# Patient Record
Sex: MALE | Race: WHITE | NOT HISPANIC OR LATINO | Employment: OTHER | URBAN - NONMETROPOLITAN AREA
[De-identification: names, ages, dates, MRNs, and addresses within clinical notes are randomized per-mention and may not be internally consistent; named-entity substitution may affect disease eponyms.]

---

## 2017-01-27 ENCOUNTER — HISTORY (OUTPATIENT)
Dept: FAMILY MEDICINE | Facility: OTHER | Age: 77
End: 2017-01-27

## 2017-01-27 ENCOUNTER — OFFICE VISIT - GICH (OUTPATIENT)
Dept: FAMILY MEDICINE | Facility: OTHER | Age: 77
End: 2017-01-27

## 2017-01-27 DIAGNOSIS — R20.2 PARESTHESIA OF SKIN: ICD-10-CM

## 2017-01-27 DIAGNOSIS — I10 ESSENTIAL (PRIMARY) HYPERTENSION: ICD-10-CM

## 2017-01-27 DIAGNOSIS — E55.9 VITAMIN D DEFICIENCY: ICD-10-CM

## 2017-01-27 DIAGNOSIS — G25.0 ESSENTIAL TREMOR: ICD-10-CM

## 2017-01-27 LAB
VIT B12 SERPL-MCNC: 647 PG/ML (ref 180–914)
VITAMIN D TOTAL - HISTORICAL: 36.6 NG/ML

## 2017-01-27 ASSESSMENT — PATIENT HEALTH QUESTIONNAIRE - PHQ9: SUM OF ALL RESPONSES TO PHQ QUESTIONS 1-9: 0

## 2017-02-07 ENCOUNTER — COMMUNICATION - GICH (OUTPATIENT)
Dept: FAMILY MEDICINE | Facility: OTHER | Age: 77
End: 2017-02-07

## 2017-02-07 ENCOUNTER — HOSPITAL ENCOUNTER (OUTPATIENT)
Dept: RADIOLOGY | Facility: OTHER | Age: 77
End: 2017-02-07
Attending: FAMILY MEDICINE

## 2017-02-07 DIAGNOSIS — R20.2 PARESTHESIA OF SKIN: ICD-10-CM

## 2017-02-07 DIAGNOSIS — G25.0 ESSENTIAL TREMOR: ICD-10-CM

## 2017-02-07 DIAGNOSIS — I10 ESSENTIAL (PRIMARY) HYPERTENSION: ICD-10-CM

## 2017-02-27 ENCOUNTER — COMMUNICATION - GICH (OUTPATIENT)
Dept: FAMILY MEDICINE | Facility: OTHER | Age: 77
End: 2017-02-27

## 2017-04-18 ENCOUNTER — HISTORY (OUTPATIENT)
Dept: EMERGENCY MEDICINE | Facility: OTHER | Age: 77
End: 2017-04-18

## 2017-06-19 ENCOUNTER — COMMUNICATION - GICH (OUTPATIENT)
Dept: FAMILY MEDICINE | Facility: OTHER | Age: 77
End: 2017-06-19

## 2017-06-19 DIAGNOSIS — G25.0 ESSENTIAL TREMOR: ICD-10-CM

## 2017-08-08 ENCOUNTER — OFFICE VISIT - GICH (OUTPATIENT)
Dept: FAMILY MEDICINE | Facility: OTHER | Age: 77
End: 2017-08-08

## 2017-08-08 ENCOUNTER — HISTORY (OUTPATIENT)
Dept: FAMILY MEDICINE | Facility: OTHER | Age: 77
End: 2017-08-08

## 2017-08-08 DIAGNOSIS — T16.2XXA FOREIGN BODY OF LEFT EAR: ICD-10-CM

## 2017-10-04 ENCOUNTER — HISTORY (OUTPATIENT)
Dept: INTERNAL MEDICINE | Facility: OTHER | Age: 77
End: 2017-10-04

## 2017-10-04 ENCOUNTER — OFFICE VISIT - GICH (OUTPATIENT)
Dept: INTERNAL MEDICINE | Facility: OTHER | Age: 77
End: 2017-10-04

## 2017-10-04 DIAGNOSIS — L57.0 ACTINIC KERATOSIS: ICD-10-CM

## 2017-10-04 ASSESSMENT — PATIENT HEALTH QUESTIONNAIRE - PHQ9: SUM OF ALL RESPONSES TO PHQ QUESTIONS 1-9: 0

## 2017-10-09 ENCOUNTER — HISTORY (OUTPATIENT)
Dept: UROLOGY | Facility: OTHER | Age: 77
End: 2017-10-09

## 2017-10-09 ENCOUNTER — AMBULATORY - GICH (OUTPATIENT)
Dept: UROLOGY | Facility: OTHER | Age: 77
End: 2017-10-09

## 2017-10-09 DIAGNOSIS — Z85.51 PERSONAL HISTORY OF MALIGNANT NEOPLASM OF BLADDER: ICD-10-CM

## 2017-10-13 ENCOUNTER — AMBULATORY - GICH (OUTPATIENT)
Dept: SCHEDULING | Facility: OTHER | Age: 77
End: 2017-10-13

## 2017-12-07 ENCOUNTER — COMMUNICATION - GICH (OUTPATIENT)
Dept: INTERNAL MEDICINE | Facility: OTHER | Age: 77
End: 2017-12-07

## 2017-12-07 DIAGNOSIS — I25.10 ATHEROSCLEROTIC HEART DISEASE OF NATIVE CORONARY ARTERY WITHOUT ANGINA PECTORIS: ICD-10-CM

## 2017-12-13 ENCOUNTER — HISTORY (OUTPATIENT)
Dept: INTERNAL MEDICINE | Facility: OTHER | Age: 77
End: 2017-12-13

## 2017-12-13 ENCOUNTER — OFFICE VISIT - GICH (OUTPATIENT)
Dept: INTERNAL MEDICINE | Facility: OTHER | Age: 77
End: 2017-12-13

## 2017-12-13 DIAGNOSIS — I10 ESSENTIAL (PRIMARY) HYPERTENSION: ICD-10-CM

## 2017-12-13 DIAGNOSIS — R53.83 OTHER FATIGUE: ICD-10-CM

## 2017-12-13 DIAGNOSIS — R73.03 PREDIABETES: ICD-10-CM

## 2017-12-13 DIAGNOSIS — R53.81 OTHER MALAISE: ICD-10-CM

## 2017-12-13 DIAGNOSIS — I25.10 ATHEROSCLEROTIC HEART DISEASE OF NATIVE CORONARY ARTERY WITHOUT ANGINA PECTORIS: ICD-10-CM

## 2017-12-13 DIAGNOSIS — E55.9 VITAMIN D DEFICIENCY: ICD-10-CM

## 2017-12-13 DIAGNOSIS — Z86.39 PERSONAL HISTORY OF OTHER ENDOCRINE, NUTRITIONAL AND METABOLIC DISEASE: ICD-10-CM

## 2017-12-13 DIAGNOSIS — G25.0 ESSENTIAL TREMOR: ICD-10-CM

## 2017-12-13 DIAGNOSIS — Z85.819 PERSONAL HISTORY OF MALIGNANT NEOPLASM OF UNSPECIFIED SITE OF LIP, ORAL CAVITY, AND PHARYNX: ICD-10-CM

## 2017-12-13 DIAGNOSIS — L57.0 ACTINIC KERATOSIS: ICD-10-CM

## 2017-12-13 DIAGNOSIS — E78.2 MIXED HYPERLIPIDEMIA: ICD-10-CM

## 2017-12-13 DIAGNOSIS — R73.09 OTHER ABNORMAL GLUCOSE: ICD-10-CM

## 2017-12-13 LAB
A/G RATIO - HISTORICAL: 1.7 (ref 1–2)
ALBUMIN SERPL-MCNC: 4.1 G/DL (ref 3.5–5.7)
ALP SERPL-CCNC: 50 IU/L (ref 34–104)
ALT (SGPT) - HISTORICAL: 20 IU/L (ref 7–52)
ANION GAP - HISTORICAL: 7 (ref 5–18)
AST SERPL-CCNC: 23 IU/L (ref 13–39)
BACTERIA URINE: NORMAL BACTERIA/HPF
BILIRUB SERPL-MCNC: 0.5 MG/DL (ref 0.3–1)
BILIRUB UR QL: NEGATIVE
BUN SERPL-MCNC: 8 MG/DL (ref 7–25)
BUN/CREAT RATIO - HISTORICAL: 10
CALCIUM SERPL-MCNC: 8.7 MG/DL (ref 8.6–10.3)
CHLORIDE SERPLBLD-SCNC: 102 MMOL/L (ref 98–107)
CHOL/HDL RATIO - HISTORICAL: 2.8
CHOLESTEROL TOTAL: 112 MG/DL
CLARITY, URINE: CLEAR CLARITY
CO2 SERPL-SCNC: 26 MMOL/L (ref 21–31)
COLOR UR: YELLOW COLOR
CREAT SERPL-MCNC: 0.83 MG/DL (ref 0.7–1.3)
EPITHELIAL CELLS: NORMAL EPI/HPF
ERYTHROCYTE [DISTWIDTH] IN BLOOD BY AUTOMATED COUNT: 12.2 % (ref 11.5–15.5)
ESTIMATED AVERAGE GLUCOSE: 126 MG/DL
GFR IF NOT AFRICAN AMERICAN - HISTORICAL: >60 ML/MIN/1.73M2
GLOBULIN - HISTORICAL: 2.4 G/DL (ref 2–3.7)
GLUCOSE SERPL-MCNC: 109 MG/DL (ref 70–105)
GLUCOSE URINE: NEGATIVE MG/DL
HCT VFR BLD AUTO: 41.5 % (ref 37–53)
HDLC SERPL-MCNC: 40 MG/DL (ref 23–92)
HEMOGLOBIN A1C MONITORING (POCT) - HISTORICAL: 6 % (ref 4–6.2)
HEMOGLOBIN: 14.5 G/DL (ref 13.5–17.5)
KETONES UR QL: NEGATIVE MG/DL
LDLC SERPL CALC-MCNC: 57 MG/DL
LEUKOCYTE ESTERASE URINE: NEGATIVE
MCH RBC QN AUTO: 33.8 PG (ref 26–34)
MCHC RBC AUTO-ENTMCNC: 34.9 G/DL (ref 32–36)
MCV RBC AUTO: 97 FL (ref 80–100)
NITRITE UR QL STRIP: NEGATIVE
NON-HDL CHOLESTEROL - HISTORICAL: 72 MG/DL
OCCULT BLOOD,URINE - HISTORICAL: NEGATIVE
OTHER: NORMAL
PH UR: 7.5 [PH]
PLATELET # BLD AUTO: 322 THOU/CU MM (ref 140–440)
PMV BLD: 9.6 FL (ref 6.5–11)
POTASSIUM SERPL-SCNC: 4.6 MMOL/L (ref 3.5–5.1)
PROT SERPL-MCNC: 6.5 G/DL (ref 6.4–8.9)
PROTEIN QUALITATIVE,URINE - HISTORICAL: 30 MG/DL
PROVIDER ORDERDED STATUS - HISTORICAL: NORMAL
RBC - HISTORICAL: NORMAL /HPF
RED BLOOD COUNT - HISTORICAL: 4.29 MIL/CU MM (ref 4.3–5.9)
SODIUM SERPL-SCNC: 135 MMOL/L (ref 133–143)
SP GR UR STRIP: 1.01
TRIGL SERPL-MCNC: 75 MG/DL
TSH - HISTORICAL: 3.34 UIU/ML (ref 0.34–5.6)
UROBILINOGEN,QUALITATIVE - HISTORICAL: NORMAL EU/DL
VIT B12 SERPL-MCNC: 678 PG/ML (ref 180–914)
VITAMIN D TOTAL - HISTORICAL: 34.3 NG/ML
WBC - HISTORICAL: NORMAL /HPF
WHITE BLOOD COUNT - HISTORICAL: 7.5 THOU/CU MM (ref 4.5–11)

## 2017-12-13 ASSESSMENT — PATIENT HEALTH QUESTIONNAIRE - PHQ9: SUM OF ALL RESPONSES TO PHQ QUESTIONS 1-9: 0

## 2017-12-18 ENCOUNTER — COMMUNICATION - GICH (OUTPATIENT)
Dept: INTERNAL MEDICINE | Facility: OTHER | Age: 77
End: 2017-12-18

## 2017-12-27 NOTE — PROGRESS NOTES
"Patient Information     Patient Name MRN Sex Rajesh Boothe [id number] Male 1940      Progress Notes by Rhina Omer NP at 2017  9:00 AM     Author:  Rhina Omer NP Service:  (none) Author Type:  PHYS- Nurse Practitioner     Filed:  2017  9:51 AM Encounter Date:  2017 Status:  Signed     :  Rhina Omer NP (PHYS- Nurse Practitioner)            HPI:  Nursing Notes:   Rin Perera  2017  9:13 AM  Signed  Patient presents to clinic with part of his hearing aid stuck in his left ear.  Rin PereraLPN ....................  2017   9:02 AM      Rajesh Huynh is a 77 y.o. male who presents to clinic today for hearing aid \"dome\"  stuck in left ear canal since last night. Has happened in the past and has been removed by medical staff without difficulty. The ear feels mildly painful, like the dome is pressing against the ear. He has tried to remove the dome without success.    Past Medical History:     Diagnosis  Date     Chronic pancreatitis (HC)      DM2 (diabetes mellitus, type 2) (HC)      ED (erectile dysfunction)      Former smoker     2ppd - quit       HTN (hypertension)      Hx of radiation therapy 2012-3/2/2012     Hyperlipidemia      Myeloproliferative disorder (HC)     questionable      Personal history of malignant neoplasm of bladder 2015     Pneumonia      Squamous cell carcinoma of epiglottis (HC)      Radiation      STEMI (ST elevation myocardial infarction) () 2013    Integrity BMS to RCA done at [facility]      Past Surgical History:      Procedure  Laterality Date     APPENDECTOMY  1970     CHOLECYSTECTOMY       COLONOSCOPY SCREENING  2005     COLONOSCOPY SCREENING  2015    no repeat due at this time       CORONARY STENT PLACEMENT  2013    Bare-metal stent to dominant RCA       CYSTOSCOPY  Multiple times     Dr. Lyndon FRANK       ERCP      xseveral, " one with papillotomy       Hx Whipple procedure  2004    for chronic pancreatitis       LEFT TOTAL SHOULDER REPLACEMENT  10/22/15     NECK/THORAX PROCEDURE  12/7/2011    L modified radical neck surgery       RADIATION TREATMENT  1/30/2012-3/2/2012     Resection Transurethral Bladder Tumor  11/10/14    Dr. Lyndon FRANK       REVISION EYELID  2012    Dr. Farias       SHOULDER ARTHROSCOPY  11/2005    right shoulder       SHOULDER ARTHROSCOPY  1/2006    left shoulder       SHOULDER ARTHROSCOPY  2011    Left shoulder scope SAD, ac.  Open subscap, biceps       SHOULDER REPLACEMENT Right 6/22/2007    with biceps tenodesis by Dr. Mukesh Ayoub       TONSIL AND ADENOIDECTOMY  1947     VASECTOMY  1980     Social History      Substance Use Topics        Smoking status:  Former Smoker     Packs/day: 3.00     Years: 43.00     Types: Cigarettes     Quit date: 1/1/2000     Smokeless tobacco:  Never Used     Alcohol use  No     Current Outpatient Prescriptions       Medication  Sig Dispense Refill     ACCU-CHEK RD PLUS TEST STRP strip USE ONE STRIP TO CHECK GLUCOSE THREE TIMES DAILY 300 Strip 2     aspirin enteric coated 81 mg tablet Take 1 tablet by mouth once daily with a meal.  0     atorvastatin (LIPITOR) 40 mg tablet Take 1 tablet by mouth once daily with evening meal. 90 tablet 2     betamethasone,augmented dipropionate 0.05% (DIPROLENE LOTION 0.05%) lotion        blood-glucose meter Dispense Accu-Chek Rd  Meter. E11.9 NIDDM type II - Test 2 times/day. Reason: New medication 1 Device 1     calcium carbonate-cholecalciferol, 600mg-200 units, (CALCIUM 600 + D,3,) 600 mg(1,500mg) -200 unit tablet Take 1 tablet by mouth 2 times daily with meals.  0     Fish Oil-DHA-EPA 1,200-144-216 mg cap Take 1 capsule by mouth 2 times daily.  0     lancets (ACCU-CHEK SOFTCLIX LANCETS) Dispense item covered by pt ins. E11.9 NIDDM type II - Test 2 times/day. Reason: High A1C 100 Each 3     metFORMIN (GLUCOPHAGE) 1,000 mg tablet Take 1 tablet  "by mouth 2 times daily with meals. 180 tablet 3     multivitamin (MVI) tablet Take 1 tablet by mouth once daily.  0     nitroglycerin (NITROSTAT) 0.4 mg sublingual tablet Place 1 tablet under the tongue every 5 minutes if needed for Chest Pain. 25 tablet 0     primidone (MYSOLINE) 50 mg tablet TAKE TWO TABLETS BY MOUTH AT BEDTIME 60 tablet 5     propranolol (INDERAL) 20 mg tablet Take 1 tablet by mouth 2 times daily. 180 tablet 3     Vacuum Erection Device System kit As directed. Use as instructed for ED 1 Kit 0     No current facility-administered medications for this visit.      Medications have been reviewed by me and are current to the best of my knowledge and ability.    No Known Allergies    ROS:  Refer to HPI    /74  Pulse 68  Temp 98.2  F (36.8  C) (Tympanic)   Ht 1.715 m (5' 7.5\")  Wt 79.5 kg (175 lb 3.2 oz)  BMI 27.04 kg/m2    EXAM:  General Appearance: Well appearing male,  appropriate appearance for age. No acute distress  Head: normocephalic, atraumatic  Ears: Large dome obscuring left ear canal prior to removing it  Psychological: normal affect, alert and pleasant    ASSESSMENT/PLAN:    ICD-10-CM    1. Ear foreign body, left, initial encounter T16.2XXA    Hearing aid dome stuck in ear canal  On exam: Black hearing aid dome stuck in left ear canal  Easily removed with alligator clamp forceps  Follow up as needed    There are no Patient Instructions on file for this visit.          "

## 2017-12-28 ENCOUNTER — COMMUNICATION - GICH (OUTPATIENT)
Dept: INTERNAL MEDICINE | Facility: OTHER | Age: 77
End: 2017-12-28

## 2017-12-28 NOTE — PATIENT INSTRUCTIONS
Patient Information     Patient Name MRN Rajesh Ball [id number] Male 1940      Patient Instructions by Jovanni Sen MD at 10/4/2017 10:40 AM     Author:  Jovanni Sen MD Service:  (none) Author Type:  Physician     Filed:  10/4/2017 11:05 AM Encounter Date:  10/4/2017 Status:  Signed     :  Jovanni Sen MD (Physician)            What to Expect Following Cryosurgery (Liquid Nitrogen)   What is Cryosurgery?   Cryosurgery is a technique for removing skin lesions that primarily involve the surface of the skin, such as warts, seborrheic keratosis, or actinic keratosis. It is a quick method of removing the lesion with minimal scarring.   The liquid nitrogen needs to be applied long enough to freeze the affected skin. By freezing the skin, a blister is created underneath the lesion. Ideally, as the new skin forms underneath the blister, the abnormal skin on the roof of the blister peels off. Occasionally, if the lesion is very thick (such as a large wart), only the surface is blistered off. The base or residual lesion may need to be frozen at another visit.   It takes about one to two weeks for the scab to fall off, which is when the new layer of skin has formed under the blister. Areas of thinner skin, such as the face, may heal a little faster.      What to Expect Over the Next Few Weeks     During Treatment - Area being treated will sting, burn, and then possibly itch.     Immediately After Treatment - Area will be red, sore, and swollen.     Next Day - Blister or blood blister has formed, tenderness starts to subside. Apply a Band-Aid if   necessary.     7 Days - Surface is dark red/brown and scab-like. Apply Vaseline  or an antibacterial ointment, such as Polysporin , if necessary.     2 to 4 Weeks - The surface starts to peel off. This may be encouraged gently during bathing, when the scab is softened.     No makeup should be applied until area is fully healed.      How to  Take Care of the Skin after Cryosurgery     A Band-Aid can be used for larger blisters or blisters in areas that are more likely to be traumatized -such as fingers and toes. If the area becomes dry or crusted, an ointment (Vaseline , Bacitracin , or Polysporin ) can also be applied.     Cleanse area with a mild cleanser and cool water.     Pat the area dry with a lint-free cloth and apply an ointment (Vaseline , Bacitracin , or Polysporin ).     Avoid glycolic acids, Vitamin C, scrubs, Tretinoin (Retin-A), and Retinol creams for 7 to 10 days.     If approved by your Provider, you may bathe, swim, exercise, and otherwise follow all of your normal activities.     The area may get wet while bathing, but swimming or hot tub use should be avoided for one week following a treatment or while the skin is open.     Within 24 hours, you can expect the area to be swollen and/or blistered. The blister may not be visible to the naked eye.     Within one week, the swelling goes down. The top becomes dark red and scab-like. The scab will loosen over the next weeks, and should fall off within one month.      Adverse Effects     The most common adverse effects are pain, swelling/blistering, potential for infection, and pale discoloration of the skin after it heals.      Blisters        Anytime a blister surfaces, whether from ill-fitting shoes, an oven burn, or liquid nitrogen cryosurgery, it will be a bit painful. For most patients, the pain is a temporary sting with some discomfort periodically over the next day as the blister forms.     The goal is to achieve a blister. This means, most commonly, patients will have a blister form following treatment. Sometimes, the blister is so thin that it can't be seen and may have minimal swelling. Occasionally, a blood blister forms that can be quite dramatic but is harmless.     Rarely, the blister may become infected. When this happens, the blister becomes unusually tender, the fluid  becomes cloudy, and the redness around it becomes more extensive (and may even form streaks). If this happens, contact our office.     Some lesions, especially those on the face, may leave a slight pale discoloration.     True scarring, involving deeper layers of the skin is unlikely.       Establish Care visit in Middle of December sometime.

## 2017-12-28 NOTE — TELEPHONE ENCOUNTER
Patient Information     Patient Name MRN Sex Rajesh Boothe [id number] Male 1940      Telephone Encounter by Jeanne Handy RN at 2017  3:26 PM     Author:  Jeanne Handy RN Service:  (none) Author Type:  NURS- Registered Nurse     Filed:  2017  3:32 PM Encounter Date:  2017 Status:  Signed     :  Jeanne Handy RN (NURS- Registered Nurse)            In clinical absence of patient's primary, Hosea Lu MD, patient is requesting that this message be sent to the primary provider's Teamlet for consideration please.        This is a Refill request from: [facility]   Name of Medication: Primidone  Quantity requested: 60 with 5 refills  Last fill date: 16  Due for refill: yes  Last visit with HOSEA LU was on: 2017 in [facility]  PCP:  Hosea Lu MD  Controlled Substance Agreement:  n/a   Diagnosis r/t this medication request: Tremor     Unable to complete prescription refill per RN Medication Refill Policy.................... Jeanne Handy RN ....................  2017   3:28 PM

## 2017-12-28 NOTE — PROGRESS NOTES
"Patient Information     Patient Name MRN Rajesh Ball [id number] Male 1940      Progress Notes by Jovanni Sen MD at 10/4/2017 10:40 AM     Author:  Jovanni Sen MD Service:  (none) Author Type:  Physician     Filed:  10/4/2017 11:16 AM Encounter Date:  10/4/2017 Status:  Signed     :  Jovanni Sen MD (Physician)            Nursing Notes:   Kate Gar  10/4/2017 10:53 AM  Signed  Previous A1C is at goal of <8  HEMOGLOBIN A1C MONITORING (POCT)    Date Value   2016 5.6 %   2011 6.0 % Total Hgb     HEMOGLOBIN A1C GIH (%)    Date Value   2012 5.7     Urine microalbumin:creatine: n/a  Foot exam unknown  Eye exam this past year    Patient is not a current smoker  Patient is on a daily aspirin  Patient is on a Statin.  Blood pressure today of   is at the goal of <139/89 for diabetics.    Kate Gar LPN..............10/4/2017 10:37 AM      Kate Gar  10/4/2017 10:53 AM  Signed  Patient presents to the clinic for multiple \"dry\" spots on the left side of the face and in the left ear noted over the past year.    Kate Gar LPN        10/4/2017 10:39 AM    Chief Complaint    Patient presents with      Derm Problem     S: Patient presented his wife due to multiple little red scaly spots on his head neck and face.  He's had some of them for over a year.  He used to spend many hours at side and was sunburned many times.  He does not have history of skin cancer.  He does have multiple other cancers in the past including bladder cancer, myeloproliferative disorders, epiglottis squamous cell carcinoma.    States that the lesions do seem to get itch and traumatized at times.  None of them have really cause any bleeding.  He has had a few new ones.    ROS: No shortness breath.  No chest pain or heaviness.  No bruising or bleeding issues.  No recent fevers or chills.    O:   Vitals:     10/04/17 1039   BP: 136/82   Pulse: 64   Weight: 80.3 kg (177 lb 2 oz)     BP " Readings from Last 3 Encounters:    10/04/17 136/82   08/08/17 128/74   04/18/17 103/82     EXAM:  Pleasant 77-year-old male in no apparent distress.  Wears glasses.  Breathing is nonlabored, no wheezing.  Pulses regular.  Got up onto the examination table without difficulty.    Multiple actinic keratoses noted on head/neck - 12 noted - one on right ear, right sideburn, right temple, 2 on the right upper hairline.  3 along left upper hairline, 3 along left sideburn, 1 on left neck just behind the left ear.    PROCEDURE:   Reviewed risks and benefits of cryotherapy. After informed consent was obtained, patient elected to proceed. These 12 lesions were treated today.   Performed cryotherapy to #12 lesions x 2 rounds of 30 second freeze/thaw cycles.   Tolerated well. No obvious complications.   Return for signs of infection.    The patient and I reviewed safe sun practices today: the importance of staying out of the sun; especially between 10AM-2PM, wearing sun screen SPF > 30, and protective clothing.   We also discussed the ABC's of skin cancers including: changes in size, uniformity, borders, coloration, bleeding, or any irregularity or changes. If these occur, seek medical attention.   The patient should have a complete skin exam by a medical professional every 6-12 months, and any questionable/suspicious, or changing lesions should be removed.     A:   Rajesh was seen today for derm problem.    Diagnoses and all orders for this visit:    Actinic keratosis of multiple sites of head and neck  -     WA DESTROY PREMALIGNANT 1ST LESION  -     WA DESTROY PREMALIGNANT LESION EA 2-14      P:   Return for -- Establish Care in Mid-December.    Patient Instructions   What to Expect Following Cryosurgery (Liquid Nitrogen)   What is Cryosurgery?   Cryosurgery is a technique for removing skin lesions that primarily involve the surface of the skin, such as warts, seborrheic keratosis, or actinic keratosis. It is a quick method  of removing the lesion with minimal scarring.   The liquid nitrogen needs to be applied long enough to freeze the affected skin. By freezing the skin, a blister is created underneath the lesion. Ideally, as the new skin forms underneath the blister, the abnormal skin on the roof of the blister peels off. Occasionally, if the lesion is very thick (such as a large wart), only the surface is blistered off. The base or residual lesion may need to be frozen at another visit.   It takes about one to two weeks for the scab to fall off, which is when the new layer of skin has formed under the blister. Areas of thinner skin, such as the face, may heal a little faster.      What to Expect Over the Next Few Weeks     During Treatment - Area being treated will sting, burn, and then possibly itch.     Immediately After Treatment - Area will be red, sore, and swollen.     Next Day - Blister or blood blister has formed, tenderness starts to subside. Apply a Band-Aid if   necessary.     7 Days - Surface is dark red/brown and scab-like. Apply Vaseline  or an antibacterial ointment, such as Polysporin , if necessary.     2 to 4 Weeks - The surface starts to peel off. This may be encouraged gently during bathing, when the scab is softened.     No makeup should be applied until area is fully healed.      How to Take Care of the Skin after Cryosurgery     A Band-Aid can be used for larger blisters or blisters in areas that are more likely to be traumatized -such as fingers and toes. If the area becomes dry or crusted, an ointment (Vaseline , Bacitracin , or Polysporin ) can also be applied.     Cleanse area with a mild cleanser and cool water.     Pat the area dry with a lint-free cloth and apply an ointment (Vaseline , Bacitracin , or Polysporin ).     Avoid glycolic acids, Vitamin C, scrubs, Tretinoin (Retin-A), and Retinol creams for 7 to 10 days.     If approved by your Provider, you may bathe, swim, exercise, and otherwise follow  all of your normal activities.     The area may get wet while bathing, but swimming or hot tub use should be avoided for one week following a treatment or while the skin is open.     Within 24 hours, you can expect the area to be swollen and/or blistered. The blister may not be visible to the naked eye.     Within one week, the swelling goes down. The top becomes dark red and scab-like. The scab will loosen over the next weeks, and should fall off within one month.      Adverse Effects     The most common adverse effects are pain, swelling/blistering, potential for infection, and pale discoloration of the skin after it heals.      Blisters        Anytime a blister surfaces, whether from ill-fitting shoes, an oven burn, or liquid nitrogen cryosurgery, it will be a bit painful. For most patients, the pain is a temporary sting with some discomfort periodically over the next day as the blister forms.     The goal is to achieve a blister. This means, most commonly, patients will have a blister form following treatment. Sometimes, the blister is so thin that it can't be seen and may have minimal swelling. Occasionally, a blood blister forms that can be quite dramatic but is harmless.     Rarely, the blister may become infected. When this happens, the blister becomes unusually tender, the fluid becomes cloudy, and the redness around it becomes more extensive (and may even form streaks). If this happens, contact our office.     Some lesions, especially those on the face, may leave a slight pale discoloration.     True scarring, involving deeper layers of the skin is unlikely.       Establish Care visit in Middle of December sometime.     Jovanni Sen MD

## 2017-12-28 NOTE — PROGRESS NOTES
Patient Information     Patient Name MRN Sex Rajesh Boothe [id number] Male 1940      Progress Notes by Anthony Haney MD at 10/9/2017  8:00 AM     Author:  Anthony Haney MD Service:  (none) Author Type:  Physician     Filed:  10/9/2017  8:38 AM Encounter Date:  10/9/2017 Status:  Signed     :  Anthony Haney MD (Physician)            Preprocedure diagnosis  History of bladder cancer    Postprocedure diagnosis  History of bladder cancer    Procedure  Flexible Cystourethroscopy    Surgeon  Anthony Haney MD    Anesthesia  2% lidocaine jelly intraurethrally    Complications  None    Indications  77 y.o. male undergoing a flexible cystoscopy for the above mentioned indications.    Pathology   I personally reviewed the pathology report   11/10/2014   Low grade Ta (muscularis propria was present in specimen)    Findings  Cystoscopic findings included a normal anterior urethra.    There was a prominent median lobe.    The lateral lobes were obstructive in appearance.  The bladder appeared to be normal capacity.    There were no tumors, stones or foreign bodies.    The orifices were slit-shaped and in their normal location.    Procedure  The patient was placed in supine position and prepped and draped in sterile fashion with lidocaine jelly per urethra for anesthesia.    I passed a lubricated 14F flexible cystoscope through the penile urethra and into the bladder and the bladder was completely visualized.  The cystoscope was retroflexed and the bladder neck and prostate visualized.    The cystoscope was slowly withdrawn while visualizing the urethra and the procedure terminated.    The patient tolerated the procedure well.      Plan  Follow up surveillance cystoscopy annually

## 2017-12-28 NOTE — PATIENT INSTRUCTIONS
Patient Information     Patient Name MRRajesh Maxwell [id number] Male 1940      Patient Instructions by Rosio Rivera RN at 10/9/2017  8:00 AM     Author:  Rosio Rivera RN Service:  (none) Author Type:  NURS- Registered Nurse     Filed:  10/9/2017  8:17 AM Encounter Date:  10/9/2017 Status:  Signed     :  Rosio Rivera RN (NURS- Registered Nurse)            Home Care after Cystoscopy  Follow these guidelines for your care after your procedure.    Activity  No limitations    Bathing or showering  No limitations    Symptoms  You may notice some burning with urination but this usually resolves after 1-2 days.  You may also notice small amounts of blood in your urine.  Please increase water intake for the next few days to help with these symptoms.    Contacts  General Questions: [phone]  Appointments:  [phone]  Emergencies:  [phone]    When to call the clinic  If you develop any of the following symptoms please call the clinic immediately.  If the clinic is closed please be seen at an urgent care clinic or the Emergency Department.  - Burning with urination that worsens after 2 days  - Unable to urinate causing severe pelvic pain  - Fevers of greater than 101 degrees F  - Flank pain that is not responding to pain medication    Follow up  Please follow up as discussed at the appointment.

## 2017-12-30 NOTE — NURSING NOTE
"Patient Information     Patient Name MRN Rajesh Ball [id number] Male 1940      Nursing Note by Kate Gar at 10/4/2017 10:40 AM     Author:  Kate Gar Service:  (none) Author Type:  (none)     Filed:  10/4/2017 10:53 AM Encounter Date:  10/4/2017 Status:  Signed     :  Kate Gar            Patient presents to the clinic for multiple \"dry\" spots on the left side of the face and in the left ear noted over the past year.    Kate Gar LPN        10/4/2017 10:39 AM          "

## 2017-12-30 NOTE — NURSING NOTE
Patient Information     Patient Name MRN Sex Rajesh Boothe [id number] Male 1940      Nursing Note by Rosio Rivera RN at 10/9/2017  8:00 AM     Author:  Rosio Rivera RN Service:  (none) Author Type:  NURS- Registered Nurse     Filed:  10/9/2017  8:33 AM Encounter Date:  10/9/2017 Status:  Signed     :  Rosio Rivera RN (NURS- Registered Nurse)            Patient positioned in supine position, perineum area prepped with chlorhexidene Gluconate and patient draped per sterile technique. Per verbal order read back by Anthony Hnaey MD, Urojet 10mL 2% lidocaine jelly to be instilled into urethra.  Urojet- 10ml 2% Lidocaine jelly instilled into the urethra.    Urojet 2%  Lot#: [id number]  Expiration date:   : [employer]  NDC: [address]-3    Bath Protocol    A. Pre-procedure verification complete yes  1-relevant information / documentation available, reviewed and properly matched to the patient; 2-consent accurate and complete, 3-equipment and supplies available    B. Site marking complete N/A  Site marked if not in continuous attendance with patient    C. TIME OUT completed yes  Time Out was conducted just prior to starting procedure to verify the eight required elements: 1-patient identity, 2-consent accurate and complete, 3-position, 4-correct side/site marked (if applicable), 5-procedure, 6-relevant images / results properly labeled and displayed (if applicable), 7-antibiotics / irrigation fluids (if applicable), 8-safety precautions.    After procedure perineum area rinsed. Discharge instructions reviewed with patient. Patient verbalized understanding of discharge instructions and discharged ambulatory.

## 2017-12-30 NOTE — NURSING NOTE
Patient Information     Patient Name MRN Rajesh Ball [id number] Male 1940      Nursing Note by Rin Perera at 2017  9:00 AM     Author:  Rin Perera Service:  (none) Author Type:  (none)     Filed:  2017  9:13 AM Encounter Date:  2017 Status:  Signed     :  Rin Perera            Patient presents to clinic with part of his hearing aid stuck in his left ear.  Rin Weiner ....................  2017   9:02 AM

## 2017-12-30 NOTE — NURSING NOTE
Patient Information     Patient Name MRN Sex Rajesh Boothe [id number] Male 1940      Nursing Note by Kate Gar at 10/4/2017 10:40 AM     Author:  Kate Gar Service:  (none) Author Type:  (none)     Filed:  10/4/2017 10:53 AM Encounter Date:  10/4/2017 Status:  Signed     :  Kate Gar            Previous A1C is at goal of <8  HEMOGLOBIN A1C MONITORING (POCT)    Date Value   2016 5.6 %   2011 6.0 % Total Hgb     HEMOGLOBIN A1C GIH (%)    Date Value   2012 5.7     Urine microalbumin:creatine: n/a  Foot exam unknown  Eye exam this past year    Patient is not a current smoker  Patient is on a daily aspirin  Patient is on a Statin.  Blood pressure today of   is at the goal of <139/89 for diabetics.    Kate Gar LPN..............10/4/2017 10:37 AM

## 2018-01-02 ENCOUNTER — COMMUNICATION - GICH (OUTPATIENT)
Dept: FAMILY MEDICINE | Facility: OTHER | Age: 78
End: 2018-01-02

## 2018-01-02 DIAGNOSIS — E11.9 TYPE 2 DIABETES MELLITUS WITHOUT COMPLICATIONS (H): ICD-10-CM

## 2018-01-03 ENCOUNTER — COMMUNICATION - GICH (OUTPATIENT)
Dept: INTERNAL MEDICINE | Facility: OTHER | Age: 78
End: 2018-01-03

## 2018-01-03 DIAGNOSIS — E11.9 TYPE 2 DIABETES MELLITUS WITHOUT COMPLICATIONS (H): ICD-10-CM

## 2018-01-03 NOTE — TELEPHONE ENCOUNTER
Patient Information     Patient Name MRN Sex Rajesh Boothe [id number] Male 1940      Telephone Encounter by Flaquita Aguilar at 2017 11:06 AM     Author:  Flaquita Aguilar Service:  (none) Author Type:  (none)     Filed:  2017 11:08 AM Encounter Date:  2017 Status:  Signed     :  Flaquita Aguilar            Spouse has questions regarding medication the [facility] was to call in for patient.  Please call.

## 2018-01-03 NOTE — TELEPHONE ENCOUNTER
Patient Information     Patient Name MRRajesh Maxwell [id number] Male 1940      Telephone Encounter by Tanya Herrera at 2017 11:20 AM     Author:  Tanya Herrera Service:  (none) Author Type:  (none)     Filed:  2017 11:28 AM Encounter Date:  2017 Status:  Signed     :  Tanya Herrera            Talked with wife and she states that she contacted the pharmacy for refills and  they will be faxing over requests as Marleni Sutton MD was to refill all of Kiesha medications for a years worth when he was in on 16 and not all were refilled. This is just an FYI.  Tanya Herrera LPN  2017  11:28 AM

## 2018-01-03 NOTE — PROGRESS NOTES
"Patient Information     Patient Name MRRajesh Maxwell [id number] Male 1940      Progress Notes by Marleni Sutton MD at 2017  9:15 AM     Author:  Marleni Sutton MD Service:  (none) Author Type:  Physician     Filed:  2017 12:26 PM Encounter Date:  2017 Status:  Signed     :  Marleni Sutton MD (Physician)            SUBJECTIVE:  76 y.o. male presents for ER followup. He was seen 17 for a diffuse sensation of tingling. He is quite unable to localize this; describes it as \"everywhere\". Not worse or better in any specific body part; says skin felt tingly as if a body part had \"gone to sleep\" and then started moving again. Has had similar but milder symptoms since then, once daily or less. 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   Blood pressure was elevated in ED, has been on low side of goal range since then.  Has had some low blood sugars in the 60s. These typically occur late morning or early afternoon especially when working outdoors. Sometimes has skipped breakfast or been late for lunch. Doesn't want to change metformin dose.    REVIEW OF SYSTEMS:    Gastrointestinal: has been taking omeprazole OTC for heartburn; Sx now well controlled.     Neuro: had some leg cramps and was taking quinine; none now for several days with no difference in \"tingling\" sensations.      Current Outpatient Prescriptions       Medication  Sig Dispense Refill     ACCU-CHEK AGNES PLUS TEST STRP strip USE ONE STRIP TO CHECK GLUCOSE THREE TIMES DAILY 300 Strip 2     aspirin enteric coated 81 mg tablet Take 1 tablet by mouth once daily with a meal.  0     atorvastatin (LIPITOR) 40 mg tablet Take 1 tablet by mouth once daily with evening meal. 90 tablet 2     betamethasone,augmented dipropionate 0.05% (DIPROLENE LOTION 0.05%) lotion        blood-glucose meter Dispense Accu-Chek Agnes  Meter. E11.9 NIDDM type II - " Test 2 times/day. Reason: New medication 1 Device 1     calcium carbonate-cholecalciferol, 600mg-200 units, (CALCIUM 600 + D,3,) 600 mg(1,500mg) -200 unit tablet Take 1 tablet by mouth 2 times daily with meals.  0     Fish Oil-DHA-EPA 1,200-144-216 mg cap Take 1 capsule by mouth 2 times daily.  0     lancets (ACCU-CHEK SOFTCLIX LANCETS) Dispense item covered by pt ins. E11.9 NIDDM type II - Test 2 times/day. Reason: High A1C 100 Each 3     metFORMIN (GLUCOPHAGE) 1,000 mg tablet Take 1 tablet by mouth 2 times daily with meals. 180 tablet 3     multivitamin (MVI) tablet Take 1 tablet by mouth once daily.  0     nitroglycerin (NITROSTAT) 0.4 mg sublingual tablet Place 1 tablet under the tongue every 5 minutes if needed for Chest Pain. 25 tablet 0     primidone (MYSOLINE) 50 mg tablet Take 2 tablets by mouth at bedtime. 60 tablet 2     propranolol (INDERAL) 20 mg tablet Take 1 tablet by mouth 2 times daily. 180 tablet 3     Vacuum Erection Device System kit As directed. Use as instructed for ED 1 Kit 0     No current facility-administered medications for this visit.      Medications have been reviewed by me and are current to the best of my knowledge and ability.      Allergies as of 01/27/2017      (No Known Allergies)        OBJECTIVE:  Visit Vitals       /74     Resp 14     Wt 78.7 kg (173 lb 9.6 oz)     BMI 27.19 kg/m2       General Appearance: Pleasant, alert, appropriate appearance for age. No acute distress  Head Exam: Normal. Normocephalic, atraumatic.  Eye Exam: Normal external eye, conjunctiva, lids, cornea. AMBER.  OroPharynx Exam: no mucosal lesions. Palate elevates midline.  Neck Exam: stable post-radiation scarring.  Thyroid Exam: No nodules or enlargement.  Chest/Respiratory Exam: Normal chest wall and respirations. Clear to auscultation.  Cardiovascular Exam: Regular rate and rhythm. S1, S2, no murmur, click, gallop, or rubs.  Gastrointestinal Exam: Soft, nontender, no abnormal masses or  organomegaly.  Neurologic Exam: Nonfocal; symmetric DTRs, normal gross motor movement, tone, and coordination. Intention tremor present on F-N-F testing; improved since starting primidone.    ASSESSMENT/PLAN:    ICD-10-CM   1. Paresthesias R20.2   2. Benign essential tremor G25.0   3. Essential hypertension I10   4. Vitamin D deficiency  E55.9       His symptom of paresthesias is extremely diffuse and not localized to any particular neurologic region. Suspect that it is therefore more of a metabolic process rather than related to neural pathology. However, given his history of vascular disease and history of cancer, will obtain a head MRI.  Medications reviewed and interactions checked. He had been taking quinine, which can potentiate the effects of metformin and may have resulted in some low blood sugars. He has stopped this and will stay off of it. Also discussed need for regular food intake especially when working outdoors.  Question if primidone may be causing his symptoms. Paresthesia is listed as an uncommon (less than 10%) side effect. He is having improvement in his tremor and would like to continue it.  Will also check labs today including vitamin D and B12. Other appropriate labs were done in the ER. Further management based on results of these labs and his MRI.

## 2018-01-03 NOTE — TELEPHONE ENCOUNTER
Patient Information     Patient Name MRN Rajesh Ball [id number] Male 1940      Telephone Encounter by Reyna Sinclair at 2017  4:23 PM     Author:  Reyna Sinclair Service:  (none) Author Type:  (none)     Filed:  2017  4:23 PM Encounter Date:  2017 Status:  Signed     :  Reyna Sinclair            Please see result note.   Reyna Sinclair LPN...................2017   4:23 PM

## 2018-01-24 ENCOUNTER — DOCUMENTATION ONLY (OUTPATIENT)
Dept: FAMILY MEDICINE | Facility: OTHER | Age: 78
End: 2018-01-24

## 2018-01-24 PROBLEM — Z86.39 HISTORY OF TYPE 2 DIABETES MELLITUS: Status: ACTIVE | Noted: 2018-01-24

## 2018-01-24 PROBLEM — Z47.89 AFTERCARE FOLLOWING SURGERY OF THE MUSCULOSKELETAL SYSTEM: Status: ACTIVE | Noted: 2017-06-21

## 2018-01-24 PROBLEM — E78.2 MIXED HYPERLIPIDEMIA: Status: ACTIVE | Noted: 2018-01-24

## 2018-01-24 PROBLEM — K86.1 PANCREATITIS, CHRONIC (H): Status: ACTIVE | Noted: 2018-01-24

## 2018-01-24 PROBLEM — F52.8 PSYCHOSEXUAL DYSFUNCTION WITH INHIBITED SEXUAL EXCITEMENT: Status: ACTIVE | Noted: 2018-01-24

## 2018-01-24 PROBLEM — R73.03 PREDIABETES: Status: ACTIVE | Noted: 2017-12-13

## 2018-01-24 PROBLEM — M75.111 PARTIAL TEAR OF RIGHT ROTATOR CUFF: Status: ACTIVE | Noted: 2017-06-21

## 2018-01-24 PROBLEM — G25.0 BENIGN ESSENTIAL TREMOR: Status: ACTIVE | Noted: 2018-01-24

## 2018-01-24 PROBLEM — I10 ESSENTIAL HYPERTENSION: Status: ACTIVE | Noted: 2018-01-24

## 2018-01-24 PROBLEM — Z85.819 HISTORY OF THROAT CANCER: Status: ACTIVE | Noted: 2017-12-13

## 2018-01-24 PROBLEM — D47.Z9 NEOPLASM OF UNCERTAIN BEHAVIOR OF OTHER LYMPHATIC AND HEMATOPOIETIC TISSUES(238.79): Status: ACTIVE | Noted: 2018-01-24

## 2018-01-24 RX ORDER — ASPIRIN 81 MG/1
81 TABLET ORAL
COMMUNITY
Start: 2013-03-04 | End: 2022-01-01

## 2018-01-24 RX ORDER — PROPRANOLOL HYDROCHLORIDE 20 MG/1
20 TABLET ORAL 2 TIMES DAILY
COMMUNITY
Start: 2017-12-13 | End: 2018-11-27

## 2018-01-24 RX ORDER — NITROGLYCERIN 0.4 MG/1
0.4 TABLET SUBLINGUAL EVERY 5 MIN PRN
COMMUNITY
Start: 2017-12-07 | End: 2018-11-27

## 2018-01-24 RX ORDER — ATORVASTATIN CALCIUM 40 MG/1
40 TABLET, FILM COATED ORAL
COMMUNITY
Start: 2017-12-13 | End: 2018-11-27

## 2018-01-24 RX ORDER — INSULIN PUMP SYRINGE, 3 ML
EACH MISCELLANEOUS
COMMUNITY
Start: 2018-01-04

## 2018-01-24 RX ORDER — DIPHENOXYLATE HYDROCHLORIDE AND ATROPINE SULFATE 2.5; .025 MG/1; MG/1
1 TABLET ORAL DAILY
COMMUNITY
Start: 2011-02-04 | End: 2022-01-01

## 2018-01-24 RX ORDER — PRIMIDONE 50 MG/1
100 TABLET ORAL AT BEDTIME
COMMUNITY
Start: 2017-12-13 | End: 2018-11-27

## 2018-01-25 VITALS
SYSTOLIC BLOOD PRESSURE: 128 MMHG | BODY MASS INDEX: 26.55 KG/M2 | TEMPERATURE: 98.2 F | DIASTOLIC BLOOD PRESSURE: 74 MMHG | HEART RATE: 68 BPM | WEIGHT: 175.2 LBS | HEIGHT: 68 IN

## 2018-01-25 VITALS
HEART RATE: 64 BPM | RESPIRATION RATE: 14 BRPM | DIASTOLIC BLOOD PRESSURE: 74 MMHG | BODY MASS INDEX: 27.18 KG/M2 | WEIGHT: 177.13 LBS | BODY MASS INDEX: 27.33 KG/M2 | SYSTOLIC BLOOD PRESSURE: 136 MMHG | DIASTOLIC BLOOD PRESSURE: 82 MMHG | SYSTOLIC BLOOD PRESSURE: 136 MMHG | WEIGHT: 173.6 LBS

## 2018-01-25 VITALS — BODY MASS INDEX: 26.46 KG/M2 | HEIGHT: 68 IN | HEART RATE: 68 BPM | WEIGHT: 174.6 LBS | RESPIRATION RATE: 20 BRPM

## 2018-01-27 ASSESSMENT — PATIENT HEALTH QUESTIONNAIRE - PHQ9
SUM OF ALL RESPONSES TO PHQ QUESTIONS 1-9: 0
SUM OF ALL RESPONSES TO PHQ QUESTIONS 1-9: 0

## 2018-02-09 VITALS
DIASTOLIC BLOOD PRESSURE: 84 MMHG | SYSTOLIC BLOOD PRESSURE: 120 MMHG | BODY MASS INDEX: 26.54 KG/M2 | HEART RATE: 64 BPM | WEIGHT: 172 LBS

## 2018-02-11 ASSESSMENT — PATIENT HEALTH QUESTIONNAIRE - PHQ9: SUM OF ALL RESPONSES TO PHQ QUESTIONS 1-9: 0

## 2018-02-12 NOTE — TELEPHONE ENCOUNTER
Patient Information     Patient Name MRN Rajesh Ball [id number] Male 1940      Telephone Encounter by Rosio Little RN at 2017  8:52 AM     Author:  Rosio Little RN Service:  (none) Author Type:  NURS- Registered Nurse     Filed:  2017  8:56 AM Encounter Date:  2017 Status:  Signed     :  Rosio Little RN (NURS- Registered Nurse)            Nitrates/Vasodilators    Office visit in the past 12 months.    Last visit with MONAE GUZMAN was on: 10/04/2017 in [facility]  Next visit with MONAE GUZMAN is on: 2017 in [facility]  Next visit with Internal Medicine is on: 2017 in [facility]    Max refills 12 months from last office visit.  If any concerns call patient and notify provider.  Prescription refilled per RN Medication Refill Policy.................... Rosio Little RN ....................  2017   8:53 AM

## 2018-02-12 NOTE — PATIENT INSTRUCTIONS
Patient Information     Patient Name MRN Rajesh Ball [id number] Male 1940      Patient Instructions by Jovanni Sen MD at 2017  9:40 AM     Author:  Jovanni Sen MD  Service:  (none) Author Type:  Physician     Filed:  2017 10:23 AM  Encounter Date:  2017 Status:  Addendum     :  Jovanni Sen MD (Physician)        Related Notes: Original Note by Jovanni Sen MD (Physician) filed at 2017 10:22 AM            Labs today.   Medications refilled.     Due to fatigue -- Trial of B12 shot today. Check thyroid labs.     Continue metformin for Diabetes prevention -- consider reducing dose, if A1c is again in normal range.     Hemoglobin A1c is a test for prediabetes and diabetes.   The normal range is up to 5.6%.    Between 5.7 and 6.4% indicates prediabetes.   6.5% and higher indicates Diabetes.     HEMOGLOBIN A1C MONITORING (POCT)    Date Value   2016 5.6 %   2011 6.0 % Total Hgb     HEMOGLOBIN A1C GIH (%)    Date Value   2012 5.7        Vitamin B12 deficiency:    Vitamin B12 deficiency can cause numerous symptoms.  Fatigue and malaise, low energy levels, low blood counts or anemia, poor mood or depression, neuropathy or pins and needles/burning sensation of the hands and feet.    -- trial of B12 shot today.    -- If B12 shot improves your energy and your blood counts, we can do B12 shots every 3-4 weeks up to every 2 or 3 months if needed.    -- Omeprazole (Proton Pump Inhibitors in general) and metformin can lower B12 levels (inhibits absorption).    -- Consider B12 tablet or B-complex tablet -- once daily.     -- Some people are able to take and absorb oral B12 or B complex tablets.   -- Some people are NOT able to absorb oral B12 very well.    If you decide to proceed with oral B12 replacement, but your B12 levels do not improve, you likely will need to use B12 shots instead.      -- Call with update on this B12 shot in 2 to 4  weeks.     -- If it didn't help, we won't do anymore.    -- If it was helpful, we can order more.       What to Expect Following Cryosurgery (Liquid Nitrogen)   What is Cryosurgery?   Cryosurgery is a technique for removing skin lesions that primarily involve the surface of the skin, such as warts, seborrheic keratosis, or actinic keratosis. It is a quick method of removing the lesion with minimal scarring.   The liquid nitrogen needs to be applied long enough to freeze the affected skin. By freezing the skin, a blister is created underneath the lesion. Ideally, as the new skin forms underneath the blister, the abnormal skin on the roof of the blister peels off. Occasionally, if the lesion is very thick (such as a large wart), only the surface is blistered off. The base or residual lesion may need to be frozen at another visit.   It takes about one to two weeks for the scab to fall off, which is when the new layer of skin has formed under the blister. Areas of thinner skin, such as the face, may heal a little faster.      What to Expect Over the Next Few Weeks     During Treatment - Area being treated will sting, burn, and then possibly itch.     Immediately After Treatment - Area will be red, sore, and swollen.     Next Day - Blister or blood blister has formed, tenderness starts to subside. Apply a Band-Aid if   necessary.     7 Days - Surface is dark red/brown and scab-like. Apply Vaseline  or an antibacterial ointment, such as Polysporin , if necessary.     2 to 4 Weeks - The surface starts to peel off. This may be encouraged gently during bathing, when the scab is softened.     No makeup should be applied until area is fully healed.      How to Take Care of the Skin after Cryosurgery     A Band-Aid can be used for larger blisters or blisters in areas that are more likely to be traumatized -such as fingers and toes. If the area becomes dry or crusted, an ointment (Vaseline , Bacitracin , or Polysporin ) can also  be applied.     Cleanse area with a mild cleanser and cool water.     Pat the area dry with a lint-free cloth and apply an ointment (Vaseline , Bacitracin , or Polysporin ).     Avoid glycolic acids, Vitamin C, scrubs, Tretinoin (Retin-A), and Retinol creams for 7 to 10 days.     If approved by your Provider, you may bathe, swim, exercise, and otherwise follow all of your normal activities.     The area may get wet while bathing, but swimming or hot tub use should be avoided for one week following a treatment or while the skin is open.     Within 24 hours, you can expect the area to be swollen and/or blistered. The blister may not be visible to the naked eye.     Within one week, the swelling goes down. The top becomes dark red and scab-like. The scab will loosen over the next weeks, and should fall off within one month.      Adverse Effects     The most common adverse effects are pain, swelling/blistering, potential for infection, and pale discoloration of the skin after it heals.      Blisters        Anytime a blister surfaces, whether from ill-fitting shoes, an oven burn, or liquid nitrogen cryosurgery, it will be a bit painful. For most patients, the pain is a temporary sting with some discomfort periodically over the next day as the blister forms.     The goal is to achieve a blister. This means, most commonly, patients will have a blister form following treatment. Sometimes, the blister is so thin that it can't be seen and may have minimal swelling. Occasionally, a blood blister forms that can be quite dramatic but is harmless.     Rarely, the blister may become infected. When this happens, the blister becomes unusually tender, the fluid becomes cloudy, and the redness around it becomes more extensive (and may even form streaks). If this happens, contact our office.     Some lesions, especially those on the face, may leave a slight pale discoloration.     True scarring, involving deeper layers of the skin is  unlikely.         Return in approximately 1 year, or sooner as needed for follow-up with Dr. Sen.  -- annual follow-up    Clinic : [phone]  Appointment line: [phone]

## 2018-02-12 NOTE — PROGRESS NOTES
Patient Information     Patient Name MRN Sex Rajesh Boothe [id number] Male 1940      Progress Notes by Jovanni Sen MD at 2017  9:40 AM     Author:  Jovanni Sen MD Service:  (none) Author Type:  Physician     Filed:  2017  7:45 PM Encounter Date:  2017 Status:  Signed     :  Jovanni Sen MD (Physician)            Nursing Notes:   Kate Gar  2017 10:12 AM  Signed  Patient presents to the clinic for establish care.    Previous A1C is at goal of <8  HEMOGLOBIN A1C MONITORING (POCT)    Date Value   2016 5.6 %   2011 6.0 % Total Hgb     HEMOGLOBIN A1C GIH (%)    Date Value   2012 5.7     Urine microalbumin:creatine: n/a  Foot exam unknown  Eye exam at least a year ago    Patient is not a current smoker  Patient is on a daily aspirin  Patient is on a Statin.  Blood pressure today of   is at the goal of <139/89 for diabetics.    Kate Gar LPN..............2017 9:58 AM    Rajesh Huynh presents to clinic today for:   Chief Complaint    Patient presents with      Establish Care     HPI: Mr. Huynh is a 77 y.o. male who presents today for evaluation of above.     (I10) Essential hypertension  (primary encounter diagnosis)  (I25.10) CAD in native artery  (G25.0) Benign essential tremor  (Z86.39) History of type 2 diabetes mellitus  (E78.2) Mixed hyperlipidemia  (Z85.819) History of throat cancer  (L57.0) Actinic keratoses x3 on head / neck  (R73.09) Elevated random blood glucose level  (E55.9) Vitamin D deficiency  (R53.81,  R53.83) Malaise and fatigue  (R73.03) Prediabetes     Patient presents for evaluation to establish care.    Hypertension, currently well controlled.  Doing well with current medication regimen.  Check labs today.  Needs refills.    Coronary artery disease, no exertional chest pain or heaviness.  Taking Lipitor, aspirin.  Propranolol.  Doing well.  Check labs.    Benign tremor, ESSential tremor, chronic.  Permit  own at bedtime and propranolol twice daily has been helpful.  He would like to continue.    Prediabetes, history of type 2 diabetes, currently on metformin 1000 mg twice daily.  Doing well with that.   HEMOGLOBIN A1C MONITORING (POCT)    Date Value   2017 6.0 %   2011 6.0 % Total Hgb     HEMOGLOBIN A1C GIH (%)    Date Value   2012 5.7     patient has 3 new skin lesions he is worried about.  He had a number of precancerous lesions treated earlier this year.  Evaluation today shows that he has a few more actinic keratoses.  Treatment options reviewed.  He would like to proceed with cryotherapy today.  See below.    Hyperlipidemia - currently on Lipitor 40 mg daily.  Seems to be tolerating well.  Check labs.  Last Lipids:  Chol: 2017 112   75  HDL: 2017 40   LDL: 2017 57    Fatigue and malaise - patient has been on metformin for quite some time.  We discussed the possibility of B12 deficiency.  He would like to proceed with lab, trial of B12 shot today.  Check thyroid level.    Vitamin D deficiency - taking calcium plus vitamin D.  Check labs today.    Mr. Flaherty Body mass index is 26.54 kg/(m^2). This is out of the normal range for a 77 y.o. Normal range for ages 18+ is between 18.5 and 24.9. To lose weight we reviewed risks and benefits of appropriate options such as diet, exercise, and medications. Patient's strategy will be  self-directed nutrition plan and self-directed exercise program   BP Readings from Last 1 Encounters:17 : 120/84  Mr. Flaherty blood pressure is out of the normal range for adults. Per JNC-8 guidelines normal adult blood pressure is < 120/80, pre-hypertensive is between 120/80 and 139/89, and hypertension is 140/90 or greater. Risks of hypertension were discussed. Patient's strategy will be weight loss, increased activity and reduced salt intake    Functional Capacity: > 4 METS.   Reports that he can climb a flight of stairs without any  chest pain/heaviness or shortness of breath.   Patient reports no current symptoms of fevers, chills, nausea/vomiting.   No cough. No shortness of breath.   No other change in bowel/bladder habits. No melena, hematochezia. No Hematuria.   No palpitations.  No orthopnea/paroxysmal nocturnal dyspnea   No vision or hearing issues.   No significant mood issues     + diarrhea after eating pizza.  Reports that it does not happen immediately, typically the next day. Stools get pretty loose.  He has not noticed it with other foods unless they are spicy -- thinks there might be something with the tomato sauce or pizza sauce that gives him problems.    No bruising.     LAVINIA:  No flowsheet data found.    PHQ9:  PHQ Depression Screening 10/4/2017 12/13/2017   Date of PHQ exam (doc flow) 10/4/2017 12/13/2017   1. Lack of interest/pleasure 0 - Not at all 0 - Not at all   2. Feeling down/depressed 0 - Not at all 0 - Not at all   PHQ-2 TOTAL SCORE 0 0   3. Trouble sleeping 0 - Not at all 0 - Not at all   4. Decreased energy 0 - Not at all 0 - Not at all   5. Appetite change 0 - Not at all 0 - Not at all   6. Feelings of failure 0 - Not at all 0 - Not at all   7. Trouble concentrating 0 - Not at all 0 - Not at all   8. Activity level 0 - Not at all 0 - Not at all   9. Hurting yourself 0 - Not at all 0 - Not at all   PHQ-9 TOTAL SCORE 0 0   PHQ-9 Severity Level none none   Functional Impairment not difficult at all not difficult at all   Some recent data might be hidden          I have personally reviewed the past medical history, past surgical history, medications, allergies, family and social history as listed below, on 12/13/2017.    Patient Active Problem List       Diagnosis  Date Noted     History of throat cancer  12/13/2017     History of throat cancer  12/13/2017     Prediabetes  12/13/2017     s/p Left total shoulder arthroplasty DOS 10/22/15 by Mukesh Ayoub MD  06/21/2017     s/p [place] Right total shoulder arthroplasty  2007 by Mukesh Ayoub MD  06/21/2017     Partial tear of right rotator cuff  06/21/2017     History of urinary retention  12/23/2016     Postoperative        History of bladder cancer, 11/2014, LGTa  11/19/2014     History of ST elevation myocardial infarction (STEMI) - 7/11/2013 07/11/2013     ACP (advance care planning)  12/08/2011     Patient has identified Health Care Agent(s):   wife Ruth [phone] is the primary medical decision maker.    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 essential thrombocytosis          Benign essential tremor                 Essential hypertension       ERECTILE DYSFUNCTION       PANCREATITIS, CHRONIC       Currently asymptomatic and on no medication as of 2008.          History of type 2 diabetes mellitus       Past Medical History:     Diagnosis  Date     Chronic pancreatitis (HC)      ED (erectile dysfunction)      Former smoker     2ppd - quit 2000      HTN (hypertension)      Hx of radiation therapy 1/30/2012-3/2/2012     Hyperlipidemia      Myeloproliferative disorder (HC)     questionable      Personal history of malignant neoplasm of bladder 4/22/2015     Pneumonia 2001     Squamous cell carcinoma of epiglottis (HC) 2011     Radiation      STEMI (ST elevation myocardial infarction) (HC) 7/5/2013    Integrity BMS to RCA done at [facility]      Past Surgical History:      Procedure  Laterality Date     APPENDECTOMY  1970     CHOLECYSTECTOMY  1994     COLONOSCOPY SCREENING  02/2005     COLONOSCOPY SCREENING  2/19/2015    no repeat due at this time       CORONARY STENT PLACEMENT  07/05/2013    Bare-metal stent to dominant RCA        CYSTOSCOPY  Multiple times     Dr. Lyndon FRANK       ERCP  1995/2002    xseveral, one with papillotomy       Hx Whipple procedure  2004    for chronic pancreatitis       LEFT TOTAL SHOULDER REPLACEMENT  10/22/15     NECK/THORAX PROCEDURE  12/7/2011    L modified radical neck surgery       RADIATION TREATMENT  1/30/2012-3/2/2012     Resection Transurethral Bladder Tumor  11/10/14    Dr. Lyndon FRANK       REVISION EYELID  2012    Dr. Farias       SHOULDER ARTHROSCOPY  11/2005    right shoulder       SHOULDER ARTHROSCOPY  1/2006    left shoulder       SHOULDER ARTHROSCOPY  2011    Left shoulder scope SAD, ac.  Open subscap, biceps       SHOULDER REPLACEMENT Right 6/22/2007    with biceps tenodesis by Dr. Mukesh Ayoub       TONSIL AND ADENOIDECTOMY  1947     VASECTOMY  1980     Current Outpatient Prescriptions       Medication  Sig Dispense Refill     aspirin enteric coated 81 mg tablet Take 1 tablet by mouth once daily with a meal.  0     atorvastatin (LIPITOR) 40 mg tablet Take 1 tablet by mouth once daily with evening meal. 90 tablet 3     blood-glucose meter Dispense Accu-Chek Agnes  Meter. E11.9 NIDDM type II - Test 2 times/day. Reason: New medication 1 Device 1     calcium carbonate-cholecalciferol, 600mg-200 units, (CALCIUM 600 + D,3,) 600 mg(1,500mg) -200 unit tablet Take 1 tablet by mouth 2 times daily with meals.  0     Fish Oil-DHA-EPA 1,200-144-216 mg cap Take 1 capsule by mouth 2 times daily.  0     metFORMIN (GLUCOPHAGE) 1,000 mg tablet Take 1 tablet by mouth 2 times daily with meals. -- for diabetes prevention 180 tablet 3     multivitamin (MVI) tablet Take 1 tablet by mouth once daily.  0     nitroglycerin (NITROSTAT) 0.4 mg sublingual tablet Place 1 tablet under the tongue every 5 minutes if needed for Chest Pain. 25 tablet 0     primidone (MYSOLINE) 50 mg tablet Take 2 tablets by mouth at bedtime. 180 tablet 3     propranolol (INDERAL) 20 mg tablet Take 1 tablet by mouth 2 times daily. 180 tablet 3  "    Vacuum Erection Device System kit As directed. Use as instructed for ED 1 Kit 0     No Known Allergies  Family History       Problem   Relation Age of Onset     Heart Disease  Father 83     CHF       Heart Disease  Mother 80     MI       Good Health  Sister      Heart Disease  Sister      pacemaker       Diabetes  Son      Type 1       Cancer-breast  Daughter      Cancer-prostate  No Family History      Family Status     Relation  Status     Father  at age 80's     of CHF       Mother  at age 83    myocardial infarction       Sister Alive     Sister Alive     Son Alive     Daughter Alive     No Family History      Social History     Social History        Marital status:       Spouse name: N/A     Number of children:  N/A     Years of education:  N/A     Occupational History       retired      Social History Main Topics        Smoking status:  Former Smoker     Packs/day: 3.00     Years: 43.00     Types: Cigarettes     Quit date: 2000     Smokeless tobacco:  Never Used     Alcohol use  No     Drug use:  No     Sexual activity:  Yes     Partners: Female     Other Topics  Concern     Not on file      Social History Narrative     Ruth Spouse     Patient is  with grown children.    [employer] at [employer] for 17 years     Complete ROS was performed and was negative unless otherwise noted in HPI above.     EXAM:   Vitals:     17 0959   BP: 120/84   Cuff Site: Right Arm   Position: Sitting   Cuff Size: Adult Regular   Pulse: 64   Weight: 78 kg (172 lb)     BP Readings from Last 3 Encounters:    17 120/84   10/04/17 136/82   17 128/74     Wt Readings from Last 3 Encounters:    17 78 kg (172 lb)   10/09/17 79.2 kg (174 lb 9.6 oz)   10/04/17 80.3 kg (177 lb 2 oz)     Estimated body mass index is 26.54 kg/(m^2) as calculated from the following:    Height as of 10/9/17: 1.715 m (5' 7.5\").    Weight as of this encounter: 78 kg (172 lb). "     EXAM:  Constitutional: Pleasant, alert, appropriate appearance for age. No acute distress  ENT: Normocephalic, Atraumatic, Thyroid without nodules or tenderness   Nose/Mouth: Oral pharynx without erythema or exudates, Nose is patent bilaterally, no rhinorrhea and Dental hygeine adequate   Eyes:  Extraocular muscles intact, Sclera non-icteric, Conjunctiva without erythema  Lymphatic Exam: Non-palpable nodes in neck, clavicular regions  Pulmonary: Lungs are clear to auscultation bilaterally, without wheezes or crackles  Cardiovascular Exam: regular rate and rhythm, brisk carotid upstroke without bruits, peripheral pulses very brisk, no pedal edema, no murmur, click, rub or gallop appreciated  Gastrointestinal Exam: Soft, non-tender, non-distended, positive bowel sounds  Genitourinary Exam:  Rectal: Deferred to Urology   Integument: actinic keratoses x3 noted -- x2 on right sideburn, x1 on left ear.     PROCEDURE:   Reviewed risks and benefits of cryotherapy. After informed consent was obtained, patient elected to proceed. These 3 lesions were treated today.   Performed cryotherapy to #3 lesions x 2 rounds of 30 second freeze/thaw cycles.   Tolerated well. No obvious complications.   Return for signs of infection.    The patient and I reviewed safe sun practices today: the importance of staying out of the sun; especially between 10AM-2PM, wearing sun screen SPF > 30, and protective clothing.   We also discussed the ABC's of skin cancers including: changes in size, uniformity, borders, coloration, bleeding, or any irregularity or changes. If these occur, seek medical attention.   The patient should have a complete skin exam by a medical professional every 6-12 months, and any questionable/suspicious, or changing lesions should be removed.       Neurologic Exam: CN 3-12 grossly intact Nonfocal, Normal gross motor movement, coordination, and tone  Sensation intact to light touch in upper and lower  extremities  Musculoskeletal Exam: Moves upper and lower extremities symmetrically, No focal weakness  Gait and station appear grossly normal  Psychiatric Exam: Awake and Alert, Affect and mood appropriate  Speech is fluent, Thought process is normal    INVESTIGATIONS:   Results for orders placed or performed in visit on 12/13/17      CBC W PLT NO DIFF      Result  Value Ref Range    WHITE BLOOD COUNT         7.5 4.5 - 11.0 thou/cu mm    RED BLOOD COUNT           4.29 (L) 4.30 - 5.90 mil/cu mm    HEMOGLOBIN                14.5 13.5 - 17.5 g/dL    HEMATOCRIT                41.5 37.0 - 53.0 %    MCV                       97 80 - 100 fL    MCH                       33.8 26.0 - 34.0 pg    MCHC                      34.9 32.0 - 36.0 g/dL    RDW                       12.2 11.5 - 15.5 %    PLATELET COUNT            322 140 - 440 thou/cu mm    MPV                       9.6 6.5 - 11.0 fL   COMP METABOLIC PANEL      Result  Value Ref Range    SODIUM 135 133 - 143 mmol/L    POTASSIUM 4.6 3.5 - 5.1 mmol/L    CHLORIDE 102 98 - 107 mmol/L    CO2,TOTAL 26 21 - 31 mmol/L    ANION GAP 7 5 - 18                    GLUCOSE 109 (H) 70 - 105 mg/dL    CALCIUM 8.7 8.6 - 10.3 mg/dL    BUN 8 7 - 25 mg/dL    CREATININE 0.83 0.70 - 1.30 mg/dL    BUN/CREAT RATIO           10                    GFR if African American >60 >60 ml/min/1.73m2    GFR if not African American >60 >60 ml/min/1.73m2    ALBUMIN 4.1 3.5 - 5.7 g/dL    PROTEIN,TOTAL 6.5 6.4 - 8.9 g/dL    GLOBULIN                  2.4 2.0 - 3.7 g/dL    A/G RATIO 1.7 1.0 - 2.0                    BILIRUBIN,TOTAL 0.5 0.3 - 1.0 mg/dL    ALK PHOSPHATASE 50 34 - 104 IU/L    ALT (SGPT) 20 7 - 52 IU/L    AST (SGOT) 23 13 - 39 IU/L   HEMOGLOBIN A1C MONITORING (POCT)      Result  Value Ref Range    HEMOGLOBIN A1C MONITORING (POCT) 6.0 4.0 - 6.2 %    ESTIMATED AVERAGE GLUCOSE  126 mg/dL   LIPID PANEL      Result  Value Ref Range    CHOLESTEROL,TOTAL 112 <200 mg/dL    TRIGLYCERIDES 75 <150 mg/dL    HDL  CHOLESTEROL 40 23 - 92 mg/dL    NON-HDL CHOLESTEROL 72 <145 mg/dl    CHOL/HDL RATIO            2.80 <4.50                    LDL CHOLESTEROL 57 <100 mg/dL    PROVIDER ORDERED STATUS RANDOM    VITAMIN B12      Result  Value Ref Range    VITAMIN B12 678 180 - 914 pg/mL   TSH      Result  Value Ref Range    TSH 3.34 0.34 - 5.60 uIU/mL   VITAMIN D 25 (DEFICIENCY)      Result  Value Ref Range    VITAMIN D TOTAL AFL 34.3   ng/mL   URINALYSIS W REFLEX MICROSCOPIC IF POSITIVE      Result  Value Ref Range    COLOR                     Yellow Yellow Color    CLARITY                   Clear Clear Clarity    SPECIFIC GRAVITY,URINE    1.010 1.010, 1.015, 1.020, 1.025                    PH,URINE                  7.5 6.0, 7.0, 8.0, 5.5, 6.5, 7.5, 8.5                    UROBILINOGEN,QUALITATIVE  Normal Normal EU/dl    PROTEIN, URINE 30 (A) Negative mg/dL    GLUCOSE, URINE Negative Negative mg/dL    KETONES,URINE             Negative Negative mg/dL    BILIRUBIN,URINE           Negative Negative                    OCCULT BLOOD,URINE        Negative Negative                    NITRITE                   Negative Negative                    LEUKOCYTE ESTERASE        Negative Negative                   URINALYSIS MICROSCOPIC      Result  Value Ref Range    RBC None Seen 0-2, None Seen /HPF    WBC None Seen 0-2, 3-5, None Seen /HPF    BACTERIA                  None Seen None Seen, Rare, Occasional, Few Bacteria/HPF    EPITHELIAL CELLS          None Seen None Seen, Few Epi/HPF    OTHER Mucus Present        ASSESSMENT AND PLAN:  Rajesh was seen today for establish care.    Diagnoses and all orders for this visit:    Essential hypertension  -     propranolol (INDERAL) 20 mg tablet; Take 1 tablet by mouth 2 times daily.  -     CBC W PLT NO DIFF; Standing  -     COMP METABOLIC PANEL; Standing  -     URINALYSIS W REFLEX MICROSCOPIC IF POSITIVE; Future  -     CBC W PLT NO DIFF  -     COMP METABOLIC PANEL  -     URINALYSIS W REFLEX MICROSCOPIC IF  POSITIVE  -     URINALYSIS MICROSCOPIC; Future  -     URINALYSIS MICROSCOPIC    CAD in native artery  -     atorvastatin (LIPITOR) 40 mg tablet; Take 1 tablet by mouth once daily with evening meal.    Benign essential tremor  -     primidone (MYSOLINE) 50 mg tablet; Take 2 tablets by mouth at bedtime.    History of type 2 diabetes mellitus  -     metFORMIN (GLUCOPHAGE) 1,000 mg tablet; Take 1 tablet by mouth 2 times daily with meals. -- for diabetes prevention    Mixed hyperlipidemia  -     atorvastatin (LIPITOR) 40 mg tablet; Take 1 tablet by mouth once daily with evening meal.  -     LIPID PANEL; Standing  -     LIPID PANEL    History of throat cancer    Actinic keratoses x3 on head / neck  -     DC DESTROY PREMALIGNANT 1ST LESION  -     DC DESTROY PREMALIGNANT LESION EA 2-14    Elevated random blood glucose level  -     HEMOGLOBIN A1C MONITORING (POCT); Standing  -     HEMOGLOBIN A1C MONITORING (POCT)    Vitamin D deficiency  -     VITAMIN D 25 (DEFICIENCY); Future  -     VITAMIN D 25 (DEFICIENCY)    Malaise and fatigue  -     cyanocobalamin 1,000 mcg injection (VITAMIN B12); Inject 1 mL intramuscular one time.  -     VITAMIN B12; Future  -     TSH; Future  -     VITAMIN B12  -     TSH    Prediabetes    lab results and schedule of future lab studies reviewed with patient, reviewed diet, exercise and weight control, recommended sodium restriction, cardiovascular risk and specific lipid/LDL goals reviewed, use of aspirin to prevent MI and TIA's discussed    -- Expected clinical course discussed   -- Medications and their side effects discussed    Rajesh is also recommended to eat a heart-healthy diet, do regular aerobic exercises, maintain a desirable body weight, and avoid tobacco products. These recommendations are from the American Heart Association (AHA) which stresses the importance of lifestyle changes to lower cardiovascular disease risk.    40 minutes spent in face-to-face interaction with patient (separate  from separately billed procedures) with greater than 50% spent in counseling and care coordination of listed medical problems.       Return in about 1 year (around 12/13/2018) for -- annual follow-up.    Patient Instructions     Labs today.   Medications refilled.     Due to fatigue -- Trial of B12 shot today. Check thyroid labs.     Continue metformin for Diabetes prevention -- consider reducing dose, if A1c is again in normal range.     Hemoglobin A1c is a test for prediabetes and diabetes.   The normal range is up to 5.6%.    Between 5.7 and 6.4% indicates prediabetes.   6.5% and higher indicates Diabetes.     HEMOGLOBIN A1C MONITORING (POCT)    Date Value   12/23/2016 5.6 %   12/08/2011 6.0 % Total Hgb     HEMOGLOBIN A1C GIH (%)    Date Value   02/21/2012 5.7        Vitamin B12 deficiency:    Vitamin B12 deficiency can cause numerous symptoms.  Fatigue and malaise, low energy levels, low blood counts or anemia, poor mood or depression, neuropathy or pins and needles/burning sensation of the hands and feet.    -- trial of B12 shot today.    -- If B12 shot improves your energy and your blood counts, we can do B12 shots every 3-4 weeks up to every 2 or 3 months if needed.    -- Omeprazole (Proton Pump Inhibitors in general) and metformin can lower B12 levels (inhibits absorption).    -- Consider B12 tablet or B-complex tablet -- once daily.     -- Some people are able to take and absorb oral B12 or B complex tablets.   -- Some people are NOT able to absorb oral B12 very well.    If you decide to proceed with oral B12 replacement, but your B12 levels do not improve, you likely will need to use B12 shots instead.      -- Call with update on this B12 shot in 2 to 4 weeks.     -- If it didn't help, we won't do anymore.    -- If it was helpful, we can order more.       What to Expect Following Cryosurgery (Liquid Nitrogen)   What is Cryosurgery?   Cryosurgery is a technique for removing skin lesions that primarily  involve the surface of the skin, such as warts, seborrheic keratosis, or actinic keratosis. It is a quick method of removing the lesion with minimal scarring.   The liquid nitrogen needs to be applied long enough to freeze the affected skin. By freezing the skin, a blister is created underneath the lesion. Ideally, as the new skin forms underneath the blister, the abnormal skin on the roof of the blister peels off. Occasionally, if the lesion is very thick (such as a large wart), only the surface is blistered off. The base or residual lesion may need to be frozen at another visit.   It takes about one to two weeks for the scab to fall off, which is when the new layer of skin has formed under the blister. Areas of thinner skin, such as the face, may heal a little faster.      What to Expect Over the Next Few Weeks     During Treatment - Area being treated will sting, burn, and then possibly itch.     Immediately After Treatment - Area will be red, sore, and swollen.     Next Day - Blister or blood blister has formed, tenderness starts to subside. Apply a Band-Aid if   necessary.     7 Days - Surface is dark red/brown and scab-like. Apply Vaseline  or an antibacterial ointment, such as Polysporin , if necessary.     2 to 4 Weeks - The surface starts to peel off. This may be encouraged gently during bathing, when the scab is softened.     No makeup should be applied until area is fully healed.      How to Take Care of the Skin after Cryosurgery     A Band-Aid can be used for larger blisters or blisters in areas that are more likely to be traumatized -such as fingers and toes. If the area becomes dry or crusted, an ointment (Vaseline , Bacitracin , or Polysporin ) can also be applied.     Cleanse area with a mild cleanser and cool water.     Pat the area dry with a lint-free cloth and apply an ointment (Vaseline , Bacitracin , or Polysporin ).     Avoid glycolic acids, Vitamin C, scrubs, Tretinoin (Retin-A), and Retinol  creams for 7 to 10 days.     If approved by your Provider, you may bathe, swim, exercise, and otherwise follow all of your normal activities.     The area may get wet while bathing, but swimming or hot tub use should be avoided for one week following a treatment or while the skin is open.     Within 24 hours, you can expect the area to be swollen and/or blistered. The blister may not be visible to the naked eye.     Within one week, the swelling goes down. The top becomes dark red and scab-like. The scab will loosen over the next weeks, and should fall off within one month.      Adverse Effects     The most common adverse effects are pain, swelling/blistering, potential for infection, and pale discoloration of the skin after it heals.      Blisters        Anytime a blister surfaces, whether from ill-fitting shoes, an oven burn, or liquid nitrogen cryosurgery, it will be a bit painful. For most patients, the pain is a temporary sting with some discomfort periodically over the next day as the blister forms.     The goal is to achieve a blister. This means, most commonly, patients will have a blister form following treatment. Sometimes, the blister is so thin that it can't be seen and may have minimal swelling. Occasionally, a blood blister forms that can be quite dramatic but is harmless.     Rarely, the blister may become infected. When this happens, the blister becomes unusually tender, the fluid becomes cloudy, and the redness around it becomes more extensive (and may even form streaks). If this happens, contact our office.     Some lesions, especially those on the face, may leave a slight pale discoloration.     True scarring, involving deeper layers of the skin is unlikely.         Return in approximately 1 year, or sooner as needed for follow-up with Dr. Sen.  -- annual follow-up    Clinic : [phone]  Appointment line: [phone]      Jovanni Sen MD

## 2018-02-12 NOTE — TELEPHONE ENCOUNTER
Patient Information     Patient Name MRN Rajesh Ball [id number] Male 1940      Telephone Encounter by Kate Gar at 2017 10:31 AM     Author:  Kate Gar Service:  (none) Author Type:  (none)     Filed:  2017 10:32 AM Encounter Date:  2017 Status:  Signed     :  Kate Gar            See other phone note.      Kate Gar LPN        2017 10:32 AM

## 2018-02-12 NOTE — NURSING NOTE
Patient Information     Patient Name MRN Sex Rajesh Boothe [id number] Male 1940      Nursing Note by Kate Gar at 2017  9:40 AM     Author:  Kate Gar Service:  (none) Author Type:  (none)     Filed:  2017 10:12 AM Encounter Date:  2017 Status:  Signed     :  Kate Gar            Patient presents to the clinic for establish care.    Previous A1C is at goal of <8  HEMOGLOBIN A1C MONITORING (POCT)    Date Value   2016 5.6 %   2011 6.0 % Total Hgb     HEMOGLOBIN A1C GIH (%)    Date Value   2012 5.7     Urine microalbumin:creatine: n/a  Foot exam unknown  Eye exam at least a year ago    Patient is not a current smoker  Patient is on a daily aspirin  Patient is on a Statin.  Blood pressure today of   is at the goal of <139/89 for diabetics.    Kate Gar LPN..............2017 9:58 AM

## 2018-02-12 NOTE — TELEPHONE ENCOUNTER
Patient Information     Patient Name MRN Sex Rajesh Boothe [id number] Male 1940      Telephone Encounter by Lisa Lovelace at 2017  9:08 AM     Author:  Lisa Lovelace Service:  (none) Author Type:  (none)     Filed:  2017  9:08 AM Encounter Date:  2017 Status:  Signed     :  Lisa Lovelace            Left message to call back    Lisa Lovelace LPN..........2017  9:08 AM

## 2018-02-12 NOTE — TELEPHONE ENCOUNTER
Patient Information     Patient Name MRN Rajesh Ball [id number] Male 1940      Telephone Encounter by Juliet Aceves at 2017 10:27 AM     Author:  Juliet Aceves Service:  (none) Author Type:  (none)     Filed:  2017 10:27 AM Encounter Date:  2017 Status:  Signed     :  Juliet Aceves            DJS-Juan Ramon says is returning phone call to [facility]? Thank You.  Juliet Aceves ....................  2017   10:27 AM

## 2018-02-12 NOTE — TELEPHONE ENCOUNTER
Patient Information     Patient Name MRN Rajesh Ball [id number] Male 1940      Telephone Encounter by Silvina Funez at 2017 11:52 AM     Author:  Silvina Funez Service:  (none) Author Type:  (none)     Filed:  2017  2:26 PM Encounter Date:  2017 Status:  Signed     :  Silvina Funez            Spoke with patient's wife, she thought Jovanni Sen MD  Had refilled patient's meds for 90 days, pharmacy only filled 30 days. Notified patient that Jovanni Sen MD  Did send 90 days on all patient's RX and that it must be something with [facility] pharmacy. Silvina Funez LPKECIA......................2017 2:25 PM

## 2018-02-12 NOTE — TELEPHONE ENCOUNTER
Patient Information     Patient Name MRN Rajesh Ball [id number] Male 1940      Telephone Encounter by Kate Gar at 2017  1:42 PM     Author:  Kate Gar Service:  (none) Author Type:  (none)     Filed:  2017  1:43 PM Encounter Date:  2017 Status:  Signed     :  Kate Gar            [facility] pharmacist indicates that Primidone was refilled on an old prescription, pharmacist confirms the new prescription and patient will receive the 90 day supply at next refill.      Kate Gar LPN        2017 1:43 PM

## 2018-02-13 NOTE — TELEPHONE ENCOUNTER
Patient Information     Patient Name MRRajesh Maxwell [id number] Male 1940      Telephone Encounter by Tiarra Wagner at 1/3/2018  4:24 PM     Author:  Tiarra Wagner Service:  (none) Author Type:  (none)     Filed:  1/3/2018  4:25 PM Encounter Date:  1/3/2018 Status:  Signed     :  Tiarra Wagner            DJS- Patient is confused about his prescriptions from his last visit and has some questions on what he should, or should not, be taking.  Thank you.

## 2018-02-13 NOTE — TELEPHONE ENCOUNTER
Patient Information     Patient Name MRN Sex Rajesh Boothe [id number] Male 1940      Telephone Encounter by Kate Gar at 2018  9:09 AM     Author:  Kate Gar Service:  (none) Author Type:  (none)     Filed:  2018  9:10 AM Encounter Date:  1/3/2018 Status:  Signed     :  Kate Gar            Patient notified of providers note and would still like a refill for the diabetic testing supplies.      Kate Gar LPN        2018 9:09 AM

## 2018-02-13 NOTE — TELEPHONE ENCOUNTER
Patient Information     Patient Name MRN Rajesh Ball [id number] Male 1940      Telephone Encounter by Kate Gar at 1/3/2018  4:41 PM     Author:  Kate Gar Service:  (none) Author Type:  (none)     Filed:  1/3/2018  4:43 PM Encounter Date:  1/3/2018 Status:  Signed     :  Kate Gar            Patient thought that Jovanni Sne MD informed him that he didn't need to test blood sugar anymore. Office note doesn't specify whether he should or shouldn't continue with that.      Please advise.    Kate Gar LPN        1/3/2018 4:43 PM

## 2018-02-13 NOTE — TELEPHONE ENCOUNTER
Patient Information     Patient Name MRN Rajesh Ball [id number] Male 1940      Telephone Encounter by Jovanni Sen MD at 1/3/2018  5:01 PM     Author:  Jovanni Sen MD Service:  (none) Author Type:  Physician     Filed:  1/3/2018  5:02 PM Encounter Date:  1/3/2018 Status:  Signed     :  Jovanni Sen MD (Physician)            Only on Metformin -- NO more regular finger poke-glucose testing needed, unless he gets low blood sugar symptoms.    Jovanni Sen MD

## 2018-02-13 NOTE — TELEPHONE ENCOUNTER
Patient Information     Patient Name MRN Rajesh Ball [id number] Male 1940      Telephone Encounter by Jeanne Handy RN at 2018  9:15 AM     Author:  Jeanne Handy RN Service:  (none) Author Type:  NURS- Registered Nurse     Filed:  2018  9:27 AM Encounter Date:  2018 Status:  Signed     :  Jeanne Handy RN (NURS- Registered Nurse)            This is being addressed by patient's PCP.  Jeanne Handy RN ....................  2018   9:16 AM

## 2018-02-13 NOTE — TELEPHONE ENCOUNTER
Patient Information     Patient Name MRN Rajesh Ball [id number] Male 1940      Telephone Encounter by Kate Gar at 2017 10:34 AM     Author:  Kate Gar Service:  (none) Author Type:  (none)     Filed:  2017 10:34 AM Encounter Date:  2017 Status:  Signed     :  Kate Gar            Patient notified of pharmacy response.      Kate Gar LPN        2017 10:34 AM

## 2018-03-07 ENCOUNTER — OFFICE VISIT (OUTPATIENT)
Dept: INTERNAL MEDICINE | Facility: OTHER | Age: 78
End: 2018-03-07
Attending: INTERNAL MEDICINE
Payer: MEDICARE

## 2018-03-07 VITALS
BODY MASS INDEX: 28.08 KG/M2 | SYSTOLIC BLOOD PRESSURE: 140 MMHG | HEART RATE: 68 BPM | WEIGHT: 182 LBS | DIASTOLIC BLOOD PRESSURE: 82 MMHG

## 2018-03-07 DIAGNOSIS — L57.0 ACTINIC KERATOSES: Primary | ICD-10-CM

## 2018-03-07 PROCEDURE — 17003 DESTRUCT PREMALG LES 2-14: CPT | Performed by: INTERNAL MEDICINE

## 2018-03-07 PROCEDURE — G0463 HOSPITAL OUTPT CLINIC VISIT: HCPCS

## 2018-03-07 PROCEDURE — 17000 DESTRUCT PREMALG LESION: CPT | Performed by: INTERNAL MEDICINE

## 2018-03-07 ASSESSMENT — ANXIETY QUESTIONNAIRES
7. FEELING AFRAID AS IF SOMETHING AWFUL MIGHT HAPPEN: NOT AT ALL
2. NOT BEING ABLE TO STOP OR CONTROL WORRYING: NOT AT ALL
GAD7 TOTAL SCORE: 0
1. FEELING NERVOUS, ANXIOUS, OR ON EDGE: NOT AT ALL
3. WORRYING TOO MUCH ABOUT DIFFERENT THINGS: NOT AT ALL
5. BEING SO RESTLESS THAT IT IS HARD TO SIT STILL: NOT AT ALL
6. BECOMING EASILY ANNOYED OR IRRITABLE: NOT AT ALL
IF YOU CHECKED OFF ANY PROBLEMS ON THIS QUESTIONNAIRE, HOW DIFFICULT HAVE THESE PROBLEMS MADE IT FOR YOU TO DO YOUR WORK, TAKE CARE OF THINGS AT HOME, OR GET ALONG WITH OTHER PEOPLE: NOT DIFFICULT AT ALL

## 2018-03-07 ASSESSMENT — PAIN SCALES - GENERAL: PAINLEVEL: NO PAIN (0)

## 2018-03-07 ASSESSMENT — PATIENT HEALTH QUESTIONNAIRE - PHQ9: 5. POOR APPETITE OR OVEREATING: NOT AT ALL

## 2018-03-07 NOTE — PROGRESS NOTES
Nursing Notes:   Kate Gar LPN  3/7/2018  3:52 PM  Signed  Patient presents to the clinic for multiple areas on the head to be treated with liquid nitrogen.      Previous A1C is at goal of <8  No results found for: A1C  Urine microalbumin:creatine: n/a  Foot exam over year-decline  Eye exam last year   Tobacco User no  Patient is on a daily aspirin  Patient is on a Statin.  Blood pressure today of  at the goal of <139/89 for diabetics.    Kate Gar LPN 3/7/2018 3:33 PM            Nursing note reviewed with patient.  Accurracy and completeness verified.   Mr. Huynh is a 77 year old male who:  No chief complaint on file.    HPI     ICD-10-CM    1. Actinic keratoses - x4 on head, x1 on left upper arm L57.0 DESTRUCT PREMALIGNANT LESION, FIRST     DESTRUCT PREMALIGNANT LESION, 2-14     Patient comes in with his wife concerned about some new skin lesions.  He has had a number of scaly spots on his head as well as one on his left upper arm.  States that they tend to get scaly he will pick at them they will sort of go away but never fully resolved and then come back.  He has had them for a number of months.    Evaluation shows actinic keratoses.  Treatment options discussed.  He would like to proceed with cryotherapy.  See below.    Functional Capacity: > 4 METS.   Review of Systems     LAVINIA:   LAVINIA-7 SCORE 3/7/2018   Total Score 0     PHQ9:  PHQ-9 SCORE 10/4/2017 12/13/2017 3/7/2018   Total Score 0 0 0       I have personally reviewed the past medical history, past surgical history, medications, allergies, family and social history as listed below, on 3/7/2018.    No Known Allergies    Current Outpatient Prescriptions   Medication Sig Dispense Refill     aspirin EC 81 MG EC tablet Take 81 mg by mouth daily with food       atorvastatin (LIPITOR) 40 MG tablet Take 40 mg by mouth daily (with dinner)       blood glucose monitoring (NO BRAND SPECIFIED) test strip Dispense item covered by pt ins. E11.9 NIDDM type II -  Test 1 time/day       Blood Glucose Monitoring Suppl (FIFTY50 GLUCOSE METER 2.0) W/DEVICE KIT Dispense Accu-Chek Agnes  Meter. Dispense item covered by pt ins. E11.9 NIDDM type II - Test 1 time/day       calcium carbonate-vitamin D (CALCIUM 600+D) 600-200 MG-UNIT TABS Take 1 tablet by mouth 2 times daily (with meals)       DOCOSAHEXAENOIC ACID PO Take 1 capsule by mouth 2 times daily       metFORMIN (GLUCOPHAGE) 1000 MG tablet Take 1,000 mg by mouth 2 times daily (with meals) For diabetes prevention       Multiple Vitamin (MULTI-VITAMINS) TABS Take 1 tablet by mouth daily       nitroGLYcerin (NITROSTAT) 0.4 MG sublingual tablet Place 0.4 mg under the tongue every 5 minutes as needed for chest pain       primidone (MYSOLINE) 50 MG tablet Take 100 mg by mouth At Bedtime       propranolol (INDERAL) 20 MG tablet Take 20 mg by mouth 2 times daily          Patient Active Problem List    Diagnosis Date Noted     Actinic keratoses - x4 on head, x1 on left upper arm 03/08/2018     Priority: Medium     Benign essential tremor 01/24/2018     Priority: Medium     Overview:        Psychosexual dysfunction with inhibited sexual excitement 01/24/2018     Priority: Medium     Essential hypertension 01/24/2018     Priority: Medium     History of type 2 diabetes mellitus 01/24/2018     Priority: Medium     Mixed hyperlipidemia 01/24/2018     Priority: Medium     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       History of throat cancer 12/13/2017     Priority: Medium     Prediabetes 12/13/2017     Priority: Medium     Partial tear of right rotator cuff 06/21/2017     Priority: Medium     Aftercare following surgery of the musculoskeletal system 06/21/2017     Priority: Medium      History of urinary retention 12/23/2016     Priority: Medium     Overview:   Postoperative       Personal history of malignant neoplasm of bladder 11/19/2014     Priority: Medium     History of ST elevation myocardial infarction (STEMI) 07/11/2013     Priority: Medium     ACP (advance care planning) 12/08/2011     Priority: Medium     Overview:   Patient has identified Health Care Agent(s):   wife Ruth [phone] is the primary medical decision maker.    Patient has Advance Care Plan Documents (Health Care Directive, POLST): Yes, has a living will    Patient has identified Specific Treatment Preferences: Yes   Specific Treatment Preferences: a.) Code Status:  CPR/Attempt Resuscitation (per chart)       Other malignant neoplasm without specification of site 11/21/2011     Priority: Medium     Past Medical History:   Diagnosis Date     Chronic myeloproliferative disease (H)     questionable     Essential (primary) hypertension     No Comments Provided     Hyperlipidemia     No Comments Provided     Male erectile dysfunction     No Comments Provided     Malignant neoplasm of supraglottis (H)     2011,Radiation     Other chronic pancreatitis (H)     No Comments Provided     Personal history of irradiation     1/30/2012-3/2/2012     Personal history of malignant neoplasm of bladder     4/22/2015     Personal history of nicotine dependence     2ppd - quit 2000     Pneumonia     2001     ST elevation (STEMI) myocardial infarction (H)     7/5/2013,Integrity BMS to RCA done at [facility]     No Comments Provided     Past Surgical History:   Procedure Laterality Date     APPENDECTOMY OPEN      1970     ARTHROSCOPY SHOULDER      11/2005,right shoulder     ARTHROSCOPY SHOULDER      1/2006,left shoulder     ARTHROSCOPY SHOULDER      2011,Left shoulder scope SAD, ac.  Open subscap, biceps     CHOLECYSTECTOMY      1994     COLONOSCOPY      02/2005     COLONOSCOPY      2/19/2015,no repeat due at this time     CYSTOSCOPY       Multiple times,Dr. Lyndon FRANK     HEART CATH, ANGIOPLASTY      07/05/2013,Bare-metal stent to dominant RCA     OTHER SURGICAL HISTORY      2004,[id number],OTHER,for chronic pancreatitis     OTHER SURGICAL HISTORY      6/22/2007,[id number],SHOULDER REPLACEMENT,Right,with biceps tenodesis by Dr. Mukesh Ayoub     OTHER SURGICAL HISTORY      1995/2002,,ERCP,xseveral, one with papillotomy     OTHER SURGICAL HISTORY      1/30/2012-3/2/2012,[id number],RADIATION TREATMENT     OTHER SURGICAL HISTORY      12/7/2011,[id number],NECK/THORAX PROCEDURE,L modified radical neck surgery     OTHER SURGICAL HISTORY      11/10/14,[id number],OTHER,Dr. Lyndon FRANK     OTHER SURGICAL HISTORY      2012,[id number],REVISION EYELID,Dr. Farias     OTHER SURGICAL HISTORY      10/22/15,[id number],OTHER     TONSILLECTOMY, ADENOIDECTOMY, COMBINED      1947     VASECTOMY      1980     Social History     Social History     Marital status:      Spouse name: N/A     Number of children: N/A     Years of education: N/A     Social History Main Topics     Smoking status: Former Smoker     Packs/day: 3.00     Years: 43.00     Types: Cigarettes     Quit date: 1/1/2000     Smokeless tobacco: Never Used     Alcohol use No     Drug use: No     Sexual activity: Yes     Partners: Female     Other Topics Concern     None     Social History Narrative    Ruth Spouse   Patient is  with grown children.  [employer] at [employer] for 17 years     Family History   Problem Relation Age of Onset     HEART DISEASE Father 83     Heart Disease,CHF     HEART DISEASE Mother 80     Heart Disease,MI     Family History Negative Sister      Good Health     HEART DISEASE Sister      Heart Disease,pacemaker     DIABETES Son      Diabetes,Type 1     Breast Cancer Daughter      Cancer-breast     Prostate Cancer No family hx of      Cancer-prostate       EXAM:   Vitals:    03/07/18 1541   BP: 140/82   BP Location: Right arm   Patient Position: Sitting   Cuff Size: Adult Regular   Pulse:  "68   Weight: 182 lb (82.6 kg)       Current Pain Score: No Pain (0)     BP Readings from Last 3 Encounters:   03/07/18 140/82   12/13/17 120/84   10/04/17 136/82    Wt Readings from Last 3 Encounters:   03/07/18 182 lb (82.6 kg)   12/13/17 172 lb (78 kg)   10/09/17 174 lb 9.6 oz (79.2 kg)      Estimated body mass index is 28.08 kg/(m^2) as calculated from the following:    Height as of 10/9/17: 5' 7.5\" (1.715 m).    Weight as of this encounter: 182 lb (82.6 kg).     Physical Exam   Skin:   actinic keratoses --- Scalp x3, left pre-auricular area x1 and left upper lateral arm x1.     PROCEDURE:  Reviewed risks and benefits of cryotherapy. After informed consent was obtained, patient elected to proceed. These 5 lesions were treated today.  Performed cryotherapy to #5 lesions x 2 rounds of 30 second freeze/thaw cycles.  Tolerated well. No obvious complications.  Return for signs of infection.    The patient and I reviewed safe sun practices today: the importance of staying out of the sun; especially between 10AM-2PM, wearing sun screen SPF > 30, and protective clothing.  We also discussed the ABC's of skin cancers including: changes in size, uniformity, borders, coloration, bleeding, or any irregularity or changes. If these occur, seek medical attention.  The patient should have a complete skin exam by a medical professional every 6-12 months, and any questionable/suspicious, or changing lesions should be removed.         INVESTIGATIONS:  Results for orders placed or performed in visit on 12/13/17   CBC with platelets   Result Value Ref Range    MPV 9.6 6.5 - 11.0 fL    Platelet Count 322 140 - 440 thou/cu mm    MCHC 34.9 32.0 - 36.0 g/dL    MCV 97 80 - 100 fL    Hemoglobin 14.5 13.5 - 17.5 g/dL    White Blood Count - Historical 7.5 4.5 - 11.0 thou/cu mm    Red Blood Count - Historical 4.29 (L) 4.30 - 5.90 mil/cu mm    RDW 12.2 11.5 - 15.5 %    Hematocrit 41.5 37.0 - 53.0 %    MCH 33.8 26.0 - 34.0 pg   Comprehensive " metabolic panel   Result Value Ref Range    A/G Ratio - Historical 1.7 1.0 - 2.0    BUN/Creatinine Ratio - Historical 10     Urea Nitrogen 8 7 - 25 mg/dL    GFR Estimate If Black >60 >60 ml/min/1.73m2    AST (SGOT) - Historical 23 13 - 39 IU/L    Protein Total 6.5 6.4 - 8.9 g/dL    Albumin 4.1 3.5 - 5.7 g/dL    GFR If Not  - Historical >60 >60 ml/min/1.73m2    Alkaline Phosphatase 50 34 - 104 IU/L    Bilirubin Total 0.5 0.3 - 1.0 mg/dL    Anion Gap - Historical 7 5 - 18    Sodium 135 133 - 143 mmol/L    Glucose 109 (H) 70 - 105 mg/dL    Potassium 4.6 3.5 - 5.1 mmol/L    Carbon Dioxide 26 21 - 31 mmol/L    Chloride 102 98 - 107 mmol/L    ALT (SGPT) - Historical 20 7 - 52 IU/L    Globulin - Historical 2.4 2.0 - 3.7 g/dL    Creatinine 0.83 0.70 - 1.30 mg/dL    Calcium 8.7 8.6 - 10.3 mg/dL   Lipid Profile   Result Value Ref Range    LDL Cholesterol Calculated 57 <100 mg/dL    Cholesterol/HDL Ratio - Historical 2.80 <4.50    Provider Ordered Status - Historical RANDOM     HDL Cholesterol 40 23 - 92 mg/dL    Cholesterol Total 112 <200 mg/dL    Non-HDL Cholesterol - Historical 72 <145 mg/dL    Triglycerides 75 <150 mg/dL   Vitamin B12   Result Value Ref Range    Vitamin B12 678 180 - 914 pg/mL   TSH   Result Value Ref Range    TSH - Historical 3.34 0.34 - 5.60 uIU/mL   Vitamin D 25 (Deficiency) - Historical Result   Result Value Ref Range    Vitamin D Total - Historical 34.3 ng/mL    Narrative    Deficiency:           <10 ng/mL  Insufficiency:      10-29 ng/mL    Sufficiency:        ng/mL    Possible Toxicity:   >100 ng/mL   UA reflex to Microscopic   Result Value Ref Range    Ketones Urine Negative Negative mg/dL    Clarity, urine Clear Clear Clarity    Color Urine Yellow Yellow Color    Occult Blood, Urine - Historical Negative Negative    Bilirubin Urine - Historical Negative Negative    Glucose Urine Negative Negative mg/dL    Urine pH - Historical 7.5 6.0, 7.0, 8.0, 5.5, 6.5, 7.5, 8.5    Protein  Qualitative, Urine - Historical 30 (A) Negative mg/dL    Leukocyte Esterase Urine Negative Negative    Nitrite Urine Negative Negative    Urobilinogen, Qualitative - Historical Normal Normal EU/dl    Specific Gravity Urine 1.010 1.010, 1.015, 1.020, 1.025   Microscopic exam urine   Result Value Ref Range    Other Mucus Present     Epithelial Cells None Seen None Seen, Few Epi/HPF    RBC - Historical None Seen 0-2, None Seen /HPF    WBC - Historical None Seen 0-2, 3-5, None Seen /HPF    Bacteria Urine None Seen None Seen, Rare, Occasional, Few Bacteria/HPF   Hemoglobin A1c POCT   Result Value Ref Range    Estimated Average Glucose 126 mg/dL    Hemoglobin A1C Monitoring - Historical 6.0 4.0 - 6.2 %    Narrative         (<=6.9%)           Indicates good control       (7.0% to 7.9%)     Indicates fair control       (>=8.0%)           Indicates poor control     NOTE:  These thresholds are guidelines and            individual targets may vary.       ASSESSMENT AND PLAN:  Problem List Items Addressed This Visit        Musculoskeletal and Integumentary    Actinic keratoses - x4 on head, x1 on left upper arm - Primary    Relevant Orders    DESTRUCT PREMALIGNANT LESION, FIRST (Completed)    DESTRUCT PREMALIGNANT LESION, 2-14 (Completed)        -- Expected clinical course discussed      Patient Instructions   What to Expect Following Cryosurgery (Liquid Nitrogen)   What is Cryosurgery?   Cryosurgery is a technique for removing skin lesions that primarily involve the surface of the skin, such as warts, seborrheic keratosis, or actinic keratosis. It is a quick method of removing the lesion with minimal scarring.   The liquid nitrogen needs to be applied long enough tofreeze the affected skin. By freezing the skin, a blister is created underneath the lesion. Ideally, as the new skin forms underneath the blister, the abnormal skin on the roof of the blister peels off. Occasionally, if thelesion is very thick (such as a large  wart), only the surface is blistered off. The base or residual lesion may need to be frozen at another visit.   It takes about one to two weeks for the scab to fall off, which is whenthe new layer of skin has formed under the blister. Areas of thinner skin, such as the face, may heal a little faster.      What to Expect Over the Next Few Weeks     During Treatment - Area beingtreated will sting, burn, and then possibly itch.     Immediately After Treatment - Area will be red, sore, and swollen.     Next Day - Blister or blood blister has formed, tenderness starts to subside. Apply aBand-Aid if   necessary.     7 Days - Surface is dark red/brown and scab-like. Apply Vaseline  or an antibacterial ointment, such as Polysporin , if necessary.     2 to 4 Weeks - The surface startsto peel off. This may be encouraged gently during bathing, when the scab is softened.     No makeup shouldbe applied until area is fully healed.      Howto Take Care of the Skin after Cryosurgery     ABand-Aid can be used for larger blisters or blisters in areas that are more likely to be traumatized -such as fingers and toes. If the area becomes dry or crusted, an ointment (Vaseline , Bacitracin , or Polysporin ) can also be applied.     Cleanse area with a mild cleanser and cool water.     Pat the area dry with a lint-free cloth and apply an ointment (Vaseline , Bacitracin , or Polysporin ).     Avoid glycolic acids, Vitamin C, scrubs, Tretinoin (Retin-A), and Retinol creams for 7 to 10 days.     If approved by your Provider, you may bathe, swim, exercise, and otherwise follow all of your normalactivities.     The area may get wet while bathing, but swimming or hot tub use should be avoided for oneweek following a treatment or while the skin is open.     Within 24 hours, you can expect the area to beswollen and/or blistered. The blister may not be visible to the naked eye.     Within one week, theswelling goes down. The top becomes dark red  and scab-like. The scab will loosen over the next weeks, and should fall off within one month.      Adverse Effects     The most common adverse effects are pain, swelling/blistering, potential for infection, and pale discoloration of the skin after it heals.      Blisters        Anytime a blister surfaces, whether from ill-fitting shoes, an oven burn, or liquid nitrogen cryosurgery, it will be a bit painful. For most patients, the pain is a temporary stingwith some discomfort periodically over the next day as the blister forms.     The goal is to achieve ablister. This means, most commonly, patients will have a blister form following treatment. Sometimes, the blister is so thin that it can't be seen and may have minimal swelling. Occasionally, a blood blister forms that canbe quite dramatic but is harmless.     Rarely, the blister may become infected. When this happens, theblister becomes unusually tender, the fluid becomes cloudy, and the redness around it becomes more extensive (and may even form streaks). If this happens, contact our office.     Some lesions, especially those on theface, may leave a slight pale discoloration.     True scarring,involving deeper layers of the skin is unlikely.       Jovanni Sen MD  Internal Medicine  [facility]

## 2018-03-07 NOTE — NURSING NOTE
Patient presents to the clinic for multiple areas on the head to be treated with liquid nitrogen.      Previous A1C is at goal of <8  No results found for: A1C  Urine microalbumin:creatine: n/a  Foot exam over year-decline  Eye exam last year   Tobacco User no  Patient is on a daily aspirin  Patient is on a Statin.  Blood pressure today of  at the goal of <139/89 for diabetics.    Kate Gar LPN 3/7/2018 3:33 PM

## 2018-03-07 NOTE — PATIENT INSTRUCTIONS
What to Expect Following Cryosurgery (Liquid Nitrogen)   What is Cryosurgery?   Cryosurgery is a technique for removing skin lesions that primarily involve the surface of the skin, such as warts, seborrheic keratosis, or actinic keratosis. It is a quick method of removing the lesion with minimal scarring.   The liquid nitrogen needs to be applied long enough tofreeze the affected skin. By freezing the skin, a blister is created underneath the lesion. Ideally, as the new skin forms underneath the blister, the abnormal skin on the roof of the blister peels off. Occasionally, if thelesion is very thick (such as a large wart), only the surface is blistered off. The base or residual lesion may need to be frozen at another visit.   It takes about one to two weeks for the scab to fall off, which is whenthe new layer of skin has formed under the blister. Areas of thinner skin, such as the face, may heal a little faster.      What to Expect Over the Next Few Weeks     During Treatment - Area beingtreated will sting, burn, and then possibly itch.     Immediately After Treatment - Area will be red, sore, and swollen.     Next Day - Blister or blood blister has formed, tenderness starts to subside. Apply aBand-Aid if   necessary.     7 Days - Surface is dark red/brown and scab-like. Apply Vaseline  or an antibacterial ointment, such as Polysporin , if necessary.     2 to 4 Weeks - The surface startsto peel off. This may be encouraged gently during bathing, when the scab is softened.     No makeup shouldbe applied until area is fully healed.      Howto Take Care of the Skin after Cryosurgery     ABand-Aid can be used for larger blisters or blisters in areas that are more likely to be traumatized -such as fingers and toes. If the area becomes dry or crusted, an ointment (Vaseline , Bacitracin , or Polysporin ) can also be applied.     Cleanse area with a mild cleanser and cool water.     Pat the area dry with a lint-free cloth  and apply an ointment (Vaseline , Bacitracin , or Polysporin ).     Avoid glycolic acids, Vitamin C, scrubs, Tretinoin (Retin-A), and Retinol creams for 7 to 10 days.     If approved by your Provider, you may bathe, swim, exercise, and otherwise follow all of your normalactivities.     The area may get wet while bathing, but swimming or hot tub use should be avoided for oneweek following a treatment or while the skin is open.     Within 24 hours, you can expect the area to beswollen and/or blistered. The blister may not be visible to the naked eye.     Within one week, theswelling goes down. The top becomes dark red and scab-like. The scab will loosen over the next weeks, and should fall off within one month.      Adverse Effects     The most common adverse effects are pain, swelling/blistering, potential for infection, and pale discoloration of the skin after it heals.      Blisters        Anytime a blister surfaces, whether from ill-fitting shoes, an oven burn, or liquid nitrogen cryosurgery, it will be a bit painful. For most patients, the pain is a temporary stingwith some discomfort periodically over the next day as the blister forms.     The goal is to achieve ablister. This means, most commonly, patients will have a blister form following treatment. Sometimes, the blister is so thin that it can't be seen and may have minimal swelling. Occasionally, a blood blister forms that canbe quite dramatic but is harmless.     Rarely, the blister may become infected. When this happens, theblister becomes unusually tender, the fluid becomes cloudy, and the redness around it becomes more extensive (and may even form streaks). If this happens, contact our office.     Some lesions, especially those on theface, may leave a slight pale discoloration.     True scarring,involving deeper layers of the skin is unlikely.

## 2018-03-07 NOTE — MR AVS SNAPSHOT
After Visit Summary   3/7/2018    Rajesh Huynh    MRN: [id number]           Patient Information     Date Of Birth          1940        Visit Information        Provider Department      3/7/2018 3:20 PM Jovanni Sen MD [facility]        Today's Diagnoses     Actinic keratoses - x4 on head, x1 on left upper arm    -  1      Care Instructions    What to Expect Following Cryosurgery (Liquid Nitrogen)   What is Cryosurgery?   Cryosurgery is a technique for removing skin lesions that primarily involve the surface of the skin, such as warts, seborrheic keratosis, or actinic keratosis. It is a quick method of removing the lesion with minimal scarring.   The liquid nitrogen needs to be applied long enough tofreeze the affected skin. By freezing the skin, a blister is created underneath the lesion. Ideally, as the new skin forms underneath the blister, the abnormal skin on the roof of the blister peels off. Occasionally, if thelesion is very thick (such as a large wart), only the surface is blistered off. The base or residual lesion may need to be frozen at another visit.   It takes about one to two weeks for the scab to fall off, which is whenthe new layer of skin has formed under the blister. Areas of thinner skin, such as the face, may heal a little faster.      What to Expect Over the Next Few Weeks     During Treatment - Area beingtreated will sting, burn, and then possibly itch.     Immediately After Treatment - Area will be red, sore, and swollen.     Next Day - Blister or blood blister has formed, tenderness starts to subside. Apply aBand-Aid if   necessary.     7 Days - Surface is dark red/brown and scab-like. Apply Vaseline  or an antibacterial ointment, such as Polysporin , if necessary.     2 to 4 Weeks - The surface startsto peel off. This may be encouraged gently during bathing, when the scab is softened.     No makeup shouldbe applied until area is fully healed.       Howto Take Care of the Skin after Cryosurgery     ABand-Aid can be used for larger blisters or blisters in areas that are more likely to be traumatized -such as fingers and toes. If the area becomes dry or crusted, an ointment (Vaseline , Bacitracin , or Polysporin ) can also be applied.     Cleanse area with a mild cleanser and cool water.     Pat the area dry with a lint-free cloth and apply an ointment (Vaseline , Bacitracin , or Polysporin ).     Avoid glycolic acids, Vitamin C, scrubs, Tretinoin (Retin-A), and Retinol creams for 7 to 10 days.     If approved by your Provider, you may bathe, swim, exercise, and otherwise follow all of your normalactivities.     The area may get wet while bathing, but swimming or hot tub use should be avoided for oneweek following a treatment or while the skin is open.     Within 24 hours, you can expect the area to beswollen and/or blistered. The blister may not be visible to the naked eye.     Within one week, theswelling goes down. The top becomes dark red and scab-like. The scab will loosen over the next weeks, and should fall off within one month.      Adverse Effects     The most common adverse effects are pain, swelling/blistering, potential for infection, and pale discoloration of the skin after it heals.      Blisters        Anytime a blister surfaces, whether from ill-fitting shoes, an oven burn, or liquid nitrogen cryosurgery, it will be a bit painful. For most patients, the pain is a temporary stingwith some discomfort periodically over the next day as the blister forms.     The goal is to achieve ablister. This means, most commonly, patients will have a blister form following treatment. Sometimes, the blister is so thin that it can't be seen and may have minimal swelling. Occasionally, a blood blister forms that canbe quite dramatic but is harmless.     Rarely, the blister may become infected. When this happens, theblister becomes unusually tender, the fluid  "becomes cloudy, and the redness around it becomes more extensive (and may even form streaks). If this happens, contact our office.     Some lesions, especially those on theface, may leave a slight pale discoloration.     True scarring,involving deeper layers of the skin is unlikely.             Follow-ups after your visit        Follow-up notes from your care team     Return if symptoms worsen or fail to improve.      Who to contact     If you have questions or need follow up information about today's clinic visit or your schedule please contact [facility] directly at [phone].  Normal or non-critical lab and imaging results will be communicated to you by Splicehart, letter or phone within 4 business days after the clinic has received the results. If you do not hear from us within 7 days, please contact the clinic through Splicehart or phone. If you have a critical or abnormal lab result, we will notify you by phone as soon as possible.  Submit refill requests through [employer] [facility] or call your pharmacy and they will forward the refill request to us. Please allow 3 business days for your refill to be completed.          Additional Information About Your Visit        MyChart Information     [employer] [facility] lets you send messages to your doctor, view your test results, renew your prescriptions, schedule appointments and more. To sign up, go to [email or web address] . Click on \"Log in\" on the left side of the screen, which will take you to the Welcome page. Then click on \"Sign up Now\" on the right side of the page.     You will be asked to enter the access code listed below, as well as some personal information. Please follow the directions to create your username and password.     Your access code is: [email or web address]  Expires: 2018  3:56 PM     Your access code will  in 90 days. If you need help or a new code, please call your [place] clinic or [phone].        Care EveryWhere ID     This is your " Care EveryWhere ID. This could be used by other organizations to access your [place] medical records  [id number]        Your Vitals Were     Pulse BMI (Body Mass Index)                68 28.08 kg/m2           Blood Pressure from Last 3 Encounters:   03/07/18 140/82   12/13/17 120/84   10/04/17 136/82    Weight from Last 3 Encounters:   03/07/18 182 lb (82.6 kg)   12/13/17 172 lb (78 kg)   10/09/17 174 lb 9.6 oz (79.2 kg)              We Performed the Following     DESTRUCT PREMALIGNANT LESION, 2-14     DESTRUCT PREMALIGNANT LESION, FIRST        Primary Care Provider Office Phone # Fax #    Jovanni Sen [phone]       [address] [facility] [address]        Equal Access to Services     [facility] RAVINDER : Hadii nay george Sosully, waaxda luqadaha, qaybta kaalmada loyd, wei jarrett . So [facility] [phone].    ATENCIÓN: Si habla español, tiene a matthews disposición servicios gratuitos de asistencia lingüística. Cornelius al [phone].    We comply with applicable federal civil rights laws and [place] laws. We do not discriminate on the basis of race, color, national origin, age, disability, sex, sexual orientation, or gender identity.            Thank you!     Thank you for choosing [facility]  for your care. Our goal is always to provide you with excellent care. Hearing back from our patients is one way we can continue to improve our services. Please take a few minutes to complete the written survey that you may receive in the mail after your visit with us. Thank you!             Your Updated Medication List - Protect others around you: Learn how to safely use, store and throw away your medicines at www.disposemymeds.org.          This list is accurate as of 3/7/18  3:56 PM.  Always use your most recent med list.                   Brand Name Dispense Instructions for use Diagnosis    aspirin EC 81 MG EC tablet      Take 81 mg by  mouth daily with food        atorvastatin 40 MG tablet    LIPITOR     Take 40 mg by mouth daily (with dinner)        blood glucose monitoring test strip    no brand specified     Dispense item covered by pt ins. E11.9 NIDDM type II - Test 1 time/day        CALCIUM 600+D 600-200 MG-UNIT Tabs   Generic drug:  calcium carbonate-vitamin D      Take 1 tablet by mouth 2 times daily (with meals)        DOCOSAHEXAENOIC ACID PO      Take 1 capsule by mouth 2 times daily        FIFTY50 GLUCOSE METER 2.0 W/DEVICE Kit      Dispense Accu-Chek Agnes  Meter. Dispense item covered by pt ins. E11.9 NIDDM type II - Test 1 time/day        metFORMIN 1000 MG tablet    GLUCOPHAGE     Take 1,000 mg by mouth 2 times daily (with meals) For diabetes prevention        MULTI-VITAMINS Tabs      Take 1 tablet by mouth daily        nitroGLYcerin 0.4 MG sublingual tablet    NITROSTAT     Place 0.4 mg under the tongue every 5 minutes as needed for chest pain        primidone 50 MG tablet    MYSOLINE     Take 100 mg by mouth At Bedtime        propranolol 20 MG tablet    INDERAL     Take 20 mg by mouth 2 times daily

## 2018-03-08 ENCOUNTER — TRANSFERRED RECORDS (OUTPATIENT)
Dept: HEALTH INFORMATION MANAGEMENT | Facility: OTHER | Age: 78
End: 2018-03-08

## 2018-03-08 PROBLEM — L57.0 ACTINIC KERATOSES: Status: ACTIVE | Noted: 2018-03-08

## 2018-03-08 ASSESSMENT — ANXIETY QUESTIONNAIRES: GAD7 TOTAL SCORE: 0

## 2018-03-08 ASSESSMENT — PATIENT HEALTH QUESTIONNAIRE - PHQ9: SUM OF ALL RESPONSES TO PHQ QUESTIONS 1-9: 0

## 2018-04-04 ENCOUNTER — OFFICE VISIT (OUTPATIENT)
Dept: INTERNAL MEDICINE | Facility: OTHER | Age: 78
End: 2018-04-04
Attending: INTERNAL MEDICINE
Payer: MEDICARE

## 2018-04-04 VITALS
BODY MASS INDEX: 27.51 KG/M2 | WEIGHT: 178.25 LBS | HEART RATE: 80 BPM | SYSTOLIC BLOOD PRESSURE: 138 MMHG | OXYGEN SATURATION: 95 % | TEMPERATURE: 97.6 F | DIASTOLIC BLOOD PRESSURE: 80 MMHG

## 2018-04-04 DIAGNOSIS — H25.13 AGE-RELATED NUCLEAR CATARACT OF BOTH EYES: ICD-10-CM

## 2018-04-04 DIAGNOSIS — Z86.39 HISTORY OF TYPE 2 DIABETES MELLITUS: ICD-10-CM

## 2018-04-04 DIAGNOSIS — I25.2 HISTORY OF ST ELEVATION MYOCARDIAL INFARCTION (STEMI): ICD-10-CM

## 2018-04-04 DIAGNOSIS — I10 ESSENTIAL HYPERTENSION: ICD-10-CM

## 2018-04-04 DIAGNOSIS — E78.2 MIXED HYPERLIPIDEMIA: ICD-10-CM

## 2018-04-04 DIAGNOSIS — G25.0 BENIGN ESSENTIAL TREMOR: ICD-10-CM

## 2018-04-04 DIAGNOSIS — R73.03 PREDIABETES: ICD-10-CM

## 2018-04-04 DIAGNOSIS — Z01.818 PREOP GENERAL PHYSICAL EXAM: Primary | ICD-10-CM

## 2018-04-04 PROCEDURE — 99214 OFFICE O/P EST MOD 30 MIN: CPT | Performed by: INTERNAL MEDICINE

## 2018-04-04 PROCEDURE — G0463 HOSPITAL OUTPT CLINIC VISIT: HCPCS

## 2018-04-04 ASSESSMENT — PATIENT HEALTH QUESTIONNAIRE - PHQ9: 5. POOR APPETITE OR OVEREATING: NOT AT ALL

## 2018-04-04 ASSESSMENT — ANXIETY QUESTIONNAIRES
5. BEING SO RESTLESS THAT IT IS HARD TO SIT STILL: NOT AT ALL
6. BECOMING EASILY ANNOYED OR IRRITABLE: NOT AT ALL
3. WORRYING TOO MUCH ABOUT DIFFERENT THINGS: NOT AT ALL
7. FEELING AFRAID AS IF SOMETHING AWFUL MIGHT HAPPEN: NOT AT ALL
2. NOT BEING ABLE TO STOP OR CONTROL WORRYING: NOT AT ALL
1. FEELING NERVOUS, ANXIOUS, OR ON EDGE: NOT AT ALL
GAD7 TOTAL SCORE: 0
IF YOU CHECKED OFF ANY PROBLEMS ON THIS QUESTIONNAIRE, HOW DIFFICULT HAVE THESE PROBLEMS MADE IT FOR YOU TO DO YOUR WORK, TAKE CARE OF THINGS AT HOME, OR GET ALONG WITH OTHER PEOPLE: NOT DIFFICULT AT ALL

## 2018-04-04 ASSESSMENT — PAIN SCALES - GENERAL: PAINLEVEL: NO PAIN (0)

## 2018-04-04 NOTE — PATIENT INSTRUCTIONS
Before Your Surgery      Call your surgeon if there is any change in your health. This includes signs of a cold or flu (such as a sore throat, runny nose, cough, rash or fever).    Do not smoke, drink alcohol or take over the counter medicine (unless your surgeon or primary care doctor tells you to) for the 24 hours before and after surgery.    If you take prescribed drugs: Follow your doctor s orders about which medicines to take and which to stop until after surgery.    Eating and drinking prior to surgery: follow the instructions from your surgeon    Take a shower or bath the night before surgery. Use the soap your surgeon gave you to gently clean your skin. If you do not have soap from your surgeon, use your regular soap. Do not shave or scrub the surgery site.  Wear clean pajamas and have clean sheets on your bed.       ASPIRIN: Bleeding risk is low for this procedure and aspirin 81 mg daily should be continued in the perioperative period

## 2018-04-04 NOTE — NURSING NOTE
Patient presents to the clinic for pre-op exam.    Previous A1C is at goal of <8  No results found for: A1C  Urine microalbumin:creatine: n/a  Foot exam over a year ago-declines today  Eye exam 3-7-18    Tobacco User no  Patient is on a daily aspirin  Patient is on a Statin.  Blood pressure today of:     BP Readings from Last 1 Encounters:   03/07/18 140/82      is at the goal of <139/89 for diabetics.    Kate Gar LPN on 4/4/2018 at 9:42 AM

## 2018-04-04 NOTE — PROGRESS NOTES
[facility]  [address]  [place] [address]  [phone]    PRE-OP EVALUATION:  Today's date: 2018    Rajesh Huynh (: 1940) presents for pre-operative evaluation assessment as requested by Dr. Yates.  He requires evaluation and anesthesia risk assessment prior to undergoing surgery/procedure for treatment of Cataracts .    Proposed Surgery/ Procedure: Right and Left Eye Cataract   Date of Surgery/ Procedure: Right Eye 18, Left Eye 18  Time of Surgery/ Procedure: unknown  Hospital/Surgical Facility: [facility]  Fax number for surgical facility: n/a  Primary Physician: Jovanni Sen  Type of Anesthesia Anticipated: Local    Patient has a Health Care Directive or Living Will:  NO    1. YES - Do you have a history of heart attack, stroke, stent, bypass or surgery on an artery in the head, neck, heart or legs?  2. NO - Do you ever have any pain or discomfort in your chest?  3. NO - Do you have a history of  Heart Failure?  4. NO - Are you troubled by shortness of breath when: walking on the level, up a slight hill or at night?  5. NO - Do you currently have a cold, bronchitis or other respiratory infection?  6. NO - Do you have a cough, shortness of breath or wheezing?  7. NO - Do you sometimes get pains in the calves of your legs when you walk?  8. NO - Do you or anyone in your family have previous history of blood clots?  9. NO - Do you or does anyone in your family have a serious bleeding problem such as prolonged bleeding following surgeries or cuts?  10. NO - Have you ever had problems with anemia or been told to take iron pills?  11. NO - Have you had any abnormal blood loss such as black, tarry or bloody stools, or abnormal vaginal bleeding?  12. NO - Have you ever had a blood transfusion?  13. NO - Have you or any of your relatives ever had problems with anesthesia?  14. NO - Do you have sleep apnea, excessive snoring or daytime drowsiness?  15.  NO - Do you have any prosthetic heart valves?  16. YES - Do you have prosthetic joints?  17. NO - Is there any chance that you may be pregnant?      HPI:       ICD-10-CM    1. Preop general physical exam Z01.818    2. Age-related nuclear cataract of both eyes H25.13    3. Essential hypertension I10    4. Benign essential tremor G25.0    5. History of ST elevation myocardial infarction (STEMI) I25.2    6. Mixed hyperlipidemia E78.2    7. History of type 2 diabetes mellitus Z86.39    8. Prediabetes R73.03      HPI related to upcoming procedure: Patient has been having progression of his eye cataracts and is now scheduled for surgery.    Essential tremor, much improved with current medication regimen.  No changes recommended.    History of ST elevation MI, treated with stent.  Doing well.  Okay to hold Plavix.  This was back in 2012.    History of type 2 diabetes, now with prediabetes.  Taking metformin.    - Advised to hold on morning of surgery while n.p.o.    HYPERTENSION - Patient has longstanding history of HTN , currently denies any symptoms referable to elevated blood pressure. Specifically denies chest pain, palpitations, dyspnea, orthopnea, PND or peripheral edema. Blood pressure readings have been in normal range. Current medication regimen is as listed below. Patient denies any side effects of medication.                                                                                                                                                                                          .  HYPERLIPIDEMIA - Patient has a long history of significant Hyperlipidemia requiring medication for treatment with recent good control. Patient reports no problems or side effects with the medication.                                                                                                                                                       .    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       History of throat cancer 12/13/2017     Priority: Medium     Prediabetes 12/13/2017     Priority: Medium     Partial tear of right rotator cuff 06/21/2017     Priority: Medium     Aftercare following surgery of the musculoskeletal system 06/21/2017     Priority: Medium     History of urinary retention 12/23/2016     Priority: Medium     Overview:   Postoperative       Personal history of malignant neoplasm of bladder 11/19/2014     Priority: Medium     History of ST elevation myocardial infarction (STEMI) 07/11/2013     Priority: Medium     ACP (advance care planning) 12/08/2011     Priority: Medium     Overview:   Patient has identified Health Care Agent(s):   wife Ruth [phone] is the primary medical decision maker.    Patient has Advance Care Plan Documents (Health Care Directive, POLST): Yes, has a living will    Patient has identified Specific Treatment Preferences: Yes   Specific Treatment Preferences: a.) Code Status:  CPR/Attempt Resuscitation (per chart)       Other malignant neoplasm without specification of site 11/21/2011     Priority: Medium      Past Medical History:   Diagnosis Date     Chronic myeloproliferative disease (H)     questionable      Essential (primary) hypertension     No Comments Provided     Hyperlipidemia     No Comments Provided     Male erectile dysfunction     No Comments Provided     Malignant neoplasm of supraglottis (H)     2011,Radiation     Other chronic pancreatitis (H)     No Comments Provided     Personal history of irradiation     1/30/2012-3/2/2012     Personal history of malignant neoplasm of bladder     4/22/2015     Personal history of nicotine dependence     2ppd - quit 2000     Pneumonia     2001     ST elevation (STEMI) myocardial infarction (H)     7/5/2013,Integrity BMS to RCA done at [facility]     No Comments Provided     Past Surgical History:   Procedure Laterality Date     APPENDECTOMY OPEN      1970     ARTHROSCOPY SHOULDER      11/2005,right shoulder     ARTHROSCOPY SHOULDER      1/2006,left shoulder     ARTHROSCOPY SHOULDER      2011,Left shoulder scope SAD, ac.  Open subscap, biceps     CHOLECYSTECTOMY      1994     COLONOSCOPY      02/2005     COLONOSCOPY      2/19/2015,no repeat due at this time     CYSTOSCOPY      Multiple times,Dr. Lyndon FRANK     HEART CATH, ANGIOPLASTY      07/05/2013,Bare-metal stent to dominant RCA     OTHER SURGICAL HISTORY      2004,427488,OTHER,for chronic pancreatitis     OTHER SURGICAL HISTORY      6/22/2007,[id number],SHOULDER REPLACEMENT,Right,with biceps tenodesis by Dr. Mukesh Ayoub     OTHER SURGICAL HISTORY      1995/2002,,ERCP,xseveral, one with papillotomy     OTHER SURGICAL HISTORY      1/30/2012-3/2/2012,[id number],RADIATION TREATMENT     OTHER SURGICAL HISTORY      12/7/2011,46272,NECK/THORAX PROCEDURE,L modified radical neck surgery     OTHER SURGICAL HISTORY      11/10/14,504294,OTHER,Dr. Lyndon FRANK     OTHER SURGICAL HISTORY      2012,87109,REVISION EYELID,Dr. Farias     OTHER SURGICAL HISTORY      10/22/15,[id number],OTHER     TONSILLECTOMY, ADENOIDECTOMY, COMBINED      1947     VASECTOMY      1980     Current Outpatient Prescriptions   Medication Sig Dispense  Refill     aspirin EC 81 MG EC tablet Take 81 mg by mouth daily with food       atorvastatin (LIPITOR) 40 MG tablet Take 40 mg by mouth daily (with dinner)       blood glucose monitoring (NO BRAND SPECIFIED) test strip Dispense item covered by pt ins. E11.9 NIDDM type II - Test 1 time/day       Blood Glucose Monitoring Suppl (FIFTY50 GLUCOSE METER 2.0) W/DEVICE KIT Dispense Accu-Chek Agnes  Meter. Dispense item covered by pt ins. E11.9 NIDDM type II - Test 1 time/day       calcium carbonate-vitamin D (CALCIUM 600+D) 600-200 MG-UNIT TABS Take 1 tablet by mouth 2 times daily (with meals)       DOCOSAHEXAENOIC ACID PO Take 1 capsule by mouth 2 times daily       metFORMIN (GLUCOPHAGE) 1000 MG tablet Take 1,000 mg by mouth 2 times daily (with meals) For diabetes prevention       Multiple Vitamin (MULTI-VITAMINS) TABS Take 1 tablet by mouth daily       nitroGLYcerin (NITROSTAT) 0.4 MG sublingual tablet Place 0.4 mg under the tongue every 5 minutes as needed for chest pain       primidone (MYSOLINE) 50 MG tablet Take 100 mg by mouth At Bedtime       propranolol (INDERAL) 20 MG tablet Take 20 mg by mouth 2 times daily       OTC products: no recent use of OTC ASA, NSAIDS or Steroids    No Known Allergies   Latex Allergy: NO    Social History   Substance Use Topics     Smoking status: Former Smoker     Packs/day: 3.00     Years: 43.00     Types: Cigarettes     Quit date: 1/1/2000     Smokeless tobacco: Never Used     Alcohol use No     History   Drug Use No       REVIEW OF SYSTEMS:   Constitutional, neuro, ENT, endocrine, pulmonary, cardiac, gastrointestinal, genitourinary, musculoskeletal, integument and psychiatric systems are negative, except as otherwise noted.    EXAM:   /80 (BP Location: Right arm, Patient Position: Sitting, Cuff Size: Adult Regular)  Pulse 80  Temp 97.6  F (36.4  C) (Temporal)  Wt 178 lb 4 oz (80.9 kg)  SpO2 95%  BMI 27.51 kg/m2    GENERAL APPEARANCE: healthy, alert and no distress     EYES:  EOMI,  PERRL     HENT: ear canals and TM's normal and nose and mouth without ulcers or lesions     NECK: no adenopathy, no asymmetry, masses, or scars and thyroid normal to palpation     RESP: lungs clear to auscultation - no rales, rhonchi or wheezes     CV: regular rates and rhythm, normal S1 S2, no S3 or S4 and no murmur, click or rub     ABDOMEN:  soft, nontender, no HSM or masses and bowel sounds normal     MS: extremities normal- no gross deformities noted, no evidence of inflammation in joints, FROM in all extremities.     SKIN: no suspicious lesions or rashes     NEURO: Normal strength and tone, sensory exam grossly normal, mentation intact and speech normal     PSYCH: mentation appears normal. and affect normal/bright     LYMPHATICS: No cervical adenopathy    DIAGNOSTICS:   EKG 4/18/2017- normal EKG with a rate of 71.  Unchanged from previous.    Recent Labs   Lab Test  12/13/17   1126  12/13/17   1058  04/18/17   1533  04/18/17   1514   05/29/15   1437   07/05/13   1146   HGB   --   14.5   --   13.8   < >   --    < >   --    PLT   --   322   --   308   < >   --    < >   --    INR   --    --    --    --    --   1.1   --   1.0   NA  135   --   132*   --    < >   --    < >   --    POTASSIUM  4.6   --   4.3   --    < >   --    < >   --    CR  0.83   --   0.84   --    < >   --    < >   --     < > = values in this interval not displayed.        IMPRESSION:   Reason for surgery/procedure: Cataract surgery.  Diagnosis/reason for consult: Cardiopulmonary preoperative risk stratification    The proposed surgical procedure is considered LOW risk.    REVISED CARDIAC RISK INDEX  The patient has the following serious cardiovascular risks for perioperative complications such as (MI, PE, VFib and 3  AV Block):  Coronary Artery Disease (MI, positive stress test, angina, Qs on EKG)  INTERPRETATION: 1 risks: Class II (low risk - 0.9% complication rate)    The patient has the following additional risks for perioperative  complications:  No identified additional risks      ICD-10-CM    1. Preop general physical exam Z01.818    2. Age-related nuclear cataract of both eyes H25.13    3. Essential hypertension I10    4. Benign essential tremor G25.0    5. History of ST elevation myocardial infarction (STEMI) I25.2    6. Mixed hyperlipidemia E78.2    7. History of type 2 diabetes mellitus Z86.39    8. Prediabetes R73.03        RECOMMENDATIONS:     --Patient is to take all scheduled medications on the day of surgery EXCEPT for modifications listed below.    Anticoagulant or Antiplatelet Medication Use  ASPIRIN: Bleeding risk is low for this procedure and aspirin 81 mg daily should be continued in the perioperative period        APPROVAL GIVEN to proceed with proposed procedure, without further diagnostic evaluation       Signed Electronically by: Jovanni Sen MD    Copy of this evaluation report is provided to requesting physician.    [facility] Preop Guidelines

## 2018-04-04 NOTE — MR AVS SNAPSHOT
After Visit Summary   4/4/2018    aRjesh Huynh    MRN: [id number]           Patient Information     Date Of Birth          1940        Visit Information        Provider Department      4/4/2018 10:00 AM Jovanni Sen MD         Today's Diagnoses     Preop general physical exam    -  1    Age-related nuclear cataract of both eyes        Essential hypertension        Benign essential tremor        History of ST elevation myocardial infarction (STEMI)        Mixed hyperlipidemia        History of type 2 diabetes mellitus        Prediabetes          Care Instructions      Before Your Surgery      Call your surgeon if there is any change in your health. This includes signs of a cold or flu (such as a sore throat, runny nose, cough, rash or fever).    Do not smoke, drink alcohol or take over the counter medicine (unless your surgeon or primary care doctor tells you to) for the 24 hours before and after surgery.    If you take prescribed drugs: Follow your doctor s orders about which medicines to take and which to stop until after surgery.    Eating and drinking prior to surgery: follow the instructions from your surgeon    Take a shower or bath the night before surgery. Use the soap your surgeon gave you to gently clean your skin. If you do not have soap from your surgeon, use your regular soap. Do not shave or scrub the surgery site.  Wear clean pajamas and have clean sheets on your bed.       ASPIRIN: Bleeding risk is low for this procedure and aspirin 81 mg daily should be continued in the perioperative period          Follow-ups after your visit        Follow-up notes from your care team     Return if symptoms worsen or fail to improve.      Who to contact     If you have questions or need follow up information about today's clinic visit or your schedule please contact [facility] directly at [phone].  Normal or non-critical lab and  "imaging results will be communicated to you by MyChart, letter or phone within 4 business days after the clinic has received the results. If you do not hear from us within 7 days, please contact the clinic through Together[employer] or phone. If you have a critical or abnormal lab result, we will notify you by phone as soon as possible.  Submit refill requests through [employer] or call your pharmacy and they will forward the refill request to us. Please allow 3 business days for your refill to be completed.          Additional Information About Your Visit        [employer] Information     [employer] lets you send messages to your doctor, view your test results, renew your prescriptions, schedule appointments and more. To sign up, go to [email or web address] . Click on \"Log in\" on the left side of the screen, which will take you to the Welcome page. Then click on \"Sign up Now\" on the right side of the page.     You will be asked to enter the access code listed below, as well as some personal information. Please follow the directions to create your username and password.     Your access code is: [email or web address]  Expires: 2018  4:56 PM     Your access code will  in 90 days. If you need help or a new code, please call your [place] clinic or [phone].        Care EveryWhere ID     This is your Care EveryWhere ID. This could be used by other organizations to access your [place] medical records  [id number]        Your Vitals Were     Pulse Temperature BMI (Body Mass Index)             80 97.6  F (36.4  C) (Temporal) 27.51 kg/m2          Blood Pressure from Last 3 Encounters:   18 138/80   18 140/82   17 120/84    Weight from Last 3 Encounters:   18 178 lb 4 oz (80.9 kg)   18 182 lb (82.6 kg)   17 172 lb (78 kg)              Today, you had the following     No orders found for display       Primary Care Provider Office Phone # Fax #    Jovanni Sen [phone]       " [address]    [facility] [address]        Equal Access to Services     MINAKHADRA ANDREASUN : Hadii nay mcmillan ariel Somarioali, wagermanda luqslavaha, qaybta katomijuan david varelaroycejuan david, wei merinoakashlata magana. So [facility] [phone].    ATENCIÓN: Si habla español, tiene a matthews disposición servicios gratuitos de asistencia lingüística. Llame al [phone].    We comply with applicable federal civil rights laws and [place] laws. We do not discriminate on the basis of race, color, national origin, age, disability, sex, sexual orientation, or gender identity.            Thank you!     Thank you for choosing [facility] AND [facility]  for your care. Our goal is always to provide you with excellent care. Hearing back from our patients is one way we can continue to improve our services. Please take a few minutes to complete the written survey that you may receive in the mail after your visit with us. Thank you!             Your Updated Medication List - Protect others around you: Learn how to safely use, store and throw away your medicines at www.disposemymeds.org.          This list is accurate as of 4/4/18 10:13 AM.  Always use your most recent med list.                   Brand Name Dispense Instructions for use Diagnosis    aspirin EC 81 MG EC tablet      Take 81 mg by mouth daily with food        atorvastatin 40 MG tablet    LIPITOR     Take 40 mg by mouth daily (with dinner)        blood glucose monitoring test strip    no brand specified     Dispense item covered by pt ins. E11.9 NIDDM type II - Test 1 time/day        CALCIUM 600+D 600-200 MG-UNIT Tabs   Generic drug:  calcium carbonate-vitamin D      Take 1 tablet by mouth 2 times daily (with meals)        DOCOSAHEXAENOIC ACID PO      Take 1 capsule by mouth 2 times daily        FIFTY50 GLUCOSE METER 2.0 W/DEVICE Kit      Dispense Accu-Chek Agnes  Meter. Dispense item covered by pt ins. E11.9 NIDDM type II - Test 1 time/day        metFORMIN 1000 MG tablet     GLUCOPHAGE     Take 1,000 mg by mouth 2 times daily (with meals) For diabetes prevention        MULTI-VITAMINS Tabs      Take 1 tablet by mouth daily        nitroGLYcerin 0.4 MG sublingual tablet    NITROSTAT     Place 0.4 mg under the tongue every 5 minutes as needed for chest pain        primidone 50 MG tablet    MYSOLINE     Take 100 mg by mouth At Bedtime        propranolol 20 MG tablet    INDERAL     Take 20 mg by mouth 2 times daily

## 2018-04-05 ASSESSMENT — PATIENT HEALTH QUESTIONNAIRE - PHQ9: SUM OF ALL RESPONSES TO PHQ QUESTIONS 1-9: 0

## 2018-04-05 ASSESSMENT — ANXIETY QUESTIONNAIRES: GAD7 TOTAL SCORE: 0

## 2018-04-19 ENCOUNTER — TRANSFERRED RECORDS (OUTPATIENT)
Dept: HEALTH INFORMATION MANAGEMENT | Facility: OTHER | Age: 78
End: 2018-04-19

## 2018-05-08 ENCOUNTER — NURSE TRIAGE (OUTPATIENT)
Dept: INTERNAL MEDICINE | Facility: OTHER | Age: 78
End: 2018-05-08

## 2018-05-08 ENCOUNTER — TELEPHONE (OUTPATIENT)
Dept: INTERNAL MEDICINE | Facility: OTHER | Age: 78
End: 2018-05-08

## 2018-05-08 ENCOUNTER — OFFICE VISIT (OUTPATIENT)
Dept: PEDIATRICS | Facility: OTHER | Age: 78
End: 2018-05-08
Attending: INTERNAL MEDICINE
Payer: MEDICARE

## 2018-05-08 VITALS
DIASTOLIC BLOOD PRESSURE: 80 MMHG | SYSTOLIC BLOOD PRESSURE: 132 MMHG | OXYGEN SATURATION: 97 % | WEIGHT: 175 LBS | BODY MASS INDEX: 27 KG/M2 | HEART RATE: 70 BPM | TEMPERATURE: 97.3 F

## 2018-05-08 DIAGNOSIS — J44.1 COPD EXACERBATION (H): Primary | ICD-10-CM

## 2018-05-08 DIAGNOSIS — F17.200 TOBACCO USE DISORDER: ICD-10-CM

## 2018-05-08 PROCEDURE — 99213 OFFICE O/P EST LOW 20 MIN: CPT | Performed by: INTERNAL MEDICINE

## 2018-05-08 PROCEDURE — G0463 HOSPITAL OUTPT CLINIC VISIT: HCPCS

## 2018-05-08 RX ORDER — PREDNISONE 20 MG/1
40 TABLET ORAL DAILY
Qty: 10 TABLET | Refills: 0 | Status: SHIPPED | OUTPATIENT
Start: 2018-05-08 | End: 2018-05-13

## 2018-05-08 RX ORDER — AZITHROMYCIN 250 MG/1
TABLET, FILM COATED ORAL
Qty: 6 TABLET | Refills: 0 | Status: SHIPPED | OUTPATIENT
Start: 2018-05-08 | End: 2018-05-14

## 2018-05-08 RX ORDER — ALBUTEROL SULFATE 90 UG/1
2 AEROSOL, METERED RESPIRATORY (INHALATION) EVERY 6 HOURS PRN
Qty: 1 INHALER | Refills: 0 | Status: SHIPPED | OUTPATIENT
Start: 2018-05-08 | End: 2018-05-14

## 2018-05-08 RX ORDER — INHALER, ASSIST DEVICES
SPACER (EA) MISCELLANEOUS
Qty: 1 EACH | Refills: 1 | Status: SHIPPED | OUTPATIENT
Start: 2018-05-08 | End: 2021-04-22

## 2018-05-08 ASSESSMENT — PAIN SCALES - GENERAL: PAINLEVEL: NO PAIN (0)

## 2018-05-08 NOTE — PROGRESS NOTES
Subjective  Rajesh Huynh is a 77 year old male who presents for evaluation of cough and wheezing.  He has been sick a week.  Started with cough and runny nose but it is all getting worse.  He is bringing up phlegm.  He feels short of breath.  He has had a generalized headache.  He has yellow-green phlegm.  His wife was recently diagnosed with pneumonia.  No fever.  No muscle aches.  No sinus pressure or tenderness.    Problem List/PMH: reviewed in EMR, and made relevant updates today.  Medications: reviewed in EMR, and made relevant updates today.  Allergies: reviewed in EMR, and made relevant updates today.    Social Hx:  Social History   Substance Use Topics     Smoking status: Former Smoker     Packs/day: 3.00     Years: 43.00     Types: Cigarettes     Quit date: 1/1/2000     Smokeless tobacco: Never Used     Alcohol use No     Social History     Social History Narrative    Ruth Spouse   Patient is  with grown children.  [employer] at [employer] for 17 years     I reviewed social history and made relevant updates today.    Family Hx:   Family History   Problem Relation Age of Onset     HEART DISEASE Father 83     Heart Disease,CHF     HEART DISEASE Mother 80     Heart Disease,MI     Family History Negative Sister      Good Health     HEART DISEASE Sister      Heart Disease,pacemaker     DIABETES Son      Diabetes,Type 1     Breast Cancer Daughter      Cancer-breast     Prostate Cancer No family hx of      Cancer-prostate       Objective  Vitals: reviewed in EMR.  /80 (BP Location: Right arm, Patient Position: Sitting, Cuff Size: Adult Large)  Pulse 70  Temp 97.3  F (36.3  C) (Tympanic)  Wt 175 lb (79.4 kg)  SpO2 97%  BMI 27 kg/m2    Gen: Pleasant male, NAD.  HEENT: MMM, no OP erythema.   Neck: Supple, no JVD, no bruits.  CV: RRR no m/r/g.   Pulm: Rhonchi at the bases, prolonged expiratory phase.  Neuro: Grossly intact  Msk: No lower extremity edema.  Skin: No concerning  lesions.  Psychiatric: Normal affect and insight. Does not appear anxious or depressed.    Assessment    ICD-10-CM    1. COPD exacerbation (H) J44.1 azithromycin (ZITHROMAX) 250 MG tablet     predniSONE (DELTASONE) 20 MG tablet     albuterol (PROAIR HFA/PROVENTIL HFA/VENTOLIN HFA) 108 (90 Base) MCG/ACT Inhaler     Respiratory Therapy Supplies (VORTEX HOLDING CHAMBER/MASK) JACOBY     General PFT Lab (Please always keep checked)     Pulmonary Function Test   2. Tobacco use disorder F17.200 Respiratory Therapy Supplies (VORTEX HOLDING CHAMBER/MASK) JACOBY     Pulmonary Function Test     Orders Placed This Encounter   Procedures     General PFT Lab (Please always keep checked)     Pulmonary Function Test       I think the most likely etiology is COPD with acute exacerbation however differential diagnosis also includes viral bronchitis, early pneumonia, atypical pneumonia, CHF with exacerbation, others.    Plan   -- Expected clinical course discussed   -- Medications and their side effects discussed  Patient Instructions    -- Azithromycin  -- Eat yogurt 1-2 times per day while on antibiotics (and for a few weeks after) to reduce the chances of diarrhea   -- Prednisone x 5 days   -- Albuterol with holding chamber for cough   -- Schedule breathing test for a few weeks out when you're feeling better   -- Schedule follow-up with Francy for 5-7 days after breathing test    Signed, Jarad Lawrence MD  Internal Medicine & Pediatrics

## 2018-05-08 NOTE — TELEPHONE ENCOUNTER
"Patient calling and states below.  Patient transferred to scheduling.  Lisa Priest RN on 5/8/2018 at 11:09 AM    Reason for Disposition    Wheezing is present    Additional Information    Negative: Severe difficulty breathing (e.g., struggling for each breath, speaks in single words)    Negative: Bluish lips, tongue, or face now    Negative: [1] Difficulty breathing AND [2] exposure to flames, smoke, or fumes    Negative: [1] Stridor AND [2] difficulty breathing    Negative: Sounds like a life-threatening emergency to the triager    Negative: [1] Previous asthma attacks AND [2] this feels like asthma attack    Negative: Dry (non-productive) cough (i.e., no sputum or minimal clear sputum)    Negative: Chest pain  (Exception: MILD central chest pain, present only when coughing)    Negative: Difficulty breathing    Negative: Patient sounds very sick or weak to the triager    Negative: [1] Coughed up blood AND [2] > 1 tablespoon (15 ml) (Exception: blood-tinged sputum)    Negative: Fever > 103 F (39.4 C)    Negative: [1] Fever > 101 F (38.3 C) AND [2] age > 60    Negative: [1] Fever > 101 F (38.3 C) AND [2] bedridden (e.g., nursing home patient, CVA, chronic illness, recovering from surgery)    Negative: [1] Fever > 100.5 F (38.1 C) AND [2] diabetes mellitus or weak immune system (e.g., HIV positive, cancer chemo, splenectomy, organ transplant, chronic steroids)    Answer Assessment - Initial Assessment Questions  1. ONSET: \"When did the cough begin?\"       One week ago  2. SEVERITY: \"How bad is the cough today?\"       Coughing alot  3. RESPIRATORY DISTRESS: \"Describe your breathing.\"       rattled  4. FEVER: \"Do you have a fever?\" If so, ask: \"What is your temperature, how was it measured, and when did it start?\"      unsure  5. SPUTUM: \"Describe the color of your sputum\" (clear, white, yellow, green)      yellow  6. HEMOPTYSIS: \"Are you coughing up any blood?\" If so ask: \"How much?\" (flecks, streaks, " "tablespoons, etc.)      denies  7. CARDIAC HISTORY: \"Do you have any history of heart disease?\" (e.g., heart attack, congestive heart failure)       Heart attack, stent  8. LUNG HISTORY: \"Do you have any history of lung disease?\"  (e.g., pulmonary embolus, asthma, emphysema)      denies  9. PE RISK FACTORS: \"Do you have a history of blood clots?\" (or: recent major surgery, recent prolonged travel, bedridden )      denies  10. OTHER SYMPTOMS: \"Do you have any other symptoms?\" (e.g., runny nose, wheezing, chest pain)        Wheezing, runny nose  11. PREGNANCY: \"Is there any chance you are pregnant?\" \"When was your last menstrual period?\"        n/a  12. TRAVEL: \"Have you traveled out of the country in the last month?\" (e.g., travel history, exposures)        denies    Protocols used: COUGH - ACUTE PRODUCTIVE-ADULT-AH    "

## 2018-05-08 NOTE — PATIENT INSTRUCTIONS
-- Azithromycin  -- Eat yogurt 1-2 times per day while on antibiotics (and for a few weeks after) to reduce the chances of diarrhea   -- Prednisone x 5 days   -- Albuterol with holding chamber for cough   -- Schedule breathing test for a few weeks out when you're feeling better   -- Schedule follow-up with Skaudis for 5-7 days after breathing test

## 2018-05-08 NOTE — NURSING NOTE
Patient presents to clinic for cough and shortness of breath that started a week ago.  Mckenna Lewis LPN ....................  5/8/2018   11:29 AM

## 2018-05-08 NOTE — MR AVS SNAPSHOT
After Visit Summary   5/8/2018    Rajesh Huynh    MRN: [id number]           Patient Information     Date Of Birth          1940        Visit Information        Provider Department      5/8/2018 11:30 AM Jarad Lawrence MD [facility]        Today's Diagnoses     COPD exacerbation (H)    -  1    Tobacco use disorder          Care Instructions     -- Azithromycin  -- Eat yogurt 1-2 times per day while on antibiotics (and for a few weeks after) to reduce the chances of diarrhea   -- Prednisone x 5 days   -- Albuterol with holding chamber for cough   -- Schedule breathing test for a few weeks out when you're feeling better   -- Schedule follow-up with Skaudis for 5-7 days after breathing test          Follow-ups after your visit        Future tests that were ordered for you today     Open Future Orders        Priority Expected Expires Ordered    General PFT Lab (Please always keep checked) Routine  5/8/2019 5/8/2018    Pulmonary Function Test Routine  5/8/2019 5/8/2018            Who to contact     If you have questions or need follow up information about today's clinic visit or your schedule please contact [facility] directly at [phone].  Normal or non-critical lab and imaging results will be communicated to you by [employer], letter or phone within 4 business days after the clinic has received the results. If you do not hear from us within 7 days, please contact the clinic through [employer] or phone. If you have a critical or abnormal lab result, we will notify you by phone as soon as possible.  Submit refill requests through [employer] or call your pharmacy and they will forward the refill request to us. Please allow 3 business days for your refill to be completed.          Additional Information About Your Visit        MyChart Information     [employer] lets you send messages to your doctor, view your test results, renew your prescriptions, schedule  "appointments and more. To sign up, go to [email or web address] . Click on \"Log in\" on the left side of the screen, which will take you to the Welcome page. Then click on \"Sign up Now\" on the right side of the page.     You will be asked to enter the access code listed below, as well as some personal information. Please follow the directions to create your username and password.     Your access code is: [email or web address]  Expires: 2018  4:56 PM     Your access code will  in 90 days. If you need help or a new code, please call your [place] clinic or [phone].        Care EveryWhere ID     This is your Care EveryWhere ID. This could be used by other organizations to access your [place] medical records  [id number]        Your Vitals Were     Pulse Temperature Pulse Oximetry BMI (Body Mass Index)          70 97.3  F (36.3  C) (Tympanic) 97% 27 kg/m2         Blood Pressure from Last 3 Encounters:   18 132/80   18 138/80   18 140/82    Weight from Last 3 Encounters:   18 175 lb (79.4 kg)   18 178 lb 4 oz (80.9 kg)   18 182 lb (82.6 kg)                 Today's Medication Changes          These changes are accurate as of 18 12:19 PM.  If you have any questions, ask your nurse or doctor.               Start taking these medicines.        Dose/Directions    albuterol 108 (90 Base) MCG/ACT Inhaler   Commonly known as:  PROAIR HFA/PROVENTIL HFA/VENTOLIN HFA   Used for:  COPD exacerbation (H)   Started by:  Jarad Lawrence MD        Dose:  2 puff   Inhale 2 puffs into the lungs every 6 hours as needed for shortness of breath / dyspnea or wheezing   Quantity:  1 Inhaler   Refills:  0       azithromycin 250 MG tablet   Commonly known as:  ZITHROMAX   Used for:  COPD exacerbation (H)   Started by:  Jarad Lawrence MD        Two tablets first day, then one tablet daily for four days.   Quantity:  6 tablet   Refills:  0       predniSONE 20 MG tablet   Commonly known " as:  DELTASONE   Used for:  COPD exacerbation (H)   Started by:  Jarad Lawrence MD        Dose:  40 mg   Take 2 tablets (40 mg) by mouth daily for 5 days   Quantity:  10 tablet   Refills:  0       VORTEX HOLDING CHAMBER/MASK Smiley   Used for:  COPD exacerbation (H), Tobacco use disorder   Started by:  Jarad Lawrence MD        Use with albuterol HFA   Quantity:  1 each   Refills:  1            Where to get your medicines      These medications were sent to [facility] [address] [address]     Phone:  [phone]     albuterol 108 (90 Base) MCG/ACT Inhaler    azithromycin 250 MG tablet    predniSONE 20 MG tablet    VORTEX HOLDING CHAMBER/MASK Smiley                Primary Care Provider Office Phone # Fax #    Jovanni Sen [phone]       [address] [facility] [address]        Equal Access to Services     [facility] HospitalSUN AH: Hadii nay george Sosully, waaxda luqadaha, qaybta kaalmada adecarlosyajuan david, wei jarrett . So [facility] [phone].    ATENCIÓN: Si habla español, tiene a matthews disposición servicios gratuitos de asistencia lingüística. Llame al [phone].    We comply with applicable federal civil rights laws and [place] laws. We do not discriminate on the basis of race, color, national origin, age, disability, sex, sexual orientation, or gender identity.            Thank you!     Thank you for choosing [facility] AND [facility]  for your care. Our goal is always to provide you with excellent care. Hearing back from our patients is one way we can continue to improve our services. Please take a few minutes to complete the written survey that you may receive in the mail after your visit with us. Thank you!             Your Updated Medication List - Protect others around you: Learn how to safely use, store and throw away your medicines at www.disposemymeds.org.           This list is accurate as of 5/8/18 12:19 PM.  Always use your most recent med list.                   Brand Name Dispense Instructions for use Diagnosis    albuterol 108 (90 Base) MCG/ACT Inhaler    PROAIR HFA/PROVENTIL HFA/VENTOLIN HFA    1 Inhaler    Inhale 2 puffs into the lungs every 6 hours as needed for shortness of breath / dyspnea or wheezing    COPD exacerbation (H)       aspirin EC 81 MG EC tablet      Take 81 mg by mouth daily with food        atorvastatin 40 MG tablet    LIPITOR     Take 40 mg by mouth daily (with dinner)        azithromycin 250 MG tablet    ZITHROMAX    6 tablet    Two tablets first day, then one tablet daily for four days.    COPD exacerbation (H)       blood glucose monitoring test strip    no brand specified     Dispense item covered by pt ins. E11.9 NIDDM type II - Test 1 time/day        CALCIUM 600+D 600-200 MG-UNIT Tabs   Generic drug:  calcium carbonate-vitamin D      Take 1 tablet by mouth 2 times daily (with meals)        DOCOSAHEXAENOIC ACID PO      Take 1 capsule by mouth 2 times daily        FIFTY50 GLUCOSE METER 2.0 w/Device Kit      Dispense Accu-Chek Agnes  Meter. Dispense item covered by pt ins. E11.9 NIDDM type II - Test 1 time/day        metFORMIN 1000 MG tablet    GLUCOPHAGE     Take 1,000 mg by mouth 2 times daily (with meals) For diabetes prevention        MULTI-VITAMINS Tabs      Take 1 tablet by mouth daily        nitroGLYcerin 0.4 MG sublingual tablet    NITROSTAT     Place 0.4 mg under the tongue every 5 minutes as needed for chest pain        predniSONE 20 MG tablet    DELTASONE    10 tablet    Take 2 tablets (40 mg) by mouth daily for 5 days    COPD exacerbation (H)       primidone 50 MG tablet    MYSOLINE     Take 100 mg by mouth At Bedtime        propranolol 20 MG tablet    INDERAL     Take 20 mg by mouth 2 times daily        VORTEX HOLDING CHAMBER/MASK Smiley     1 each    Use with albuterol HFA    COPD exacerbation (H), Tobacco use disorder

## 2018-05-14 ENCOUNTER — OFFICE VISIT (OUTPATIENT)
Dept: FAMILY MEDICINE | Facility: OTHER | Age: 78
End: 2018-05-14
Attending: PHYSICIAN ASSISTANT
Payer: MEDICARE

## 2018-05-14 ENCOUNTER — HOSPITAL ENCOUNTER (OUTPATIENT)
Dept: GENERAL RADIOLOGY | Facility: OTHER | Age: 78
Discharge: HOME | End: 2018-05-14
Attending: PHYSICIAN ASSISTANT | Admitting: PHYSICIAN ASSISTANT
Payer: MEDICARE

## 2018-05-14 VITALS
OXYGEN SATURATION: 96 % | TEMPERATURE: 97.4 F | HEART RATE: 70 BPM | DIASTOLIC BLOOD PRESSURE: 72 MMHG | SYSTOLIC BLOOD PRESSURE: 112 MMHG | WEIGHT: 170.8 LBS | BODY MASS INDEX: 26.36 KG/M2

## 2018-05-14 DIAGNOSIS — R05.9 COUGH: ICD-10-CM

## 2018-05-14 DIAGNOSIS — J44.1 COPD EXACERBATION (H): ICD-10-CM

## 2018-05-14 DIAGNOSIS — J22 LOWER RESP. TRACT INFECTION: Primary | ICD-10-CM

## 2018-05-14 PROCEDURE — 99213 OFFICE O/P EST LOW 20 MIN: CPT | Performed by: PHYSICIAN ASSISTANT

## 2018-05-14 PROCEDURE — G0463 HOSPITAL OUTPT CLINIC VISIT: HCPCS

## 2018-05-14 PROCEDURE — G0463 HOSPITAL OUTPT CLINIC VISIT: HCPCS | Mod: 25

## 2018-05-14 PROCEDURE — 71046 X-RAY EXAM CHEST 2 VIEWS: CPT

## 2018-05-14 RX ORDER — ALBUTEROL SULFATE 90 UG/1
2 AEROSOL, METERED RESPIRATORY (INHALATION) EVERY 6 HOURS PRN
Qty: 1 INHALER | Refills: 1 | Status: SHIPPED | OUTPATIENT
Start: 2018-05-14 | End: 2018-11-27

## 2018-05-14 RX ORDER — LEVOFLOXACIN 500 MG/1
500 TABLET, FILM COATED ORAL DAILY
Qty: 10 TABLET | Refills: 0 | Status: SHIPPED | OUTPATIENT
Start: 2018-05-14 | End: 2018-05-25

## 2018-05-14 RX ORDER — PREDNISONE 20 MG/1
TABLET ORAL
Qty: 15 TABLET | Refills: 0 | Status: SHIPPED | OUTPATIENT
Start: 2018-05-14 | End: 2018-05-25

## 2018-05-14 NOTE — MR AVS SNAPSHOT
After Visit Summary   5/14/2018    Rajesh Huynh    MRN: [id number]           Patient Information     Date Of Birth          1940        Visit Information        Provider Department      5/14/2018 10:00 AM Lety Yuen PA-C [facility]        Today's Diagnoses     Lower resp. tract infection    -  1    Cough        COPD exacerbation (H)          Care Instructions    Antibiotic has been sent to pharmacy. Please take full course of antibiotic even if symptoms have completely resolved. This helps prevent against antibiotic resistance.     Patient prescribed antibiotics. Monitor for any fevers or chills. Return in 7-10 days if not feeling better. Please call clinic with any questions or concerns. Please take in a lot of fluids and get rest. Return with any change orworsening of symptoms.    May use symptomatic care with tylenol or ibuprofen. Sudafed or mucinex work well for congestion. May use cough syrup or cough drops.  Using a humidifier works well to break up the congestion. You can also sleep propped up on a couple pillows to decrease symptoms at night. A nettipot works well to decrease nasal congestion.    Use a Neti Pot/sinus flush (Chase Med Sinus Rinse) 3 times daily to irrigate sinuses/mucosal tissue.     Sudafed or mucinex work well for congestion.   If you choose pseudoephedrine, usefor only 5-7 days AS DIRECTED. Speak to your pharmacist if you have any concerns about your medications. May also use decongestant nasal spray, but only for 3 days MAXIMUM.    You will need to be evaluated if you start to experience:  Fever higher than 102.5 F (39.2 C)   Sudden and severe pain in the face and head   Trouble seeing or seeing double   Trouble thinking clearly   Swelling or redness around 1 or both eyes   Trouble breathing or a stiff neck    * If you are a smoker, try to quit *    Call 9-1-1 or go to the emergency room if you:  Have trouble breathing   Are drooling  "because you cannot swallow your saliva   Have swelling of the neck or tongue   Cannot move your neck or have trouble opening your mouth            Follow-ups after your visit        Follow-up notes from your care team     Return if symptoms worsen or fail to improve.      Your next 10 appointments already scheduled     Jun 07, 2018 10:45 AM CDT   Pulmonary Function with GH RESPIRATORY THERAPY TECH   [facility] ([facility])    [address]  [place] [address] [phone]           No Inhalers for 6 hours prior to test No Smoking 2 hours prior to test              Future tests that were ordered for you today     Open Future Orders        Priority Expected Expires Ordered    XR Chest 2 Views Routine 5/14/2018 5/14/2019 5/14/2018            Who to contact     If you have questions or need follow up information about today's clinic visit or your schedule please contact [facility] directly at [phone].  Normal or non-critical lab and imaging results will be communicated to you by Leiyoohart, letter or phone within 4 business days after the clinic has received the results. If you do not hear from us within 7 days, please contact the clinic through Leiyoohart or phone. If you have a critical or abnormal lab result, we will notify you by phone as soon as possible.  Submit refill requests through Flexcom or call your pharmacy and they will forward the refill request to us. Please allow 3 business days for your refill to be completed.          Additional Information About Your Visit        Flexcom Information     Flexcom lets you send messages to your doctor, view your test results, renew your prescriptions, schedule appointments and more. To sign up, go to [email or web address] . Click on \"Log in\" on the left side of the screen, which will take you to the Welcome page. Then click on \"Sign up Now\" on the right side of the page.     You will be " asked to enter the access code listed below, as well as some personal information. Please follow the directions to create your username and password.     Your access code is: [email or web address]  Expires: 2018  4:56 PM     Your access code will  in 90 days. If you need help or a new code, please call your [place] clinic or [phone].        Care EveryWhere ID     This is your Care EveryWhere ID. This could be used by other organizations to access your [place] medical records  [id number]        Your Vitals Were     Pulse Temperature Pulse Oximetry BMI (Body Mass Index)          70 97.4  F (36.3  C) 96% 26.36 kg/m2         Blood Pressure from Last 3 Encounters:   18 112/72   18 132/80   18 138/80    Weight from Last 3 Encounters:   18 170 lb 12.8 oz (77.5 kg)   18 175 lb (79.4 kg)   18 178 lb 4 oz (80.9 kg)                 Today's Medication Changes          These changes are accurate as of 18 10:45 AM.  If you have any questions, ask your nurse or doctor.               Start taking these medicines.        Dose/Directions    levofloxacin 500 MG tablet   Commonly known as:  LEVAQUIN   Used for:  Lower resp. tract infection, Cough, COPD exacerbation (H)   Started by:  Lety Yuen PA-C        Dose:  500 mg   Take 1 tablet (500 mg) by mouth daily for 10 days   Quantity:  10 tablet   Refills:  0       predniSONE 20 MG tablet   Commonly known as:  DELTASONE   Used for:  Lower resp. tract infection, Cough, COPD exacerbation (H)   Started by:  Lety Yuen PA-C        Take 40 mg PO Qam x 5 days, then 20 mg x 5 days   Quantity:  15 tablet   Refills:  0         Stop taking these medicines if you haven't already. Please contact your care team if you have questions.     azithromycin 250 MG tablet   Commonly known as:  ZITHROMAX   Stopped by:  Lety Yuen PA-C                Where to get your medicines      These medications were sent to [id number]  - [place], [place] - [address] .  [address] [facility] [address]     Phone:  [phone]     albuterol 108 (90 Base) MCG/ACT Inhaler    levofloxacin 500 MG tablet    predniSONE 20 MG tablet                Primary Care Provider Office Phone # Fax #    Jovanni Sen [phone]       [address]  [facility] [address]        Equal Access to Services     KHADRA CASTELLON : Hadii aad ku hadasho Soomaali, waaxda luqadaha, qaybta kaalmada adeegyada, waxay idiin haysandien adeeg fox jarrett . So [facility] [phone].    ATENCIÓN: Si bernadette yu, tiene a matthews disposición servicios gratuitos de asistencia lingüística. [facility] [phone].    We comply with applicable federal civil rights laws and [place] laws. We do not discriminate on the basis of race, color, national origin, age, disability, sex, sexual orientation, or gender identity.            Thank you!     Thank you for choosing [facility]  for your care. Our goal is always to provide you with excellent care. Hearing back from our patients is one way we can continue to improve our services. Please take a few minutes to complete the written survey that you may receive in the mail after your visit with us. Thank you!             Your Updated Medication List - Protect others around you: Learn how to safely use, store and throw away your medicines at www.disposemymeds.org.          This list is accurate as of 5/14/18 10:45 AM.  Always use your most recent med list.                   Brand Name Dispense Instructions for use Diagnosis    albuterol 108 (90 Base) MCG/ACT Inhaler    PROAIR HFA/PROVENTIL HFA/VENTOLIN HFA    1 Inhaler    Inhale 2 puffs into the lungs every 6 hours as needed for shortness of breath / dyspnea or wheezing    COPD exacerbation (H)       aspirin 81 MG EC tablet      Take 81 mg by mouth daily with food        atorvastatin 40 MG tablet    LIPITOR     Take 40 mg by mouth daily  (with dinner)        blood glucose monitoring test strip    no brand specified     Dispense item covered by pt ins. E11.9 NIDDM type II - Test 1 time/day        CALCIUM 600+D 600-200 MG-UNIT Tabs   Generic drug:  calcium carbonate-vitamin D      Take 1 tablet by mouth 2 times daily (with meals)        DOCOSAHEXAENOIC ACID PO      Take 1 capsule by mouth 2 times daily        FIFTY50 GLUCOSE METER 2.0 w/Device Kit      Dispense Accu-Chek Agnes  Meter. Dispense item covered by pt ins. E11.9 NIDDM type II - Test 1 time/day        levofloxacin 500 MG tablet    LEVAQUIN    10 tablet    Take 1 tablet (500 mg) by mouth daily for 10 days    Lower resp. tract infection, Cough, COPD exacerbation (H)       metFORMIN 1000 MG tablet    GLUCOPHAGE     Take 1,000 mg by mouth 2 times daily (with meals) For diabetes prevention        MULTI-VITAMINS Tabs      Take 1 tablet by mouth daily        nitroGLYcerin 0.4 MG sublingual tablet    NITROSTAT     Place 0.4 mg under the tongue every 5 minutes as needed for chest pain        predniSONE 20 MG tablet    DELTASONE    15 tablet    Take 40 mg PO Qam x 5 days, then 20 mg x 5 days    Lower resp. tract infection, Cough, COPD exacerbation (H)       primidone 50 MG tablet    MYSOLINE     Take 100 mg by mouth At Bedtime        propranolol 20 MG tablet    INDERAL     Take 20 mg by mouth 2 times daily        VORTEX HOLDING CHAMBER/MASK Smiley     1 each    Use with albuterol HFA    COPD exacerbation (H), Tobacco use disorder

## 2018-05-14 NOTE — PATIENT INSTRUCTIONS
Antibiotic has been sent to pharmacy. Please take full course of antibiotic even if symptoms have completely resolved. This helps prevent against antibiotic resistance.     Patient prescribed antibiotics. Monitor for any fevers or chills. Return in 7-10 days if not feeling better. Please call clinic with any questions or concerns. Please take in a lot of fluids and get rest. Return with any change orworsening of symptoms.    May use symptomatic care with tylenol or ibuprofen. Sudafed or mucinex work well for congestion. May use cough syrup or cough drops.  Using a humidifier works well to break up the congestion. You can also sleep propped up on a couple pillows to decrease symptoms at night. A nettipot works well to decrease nasal congestion.    Use a Neti Pot/sinus flush (Chase Med Sinus Rinse) 3 times daily to irrigate sinuses/mucosal tissue.     Sudafed or mucinex work well for congestion.   If you choose pseudoephedrine, usefor only 5-7 days AS DIRECTED. Speak to your pharmacist if you have any concerns about your medications. May also use decongestant nasal spray, but only for 3 days MAXIMUM.    You will need to be evaluated if you start to experience:  Fever higher than 102.5 F (39.2 C)   Sudden and severe pain in the face and head   Trouble seeing or seeing double   Trouble thinking clearly   Swelling or redness around 1 or both eyes   Trouble breathing or a stiff neck    * If you are a smoker, try to quit *    Call 9-1-1 or go to the emergency room if you:  Have trouble breathing   Are drooling because you cannot swallow your saliva   Have swelling of the neck or tongue   Cannot move your neck or have trouble opening your mouth

## 2018-05-14 NOTE — NURSING NOTE
"Patient presents to clinic with c/o cough, SOB, states \"rattling in chest.\"  Was seen last week and put on antibiotics, feels worse.  Blessing Clarke LPN ...... 5/14/2018 10:03 AM      "

## 2018-05-14 NOTE — PROGRESS NOTES
"Nursing Notes:   Nedra Clarke LPN  5/14/2018 10:11 AM  Signed  Patient presents to clinic with c/o cough, SOB, states \"rattling in chest.\"  Was seen last week and put on antibiotics, feels worse.  Blessing Vince MILLERN ...... 5/14/2018 10:03 AM        HPI:    Rajesh Huynh is a 77 year old male who presents for cough, SOB, states \"rattling in chest.\"  Was seen last week and put on antibiotics, feels worse. Seen on 5/8. He was put on azithromycin and prednisone. Cold 2.5 weeks. Wheezing, rattling.  No diarrhea.  Coughing up yellow phlegm.      Past Medical History:   Diagnosis Date     Chronic myeloproliferative disease (H)     questionable     Essential (primary) hypertension     No Comments Provided     Hyperlipidemia     No Comments Provided     Male erectile dysfunction     No Comments Provided     Malignant neoplasm of supraglottis (H)     2011,Radiation     Other chronic pancreatitis (H)     No Comments Provided     Personal history of irradiation     1/30/2012-3/2/2012     Personal history of malignant neoplasm of bladder     4/22/2015     Personal history of nicotine dependence     2ppd - quit 2000     Pneumonia     2001     ST elevation (STEMI) myocardial infarction (H)     7/5/2013,Integrity BMS to RCA done at [facility]     No Comments Provided       Past Surgical History:   Procedure Laterality Date     APPENDECTOMY OPEN      1970     ARTHROSCOPY SHOULDER      11/2005,right shoulder     ARTHROSCOPY SHOULDER      1/2006,left shoulder     ARTHROSCOPY SHOULDER      2011,Left shoulder scope SAD, ac.  Open subscap, biceps     CHOLECYSTECTOMY      1994     COLONOSCOPY      02/2005     COLONOSCOPY      2/19/2015,no repeat due at this time     CYSTOSCOPY      Multiple times,Dr. Lyndon FRANK     HEART CATH, ANGIOPLASTY      07/05/2013,Bare-metal stent to dominant RCA     OTHER SURGICAL HISTORY      2004,281216,OTHER,for chronic pancreatitis     OTHER SURGICAL HISTORY      " 6/22/2007,[id number],SHOULDER REPLACEMENT,Right,with biceps tenodesis by Dr. Mukesh Ayoub     OTHER SURGICAL HISTORY      1995/2002,,ERCP,xseveral, one with papillotomy     OTHER SURGICAL HISTORY      1/30/2012-3/2/2012,255241,RADIATION TREATMENT     OTHER SURGICAL HISTORY      12/7/2011,07762,NECK/THORAX PROCEDURE,L modified radical neck surgery     OTHER SURGICAL HISTORY      11/10/14,[id number],OTHER,Dr. Lyndon FRANK     OTHER SURGICAL HISTORY      2012,31343,REVISION EYELID,Dr. Farias     OTHER SURGICAL HISTORY      10/22/15,[id number],OTHER     TONSILLECTOMY, ADENOIDECTOMY, COMBINED      1947     VASECTOMY      1980       Family History   Problem Relation Age of Onset     HEART DISEASE Father 83     Heart Disease,CHF     HEART DISEASE Mother 80     Heart Disease,MI     Family History Negative Sister      Good Health     HEART DISEASE Sister      Heart Disease,pacemaker     DIABETES Son      Diabetes,Type 1     Breast Cancer Daughter      Cancer-breast     Prostate Cancer No family hx of      Cancer-prostate       Social History     Social History     Marital status:      Spouse name: N/A     Number of children: N/A     Years of education: N/A     Occupational History     Not on file.     Social History Main Topics     Smoking status: Former Smoker     Packs/day: 3.00     Years: 43.00     Types: Cigarettes     Quit date: 1/1/2000     Smokeless tobacco: Never Used     Alcohol use No     Drug use: No     Sexual activity: Yes     Partners: Female     Other Topics Concern     Not on file     Social History Narrative    Ruth Spouse   Patient is  with grown children.  [employer] at [employer] for 17 years       Current Outpatient Prescriptions   Medication Sig Dispense Refill     albuterol (PROAIR HFA/PROVENTIL HFA/VENTOLIN HFA) 108 (90 Base) MCG/ACT Inhaler Inhale 2 puffs into the lungs every 6 hours as needed for shortness of breath / dyspnea or wheezing 1 Inhaler 1     aspirin EC 81 MG EC tablet Take 81  mg by mouth daily with food       atorvastatin (LIPITOR) 40 MG tablet Take 40 mg by mouth daily (with dinner)       blood glucose monitoring (NO BRAND SPECIFIED) test strip Dispense item covered by pt ins. E11.9 NIDDM type II - Test 1 time/day       Blood Glucose Monitoring Suppl (FIFTY50 GLUCOSE METER 2.0) W/DEVICE KIT Dispense Accu-Chek Agnes  Meter. Dispense item covered by pt ins. E11.9 NIDDM type II - Test 1 time/day       calcium carbonate-vitamin D (CALCIUM 600+D) 600-200 MG-UNIT TABS Take 1 tablet by mouth 2 times daily (with meals)       DOCOSAHEXAENOIC ACID PO Take 1 capsule by mouth 2 times daily       levofloxacin (LEVAQUIN) 500 MG tablet Take 1 tablet (500 mg) by mouth daily for 10 days 10 tablet 0     metFORMIN (GLUCOPHAGE) 1000 MG tablet Take 1,000 mg by mouth 2 times daily (with meals) For diabetes prevention       Multiple Vitamin (MULTI-VITAMINS) TABS Take 1 tablet by mouth daily       nitroGLYcerin (NITROSTAT) 0.4 MG sublingual tablet Place 0.4 mg under the tongue every 5 minutes as needed for chest pain       predniSONE (DELTASONE) 20 MG tablet Take 40 mg PO Qam x 5 days, then 20 mg x 5 days 15 tablet 0     primidone (MYSOLINE) 50 MG tablet Take 100 mg by mouth At Bedtime       propranolol (INDERAL) 20 MG tablet Take 20 mg by mouth 2 times daily       Respiratory Therapy Supplies (VORTEX HOLDING CHAMBER/MASK) JACOBY Use with albuterol HFA 1 each 1     [DISCONTINUED] albuterol (PROAIR HFA/PROVENTIL HFA/VENTOLIN HFA) 108 (90 Base) MCG/ACT Inhaler Inhale 2 puffs into the lungs every 6 hours as needed for shortness of breath / dyspnea or wheezing 1 Inhaler 0       No Known Allergies    REVIEW OF SYSTEMS:  Refer to HPI.    EXAM:   Vitals:    /72 (BP Location: Right arm, Patient Position: Sitting, Cuff Size: Adult Regular)  Pulse 70  Temp 97.4  F (36.3  C)  Wt 170 lb 12.8 oz (77.5 kg)  SpO2 96%  BMI 26.36 kg/m2    General Appearance: Pleasant, alert, appropriate appearance for age. No acute  distress  Ear Exam: Normal TM's bilaterally, grey, translucent, bony landmarks appreciated.   Left/Right TM: Effusion is not present. TM is not bulging. There is no pus appreciated.    Normal auditory canals and external ears. Non-tender.  OroPharynx Exam:  Non-erythematous posterior pharynx with no exudates. No sinus pain upon palpation of frontal, ethmoid, and maxillary sinuses.  Chest/Respiratory Exam: Normal chest wall and respirations.  Wheezing appreciated throughout the anterior and posterior lung lobes bilaterally.  No crackling appreciated.. No retractions appreciated.  Cardiovascular Exam: Regular rate and rhythm. S1, S2, no murmur, click, gallop, or rubs.  Lymphatic Exam: ACLN.  Skin: no rash or abnormalities  Psychiatric Exam: Alert and oriented - appropriate affect.    PHQ Depression Screen  PHQ-9 SCORE 12/13/2017 3/7/2018 4/4/2018   Total Score 0 0 0           ASSESSMENT AND PLAN:    1. Lower resp. tract infection    2. Cough    3. COPD exacerbation (H)          Completed chest xray.  I personally reviewed the xray. I found no concerns appreciated upon initial read of xray.  Final read pending by radiology.    Started on a course of levofloxacin and prednisone taper.  Refilled albuterol inhaler.    Antibiotic has been sent to pharmacy. Please take full course of antibiotic even if symptoms have completely resolved. This helps prevent against antibiotic resistance.     Patient prescribed antibiotics. Monitor for any fevers or chills. Return in 7-10 days if not feeling better. Please call clinic with any questions or concerns. Please take in a lot of fluids and get rest. Return with any change orworsening of symptoms.    May use symptomatic care with tylenol or ibuprofen. Sudafed or mucinex work well for congestion. May use cough syrup or cough drops.  Using a humidifier works well to break up the congestion. You can also sleep propped up on a couple pillows to decrease symptoms at night. A nettipot  works well to decrease nasal congestion.    Use a Neti Pot/sinus flush (Chase Med Sinus Rinse) 3 times daily to irrigate sinuses/mucosal tissue.     Sudafed or mucinex work well for congestion.   If you choose pseudoephedrine, usefor only 5-7 days AS DIRECTED. Speak to your pharmacist if you have any concerns about your medications. May also use decongestant nasal spray, but only for 3 days MAXIMUM.    You will need to be evaluated if you start to experience:  Fever higher than 102.5 F (39.2 C)   Sudden and severe pain in the face and head   Trouble seeing or seeing double   Trouble thinking clearly   Swelling or redness around 1 or both eyes   Trouble breathing or a stiff neck    * If you are a smoker, try to quit *    Call 9-1-1 or go to the emergency room if you:  Have trouble breathing   Are drooling because you cannot swallow your saliva   Have swelling of the neck or tongue   Cannot move your neck or have trouble opening your mouth         Lety Yuen..................5/14/2018 10:09 AM

## 2018-05-17 ENCOUNTER — OFFICE VISIT (OUTPATIENT)
Dept: UROLOGY | Facility: OTHER | Age: 78
End: 2018-05-17
Attending: UROLOGY
Payer: MEDICARE

## 2018-05-17 VITALS
HEART RATE: 68 BPM | DIASTOLIC BLOOD PRESSURE: 82 MMHG | SYSTOLIC BLOOD PRESSURE: 132 MMHG | RESPIRATION RATE: 16 BRPM | BODY MASS INDEX: 25.79 KG/M2 | WEIGHT: 170.2 LBS | HEIGHT: 68 IN

## 2018-05-17 DIAGNOSIS — Z85.51 PERSONAL HISTORY OF MALIGNANT NEOPLASM OF BLADDER: Primary | ICD-10-CM

## 2018-05-17 DIAGNOSIS — R30.0 DYSURIA: ICD-10-CM

## 2018-05-17 LAB
ALBUMIN UR-MCNC: ABNORMAL MG/DL
APPEARANCE UR: CLEAR
BACTERIA #/AREA URNS HPF: ABNORMAL /HPF
BILIRUB UR QL STRIP: NEGATIVE
CAOX CRY #/AREA URNS HPF: ABNORMAL /HPF
COLOR UR AUTO: YELLOW
GLUCOSE UR STRIP-MCNC: NEGATIVE MG/DL
HGB UR QL STRIP: NEGATIVE
KETONES UR STRIP-MCNC: ABNORMAL MG/DL
LEUKOCYTE ESTERASE UR QL STRIP: NEGATIVE
MUCOUS THREADS #/AREA URNS LPF: PRESENT /LPF
NITRATE UR QL: NEGATIVE
PH UR STRIP: 5.5 PH (ref 5–7)
RBC #/AREA URNS AUTO: ABNORMAL /HPF
SOURCE: ABNORMAL
SP GR UR STRIP: >1.03 (ref 1–1.03)
UROBILINOGEN UR STRIP-ACNC: 0.2 EU/DL (ref 0.2–1)
WBC #/AREA URNS AUTO: ABNORMAL /HPF

## 2018-05-17 PROCEDURE — 81001 URINALYSIS AUTO W/SCOPE: CPT | Performed by: UROLOGY

## 2018-05-17 PROCEDURE — G0463 HOSPITAL OUTPT CLINIC VISIT: HCPCS

## 2018-05-17 PROCEDURE — G0463 HOSPITAL OUTPT CLINIC VISIT: HCPCS | Mod: 25

## 2018-05-17 PROCEDURE — 99213 OFFICE O/P EST LOW 20 MIN: CPT | Performed by: UROLOGY

## 2018-05-17 ASSESSMENT — PAIN SCALES - GENERAL: PAINLEVEL: NO PAIN (0)

## 2018-05-17 NOTE — PATIENT INSTRUCTIONS
Results for MARLINE CHOA (MRN [id number]) as of 5/17/2018 09:06   Ref. Range 5/17/2018 08:09   Color Urine Unknown Yellow   Appearance Urine Unknown Clear   Glucose Urine Latest Ref Range: NEG^Negative mg/dL Negative   Bilirubin Urine Latest Ref Range: NEG^Negative  Negative   Ketones Urine Latest Ref Range: NEG^Negative mg/dL Trace (A)   Specific Gravity Urine Latest Ref Range: 1.003 - 1.035  >1.030   pH Urine Latest Ref Range: 5.0 - 7.0 pH 5.5   Protein Albumin Urine Latest Ref Range: NEG^Negative mg/dL Trace (A)   Urobilinogen Urine Latest Ref Range: 0.2 - 1.0 EU/dL 0.2   Nitrite Urine Latest Ref Range: NEG^Negative  Negative   Blood Urine Latest Ref Range: NEG^Negative  Negative   Leukocyte Esterase Urine Latest Ref Range: NEG^Negative  Negative   Source Unknown Midstream Urine   WBC Urine Latest Ref Range: OTO5^0 - 5 /HPF 0 - 5   RBC Urine Latest Ref Range: OTO2^O - 2 /HPF O - 2   Bacteria Urine Latest Ref Range: NEG^Negative /HPF Few (A)   Mucous Urine Latest Ref Range: NEG^Negative /LPF Present (A)   Calcium Oxalate Latest Ref Range: NEG^Negative /HPF Many (A)

## 2018-05-17 NOTE — PROGRESS NOTES
"Type of Visit  Established    Chief Complaint  Dysuria    HPI  Mr. Huynh is a 77 year old male w/h/o bladder cancer follows up with dysuria.  Dysuria x 3 weeks.  Daily but intermittent.  No gross hematuria.  Currently on Levaquin for bronchitis.  No fevers or chills.      Review of Systems  I reviewed the ROS with the patient.    Nursing Notes:   Elinor Martinez RN  5/17/2018  8:11 AM  Signed  Review of Systems:    Weight loss:    No     Recent fever/chills:  No   Night sweats:   No  Current skin rash:  No   Recent hair loss:  No  Heat intolerance:  No   Cold intolerance:  No  Chest pain:   No   Palpitations:   No  Shortness of breath:  No   Wheezing:   Yes  Constipation:    No   Diarrhea:   No   Nausea:   No   Vomiting:   No   Kidney/side pain:  No   Back pain:   No  Frequent headaches:  No   Dizziness:     No  Leg swelling:   No   Calf pain:    No    Family History  Family History   Problem Relation Age of Onset     HEART DISEASE Father 83     Heart Disease,CHF     HEART DISEASE Mother 80     Heart Disease,MI     Family History Negative Sister      Good Health     HEART DISEASE Sister      Heart Disease,pacemaker     DIABETES Son      Diabetes,Type 1     Breast Cancer Daughter      Cancer-breast     Prostate Cancer No family hx of      Cancer-prostate       Physical Exam  Vitals:    05/17/18 0808   BP: 132/82   BP Location: Left arm   Patient Position: Sitting   Cuff Size: Adult Regular   Pulse: 68   Resp: 16   Weight: 77.2 kg (170 lb 3.2 oz)   Height: 1.715 m (5' 7.5\")     Constitutional: NAD, WDWN.  Cardiovascular: Regular rate.  Pulmonary/Chest: Respirations are even and non-labored bilaterally.  Abdominal: Soft. No distension, tenderness, masses or guarding. No CVA tenderness.  Extremities: LOU x 4, Warm. No clubbing.  No cyanosis.    Skin: Pink, warm and dry.  No rashes noted.  Genitourinary: nonpalpable bladder    Labs  Results for MARLINE HUYNH (MRN [id number]) as of 5/17/2018 09:02   5/17/2018 08:09 "   Color Urine Yellow   Appearance Urine Clear   Glucose Urine Negative   Bilirubin Urine Negative   Ketones Urine Trace (A)   Specific Gravity Urine >1.030   pH Urine 5.5   Protein Albumin Urine Trace (A)   Urobilinogen Urine 0.2   Nitrite Urine Negative   Blood Urine Negative   Leukocyte Esterase Urine Negative   Source Midstream Urine   WBC Urine 0 - 5   RBC Urine O - 2   Bacteria Urine Few (A)   Mucous Urine Present (A)   Calcium Oxalate Many (A)     Assessment  Mr. Huynh is a 77 year old male follows up with dysuria.  His urinalysis does not support a UTI in addition he is currently on Levaquin for bronchitis.  His specific gravity is greater than 1.030.  I explained the dysuria is likely related to concentrated urine.    Plan  Increase fluids   follow-up as planned in October with cystoscopy

## 2018-05-17 NOTE — MR AVS SNAPSHOT
After Visit Summary   5/17/2018    Marline Huynh    MRN: [id number]           Patient Information     Date Of Birth          1940        Visit Information        Provider Department      5/17/2018 8:00 AM Anthony Haney MD [facility]        Today's Diagnoses     Personal history of malignant neoplasm of bladder    -  1      Care Instructions    Results for MARLINE HUYNH (MRN [id number]) as of 5/17/2018 09:06   Ref. Range 5/17/2018 08:09   Color Urine Unknown Yellow   Appearance Urine Unknown Clear   Glucose Urine Latest Ref Range: NEG^Negative mg/dL Negative   Bilirubin Urine Latest Ref Range: NEG^Negative  Negative   Ketones Urine Latest Ref Range: NEG^Negative mg/dL Trace (A)   Specific Gravity Urine Latest Ref Range: 1.003 - 1.035  >1.030   pH Urine Latest Ref Range: 5.0 - 7.0 pH 5.5   Protein Albumin Urine Latest Ref Range: NEG^Negative mg/dL Trace (A)   Urobilinogen Urine Latest Ref Range: 0.2 - 1.0 EU/dL 0.2   Nitrite Urine Latest Ref Range: NEG^Negative  Negative   Blood Urine Latest Ref Range: NEG^Negative  Negative   Leukocyte Esterase Urine Latest Ref Range: NEG^Negative  Negative   Source Unknown Midstream Urine   WBC Urine Latest Ref Range: OTO5^0 - 5 /HPF 0 - 5   RBC Urine Latest Ref Range: OTO2^O - 2 /HPF O - 2   Bacteria Urine Latest Ref Range: NEG^Negative /HPF Few (A)   Mucous Urine Latest Ref Range: NEG^Negative /LPF Present (A)   Calcium Oxalate Latest Ref Range: NEG^Negative /HPF Many (A)             Follow-ups after your visit        Your next 10 appointments already scheduled     Jun 07, 2018 10:45 AM CDT   Pulmonary Function with GH RESPIRATORY THERAPY TECH   [facility]    [address]  [facility] [id number] [phone]           No Inhalers for 6 hours prior to test No Smoking 2 hours prior to test              Who to contact     If you have questions or need follow up  "information about today's clinic visit or your schedule please contact [facility] directly at [phone].  Normal or non-critical lab and imaging results will be communicated to you by CTMGhart, letter or phone within 4 business days after the clinic has received the results. If you do not hear from us within 7 days, please contact the clinic through CTMGhart or phone. If you have a critical or abnormal lab result, we will notify you by phone as soon as possible.  Submit refill requests through Inbox or call your pharmacy and they will forward the refill request to us. Please allow 3 business days for your refill to be completed.          Additional Information About Your Visit        CTMGharMobPanel Information     Inbox lets you send messages to your doctor, view your test results, renew your prescriptions, schedule appointments and more. To sign up, go to [email or web address] . Click on \"Log in\" on the left side of the screen, which will take you to the Welcome page. Then click on \"Sign up Now\" on the right side of the page.     You will be asked to enter the access code listed below, as well as some personal information. Please follow the directions to create your username and password.     Your access code is: [email or web address]  Expires: 2018  4:56 PM     Your access code will  in 90 days. If you need help or a new code, please call your [facility] clinic or [phone].        Care EveryWhere ID     This is your Care EveryWhere ID. This could be used by other organizations to access your [facility] medical records  [id number]        Your Vitals Were     Pulse Respirations Height BMI (Body Mass Index)          68 16 1.715 m (5' 7.5\") 26.26 kg/m2         Blood Pressure from Last 3 Encounters:   18 132/82   18 112/72   18 132/80    Weight from Last 3 Encounters:   18 77.2 kg (170 lb 3.2 oz)   18 77.5 kg (170 lb 12.8 oz)   18 79.4 kg (175 lb)            "   We Performed the Following     *UA reflex to Microscopic     Urine Microscopic        Primary Care Provider Office Phone # Fax #    Jovanni Sen [phone] [address]   [facility] [address]        Equal Access to Services     PILAR CASTELLON : Fabricio mcmillan maxtomasa Sosully, wagermanda luqadaha, qaybta kaalmada adeseven, waxnguyen ced papotoshia merinoakashlata magana. So [facility] [phone].    ATENCIÓN: Si habla español, tiene a matthews disposición servicios gratuitos de asistencia lingüística. Llame al [phone].    We comply with applicable federal civil rights laws and [place] laws. We do not discriminate on the basis of race, color, national origin, age, disability, sex, sexual orientation, or gender identity.            Thank you!     Thank you for choosing [facility]\""  for your care. Our goal is always to provide you with excellent care. Hearing back from our patients is one way we can continue to improve our services. Please take a few minutes to complete the written survey that you may receive in the mail after your visit with us. Thank you!             Your Updated Medication List - Protect others around you: Learn how to safely use, store and throw away your medicines at www.disposemymeds.org.          This list is accurate as of 5/17/18  9:08 AM.  Always use your most recent med list.                   Brand Name Dispense Instructions for use Diagnosis    albuterol 108 (90 Base) MCG/ACT Inhaler    PROAIR HFA/PROVENTIL HFA/VENTOLIN HFA    1 Inhaler    Inhale 2 puffs into the lungs every 6 hours as needed for shortness of breath / dyspnea or wheezing    COPD exacerbation (H)       aspirin 81 MG EC tablet      Take 81 mg by mouth daily with food        atorvastatin 40 MG tablet    LIPITOR     Take 40 mg by mouth daily (with dinner)        blood glucose monitoring test strip    no brand specified     Dispense item covered by pt ins. E11.9 NIDDM type II - Test 1  time/day        CALCIUM 600+D 600-200 MG-UNIT Tabs   Generic drug:  calcium carbonate-vitamin D      Take 1 tablet by mouth 2 times daily (with meals)        DOCOSAHEXAENOIC ACID PO      Take 1 capsule by mouth 2 times daily        FIFTY50 GLUCOSE METER 2.0 w/Device Kit      Dispense Accu-Chek Agnes  Meter. Dispense item covered by pt ins. E11.9 NIDDM type II - Test 1 time/day        levofloxacin 500 MG tablet    LEVAQUIN    10 tablet    Take 1 tablet (500 mg) by mouth daily for 10 days    Lower resp. tract infection, Cough, COPD exacerbation (H)       metFORMIN 1000 MG tablet    GLUCOPHAGE     Take 1,000 mg by mouth 2 times daily (with meals) For diabetes prevention        MULTI-VITAMINS Tabs      Take 1 tablet by mouth daily        nitroGLYcerin 0.4 MG sublingual tablet    NITROSTAT     Place 0.4 mg under the tongue every 5 minutes as needed for chest pain        predniSONE 20 MG tablet    DELTASONE    15 tablet    Take 40 mg PO Qam x 5 days, then 20 mg x 5 days    Lower resp. tract infection, Cough, COPD exacerbation (H)       primidone 50 MG tablet    MYSOLINE     Take 100 mg by mouth At Bedtime        propranolol 20 MG tablet    INDERAL     Take 20 mg by mouth 2 times daily        VORTEX HOLDING CHAMBER/MASK Smiley     1 each    Use with albuterol HFA    COPD exacerbation (H), Tobacco use disorder

## 2018-05-17 NOTE — NURSING NOTE
Review of Systems:    Weight loss:    No     Recent fever/chills:  No   Night sweats:   No  Current skin rash:  No   Recent hair loss:  No  Heat intolerance:  No   Cold intolerance:  No  Chest pain:   No   Palpitations:   No  Shortness of breath:  No   Wheezing:   Yes  Constipation:    No   Diarrhea:   No   Nausea:   No   Vomiting:   No   Kidney/side pain:  No   Back pain:   No  Frequent headaches:  No   Dizziness:     No  Leg swelling:   No   Calf pain:    No

## 2018-05-25 ENCOUNTER — HOSPITAL ENCOUNTER (EMERGENCY)
Facility: OTHER | Age: 78
Discharge: HOME | End: 2018-05-25
Attending: EMERGENCY MEDICINE | Admitting: EMERGENCY MEDICINE
Payer: MEDICARE

## 2018-05-25 VITALS
TEMPERATURE: 97.3 F | OXYGEN SATURATION: 98 % | SYSTOLIC BLOOD PRESSURE: 125 MMHG | HEIGHT: 67 IN | DIASTOLIC BLOOD PRESSURE: 76 MMHG

## 2018-05-25 DIAGNOSIS — T16.1XXA FOREIGN BODY OF RIGHT EAR, INITIAL ENCOUNTER: ICD-10-CM

## 2018-05-25 PROCEDURE — 69200 CLEAR OUTER EAR CANAL: CPT | Performed by: EMERGENCY MEDICINE

## 2018-05-25 PROCEDURE — 99282 EMERGENCY DEPT VISIT SF MDM: CPT | Mod: Z6 | Performed by: EMERGENCY MEDICINE

## 2018-05-25 PROCEDURE — 99282 EMERGENCY DEPT VISIT SF MDM: CPT | Mod: 25 | Performed by: EMERGENCY MEDICINE

## 2018-05-25 ASSESSMENT — ENCOUNTER SYMPTOMS
SHORTNESS OF BREATH: 0
FEVER: 0
NAUSEA: 0
CHILLS: 0

## 2018-05-25 NOTE — ED AVS SNAPSHOT
[facility]    [address]    [place] [address]    Phone:  [phone]    Fax:  [phone]                                       Rajesh Huynh   MRN: [id number]    Department:  [facility]   Date of Visit:  5/25/2018           After Visit Summary Signature Page     I have received my discharge instructions, and my questions have been answered. I have discussed any challenges I see with this plan with the nurse or doctor.    ..........................................................................................................................................  Patient/Patient Representative Signature      ..........................................................................................................................................  Patient Representative Print Name and Relationship to Patient    ..................................................               ................................................  Date                                            Time    ..........................................................................................................................................  Reviewed by Signature/Title    ...................................................              ..............................................  Date                                                            Time

## 2018-05-25 NOTE — ED AVS SNAPSHOT
[facility]    [address]    [place] [address]    Phone:  [phone]    Fax:  [phone]                                       Rajesh Huynh   MRN: [id number]    Department:  [facility]   Date of Visit:  5/25/2018           Patient Information     Date Of Birth          1940        Your diagnoses for this visit were:     Foreign body of right ear, initial encounter        You were seen by Jordon Estes MD.        Discharge Instructions         Foreign Body: Ear Canal (Removed)    An object has been removed from the ear canal. A foreign body in the ear can lead to irritation. Sometimes this can cause infection in the outer ear canal.  Home care    If prescription eardrops have been given, use these as directed. Do not get water in your ear for the next five days. (Do not go swimming for 5 days.)    You may use acetaminophen or ibuprofen to control pain, unless another pain medicine was prescribed. Note: If you have chronic liver or kidney disease, or if you have ever had a stomach ulcer or gastrointestinal bleeding, talk with your healthcare provider before using these medicines.  Follow-up care  Follow up with your healthcare provider, or as advised.  When to seek medical advice  Call your healthcare provider right away if any of these occur    Ear pain, itching, or discharge    Redness or swelling of the outer ear    Blood or fluid draining from the ear    Persistent hearing loss    Fever of 100.4 F (38 C) or higher, or as directed by your healthcare provider  Date Last Reviewed: 5/1/2017 2000-2017 [employer]. [address] [place] [address]. All rights reserved. This information is not intended as a substitute for professional medical care. Always follow your healthcare professional's instructions.          Your next 10 appointments already scheduled     Jun 07, 2018 10:45 AM CDT   Pulmonary Function with GH  RESPIRATORY THERAPY [facility] ([facility])    [address]  [place] [address] [phone]           No Inhalers for 6 hours prior to test No Smoking 2 hours prior to test              24 Hour Appointment Hotline       To make an appointment at any [facility], call [phone] ([phone]). If you don't have a family doctor or clinic, we will help you find one. [facility] are conveniently located to serve the needs of you and your family.             Review of your medicines      Our records show that you are taking the medicines listed below. If these are incorrect, please call your family doctor or clinic.        Dose / Directions Last dose taken    albuterol 108 (90 Base) MCG/ACT Inhaler   Commonly known as:  PROAIR HFA/PROVENTIL HFA/VENTOLIN HFA   Dose:  2 puff   Quantity:  1 Inhaler        Inhale 2 puffs into the lungs every 6 hours as needed for shortness of breath / dyspnea or wheezing   Refills:  1        aspirin 81 MG EC tablet   Dose:  81 mg        Take 81 mg by mouth daily with food   Refills:  0        atorvastatin 40 MG tablet   Commonly known as:  LIPITOR   Dose:  40 mg        Take 40 mg by mouth daily (with dinner)   Refills:  0        blood glucose monitoring test strip   Commonly known as:  no brand specified        Dispense item covered by pt ins. E11.9 NIDDM type II - Test 1 time/day   Refills:  0        CALCIUM 600+D 600-200 MG-UNIT Tabs   Dose:  1 tablet   Generic drug:  calcium carbonate-vitamin D        Take 1 tablet by mouth 2 times daily (with meals)   Refills:  0        DOCOSAHEXAENOIC ACID PO   Dose:  1 capsule        Take 1 capsule by mouth 2 times daily   Refills:  0        FIFTY50 GLUCOSE METER 2.0 w/Device Kit        Dispense Accu-Chek Agnes  Meter. Dispense item covered by pt ins. E11.9 NIDDM type II - Test 1 time/day   Refills:  0        metFORMIN 1000 MG tablet   Commonly known as:  GLUCOPHAGE  "  Dose:  1000 mg        Take 1,000 mg by mouth 2 times daily (with meals) For diabetes prevention   Refills:  0        MULTI-VITAMINS Tabs   Dose:  1 tablet        Take 1 tablet by mouth daily   Refills:  0        nitroGLYcerin 0.4 MG sublingual tablet   Commonly known as:  NITROSTAT   Dose:  0.4 mg        Place 0.4 mg under the tongue every 5 minutes as needed for chest pain   Refills:  0        primidone 50 MG tablet   Commonly known as:  MYSOLINE   Dose:  100 mg        Take 100 mg by mouth At Bedtime   Refills:  0        propranolol 20 MG tablet   Commonly known as:  INDERAL   Dose:  20 mg        Take 20 mg by mouth 2 times daily   Refills:  0        VORTEX HOLDING CHAMBER/MASK Smiley   Quantity:  1 each        Use with albuterol HFA   Refills:  1                Orders Needing Specimen Collection     None      Pending Results     No orders found from 5/23/2018 to 5/26/2018.            Pending Culture Results     No orders found from 5/23/2018 to 5/26/2018.            Pending Results Instructions     If you had any lab results that were not finalized at the time of your Discharge, you can call the ED Lab Result RN at [phone]. You will be contacted by this team for any positive Lab results or changes in treatment. The nurses are available 7 days a week from 10A to 6:30P.  You can leave a message 24 hours per day and they will return your call.        Thank you for choosing [facility]       Thank you for choosing [facility] for your care. Our goal is always to provide you with excellent care. Hearing back from our patients is one way we can continue to improve our services. Please take a few minutes to complete the written survey that you may receive in the mail after you visit with us. Thank you!        [employer] Information     [employer] lets you send messages to your doctor, view your test results, renew your prescriptions, schedule appointments and more. To sign up, go to [email or web address] . Click on \"Log in\" " "on the left side of the screen, which will take you to the Welcome page. Then click on \"Sign up Now\" on the right side of the page.     You will be asked to enter the access code listed below, as well as some personal information. Please follow the directions to create your username and password.     Your access code is: [email or web address]  Expires: 2018  4:56 PM     Your access code will  in 90 days. If you need help or a new code, please call your [place] clinic or [phone].        Care EveryWhere ID     This is your Care EveryWhere ID. This could be used by other organizations to access your [place] medical records  [id number]        Equal Access to Services     PILAR CASTELLON : Fabricio Singh, manan jonas, martha harp, wei magana. So [facility] [phone].    ATENCIÓN: Si habla español, tiene a matthews disposición servicios gratuitos de asistencia lingüística. Llame al [phone].    We comply with applicable federal civil rights laws and [place] laws. We do not discriminate on the basis of race, color, national origin, age, disability, sex, sexual orientation, or gender identity.            After Visit Summary       This is your record. Keep this with you and show to your community pharmacist(s) and doctor(s) at your next visit.                  "

## 2018-05-26 NOTE — ED NOTES
COLUMBIA-SUICIDE SEVERITY RATING SCALE   Screen with Triage Points for Emergency Department      Ask questions that are bolded and underlined.   Past  month   Ask Questions 1 and 2 YES NO   1)  Have you wished you were dead or wished you could go to sleep and not wake up?   n   2)  Have you actually had any thoughts of killing yourself?   n   If YES to 2, ask questions 3, 4, 5, and 6.  If NO to 2, go directly to question 6.   3)  Have you been thinking about how you might do this?   E.g.  I thought about taking an overdose but I never made a specific plan as to when where or how I would actually do it .and I would never go through with it.       4)  Have you had these thoughts and had some intention of acting on them?   As opposed to  I have the thoughts but I definitely will not do anything about them.       5)  Have you started to work out or worked out the details of how to kill yourself? Do you intend to carry out this plan?      6)  Have you ever done anything, started to do anything, or prepared to do anything to end your life?  Examples: Collected pills, obtained a gun, gave away valuables, wrote a will or suicide note, took out pills but didn t swallow any, held a gun but changed your mind or it was grabbed from your hand, went to the roof but didn t jump; or actually took pills, tried to shoot yourself, cut yourself, tried to hang yourself, etc.    If YES, ask: Was this within the past three months?  Lifetime     n    Past 3 Months     n   Item 1:  Behavioral Health Referral at Discharge  Item 2:  Behavioral Health Referral at Discharge   Item 3:  Behavioral Health Consult (Psychiatric Nurse/) and consider Patient Safety Precautions  Item 4:  Immediate Notification of Physician and/or Behavioral Health and Patient Safety Precautions   Item 5:  Immediate Notification of Physician and/or Behavioral Health and Patient Safety Precautions  Item 6:  Over 3 months ago: Behavioral Health Consult  (Psychiatric Nurse/) and consider Patient Safety Precautions  OR  Item 6:  3 months ago or less: Immediate Notification of Physician and/or Behavioral Health and Patient Safety Precautions

## 2018-05-26 NOTE — ED PROVIDER NOTES
History     Chief Complaint   Patient presents with     Foreign Body in Ear     Patient is a 78 year old male presenting with foreign body in ear. The history is provided by the patient.   Foreign Body in Ear   Pertinent negatives include no chest pain and no shortness of breath.     Rajesh Huynh is a 78 year old male who has part of his hearing aid stuck in his ear. It is the plastic earbud piece. It is far enough in that neither he nor his wife can get it out. No pain.    Problem List:    Patient Active Problem List    Diagnosis Date Noted     COPD exacerbation (H) 05/08/2018     Priority: Medium     Tobacco use disorder 05/08/2018     Priority: Medium     Actinic keratoses - x4 on head, x1 on left upper arm 03/08/2018     Priority: Medium     Benign essential tremor 01/24/2018     Priority: Medium     Overview:        Psychosexual dysfunction with inhibited sexual excitement 01/24/2018     Priority: Medium     Essential hypertension, benign 01/24/2018     Priority: Medium     History of type 2 diabetes mellitus 01/24/2018     Priority: Medium     Mixed hyperlipidemia 01/24/2018     Priority: Medium     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       History of throat cancer 12/13/2017     Priority: Medium     Prediabetes 12/13/2017     Priority: Medium     Partial tear of right rotator cuff 06/21/2017     Priority: Medium     Aftercare following surgery of the musculoskeletal system 06/21/2017     Priority: Medium     History of urinary retention 12/23/2016     Priority: Medium     Overview:   Postoperative       Atherosclerosis of coronary artery of native heart without angina pectoris, unspecified vessel or lesion type 10/03/2016     Priority: Medium      Dyslipidemia 06/02/2015     Priority: Medium     Personal history of malignant neoplasm of bladder 11/19/2014     Priority: Medium     History of ST elevation myocardial infarction (STEMI) 07/11/2013     Priority: Medium     ST elevation myocardial infarction (STEMI) of inferior wall (H) 07/05/2013     Priority: Medium     HCD (health care directive) 12/08/2011     Priority: Medium     Overview:   Patient has identified Health Care Agent(s):   wife Ruth [phone] is the primary medical decision maker.    Patient has Advance Care Plan Documents (Health Care Directive, POLST): Yes, has a living will    Patient has identified Specific Treatment Preferences: Yes   Specific Treatment Preferences: a.) Code Status:  CPR/Attempt Resuscitation (per chart)       Other malignant neoplasm without specification of site 11/21/2011     Priority: Medium        Past Medical History:    Past Medical History:   Diagnosis Date     Chronic myeloproliferative disease (H)      Essential (primary) hypertension      Hyperlipidemia      Male erectile dysfunction      Malignant neoplasm of supraglottis (H)      Other chronic pancreatitis (H)      Personal history of irradiation      Personal history of malignant neoplasm of bladder      Personal history of nicotine dependence      Pneumonia      ST elevation (STEMI) myocardial infarction (H)      Tremor        Past Surgical History:    Past Surgical History:   Procedure Laterality Date     APPENDECTOMY OPEN      1970     ARTHROSCOPY SHOULDER      11/2005,right shoulder     ARTHROSCOPY SHOULDER      1/2006,left shoulder     ARTHROSCOPY SHOULDER      2011,Left shoulder scope SAD, ac.  Open subscap, biceps     CHOLECYSTECTOMY      1994     COLONOSCOPY      02/2005     COLONOSCOPY      2/19/2015,no repeat due at this time     CYSTOSCOPY      Multiple times,Dr. Lyndon FRANK     HEART CATH, ANGIOPLASTY      07/05/2013,Bare-metal stent to dominant RCA     OTHER SURGICAL HISTORY       2004,268305,OTHER,for chronic pancreatitis     OTHER SURGICAL HISTORY      6/22/2007,[id number],SHOULDER REPLACEMENT,Right,with biceps tenodesis by Dr. Mukesh Ayoub     OTHER SURGICAL HISTORY      1995/2002,,ERCP,xseveral, one with papillotomy     OTHER SURGICAL HISTORY      1/30/2012-3/2/2012,540573,RADIATION TREATMENT     OTHER SURGICAL HISTORY      12/7/2011,36592,NECK/THORAX PROCEDURE,L modified radical neck surgery     OTHER SURGICAL HISTORY      11/10/14,062180,OTHER,Dr. Lyndon FRANK     OTHER SURGICAL HISTORY      2012,78457,REVISION EYELID,Dr. Farias     OTHER SURGICAL HISTORY      10/22/15,412204,OTHER     TONSILLECTOMY, ADENOIDECTOMY, COMBINED      1947     VASECTOMY      1980       Family History:    Family History   Problem Relation Age of Onset     HEART DISEASE Father 83     Heart Disease,CHF     HEART DISEASE Mother 80     Heart Disease,MI     Family History Negative Sister      Good Health     HEART DISEASE Sister      Heart Disease,pacemaker     DIABETES Son      Diabetes,Type 1     Breast Cancer Daughter      Cancer-breast     Prostate Cancer No family hx of      Cancer-prostate       Social History:  Marital Status:   [2]  Social History   Substance Use Topics     Smoking status: Former Smoker     Packs/day: 3.00     Years: 43.00     Types: Cigarettes     Quit date: 1/1/2000     Smokeless tobacco: Never Used     Alcohol use No        Medications:      albuterol (PROAIR HFA/PROVENTIL HFA/VENTOLIN HFA) 108 (90 Base) MCG/ACT Inhaler   aspirin EC 81 MG EC tablet   atorvastatin (LIPITOR) 40 MG tablet   calcium carbonate-vitamin D (CALCIUM 600+D) 600-200 MG-UNIT TABS   DOCOSAHEXAENOIC ACID PO   metFORMIN (GLUCOPHAGE) 1000 MG tablet   Multiple Vitamin (MULTI-VITAMINS) TABS   primidone (MYSOLINE) 50 MG tablet   propranolol (INDERAL) 20 MG tablet   blood glucose monitoring (NO BRAND SPECIFIED) test strip   Blood Glucose Monitoring Suppl (FIFTY50 GLUCOSE METER 2.0) W/DEVICE KIT   nitroGLYcerin  "(NITROSTAT) 0.4 MG sublingual tablet   Respiratory Therapy Supplies (VORTEX HOLDING CHAMBER/MASK) JACOBY         Review of Systems   Constitutional: Negative for chills and fever.   HENT: Negative for congestion, ear discharge and ear pain.    Eyes: Negative for visual disturbance.   Respiratory: Negative for shortness of breath.    Cardiovascular: Negative for chest pain.   Gastrointestinal: Negative for nausea.   Skin: Negative for rash.       Physical Exam   BP: 125/76  Heart Rate: 66  Temp: 97.3  F (36.3  C)  Height: 170.2 cm (5' 7\")  SpO2: 98 %      Physical Exam   Constitutional: He is oriented to person, place, and time. He appears well-developed and well-nourished. No distress.   HENT:   Head: Normocephalic and atraumatic.   Patient has an earbud stuck deep in the external canal on the right side. No erythema bleeding or discharge.   Neck: Neck supple.   Cardiovascular: Normal rate.    Pulmonary/Chest: Effort normal.   Neurological: He is alert and oriented to person, place, and time.   Skin: Skin is warm and dry. He is not diaphoretic.   Psychiatric: He has a normal mood and affect. His behavior is normal.   Nursing note and vitals reviewed.      ED Course     ED Course     Procedures    I was able to visualize and grasp the earbud with a bayonet forceps and remove it easily. Tolerated this well.       No results found for this or any previous visit (from the past 24 hour(s)).    Medications - No data to display    Assessments & Plan (with Medical Decision Making)     I have reviewed the nursing notes.    I have reviewed the findings, diagnosis, plan and need for follow up with the patient.  Will discharge him to home. If he develops any signs of infection in his ear should be rechecked in clinic.    New Prescriptions    No medications on file       Final diagnoses:   Foreign body of right ear, initial encounter       5/25/2018   [facility]     Jordon Estes MD  05/25/18 2227    "

## 2018-05-26 NOTE — ED TRIAGE NOTES
Pt comes in to the ER c/o of having the dome of his hearing aid stuck in the right ear. Piece is not obviously seen.

## 2018-05-26 NOTE — DISCHARGE INSTRUCTIONS
Foreign Body: Ear Canal (Removed)    An object has been removed from the ear canal. A foreign body in the ear can lead to irritation. Sometimes this can cause infection in the outer ear canal.  Home care    If prescription eardrops have been given, use these as directed. Do not get water in your ear for the next five days. (Do not go swimming for 5 days.)    You may use acetaminophen or ibuprofen to control pain, unless another pain medicine was prescribed. Note: If you have chronic liver or kidney disease, or if you have ever had a stomach ulcer or gastrointestinal bleeding, talk with your healthcare provider before using these medicines.  Follow-up care  Follow up with your healthcare provider, or as advised.  When to seek medical advice  Call your healthcare provider right away if any of these occur    Ear pain, itching, or discharge    Redness or swelling of the outer ear    Blood or fluid draining from the ear    Persistent hearing loss    Fever of 100.4 F (38 C) or higher, or as directed by your healthcare provider  Date Last Reviewed: 5/1/2017 2000-2017 [employer]. [address] [place] [address]. All rights reserved. This information is not intended as a substitute for professional medical care. Always follow your healthcare professional's instructions.

## 2018-05-30 ENCOUNTER — TELEPHONE (OUTPATIENT)
Dept: INTERNAL MEDICINE | Facility: OTHER | Age: 78
End: 2018-05-30

## 2018-05-30 ENCOUNTER — OFFICE VISIT (OUTPATIENT)
Dept: FAMILY MEDICINE | Facility: OTHER | Age: 78
End: 2018-05-30
Attending: NURSE PRACTITIONER
Payer: MEDICARE

## 2018-05-30 VITALS
HEART RATE: 64 BPM | TEMPERATURE: 97.8 F | OXYGEN SATURATION: 96 % | SYSTOLIC BLOOD PRESSURE: 130 MMHG | WEIGHT: 173 LBS | BODY MASS INDEX: 27.1 KG/M2 | DIASTOLIC BLOOD PRESSURE: 70 MMHG

## 2018-05-30 DIAGNOSIS — J44.1 COPD EXACERBATION (H): ICD-10-CM

## 2018-05-30 DIAGNOSIS — R53.83 OTHER FATIGUE: Primary | ICD-10-CM

## 2018-05-30 LAB
ALBUMIN SERPL-MCNC: 4.1 G/DL (ref 3.5–5.7)
ALP SERPL-CCNC: 51 U/L (ref 34–104)
ALT SERPL W P-5'-P-CCNC: 22 U/L (ref 7–52)
ANION GAP SERPL CALCULATED.3IONS-SCNC: 6 MMOL/L (ref 3–14)
AST SERPL W P-5'-P-CCNC: 18 U/L (ref 13–39)
BASOPHILS # BLD AUTO: 0 10E9/L (ref 0–0.2)
BASOPHILS NFR BLD AUTO: 0.2 %
BILIRUB SERPL-MCNC: 0.4 MG/DL (ref 0.3–1)
BUN SERPL-MCNC: 9 MG/DL (ref 7–25)
CALCIUM SERPL-MCNC: 9.6 MG/DL (ref 8.6–10.3)
CHLORIDE SERPL-SCNC: 98 MMOL/L (ref 98–107)
CO2 SERPL-SCNC: 29 MMOL/L (ref 21–31)
CREAT SERPL-MCNC: 0.73 MG/DL (ref 0.7–1.3)
DIFFERENTIAL METHOD BLD: ABNORMAL
EOSINOPHIL # BLD AUTO: 0.2 10E9/L (ref 0–0.7)
EOSINOPHIL NFR BLD AUTO: 2.1 %
ERYTHROCYTE [DISTWIDTH] IN BLOOD BY AUTOMATED COUNT: 12.4 % (ref 10–15)
ERYTHROCYTE [SEDIMENTATION RATE] IN BLOOD BY WESTERGREN METHOD: 14 MM/H (ref 1–10)
GFR SERPL CREATININE-BSD FRML MDRD: >90 ML/MIN/1.7M2
GLUCOSE SERPL-MCNC: 157 MG/DL (ref 70–105)
HCT VFR BLD AUTO: 41.4 % (ref 40–53)
HGB BLD-MCNC: 14.1 G/DL (ref 13.3–17.7)
IMM GRANULOCYTES # BLD: 0 10E9/L (ref 0–0.4)
IMM GRANULOCYTES NFR BLD: 0.3 %
LYMPHOCYTES # BLD AUTO: 0.8 10E9/L (ref 0.8–5.3)
LYMPHOCYTES NFR BLD AUTO: 9.3 %
MCH RBC QN AUTO: 33.4 PG (ref 26.5–33)
MCHC RBC AUTO-ENTMCNC: 34.1 G/DL (ref 31.5–36.5)
MCV RBC AUTO: 98 FL (ref 78–100)
MONOCYTES # BLD AUTO: 0.9 10E9/L (ref 0–1.3)
MONOCYTES NFR BLD AUTO: 9.9 %
NEUTROPHILS # BLD AUTO: 6.9 10E9/L (ref 1.6–8.3)
NEUTROPHILS NFR BLD AUTO: 78.2 %
PLATELET # BLD AUTO: 353 10E9/L (ref 150–450)
POTASSIUM SERPL-SCNC: 5 MMOL/L (ref 3.5–5.1)
PROT SERPL-MCNC: 6.4 G/DL (ref 6.4–8.9)
RBC # BLD AUTO: 4.22 10E12/L (ref 4.4–5.9)
SODIUM SERPL-SCNC: 133 MMOL/L (ref 134–144)
TSH SERPL DL<=0.05 MIU/L-ACNC: 3.46 IU/ML (ref 0.34–5.6)
WBC # BLD AUTO: 8.8 10E9/L (ref 4–11)

## 2018-05-30 PROCEDURE — 25000125 ZZHC RX 250: Performed by: NURSE PRACTITIONER

## 2018-05-30 PROCEDURE — 36415 COLL VENOUS BLD VENIPUNCTURE: CPT | Performed by: NURSE PRACTITIONER

## 2018-05-30 PROCEDURE — 80053 COMPREHEN METABOLIC PANEL: CPT | Performed by: NURSE PRACTITIONER

## 2018-05-30 PROCEDURE — G0463 HOSPITAL OUTPT CLINIC VISIT: HCPCS

## 2018-05-30 PROCEDURE — G0463 HOSPITAL OUTPT CLINIC VISIT: HCPCS | Mod: 25

## 2018-05-30 PROCEDURE — 99214 OFFICE O/P EST MOD 30 MIN: CPT | Performed by: NURSE PRACTITIONER

## 2018-05-30 PROCEDURE — 84443 ASSAY THYROID STIM HORMONE: CPT | Performed by: NURSE PRACTITIONER

## 2018-05-30 PROCEDURE — 94640 AIRWAY INHALATION TREATMENT: CPT | Performed by: NURSE PRACTITIONER

## 2018-05-30 PROCEDURE — 85025 COMPLETE CBC W/AUTO DIFF WBC: CPT | Performed by: NURSE PRACTITIONER

## 2018-05-30 PROCEDURE — 85652 RBC SED RATE AUTOMATED: CPT | Performed by: NURSE PRACTITIONER

## 2018-05-30 RX ORDER — IPRATROPIUM BROMIDE AND ALBUTEROL SULFATE 2.5; .5 MG/3ML; MG/3ML
3 SOLUTION RESPIRATORY (INHALATION) ONCE
Status: COMPLETED | OUTPATIENT
Start: 2018-05-30 | End: 2018-05-30

## 2018-05-30 RX ADMIN — IPRATROPIUM BROMIDE AND ALBUTEROL SULFATE 3 ML: .5; 3 SOLUTION RESPIRATORY (INHALATION) at 14:29

## 2018-05-30 ASSESSMENT — ENCOUNTER SYMPTOMS
FATIGUE: 1
COUGH: 1

## 2018-05-30 ASSESSMENT — PAIN SCALES - GENERAL: PAINLEVEL: NO PAIN (0)

## 2018-05-30 NOTE — TELEPHONE ENCOUNTER
Wife called, he has a test next week but thinks she should get him in he can't stay awake and is sleeping all the time-

## 2018-05-30 NOTE — NURSING NOTE
Patient presents for Cough along with fatigue. He has been better with the cough but is still coughing and increased fatigue.   Unique Alonso LPN........................5/30/2018  1:28 PM

## 2018-05-30 NOTE — TELEPHONE ENCOUNTER
Patient wife states coughing has improved slightly. But is very tired and not getting back to him self, sitting on couch, no ambition and feeling tired.  Radha Alvarado LPN .............5/30/2018     8:12 Am       appt scheduled with Tanya LARSON.   Radha Alvarado LPN .............5/30/2018     8:21 AM

## 2018-05-30 NOTE — MR AVS SNAPSHOT
After Visit Summary   5/30/2018    Rajesh Huynh    MRN: [id number]           Patient Information     Date Of Birth          1940        Visit Information        Provider Department      5/30/2018 1:30 PM Tanya Sanchez APRN CNP [facility]        Today's Diagnoses     Other fatigue    -  1    COPD exacerbation (H)          Care Instructions    Try an over the counter generic steroid nasal spray daily to try    Continue to use albuterol as needed every 4-6 hours for cough spasms    Push fluids    Give it some time to recover    You have lung function test next week          Follow-ups after your visit        Follow-up notes from your care team     Return if symptoms worsen or fail to improve.      Your next 10 appointments already scheduled     Jun 07, 2018 10:45 AM CDT   Pulmonary Function with  RESPIRATORY THERAPY TECH   [facility] ([facility])    [address]  [place] [id number] [phone]           No Inhalers for 6 hours prior to test No Smoking 2 hours prior to test              Who to contact     If you have questions or need follow up information about today's clinic visit or your schedule please contact [facility] directly at [phone].  Normal or non-critical lab and imaging results will be communicated to you by [employer], letter or phone within 4 business days after the clinic has received the results. If you do not hear from us within 7 days, please contact the clinic through [employer] or phone. If you have a critical or abnormal lab result, we will notify you by phone as soon as possible.  Submit refill requests through [employer] or call your pharmacy and they will forward the refill request to us. Please allow 3 business days for your refill to be completed.          Additional Information About Your Visit        [employer] Information     [employer] lets you send messages to your  "doctor, view your test results, renew your prescriptions, schedule appointments and more. To sign up, go to [email or web address] . Click on \"Log in\" on the left side of the screen, which will take you to the Welcome page. Then click on \"Sign up Now\" on the right side of the page.     You will be asked to enter the access code listed below, as well as some personal information. Please follow the directions to create your username and password.     Your access code is: [email or web address]  Expires: 2018  4:56 PM     Your access code will  in 90 days. If you need help or a new code, please call your [place] clinic or [phone].        Care EveryWhere ID     This is your Care EveryWhere ID. This could be used by other organizations to access your [place] medical records  [id number]        Your Vitals Were     Pulse Temperature Pulse Oximetry BMI (Body Mass Index)          64 97.8  F (36.6  C) (Tympanic) 96% 27.1 kg/m2         Blood Pressure from Last 3 Encounters:   18 130/70   18 125/76   18 132/82    Weight from Last 3 Encounters:   18 173 lb (78.5 kg)   18 170 lb 3.2 oz (77.2 kg)   18 170 lb 12.8 oz (77.5 kg)              We Performed the Following     CBC and Differential     Comprehensive metabolic panel     Sedimentation Rate (ESR)     TSH        Primary Care Provider Office Phone # Fax #    Jovanni Sen [phone] [address] [facility] [address]        Equal Access to Services     [facility] : Hadthania Singh, manan jonas, wei lerner. So [facility] [phone].    ATENCIÓN: Si habla español, tiene a matthews disposición servicios gratuitos de asistencia lingüística. Cornelius al [phone].    We comply with applicable federal civil rights laws and [place] laws. We do not discriminate on the basis of race, color, national origin, age, disability, sex, sexual " orientation, or gender identity.            Thank you!     Thank you for choosing [facility] AND [facility]  for your care. Our goal is always to provide you with excellent care. Hearing back from our patients is one way we can continue to improve our services. Please take a few minutes to complete the written survey that you may receive in the mail after your visit with us. Thank you!             Your Updated Medication List - Protect others around you: Learn how to safely use, store and throw away your medicines at www.disposemymeds.org.          This list is accurate as of 5/30/18  3:14 PM.  Always use your most recent med list.                   Brand Name Dispense Instructions for use Diagnosis    albuterol 108 (90 Base) MCG/ACT Inhaler    PROAIR HFA/PROVENTIL HFA/VENTOLIN HFA    1 Inhaler    Inhale 2 puffs into the lungs every 6 hours as needed for shortness of breath / dyspnea or wheezing    COPD exacerbation (H)       aspirin 81 MG EC tablet      Take 81 mg by mouth daily with food        atorvastatin 40 MG tablet    LIPITOR     Take 40 mg by mouth daily (with dinner)        blood glucose monitoring test strip    no brand specified     Dispense item covered by pt ins. E11.9 NIDDM type II - Test 1 time/day        CALCIUM 600+D 600-200 MG-UNIT Tabs   Generic drug:  calcium carbonate-vitamin D      Take 1 tablet by mouth 2 times daily (with meals)        DOCOSAHEXAENOIC ACID PO      Take 1 capsule by mouth 2 times daily        FIFTY50 GLUCOSE METER 2.0 w/Device Kit      Dispense Accu-Chek Agnes  Meter. Dispense item covered by pt ins. E11.9 NIDDM type II - Test 1 time/day        metFORMIN 1000 MG tablet    GLUCOPHAGE     Take 1,000 mg by mouth 2 times daily (with meals) For diabetes prevention        MULTI-VITAMINS Tabs      Take 1 tablet by mouth daily        nitroGLYcerin 0.4 MG sublingual tablet    NITROSTAT     Place 0.4 mg under the tongue every 5 minutes as needed for chest pain        primidone 50  MG tablet    MYSOLINE     Take 100 mg by mouth At Bedtime        propranolol 20 MG tablet    INDERAL     Take 20 mg by mouth 2 times daily        VORTEX HOLDING CHAMBER/MASK Smiley     1 each    Use with albuterol HFA    COPD exacerbation (H), Tobacco use disorder

## 2018-05-30 NOTE — PATIENT INSTRUCTIONS
Try an over the counter generic steroid nasal spray daily to try relieving congestion and nasal drip    Continue to use albuterol as needed every 4-6 hours for cough spasms    Push fluids    Give it some time to recover    You have lung function test next week--

## 2018-05-30 NOTE — PROGRESS NOTES
SUBJECTIVE:   Rajesh Huynh is a 78 year old male who presents to clinic today for the following health issues:    Since for follow-up on recent COPD exacerbation, was seen early May, treated with azithromycin and prednisone burst after first visit, return to clinic 10 days later retreated with Levaquin and prednisone  Reports rarely uses albuterol at home is not on a controller inhaler  Reports feels generally fatigued, denies any fever, chills, nausea, vomiting or changes in bowel or bladder  Taking oral foods and fluids well without difficulty, feels generally tired  Cough has lessened each week since onset    HPI    Patient Active Problem List   Diagnosis     HCD (health care directive)     Benign essential tremor     Other malignant neoplasm without specification of site     Psychosexual dysfunction with inhibited sexual excitement     Essential hypertension, benign     Personal history of malignant neoplasm of bladder     History of ST elevation myocardial infarction (STEMI)     History of throat cancer     History of type 2 diabetes mellitus     History of urinary retention     Mixed hyperlipidemia     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)     Pancreatitis, chronic (H)     Partial tear of right rotator cuff     Prediabetes     Aftercare following surgery of the musculoskeletal system     Actinic keratoses - x4 on head, x1 on left upper arm     COPD exacerbation (H)     Tobacco use disorder     Atherosclerosis of coronary artery of native heart without angina pectoris, unspecified vessel or lesion type     Dyslipidemia     ST elevation myocardial infarction (STEMI) of inferior wall (H)     Past Surgical History:   Procedure Laterality Date     APPENDECTOMY OPEN      1970     ARTHROSCOPY SHOULDER      11/2005,right shoulder     ARTHROSCOPY SHOULDER      1/2006,left shoulder     ARTHROSCOPY SHOULDER      2011,Left shoulder scope SAD, ac.  Open subscap, biceps      CHOLECYSTECTOMY      1994     COLONOSCOPY      02/2005     COLONOSCOPY      2/19/2015,no repeat due at this time     CYSTOSCOPY      Multiple times,Dr. Lyndon FRANK     HEART CATH, ANGIOPLASTY      07/05/2013,Bare-metal stent to dominant RCA     OTHER SURGICAL HISTORY      2004,586391,OTHER,for chronic pancreatitis     OTHER SURGICAL HISTORY      6/22/2007,[id number],SHOULDER REPLACEMENT,Right,with biceps tenodesis by Dr. Mukesh Ayoub     OTHER SURGICAL HISTORY      1995/2002,,ERCP,xseveral, one with papillotomy     OTHER SURGICAL HISTORY      1/30/2012-3/2/2012,[id number],RADIATION TREATMENT     OTHER SURGICAL HISTORY      12/7/2011,33483,NECK/THORAX PROCEDURE,L modified radical neck surgery     OTHER SURGICAL HISTORY      11/10/14,[id number],OTHER,Dr. Lyndon FRANK     OTHER SURGICAL HISTORY      2012,69678,REVISION EYELID,Dr. Farias     OTHER SURGICAL HISTORY      10/22/15,[id number],OTHER     TONSILLECTOMY, ADENOIDECTOMY, COMBINED      1947     VASECTOMY      1980       Social History   Substance Use Topics     Smoking status: Former Smoker     Packs/day: 3.00     Years: 43.00     Types: Cigarettes     Quit date: 1/1/2000     Smokeless tobacco: Never Used     Alcohol use No     Family History   Problem Relation Age of Onset     HEART DISEASE Father 83     Heart Disease,CHF     HEART DISEASE Mother 80     Heart Disease,MI     Family History Negative Sister      Good Health     HEART DISEASE Sister      Heart Disease,pacemaker     DIABETES Son      Diabetes,Type 1     Breast Cancer Daughter      Cancer-breast     Prostate Cancer No family hx of      Cancer-prostate         Current Outpatient Prescriptions   Medication Sig Dispense Refill     albuterol (PROAIR HFA/PROVENTIL HFA/VENTOLIN HFA) 108 (90 Base) MCG/ACT Inhaler Inhale 2 puffs into the lungs every 6 hours as needed for shortness of breath / dyspnea or wheezing 1 Inhaler 1     aspirin EC 81 MG EC tablet Take 81 mg by mouth daily with food       atorvastatin (LIPITOR)  40 MG tablet Take 40 mg by mouth daily (with dinner)       blood glucose monitoring (NO BRAND SPECIFIED) test strip Dispense item covered by pt ins. E11.9 NIDDM type II - Test 1 time/day       Blood Glucose Monitoring Suppl (FIFTY50 GLUCOSE METER 2.0) W/DEVICE KIT Dispense Accu-Chek Agnes  Meter. Dispense item covered by pt ins. E11.9 NIDDM type II - Test 1 time/day       calcium carbonate-vitamin D (CALCIUM 600+D) 600-200 MG-UNIT TABS Take 1 tablet by mouth 2 times daily (with meals)       DOCOSAHEXAENOIC ACID PO Take 1 capsule by mouth 2 times daily       metFORMIN (GLUCOPHAGE) 1000 MG tablet Take 1,000 mg by mouth 2 times daily (with meals) For diabetes prevention       Multiple Vitamin (MULTI-VITAMINS) TABS Take 1 tablet by mouth daily       nitroGLYcerin (NITROSTAT) 0.4 MG sublingual tablet Place 0.4 mg under the tongue every 5 minutes as needed for chest pain       primidone (MYSOLINE) 50 MG tablet Take 100 mg by mouth At Bedtime       propranolol (INDERAL) 20 MG tablet Take 20 mg by mouth 2 times daily       Respiratory Therapy Supplies (VORTEX HOLDING CHAMBER/MASK) JACOBY Use with albuterol HFA 1 each 1     No Known Allergies  BP Readings from Last 3 Encounters:   05/30/18 130/70   05/25/18 125/76   05/17/18 132/82    Wt Readings from Last 3 Encounters:   05/30/18 173 lb (78.5 kg)   05/17/18 170 lb 3.2 oz (77.2 kg)   05/14/18 170 lb 12.8 oz (77.5 kg)                  Labs reviewed in EPIC    Review of Systems   Constitutional: Positive for fatigue.   HENT: Positive for congestion.    Respiratory: Positive for cough.    All other systems reviewed and are negative.       OBJECTIVE:     /70 (BP Location: Right arm, Patient Position: Sitting, Cuff Size: Adult Large)  Pulse 64  Temp 97.8  F (36.6  C) (Tympanic)  Wt 173 lb (78.5 kg)  SpO2 96%  BMI 27.1 kg/m2  Body mass index is 27.1 kg/(m^2).  Physical Exam   Constitutional: He is oriented to person, place, and time. He appears well-developed and  well-nourished.   Cardiovascular: Normal rate, regular rhythm and normal heart sounds.    Pulmonary/Chest: Effort normal and breath sounds normal.   Musculoskeletal: Normal range of motion.   Neurological: He is alert and oriented to person, place, and time.   Skin: Skin is warm and dry.   Psychiatric: He has a normal mood and affect. His behavior is normal. Judgment and thought content normal.   Laughing, highly animated conversation   Nursing note and vitals reviewed.      Results for orders placed or performed in visit on 05/30/18   Sedimentation Rate (ESR)   Result Value Ref Range    Sed Rate 14 (H) 1 - 10 mm/h   CBC and Differential   Result Value Ref Range    WBC 8.8 4.0 - 11.0 10e9/L    RBC Count 4.22 (L) 4.4 - 5.9 10e12/L    Hemoglobin 14.1 13.3 - 17.7 g/dL    Hematocrit 41.4 40.0 - 53.0 %    MCV 98 78 - 100 fl    MCH 33.4 (H) 26.5 - 33.0 pg    MCHC 34.1 31.5 - 36.5 g/dL    RDW 12.4 10.0 - 15.0 %    Platelet Count 353 150 - 450 10e9/L    Diff Method Automated Method     % Neutrophils 78.2 %    % Lymphocytes 9.3 %    % Monocytes 9.9 %    % Eosinophils 2.1 %    % Basophils 0.2 %    % Immature Granulocytes 0.3 %    Absolute Neutrophil 6.9 1.6 - 8.3 10e9/L    Absolute Lymphocytes 0.8 0.8 - 5.3 10e9/L    Absolute Monocytes 0.9 0.0 - 1.3 10e9/L    Absolute Eosinophils 0.2 0.0 - 0.7 10e9/L    Absolute Basophils 0.0 0.0 - 0.2 10e9/L    Abs Immature Granulocytes 0.0 0 - 0.4 10e9/L   Comprehensive metabolic panel   Result Value Ref Range    Sodium 133 (L) 134 - 144 mmol/L    Potassium 5.0 3.5 - 5.1 mmol/L    Chloride 98 98 - 107 mmol/L    Carbon Dioxide 29 21 - 31 mmol/L    Anion Gap 6 3 - 14 mmol/L    Glucose 157 (H) 70 - 105 mg/dL    Urea Nitrogen 9 7 - 25 mg/dL    Creatinine 0.73 0.70 - 1.30 mg/dL    GFR Estimate >90 >60 mL/min/1.7m2    GFR Estimate If Black >90 >60 mL/min/1.7m2    Calcium 9.6 8.6 - 10.3 mg/dL    Bilirubin Total 0.4 0.3 - 1.0 mg/dL    Albumin 4.1 3.5 - 5.7 g/dL    Protein Total 6.4 6.4 - 8.9 g/dL     Alkaline Phosphatase 51 34 - 104 U/L    ALT 22 7 - 52 U/L    AST 18 13 - 39 U/L   TSH   Result Value Ref Range    Thyrotropin 3.46 0.34 - 5.60 IU/mL         ASSESSMENT/PLAN:     Reviewed CMP, CBC, TSH negative findings during office visit  Patient uncertain DuoNeb helpful    Overall seems to be handling well--discussed giving himself a couple more weeks to recover from his recent bronchitis COPD exacerbation before returning to baseline  Encouraged to continue to push fluids, activity as tolerated to prevent further deconditioning from laying around over the last couple weeks with his respiratory issues    Has pulmonary function testing scheduled next week and encouraged to complete this to further assess his lung function    Recommend trying nasal corticosteroid spray over-the-counter for congestion and postnasal drainage symptoms as this may be causing some throat irritation and triggering cough    Encouraged using his PRN albuterol inhaler for any coughing spasms or wheezing    1. Other fatigue    - Sedimentation Rate (ESR)  - CBC and Differential  - Comprehensive metabolic panel  - TSH    2. COPD exacerbation (H)  - Sedimentation Rate (ESR)  - CBC and Differential  - Comprehensive metabolic panel      JOSEPHINE [facility]araj [facility] AND [facility]

## 2018-06-07 ENCOUNTER — HOSPITAL ENCOUNTER (OUTPATIENT)
Dept: RESPIRATORY THERAPY | Facility: OTHER | Age: 78
Discharge: HOME | End: 2018-06-07
Attending: INTERNAL MEDICINE | Admitting: INTERNAL MEDICINE
Payer: MEDICARE

## 2018-06-07 DIAGNOSIS — J44.1 COPD EXACERBATION (H): ICD-10-CM

## 2018-06-07 DIAGNOSIS — F17.200 TOBACCO USE DISORDER: ICD-10-CM

## 2018-06-07 PROCEDURE — 94010 BREATHING CAPACITY TEST: CPT

## 2018-06-07 PROCEDURE — 40000275 ZZH STATISTIC RCP TIME EA 10 MIN

## 2018-06-07 PROCEDURE — 94726 PLETHYSMOGRAPHY LUNG VOLUMES: CPT | Mod: 26 | Performed by: INTERNAL MEDICINE

## 2018-06-07 PROCEDURE — 94729 DIFFUSING CAPACITY: CPT | Mod: 26 | Performed by: INTERNAL MEDICINE

## 2018-06-07 PROCEDURE — 94729 DIFFUSING CAPACITY: CPT

## 2018-06-07 PROCEDURE — 94010 BREATHING CAPACITY TEST: CPT | Mod: 26 | Performed by: INTERNAL MEDICINE

## 2018-06-07 PROCEDURE — 94726 PLETHYSMOGRAPHY LUNG VOLUMES: CPT

## 2018-06-08 LAB
DLCOUNC-%PRED-PRE: 88 %
DLCOUNC-PRE: 20.65 ML/MIN/MMHG
DLCOUNC-PRED: 23.27 ML/MIN/MMHG
ERV-%PRED-PRE: 10 %
ERV-PRE: 0.09 L
ERV-PRED: 0.79 L
EXPTIME-PRE: 7.69 SEC
FEF2575-%PRED-PRE: 79 %
FEF2575-PRE: 1.59 L/SEC
FEF2575-PRED: 1.99 L/SEC
FEFMAX-%PRED-PRE: 111 %
FEFMAX-PRE: 7.7 L/SEC
FEFMAX-PRED: 6.93 L/SEC
FEV1-%PRED-PRE: 90 %
FEV1-PRE: 2.44 L
FEV1FEV6-PRE: 73 %
FEV1FEV6-PRED: 77 %
FEV1FVC-PRE: 72 %
FEV1FVC-PRED: 72 %
FEV1SVC-PRE: 66 %
FEV1SVC-PRED: 66 %
FIFMAX-PRE: 3.97 L/SEC
FRCPLETH-%PRED-PRE: 134 %
FRCPLETH-PRE: 4.83 L
FRCPLETH-PRED: 3.59 L
FVC-%PRED-PRE: 94 %
FVC-PRE: 3.39 L
FVC-PRED: 3.59 L
GAW-%PRED-PRE: 32 %
GAW-PRE: 0.33 L/S/CMH2O
GAW-PRED: 1.03 L/S/CMH2O
IC-%PRED-PRE: 110 %
IC-PRE: 3.63 L
IC-PRED: 3.28 L
RVPLETH-%PRED-PRE: 176 %
RVPLETH-PRE: 4.75 L
RVPLETH-PRED: 2.69 L
SGAW-%PRED-PRE: 82 %
SGAW-PRE: 0.07 1/CMH2O*S
SGAW-PRED: 0.08 1/CMH2O*S
SRAW-%PRED-PRE: 322 %
SRAW-PRE: 15.34 CMH2O*S
SRAW-PRED: 4.76 CMH2O*S
TLCPLETH-%PRED-PRE: 129 %
TLCPLETH-PRE: 8.46 L
TLCPLETH-PRED: 6.52 L
VA-%PRED-PRE: 100 %
VA-PRE: 6.26 L
VC-%PRED-PRE: 91 %
VC-PRE: 3.71 L
VC-PRED: 4.07 L

## 2018-07-20 ENCOUNTER — TELEPHONE (OUTPATIENT)
Dept: INTERNAL MEDICINE | Facility: OTHER | Age: 78
End: 2018-07-20

## 2018-07-20 NOTE — TELEPHONE ENCOUNTER
The patients wife was contacted and notified that Jovanni Sen MD is out of the office until 7- and his nurse and him will look at what they can do for scheduling at that time.  Ligia Reddy LPN on 7/20/2018 at 9:29 AM

## 2018-07-20 NOTE — TELEPHONE ENCOUNTER
Patient has an appointment on aug 17 2018.  Patients wife has an appointment in [place] the same day with Dr Edwards for a small procedure.   Is this a way he can be rescheduled so they can still go to [place]?  Please call patient or his wife Ruth.  Thank You Shanika Collier on 7/20/2018 at 9:16 AM

## 2018-07-24 NOTE — PROGRESS NOTES
Patient Information     Patient Name  Rajesh Huynh MRN  [id number] Sex  Male   1940      Letter by Jovanni Sen MD at      Author:  Jovanni Sen MD Service:  (none) Author Type:  (none)    Filed:   Encounter Date:  2017 Status:  (Other)           Rajesh Huynh  [id number] [facility] [address]          2017    Dear Mr. Huynh:    Following are the tests completed during your last clinic visit.  The results of these tests are included below.      Overall, labs look pretty good.      Your vitamin D level is mildly low.  Goal level is 40-50.    Prediabetes is currently present, diabetes has resolved.    --> Continue metformin 1000 mg twice daily.      -- Try to avoid Carbohydrates as much as possible -- breads, pasta, baked goods, cakes, oatmeal, cold cereal, potatoes.   These are turned to sugar in one metabolic conversion, cause insulin secretion and increased fat deposition / weight gain.      -- Eat more lean meats, proteins, eggs, nuts.       Thyroid level is normal.  Cholesterol levels look good.  Blood counts are normal.    Vitamin B12 level is within the middle of the normal range.  This could be low for you personally.  Let me know how the B12 shot helped.      Results for orders placed or performed in visit on 17      CBC W PLT NO DIFF      Result  Value Ref Range    WHITE BLOOD COUNT         7.5 4.5 - 11.0 thou/cu mm    RED BLOOD COUNT           4.29 (L) 4.30 - 5.90 mil/cu mm    HEMOGLOBIN                14.5 13.5 - 17.5 g/dL    HEMATOCRIT                41.5 37.0 - 53.0 %    MCV                       97 80 - 100 fL    MCH                       33.8 26.0 - 34.0 pg    MCHC                      34.9 32.0 - 36.0 g/dL    RDW                       12.2 11.5 - 15.5 %    PLATELET COUNT            322 140 - 440 thou/cu mm    MPV                       9.6 6.5 - 11.0 fL   COMP METABOLIC PANEL      Result  Value Ref Range    SODIUM 135 133 - 143 mmol/L    POTASSIUM  4.6 3.5 - 5.1 mmol/L    CHLORIDE 102 98 - 107 mmol/L    CO2,TOTAL 26 21 - 31 mmol/L    ANION GAP 7 5 - 18                    GLUCOSE 109 (H) 70 - 105 mg/dL    CALCIUM 8.7 8.6 - 10.3 mg/dL    BUN 8 7 - 25 mg/dL    CREATININE 0.83 0.70 - 1.30 mg/dL    BUN/CREAT RATIO           10                    GFR if African American >60 >60 ml/min/1.73m2    GFR if not African American >60 >60 ml/min/1.73m2    ALBUMIN 4.1 3.5 - 5.7 g/dL    PROTEIN,TOTAL 6.5 6.4 - 8.9 g/dL    GLOBULIN                  2.4 2.0 - 3.7 g/dL    A/G RATIO 1.7 1.0 - 2.0                    BILIRUBIN,TOTAL 0.5 0.3 - 1.0 mg/dL    ALK PHOSPHATASE 50 34 - 104 IU/L    ALT (SGPT) 20 7 - 52 IU/L    AST (SGOT) 23 13 - 39 IU/L   HEMOGLOBIN A1C MONITORING (POCT)      Result  Value Ref Range    HEMOGLOBIN A1C MONITORING (POCT) 6.0 4.0 - 6.2 %    ESTIMATED AVERAGE GLUCOSE  126 mg/dL   LIPID PANEL      Result  Value Ref Range    CHOLESTEROL,TOTAL 112 <200 mg/dL    TRIGLYCERIDES 75 <150 mg/dL    HDL CHOLESTEROL 40 23 - 92 mg/dL    NON-HDL CHOLESTEROL 72 <145 mg/dl    CHOL/HDL RATIO            2.80 <4.50                    LDL CHOLESTEROL 57 <100 mg/dL    PROVIDER ORDERED STATUS RANDOM    VITAMIN B12      Result  Value Ref Range    VITAMIN B12 678 180 - 914 pg/mL   TSH      Result  Value Ref Range    TSH 3.34 0.34 - 5.60 uIU/mL   VITAMIN D 25 (DEFICIENCY)      Result  Value Ref Range    VITAMIN D TOTAL AFL 34.3   ng/mL   URINALYSIS W REFLEX MICROSCOPIC IF POSITIVE      Result  Value Ref Range    COLOR                     Yellow Yellow Color    CLARITY                   Clear Clear Clarity    SPECIFIC GRAVITY,URINE    1.010 1.010, 1.015, 1.020, 1.025                    PH,URINE                  7.5 6.0, 7.0, 8.0, 5.5, 6.5, 7.5, 8.5                    UROBILINOGEN,QUALITATIVE  Normal Normal EU/dl    PROTEIN, URINE 30 (A) Negative mg/dL    GLUCOSE, URINE Negative Negative mg/dL    KETONES,URINE             Negative Negative mg/dL    BILIRUBIN,URINE           Negative  Negative                    OCCULT BLOOD,URINE        Negative Negative                    NITRITE                   Negative Negative                    LEUKOCYTE ESTERASE        Negative Negative                   URINALYSIS MICROSCOPIC      Result  Value Ref Range    RBC None Seen 0-2, None Seen /HPF    WBC None Seen 0-2, 3-5, None Seen /HPF    BACTERIA                  None Seen None Seen, Rare, Occasional, Few Bacteria/HPF    EPITHELIAL CELLS          None Seen None Seen, Few Epi/HPF    OTHER Mucus Present          If you have any further questions or problems contact my office at  [phone]   --- or send Hip[employer] message --- otherwise schedule an appointment.    Clinic : [phone]  Appointment line: [phone]     Thank you,    Jovanni Sen MD    Internal Medicine  [facility]     Reviewed and electronically signed by provider.

## 2018-07-24 NOTE — PROGRESS NOTES
Patient Information     Patient Name  Rajesh Huynh MRN  [id number] Sex  Male   1940      Letter by Marleni Sutton MD at      Author:  Marleni Sutton MD Service:  (none) Author Type:  (none)    Filed:   Encounter Date:  2017 Status:  (Other)           Rajesh Huynh  [address] [facility] [address]      2017    Dear Mr. Huynh:    Following are the tests completed during your last clinic visit.  The results of these tests are normal and require no further attention unless otherwise noted.    Resulted Orders      VITAMIN B12 (Collected: 2017  9:42 AM)      Result  Value Ref Range    VITAMIN B12 647 180 - 914 pg/mL   VITAMIN D 25 (DEFICIENCY) (Collected: 2017  9:42 AM)      Result  Value Ref Range    VITAMIN D TOTAL AFL 36.6   ng/mL    Narrative      Deficiency:           <10 ng/mL  Insufficiency:      10-29 ng/mL    Sufficiency:        ng/mL    Possible Toxicity:   >100 ng/mL             If you have any further questions or problems contact my office at  [phone].        Thank you,      Marleni Sutton MD

## 2018-07-25 NOTE — TELEPHONE ENCOUNTER
Confirmed patient's appointment time and spouse indicates that she will be able to have patient come because they will be able to still make it to [place] by 1 pm.      Kate Gar LPN 7/25/2018 12:03 PM

## 2018-08-03 ENCOUNTER — HOSPITAL ENCOUNTER (OUTPATIENT)
Dept: GENERAL RADIOLOGY | Facility: OTHER | Age: 78
Discharge: HOME | End: 2018-08-03
Attending: OTOLARYNGOLOGY | Admitting: OTOLARYNGOLOGY
Payer: MEDICARE

## 2018-08-03 DIAGNOSIS — R13.10 DYSPHAGIA, UNSPECIFIED TYPE: ICD-10-CM

## 2018-08-03 PROCEDURE — 74220 X-RAY XM ESOPHAGUS 1CNTRST: CPT

## 2018-08-17 ENCOUNTER — OFFICE VISIT (OUTPATIENT)
Dept: INTERNAL MEDICINE | Facility: OTHER | Age: 78
End: 2018-08-17
Attending: INTERNAL MEDICINE
Payer: MEDICARE

## 2018-08-17 VITALS
WEIGHT: 181.56 LBS | DIASTOLIC BLOOD PRESSURE: 78 MMHG | HEART RATE: 64 BPM | SYSTOLIC BLOOD PRESSURE: 138 MMHG | BODY MASS INDEX: 28.44 KG/M2

## 2018-08-17 DIAGNOSIS — L57.0 ACTINIC KERATOSIS OF MULTIPLE SITES OF HEAD AND NECK: Primary | ICD-10-CM

## 2018-08-17 PROCEDURE — 17003 DESTRUCT PREMALG LES 2-14: CPT | Performed by: INTERNAL MEDICINE

## 2018-08-17 PROCEDURE — G0463 HOSPITAL OUTPT CLINIC VISIT: HCPCS

## 2018-08-17 PROCEDURE — 17000 DESTRUCT PREMALG LESION: CPT | Performed by: INTERNAL MEDICINE

## 2018-08-17 ASSESSMENT — ANXIETY QUESTIONNAIRES
GAD7 TOTAL SCORE: 0
7. FEELING AFRAID AS IF SOMETHING AWFUL MIGHT HAPPEN: NOT AT ALL
1. FEELING NERVOUS, ANXIOUS, OR ON EDGE: NOT AT ALL
6. BECOMING EASILY ANNOYED OR IRRITABLE: NOT AT ALL
IF YOU CHECKED OFF ANY PROBLEMS ON THIS QUESTIONNAIRE, HOW DIFFICULT HAVE THESE PROBLEMS MADE IT FOR YOU TO DO YOUR WORK, TAKE CARE OF THINGS AT HOME, OR GET ALONG WITH OTHER PEOPLE: NOT DIFFICULT AT ALL
2. NOT BEING ABLE TO STOP OR CONTROL WORRYING: NOT AT ALL
3. WORRYING TOO MUCH ABOUT DIFFERENT THINGS: NOT AT ALL
5. BEING SO RESTLESS THAT IT IS HARD TO SIT STILL: NOT AT ALL

## 2018-08-17 ASSESSMENT — ENCOUNTER SYMPTOMS
FATIGUE: 0
WOUND: 0
SHORTNESS OF BREATH: 0

## 2018-08-17 ASSESSMENT — PAIN SCALES - GENERAL: PAINLEVEL: NO PAIN (0)

## 2018-08-17 ASSESSMENT — PATIENT HEALTH QUESTIONNAIRE - PHQ9: 5. POOR APPETITE OR OVEREATING: NOT AT ALL

## 2018-08-17 NOTE — NURSING NOTE
"Patient presents to the clinic for removing lesions on face/head with cryotherapy.    Previous A1C is at goal of <8  No results found for: A1C  Urine microalbumin:creatine: n/a  Foot exam over a year ago-declines  Eye exam 8-17-18    Tobacco User no  Patient is on a daily aspirin  Patient is on a Statin.  Blood pressure today of:     BP Readings from Last 1 Encounters:   05/30/18 130/70      is at the goal of <139/89 for diabetics.    Chief Complaint   Patient presents with     Derm Problem       Initial There were no vitals taken for this visit. Estimated body mass index is 27.1 kg/(m^2) as calculated from the following:    Height as of 5/25/18: 5' 7\" (1.702 m).    Weight as of 5/30/18: 173 lb (78.5 kg).  Medication Reconciliation: complete    Kate Gar LPN        "

## 2018-08-17 NOTE — MR AVS SNAPSHOT
After Visit Summary   8/17/2018    Rajesh Huynh    MRN: [id number]           Patient Information     Date Of Birth          1940        Visit Information        Provider Department      8/17/2018 8:40 AM Jovanni Sen MD [facility]        Today's Diagnoses     Actinic keratosis of multiple sites of head and neck x12 lesions    -  1      Care Instructions    What to Expect Following Cryosurgery (Liquid Nitrogen)   What is Cryosurgery?   Cryosurgery is a technique for removing skin lesions that primarily involve the surface of the skin, such as warts, seborrheic keratosis, or actinic keratosis. It is a quick method of removing the lesion with minimal scarring.   The liquid nitrogen needs to be applied long enough tofreeze the affected skin. By freezing the skin, a blister is created underneath the lesion. Ideally, as the new skin forms underneath the blister, the abnormal skin on the roof of the blister peels off. Occasionally, if thelesion is very thick (such as a large wart), only the surface is blistered off. The base or residual lesion may need to be frozen at another visit.   It takes about one to two weeks for the scab to fall off, which is whenthe new layer of skin has formed under the blister. Areas of thinner skin, such as the face, may heal a little faster.      What to Expect Over the Next Few Weeks     During Treatment - Area beingtreated will sting, burn, and then possibly itch.     Immediately After Treatment - Area will be red, sore, and swollen.     Next Day - Blister or blood blister has formed, tenderness starts to subside. Apply aBand-Aid if   necessary.     7 Days - Surface is dark red/brown and scab-like. Apply Vaseline  or an antibacterial ointment, such as Polysporin , if necessary.     2 to 4 Weeks - The surface startsto peel off. This may be encouraged gently during bathing, when the scab is softened.     No makeup shouldbe applied until area is  fully healed.      Howto Take Care of the Skin after Cryosurgery     ABand-Aid can be used for larger blisters or blisters in areas that are more likely to be traumatized -such as fingers and toes. If the area becomes dry or crusted, an ointment (Vaseline , Bacitracin , or Polysporin ) can also be applied.     Cleanse area with a mild cleanser and cool water.     Pat the area dry with a lint-free cloth and apply an ointment (Vaseline , Bacitracin , or Polysporin ).     Avoid glycolic acids, Vitamin C, scrubs, Tretinoin (Retin-A), and Retinol creams for 7 to 10 days.     If approved by your Provider, you may bathe, swim, exercise, and otherwise follow all of your normalactivities.     The area may get wet while bathing, but swimming or hot tub use should be avoided for oneweek following a treatment or while the skin is open.     Within 24 hours, you can expect the area to beswollen and/or blistered. The blister may not be visible to the naked eye.     Within one week, theswelling goes down. The top becomes dark red and scab-like. The scab will loosen over the next weeks, and should fall off within one month.      Adverse Effects     The most common adverse effects are pain, swelling/blistering, potential for infection, and pale discoloration of the skin after it heals.      Blisters        Anytime a blister surfaces, whether from ill-fitting shoes, an oven burn, or liquid nitrogen cryosurgery, it will be a bit painful. For most patients, the pain is a temporary stingwith some discomfort periodically over the next day as the blister forms.     The goal is to achieve ablister. This means, most commonly, patients will have a blister form following treatment. Sometimes, the blister is so thin that it can't be seen and may have minimal swelling. Occasionally, a blood blister forms that canbe quite dramatic but is harmless.     Rarely, the blister may become infected. When this happens, theblister becomes unusually tender,  the fluid becomes cloudy, and the redness around it becomes more extensive (and may even form streaks). If this happens, contact our office.     Some lesions, especially those on theface, may leave a slight pale discoloration.     True scarring,involving deeper layers of the skin is unlikely.             Follow-ups after your visit        Who to contact     If you have questions or need follow up information about today's clinic visit or your schedule please contact [facility] directly at [phone].  Normal or non-critical lab and imaging results will be communicated to you by MyChart, letter or phone within 4 business days after the clinic has received the results. If you do not hear from us within 7 days, please contact the clinic through MyChart or phone. If you have a critical or abnormal lab result, we will notify you by phone as soon as possible.  Submit refill requests through [employer] or call your pharmacy and they will forward the refill request to us. Please allow 3 business days for your refill to be completed.          Additional Information About Your Visit        Care EveryWhere ID     This is your Care EveryWhere ID. This could be used by other organizations to access your [place] medical records  [id number]        Your Vitals Were     Pulse BMI (Body Mass Index)                64 28.44 kg/m2           Blood Pressure from Last 3 Encounters:   08/17/18 138/78   05/30/18 130/70   05/25/18 125/76    Weight from Last 3 Encounters:   08/17/18 181 lb 9 oz (82.4 kg)   05/30/18 173 lb (78.5 kg)   05/17/18 170 lb 3.2 oz (77.2 kg)              We Performed the Following     DESTRUCT PREMALIGNANT LESION, 2-14     DESTRUCT PREMALIGNANT LESION, FIRST        Primary Care Provider Office Phone # Fax #    Jovanni Sen [phone] [address]    [facility] [address]        Equal Access to Services     PILAR CASTELLON : manan Otoole  mirtaelier wei lerner. So [facility] [phone].    ATENCIÓN: Si bernadette yu, tiene a matthews disposición servicios gratuitos de asistencia lingüística. Cornelius al [phone].    We comply with applicable federal civil rights laws and [place] laws. We do not discriminate on the basis of race, color, national origin, age, disability, sex, sexual orientation, or gender identity.            Thank you!     Thank you for choosing [facility]  for your care. Our goal is always to provide you with excellent care. Hearing back from our patients is one way we can continue to improve our services. Please take a few minutes to complete the written survey that you may receive in the mail after your visit with us. Thank you!             Your Updated Medication List - Protect others around you: Learn how to safely use, store and throw away your medicines at www.disposemymeds.org.          This list is accurate as of 8/17/18  9:06 AM.  Always use your most recent med list.                   Brand Name Dispense Instructions for use Diagnosis    albuterol 108 (90 Base) MCG/ACT inhaler    PROAIR HFA/PROVENTIL HFA/VENTOLIN HFA    1 Inhaler    Inhale 2 puffs into the lungs every 6 hours as needed for shortness of breath / dyspnea or wheezing    COPD exacerbation (H)       aspirin 81 MG EC tablet      Take 81 mg by mouth daily with food        atorvastatin 40 MG tablet    LIPITOR     Take 40 mg by mouth daily (with dinner)        blood glucose monitoring test strip    no brand specified     Dispense item covered by pt ins. E11.9 NIDDM type II - Test 1 time/day        CALCIUM 600+D 600-200 MG-UNIT Tabs   Generic drug:  calcium carbonate-vitamin D      Take 1 tablet by mouth 2 times daily (with meals)        DOCOSAHEXAENOIC ACID PO      Take 1 capsule by mouth 2 times daily        FIFTY50 GLUCOSE METER 2.0 w/Device Kit      Dispense Accu-Chek Agnes  Meter. Dispense  item covered by pt ins. E11.9 NIDDM type II - Test 1 time/day        metFORMIN 1000 MG tablet    GLUCOPHAGE     Take 1,000 mg by mouth 2 times daily (with meals) For diabetes prevention        MULTI-VITAMINS Tabs      Take 1 tablet by mouth daily        nitroGLYcerin 0.4 MG sublingual tablet    NITROSTAT     Place 0.4 mg under the tongue every 5 minutes as needed for chest pain        primidone 50 MG tablet    MYSOLINE     Take 100 mg by mouth At Bedtime        propranolol 20 MG tablet    INDERAL     Take 20 mg by mouth 2 times daily        VORTEX HOLDING CHAMBER/MASK Smiley     1 each    Use with albuterol HFA    COPD exacerbation (H), Tobacco use disorder

## 2018-08-17 NOTE — PROGRESS NOTES
"Nursing Notes:   Kate Gar LPN  8/17/2018  9:03 AM  Signed  Patient presents to the clinic for removing lesions on face/head with cryotherapy.    Previous A1C is at goal of <8  No results found for: A1C  Urine microalbumin:creatine: n/a  Foot exam over a year ago-declines  Eye exam 8-17-18    Tobacco User no  Patient is on a daily aspirin  Patient is on a Statin.  Blood pressure today of:     BP Readings from Last 1 Encounters:   05/30/18 130/70      is at the goal of <139/89 for diabetics.    Chief Complaint   Patient presents with     Derm Problem       Initial There were no vitals taken for this visit. Estimated body mass index is 27.1 kg/(m^2) as calculated from the following:    Height as of 5/25/18: 5' 7\" (1.702 m).    Weight as of 5/30/18: 173 lb (78.5 kg).  Medication Reconciliation: complete    Kate Gar LPN        Nursing note reviewed with patient.  Accurracy and completeness verified.   Mr. Huynh is a 78 year old male who:  Patient presents with:  Derm Problem    HPI     ICD-10-CM    1. Actinic keratosis of multiple sites of head and neck x12 lesions L57.0 DESTRUCT PREMALIGNANT LESION, 2-14     DESTRUCT PREMALIGNANT LESION, FIRST     Patient presents for evaluation of multiple spots on his head and neck and face.  Has had precancerous lesions in the past.  Numerous lesions reviewed.  Actinic keratoses noted.  Treatment options reviewed and discussed.  He would like to proceed with cryotherapy.  See below.    Functional Capacity: > 4 METS.   Reports that he can climb a flight of stairs without any chest pain/heaviness or shortness of breath.   No orthopnea/paroxysmal nocturnal dyspnea  Review of Systems   Constitutional: Negative for fatigue.   Respiratory: Negative for shortness of breath.    Cardiovascular: Negative for chest pain.   Skin: Positive for rash. Negative for pallor and wound.        LAVINIA:   LAVINIA-7 SCORE 3/7/2018 4/4/2018 8/17/2018   Total Score 0 0 0     PHQ9:  PHQ-9 SCORE " 3/7/2018 4/4/2018 8/17/2018   Total Score 0 0 0       I have personally reviewed the past medical history, past surgical history, medications, allergies, family and social history as listed below, on 8/17/2018.    No Known Allergies    Current Outpatient Prescriptions   Medication Sig Dispense Refill     albuterol (PROAIR HFA/PROVENTIL HFA/VENTOLIN HFA) 108 (90 Base) MCG/ACT Inhaler Inhale 2 puffs into the lungs every 6 hours as needed for shortness of breath / dyspnea or wheezing 1 Inhaler 1     aspirin EC 81 MG EC tablet Take 81 mg by mouth daily with food       atorvastatin (LIPITOR) 40 MG tablet Take 40 mg by mouth daily (with dinner)       blood glucose monitoring (NO BRAND SPECIFIED) test strip Dispense item covered by pt ins. E11.9 NIDDM type II - Test 1 time/day       Blood Glucose Monitoring Suppl (FIFTY50 GLUCOSE METER 2.0) W/DEVICE KIT Dispense Accu-Chek Agnes  Meter. Dispense item covered by pt ins. E11.9 NIDDM type II - Test 1 time/day       calcium carbonate-vitamin D (CALCIUM 600+D) 600-200 MG-UNIT TABS Take 1 tablet by mouth 2 times daily (with meals)       DOCOSAHEXAENOIC ACID PO Take 1 capsule by mouth 2 times daily       metFORMIN (GLUCOPHAGE) 1000 MG tablet Take 1,000 mg by mouth 2 times daily (with meals) For diabetes prevention       Multiple Vitamin (MULTI-VITAMINS) TABS Take 1 tablet by mouth daily       nitroGLYcerin (NITROSTAT) 0.4 MG sublingual tablet Place 0.4 mg under the tongue every 5 minutes as needed for chest pain       primidone (MYSOLINE) 50 MG tablet Take 100 mg by mouth At Bedtime       propranolol (INDERAL) 20 MG tablet Take 20 mg by mouth 2 times daily       Respiratory Therapy Supplies (VORTEX HOLDING CHAMBER/MASK) JACOBY Use with albuterol HFA 1 each 1        Patient Active Problem List    Diagnosis Date Noted     COPD exacerbation (H) 05/08/2018     Priority: Medium     Tobacco use disorder 05/08/2018     Priority: Medium     Actinic keratoses - x4 on head, x1 on left upper  arm 03/08/2018     Priority: Medium     Benign essential tremor 01/24/2018     Priority: Medium     Overview:        Psychosexual dysfunction with inhibited sexual excitement 01/24/2018     Priority: Medium     Essential hypertension, benign 01/24/2018     Priority: Medium     History of type 2 diabetes mellitus 01/24/2018     Priority: Medium     Mixed hyperlipidemia 01/24/2018     Priority: Medium     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       History of throat cancer 12/13/2017     Priority: Medium     Prediabetes 12/13/2017     Priority: Medium     Partial tear of right rotator cuff 06/21/2017     Priority: Medium     Aftercare following surgery of the musculoskeletal system 06/21/2017     Priority: Medium     History of urinary retention 12/23/2016     Priority: Medium     Overview:   Postoperative       Atherosclerosis of coronary artery of native heart without angina pectoris, unspecified vessel or lesion type 10/03/2016     Priority: Medium     Dyslipidemia 06/02/2015     Priority: Medium     Personal history of malignant neoplasm of bladder 11/19/2014     Priority: Medium     History of ST elevation myocardial infarction (STEMI) 07/11/2013     Priority: Medium     ST elevation myocardial infarction (STEMI) of inferior wall (H) 07/05/2013     Priority: Medium     HCD (health care directive) 12/08/2011     Priority: Medium     Overview:   Patient has identified Health Care Agent(s):   wife Ruth [phone] is the primary medical decision maker.    Patient has Advance Care Plan Documents (Health Care Directive, POLST): Yes, has a living will    Patient has identified Specific Treatment Preferences: Yes   Specific Treatment Preferences: a.) Code Status:   CPR/Attempt Resuscitation (per chart)       Other malignant neoplasm without specification of site 11/21/2011     Priority: Medium     Past Medical History:   Diagnosis Date     Chronic myeloproliferative disease (H)     questionable     Essential (primary) hypertension     No Comments Provided     Hyperlipidemia     No Comments Provided     Male erectile dysfunction     No Comments Provided     Malignant neoplasm of supraglottis (H)     2011,Radiation     Other chronic pancreatitis (H)     No Comments Provided     Personal history of irradiation     1/30/2012-3/2/2012     Personal history of malignant neoplasm of bladder     4/22/2015     Personal history of nicotine dependence     2ppd - quit 2000     Pneumonia     2001     ST elevation (STEMI) myocardial infarction (H)     7/5/2013,Integrity BMS to RCA done at [facility]     No Comments Provided     Past Surgical History:   Procedure Laterality Date     APPENDECTOMY OPEN      1970     ARTHROSCOPY SHOULDER      11/2005,right shoulder     ARTHROSCOPY SHOULDER      1/2006,left shoulder     ARTHROSCOPY SHOULDER      2011,Left shoulder scope SAD, ac.  Open subscap, biceps     CHOLECYSTECTOMY      1994     COLONOSCOPY      02/2005     COLONOSCOPY      2/19/2015,no repeat due at this time     CYSTOSCOPY      Multiple times,Dr. Lyndon FRANK     HEART CATH, ANGIOPLASTY      07/05/2013,Bare-metal stent to dominant RCA     OTHER SURGICAL HISTORY      2004,[id number],OTHER,for chronic pancreatitis     OTHER SURGICAL HISTORY      6/22/2007,[id number],SHOULDER REPLACEMENT,Right,with biceps tenodesis by Dr. Mukesh Ayoub     OTHER SURGICAL HISTORY      1995/2002,,ERCP,xseveral, one with papillotomy     OTHER SURGICAL HISTORY      1/30/2012-3/2/2012,[id number],RADIATION TREATMENT     OTHER SURGICAL HISTORY      12/7/2011,[id number],NECK/THORAX PROCEDURE,L modified radical neck surgery     OTHER SURGICAL HISTORY      11/10/14,[id number],OTHER,Dr. Lyndon FRANK     OTHER SURGICAL HISTORY   "    2012,[id number],REVISION EYELID,Dr. Farias     OTHER SURGICAL HISTORY      10/22/15,[id number],OTHER     TONSILLECTOMY, ADENOIDECTOMY, COMBINED      1947     VASECTOMY      1980     Social History     Social History     Marital status:      Spouse name: N/A     Number of children: N/A     Years of education: N/A     Social History Main Topics     Smoking status: Former Smoker     Packs/day: 3.00     Years: 43.00     Types: Cigarettes     Quit date: 1/1/2000     Smokeless tobacco: Never Used     Alcohol use No     Drug use: No     Sexual activity: Yes     Partners: Female     Other Topics Concern     Not on file     Social History Narrative    Ruth Spouse   Patient is  with grown children.  [employer] at [employer] for 17 years     Family History   Problem Relation Age of Onset     HEART DISEASE Father 83     Heart Disease,CHF     HEART DISEASE Mother 80     Heart Disease,MI     Family History Negative Sister      Good Health     HEART DISEASE Sister      Heart Disease,pacemaker     Diabetes Son      Diabetes,Type 1     Breast Cancer Daughter      Cancer-breast     Prostate Cancer No family hx of      Cancer-prostate       EXAM:   Vitals:    08/17/18 0837   BP: 138/78   BP Location: Right arm   Patient Position: Sitting   Cuff Size: Adult Regular   Pulse: 64   Weight: 181 lb 9 oz (82.4 kg)       Current Pain Score: No Pain (0)     BP Readings from Last 3 Encounters:   08/17/18 138/78   05/30/18 130/70   05/25/18 125/76    Wt Readings from Last 3 Encounters:   08/17/18 181 lb 9 oz (82.4 kg)   05/30/18 173 lb (78.5 kg)   05/17/18 170 lb 3.2 oz (77.2 kg)      Estimated body mass index is 28.44 kg/(m^2) as calculated from the following:    Height as of 5/25/18: 5' 7\" (1.702 m).    Weight as of this encounter: 181 lb 9 oz (82.4 kg).     Physical Exam   Constitutional: He appears well-developed and well-nourished. No distress.   Skin:   Multiple actinic keratoses noted on head/face.     PROCEDURE:  Reviewed " risks and benefits of cryotherapy. After informed consent was obtained, patient elected to proceed. These 12 lesions were treated today.  Performed cryotherapy to #12 lesions x 2 rounds of 30 second freeze/thaw cycles.  Tolerated well. No obvious complications.  Return for signs of infection.    The patient and I reviewed safe sun practices today: the importance of staying out of the sun; especially between 10AM-2PM, wearing sun screen SPF > 30, and protective clothing.  We also discussed the ABC's of skin cancers including: changes in size, uniformity, borders, coloration, bleeding, or any irregularity or changes. If these occur, seek medical attention.  The patient should have a complete skin exam by a medical professional every 6-12 months, and any questionable/suspicious, or changing lesions should be removed.        INVESTIGATIONS:  Results for orders placed or performed during the hospital encounter of 08/03/18   XR Esophagram    Narrative    PROCEDURE: XR ESOPHAGRAM    HISTORY:  Cervical dysphagia. Personal history of epiglottic tumor  resection and radiation therapy.    TECHNIQUE: Sequential swallows of thin barium were observed.    COMPARISON:  Chest radiographs 5/14/2018    FINDINGS:      There is prompt aspiration of thin liquids on initial swallows. Some  barium coats the roof of the vocal cords. No cough reflex was  observed. Brief imaging over the lower esophagus demonstrates normal  distensibility and smooth mucosa. Some to and fro dysmotility is seen.  No further imaging was performed due to the risk of further  aspiration.      Impression    IMPRESSION:      Aspiration of thin liquids. Recommend speech consultation.    Mild to moderate distal esophageal dysmotility.      IRISH STERLING MD       ASSESSMENT AND PLAN:  Problem List Items Addressed This Visit     None      Visit Diagnoses     Actinic keratosis of multiple sites of head and neck x12 lesions    -  Primary    Relevant Orders     DESTRUCT PREMALIGNANT LESION, 2-14 (Completed)    DESTRUCT PREMALIGNANT LESION, FIRST (Completed)        -- Expected clinical course discussed    -- Medications and their side effects discussed    Patient Instructions   What to Expect Following Cryosurgery (Liquid Nitrogen)   What is Cryosurgery?   Cryosurgery is a technique for removing skin lesions that primarily involve the surface of the skin, such as warts, seborrheic keratosis, or actinic keratosis. It is a quick method of removing the lesion with minimal scarring.   The liquid nitrogen needs to be applied long enough tofreeze the affected skin. By freezing the skin, a blister is created underneath the lesion. Ideally, as the new skin forms underneath the blister, the abnormal skin on the roof of the blister peels off. Occasionally, if thelesion is very thick (such as a large wart), only the surface is blistered off. The base or residual lesion may need to be frozen at another visit.   It takes about one to two weeks for the scab to fall off, which is whenthe new layer of skin has formed under the blister. Areas of thinner skin, such as the face, may heal a little faster.      What to Expect Over the Next Few Weeks     During Treatment - Area beingtreated will sting, burn, and then possibly itch.     Immediately After Treatment - Area will be red, sore, and swollen.     Next Day - Blister or blood blister has formed, tenderness starts to subside. Apply aBand-Aid if   necessary.     7 Days - Surface is dark red/brown and scab-like. Apply Vaseline  or an antibacterial ointment, such as Polysporin , if necessary.     2 to 4 Weeks - The surface startsto peel off. This may be encouraged gently during bathing, when the scab is softened.     No makeup shouldbe applied until area is fully healed.      Howto Take Care of the Skin after Cryosurgery     ABand-Aid can be used for larger blisters or blisters in areas that are more likely to be traumatized -such as fingers  and toes. If the area becomes dry or crusted, an ointment (Vaseline , Bacitracin , or Polysporin ) can also be applied.     Cleanse area with a mild cleanser and cool water.     Pat the area dry with a lint-free cloth and apply an ointment (Vaseline , Bacitracin , or Polysporin ).     Avoid glycolic acids, Vitamin C, scrubs, Tretinoin (Retin-A), and Retinol creams for 7 to 10 days.     If approved by your Provider, you may bathe, swim, exercise, and otherwise follow all of your normalactivities.     The area may get wet while bathing, but swimming or hot tub use should be avoided for oneweek following a treatment or while the skin is open.     Within 24 hours, you can expect the area to beswollen and/or blistered. The blister may not be visible to the naked eye.     Within one week, theswelling goes down. The top becomes dark red and scab-like. The scab will loosen over the next weeks, and should fall off within one month.      Adverse Effects     The most common adverse effects are pain, swelling/blistering, potential for infection, and pale discoloration of the skin after it heals.      Blisters        Anytime a blister surfaces, whether from ill-fitting shoes, an oven burn, or liquid nitrogen cryosurgery, it will be a bit painful. For most patients, the pain is a temporary stingwith some discomfort periodically over the next day as the blister forms.     The goal is to achieve ablister. This means, most commonly, patients will have a blister form following treatment. Sometimes, the blister is so thin that it can't be seen and may have minimal swelling. Occasionally, a blood blister forms that canbe quite dramatic but is harmless.     Rarely, the blister may become infected. When this happens, theblister becomes unusually tender, the fluid becomes cloudy, and the redness around it becomes more extensive (and may even form streaks). If this happens, contact our office.     Some lesions, especially those on theface,  may leave a slight pale discoloration.     True scarring,involving deeper layers of the skin is unlikely.       Jovanni Sen MD  Internal Medicine  [facility]

## 2018-08-18 ASSESSMENT — ANXIETY QUESTIONNAIRES: GAD7 TOTAL SCORE: 0

## 2018-08-18 ASSESSMENT — PATIENT HEALTH QUESTIONNAIRE - PHQ9: SUM OF ALL RESPONSES TO PHQ QUESTIONS 1-9: 0

## 2018-10-26 ENCOUNTER — OFFICE VISIT (OUTPATIENT)
Dept: UROLOGY | Facility: OTHER | Age: 78
End: 2018-10-26
Attending: UROLOGY
Payer: MEDICARE

## 2018-10-26 VITALS — BODY MASS INDEX: 26.89 KG/M2 | HEART RATE: 68 BPM | HEIGHT: 68 IN | RESPIRATION RATE: 12 BRPM | WEIGHT: 177.4 LBS

## 2018-10-26 DIAGNOSIS — Z85.51 PERSONAL HISTORY OF MALIGNANT NEOPLASM OF BLADDER: Primary | ICD-10-CM

## 2018-10-26 DIAGNOSIS — R39.12 WEAK URINARY STREAM: ICD-10-CM

## 2018-10-26 PROCEDURE — G0463 HOSPITAL OUTPT CLINIC VISIT: HCPCS | Mod: 25

## 2018-10-26 PROCEDURE — 99212 OFFICE O/P EST SF 10 MIN: CPT | Mod: 25 | Performed by: UROLOGY

## 2018-10-26 PROCEDURE — 52000 CYSTOURETHROSCOPY: CPT | Performed by: UROLOGY

## 2018-10-26 RX ORDER — TAMSULOSIN HYDROCHLORIDE 0.4 MG/1
0.4 CAPSULE ORAL EVERY EVENING
Qty: 90 CAPSULE | Refills: 3 | Status: SHIPPED | OUTPATIENT
Start: 2018-10-26 | End: 2019-10-30

## 2018-10-26 ASSESSMENT — PAIN SCALES - GENERAL: PAINLEVEL: NO PAIN (0)

## 2018-10-26 NOTE — NURSING NOTE
Patient positioned in supine position, perineum area prepped with chlorhexidene Gluconate and patient draped per sterile technique. Per verbal order read back by Anthony Haney MD, Urojet 10mL 2% lidocaine jelly to be instilled into urethra.  Urojet- 10ml 2% Lidocaine jelly instilled into the urethra.    Urojet 2%  Lot#: [id number]  Expiration date: 6/20  : [employer]  NDC: [address]-8    [place] Protocol    A. Pre-procedure verification complete Yes  1-relevant information / documentation available, reviewed and properly matched to the patient; 2-consent accurate and complete, 3-equipment and supplies available    B. Site marking complete N/A  Site marked if not in continuous attendance with patient    C. TIME OUT completed Yes  Time Out was conducted just prior to starting procedure to verify the eight required elements: 1-patient identity, 2-consent accurate and complete, 3-position, 4-correct side/site marked (if applicable), 5-procedure, 6-relevant images / results properly labeled and displayed (if applicable), 7-antibiotics / irrigation fluids (if applicable), 8-safety precautions.    After procedure perineum area rinsed. Discharge instructions reviewed with patient. Patient verbalized understanding of discharge instructions and discharged ambulatory.  Elinor Martinez..................10/26/2018  8:30 AM

## 2018-10-26 NOTE — PROGRESS NOTES
Preprocedure diagnosis  History of bladder cancer    Postprocedure diagnosis  History of bladder cancer    Procedure  Flexible Cystourethroscopy    Surgeon  Anthony Haney MD    Anesthesia  2% lidocaine jelly intraurethrally    Complications  None    Indications  78 y.o. male undergoing a flexible cystoscopy for the above mentioned indications.    Pathology   I personally reviewed the pathology report   11/10/2014   Low grade Ta (muscularis propria was present in specimen)    Findings  Cystoscopic findings included a normal anterior urethra.    There was a prominent median lobe.    The lateral lobes were obstructive in appearance.  The bladder appeared to be normal capacity.    There were no tumors, stones or foreign bodies.    The orifices were slit-shaped and in their normal location.    Procedure  The patient was placed in supine position and prepped and draped in sterile fashion with lidocaine jelly per urethra for anesthesia.    I passed a lubricated 14F flexible cystoscope through the penile urethra and into the bladder and the bladder was completely visualized.  The cystoscope was retroflexed and the bladder neck and prostate visualized.    The cystoscope was slowly withdrawn while visualizing the urethra and the procedure terminated.    The patient tolerated the procedure well.      Assessment  History of bladder cancer -cystoscopy was negative    BPH with weak stream  -discussed starting an alpha-blocker     we discussed side effects of Flomax including, but not limited to, retrograde ejaculation, congestion and lightheadedness.    Plan  Start Flomax 0.4mg every evening  Follow up surveillance cystoscopy annually          I spent 10 minutes on this patient's visit (exclusive of separately billed services/procedures) and over half of this time was spent in face-to-face counseling regarding BPH with weak stream: treatment options with emphasis on  risks and benefits of each, prognosis and importance of  compliance.

## 2018-10-26 NOTE — PATIENT INSTRUCTIONS

## 2018-10-26 NOTE — MR AVS SNAPSHOT
After Visit Summary   10/26/2018    Rajesh Huynh    MRN: [id number]           Patient Information     Date Of Birth          1940        Visit Information        Provider Department      10/26/2018 8:00 AM Anthony Haney MD [facility]        Care Instructions    Home Care after Cystoscopy  Follow these guidelines for your care after your procedure.    Activity  No limitations    Bathing or showering  No limitations    Symptoms  You may notice some burning with urination but this usually resolves after 1-2 days.  You may also notice small amounts of blood in your urine.  Please increase water intake for the next few days to help with these symptoms.    Contacts  General Questions: [phone]  Appointments:  [phone]  Emergencies:  [phone]    When to call the clinic  If you develop any of the following symptoms please call the clinic immediately.  If the clinic is closed please be seen at an urgent care clinic or the Emergency Department.  - Burning with urination that worsens after 2 days  - Unable to urinate causing severe pelvic pain  - Fevers of greater than 101 degrees F  - Flank pain that is not responding to pain medication    Follow up  Please follow up as discussed at the appointment.          Follow-ups after your visit        Who to contact     If you have questions or need follow up information about today's clinic visit or your schedule please contact [facility] directly at [phone].  Normal or non-critical lab and imaging results will be communicated to you by MyChart, letter or phone within 4 business days after the clinic has received the results. If you do not hear from us within 7 days, please contact the clinic through MyChart or phone. If you have a critical or abnormal lab result, we will notify you by phone as soon as possible.  Submit refill requests through [employer] or call your pharmacy and they will forward the refill  request to us. Please allow 3 business days for your refill to be completed.          Additional Information About Your Visit        Care EveryWhere ID     This is your Care EveryWhere ID. This could be used by other organizations to access your [place] medical records  [id number]         Blood Pressure from Last 3 Encounters:   08/17/18 138/78   05/30/18 130/70   05/25/18 125/76    Weight from Last 3 Encounters:   08/17/18 82.4 kg (181 lb 9 oz)   05/30/18 78.5 kg (173 lb)   05/17/18 77.2 kg (170 lb 3.2 oz)              Today, you had the following     No orders found for display       Primary Care Provider Office Phone # Fax #    Jovanni Sen [phone]       [address] [facility] [address]        Equal Access to Services     [facility] : Hadii nay george Sosully, waaxda luqadaha, qaybta kaalmada loyd, wei jarrett . So [facility] [phone].    ATENCIÓN: Si habla español, tiene a matthews disposición servicios gratuitos de asistencia lingüística. Cornelius al [phone].    We comply with applicable federal civil rights laws and [place] laws. We do not discriminate on the basis of race, color, national origin, age, disability, sex, sexual orientation, or gender identity.            Thank you!     Thank you for choosing [facility]  for your care. Our goal is always to provide you with excellent care. Hearing back from our patients is one way we can continue to improve our services. Please take a few minutes to complete the written survey that you may receive in the mail after your visit with us. Thank you!             Your Updated Medication List - Protect others around you: Learn how to safely use, store and throw away your medicines at www.disposemymeds.org.          This list is accurate as of 10/26/18  8:06 AM.  Always use your most recent med list.                   Brand Name Dispense Instructions for use Diagnosis     albuterol 108 (90 Base) MCG/ACT inhaler    PROAIR HFA/PROVENTIL HFA/VENTOLIN HFA    1 Inhaler    Inhale 2 puffs into the lungs every 6 hours as needed for shortness of breath / dyspnea or wheezing    COPD exacerbation (H)       aspirin 81 MG EC tablet      Take 81 mg by mouth daily with food        atorvastatin 40 MG tablet    LIPITOR     Take 40 mg by mouth daily (with dinner)        blood glucose monitoring test strip    no brand specified     Dispense item covered by pt ins. E11.9 NIDDM type II - Test 1 time/day        CALCIUM 600+D 600-200 MG-UNIT Tabs   Generic drug:  calcium carbonate-vitamin D      Take 1 tablet by mouth 2 times daily (with meals)        DOCOSAHEXAENOIC ACID PO      Take 1 capsule by mouth 2 times daily        FIFTY50 GLUCOSE METER 2.0 w/Device Kit      Dispense Accu-Chek Agnes  Meter. Dispense item covered by pt ins. E11.9 NIDDM type II - Test 1 time/day        metFORMIN 1000 MG tablet    GLUCOPHAGE     Take 1,000 mg by mouth 2 times daily (with meals) For diabetes prevention        MULTI-VITAMINS Tabs      Take 1 tablet by mouth daily        nitroGLYcerin 0.4 MG sublingual tablet    NITROSTAT     Place 0.4 mg under the tongue every 5 minutes as needed for chest pain        primidone 50 MG tablet    MYSOLINE     Take 100 mg by mouth At Bedtime        propranolol 20 MG tablet    INDERAL     Take 20 mg by mouth 2 times daily        VORTEX HOLDING CHAMBER/MASK Smiley     1 each    Use with albuterol HFA    COPD exacerbation (H), Tobacco use disorder

## 2018-11-16 ENCOUNTER — TELEPHONE (OUTPATIENT)
Dept: INTERNAL MEDICINE | Facility: OTHER | Age: 78
End: 2018-11-16

## 2018-11-16 ENCOUNTER — NURSE TRIAGE (OUTPATIENT)
Dept: INTERNAL MEDICINE | Facility: OTHER | Age: 78
End: 2018-11-16

## 2018-11-16 NOTE — TELEPHONE ENCOUNTER
Primary will return to office on Tuesday 11/20/18.  Routing message to nurse triage to address if patient should be seen sooner than in 4 days.    Rosio Trinh LPN............11/16/2018 11:59 AM

## 2018-11-16 NOTE — TELEPHONE ENCOUNTER
Patient calling and states has been having episodes of diarrhea.  Patient states its been going on for about 3-4 months, states he can go 4-6 days without out bowel movement and then will have regular bowel movement followed by 6-7 times diarrhea episodes and then it stops.  Patient wondering if it is certain foods he is eating.  Patient states is taking in plenty of fluids and food.  Denies any other symptoms just some abdominal cramping with diarrhea episodes.  Patient states he does not need to go to ER or rapid clinic but will present there if he feels he needs to.  Patient states just wants to see when he could get in to see  for work in when he returns on Tuesday.  Informed patient message will be routed to Dr. Sen for review when he returns on Tuesday.      Reason for Disposition    [1] MODERATE diarrhea (e.g., 4-6 times / day more than normal) AND [2] age > 70 years    Additional Information    Negative: Shock suspected (e.g., cold/pale/clammy skin, too weak to stand, low BP, rapid pulse)    Negative: Difficult to awaken or acting confused  (e.g., disoriented, slurred speech)    Negative: Sounds like a life-threatening emergency to the triager    Negative: Vomiting also present and worse than the diarrhea    Negative: [1] Blood in stool AND [2] without diarrhea    Negative: [1] SEVERE abdominal pain (e.g., excruciating) AND [2] present > 1 hour    Negative: [1] SEVERE abdominal pain AND [2] age > 60    Negative: [1] Blood in the stool AND [2] moderate or large amount of blood    Negative: Black or tarry bowel movements  (Exception: chronic-unchanged  black-grey bowel movements AND is taking iron pills or Pepto-bismol)    Negative: [1] Drinking very little AND [2] dehydration suspected (e.g., no urine > 12 hours, very dry mouth, very lightheaded)    Negative: Patient sounds very sick or weak to the triager    Negative: [1] SEVERE diarrhea (e.g., 7 or more times / day more than normal) AND [2]   "age > 60 years    Negative: [1] Constant abdominal pain AND [2] present > 2 hours    Negative: [1] Fever > 103 F (39.4 C) AND [2] not able to get the fever down using Fever Care Advice    Negative: [1] SEVERE diarrhea (e.g., 7 or more times / day more than normal) AND [2] present > 24 hours (1 day)    Negative: [1] MODERATE diarrhea (e.g., 4-6 times / day more than normal) AND [2] present > 48 hours (2 days)    Answer Assessment - Initial Assessment Questions  1. DIARRHEA SEVERITY: \"How bad is the diarrhea?\" \"How many extra stools have you had in the past 24 hours than normal?\"     - MILD: Few loose or mushy BMs; increase of 1-3 stools over normal daily number of stools; mild increase in ostomy output.    - MODERATE: Increase of 4-6 stools daily over normal; moderate increase in ostomy output.    - SEVERE (or Worst Possible): Increase of 7 or more stools daily over normal; moderate increase in ostomy output; incontinence.      moderate  2. ONSET: \"When did the diarrhea begin?\"       This morning at 4 am  3. BM CONSISTENCY: \"How loose or watery is the diarrhea?\"       Starts solid then turns into watery  4. VOMITING: \"Are you also vomiting?\" If so, ask: \"How many times in the past 24 hours?\"       denies  5. ABDOMINAL PAIN: \"Are you having any abdominal pain?\" If yes: \"What does it feel like?\" (e.g., crampy, dull, intermittent, constant)       Cramping with episode  6. ABDOMINAL PAIN SEVERITY: If present, ask: \"How bad is the pain?\"  (e.g., Scale 1-10; mild, moderate, or severe)     - MILD (1-3): doesn't interfere with normal activities, abdomen soft and not tender to touch      - MODERATE (4-7): interferes with normal activities or awakens from sleep, tender to touch      - SEVERE (8-10): excruciating pain, doubled over, unable to do any normal activities        denie  7. ORAL INTAKE: If vomiting, \"Have you been able to drink liquids?\" \"How much fluids have you had in the past 24 hours?\"      Taking in plenty of " "liquids and foods  8. HYDRATION: \"Any signs of dehydration?\" (e.g., dry mouth [not just dry lips], too weak to stand, dizziness, new weight loss) \"When did you last urinate?\"      denies  9. EXPOSURE: \"Have you traveled to a foreign country recently?\" \"Have you been exposed to anyone with diarrhea?\" \"Could you have eaten any food that was spoiled?\"      denies  10. OTHER SYMPTOMS: \"Do you have any other symptoms?\" (e.g., fever, blood in stool)        denies  11. PREGNANCY: \"Is there any chance you are pregnant?\" \"When was your last menstrual period?\"        n/a    Protocols used: DIARRHEA-ADULT-AH      "

## 2018-11-16 NOTE — TELEPHONE ENCOUNTER
Patient informed Dr. Sen not in office until Tuesday and would like a work in for diarrhea issues.

## 2018-11-18 NOTE — TELEPHONE ENCOUNTER
Keep a food diary.  See if he can figure out which foods are giving him problems.    Try Imodium or Pepto-Bismol or other OTC diarrhea medications.  Sometimes these are needed regularly.    If he is not up-to-date on his colonoscopy, probably should get one scheduled.    Jovanni Sen MD

## 2018-11-19 NOTE — TELEPHONE ENCOUNTER
"Called patient and informed him of Dr. Sen response and verbalized understanding.  Patient states he will keep a diary and states last colonoscopy was about 2-3 years ago.    In reviewing chart patients last colonoscopy was noted on 2/2015 and per surgical note  \"FOLLOW UP:  RECOMMEND high fiber diet. No routine screening due to age greater than 80 at next screening interval.\"    Patient will keep diary and follow up with Dr. Sen as needed.    Lisa Priest RN on 11/19/2018 at 8:01 AM    "

## 2018-11-27 ENCOUNTER — TELEPHONE (OUTPATIENT)
Dept: INTERNAL MEDICINE | Facility: OTHER | Age: 78
End: 2018-11-27

## 2018-11-27 ENCOUNTER — OFFICE VISIT (OUTPATIENT)
Dept: INTERNAL MEDICINE | Facility: OTHER | Age: 78
End: 2018-11-27
Attending: INTERNAL MEDICINE
Payer: MEDICARE

## 2018-11-27 VITALS
SYSTOLIC BLOOD PRESSURE: 138 MMHG | HEART RATE: 88 BPM | TEMPERATURE: 97.4 F | BODY MASS INDEX: 27.33 KG/M2 | WEIGHT: 177.13 LBS | DIASTOLIC BLOOD PRESSURE: 76 MMHG

## 2018-11-27 DIAGNOSIS — R73.09 ELEVATED GLUCOSE LEVEL: ICD-10-CM

## 2018-11-27 DIAGNOSIS — E78.2 MIXED HYPERLIPIDEMIA: ICD-10-CM

## 2018-11-27 DIAGNOSIS — J44.1 COPD EXACERBATION (H): Primary | ICD-10-CM

## 2018-11-27 DIAGNOSIS — I10 ESSENTIAL HYPERTENSION, BENIGN: ICD-10-CM

## 2018-11-27 DIAGNOSIS — I25.2 HISTORY OF ST ELEVATION MYOCARDIAL INFARCTION (STEMI): ICD-10-CM

## 2018-11-27 PROCEDURE — 99214 OFFICE O/P EST MOD 30 MIN: CPT | Performed by: INTERNAL MEDICINE

## 2018-11-27 PROCEDURE — G0463 HOSPITAL OUTPT CLINIC VISIT: HCPCS

## 2018-11-27 RX ORDER — AZITHROMYCIN 250 MG/1
TABLET, FILM COATED ORAL
Qty: 6 TABLET | Refills: 0 | Status: SHIPPED | OUTPATIENT
Start: 2018-11-27 | End: 2019-03-21

## 2018-11-27 RX ORDER — PRIMIDONE 50 MG/1
100 TABLET ORAL AT BEDTIME
Qty: 180 TABLET | Refills: 3 | Status: SHIPPED | OUTPATIENT
Start: 2018-11-27 | End: 2019-10-30

## 2018-11-27 RX ORDER — METHYLPREDNISOLONE 4 MG
TABLET, DOSE PACK ORAL
Qty: 21 TABLET | Refills: 0 | Status: SHIPPED | OUTPATIENT
Start: 2018-11-27 | End: 2019-03-21

## 2018-11-27 RX ORDER — ALBUTEROL SULFATE 90 UG/1
2 AEROSOL, METERED RESPIRATORY (INHALATION) EVERY 6 HOURS PRN
Qty: 3 INHALER | Refills: 3 | Status: SHIPPED | OUTPATIENT
Start: 2018-11-27 | End: 2019-10-30

## 2018-11-27 RX ORDER — NITROGLYCERIN 0.4 MG/1
0.4 TABLET SUBLINGUAL EVERY 5 MIN PRN
Qty: 25 TABLET | Refills: 11 | Status: SHIPPED | OUTPATIENT
Start: 2018-11-27 | End: 2019-10-30

## 2018-11-27 RX ORDER — PROPRANOLOL HYDROCHLORIDE 20 MG/1
20 TABLET ORAL 2 TIMES DAILY
Qty: 180 TABLET | Refills: 3 | Status: SHIPPED | OUTPATIENT
Start: 2018-11-27 | End: 2019-03-21

## 2018-11-27 RX ORDER — ATORVASTATIN CALCIUM 40 MG/1
40 TABLET, FILM COATED ORAL
Qty: 90 TABLET | Refills: 3 | Status: SHIPPED | OUTPATIENT
Start: 2018-11-27 | End: 2019-10-30

## 2018-11-27 ASSESSMENT — ENCOUNTER SYMPTOMS
NAUSEA: 0
MYALGIAS: 0
BRUISES/BLEEDS EASILY: 0
ABDOMINAL PAIN: 0
FEVER: 0
VOMITING: 0
EYE PAIN: 0
LIGHT-HEADEDNESS: 0
CHILLS: 0
DIZZINESS: 0
HEMATURIA: 0
ARTHRALGIAS: 0
SHORTNESS OF BREATH: 1
PALPITATIONS: 0
COUGH: 1
DIARRHEA: 0
AGITATION: 0
DYSURIA: 0
CONFUSION: 0
WHEEZING: 1
FATIGUE: 1

## 2018-11-27 ASSESSMENT — ANXIETY QUESTIONNAIRES
2. NOT BEING ABLE TO STOP OR CONTROL WORRYING: NOT AT ALL
6. BECOMING EASILY ANNOYED OR IRRITABLE: NOT AT ALL
3. WORRYING TOO MUCH ABOUT DIFFERENT THINGS: NOT AT ALL
7. FEELING AFRAID AS IF SOMETHING AWFUL MIGHT HAPPEN: NOT AT ALL
5. BEING SO RESTLESS THAT IT IS HARD TO SIT STILL: NOT AT ALL
GAD7 TOTAL SCORE: 0
1. FEELING NERVOUS, ANXIOUS, OR ON EDGE: NOT AT ALL
IF YOU CHECKED OFF ANY PROBLEMS ON THIS QUESTIONNAIRE, HOW DIFFICULT HAVE THESE PROBLEMS MADE IT FOR YOU TO DO YOUR WORK, TAKE CARE OF THINGS AT HOME, OR GET ALONG WITH OTHER PEOPLE: NOT DIFFICULT AT ALL

## 2018-11-27 ASSESSMENT — PATIENT HEALTH QUESTIONNAIRE - PHQ9
5. POOR APPETITE OR OVEREATING: NOT AT ALL
SUM OF ALL RESPONSES TO PHQ QUESTIONS 1-9: 0

## 2018-11-27 ASSESSMENT — PAIN SCALES - GENERAL: PAINLEVEL: NO PAIN (0)

## 2018-11-27 NOTE — TELEPHONE ENCOUNTER
Patient has been ill over the past couple of weeks.  Productive cough with green/yellow sputum, shortness of breath with activity, fatigue, head congestion and thick, green/yellow nasal drainage over the past several days.  Patient/spouse denies any fevers at this time.      Kate Gar LPN 11/27/2018 9:35 AM

## 2018-11-27 NOTE — TELEPHONE ENCOUNTER
Pharmacy wanted to clarify dispense quantity of Medrol, Jovanni Sen MD verbally confirmed patient is to take 2 tablets daily for 10 days and then 1 tablet on day 11.  Pharmacist Lisa notified of Jovanni Sen MD verbal message.        Kate Gar LPN 11/27/2018 4:54 PM

## 2018-11-27 NOTE — PROGRESS NOTES
"Nursing Notes:   Kate Gar LPN  11/27/2018  3:45 PM  Signed  Patient has been ill over the past couple of weeks.  Productive cough with green/yellow sputum, shortness of breath with activity, fatigue, head congestion and thick, green/yellow nasal drainage over the past several days.  Patient/spouse denies any fevers at this time.       Previous A1C is at goal of <8  No results found for: A1C  Urine microalbumin:creatine: n/a  Foot exam over a year ago-declines at this time  Eye exam 8-17-18    Tobacco User no  Patient is on a daily aspirin  Patient is on a Statin.  Blood pressure today of:     BP Readings from Last 1 Encounters:   08/17/18 138/78      is at the goal of <139/89 for diabetics.    Chief Complaint   Patient presents with     Cough       Initial There were no vitals taken for this visit. Estimated body mass index is 27.37 kg/(m^2) as calculated from the following:    Height as of 10/26/18: 5' 7.5\" (1.715 m).    Weight as of 10/26/18: 177 lb 6.4 oz (80.5 kg).  Medication Reconciliation: complete    Kate Gar LPN        Nursing note reviewed with patient.  Accurracy and completeness verified.   Mr. Huynh is a 78 year old male who:  Patient presents with:  Cough      ICD-10-CM    1. COPD exacerbation (H) J44.1 albuterol (PROAIR HFA/PROVENTIL HFA/VENTOLIN HFA) 108 (90 Base) MCG/ACT inhaler     azithromycin (ZITHROMAX) 250 MG tablet     methylPREDNISolone (MEDROL DOSEPAK) 4 MG tablet   2. Essential hypertension, benign I10 primidone (MYSOLINE) 50 MG tablet     propranolol (INDERAL) 20 MG tablet     nitroGLYcerin (NITROSTAT) 0.4 MG sublingual tablet   3. Mixed hyperlipidemia E78.2 atorvastatin (LIPITOR) 40 MG tablet   4. Elevated glucose level R73.09 metFORMIN (GLUCOPHAGE) 1000 MG tablet   5. History of ST elevation myocardial infarction (STEMI) I25.2 primidone (MYSOLINE) 50 MG tablet     propranolol (INDERAL) 20 MG tablet     nitroGLYcerin (NITROSTAT) 0.4 MG sublingual tablet     HPI  Patient " comes in with breathing issues.  States that over the last couple of weeks he started having some increasing phlegm and cough.  The last few days symptoms have worsened.  Has been using his inhaler more often which does help but he started getting some purulent phlegm, increased shortness of breath and cough.  Denies any fevers or chills.    We discussed evaluations of labs/chest x-ray.  At this time he is not that concerned about pneumonia.  He would like to get medication to help with his COPD exacerbation.  Start prednisone, Zithromax.  Needs refills of albuterol inhaler.  Reports that prior to the last few days/weeks.  Was doing very well from a breathing standpoint.  Hardly needed to use his albuterol at all.    Hypertension, currently well controlled.  Using primidone for his tremor.  Propanolol for his tremor.  Has not used nitroglycerin has history of coronary artery disease.  Needs refills.    Mixed hyperlipidemia, doing well with Lipitor.  Needs refills.  No side effects reported.  Elevated glucose, history of type 2 diabetes.  Continues on metformin for diabetes prevention.    History of myocardial infarction, continue propanolol.  Needs nitroglycerin refilled.    Functional Capacity: > or about  4 METS.   No orthopnea/paroxysmal nocturnal dyspnea  Review of Systems   Constitutional: Positive for fatigue. Negative for chills and fever.   HENT: Negative for congestion and hearing loss.    Eyes: Negative for pain and visual disturbance.   Respiratory: Positive for cough (Yellow-green phlegm), shortness of breath and wheezing.    Cardiovascular: Negative for chest pain and palpitations.   Gastrointestinal: Negative for abdominal pain, diarrhea, nausea and vomiting.   Endocrine: Negative for cold intolerance and heat intolerance.   Genitourinary: Negative for dysuria and hematuria.   Musculoskeletal: Negative for arthralgias and myalgias.   Skin: Negative for pallor.   Allergic/Immunologic: Negative for  immunocompromised state.   Neurological: Negative for dizziness and light-headedness.   Hematological: Does not bruise/bleed easily.   Psychiatric/Behavioral: Negative for agitation and confusion.      LAVINIA:   LAVINIA-7 SCORE 4/4/2018 8/17/2018 11/27/2018   Total Score 0 0 0     PHQ9:  PHQ-9 SCORE 4/4/2018 8/17/2018 11/27/2018   PHQ-9 Total Score 0 0 0       I have personally reviewed the past medical history, past surgical history, medications, allergies, family and social history as listed below, on 11/27/2018.    No Known Allergies    Current Outpatient Prescriptions   Medication Sig Dispense Refill     albuterol (PROAIR HFA/PROVENTIL HFA/VENTOLIN HFA) 108 (90 Base) MCG/ACT inhaler Inhale 2 puffs into the lungs every 6 hours as needed for shortness of breath / dyspnea or wheezing 3 Inhaler 3     atorvastatin (LIPITOR) 40 MG tablet Take 1 tablet (40 mg) by mouth daily (with dinner) 90 tablet 3     azithromycin (ZITHROMAX) 250 MG tablet Two tablets first day, then one tablet daily for four days. 6 tablet 0     metFORMIN (GLUCOPHAGE) 1000 MG tablet Take 1 tablet (1,000 mg) by mouth 2 times daily (with meals) For diabetes prevention 180 tablet 3     methylPREDNISolone (MEDROL DOSEPAK) 4 MG tablet Take 2 tablets daily until gone 21 tablet 0     nitroGLYcerin (NITROSTAT) 0.4 MG sublingual tablet Place 1 tablet (0.4 mg) under the tongue every 5 minutes as needed for chest pain 25 tablet 11     primidone (MYSOLINE) 50 MG tablet Take 2 tablets (100 mg) by mouth At Bedtime 180 tablet 3     propranolol (INDERAL) 20 MG tablet Take 1 tablet (20 mg) by mouth 2 times daily 180 tablet 3     aspirin EC 81 MG EC tablet Take 81 mg by mouth daily with food       blood glucose monitoring (NO BRAND SPECIFIED) test strip Dispense item covered by pt ins. E11.9 NIDDM type II - Test 1 time/day       Blood Glucose Monitoring Suppl (FIFTY50 GLUCOSE METER 2.0) W/DEVICE KIT Dispense Accu-Chek Agnes  Meter. Dispense item covered by pt ins. E11.9  NIDDM type II - Test 1 time/day       calcium carbonate-vitamin D (CALCIUM 600+D) 600-200 MG-UNIT TABS Take 1 tablet by mouth 2 times daily (with meals)       DOCOSAHEXAENOIC ACID PO Take 1 capsule by mouth 2 times daily       Multiple Vitamin (MULTI-VITAMINS) TABS Take 1 tablet by mouth daily       Respiratory Therapy Supplies (VORTEX HOLDING CHAMBER/MASK) JACOBY Use with albuterol HFA 1 each 1     tamsulosin (FLOMAX) 0.4 MG capsule Take 1 capsule (0.4 mg) by mouth every evening 90 capsule 3        Patient Active Problem List    Diagnosis Date Noted     COPD exacerbation (H) 05/08/2018     Priority: Medium     Tobacco use disorder 05/08/2018     Priority: Medium     Actinic keratoses - x4 on head, x1 on left upper arm 03/08/2018     Priority: Medium     Benign essential tremor 01/24/2018     Priority: Medium     Overview:        Psychosexual dysfunction with inhibited sexual excitement 01/24/2018     Priority: Medium     Essential hypertension, benign 01/24/2018     Priority: Medium     History of type 2 diabetes mellitus 01/24/2018     Priority: Medium     Mixed hyperlipidemia 01/24/2018     Priority: Medium     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       History of throat cancer 12/13/2017     Priority: Medium     Prediabetes 12/13/2017     Priority: Medium     Partial tear of right rotator cuff 06/21/2017     Priority: Medium     Aftercare following surgery of the musculoskeletal system 06/21/2017     Priority: Medium     History of urinary retention 12/23/2016     Priority: Medium     Overview:   Postoperative       Atherosclerosis of coronary artery of native heart without angina pectoris, unspecified vessel or lesion type 10/03/2016     Priority: Medium      Dyslipidemia 06/02/2015     Priority: Medium     Personal history of malignant neoplasm of bladder 11/19/2014     Priority: Medium     History of ST elevation myocardial infarction (STEMI) 07/11/2013     Priority: Medium     ST elevation myocardial infarction (STEMI) of inferior wall (H) 07/05/2013     Priority: Medium     HCD (health care directive) 12/08/2011     Priority: Medium     Overview:   Patient has identified Health Care Agent(s):   wife Ruth [phone] is the primary medical decision maker.    Patient has Advance Care Plan Documents (Health Care Directive, POLST): Yes, has a living will    Patient has identified Specific Treatment Preferences: Yes   Specific Treatment Preferences: a.) Code Status:  CPR/Attempt Resuscitation (per chart)       Other malignant neoplasm without specification of site 11/21/2011     Priority: Medium     Past Medical History:   Diagnosis Date     Chronic myeloproliferative disease (H)     questionable     Essential (primary) hypertension     No Comments Provided     Hyperlipidemia     No Comments Provided     Male erectile dysfunction     No Comments Provided     Malignant neoplasm of supraglottis (H)     2011,Radiation     Other chronic pancreatitis (H)     No Comments Provided     Personal history of irradiation     1/30/2012-3/2/2012     Personal history of malignant neoplasm of bladder     4/22/2015     Personal history of nicotine dependence     2ppd - quit 2000     Pneumonia     2001     ST elevation (STEMI) myocardial infarction (H)     7/5/2013,Integrity BMS to RCA done at [facility]     No Comments Provided     Past Surgical History:   Procedure Laterality Date     APPENDECTOMY OPEN      1970     ARTHROSCOPY SHOULDER      11/2005,right shoulder     ARTHROSCOPY SHOULDER      1/2006,left shoulder     ARTHROSCOPY SHOULDER      2011,Left shoulder scope SAD, ac.  Open subscap, biceps     CHOLECYSTECTOMY      1994     COLONOSCOPY      02/2005     COLONOSCOPY       2/19/2015,no repeat due at this time     CYSTOSCOPY      Multiple times,Dr. Lyndon FRANK     HEART CATH, ANGIOPLASTY      07/05/2013,Bare-metal stent to dominant RCA     OTHER SURGICAL HISTORY      2004,086641,OTHER,for chronic pancreatitis     OTHER SURGICAL HISTORY      6/22/2007,[id number],SHOULDER REPLACEMENT,Right,with biceps tenodesis by Dr. Mukesh Ayoub     OTHER SURGICAL HISTORY      1995/2002,,ERCP,xseveral, one with papillotomy     OTHER SURGICAL HISTORY      1/30/2012-3/2/2012,[id number],RADIATION TREATMENT     OTHER SURGICAL HISTORY      12/7/2011,45496,NECK/THORAX PROCEDURE,L modified radical neck surgery     OTHER SURGICAL HISTORY      11/10/14,549744,OTHER,Dr. Lyndon FRANK     OTHER SURGICAL HISTORY      2012,96529,REVISION EYELID,Dr. Farias     OTHER SURGICAL HISTORY      10/22/15,767231,OTHER     TONSILLECTOMY, ADENOIDECTOMY, COMBINED      1947     VASECTOMY      1980     Social History     Social History     Marital status:      Spouse name: N/A     Number of children: N/A     Years of education: N/A     Social History Main Topics     Smoking status: Former Smoker     Packs/day: 3.00     Years: 43.00     Types: Cigarettes     Quit date: 1/1/2000     Smokeless tobacco: Never Used     Alcohol use No     Drug use: No     Sexual activity: Yes     Partners: Female     Other Topics Concern     None     Social History Narrative    Ruth Spouse   Patient is  with grown children.  [employer] at [employer] for 17 years     Family History   Problem Relation Age of Onset     Heart Disease Father 83     Heart Disease,CHF     Heart Disease Mother 80     Heart Disease,MI     Family History Negative Sister      Good Health     Heart Disease Sister      Heart Disease,pacemaker     Diabetes Son      Diabetes,Type 1     Breast Cancer Daughter      Cancer-breast     Prostate Cancer No family hx of      Cancer-prostate       EXAM:   Vitals:    11/27/18 1532   BP: 138/76   BP Location: Right arm   Patient  "Position: Sitting   Cuff Size: Adult Regular   Pulse: 88   Temp: 97.4  F (36.3  C)   TempSrc: Tympanic   Weight: 177 lb 2 oz (80.3 kg)       Current Pain Score: No Pain (0)     BP Readings from Last 3 Encounters:   11/27/18 138/76   08/17/18 138/78   05/30/18 130/70      Wt Readings from Last 3 Encounters:   11/27/18 177 lb 2 oz (80.3 kg)   10/26/18 177 lb 6.4 oz (80.5 kg)   08/17/18 181 lb 9 oz (82.4 kg)      Estimated body mass index is 27.33 kg/(m^2) as calculated from the following:    Height as of 10/26/18: 5' 7.5\" (1.715 m).    Weight as of this encounter: 177 lb 2 oz (80.3 kg).     Physical Exam   Constitutional: He appears well-developed and well-nourished.   HENT:   Head: Normocephalic and atraumatic.   Eyes: No scleral icterus.   Cardiovascular: Normal rate and regular rhythm.    Pulmonary/Chest: Effort normal. He has wheezes. He has rales.   Abdominal: Soft.   Musculoskeletal: Normal range of motion. He exhibits no tenderness.   Neurological: He is alert.   Skin: Skin is warm. No rash noted.   Psychiatric: He has a normal mood and affect.      Procedures   INVESTIGATIONS:  Results for orders placed or performed during the hospital encounter of 08/03/18   XR Esophagram    Narrative    PROCEDURE: XR ESOPHAGRAM    HISTORY:  Cervical dysphagia. Personal history of epiglottic tumor  resection and radiation therapy.    TECHNIQUE: Sequential swallows of thin barium were observed.    COMPARISON:  Chest radiographs 5/14/2018    FINDINGS:      There is prompt aspiration of thin liquids on initial swallows. Some  barium coats the roof of the vocal cords. No cough reflex was  observed. Brief imaging over the lower esophagus demonstrates normal  distensibility and smooth mucosa. Some to and fro dysmotility is seen.  No further imaging was performed due to the risk of further  aspiration.      Impression    IMPRESSION:      Aspiration of thin liquids. Recommend speech consultation.    Mild to moderate distal esophageal " dysmotility.      IRISH STERLING MD       ASSESSMENT AND PLAN:  Problem List Items Addressed This Visit        Respiratory    COPD exacerbation (H) - Primary    Relevant Medications    albuterol (PROAIR HFA/PROVENTIL HFA/VENTOLIN HFA) 108 (90 Base) MCG/ACT inhaler    azithromycin (ZITHROMAX) 250 MG tablet    methylPREDNISolone (MEDROL DOSEPAK) 4 MG tablet       Endocrine    Mixed hyperlipidemia    Relevant Medications    atorvastatin (LIPITOR) 40 MG tablet       Circulatory    Essential hypertension, benign    Relevant Medications    primidone (MYSOLINE) 50 MG tablet    propranolol (INDERAL) 20 MG tablet    nitroGLYcerin (NITROSTAT) 0.4 MG sublingual tablet       Other    History of ST elevation myocardial infarction (STEMI)    Relevant Medications    primidone (MYSOLINE) 50 MG tablet    propranolol (INDERAL) 20 MG tablet    nitroGLYcerin (NITROSTAT) 0.4 MG sublingual tablet      Other Visit Diagnoses     Elevated glucose level        Relevant Medications    metFORMIN (GLUCOPHAGE) 1000 MG tablet        reviewed diet, exercise and weight control  -- Expected clinical course discussed    -- Medications and their side effects discussed    Patient Instructions   1. COPD exacerbation (H)  - albuterol (PROAIR HFA/PROVENTIL HFA/VENTOLIN HFA) 108 (90 Base) MCG/ACT inhaler; Inhale 2 puffs into the lungs every 6 hours as needed for shortness of breath / dyspnea or wheezing  Dispense: 3 Inhaler; Refill: 3  - azithromycin (ZITHROMAX) 250 MG tablet; Two tablets first day, then one tablet daily for four days.  Dispense: 6 tablet; Refill: 0  - methylPREDNISolone (MEDROL DOSEPAK) 4 MG tablet; Take 2 tablets daily until gone  Dispense: 21 tablet; Refill: 0    2. Essential hypertension, benign  - primidone (MYSOLINE) 50 MG tablet; Take 2 tablets (100 mg) by mouth At Bedtime  Dispense: 180 tablet; Refill: 3  - propranolol (INDERAL) 20 MG tablet; Take 1 tablet (20 mg) by mouth 2 times daily  Dispense: 180 tablet; Refill: 3  -  nitroGLYcerin (NITROSTAT) 0.4 MG sublingual tablet; Place 1 tablet (0.4 mg) under the tongue every 5 minutes as needed for chest pain  Dispense: 25 tablet; Refill: 11    3. Mixed hyperlipidemia  - atorvastatin (LIPITOR) 40 MG tablet; Take 1 tablet (40 mg) by mouth daily (with dinner)  Dispense: 90 tablet; Refill: 3    4. Elevated glucose level  - metFORMIN (GLUCOPHAGE) 1000 MG tablet; Take 1 tablet (1,000 mg) by mouth 2 times daily (with meals) For diabetes prevention  Dispense: 180 tablet; Refill: 3    5. History of ST elevation myocardial infarction (STEMI)  - primidone (MYSOLINE) 50 MG tablet; Take 2 tablets (100 mg) by mouth At Bedtime  Dispense: 180 tablet; Refill: 3  - propranolol (INDERAL) 20 MG tablet; Take 1 tablet (20 mg) by mouth 2 times daily  Dispense: 180 tablet; Refill: 3  - nitroGLYcerin (NITROSTAT) 0.4 MG sublingual tablet; Place 1 tablet (0.4 mg) under the tongue every 5 minutes as needed for chest pain  Dispense: 25 tablet; Refill: 11    Return as needed for follow-up with Dr. Sen.    Clinic : [phone]  Appointment line: [phone]      Jovanni Sen MD  Internal Medicine  [facility]     Portions of this note were dictated using speech recognition software. The note has been proofread but errors in the text may have been overlooked. Please contact me if there are any concerns regarding the accuracy of the dictation.

## 2018-11-27 NOTE — NURSING NOTE
"Patient has been ill over the past couple of weeks.  Productive cough with green/yellow sputum, shortness of breath with activity, fatigue, head congestion and thick, green/yellow nasal drainage over the past several days.  Patient/spouse denies any fevers at this time.       Previous A1C is at goal of <8  No results found for: A1C  Urine microalbumin:creatine: n/a  Foot exam over a year ago-declines at this time  Eye exam 8-17-18    Tobacco User no  Patient is on a daily aspirin  Patient is on a Statin.  Blood pressure today of:     BP Readings from Last 1 Encounters:   08/17/18 138/78      is at the goal of <139/89 for diabetics.    Chief Complaint   Patient presents with     Cough       Initial There were no vitals taken for this visit. Estimated body mass index is 27.37 kg/(m^2) as calculated from the following:    Height as of 10/26/18: 5' 7.5\" (1.715 m).    Weight as of 10/26/18: 177 lb 6.4 oz (80.5 kg).  Medication Reconciliation: complete    Kate Gar LPN        "

## 2018-11-27 NOTE — PATIENT INSTRUCTIONS
1. COPD exacerbation (H)  - albuterol (PROAIR HFA/PROVENTIL HFA/VENTOLIN HFA) 108 (90 Base) MCG/ACT inhaler; Inhale 2 puffs into the lungs every 6 hours as needed for shortness of breath / dyspnea or wheezing  Dispense: 3 Inhaler; Refill: 3  - azithromycin (ZITHROMAX) 250 MG tablet; Two tablets first day, then one tablet daily for four days.  Dispense: 6 tablet; Refill: 0  - methylPREDNISolone (MEDROL DOSEPAK) 4 MG tablet; Take 2 tablets daily until gone  Dispense: 21 tablet; Refill: 0    2. Essential hypertension, benign  - primidone (MYSOLINE) 50 MG tablet; Take 2 tablets (100 mg) by mouth At Bedtime  Dispense: 180 tablet; Refill: 3  - propranolol (INDERAL) 20 MG tablet; Take 1 tablet (20 mg) by mouth 2 times daily  Dispense: 180 tablet; Refill: 3  - nitroGLYcerin (NITROSTAT) 0.4 MG sublingual tablet; Place 1 tablet (0.4 mg) under the tongue every 5 minutes as needed for chest pain  Dispense: 25 tablet; Refill: 11    3. Mixed hyperlipidemia  - atorvastatin (LIPITOR) 40 MG tablet; Take 1 tablet (40 mg) by mouth daily (with dinner)  Dispense: 90 tablet; Refill: 3    4. Elevated glucose level  - metFORMIN (GLUCOPHAGE) 1000 MG tablet; Take 1 tablet (1,000 mg) by mouth 2 times daily (with meals) For diabetes prevention  Dispense: 180 tablet; Refill: 3    5. History of ST elevation myocardial infarction (STEMI)  - primidone (MYSOLINE) 50 MG tablet; Take 2 tablets (100 mg) by mouth At Bedtime  Dispense: 180 tablet; Refill: 3  - propranolol (INDERAL) 20 MG tablet; Take 1 tablet (20 mg) by mouth 2 times daily  Dispense: 180 tablet; Refill: 3  - nitroGLYcerin (NITROSTAT) 0.4 MG sublingual tablet; Place 1 tablet (0.4 mg) under the tongue every 5 minutes as needed for chest pain  Dispense: 25 tablet; Refill: 11    Return as needed for follow-up with Dr. Sen.    Clinic : [phone]  Appointment line: [phone]

## 2018-11-27 NOTE — MR AVS SNAPSHOT
After Visit Summary   11/27/2018    Rajesh Huynh    MRN: [id number]           Patient Information     Date Of Birth          1940        Visit Information        Provider Department      11/27/2018 3:20 PM Jovanni Sen MD [facility]        Today's Diagnoses     COPD exacerbation (H)    -  1    Essential hypertension, benign        Mixed hyperlipidemia        Elevated glucose level        History of ST elevation myocardial infarction (STEMI)          Care Instructions    1. COPD exacerbation (H)  - albuterol (PROAIR HFA/PROVENTIL HFA/VENTOLIN HFA) 108 (90 Base) MCG/ACT inhaler; Inhale 2 puffs into the lungs every 6 hours as needed for shortness of breath / dyspnea or wheezing  Dispense: 3 Inhaler; Refill: 3  - azithromycin (ZITHROMAX) 250 MG tablet; Two tablets first day, then one tablet daily for four days.  Dispense: 6 tablet; Refill: 0  - methylPREDNISolone (MEDROL DOSEPAK) 4 MG tablet; Take 2 tablets daily until gone  Dispense: 21 tablet; Refill: 0    2. Essential hypertension, benign  - primidone (MYSOLINE) 50 MG tablet; Take 2 tablets (100 mg) by mouth At Bedtime  Dispense: 180 tablet; Refill: 3  - propranolol (INDERAL) 20 MG tablet; Take 1 tablet (20 mg) by mouth 2 times daily  Dispense: 180 tablet; Refill: 3  - nitroGLYcerin (NITROSTAT) 0.4 MG sublingual tablet; Place 1 tablet (0.4 mg) under the tongue every 5 minutes as needed for chest pain  Dispense: 25 tablet; Refill: 11    3. Mixed hyperlipidemia  - atorvastatin (LIPITOR) 40 MG tablet; Take 1 tablet (40 mg) by mouth daily (with dinner)  Dispense: 90 tablet; Refill: 3    4. Elevated glucose level  - metFORMIN (GLUCOPHAGE) 1000 MG tablet; Take 1 tablet (1,000 mg) by mouth 2 times daily (with meals) For diabetes prevention  Dispense: 180 tablet; Refill: 3    5. History of ST elevation myocardial infarction (STEMI)  - primidone (MYSOLINE) 50 MG tablet; Take 2 tablets (100 mg) by mouth At Bedtime  Dispense: 180  "tablet; Refill: 3  - propranolol (INDERAL) 20 MG tablet; Take 1 tablet (20 mg) by mouth 2 times daily  Dispense: 180 tablet; Refill: 3  - nitroGLYcerin (NITROSTAT) 0.4 MG sublingual tablet; Place 1 tablet (0.4 mg) under the tongue every 5 minutes as needed for chest pain  Dispense: 25 tablet; Refill: 11    Return as needed for follow-up with Dr. Sen.    Clinic : [phone]  Appointment line: [phone]            Follow-ups after your visit        Who to contact     If you have questions or need follow up information about today's clinic visit or your schedule please contact [facility] directly at [phone].  Normal or non-critical lab and imaging results will be communicated to you by [employer], letter or phone within 4 business days after the clinic has received the results. If you do not hear from us within 7 days, please contact the clinic through [employer] or phone. If you have a critical or abnormal lab result, we will notify you by phone as soon as possible.  Submit refill requests through [employer] or call your pharmacy and they will forward the refill request to us. Please allow 3 business days for your refill to be completed.          Additional Information About Your Visit        [employer] Information     [employer] lets you send messages to your doctor, view your test results, renew your prescriptions, schedule appointments and more. To sign up, go to [email or web address] . Click on \"Log in\" on the left side of the screen, which will take you to the Welcome page. Then click on \"Sign up Now\" on the right side of the page.     You will be asked to enter the access code listed below, as well as some personal information. Please follow the directions to create your username and password.     Your access code is: [email or web address]  Expires: 2019  3:56 PM     Your access code will  in 90 days. If you need help or a new code, please call your [place] clinic or " [phone].        Care EveryWhere ID     This is your Care EveryWhere ID. This could be used by other organizations to access your [place] medical records  [id number]        Your Vitals Were     Pulse Temperature BMI (Body Mass Index)             88 97.4  F (36.3  C) (Tympanic) 27.33 kg/m2          Blood Pressure from Last 3 Encounters:   11/27/18 138/76   08/17/18 138/78   05/30/18 130/70    Weight from Last 3 Encounters:   11/27/18 177 lb 2 oz (80.3 kg)   10/26/18 177 lb 6.4 oz (80.5 kg)   08/17/18 181 lb 9 oz (82.4 kg)              Today, you had the following     No orders found for display         Today's Medication Changes          These changes are accurate as of 11/27/18  3:56 PM.  If you have any questions, ask your nurse or doctor.               Start taking these medicines.        Dose/Directions    azithromycin 250 MG tablet   Commonly known as:  ZITHROMAX   Used for:  COPD exacerbation (H)   Started by:  Jovanni Sen MD        Two tablets first day, then one tablet daily for four days.   Quantity:  6 tablet   Refills:  0       methylPREDNISolone 4 MG tablet   Commonly known as:  MEDROL DOSEPAK   Used for:  COPD exacerbation (H)   Started by:  Jovanni Sen MD        Take 2 tablets daily until gone   Quantity:  21 tablet   Refills:  0            Where to get your medicines      These medications were sent to [facility] [address] [address]     Phone:  [phone]     albuterol 108 (90 Base) MCG/ACT inhaler    atorvastatin 40 MG tablet    azithromycin 250 MG tablet    metFORMIN 1000 MG tablet    methylPREDNISolone 4 MG tablet    nitroGLYcerin 0.4 MG sublingual tablet    primidone 50 MG tablet    propranolol 20 MG tablet                Primary Care Provider Office Phone # Fax #    Jovanni Sen [phone]       [address] [facility] [address]        Equal Access to Services     [facility]  GAAR : Hadii aad ku ariel Singh, wagermanda luqadaha, qaybta kaacacia harp, wei ced hayatif merinoakashlata jarrett . So [facility] [phone].    ATENCIÓN: Si habla español, tiene a matthews disposición servicios gratuitos de asistencia lingüística. Llame al [phone].    We comply with applicable federal civil rights laws and [place] laws. We do not discriminate on the basis of race, color, national origin, age, disability, sex, sexual orientation, or gender identity.            Thank you!     Thank you for choosing [facility]  for your care. Our goal is always to provide you with excellent care. Hearing back from our patients is one way we can continue to improve our services. Please take a few minutes to complete the written survey that you may receive in the mail after your visit with us. Thank you!             Your Updated Medication List - Protect others around you: Learn how to safely use, store and throw away your medicines at www.disposemymeds.org.          This list is accurate as of 11/27/18  3:56 PM.  Always use your most recent med list.                   Brand Name Dispense Instructions for use Diagnosis    albuterol 108 (90 Base) MCG/ACT inhaler    PROAIR HFA/PROVENTIL HFA/VENTOLIN HFA    3 Inhaler    Inhale 2 puffs into the lungs every 6 hours as needed for shortness of breath / dyspnea or wheezing    COPD exacerbation (H)       aspirin 81 MG EC tablet      Take 81 mg by mouth daily with food        atorvastatin 40 MG tablet    LIPITOR    90 tablet    Take 1 tablet (40 mg) by mouth daily (with dinner)    Mixed hyperlipidemia       azithromycin 250 MG tablet    ZITHROMAX    6 tablet    Two tablets first day, then one tablet daily for four days.    COPD exacerbation (H)       blood glucose monitoring test strip    NO BRAND SPECIFIED     Dispense item covered by pt ins. E11.9 NIDDM type II - Test 1 time/day        CALCIUM 600+D 600-200 MG-UNIT Tabs   Generic drug:  calcium  carbonate-vitamin D      Take 1 tablet by mouth 2 times daily (with meals)        DOCOSAHEXAENOIC ACID PO      Take 1 capsule by mouth 2 times daily        FIFTY50 GLUCOSE METER 2.0 w/Device Kit      Dispense Accu-Chek Agnes  Meter. Dispense item covered by pt ins. E11.9 NIDDM type II - Test 1 time/day        metFORMIN 1000 MG tablet    GLUCOPHAGE    180 tablet    Take 1 tablet (1,000 mg) by mouth 2 times daily (with meals) For diabetes prevention    Elevated glucose level       methylPREDNISolone 4 MG tablet    MEDROL DOSEPAK    21 tablet    Take 2 tablets daily until gone    COPD exacerbation (H)       MULTI-VITAMINS Tabs      Take 1 tablet by mouth daily        nitroGLYcerin 0.4 MG sublingual tablet    NITROSTAT    25 tablet    Place 1 tablet (0.4 mg) under the tongue every 5 minutes as needed for chest pain    Essential hypertension, benign, History of ST elevation myocardial infarction (STEMI)       primidone 50 MG tablet    MYSOLINE    180 tablet    Take 2 tablets (100 mg) by mouth At Bedtime    Essential hypertension, benign, History of ST elevation myocardial infarction (STEMI)       propranolol 20 MG tablet    INDERAL    180 tablet    Take 1 tablet (20 mg) by mouth 2 times daily    Essential hypertension, benign, History of ST elevation myocardial infarction (STEMI)       tamsulosin 0.4 MG capsule    FLOMAX    90 capsule    Take 1 capsule (0.4 mg) by mouth every evening    Weak urinary stream       VORTEX HOLDING CHAMBER/MASK Smiley     1 each    Use with albuterol HFA    COPD exacerbation (H), Tobacco use disorder

## 2018-11-27 NOTE — TELEPHONE ENCOUNTER
After birth date was verified, patient was offered 2 appointment options this afternoon per Dr Sen.  Patient would like to come at 3:20pm and was given that time.  Gabriella Diaz CMA ([facility])................ 11/27/2018 12:03 PM

## 2018-11-28 ASSESSMENT — ANXIETY QUESTIONNAIRES: GAD7 TOTAL SCORE: 0

## 2019-01-17 ENCOUNTER — DOCUMENTATION ONLY (OUTPATIENT)
Dept: OTHER | Facility: CLINIC | Age: 79
End: 2019-01-17

## 2019-03-13 DIAGNOSIS — Z86.39 HISTORY OF TYPE 2 DIABETES MELLITUS: ICD-10-CM

## 2019-03-13 DIAGNOSIS — E11.9 DIABETES MELLITUS WITHOUT COMPLICATION (H): Primary | ICD-10-CM

## 2019-03-14 RX ORDER — BLOOD SUGAR DIAGNOSTIC
STRIP MISCELLANEOUS
Qty: 100 STRIP | Refills: 3 | Status: SHIPPED | OUTPATIENT
Start: 2019-03-14 | End: 2019-12-20 | Stop reason: DRUGHIGH

## 2019-03-14 NOTE — TELEPHONE ENCOUNTER
Prescription approved per [facility] Refill Protocol.  Rosio Little RN on 3/14/2019 at 10:34 AM

## 2019-03-21 ENCOUNTER — OFFICE VISIT (OUTPATIENT)
Dept: INTERNAL MEDICINE | Facility: OTHER | Age: 79
End: 2019-03-21
Attending: INTERNAL MEDICINE
Payer: MEDICARE

## 2019-03-21 VITALS
HEIGHT: 68 IN | WEIGHT: 179.8 LBS | OXYGEN SATURATION: 95 % | DIASTOLIC BLOOD PRESSURE: 80 MMHG | RESPIRATION RATE: 16 BRPM | TEMPERATURE: 97.2 F | BODY MASS INDEX: 27.25 KG/M2 | SYSTOLIC BLOOD PRESSURE: 138 MMHG | HEART RATE: 72 BPM

## 2019-03-21 DIAGNOSIS — I25.2 HISTORY OF ST ELEVATION MYOCARDIAL INFARCTION (STEMI): ICD-10-CM

## 2019-03-21 DIAGNOSIS — I10 ESSENTIAL HYPERTENSION, BENIGN: ICD-10-CM

## 2019-03-21 DIAGNOSIS — L57.0 MULTIPLE ACTINIC KERATOSES: ICD-10-CM

## 2019-03-21 DIAGNOSIS — Z98.890 HISTORY OF PANCREATIC SURGERY: ICD-10-CM

## 2019-03-21 DIAGNOSIS — K86.1 IDIOPATHIC CHRONIC PANCREATITIS (H): Primary | ICD-10-CM

## 2019-03-21 LAB
ALBUMIN SERPL-MCNC: 4.5 G/DL (ref 3.5–5.7)
ALP SERPL-CCNC: 49 U/L (ref 34–104)
ALT SERPL W P-5'-P-CCNC: 19 U/L (ref 7–52)
ANION GAP SERPL CALCULATED.3IONS-SCNC: 5 MMOL/L (ref 3–14)
AST SERPL W P-5'-P-CCNC: 19 U/L (ref 13–39)
BASOPHILS # BLD AUTO: 0 10E9/L (ref 0–0.2)
BASOPHILS NFR BLD AUTO: 0.4 %
BILIRUB SERPL-MCNC: 0.4 MG/DL (ref 0.3–1)
BUN SERPL-MCNC: 10 MG/DL (ref 7–25)
CALCIUM SERPL-MCNC: 9.8 MG/DL (ref 8.6–10.3)
CHLORIDE SERPL-SCNC: 101 MMOL/L (ref 98–107)
CO2 SERPL-SCNC: 31 MMOL/L (ref 21–31)
CREAT SERPL-MCNC: 0.76 MG/DL (ref 0.7–1.3)
DIFFERENTIAL METHOD BLD: ABNORMAL
EOSINOPHIL # BLD AUTO: 0.2 10E9/L (ref 0–0.7)
EOSINOPHIL NFR BLD AUTO: 2.2 %
ERYTHROCYTE [DISTWIDTH] IN BLOOD BY AUTOMATED COUNT: 12.1 % (ref 10–15)
GFR SERPL CREATININE-BSD FRML MDRD: >90 ML/MIN/{1.73_M2}
GLUCOSE SERPL-MCNC: 98 MG/DL (ref 70–105)
HCT VFR BLD AUTO: 43.2 % (ref 40–53)
HGB BLD-MCNC: 14.3 G/DL (ref 13.3–17.7)
IMM GRANULOCYTES # BLD: 0 10E9/L (ref 0–0.4)
IMM GRANULOCYTES NFR BLD: 0.3 %
LIPASE SERPL-CCNC: 19 U/L (ref 11–82)
LYMPHOCYTES # BLD AUTO: 0.9 10E9/L (ref 0.8–5.3)
LYMPHOCYTES NFR BLD AUTO: 12.7 %
MCH RBC QN AUTO: 32.8 PG (ref 26.5–33)
MCHC RBC AUTO-ENTMCNC: 33.1 G/DL (ref 31.5–36.5)
MCV RBC AUTO: 99 FL (ref 78–100)
MONOCYTES # BLD AUTO: 0.8 10E9/L (ref 0–1.3)
MONOCYTES NFR BLD AUTO: 10.9 %
NEUTROPHILS # BLD AUTO: 5.3 10E9/L (ref 1.6–8.3)
NEUTROPHILS NFR BLD AUTO: 73.5 %
PLATELET # BLD AUTO: 350 10E9/L (ref 150–450)
POTASSIUM SERPL-SCNC: 4.6 MMOL/L (ref 3.5–5.1)
PROT SERPL-MCNC: 6.6 G/DL (ref 6.4–8.9)
RBC # BLD AUTO: 4.36 10E12/L (ref 4.4–5.9)
SODIUM SERPL-SCNC: 137 MMOL/L (ref 134–144)
WBC # BLD AUTO: 7.2 10E9/L (ref 4–11)

## 2019-03-21 PROCEDURE — G0463 HOSPITAL OUTPT CLINIC VISIT: HCPCS | Mod: 25

## 2019-03-21 PROCEDURE — 17000 DESTRUCT PREMALG LESION: CPT | Performed by: INTERNAL MEDICINE

## 2019-03-21 PROCEDURE — 85025 COMPLETE CBC W/AUTO DIFF WBC: CPT | Performed by: INTERNAL MEDICINE

## 2019-03-21 PROCEDURE — G0463 HOSPITAL OUTPT CLINIC VISIT: HCPCS

## 2019-03-21 PROCEDURE — 99214 OFFICE O/P EST MOD 30 MIN: CPT | Mod: 25 | Performed by: INTERNAL MEDICINE

## 2019-03-21 PROCEDURE — 17003 DESTRUCT PREMALG LES 2-14: CPT | Performed by: INTERNAL MEDICINE

## 2019-03-21 PROCEDURE — 83690 ASSAY OF LIPASE: CPT | Performed by: INTERNAL MEDICINE

## 2019-03-21 PROCEDURE — 36415 COLL VENOUS BLD VENIPUNCTURE: CPT | Performed by: INTERNAL MEDICINE

## 2019-03-21 PROCEDURE — 80053 COMPREHEN METABOLIC PANEL: CPT | Performed by: INTERNAL MEDICINE

## 2019-03-21 RX ORDER — PROPRANOLOL HYDROCHLORIDE 20 MG/1
20 TABLET ORAL 3 TIMES DAILY
Qty: 270 TABLET | Refills: 3 | Status: SHIPPED | OUTPATIENT
Start: 2019-03-21 | End: 2019-10-30

## 2019-03-21 ASSESSMENT — ENCOUNTER SYMPTOMS
CONFUSION: 0
WHEEZING: 0
VOMITING: 0
NERVOUS/ANXIOUS: 1
DIZZINESS: 0
DYSURIA: 0
MYALGIAS: 0
SHORTNESS OF BREATH: 0
FATIGUE: 1
LIGHT-HEADEDNESS: 0
ARTHRALGIAS: 0
PALPITATIONS: 0
FEVER: 0
CHILLS: 0
TREMORS: 1
WOUND: 0
COUGH: 0
DIARRHEA: 1
BRUISES/BLEEDS EASILY: 0
ABDOMINAL PAIN: 1
NAUSEA: 0
HEMATURIA: 0
AGITATION: 0

## 2019-03-21 ASSESSMENT — PAIN SCALES - GENERAL: PAINLEVEL: NO PAIN (0)

## 2019-03-21 ASSESSMENT — MIFFLIN-ST. JEOR: SCORE: 1507.13

## 2019-03-21 NOTE — PROGRESS NOTES
Nursing Notes:   Malena Baum LPN  3/21/2019 12:11 PM  Addendum  Patient presents today for derm problem. Patient has some spots on his face that concern him.  Medication Reconciliation Complete    Malena Baum LPN on 3/21/2019 at 11:55 AM      Nursing note reviewed with patient.  Accuracy and completeness verified.   Mr. Huynh is a 78 year old male who:  Patient presents with:  Derm Problem: spots on face      ICD-10-CM    1. Idiopathic chronic pancreatitis (H) K86.1 MR Pancreas wo & w Contrast     CBC and Differential     Comprehensive metabolic panel     Lipase     Lipase     Comprehensive metabolic panel     CBC and Differential   2. Multiple actinic keratoses L57.0 DESTRUCT PREMALIGNANT LESION, 2-14     DESTRUCT PREMALIGNANT LESION, FIRST   3. History of pancreatic surgery - Whipple Z98.890 MR Pancreas wo & w Contrast     CBC and Differential     Comprehensive metabolic panel     Lipase     Lipase     Comprehensive metabolic panel     CBC and Differential   4. Essential hypertension, benign I10 propranolol (INDERAL) 20 MG tablet   5. History of ST elevation myocardial infarction (STEMI) I25.2 propranolol (INDERAL) 20 MG tablet     HPI  Patient was originally scheduled for Medicare wellness check, he ended up changing his mind and wanted an office visit for evaluation of other issues today.    Patient has history of idiopathic chronic pancreatitis.  History of pancreatic surgery/Whipple surgery in the past.  He is worried about possible pancreatic cancer.  With his history, check labs, MRI of the pancreas ordered.    Patient has had precancerous lesions in the past.  He has 6 new spots that he would like evaluated.  Evaluation shows actinic keratoses once again.  Treatment options reviewed and discussed, he would like to proceed with cryotherapy.  See below.    Hypertension, currently well controlled.  Having some issues with action tremors.  Propanolol was previously helping as well as primidone  tablets.  Seems to be having worsening tremors recently.  Also having some issues with anxiety causing some diarrhea.  We will increase propanolol up to 3 tablets daily.    History of heart attack, no recent chest pain heaviness or shortness of breath.  Denies cardiac limitations.    Functional Capacity:  > or about 4 METS.   Review of Systems   Constitutional: Positive for fatigue. Negative for chills and fever.   HENT: Negative for congestion and hearing loss.    Eyes: Negative for visual disturbance.   Respiratory: Negative for cough, shortness of breath and wheezing.    Cardiovascular: Negative for chest pain and palpitations.   Gastrointestinal: Positive for abdominal pain (+ intermittent) and diarrhea. Negative for nausea and vomiting.   Endocrine: Negative for cold intolerance and heat intolerance.   Genitourinary: Negative for dysuria and hematuria.   Musculoskeletal: Negative for arthralgias and myalgias.   Skin: Negative for rash and wound.        + actinic keratosis lesions x6 on scalp, left arm   Allergic/Immunologic: Negative for immunocompromised state.   Neurological: Positive for tremors. Negative for dizziness and light-headedness.   Hematological: Does not bruise/bleed easily.   Psychiatric/Behavioral: Negative for agitation and confusion. The patient is nervous/anxious.       LAVINIA:   LAVINIA-7 SCORE 4/4/2018 8/17/2018 11/27/2018   Total Score 0 0 0     PHQ9:  PHQ-9 SCORE 4/4/2018 8/17/2018 11/27/2018   PHQ-9 Total Score 0 0 0       I have personally reviewed the past medical history, past surgical history, medications, allergies, family and social history as listed below.     No Known Allergies    Current Outpatient Medications   Medication Sig Dispense Refill     ACCU-CHEK RD PLUS test strip USE TO CHECK GLUCOSE ONCE DAILY 100 strip 3     albuterol (PROAIR HFA/PROVENTIL HFA/VENTOLIN HFA) 108 (90 Base) MCG/ACT inhaler Inhale 2 puffs into the lungs every 6 hours as needed for shortness of breath /  dyspnea or wheezing 3 Inhaler 3     aspirin EC 81 MG EC tablet Take 81 mg by mouth daily with food       atorvastatin (LIPITOR) 40 MG tablet Take 1 tablet (40 mg) by mouth daily (with dinner) 90 tablet 3     Blood Glucose Monitoring Suppl (FIFTY50 GLUCOSE METER 2.0) W/DEVICE KIT Dispense Accu-Chek Agnes  Meter. Dispense item covered by pt ins. E11.9 NIDDM type II - Test 1 time/day       calcium carbonate-vitamin D (CALCIUM 600+D) 600-200 MG-UNIT TABS Take 1 tablet by mouth 2 times daily (with meals)       DOCOSAHEXAENOIC ACID PO Take 1 capsule by mouth 2 times daily       metFORMIN (GLUCOPHAGE) 1000 MG tablet Take 1 tablet (1,000 mg) by mouth 2 times daily (with meals) For diabetes prevention 180 tablet 3     Multiple Vitamin (MULTI-VITAMINS) TABS Take 1 tablet by mouth daily       nitroGLYcerin (NITROSTAT) 0.4 MG sublingual tablet Place 1 tablet (0.4 mg) under the tongue every 5 minutes as needed for chest pain 25 tablet 11     primidone (MYSOLINE) 50 MG tablet Take 2 tablets (100 mg) by mouth At Bedtime 180 tablet 3     propranolol (INDERAL) 20 MG tablet Take 1 tablet (20 mg) by mouth 3 times daily -- Dose Increase 3/21/2019 270 tablet 3     Respiratory Therapy Supplies (VORTEX HOLDING CHAMBER/MASK) JACOBY Use with albuterol HFA 1 each 1     tamsulosin (FLOMAX) 0.4 MG capsule Take 1 capsule (0.4 mg) by mouth every evening 90 capsule 3        Patient Active Problem List    Diagnosis Date Noted     History of pancreatic surgery - Whipple 03/21/2019     Priority: Medium     COPD exacerbation (H) 05/08/2018     Priority: Medium     Tobacco use disorder 05/08/2018     Priority: Medium     Actinic keratoses - x4 on head, x1 on left upper arm 03/08/2018     Priority: Medium     Benign essential tremor 01/24/2018     Priority: Medium     Overview:        Psychosexual dysfunction with inhibited sexual excitement 01/24/2018     Priority: Medium     Essential hypertension, benign 01/24/2018     Priority: Medium     History of  type 2 diabetes mellitus 01/24/2018     Priority: Medium     Mixed hyperlipidemia 01/24/2018     Priority: Medium     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       History of throat cancer 12/13/2017     Priority: Medium     Prediabetes 12/13/2017     Priority: Medium     Partial tear of right rotator cuff 06/21/2017     Priority: Medium     Aftercare following surgery of the musculoskeletal system 06/21/2017     Priority: Medium     History of urinary retention 12/23/2016     Priority: Medium     Overview:   Postoperative       Atherosclerosis of coronary artery of native heart without angina pectoris, unspecified vessel or lesion type 10/03/2016     Priority: Medium     Dyslipidemia 06/02/2015     Priority: Medium     Personal history of malignant neoplasm of bladder 11/19/2014     Priority: Medium     History of ST elevation myocardial infarction (STEMI) 07/11/2013     Priority: Medium     ST elevation myocardial infarction (STEMI) of inferior wall (H) 07/05/2013     Priority: Medium     Other malignant neoplasm without specification of site 11/21/2011     Priority: Medium     Past Medical History:   Diagnosis Date     Chronic myeloproliferative disease (H)     questionable     Essential (primary) hypertension     No Comments Provided     Hyperlipidemia     No Comments Provided     Male erectile dysfunction     No Comments Provided     Malignant neoplasm of supraglottis (H)     2011,Radiation     Other chronic pancreatitis (H)     No Comments Provided     Personal history of irradiation     1/30/2012-3/2/2012     Personal history of malignant neoplasm of bladder     4/22/2015     Personal history of nicotine dependence     2ppd - quit 2000     Pneumonia      2001     ST elevation (STEMI) myocardial infarction (H)     7/5/2013,Integrity BMS to RCA done at [facility]     No Comments Provided     Past Surgical History:   Procedure Laterality Date     APPENDECTOMY OPEN      1970     ARTHROSCOPY SHOULDER      11/2005,right shoulder     ARTHROSCOPY SHOULDER      1/2006,left shoulder     ARTHROSCOPY SHOULDER      2011,Left shoulder scope SAD, ac.  Open subscap, biceps     CHOLECYSTECTOMY      1994     COLONOSCOPY      02/2005     COLONOSCOPY      2/19/2015,no repeat due at this time     CYSTOSCOPY      Multiple times,Dr. Lyndon FRANK     HEART CATH, ANGIOPLASTY      07/05/2013,Bare-metal stent to dominant RCA     OTHER SURGICAL HISTORY      2004,716068,OTHER,for chronic pancreatitis     OTHER SURGICAL HISTORY      6/22/2007,[id number],SHOULDER REPLACEMENT,Right,with biceps tenodesis by Dr. Mukesh Ayoub     OTHER SURGICAL HISTORY      1995/2002,,ERCP,xseveral, one with papillotomy     OTHER SURGICAL HISTORY      1/30/2012-3/2/2012,[id number],RADIATION TREATMENT     OTHER SURGICAL HISTORY      12/7/2011,91002,NECK/THORAX PROCEDURE,L modified radical neck surgery     OTHER SURGICAL HISTORY      11/10/14,[id number],OTHER,Dr. Lyndon FRANK     OTHER SURGICAL HISTORY      2012,31424,REVISION EYELID,Dr. Farias     OTHER SURGICAL HISTORY      10/22/15,[id number],OTHER     TONSILLECTOMY, ADENOIDECTOMY, COMBINED      1947     VASECTOMY      1980     Social History     Socioeconomic History     Marital status:      Spouse name: None     Number of children: None     Years of education: None     Highest education level: None   Occupational History     None   Social Needs     Financial resource strain: None     Food insecurity:     Worry: None     Inability: None     Transportation needs:     Medical: None     Non-medical: None   Tobacco Use     Smoking status: Former Smoker     Packs/day: 3.00     Years: 43.00     Pack years: 129.00     Types: Cigarettes     Last attempt to quit:  "2000     Years since quittin.2     Smokeless tobacco: Never Used   Substance and Sexual Activity     Alcohol use: No     Alcohol/week: 0.0 oz     Drug use: No     Sexual activity: Yes     Partners: Female   Lifestyle     Physical activity:     Days per week: None     Minutes per session: None     Stress: None   Relationships     Social connections:     Talks on phone: None     Gets together: None     Attends [employer] service: None     Active member of club or organization: None     Attends meetings of clubs or organizations: None     Relationship status: None     Intimate partner violence:     Fear of current or ex partner: None     Emotionally abused: None     Physically abused: None     Forced sexual activity: None   Other Topics Concern     Parent/sibling w/ CABG, MI or angioplasty before 65F 55M? Not Asked   Social History Narrative    Ruth Spouse   Patient is  with grown children.  [employer] at [employer] for 17 years     Family History   Problem Relation Age of Onset     Heart Disease Father 83        Heart Disease,CHF     Heart Disease Mother 80        Heart Disease,MI     Family History Negative Sister         Good Health     Heart Disease Sister         Heart Disease,pacemaker     Diabetes Son         Diabetes,Type 1     Breast Cancer Daughter         Cancer-breast     Prostate Cancer No family hx of         Cancer-prostate       EXAM:   Vitals:    19 1151   BP: 138/80   Pulse: 72   Resp: 16   Temp: 97.2  F (36.2  C)   TempSrc: Tympanic   SpO2: 95%   Weight: 81.6 kg (179 lb 12.8 oz)   Height: 1.715 m (5' 7.5\")       Current Pain Score: No Pain (0)     BP Readings from Last 3 Encounters:   19 138/80   18 138/76   18 138/78      Wt Readings from Last 3 Encounters:   19 81.6 kg (179 lb 12.8 oz)   18 80.3 kg (177 lb 2 oz)   10/26/18 80.5 kg (177 lb 6.4 oz)      Estimated body mass index is 27.75 kg/m  as calculated from the following:    Height as of this " "encounter: 1.715 m (5' 7.5\").    Weight as of this encounter: 81.6 kg (179 lb 12.8 oz).     Physical Exam   Constitutional: He appears well-developed and well-nourished. No distress.   HENT:   Head: Normocephalic and atraumatic.   Eyes: Conjunctivae are normal. No scleral icterus.   Neck: Neck supple.   Cardiovascular: Normal rate and regular rhythm.   Pulmonary/Chest: Effort normal.   Abdominal: Soft. There is no tenderness.   Musculoskeletal: He exhibits no deformity.   Lymphadenopathy:     He has no cervical adenopathy.   Neurological: He is alert.   Skin: Skin is warm and dry. No rash noted. He is not diaphoretic.   actinic keratoses x6 noted -- x5 on head/scalp and x1 on left upper arm.     PROCEDURE:   Reviewed risks and benefits of cryotherapy.After informed consent was obtained, patient elected to proceed. These 6 lesions were treated today.   Performed cryotherapy to #6 lesions x 2 rounds of 30 second freeze/thaw cycles.   Tolerated well. No obvious complications.   Return for signs of infection.    The patient and I reviewed safe sun practices today: the importance of staying out of the sun;especially between 10AM-2PM, wearing sun screen SPF > 30, and protective clothing.   We also discussed the ABC's of skin cancers including: changes in size, uniformity, borders, coloration, bleeding, or any irregularityor changes. If these occur, seek medical attention.   The patient should have a complete skin exam by a medical professional every 6-12 months, and any questionable/suspicious, or changing lesions should be removed.      Psychiatric: He has a normal mood and affect.      Procedures   INVESTIGATIONS:  Results for orders placed or performed in visit on 03/21/19   Lipase   Result Value Ref Range    Lipase 19 11 - 82 U/L   Comprehensive metabolic panel   Result Value Ref Range    Sodium 137 134 - 144 mmol/L    Potassium 4.6 3.5 - 5.1 mmol/L    Chloride 101 98 - 107 mmol/L    Carbon Dioxide 31 21 - 31 mmol/L "    Anion Gap 5 3 - 14 mmol/L    Glucose 98 70 - 105 mg/dL    Urea Nitrogen 10 7 - 25 mg/dL    Creatinine 0.76 0.70 - 1.30 mg/dL    GFR Estimate >90 >60 mL/min/[1.73_m2]    GFR Estimate If Black >90 >60 mL/min/[1.73_m2]    Calcium 9.8 8.6 - 10.3 mg/dL    Bilirubin Total 0.4 0.3 - 1.0 mg/dL    Albumin 4.5 3.5 - 5.7 g/dL    Protein Total 6.6 6.4 - 8.9 g/dL    Alkaline Phosphatase 49 34 - 104 U/L    ALT 19 7 - 52 U/L    AST 19 13 - 39 U/L   CBC and Differential   Result Value Ref Range    WBC 7.2 4.0 - 11.0 10e9/L    RBC Count 4.36 (L) 4.4 - 5.9 10e12/L    Hemoglobin 14.3 13.3 - 17.7 g/dL    Hematocrit 43.2 40.0 - 53.0 %    MCV 99 78 - 100 fl    MCH 32.8 26.5 - 33.0 pg    MCHC 33.1 31.5 - 36.5 g/dL    RDW 12.1 10.0 - 15.0 %    Platelet Count 350 150 - 450 10e9/L    Diff Method Automated Method     % Neutrophils 73.5 %    % Lymphocytes 12.7 %    % Monocytes 10.9 %    % Eosinophils 2.2 %    % Basophils 0.4 %    % Immature Granulocytes 0.3 %    Absolute Neutrophil 5.3 1.6 - 8.3 10e9/L    Absolute Lymphocytes 0.9 0.8 - 5.3 10e9/L    Absolute Monocytes 0.8 0.0 - 1.3 10e9/L    Absolute Eosinophils 0.2 0.0 - 0.7 10e9/L    Absolute Basophils 0.0 0.0 - 0.2 10e9/L    Abs Immature Granulocytes 0.0 0 - 0.4 10e9/L       ASSESSMENT AND PLAN:  Problem List Items Addressed This Visit        Digestive    Pancreatitis, chronic (H) - Primary    Relevant Orders    MR Pancreas wo & w Contrast    CBC and Differential (Completed)    Comprehensive metabolic panel (Completed)    Lipase (Completed)       Circulatory    Essential hypertension, benign    Relevant Medications    propranolol (INDERAL) 20 MG tablet       Other    History of ST elevation myocardial infarction (STEMI)    Relevant Medications    propranolol (INDERAL) 20 MG tablet    History of pancreatic surgery - Whipple    Relevant Orders    MR Pancreas wo & w Contrast    CBC and Differential (Completed)    Comprehensive metabolic panel (Completed)    Lipase (Completed)      Other  Visit Diagnoses     Multiple actinic keratoses        Relevant Orders    DESTRUCT PREMALIGNANT LESION, 2-14 (Completed)    DESTRUCT PREMALIGNANT LESION, FIRST (Completed)        reviewed diet, exercise and weight control, recommended sodium restriction  -- Expected clinical course discussed    -- Medications and their side effects discussed    Patient Instructions     Labs today.   Cryotherapy treatments to actinic keratoses today.     Abdominal MRI ordered  - they will call with date/time of appointment.      Increase Propranolol to 20 mg 3 times daily -- for tremor and anxiety.     Consider trial of Zyrtec, Claritin, or Allegra -- as needed to help with hives or allergies.      Return as needed for follow-up with Dr. Sen.    Clinic : [phone]  Appointment line: [phone]       Preventive Health Recommendations:     See your health care provider every year to    Review health changes.     Discuss preventive care.      Review your medicines if your doctor has prescribed any.    Talk with your health care provider about whether you should have a test to screen for prostate cancer (PSA).    Every 3 years, have a diabetes test (fasting glucose). If you are at risk for diabetes, you should have this test more often.    Every 5 years, have a cholesterol test. Have this test more often if you are at risk for high cholesterol or heart disease.     Every 10 years, have a colonoscopy. Or, have a yearly FIT test (stool test). These exams will check for colon cancer.    Talk to with your health care provider about screening for Abdominal Aortic Aneurysm if you have a family history of AAA or have a history of smoking.  Shots:     Get a flu shot each year.     Get a tetanus shot every 10 years.     Talk to your doctor about your pneumonia vaccines. There are now two you should receive - Pneumovax (PPSV 23) and Prevnar (PCV 13).    Talk to your pharmacist about a shingles vaccine.     Talk to your doctor about  the hepatitis B vaccine.  Nutrition:     Eat at least 5 servings of fruits and vegetables each day.     Eat whole-grain bread, whole-wheat pasta and brown rice instead of white grains and rice.     Get adequate Calcium and Vitamin D.   Lifestyle    Exercise for at least 150 minutes a week (30 minutes a day, 5 days a week). This will help you control your weight and prevent disease.     Limit alcohol to one drink per day.     No smoking.     Wear sunscreen to prevent skin cancer.     See your dentist every six months for an exam and cleaning.     See your eye doctor every 1 to 2 years to screen for conditions such as glaucoma, macular degeneration and cataracts.    Personalized Prevention Plan  You are due for the preventive services outlined below.  Your care team is available to assist you in scheduling these services.  If you have already completed any of these items, please share that information with your care team to update in your medical record.    Health Maintenance Due   Topic Date Due     Annual Wellness Visit  05/18/2006     Zoster (Shingles) Vaccine (2 of 3) 08/11/2008     Jovanni Sen MD  Internal Medicine  [facility]     Portions of this note were dictated using speech recognition software. The note has been proofread but errors in the text may have been overlooked. Please contact me if there are any concerns regarding the accuracy of the dictation.

## 2019-03-21 NOTE — NURSING NOTE
Patient presents today for derm problem. Patient has some spots on his face that concern him.  Medication Reconciliation Complete    Malena Baum LPN on 3/21/2019 at 11:55 AM

## 2019-03-21 NOTE — LETTER
March 21, 2019    Rajesh Huynh  [address] [place] [address]      Dear Rajesh Huynh,    The result of your recent tests are included below:    Labs look good.  Continue current medications.    Results for orders placed or performed in visit on 03/21/19   Lipase   Result Value Ref Range    Lipase 19 11 - 82 U/L   Comprehensive metabolic panel   Result Value Ref Range    Sodium 137 134 - 144 mmol/L    Potassium 4.6 3.5 - 5.1 mmol/L    Chloride 101 98 - 107 mmol/L    Carbon Dioxide 31 21 - 31 mmol/L    Anion Gap 5 3 - 14 mmol/L    Glucose 98 70 - 105 mg/dL    Urea Nitrogen 10 7 - 25 mg/dL    Creatinine 0.76 0.70 - 1.30 mg/dL    GFR Estimate >90 >60 mL/min/[1.73_m2]    GFR Estimate If Black >90 >60 mL/min/[1.73_m2]    Calcium 9.8 8.6 - 10.3 mg/dL    Bilirubin Total 0.4 0.3 - 1.0 mg/dL    Albumin 4.5 3.5 - 5.7 g/dL    Protein Total 6.6 6.4 - 8.9 g/dL    Alkaline Phosphatase 49 34 - 104 U/L    ALT 19 7 - 52 U/L    AST 19 13 - 39 U/L   CBC and Differential   Result Value Ref Range    WBC 7.2 4.0 - 11.0 10e9/L    RBC Count 4.36 (L) 4.4 - 5.9 10e12/L    Hemoglobin 14.3 13.3 - 17.7 g/dL    Hematocrit 43.2 40.0 - 53.0 %    MCV 99 78 - 100 fl    MCH 32.8 26.5 - 33.0 pg    MCHC 33.1 31.5 - 36.5 g/dL    RDW 12.1 10.0 - 15.0 %    Platelet Count 350 150 - 450 10e9/L    Diff Method Automated Method     % Neutrophils 73.5 %    % Lymphocytes 12.7 %    % Monocytes 10.9 %    % Eosinophils 2.2 %    % Basophils 0.4 %    % Immature Granulocytes 0.3 %    Absolute Neutrophil 5.3 1.6 - 8.3 10e9/L    Absolute Lymphocytes 0.9 0.8 - 5.3 10e9/L    Absolute Monocytes 0.8 0.0 - 1.3 10e9/L    Absolute Eosinophils 0.2 0.0 - 0.7 10e9/L    Absolute Basophils 0.0 0.0 - 0.2 10e9/L    Abs Immature Granulocytes 0.0 0 - 0.4 10e9/L       If you have any further questions or problems, please contact my office at [phone] and schedule an appointment.    Clinic : [phone]  Appointment line: [phone]     Thank  Jovanni kapoor MD    Internal Medicine  [facility]     Reviewed and electronically signed by provider.

## 2019-03-21 NOTE — PATIENT INSTRUCTIONS
Labs today.   Cryotherapy treatments to actinic keratoses today.     Abdominal MRI ordered  - they will call with date/time of appointment.      Increase Propranolol to 20 mg 3 times daily -- for tremor and anxiety.     Consider trial of Zyrtec, Claritin, or Allegra -- as needed to help with hives or allergies.      Return as needed for follow-up with Dr. Sen.    Clinic : [phone]  Appointment line: [phone]       Preventive Health Recommendations:     See your health care provider every year to    Review health changes.     Discuss preventive care.      Review your medicines if your doctor has prescribed any.    Talk with your health care provider about whether you should have a test to screen for prostate cancer (PSA).    Every 3 years, have a diabetes test (fasting glucose). If you are at risk for diabetes, you should have this test more often.    Every 5 years, have a cholesterol test. Have this test more often if you are at risk for high cholesterol or heart disease.     Every 10 years, have a colonoscopy. Or, have a yearly FIT test (stool test). These exams will check for colon cancer.    Talk to with your health care provider about screening for Abdominal Aortic Aneurysm if you have a family history of AAA or have a history of smoking.  Shots:     Get a flu shot each year.     Get a tetanus shot every 10 years.     Talk to your doctor about your pneumonia vaccines. There are now two you should receive - Pneumovax (PPSV 23) and Prevnar (PCV 13).    Talk to your pharmacist about a shingles vaccine.     Talk to your doctor about the hepatitis B vaccine.  Nutrition:     Eat at least 5 servings of fruits and vegetables each day.     Eat whole-grain bread, whole-wheat pasta and brown rice instead of white grains and rice.     Get adequate Calcium and Vitamin D.   Lifestyle    Exercise for at least 150 minutes a week (30 minutes a day, 5 days a week). This will help you control your weight and  prevent disease.     Limit alcohol to one drink per day.     No smoking.     Wear sunscreen to prevent skin cancer.     See your dentist every six months for an exam and cleaning.     See your eye doctor every 1 to 2 years to screen for conditions such as glaucoma, macular degeneration and cataracts.    Personalized Prevention Plan  You are due for the preventive services outlined below.  Your care team is available to assist you in scheduling these services.  If you have already completed any of these items, please share that information with your care team to update in your medical record.    Health Maintenance Due   Topic Date Due     Annual Wellness Visit  05/18/2006     Zoster (Shingles) Vaccine (2 of 3) 08/11/2008

## 2019-03-25 ENCOUNTER — HOSPITAL ENCOUNTER (OUTPATIENT)
Dept: MRI IMAGING | Facility: OTHER | Age: 79
Discharge: HOME | End: 2019-03-25
Attending: INTERNAL MEDICINE | Admitting: INTERNAL MEDICINE
Payer: MEDICARE

## 2019-03-25 DIAGNOSIS — Z98.890 HISTORY OF PANCREATIC SURGERY: ICD-10-CM

## 2019-03-25 DIAGNOSIS — K86.1 IDIOPATHIC CHRONIC PANCREATITIS (H): ICD-10-CM

## 2019-03-25 PROCEDURE — 74183 MRI ABD W/O CNTR FLWD CNTR: CPT

## 2019-03-25 PROCEDURE — A9577 INJ MULTIHANCE: HCPCS | Performed by: INTERNAL MEDICINE

## 2019-03-25 PROCEDURE — 25500064 ZZH RX 255 OP 636: Performed by: INTERNAL MEDICINE

## 2019-03-25 RX ADMIN — GADOBENATE DIMEGLUMINE 17 ML: 529 INJECTION, SOLUTION INTRAVENOUS at 10:57

## 2019-04-02 ENCOUNTER — HOSPITAL ENCOUNTER (EMERGENCY)
Facility: OTHER | Age: 79
Discharge: HOME | End: 2019-04-02
Attending: PHYSICIAN ASSISTANT | Admitting: PHYSICIAN ASSISTANT
Payer: MEDICARE

## 2019-04-02 VITALS
DIASTOLIC BLOOD PRESSURE: 98 MMHG | OXYGEN SATURATION: 96 % | RESPIRATION RATE: 16 BRPM | HEART RATE: 72 BPM | SYSTOLIC BLOOD PRESSURE: 142 MMHG | TEMPERATURE: 98.1 F

## 2019-04-02 DIAGNOSIS — T16.1XXA FOREIGN BODY IN RIGHT EAR: ICD-10-CM

## 2019-04-02 PROCEDURE — 99282 EMERGENCY DEPT VISIT SF MDM: CPT | Performed by: PHYSICIAN ASSISTANT

## 2019-04-02 PROCEDURE — 99282 EMERGENCY DEPT VISIT SF MDM: CPT | Mod: Z6 | Performed by: PHYSICIAN ASSISTANT

## 2019-04-02 ASSESSMENT — ENCOUNTER SYMPTOMS
ADENOPATHY: 0
ABDOMINAL PAIN: 0
FEVER: 0
BRUISES/BLEEDS EASILY: 0
SHORTNESS OF BREATH: 0
CONFUSION: 0
BACK PAIN: 0
CHEST TIGHTNESS: 0
CHILLS: 0
WOUND: 0
HEMATURIA: 0

## 2019-04-02 NOTE — ED AVS SNAPSHOT
[facility]  [address]  [place] [address]  Phone:  [phone]  Fax:  [phone]                                    Rajesh Huynh   MRN: [id number]    Department:  [facility]   Date of Visit:  4/2/2019           After Visit Summary Signature Page    I have received my discharge instructions, and my questions have been answered. I have discussed any challenges I see with this plan with the nurse or doctor.    ..........................................................................................................................................  Patient/Patient Representative Signature      ..........................................................................................................................................  Patient Representative Print Name and Relationship to Patient    ..................................................               ................................................  Date                                   Time    ..........................................................................................................................................  Reviewed by Signature/Title    ...................................................              ..............................................  Date                                               Time          22EPIC Rev 08/18

## 2019-04-03 NOTE — ED TRIAGE NOTES
Patient has small rubber cushions of tip of hearing aids and it came off inside the patients ear when removing his hearing aid approx: 45 minutes ago. Its not causing any pain at this time however is causing him some discomfort. Has not made any attempts at home to remove it.

## 2019-04-03 NOTE — ED PROVIDER NOTES
History     Chief Complaint   Patient presents with     Foreign Body in Ear     R ear      This is a 78-year-old male who got 1 of the small rubber cushions on the tip of his hearing aid in his right ear canal.  This is happened a couple times before this.  He is here for further evaluation at this time.  Denies any other issues.              Allergies:  No Known Allergies    Problem List:    Patient Active Problem List    Diagnosis Date Noted     History of pancreatic surgery - [facility] 03/21/2019     Priority: Medium     COPD exacerbation (H) 05/08/2018     Priority: Medium     Tobacco use disorder 05/08/2018     Priority: Medium     Actinic keratoses - x4 on head, x1 on left upper arm 03/08/2018     Priority: Medium     Benign essential tremor 01/24/2018     Priority: Medium     Overview:        Psychosexual dysfunction with inhibited sexual excitement 01/24/2018     Priority: Medium     Essential hypertension, benign 01/24/2018     Priority: Medium     History of type 2 diabetes mellitus 01/24/2018     Priority: Medium     Mixed hyperlipidemia 01/24/2018     Priority: Medium     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       History of throat cancer 12/13/2017     Priority: Medium     Prediabetes 12/13/2017     Priority: Medium     Partial tear of right rotator cuff 06/21/2017     Priority: Medium     Aftercare following surgery of the musculoskeletal system 06/21/2017     Priority: Medium     History of urinary retention 12/23/2016     Priority: Medium     Overview:   Postoperative       Atherosclerosis of coronary artery of native heart without angina pectoris, unspecified vessel or lesion type 10/03/2016     Priority: Medium     Dyslipidemia 06/02/2015      Priority: Medium     Personal history of malignant neoplasm of bladder 11/19/2014     Priority: Medium     History of ST elevation myocardial infarction (STEMI) 07/11/2013     Priority: Medium     ST elevation myocardial infarction (STEMI) of inferior wall (H) 07/05/2013     Priority: Medium     Other malignant neoplasm without specification of site 11/21/2011     Priority: Medium        Past Medical History:    Past Medical History:   Diagnosis Date     Chronic myeloproliferative disease (H)      Essential (primary) hypertension      Hyperlipidemia      Male erectile dysfunction      Malignant neoplasm of supraglottis (H)      Other chronic pancreatitis (H)      Personal history of irradiation      Personal history of malignant neoplasm of bladder      Personal history of nicotine dependence      Pneumonia      ST elevation (STEMI) myocardial infarction (H)      Tremor        Past Surgical History:    Past Surgical History:   Procedure Laterality Date     APPENDECTOMY OPEN      1970     ARTHROSCOPY SHOULDER      11/2005,right shoulder     ARTHROSCOPY SHOULDER      1/2006,left shoulder     ARTHROSCOPY SHOULDER      2011,Left shoulder scope SAD, ac.  Open subscap, biceps     CHOLECYSTECTOMY      1994     COLONOSCOPY      02/2005     COLONOSCOPY      2/19/2015,no repeat due at this time     CYSTOSCOPY      Multiple times,Dr. Lyndon FRANK     HEART CATH, ANGIOPLASTY      07/05/2013,Bare-metal stent to dominant RCA     OTHER SURGICAL HISTORY      2004,555003,OTHER,for chronic pancreatitis     OTHER SURGICAL HISTORY      6/22/2007,[id number],SHOULDER REPLACEMENT,Right,with biceps tenodesis by Dr. Mukesh Ayoub     OTHER SURGICAL HISTORY      1995/2002,,ERCP,xseveral, one with papillotomy     OTHER SURGICAL HISTORY      1/30/2012-3/2/2012,306161,RADIATION TREATMENT     OTHER SURGICAL HISTORY      12/7/2011,45908,NECK/THORAX PROCEDURE,L modified radical neck surgery     OTHER SURGICAL HISTORY       11/10/14,259138,OTHER,Dr. Lyndon FRANK     OTHER SURGICAL HISTORY      ,43236,REVISION EYELID,Dr. Farias     OTHER SURGICAL HISTORY      10/22/15,332367,OTHER     TONSILLECTOMY, ADENOIDECTOMY, COMBINED      1947     VASECTOMY             Family History:    Family History   Problem Relation Age of Onset     Heart Disease Father 83        Heart Disease,CHF     Heart Disease Mother 80        Heart Disease,MI     Family History Negative Sister         Good Health     Heart Disease Sister         Heart Disease,pacemaker     Diabetes Son         Diabetes,Type 1     Breast Cancer Daughter         Cancer-breast     Prostate Cancer No family hx of         Cancer-prostate       Social History:  Marital Status:   [2]  Social History     Tobacco Use     Smoking status: Former Smoker     Packs/day: 3.00     Years: 43.00     Pack years: 129.00     Types: Cigarettes     Last attempt to quit: 2000     Years since quittin.2     Smokeless tobacco: Never Used   Substance Use Topics     Alcohol use: No     Alcohol/week: 0.0 oz     Drug use: No        Medications:      albuterol (PROAIR HFA/PROVENTIL HFA/VENTOLIN HFA) 108 (90 Base) MCG/ACT inhaler   aspirin EC 81 MG EC tablet   atorvastatin (LIPITOR) 40 MG tablet   calcium carbonate-vitamin D (CALCIUM 600+D) 600-200 MG-UNIT TABS   DOCOSAHEXAENOIC ACID PO   metFORMIN (GLUCOPHAGE) 1000 MG tablet   Multiple Vitamin (MULTI-VITAMINS) TABS   primidone (MYSOLINE) 50 MG tablet   propranolol (INDERAL) 20 MG tablet   tamsulosin (FLOMAX) 0.4 MG capsule   ACCU-CHEK RD PLUS test strip   Blood Glucose Monitoring Suppl (FIFTY50 GLUCOSE METER 2.0) W/DEVICE KIT   nitroGLYcerin (NITROSTAT) 0.4 MG sublingual tablet   Respiratory Therapy Supplies (VORTEX HOLDING CHAMBER/MASK) JACOBY         Review of Systems   Constitutional: Negative for chills and fever.   HENT: Positive for ear pain. Negative for congestion and ear discharge.    Eyes: Negative for visual disturbance.    Respiratory: Negative for chest tightness and shortness of breath.    Cardiovascular: Negative for chest pain.   Gastrointestinal: Negative for abdominal pain.   Genitourinary: Negative for hematuria.   Musculoskeletal: Negative for back pain.   Skin: Negative for rash and wound.   Neurological: Negative for syncope.   Hematological: Negative for adenopathy. Does not bruise/bleed easily.   Psychiatric/Behavioral: Negative for confusion.       Physical Exam   BP: (!) 142/98  Pulse: 72  Temp: 98.1  F (36.7  C)  Resp: 16  SpO2: 96 %      Physical Exam   Constitutional: He appears well-developed and well-nourished. No distress.   HENT:   Head: Normocephalic and atraumatic.   Eyes: Conjunctivae are normal. No scleral icterus.   Neck: Neck supple.   Cardiovascular: Normal rate and regular rhythm.   Pulmonary/Chest: Effort normal.   Abdominal: Soft. There is no tenderness.   Musculoskeletal: He exhibits no deformity.   Lymphadenopathy:     He has no cervical adenopathy.   Neurological: He is alert.   Skin: Skin is warm and dry. No rash noted. He is not diaphoretic.   Psychiatric: He has a normal mood and affect.       ED Course        FB removal  Date/Time: 4/2/2019 7:05 PM  Performed by: Ang Norman PA-C  Authorized by: Ang Norman PA-C   Consent: Verbal consent obtained. Written consent not obtained.  Consent given by: patient  Patient understanding: patient states understanding of the procedure being performed  Patient consent: the patient's understanding of the procedure matches consent given  Procedure consent: procedure consent matches procedure scheduled  Patient identity confirmed: verbally with patient  Body area: ear  Anesthesia: see MAR for details    Anesthesia:  Anesthetic total: 0 mL    Sedation:  Patient sedated: no    Patient restrained: no  Patient cooperative: yes  Removal mechanism: forceps  Complexity: simple  1 objects recovered.  Objects recovered: 1  Post-procedure assessment: foreign  body removed  Patient tolerance: Patient tolerated the procedure well with no immediate complications                       No results found for this or any previous visit (from the past 24 hour(s)).    Medications - No data to display    Assessments & Plan (with Medical Decision Making)     I have reviewed the nursing notes.    I have reviewed the findings, diagnosis, plan and need for follow up with the patient.         Medication List      There are no discharge medications for this visit.         Final diagnoses:   Foreign body in right ear     Patient wants a foreign body in his right ear canal.  This was removed as above successfully.  No other issues he will return home.  Follow-up with his primary care provider as needed for further evaluation.  4/2/2019   [facility]     Ang Norman PA-C  04/1940

## 2019-05-02 ENCOUNTER — OFFICE VISIT (OUTPATIENT)
Dept: INTERNAL MEDICINE | Facility: OTHER | Age: 79
End: 2019-05-02
Attending: INTERNAL MEDICINE
Payer: MEDICARE

## 2019-05-02 VITALS
TEMPERATURE: 97 F | HEART RATE: 68 BPM | RESPIRATION RATE: 16 BRPM | SYSTOLIC BLOOD PRESSURE: 106 MMHG | DIASTOLIC BLOOD PRESSURE: 70 MMHG | BODY MASS INDEX: 27.68 KG/M2 | WEIGHT: 179.38 LBS

## 2019-05-02 DIAGNOSIS — R19.7 DIARRHEA, UNSPECIFIED TYPE: Primary | ICD-10-CM

## 2019-05-02 DIAGNOSIS — Z86.39 HISTORY OF TYPE 2 DIABETES MELLITUS: ICD-10-CM

## 2019-05-02 DIAGNOSIS — E11.9 CONTROLLED TYPE 2 DIABETES MELLITUS WITHOUT COMPLICATION, WITHOUT LONG-TERM CURRENT USE OF INSULIN (H): ICD-10-CM

## 2019-05-02 DIAGNOSIS — E78.2 MIXED HYPERLIPIDEMIA: ICD-10-CM

## 2019-05-02 DIAGNOSIS — I10 BENIGN ESSENTIAL HYPERTENSION: ICD-10-CM

## 2019-05-02 LAB
ALBUMIN SERPL-MCNC: 4.4 G/DL (ref 3.5–5.7)
ALP SERPL-CCNC: 47 U/L (ref 34–104)
ALT SERPL W P-5'-P-CCNC: 20 U/L (ref 7–52)
ANION GAP SERPL CALCULATED.3IONS-SCNC: 5 MMOL/L (ref 3–14)
AST SERPL W P-5'-P-CCNC: 21 U/L (ref 13–39)
BILIRUB SERPL-MCNC: 0.4 MG/DL (ref 0.3–1)
BUN SERPL-MCNC: 12 MG/DL (ref 7–25)
CALCIUM SERPL-MCNC: 9.6 MG/DL (ref 8.6–10.3)
CHLORIDE SERPL-SCNC: 99 MMOL/L (ref 98–107)
CHOLEST SERPL-MCNC: 110 MG/DL
CO2 SERPL-SCNC: 27 MMOL/L (ref 21–31)
CREAT SERPL-MCNC: 0.83 MG/DL (ref 0.7–1.3)
ERYTHROCYTE [DISTWIDTH] IN BLOOD BY AUTOMATED COUNT: 12.3 % (ref 10–15)
GFR SERPL CREATININE-BSD FRML MDRD: 90 ML/MIN/{1.73_M2}
GLUCOSE SERPL-MCNC: 116 MG/DL (ref 70–105)
HBA1C MFR BLD: 6.8 % (ref 4–6)
HCT VFR BLD AUTO: 41.5 % (ref 40–53)
HDLC SERPL-MCNC: 39 MG/DL (ref 23–92)
HGB BLD-MCNC: 14.2 G/DL (ref 13.3–17.7)
LDLC SERPL CALC-MCNC: 47 MG/DL
MCH RBC QN AUTO: 33.1 PG (ref 26.5–33)
MCHC RBC AUTO-ENTMCNC: 34.2 G/DL (ref 31.5–36.5)
MCV RBC AUTO: 97 FL (ref 78–100)
NONHDLC SERPL-MCNC: 71 MG/DL
PLATELET # BLD AUTO: 343 10E9/L (ref 150–450)
POTASSIUM SERPL-SCNC: 5 MMOL/L (ref 3.5–5.1)
PROT SERPL-MCNC: 6.3 G/DL (ref 6.4–8.9)
RBC # BLD AUTO: 4.29 10E12/L (ref 4.4–5.9)
SODIUM SERPL-SCNC: 131 MMOL/L (ref 134–144)
TRIGL SERPL-MCNC: 119 MG/DL
WBC # BLD AUTO: 7.6 10E9/L (ref 4–11)

## 2019-05-02 PROCEDURE — 99214 OFFICE O/P EST MOD 30 MIN: CPT | Performed by: INTERNAL MEDICINE

## 2019-05-02 PROCEDURE — 83036 HEMOGLOBIN GLYCOSYLATED A1C: CPT | Mod: ZL | Performed by: INTERNAL MEDICINE

## 2019-05-02 PROCEDURE — 85027 COMPLETE CBC AUTOMATED: CPT | Mod: ZL | Performed by: INTERNAL MEDICINE

## 2019-05-02 PROCEDURE — G0463 HOSPITAL OUTPT CLINIC VISIT: HCPCS

## 2019-05-02 PROCEDURE — 80061 LIPID PANEL: CPT | Mod: ZL | Performed by: INTERNAL MEDICINE

## 2019-05-02 PROCEDURE — 80053 COMPREHEN METABOLIC PANEL: CPT | Mod: ZL | Performed by: INTERNAL MEDICINE

## 2019-05-02 PROCEDURE — 36415 COLL VENOUS BLD VENIPUNCTURE: CPT | Mod: ZL | Performed by: INTERNAL MEDICINE

## 2019-05-02 RX ORDER — LOPERAMIDE HYDROCHLORIDE 2 MG/1
2 TABLET ORAL 4 TIMES DAILY PRN
Qty: 120 TABLET | Refills: 11 | Status: SHIPPED | OUTPATIENT
Start: 2019-05-02 | End: 2020-08-05

## 2019-05-02 ASSESSMENT — ENCOUNTER SYMPTOMS
WHEEZING: 0
FATIGUE: 1
PALPITATIONS: 0
TREMORS: 1
ARTHRALGIAS: 0
SHORTNESS OF BREATH: 0
DYSURIA: 0
VOMITING: 0
NAUSEA: 0
COUGH: 0
CHILLS: 0
CONFUSION: 0
MYALGIAS: 0
HEMATURIA: 0
NERVOUS/ANXIOUS: 1
AGITATION: 0
WOUND: 0
DIARRHEA: 1
DIZZINESS: 0
FEVER: 0
LIGHT-HEADEDNESS: 0
BRUISES/BLEEDS EASILY: 0
ABDOMINAL PAIN: 1

## 2019-05-02 ASSESSMENT — ANXIETY QUESTIONNAIRES
1. FEELING NERVOUS, ANXIOUS, OR ON EDGE: NOT AT ALL
2. NOT BEING ABLE TO STOP OR CONTROL WORRYING: NOT AT ALL
IF YOU CHECKED OFF ANY PROBLEMS ON THIS QUESTIONNAIRE, HOW DIFFICULT HAVE THESE PROBLEMS MADE IT FOR YOU TO DO YOUR WORK, TAKE CARE OF THINGS AT HOME, OR GET ALONG WITH OTHER PEOPLE: NOT DIFFICULT AT ALL
7. FEELING AFRAID AS IF SOMETHING AWFUL MIGHT HAPPEN: NOT AT ALL
GAD7 TOTAL SCORE: 0
6. BECOMING EASILY ANNOYED OR IRRITABLE: NOT AT ALL
3. WORRYING TOO MUCH ABOUT DIFFERENT THINGS: NOT AT ALL
5. BEING SO RESTLESS THAT IT IS HARD TO SIT STILL: NOT AT ALL

## 2019-05-02 ASSESSMENT — PAIN SCALES - GENERAL: PAINLEVEL: NO PAIN (0)

## 2019-05-02 ASSESSMENT — PATIENT HEALTH QUESTIONNAIRE - PHQ9
SUM OF ALL RESPONSES TO PHQ QUESTIONS 1-9: 0
5. POOR APPETITE OR OVEREATING: NOT AT ALL

## 2019-05-02 NOTE — PROGRESS NOTES
"Nursing Notes:   Kate Gar LPN  5/2/2019  2:34 PM  Signed  Patient presents to the clinic for episodes of diarrhea on and off over the past year.  Patient and spouse feels that this is related to anxiety not food related.      Previous A1C is at goal of <8  No results found for: A1C  Urine microalbumin:creatine: n/a  Foot exam unknown-declines today  Eye exam 8-17-18    Tobacco User no  Patient is on a daily aspirin  Patient is on a Statin.  Blood pressure today of:     BP Readings from Last 1 Encounters:   04/02/19 (!) 142/98      is not at the goal of <139/89 for diabetics.      Chief Complaint   Patient presents with     Diarrhea       Initial /70 (BP Location: Right arm, Patient Position: Sitting, Cuff Size: Adult Regular)   Pulse 68   Temp 97  F (36.1  C) (Tympanic)   Resp 16   Wt 81.4 kg (179 lb 6 oz)   BMI 27.68 kg/m    Estimated body mass index is 27.68 kg/m  as calculated from the following:    Height as of 3/21/19: 1.715 m (5' 7.5\").    Weight as of this encounter: 81.4 kg (179 lb 6 oz).  Medication Reconciliation: complete    Kate Gar LPN          Nursing note reviewed with patient.  Accuracy and completeness verified.   Mr. Huynh is a 78 year old male who:  Patient presents with:  Diarrhea      ICD-10-CM    1. Diarrhea, unspecified type R19.7 loperamide (IMODIUM A-D) 2 MG tablet   2. Mixed hyperlipidemia E78.2 Lipid Profile     Lipid Profile   3. Benign essential hypertension I10 Comprehensive metabolic panel     CBC with platelets     CBC with platelets     Comprehensive metabolic panel   4. History of type 2 diabetes mellitus Z86.39 Hemoglobin A1c     Hemoglobin A1c   5. Controlled type 2 diabetes mellitus without complication, without long-term current use of insulin (H) E11.9      HPI  Patient comes in with his wife for follow-up of his diarrhea.  States that on occasion he gets quite anxious and then has loose stools.  Other times certain foods seem to give him problems.  " Sometimes tomatoes give him issues but other times he can tolerate tomato juice that is homemade.  He is not really sure if this is an IBD issue or if there is some other type of food sensitivity or chemical sensitivity.  Advised that he start a food diary.  Does not sound infectious at all.  Okay to try Imodium.  If needed we can pursue other treatments as well.  See below.    Mixed hyperlipidemia, currently on Lipitor, seems to be tolerating well.  Labs ordered.    Hypertension, currently well controlled.  Tolerating medication.  Check labs.    History of type 2 diabetes, elevated glucose.  Currently on metformin.  Last labs showed prediabetes.  Check labs.      Labs today are back within the diabetic range.   Denies any hypoglycemia.  No side effects.    Functional Capacity: > 4 METS.   Review of Systems   Constitutional: Positive for fatigue. Negative for chills and fever.   HENT: Negative for congestion and hearing loss.    Eyes: Negative for visual disturbance.   Respiratory: Negative for cough, shortness of breath and wheezing.    Cardiovascular: Negative for chest pain and palpitations.   Gastrointestinal: Positive for abdominal pain (+ intermittent) and diarrhea. Negative for nausea and vomiting.   Endocrine: Negative for cold intolerance and heat intolerance.   Genitourinary: Negative for dysuria and hematuria.   Musculoskeletal: Negative for arthralgias and myalgias.   Skin: Negative for rash and wound.        + actinic keratosis lesions x6 on scalp, left arm -previously treated with cryotherapy, doing well.-Have resolved.   Allergic/Immunologic: Negative for immunocompromised state.   Neurological: Positive for tremors. Negative for dizziness and light-headedness.   Hematological: Does not bruise/bleed easily.   Psychiatric/Behavioral: Negative for agitation and confusion. The patient is nervous/anxious.       LAVINIA:   LAVINIA-7 SCORE 8/17/2018 11/27/2018 5/2/2019   Total Score 0 0 0     PHQ9:  PHQ-9 SCORE  8/17/2018 11/27/2018 5/2/2019   PHQ-9 Total Score 0 0 0     I have personally reviewed the past medical history, past surgical history, medications, allergies, family and social history as listed below.     No Known Allergies    Current Outpatient Medications   Medication Sig Dispense Refill     ACCU-CHEK RD PLUS test strip USE TO CHECK GLUCOSE ONCE DAILY 100 strip 3     albuterol (PROAIR HFA/PROVENTIL HFA/VENTOLIN HFA) 108 (90 Base) MCG/ACT inhaler Inhale 2 puffs into the lungs every 6 hours as needed for shortness of breath / dyspnea or wheezing 3 Inhaler 3     aspirin EC 81 MG EC tablet Take 81 mg by mouth daily with food       atorvastatin (LIPITOR) 40 MG tablet Take 1 tablet (40 mg) by mouth daily (with dinner) 90 tablet 3     Blood Glucose Monitoring Suppl (FIFTY50 GLUCOSE METER 2.0) W/DEVICE KIT Dispense Accu-Chek Rd  Meter. Dispense item covered by pt ins. E11.9 NIDDM type II - Test 1 time/day       calcium carbonate-vitamin D (CALCIUM 600+D) 600-200 MG-UNIT TABS Take 1 tablet by mouth 2 times daily (with meals)       DOCOSAHEXAENOIC ACID PO Take 1 capsule by mouth 2 times daily       loperamide (IMODIUM A-D) 2 MG tablet Take 1 tablet (2 mg) by mouth 4 times daily as needed for diarrhea 120 tablet 11     metFORMIN (GLUCOPHAGE) 1000 MG tablet Take 1 tablet (1,000 mg) by mouth 2 times daily (with meals) For diabetes prevention 180 tablet 3     Multiple Vitamin (MULTI-VITAMINS) TABS Take 1 tablet by mouth daily       nitroGLYcerin (NITROSTAT) 0.4 MG sublingual tablet Place 1 tablet (0.4 mg) under the tongue every 5 minutes as needed for chest pain 25 tablet 11     primidone (MYSOLINE) 50 MG tablet Take 2 tablets (100 mg) by mouth At Bedtime 180 tablet 3     propranolol (INDERAL) 20 MG tablet Take 1 tablet (20 mg) by mouth 3 times daily -- Dose Increase 3/21/2019 270 tablet 3     Respiratory Therapy Supplies (VORTEX HOLDING CHAMBER/MASK) JACOBY Use with albuterol HFA 1 each 1     tamsulosin (FLOMAX) 0.4 MG  capsule Take 1 capsule (0.4 mg) by mouth every evening 90 capsule 3        Patient Active Problem List    Diagnosis Date Noted     Controlled type 2 diabetes mellitus without complication, without long-term current use of insulin (H) 05/03/2019     Priority: Medium     History of pancreatic surgery - [facility] 03/21/2019     Priority: Medium     COPD exacerbation (H) 05/08/2018     Priority: Medium     Tobacco use disorder 05/08/2018     Priority: Medium     Actinic keratoses - x4 on head, x1 on left upper arm 03/08/2018     Priority: Medium     Benign essential tremor 01/24/2018     Priority: Medium     Overview:        Psychosexual dysfunction with inhibited sexual excitement 01/24/2018     Priority: Medium     Essential hypertension, benign 01/24/2018     Priority: Medium     History of type 2 diabetes mellitus 01/24/2018     Priority: Medium     Mixed hyperlipidemia 01/24/2018     Priority: Medium     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       History of throat cancer 12/13/2017     Priority: Medium     Prediabetes 12/13/2017     Priority: Medium     Partial tear of right rotator cuff 06/21/2017     Priority: Medium     Aftercare following surgery of the musculoskeletal system 06/21/2017     Priority: Medium     History of urinary retention 12/23/2016     Priority: Medium     Overview:   Postoperative       Atherosclerosis of coronary artery of native heart without angina pectoris, unspecified vessel or lesion type 10/03/2016     Priority: Medium     Dyslipidemia 06/02/2015     Priority: Medium     Personal history of malignant neoplasm of bladder 11/19/2014     Priority: Medium     History of ST elevation myocardial infarction (STEMI) 07/11/2013      Priority: Medium     ST elevation myocardial infarction (STEMI) of inferior wall (H) 07/05/2013     Priority: Medium     Other malignant neoplasm without specification of site 11/21/2011     Priority: Medium     Past Medical History:   Diagnosis Date     Chronic myeloproliferative disease (H)     questionable     Essential (primary) hypertension     No Comments Provided     Hyperlipidemia     No Comments Provided     Male erectile dysfunction     No Comments Provided     Malignant neoplasm of supraglottis (H)     2011,Radiation     Other chronic pancreatitis (H)     No Comments Provided     Personal history of irradiation     1/30/2012-3/2/2012     Personal history of malignant neoplasm of bladder     4/22/2015     Personal history of nicotine dependence     2ppd - quit 2000     Pneumonia     2001     ST elevation (STEMI) myocardial infarction (H)     7/5/2013,Integrity BMS to RCA done [facility]     No Comments Provided     Past Surgical History:   Procedure Laterality Date     APPENDECTOMY OPEN      1970     ARTHROSCOPY SHOULDER      11/2005,right shoulder     ARTHROSCOPY SHOULDER      1/2006,left shoulder     ARTHROSCOPY SHOULDER      2011,Left shoulder scope SAD, ac.  Open subscap, biceps     CHOLECYSTECTOMY      1994     COLONOSCOPY      02/2005     COLONOSCOPY      2/19/2015,no repeat due at this time     CYSTOSCOPY      Multiple times,Dr. Lyndon FRANK     HEART CATH, ANGIOPLASTY      07/05/2013,Bare-metal stent to dominant RCA     OTHER SURGICAL HISTORY      2004,548292,OTHER,for chronic pancreatitis     OTHER SURGICAL HISTORY      6/22/2007,PSHEB210,SHOULDER REPLACEMENT,Right,with biceps tenodesis by Dr. Mukesh Ayoub     OTHER SURGICAL HISTORY      1995/2002,,ERCP,xseveral, one with papillotomy     OTHER SURGICAL HISTORY      1/30/2012-3/2/2012,[id number],RADIATION TREATMENT     OTHER SURGICAL HISTORY      12/7/2011,64328,NECK/THORAX PROCEDURE,L modified radical neck surgery     OTHER SURGICAL  HISTORY      11/10/14,024386,OTHER,Dr. Lyndon FRANK     OTHER SURGICAL HISTORY      ,70993,REVISION EYELID,Dr. Farias     OTHER SURGICAL HISTORY      10/22/15,761986,OTHER     TONSILLECTOMY, ADENOIDECTOMY, COMBINED      194     VASECTOMY           Social History     Socioeconomic History     Marital status:      Spouse name: None     Number of children: None     Years of education: None     Highest education level: None   Occupational History     None   Social Needs     Financial resource strain: None     Food insecurity:     Worry: None     Inability: None     Transportation needs:     Medical: None     Non-medical: None   Tobacco Use     Smoking status: Former Smoker     Packs/day: 3.00     Years: 43.00     Pack years: 129.00     Types: Cigarettes     Last attempt to quit: 2000     Years since quittin.3     Smokeless tobacco: Never Used   Substance and Sexual Activity     Alcohol use: No     Alcohol/week: 0.0 oz     Drug use: No     Sexual activity: Yes     Partners: Female   Lifestyle     Physical activity:     Days per week: None     Minutes per session: None     Stress: None   Relationships     Social connections:     Talks on phone: None     Gets together: None     Attends Alevism service: None     Active member of club or organization: None     Attends meetings of clubs or organizations: None     Relationship status: None     Intimate partner violence:     Fear of current or ex partner: None     Emotionally abused: None     Physically abused: None     Forced sexual activity: None   Other Topics Concern     Parent/sibling w/ CABG, MI or angioplasty before 65F 55M? Not Asked   Social History Narrative    uRth Spouse   Patient is  with grown children.  [employer] at [employer] for 17 years     Family History   Problem Relation Age of Onset     Heart Disease Father 83        Heart Disease,CHF     Heart Disease Mother 80        Heart Disease,MI     Family History Negative Sister  "        Good Health     Heart Disease Sister         Heart Disease,pacemaker     Diabetes Son         Diabetes,Type 1     Breast Cancer Daughter         Cancer-breast     Prostate Cancer No family hx of         Cancer-prostate       EXAM:   Vitals:    05/02/19 1419   BP: 106/70   BP Location: Right arm   Patient Position: Sitting   Cuff Size: Adult Regular   Pulse: 68   Resp: 16   Temp: 97  F (36.1  C)   TempSrc: Tympanic   Weight: 81.4 kg (179 lb 6 oz)       Current Pain Score: No Pain (0)     BP Readings from Last 3 Encounters:   05/02/19 106/70   04/02/19 (!) 142/98   03/21/19 138/80      Wt Readings from Last 3 Encounters:   05/02/19 81.4 kg (179 lb 6 oz)   03/21/19 81.6 kg (179 lb 12.8 oz)   11/27/18 80.3 kg (177 lb 2 oz)      Estimated body mass index is 27.68 kg/m  as calculated from the following:    Height as of 3/21/19: 1.715 m (5' 7.5\").    Weight as of this encounter: 81.4 kg (179 lb 6 oz).     Physical Exam   Constitutional: He appears well-developed and well-nourished. No distress.   HENT:   Head: Normocephalic and atraumatic.   Eyes: Conjunctivae are normal. No scleral icterus.   Neck: Neck supple.   Cardiovascular: Normal rate and regular rhythm.   Pulmonary/Chest: Effort normal.   Abdominal: Soft. There is no tenderness.   Musculoskeletal: He exhibits no deformity.   Lymphadenopathy:     He has no cervical adenopathy.   Neurological: He is alert.   Skin: Skin is warm and dry. No rash noted. He is not diaphoretic.   Psychiatric: He has a normal mood and affect.      Procedures   INVESTIGATIONS:  Results for orders placed or performed in visit on 05/02/19   Lipid Profile   Result Value Ref Range    Cholesterol 110 <200 mg/dL    Triglycerides 119 <150 mg/dL    HDL Cholesterol 39 23 - 92 mg/dL    LDL Cholesterol Calculated 47 <100 mg/dL    Non HDL Cholesterol 71 <130 mg/dL   Hemoglobin A1c   Result Value Ref Range    Hemoglobin A1C 6.8 (H) 4.0 - 6.0 %   CBC with platelets   Result Value Ref Range    WBC " 7.6 4.0 - 11.0 10e9/L    RBC Count 4.29 (L) 4.4 - 5.9 10e12/L    Hemoglobin 14.2 13.3 - 17.7 g/dL    Hematocrit 41.5 40.0 - 53.0 %    MCV 97 78 - 100 fl    MCH 33.1 (H) 26.5 - 33.0 pg    MCHC 34.2 31.5 - 36.5 g/dL    RDW 12.3 10.0 - 15.0 %    Platelet Count 343 150 - 450 10e9/L   Comprehensive metabolic panel   Result Value Ref Range    Sodium 131 (L) 134 - 144 mmol/L    Potassium 5.0 3.5 - 5.1 mmol/L    Chloride 99 98 - 107 mmol/L    Carbon Dioxide 27 21 - 31 mmol/L    Anion Gap 5 3 - 14 mmol/L    Glucose 116 (H) 70 - 105 mg/dL    Urea Nitrogen 12 7 - 25 mg/dL    Creatinine 0.83 0.70 - 1.30 mg/dL    GFR Estimate 90 >60 mL/min/[1.73_m2]    GFR Estimate If Black >90 >60 mL/min/[1.73_m2]    Calcium 9.6 8.6 - 10.3 mg/dL    Bilirubin Total 0.4 0.3 - 1.0 mg/dL    Albumin 4.4 3.5 - 5.7 g/dL    Protein Total 6.3 (L) 6.4 - 8.9 g/dL    Alkaline Phosphatase 47 34 - 104 U/L    ALT 20 7 - 52 U/L    AST 21 13 - 39 U/L       ASSESSMENT AND PLAN:  Problem List Items Addressed This Visit        Endocrine    Mixed hyperlipidemia    Relevant Orders    Lipid Profile    Controlled type 2 diabetes mellitus without complication, without long-term current use of insulin (H)       Other    History of type 2 diabetes mellitus    Relevant Orders    Hemoglobin A1c      Other Visit Diagnoses     Diarrhea, unspecified type    -  Primary    Relevant Medications    loperamide (IMODIUM A-D) 2 MG tablet    Benign essential hypertension        Relevant Orders    Comprehensive metabolic panel    CBC with platelets        reviewed diet, exercise and weight control  -- Expected clinical course discussed    -- Medications and their side effects discussed    Patient Instructions   -- Try a Super-B-Complex with B12 -- every other day -- to help energy / mood and balance.     -- Consider Under the tongue / Liquid. ([employer])     Try to adjust the propranolol -- try to use 1 tablet in AM and 2 tablet in evening. May help with daytime tiredness.     Consider  imodium daily (adjust dose) -- for loose stools.     Consider daily fiber supplement (benefiber, metamucil).... Mix with water.     ? Food sensitivity or intolerance.... In addition to possible irritable bowel. Or pesticide sensitivity....   --- May need to consider food avoidance or limitation...     Keep a food journal.     If needed -- could try Questran powder -- to help with diarrhea.     Return as needed for follow-up with Dr. Sen.    Clinic : [phone]  Appointment line: [phone]     Jovanni Sen MD  Internal Medicine  [facility]     Portions of this note were dictated using speech recognition software. The note has been proofread but errors in the text may have been overlooked. Please contact me if there are any concerns regarding the accuracy of the dictation.

## 2019-05-02 NOTE — PATIENT INSTRUCTIONS
-- Try a Super-B-Complex with B12 -- every other day -- to help energy / mood and balance.     -- Consider Under the tongue / Liquid. ([employer])     Try to adjust the propranolol -- try to use 1 tablet in AM and 2 tablet in evening. May help with daytime tiredness.     Consider imodium daily (adjust dose) -- for loose stools.     Consider daily fiber supplement (benefiber, metamucil).... Mix with water.     ? Food sensitivity or intolerance.... In addition to possible irritable bowel. Or pesticide sensitivity....   --- May need to consider food avoidance or limitation...     Keep a food journal.     If needed -- could try Questran powder -- to help with diarrhea.     Return as needed for follow-up with Dr. Sen.    Clinic : [phone]  Appointment line: [phone]

## 2019-05-02 NOTE — LETTER
May 6, 2019      Rajesh Huynh  [address] [facility] [place] [address]        Dear ,    We are writing to inform you of your test results.    Hemoglobin A1c shows that you once again have diabetes.  6.5% or higher.    The rest of your labs look very good.    Resulted Orders   Lipid Profile   Result Value Ref Range    Cholesterol 110 <200 mg/dL    Triglycerides 119 <150 mg/dL    HDL Cholesterol 39 23 - 92 mg/dL    LDL Cholesterol Calculated 47 <100 mg/dL      Comment:      Desirable:       <100 mg/dl    Non HDL Cholesterol 71 <130 mg/dL   Hemoglobin A1c   Result Value Ref Range    Hemoglobin A1C 6.8 (H) 4.0 - 6.0 %   CBC with platelets   Result Value Ref Range    WBC 7.6 4.0 - 11.0 10e9/L    RBC Count 4.29 (L) 4.4 - 5.9 10e12/L    Hemoglobin 14.2 13.3 - 17.7 g/dL    Hematocrit 41.5 40.0 - 53.0 %    MCV 97 78 - 100 fl    MCH 33.1 (H) 26.5 - 33.0 pg    MCHC 34.2 31.5 - 36.5 g/dL    RDW 12.3 10.0 - 15.0 %    Platelet Count 343 150 - 450 10e9/L   Comprehensive metabolic panel   Result Value Ref Range    Sodium 131 (L) 134 - 144 mmol/L    Potassium 5.0 3.5 - 5.1 mmol/L    Chloride 99 98 - 107 mmol/L    Carbon Dioxide 27 21 - 31 mmol/L    Anion Gap 5 3 - 14 mmol/L    Glucose 116 (H) 70 - 105 mg/dL    Urea Nitrogen 12 7 - 25 mg/dL    Creatinine 0.83 0.70 - 1.30 mg/dL    GFR Estimate 90 >60 mL/min/[1.73_m2]    GFR Estimate If Black >90 >60 mL/min/[1.73_m2]    Calcium 9.6 8.6 - 10.3 mg/dL    Bilirubin Total 0.4 0.3 - 1.0 mg/dL    Albumin 4.4 3.5 - 5.7 g/dL    Protein Total 6.3 (L) 6.4 - 8.9 g/dL    Alkaline Phosphatase 47 34 - 104 U/L    ALT 20 7 - 52 U/L    AST 21 13 - 39 U/L       If you have any questions or concerns, please call the clinic at the number listed above.       Sincerely,        Jovanni Sen MD

## 2019-05-02 NOTE — NURSING NOTE
"Patient presents to the clinic for episodes of diarrhea on and off over the past year.  Patient and spouse feels that this is related to anxiety not food related.      Previous A1C is at goal of <8  No results found for: A1C  Urine microalbumin:creatine: n/a  Foot exam unknown-declines today  Eye exam 8-17-18    Tobacco User no  Patient is on a daily aspirin  Patient is on a Statin.  Blood pressure today of:     BP Readings from Last 1 Encounters:   04/02/19 (!) 142/98      is not at the goal of <139/89 for diabetics.      Chief Complaint   Patient presents with     Diarrhea       Initial /70 (BP Location: Right arm, Patient Position: Sitting, Cuff Size: Adult Regular)   Pulse 68   Temp 97  F (36.1  C) (Tympanic)   Resp 16   Wt 81.4 kg (179 lb 6 oz)   BMI 27.68 kg/m   Estimated body mass index is 27.68 kg/m  as calculated from the following:    Height as of 3/21/19: 1.715 m (5' 7.5\").    Weight as of this encounter: 81.4 kg (179 lb 6 oz).  Medication Reconciliation: complete    Kate Gar LPN          "

## 2019-05-03 PROBLEM — E11.9 CONTROLLED TYPE 2 DIABETES MELLITUS WITHOUT COMPLICATION, WITHOUT LONG-TERM CURRENT USE OF INSULIN (H): Status: ACTIVE | Noted: 2019-05-03

## 2019-05-03 ASSESSMENT — ANXIETY QUESTIONNAIRES: GAD7 TOTAL SCORE: 0

## 2019-08-19 ENCOUNTER — TRANSFERRED RECORDS (OUTPATIENT)
Dept: HEALTH INFORMATION MANAGEMENT | Facility: OTHER | Age: 79
End: 2019-08-19

## 2019-09-17 ENCOUNTER — OFFICE VISIT (OUTPATIENT)
Dept: OTOLARYNGOLOGY | Facility: OTHER | Age: 79
End: 2019-09-17
Attending: OTOLARYNGOLOGY
Payer: MEDICARE

## 2019-09-17 DIAGNOSIS — H90.3 SNHL (SENSORY-NEURAL HEARING LOSS), ASYMMETRICAL: Primary | ICD-10-CM

## 2019-09-17 PROCEDURE — G0463 HOSPITAL OUTPT CLINIC VISIT: HCPCS

## 2019-09-19 ENCOUNTER — TELEPHONE (OUTPATIENT)
Dept: INTERNAL MEDICINE | Facility: OTHER | Age: 79
End: 2019-09-19

## 2019-09-20 NOTE — PROGRESS NOTES
document embedded image  Patient Name:  Rajesh Huynh  YOB: 1940  MR Number:  [id number]  Acct Number: [id number]    cc: ~    ENT Progress Note    Date: 09/17/19    Visit Reasons: Hearing Loss Left Ear    HPI  HPI  HPI: Chief complaint:  Asymmetric hearing loss    History  The patient is a 79-year-old hearing aid user who is service connected for his hearing.  [facility]  [facility]  He recently was noted to have some asymmetry of his hearing and was sent here today to make sure further medical workup was not necessary.  He denies vertigo.    Exam  The external auditory canals and TMs are clear bilaterally  The remainder of the head neck exam is unremarkable  Our audiogram fails reveal significant asymmetry on the pure tone testing.  It shows a speech reception threshold of 35 decibels on the right and 40 decibels on the left.  He has an 88% discrimination score on the right with a 56% discrimination score on the left.    PFSH    PFSH:     Medical History (Updated 09/17/19 @ 11:35 by Gordon Edwards MD)    Diagnosis unknown (Acute)  No eCW History       A&P  Assessment & Plan  (1) Asymmetrical sensorineural hearing loss:         Status: Acute        Code(s):  H90.5 - Unspecified sensorineural hearing loss        We discussed the remote possibility of retrocochlear pathology.  I would simply advise a follow-up audiogram in 1 year.  The patient is in agreement.      Gordon Edwards MD              09/17/19 1137   <Electronically signed by Gordon Edwards MD> 09/17/19 1431

## 2019-10-02 ENCOUNTER — OFFICE VISIT (OUTPATIENT)
Dept: UROLOGY | Facility: OTHER | Age: 79
End: 2019-10-02
Attending: UROLOGY
Payer: MEDICARE

## 2019-10-02 VITALS
BODY MASS INDEX: 26.78 KG/M2 | TEMPERATURE: 95.3 F | HEART RATE: 64 BPM | WEIGHT: 170.6 LBS | RESPIRATION RATE: 14 BRPM | HEIGHT: 67 IN

## 2019-10-02 DIAGNOSIS — Z85.51 PERSONAL HISTORY OF MALIGNANT NEOPLASM OF BLADDER: ICD-10-CM

## 2019-10-02 DIAGNOSIS — R39.12 WEAK URINARY STREAM: ICD-10-CM

## 2019-10-02 DIAGNOSIS — Z85.51 HISTORY OF BLADDER CANCER: Primary | ICD-10-CM

## 2019-10-02 PROCEDURE — 52000 CYSTOURETHROSCOPY: CPT | Performed by: UROLOGY

## 2019-10-02 PROCEDURE — 99212 OFFICE O/P EST SF 10 MIN: CPT | Mod: 25 | Performed by: UROLOGY

## 2019-10-02 PROCEDURE — G0463 HOSPITAL OUTPT CLINIC VISIT: HCPCS | Mod: 25

## 2019-10-02 RX ORDER — TAMSULOSIN HYDROCHLORIDE 0.4 MG/1
0.4 CAPSULE ORAL EVERY EVENING
Qty: 90 CAPSULE | Refills: 3 | Status: SHIPPED | OUTPATIENT
Start: 2019-10-02 | End: 2020-08-05

## 2019-10-02 ASSESSMENT — MIFFLIN-ST. JEOR: SCORE: 1452.47

## 2019-10-02 ASSESSMENT — PAIN SCALES - GENERAL: PAINLEVEL: NO PAIN (0)

## 2019-10-02 NOTE — PROGRESS NOTES
Patient positioned in supine position, perineum area prepped with chlorhexidene Gluconate and patient draped per sterile technique. Per verbal order read back by Anthony Haney MD, Urojet 10mL 2% lidocaine jelly to be instilled into urethra.  Urojet- 10ml 2% Lidocaine jelly instilled into the urethra.    Urojet 2%  Lot#: [id number]  Expiration date: 05/21  : [employer]  NDC: [address]-1    [place] Protocol    A. Pre-procedure verification complete Yes  1-relevant information / documentation available, reviewed and properly matched to the patient; 2-consent accurate and complete, 3-equipment and supplies available    B. Site marking complete N/A  Site marked if not in continuous attendance with patient    C. TIME OUT completed Yes  Time Out was conducted just prior to starting procedure to verify the eight required elements: 1-patient identity, 2-consent accurate and complete, 3-position, 4-correct side/site marked (if applicable), 5-procedure, 6-relevant images / results properly labeled and displayed (if applicable), 7-antibiotics / irrigation fluids (if applicable), 8-safety precautions.    After procedure perineum area rinsed. Discharge instructions reviewed with patient. Patient verbalized understanding of discharge instructions and discharged ambulatory.  Lety Hummel RN..................10/2/2019  9:57 AM

## 2019-10-02 NOTE — PROGRESS NOTES
Preprocedure diagnosis  History of bladder cancer    Postprocedure diagnosis  History of bladder cancer    Procedure  Flexible Cystourethroscopy    Surgeon  Anthony Haney MD    Anesthesia  2% lidocaine jelly intraurethrally    Complications  None    Indications  The patient is undergoing a flexible cystoscopy for the above mentioned indications.    Pathology   I personally reviewed the pathology report   11/10/2014   Low grade Ta (muscularis propria was present in specimen)    Findings  Cystoscopic findings included a normal anterior urethra.    There was a prominent median lobe.    The lateral lobes were obstructive in appearance.  The bladder appeared to be normal capacity.    There were no tumors, stones or foreign bodies.    The orifices were slit-shaped and in their normal location.    Procedure  The patient was placed in supine position and prepped and draped in sterile fashion with lidocaine jelly per urethra for anesthesia.    I passed a lubricated 14F flexible cystoscope through the penile urethra and into the bladder and the bladder was completely visualized.  The cystoscope was retroflexed and the bladder neck and prostate visualized.    The cystoscope was slowly withdrawn while visualizing the urethra and the procedure terminated.    The patient tolerated the procedure well.      Assessment  History of bladder cancer -cystoscopy was negative    BPH with weak stream  -continue Flomax    Plan  Continue Flomax 0.4mg every evening  Follow up surveillance cystoscopy annually          I spent 11 minutes on this patient's visit (exclusive of separately billed services/procedures) and over half of this time was spent in face-to-face counseling regarding BPH with weak stream: treatment options with emphasis on  risks and benefits of each, prognosis and importance of compliance.

## 2019-10-02 NOTE — PATIENT INSTRUCTIONS

## 2019-10-25 PROBLEM — J44.1 COPD EXACERBATION (H): Status: RESOLVED | Noted: 2018-05-08 | Resolved: 2019-10-25

## 2019-10-25 PROBLEM — Z47.89 AFTERCARE FOLLOWING SURGERY OF THE MUSCULOSKELETAL SYSTEM: Status: RESOLVED | Noted: 2017-06-21 | Resolved: 2019-10-25

## 2019-10-25 PROBLEM — Z86.39 HISTORY OF TYPE 2 DIABETES MELLITUS: Status: RESOLVED | Noted: 2018-01-24 | Resolved: 2019-10-25

## 2019-10-25 PROBLEM — K86.1 PANCREATITIS, CHRONIC (H): Status: RESOLVED | Noted: 2018-01-24 | Resolved: 2019-10-25

## 2019-10-25 PROBLEM — F17.200 TOBACCO USE DISORDER: Status: RESOLVED | Noted: 2018-05-08 | Resolved: 2019-10-25

## 2019-10-25 PROBLEM — R73.03 PREDIABETES: Status: RESOLVED | Noted: 2017-12-13 | Resolved: 2019-10-25

## 2019-10-25 NOTE — PROGRESS NOTES
"Nursing Notes:   Kate Gar LPN  10/30/2019 10:10 AM  Signed  Patient presents to the clinic for skin check of the face.      Previous A1C is at goal of <8  Lab Results   Component Value Date    A1C 6.8 05/02/2019     Urine microalbumin:creatine: n/a  Foot exam over a year ago-declines today  Eye exam 08-19-19    Tobacco User no  Patient is on a daily aspirin  Patient is on a Statin.  Blood pressure today of:     BP Readings from Last 1 Encounters:   05/02/19 106/70      is at the goal of <139/89 for diabetics.     Chief Complaint   Patient presents with     Derm Problem       Initial /62 (BP Location: Right arm, Patient Position: Sitting, Cuff Size: Adult Regular)   Pulse 64   Temp 96.9  F (36.1  C) (Tympanic)   Resp 16   Wt 79.4 kg (175 lb 2 oz)   BMI 27.43 kg/m    Estimated body mass index is 27.43 kg/m  as calculated from the following:    Height as of 10/2/19: 1.702 m (5' 7\").    Weight as of this encounter: 79.4 kg (175 lb 2 oz).  Medication Reconciliation: complete    Kate Gar LPN      Nursing note reviewed with patient.  Accuracy and completeness verified.   Mr. Huynh is a 79 year old male who:  Patient presents with:  Derm Problem      ICD-10-CM    1. Controlled type 2 diabetes mellitus without complication, without long-term current use of insulin (H) E11.9 Albumin Random Urine Quantitative with Creat Ratio     Hemoglobin A1c     *UA reflex to Microscopic     metFORMIN (GLUCOPHAGE) 1000 MG tablet     blood glucose (NO BRAND SPECIFIED) test strip     blood glucose (NO BRAND SPECIFIED) lancets standard   2. Actinic keratoses - x1 right ear, x2 right temple, x1 left temple, x1 left per-auricular area, x1 left upper arm, x2 posterior scalp L57.0 DESTRUCT PREMALIGNANT LESION, 2-14     DESTRUCT PREMALIGNANT LESION, FIRST   3. Benign essential tremor G25.0    4. Benign essential hypertension I10 Comprehensive metabolic panel     CBC with platelets   5. Mixed hyperlipidemia E78.2 Lipid " Profile     atorvastatin (LIPITOR) 40 MG tablet   6. History of tobacco use Z87.891 albuterol (PROAIR HFA/PROVENTIL HFA/VENTOLIN HFA) 108 (90 Base) MCG/ACT inhaler   7. History of pancreatic surgery - [facility] Z98.890    8. Atherosclerosis of coronary artery of native heart without angina pectoris, unspecified vessel or lesion type I25.10    9. History of ST elevation myocardial infarction (STEMI) I25.2 primidone (MYSOLINE) 50 MG tablet     nitroGLYcerin (NITROSTAT) 0.4 MG sublingual tablet     propranolol (INDERAL) 20 MG tablet     HPI  Patient presents for follow-up of multiple issues.    Diabetes, currently well controlled.  Tolerating medication regimen.  Doing well with metformin.  Checking blood sugars approximately once daily.  Needs refills.  Check labs.    Precancerous skin lesions, numerous actinic keratoses again noted.  Has total of 8 lesions.  Treatment options reviewed and discussed, he would like to proceed with cryotherapy.  See below.    Essential tremor, well controlled with propanolol.  Did well with dose increased back in March.  He does still have some tremor with trying to write his name is not very legible but otherwise tremor is doing quite well.  No changes today.    Hypertension, actually blood pressures are a bit on the low side.  Has had some lightheadedness.  Currently on Flomax.  Discussed possibility of changing Flomax dosing around from evening dosing to morning dosing perhaps this will help with some of the lightheadedness.  Encouraged taking time when getting up out of a sitting or laying down position.  At this point he wants to continue with his current medications and will try to make a time of day dosing change.    History of tobacco use, intermittently using albuterol.  Needs refills.    Coronary artery disease, denies exertional chest pain heaviness or shortness of breath.  Continues with aspirin and Lipitor.  No issues at this time.    Functional Capacity: > 4 METS.   Review  of Systems   Constitutional: Positive for fatigue. Negative for chills and fever.   HENT: Negative for congestion and hearing loss.    Eyes: Negative for visual disturbance.   Respiratory: Negative for cough, shortness of breath and wheezing.    Cardiovascular: Negative for chest pain and palpitations.   Gastrointestinal: Positive for abdominal pain (+ intermittent) and diarrhea. Negative for nausea and vomiting.   Endocrine: Negative for cold intolerance and heat intolerance.   Genitourinary: Negative for dysuria and hematuria.   Musculoskeletal: Negative for arthralgias and myalgias.   Skin: Positive for rash. Negative for wound.        + Actinic keratoses - x1 right ear, x2 right temple, x1 left temple, x1 left per-auricular area, x1 left upper arm, x2 posterior scalp   Allergic/Immunologic: Negative for immunocompromised state.   Neurological: Positive for dizziness, tremors and light-headedness.        + some balance problems   Hematological: Does not bruise/bleed easily.   Psychiatric/Behavioral: Negative for agitation and confusion. The patient is nervous/anxious.       Problem List/PMH: reviewed in EMR, and made relevant updates today.  Medications: reviewed in EMR, and made relevant updates today.  Allergies: reviewed in EMR, and made relevant updates today.  I reviewed family and social history and made relevant updates today.  Social History     Tobacco Use     Smoking status: Former Smoker     Packs/day: 3.00     Years: 43.00     Pack years: 129.00     Types: Cigarettes     Last attempt to quit: 2000     Years since quittin.8     Smokeless tobacco: Never Used   Substance Use Topics     Alcohol use: No     Alcohol/week: 0.0 standard drinks     Drug use: No      Family History   Problem Relation Age of Onset     Heart Disease Father 83        Heart Disease,CHF     Heart Disease Mother 80        Heart Disease,MI     Family History Negative Sister         Good Health     Heart Disease Sister          "Heart Disease,pacemaker     Diabetes Son         Diabetes,Type 1     Breast Cancer Daughter         Cancer-breast     Prostate Cancer No family hx of         Cancer-prostate       EXAM:   Vitals:    10/30/19 0947   BP: 100/62   BP Location: Right arm   Patient Position: Sitting   Cuff Size: Adult Regular   Pulse: 64   Resp: 16   Temp: 96.9  F (36.1  C)   TempSrc: Tympanic   Weight: 79.4 kg (175 lb 2 oz)       Current Pain Score: Moderate Pain (5)     BP Readings from Last 3 Encounters:   10/30/19 100/62   05/02/19 106/70   04/02/19 (!) 142/98      Wt Readings from Last 3 Encounters:   10/30/19 79.4 kg (175 lb 2 oz)   10/02/19 77.4 kg (170 lb 9.6 oz)   05/02/19 81.4 kg (179 lb 6 oz)      Estimated body mass index is 27.43 kg/m  as calculated from the following:    Height as of 10/2/19: 1.702 m (5' 7\").    Weight as of this encounter: 79.4 kg (175 lb 2 oz).     Physical Exam  Constitutional:       General: He is not in acute distress.     Appearance: He is well-developed. He is not diaphoretic.   HENT:      Head: Normocephalic and atraumatic.   Eyes:      General: No scleral icterus.     Conjunctiva/sclera: Conjunctivae normal.   Neck:      Musculoskeletal: Neck supple.   Cardiovascular:      Rate and Rhythm: Normal rate and regular rhythm.   Pulmonary:      Effort: Pulmonary effort is normal.      Breath sounds: Normal breath sounds.   Abdominal:      Palpations: Abdomen is soft.      Tenderness: There is no tenderness.   Musculoskeletal:         General: No deformity.   Lymphadenopathy:      Cervical: No cervical adenopathy.   Skin:     General: Skin is warm and dry.      Findings: Rash present.      Comments: Actinic keratoses - x1 right ear, x2 right temple, x1 left temple, x1 left per-auricular area, x1 left upper arm, x2 posterior scalp    PROCEDURE:   Reviewed risks and benefits of cryotherapy.After informed consent was obtained, patient elected to proceed. These 8 lesions were treated today.   Performed " cryotherapy to #8 lesions x 2 rounds of 30 second freeze/thaw cycles.   Tolerated well. No obvious complications.   Return for signs of infection.    The patient and I reviewed safe sun practices today: the importance of staying out of the sun;especially between 10AM-2PM, wearing sun screen SPF > 30, and protective clothing.   We also discussed the ABC's of skin cancers including: changes in size, uniformity, borders, coloration, bleeding, or any irregularityor changes. If these occur, seek medical attention.   The patient should have a complete skin exam by a medical professional every 6-12 months, and any questionable/suspicious, or changing lesions should be removed.     Neurological:      General: No focal deficit present.      Mental Status: He is alert.   Psychiatric:         Mood and Affect: Mood normal.         Behavior: Behavior normal.        Procedures   INVESTIGATIONS:  -- see below.     ASSESSMENT AND PLAN:  Problem List Items Addressed This Visit        Nervous and Auditory    Benign essential tremor    Relevant Medications    primidone (MYSOLINE) 50 MG tablet       Endocrine    Mixed hyperlipidemia    Relevant Medications    atorvastatin (LIPITOR) 40 MG tablet    Other Relevant Orders    Lipid Profile    Controlled type 2 diabetes mellitus without complication, without long-term current use of insulin (H) - Primary    Relevant Medications    metFORMIN (GLUCOPHAGE) 1000 MG tablet    blood glucose (NO BRAND SPECIFIED) test strip    blood glucose (NO BRAND SPECIFIED) lancets standard    Other Relevant Orders    Albumin Random Urine Quantitative with Creat Ratio    Hemoglobin A1c    *UA reflex to Microscopic       Circulatory    Benign essential hypertension    Relevant Orders    Comprehensive metabolic panel    CBC with platelets    Atherosclerosis of coronary artery of native heart without angina pectoris, unspecified vessel or lesion type       Musculoskeletal and Integumentary    Actinic keratoses -      Relevant Orders    DESTRUCT PREMALIGNANT LESION, 2-14 (Completed)    DESTRUCT PREMALIGNANT LESION, FIRST (Completed)       Behavioral    History of tobacco use    Relevant Medications    albuterol (PROAIR HFA/PROVENTIL HFA/VENTOLIN HFA) 108 (90 Base) MCG/ACT inhaler       Other    History of ST elevation myocardial infarction (STEMI)    Relevant Medications    primidone (MYSOLINE) 50 MG tablet    nitroGLYcerin (NITROSTAT) 0.4 MG sublingual tablet    propranolol (INDERAL) 20 MG tablet    History of pancreatic surgery - [facility]        reviewed diet, exercise and weight control, cardiovascular risk and specific lipid/LDL goals reviewed, specific diabetic recommendations low cholesterol diet, weight control and daily exercise discussed, use of aspirin to prevent MI and TIA's discussed  -- Expected clinical course discussed    -- Medications and their side effects discussed    Patient Instructions     8 precancer lesions treated with cryotherapy today.  See previous home care instructions for home care details.    Consider not limiting your salt intake to try helping with maintaining slightly higher blood pressure.    Tremors are doing very well with propanolol.  Continue current dosing for now.    Flomax is likely contributing to some increased lightheadedness and dizziness when changing position or standing.    Will skip Flomax tonight and start taking it every morning.  See if this makes any difference with your position changes/lightheadedness.  If this makes it worse go back to evening dosing of Flomax.    Labs today.     Last labs:     Results for orders placed or performed in visit on 05/02/19   Lipid Profile   Result Value Ref Range    Cholesterol 110 <200 mg/dL    Triglycerides 119 <150 mg/dL    HDL Cholesterol 39 23 - 92 mg/dL    LDL Cholesterol Calculated 47 <100 mg/dL    Non HDL Cholesterol 71 <130 mg/dL   Hemoglobin A1c   Result Value Ref Range    Hemoglobin A1C 6.8 (H) 4.0 - 6.0 %   CBC with platelets    Result Value Ref Range    WBC 7.6 4.0 - 11.0 10e9/L    RBC Count 4.29 (L) 4.4 - 5.9 10e12/L    Hemoglobin 14.2 13.3 - 17.7 g/dL    Hematocrit 41.5 40.0 - 53.0 %    MCV 97 78 - 100 fl    MCH 33.1 (H) 26.5 - 33.0 pg    MCHC 34.2 31.5 - 36.5 g/dL    RDW 12.3 10.0 - 15.0 %    Platelet Count 343 150 - 450 10e9/L   Comprehensive metabolic panel   Result Value Ref Range    Sodium 131 (L) 134 - 144 mmol/L    Potassium 5.0 3.5 - 5.1 mmol/L    Chloride 99 98 - 107 mmol/L    Carbon Dioxide 27 21 - 31 mmol/L    Anion Gap 5 3 - 14 mmol/L    Glucose 116 (H) 70 - 105 mg/dL    Urea Nitrogen 12 7 - 25 mg/dL    Creatinine 0.83 0.70 - 1.30 mg/dL    GFR Estimate 90 >60 mL/min/[1.73_m2]    GFR Estimate If Black >90 >60 mL/min/[1.73_m2]    Calcium 9.6 8.6 - 10.3 mg/dL    Bilirubin Total 0.4 0.3 - 1.0 mg/dL    Albumin 4.4 3.5 - 5.7 g/dL    Protein Total 6.3 (L) 6.4 - 8.9 g/dL    Alkaline Phosphatase 47 34 - 104 U/L    ALT 20 7 - 52 U/L    AST 21 13 - 39 U/L        Return for Diabetes labs and clinic follow-up appointment every 3 to 4 months.  --- (Go for about 91 to 100 days)    Aspects of Diabetes we can improve:  Hemoglobin A1c Lab Results   Component Value Date    A1C 6.8 05/02/2019    Goal range is under 8. Best is 6.5 to 7   Blood Pressure 100/62 Goal to keep less than 140/90   Tobacco  reports that he quit smoking about 19 years ago. His smoking use included cigarettes. He has a 129.00 pack-year smoking history. He has never used smokeless tobacco. Goal to abstain from tobacco   Eye Exam -- Do Yearly -- Annual diabetic eye exam   Healthy weight Body mass index is 27.43 kg/m . Goal BMI under 30, best is under 25.      -- Trying to exercise daily (goal at least 20 min/day) with moderate aerobic activity   -- Eat healthy (resources from ADA at http://www.diabetes.org/)   -- Taking good care of my feet. Consider seeing the Podiatrist   -- Check blood sugars as directed, record in log book and bring to every appointment      Schedule  lab only appointment --- A few days AFTER: 1/28/20   Schedule clinic appointment with Dr. Sen -- Same day as labs, or 1-2 days later.     Insurance companies are now grading you and I on your blood sugar control -- Goal is to get your A1c down to 7.9% or lower and NO Smoking!    -- Medicare and most insurance companies, will only cover Hemoglobin A1c labs to be rechecked every 91+ days.      Hemoglobin A1C   Date Value Ref Range Status   05/02/2019 6.8 (H) 4.0 - 6.0 % Final       Next follow-up appointment with Dr. Sen should be scheduled:  -- Approximately a few days AFTER: 1/28/20      Jovanni Sen MD  Internal Medicine  [facility]     Portions of this note were dictated using speech recognition software. The note has been proofread but errors in the text may have been overlooked. Please contact me if there are any concerns regarding the accuracy of the dictation.

## 2019-10-30 ENCOUNTER — OFFICE VISIT (OUTPATIENT)
Dept: INTERNAL MEDICINE | Facility: OTHER | Age: 79
End: 2019-10-30
Attending: INTERNAL MEDICINE
Payer: MEDICARE

## 2019-10-30 VITALS
BODY MASS INDEX: 27.43 KG/M2 | WEIGHT: 175.13 LBS | TEMPERATURE: 96.9 F | RESPIRATION RATE: 16 BRPM | DIASTOLIC BLOOD PRESSURE: 62 MMHG | SYSTOLIC BLOOD PRESSURE: 100 MMHG | HEART RATE: 64 BPM

## 2019-10-30 DIAGNOSIS — E78.2 MIXED HYPERLIPIDEMIA: ICD-10-CM

## 2019-10-30 DIAGNOSIS — I25.10 ATHEROSCLEROSIS OF CORONARY ARTERY OF NATIVE HEART WITHOUT ANGINA PECTORIS, UNSPECIFIED VESSEL OR LESION TYPE: ICD-10-CM

## 2019-10-30 DIAGNOSIS — I25.2 HISTORY OF ST ELEVATION MYOCARDIAL INFARCTION (STEMI): ICD-10-CM

## 2019-10-30 DIAGNOSIS — E11.9 CONTROLLED TYPE 2 DIABETES MELLITUS WITHOUT COMPLICATION, WITHOUT LONG-TERM CURRENT USE OF INSULIN (H): Primary | ICD-10-CM

## 2019-10-30 DIAGNOSIS — Z87.891 HISTORY OF TOBACCO USE: ICD-10-CM

## 2019-10-30 DIAGNOSIS — L57.0 ACTINIC KERATOSES: ICD-10-CM

## 2019-10-30 DIAGNOSIS — Z98.890 HISTORY OF PANCREATIC SURGERY: ICD-10-CM

## 2019-10-30 DIAGNOSIS — G25.0 BENIGN ESSENTIAL TREMOR: ICD-10-CM

## 2019-10-30 DIAGNOSIS — I10 BENIGN ESSENTIAL HYPERTENSION: ICD-10-CM

## 2019-10-30 LAB
ALBUMIN SERPL-MCNC: 4.8 G/DL (ref 3.5–5.7)
ALBUMIN UR-MCNC: NEGATIVE MG/DL
ALP SERPL-CCNC: 51 U/L (ref 34–104)
ALT SERPL W P-5'-P-CCNC: 18 U/L (ref 7–52)
ANION GAP SERPL CALCULATED.3IONS-SCNC: 5 MMOL/L (ref 3–14)
APPEARANCE UR: CLEAR
AST SERPL W P-5'-P-CCNC: 19 U/L (ref 13–39)
BILIRUB SERPL-MCNC: 0.4 MG/DL (ref 0.3–1)
BILIRUB UR QL STRIP: NEGATIVE
BUN SERPL-MCNC: 14 MG/DL (ref 7–25)
CALCIUM SERPL-MCNC: 10.1 MG/DL (ref 8.6–10.3)
CHLORIDE SERPL-SCNC: 97 MMOL/L (ref 98–107)
CHOLEST SERPL-MCNC: 112 MG/DL
CO2 SERPL-SCNC: 30 MMOL/L (ref 21–31)
COLOR UR AUTO: YELLOW
CREAT SERPL-MCNC: 0.8 MG/DL (ref 0.7–1.3)
CREAT UR-MCNC: 136 MG/DL
ERYTHROCYTE [DISTWIDTH] IN BLOOD BY AUTOMATED COUNT: 12.5 % (ref 10–15)
GFR SERPL CREATININE-BSD FRML MDRD: >90 ML/MIN/{1.73_M2}
GLUCOSE SERPL-MCNC: 148 MG/DL (ref 70–105)
GLUCOSE UR STRIP-MCNC: NEGATIVE MG/DL
HBA1C MFR BLD: 6.7 % (ref 4–6)
HCT VFR BLD AUTO: 41.4 % (ref 40–53)
HDLC SERPL-MCNC: 43 MG/DL (ref 23–92)
HGB BLD-MCNC: 13.9 G/DL (ref 13.3–17.7)
HGB UR QL STRIP: NEGATIVE
KETONES UR STRIP-MCNC: ABNORMAL MG/DL
LDLC SERPL CALC-MCNC: 56 MG/DL
LEUKOCYTE ESTERASE UR QL STRIP: NEGATIVE
MCH RBC QN AUTO: 32.9 PG (ref 26.5–33)
MCHC RBC AUTO-ENTMCNC: 33.6 G/DL (ref 31.5–36.5)
MCV RBC AUTO: 98 FL (ref 78–100)
MICROALBUMIN UR-MCNC: 15 MG/L
MICROALBUMIN/CREAT UR: 10.74 MG/G CR (ref 0–17)
NITRATE UR QL: NEGATIVE
NONHDLC SERPL-MCNC: 69 MG/DL
PH UR STRIP: 6.5 PH (ref 5–9)
PLATELET # BLD AUTO: 337 10E9/L (ref 150–450)
POTASSIUM SERPL-SCNC: 4.6 MMOL/L (ref 3.5–5.1)
PROT SERPL-MCNC: 7.4 G/DL (ref 6.4–8.9)
RBC # BLD AUTO: 4.22 10E12/L (ref 4.4–5.9)
SODIUM SERPL-SCNC: 132 MMOL/L (ref 134–144)
SOURCE: ABNORMAL
SP GR UR STRIP: 1.01 (ref 1–1.03)
TRIGL SERPL-MCNC: 65 MG/DL
UROBILINOGEN UR STRIP-ACNC: 0.2 EU/DL (ref 0.2–1)
WBC # BLD AUTO: 7.7 10E9/L (ref 4–11)

## 2019-10-30 PROCEDURE — 17003 DESTRUCT PREMALG LES 2-14: CPT | Performed by: INTERNAL MEDICINE

## 2019-10-30 PROCEDURE — 83036 HEMOGLOBIN GLYCOSYLATED A1C: CPT | Mod: ZL | Performed by: INTERNAL MEDICINE

## 2019-10-30 PROCEDURE — G0463 HOSPITAL OUTPT CLINIC VISIT: HCPCS | Mod: 25

## 2019-10-30 PROCEDURE — 80061 LIPID PANEL: CPT | Mod: ZL | Performed by: INTERNAL MEDICINE

## 2019-10-30 PROCEDURE — G0463 HOSPITAL OUTPT CLINIC VISIT: HCPCS

## 2019-10-30 PROCEDURE — 99214 OFFICE O/P EST MOD 30 MIN: CPT | Mod: 25 | Performed by: INTERNAL MEDICINE

## 2019-10-30 PROCEDURE — 80053 COMPREHEN METABOLIC PANEL: CPT | Mod: ZL | Performed by: INTERNAL MEDICINE

## 2019-10-30 PROCEDURE — 17000 DESTRUCT PREMALG LESION: CPT | Performed by: INTERNAL MEDICINE

## 2019-10-30 PROCEDURE — 36415 COLL VENOUS BLD VENIPUNCTURE: CPT | Mod: ZL | Performed by: INTERNAL MEDICINE

## 2019-10-30 PROCEDURE — 85027 COMPLETE CBC AUTOMATED: CPT | Mod: ZL | Performed by: INTERNAL MEDICINE

## 2019-10-30 PROCEDURE — 81003 URINALYSIS AUTO W/O SCOPE: CPT | Mod: ZL | Performed by: INTERNAL MEDICINE

## 2019-10-30 PROCEDURE — 82043 UR ALBUMIN QUANTITATIVE: CPT | Mod: ZL | Performed by: INTERNAL MEDICINE

## 2019-10-30 RX ORDER — ALBUTEROL SULFATE 90 UG/1
2 AEROSOL, METERED RESPIRATORY (INHALATION) EVERY 6 HOURS PRN
Qty: 3 INHALER | Refills: 3 | Status: SHIPPED | OUTPATIENT
Start: 2019-10-30 | End: 2020-08-05

## 2019-10-30 RX ORDER — NITROGLYCERIN 0.4 MG/1
0.4 TABLET SUBLINGUAL EVERY 5 MIN PRN
Qty: 25 TABLET | Refills: 11 | Status: SHIPPED | OUTPATIENT
Start: 2019-10-30 | End: 2020-08-05

## 2019-10-30 RX ORDER — ATORVASTATIN CALCIUM 40 MG/1
40 TABLET, FILM COATED ORAL
Qty: 90 TABLET | Refills: 3 | Status: SHIPPED | OUTPATIENT
Start: 2019-10-30 | End: 2020-08-05

## 2019-10-30 RX ORDER — PROPRANOLOL HYDROCHLORIDE 20 MG/1
20 TABLET ORAL 3 TIMES DAILY
Qty: 270 TABLET | Refills: 3 | Status: SHIPPED | OUTPATIENT
Start: 2019-10-30 | End: 2020-08-05

## 2019-10-30 RX ORDER — PRIMIDONE 50 MG/1
100 TABLET ORAL AT BEDTIME
Qty: 180 TABLET | Refills: 3 | Status: SHIPPED | OUTPATIENT
Start: 2019-10-30 | End: 2020-08-05

## 2019-10-30 ASSESSMENT — ENCOUNTER SYMPTOMS
ABDOMINAL PAIN: 1
FEVER: 0
AGITATION: 0
TREMORS: 1
VOMITING: 0
ARTHRALGIAS: 0
PALPITATIONS: 0
SHORTNESS OF BREATH: 0
HEMATURIA: 0
MYALGIAS: 0
WHEEZING: 0
DIARRHEA: 1
CONFUSION: 0
WOUND: 0
NERVOUS/ANXIOUS: 1
CHILLS: 0
DYSURIA: 0
BRUISES/BLEEDS EASILY: 0
DIZZINESS: 1
NAUSEA: 0
COUGH: 0
LIGHT-HEADEDNESS: 1
FATIGUE: 1

## 2019-10-30 ASSESSMENT — ANXIETY QUESTIONNAIRES
6. BECOMING EASILY ANNOYED OR IRRITABLE: NOT AT ALL
2. NOT BEING ABLE TO STOP OR CONTROL WORRYING: NOT AT ALL
1. FEELING NERVOUS, ANXIOUS, OR ON EDGE: NOT AT ALL
IF YOU CHECKED OFF ANY PROBLEMS ON THIS QUESTIONNAIRE, HOW DIFFICULT HAVE THESE PROBLEMS MADE IT FOR YOU TO DO YOUR WORK, TAKE CARE OF THINGS AT HOME, OR GET ALONG WITH OTHER PEOPLE: NOT DIFFICULT AT ALL
GAD7 TOTAL SCORE: 0
3. WORRYING TOO MUCH ABOUT DIFFERENT THINGS: NOT AT ALL
7. FEELING AFRAID AS IF SOMETHING AWFUL MIGHT HAPPEN: NOT AT ALL
5. BEING SO RESTLESS THAT IT IS HARD TO SIT STILL: NOT AT ALL

## 2019-10-30 ASSESSMENT — PATIENT HEALTH QUESTIONNAIRE - PHQ9
5. POOR APPETITE OR OVEREATING: NOT AT ALL
SUM OF ALL RESPONSES TO PHQ QUESTIONS 1-9: 0

## 2019-10-30 ASSESSMENT — PAIN SCALES - GENERAL: PAINLEVEL: MODERATE PAIN (5)

## 2019-10-30 NOTE — NURSING NOTE
"Patient presents to the clinic for skin check of the face.      Previous A1C is at goal of <8  Lab Results   Component Value Date    A1C 6.8 05/02/2019     Urine microalbumin:creatine: n/a  Foot exam over a year ago-declines today  Eye exam 08-19-19    Tobacco User no  Patient is on a daily aspirin  Patient is on a Statin.  Blood pressure today of:     BP Readings from Last 1 Encounters:   05/02/19 106/70      is at the goal of <139/89 for diabetics.     Chief Complaint   Patient presents with     Derm Problem       Initial /62 (BP Location: Right arm, Patient Position: Sitting, Cuff Size: Adult Regular)   Pulse 64   Temp 96.9  F (36.1  C) (Tympanic)   Resp 16   Wt 79.4 kg (175 lb 2 oz)   BMI 27.43 kg/m   Estimated body mass index is 27.43 kg/m  as calculated from the following:    Height as of 10/2/19: 1.702 m (5' 7\").    Weight as of this encounter: 79.4 kg (175 lb 2 oz).  Medication Reconciliation: complete    Kate Gar LPN      "

## 2019-10-30 NOTE — LETTER
October 30, 2019      Rajesh Huynh  [address] [place] [address]        Dear ,    We are writing to inform you of your test results.    Labs are stable.  Continue current medications.    Resulted Orders   Lipid Profile   Result Value Ref Range    Cholesterol 112 <200 mg/dL    Triglycerides 65 <150 mg/dL    HDL Cholesterol 43 23 - 92 mg/dL    LDL Cholesterol Calculated 56 <100 mg/dL      Comment:      Desirable:       <100 mg/dl    Non HDL Cholesterol 69 <130 mg/dL   Hemoglobin A1c   Result Value Ref Range    Hemoglobin A1C 6.7 (H) 4.0 - 6.0 %   CBC with platelets   Result Value Ref Range    WBC 7.7 4.0 - 11.0 10e9/L    RBC Count 4.22 (L) 4.4 - 5.9 10e12/L    Hemoglobin 13.9 13.3 - 17.7 g/dL    Hematocrit 41.4 40.0 - 53.0 %    MCV 98 78 - 100 fl    MCH 32.9 26.5 - 33.0 pg    MCHC 33.6 31.5 - 36.5 g/dL    RDW 12.5 10.0 - 15.0 %    Platelet Count 337 150 - 450 10e9/L   Comprehensive metabolic panel   Result Value Ref Range    Sodium 132 (L) 134 - 144 mmol/L    Potassium 4.6 3.5 - 5.1 mmol/L    Chloride 97 (L) 98 - 107 mmol/L    Carbon Dioxide 30 21 - 31 mmol/L    Anion Gap 5 3 - 14 mmol/L    Glucose 148 (H) 70 - 105 mg/dL    Urea Nitrogen 14 7 - 25 mg/dL    Creatinine 0.80 0.70 - 1.30 mg/dL    GFR Estimate >90 >60 mL/min/[1.73_m2]    GFR Estimate If Black >90 >60 mL/min/[1.73_m2]    Calcium 10.1 8.6 - 10.3 mg/dL    Bilirubin Total 0.4 0.3 - 1.0 mg/dL    Albumin 4.8 3.5 - 5.7 g/dL    Protein Total 7.4 6.4 - 8.9 g/dL    Alkaline Phosphatase 51 34 - 104 U/L    ALT 18 7 - 52 U/L    AST 19 13 - 39 U/L   *UA reflex to Microscopic   Result Value Ref Range    Color Urine Yellow     Appearance Urine Clear     Glucose Urine Negative NEG^Negative mg/dL    Bilirubin Urine Negative NEG^Negative    Ketones Urine Trace (A) NEG^Negative mg/dL    Specific Gravity Urine 1.015 1.000 - 1.030    Blood Urine Negative NEG^Negative    pH Urine 6.5 5.0 - 9.0 pH    Protein Albumin Urine Negative NEG^Negative mg/dL     Urobilinogen Urine 0.2 0.2 - 1.0 EU/dL    Nitrite Urine Negative NEG^Negative    Leukocyte Esterase Urine Negative NEG^Negative    Source Midstream Urine    Albumin Random Urine Quantitative with Creat Ratio   Result Value Ref Range    Creatinine Urine 136 mg/dL    Albumin Urine mg/L 15 mg/L    Albumin Urine mg/g Cr 10.74 0 - 17 mg/g Cr       If you have any questions or concerns, please call the clinic at the number listed above.       Sincerely,        Jovanni Sen MD

## 2019-10-30 NOTE — PATIENT INSTRUCTIONS
8 precancer lesions treated with cryotherapy today.  See previous home care instructions for home care details.    Consider not limiting your salt intake to try helping with maintaining slightly higher blood pressure.    Tremors are doing very well with propanolol.  Continue current dosing for now.    Flomax is likely contributing to some increased lightheadedness and dizziness when changing position or standing.    Will skip Flomax tonight and start taking it every morning.  See if this makes any difference with your position changes/lightheadedness.  If this makes it worse go back to evening dosing of Flomax.    Labs today.     Last labs:     Results for orders placed or performed in visit on 05/02/19   Lipid Profile   Result Value Ref Range    Cholesterol 110 <200 mg/dL    Triglycerides 119 <150 mg/dL    HDL Cholesterol 39 23 - 92 mg/dL    LDL Cholesterol Calculated 47 <100 mg/dL    Non HDL Cholesterol 71 <130 mg/dL   Hemoglobin A1c   Result Value Ref Range    Hemoglobin A1C 6.8 (H) 4.0 - 6.0 %   CBC with platelets   Result Value Ref Range    WBC 7.6 4.0 - 11.0 10e9/L    RBC Count 4.29 (L) 4.4 - 5.9 10e12/L    Hemoglobin 14.2 13.3 - 17.7 g/dL    Hematocrit 41.5 40.0 - 53.0 %    MCV 97 78 - 100 fl    MCH 33.1 (H) 26.5 - 33.0 pg    MCHC 34.2 31.5 - 36.5 g/dL    RDW 12.3 10.0 - 15.0 %    Platelet Count 343 150 - 450 10e9/L   Comprehensive metabolic panel   Result Value Ref Range    Sodium 131 (L) 134 - 144 mmol/L    Potassium 5.0 3.5 - 5.1 mmol/L    Chloride 99 98 - 107 mmol/L    Carbon Dioxide 27 21 - 31 mmol/L    Anion Gap 5 3 - 14 mmol/L    Glucose 116 (H) 70 - 105 mg/dL    Urea Nitrogen 12 7 - 25 mg/dL    Creatinine 0.83 0.70 - 1.30 mg/dL    GFR Estimate 90 >60 mL/min/[1.73_m2]    GFR Estimate If Black >90 >60 mL/min/[1.73_m2]    Calcium 9.6 8.6 - 10.3 mg/dL    Bilirubin Total 0.4 0.3 - 1.0 mg/dL    Albumin 4.4 3.5 - 5.7 g/dL    Protein Total 6.3 (L) 6.4 - 8.9 g/dL    Alkaline Phosphatase 47 34 - 104 U/L    ALT  20 7 - 52 U/L    AST 21 13 - 39 U/L        Return for Diabetes labs and clinic follow-up appointment every 3 to 4 months.  --- (Go for about 91 to 100 days)    Aspects of Diabetes we can improve:  Hemoglobin A1c Lab Results   Component Value Date    A1C 6.8 05/02/2019    Goal range is under 8. Best is 6.5 to 7   Blood Pressure 100/62 Goal to keep less than 140/90   Tobacco  reports that he quit smoking about 19 years ago. His smoking use included cigarettes. He has a 129.00 pack-year smoking history. He has never used smokeless tobacco. Goal to abstain from tobacco   Eye Exam -- Do Yearly -- Annual diabetic eye exam   Healthy weight Body mass index is 27.43 kg/m . Goal BMI under 30, best is under 25.      -- Trying to exercise daily (goal at least 20 min/day) with moderate aerobic activity   -- Eat healthy (resources from ADA at http://www.diabetes.org/)   -- Taking good care of my feet. Consider seeing the Podiatrist   -- Check blood sugars as directed, record in log book and bring to every appointment      Schedule lab only appointment --- A few days AFTER: 1/28/20   Schedule clinic appointment with Dr. Sen -- Same day as labs, or 1-2 days later.     Insurance companies are now grading you and I on your blood sugar control -- Goal is to get your A1c down to 7.9% or lower and NO Smoking!    -- Medicare and most insurance companies, will only cover Hemoglobin A1c labs to be rechecked every 91+ days.      Hemoglobin A1C   Date Value Ref Range Status   05/02/2019 6.8 (H) 4.0 - 6.0 % Final       Next follow-up appointment with Dr. Sen should be scheduled:  -- Approximately a few days AFTER: 1/28/20

## 2019-10-31 ASSESSMENT — ANXIETY QUESTIONNAIRES: GAD7 TOTAL SCORE: 0

## 2019-11-12 ENCOUNTER — TELEPHONE (OUTPATIENT)
Dept: INTERNAL MEDICINE | Facility: OTHER | Age: 79
End: 2019-11-12

## 2019-11-12 NOTE — PROGRESS NOTES
"Nursing Notes:   Malena Cruz LPN  11/13/2019  9:12 AM  Signed  Chief Complaint   Patient presents with     Diarrhea     Pt states he's had persistent diarrhea for 2+ months       Initial /76 (BP Location: Right arm, Patient Position: Sitting, Cuff Size: Adult Regular)   Pulse 67   Temp 98  F (36.7  C) (Tympanic)   Resp 16   Ht 1.702 m (5' 7.01\")   Wt 79.2 kg (174 lb 9.6 oz)   SpO2 95%   BMI 27.34 kg/m    Estimated body mass index is 27.34 kg/m  as calculated from the following:    Height as of this encounter: 1.702 m (5' 7.01\").    Weight as of this encounter: 79.2 kg (174 lb 9.6 oz).  Medication Reconciliation: complete    Malena Cruz LPN on 11/13/2019 at 8:56 AM    Nursing note reviewed with patient.  Accuracy and completeness verified.   Mr. Huynh is a 79 year old male who:  Patient presents with:  Diarrhea: Pt states he's had persistent diarrhea for 2+ months      ICD-10-CM    1. Intermittent diarrhea R19.7 Elastase Fecal     Fat Stool Qual Random Collection     Wheat Dextrin (BENEFIBER) POWD     HPI  Patient comes in with his wife for evaluation of bowel issues.  He has been having loose stools, he will take Imodium, no bowel movements for 3 or 4 days and then takes a stool softener and then he gets diarrhea again.  He has been having rebounding effects like this going on for the past couple of months.  No blood in the stool.  No abnormal weight loss or poor appetite.  No night sweats.    He was on fiber supplement at one time but has stopped taking it.  Recommend he go back onto this hopefully this will help level out his loose stools and constipation issues.  Advised to limits Imodium use as much as possible.  Recommend getting smallest dose of tablets and even taking a 1/2 or 1/4 if needed, if going on a long road trip.    Daughter is worried about pancreatic insufficiency.  Check stool labs.    Otherwise doing well.  No other med changes today.  Additional testing or treatment based on " results of above.    Thinks that he does have some normal stools at times but he is having so many up-and-down symptoms waxing and waning from constipation to diarrhea that he is not really sure anymore if there is food related associations.    Functional Capacity: about 4 METS.   Review of Systems   Constitutional: Positive for fatigue. Negative for chills and fever.   HENT: Negative for congestion and hearing loss.    Eyes: Negative for visual disturbance.   Respiratory: Negative for cough, shortness of breath and wheezing.    Cardiovascular: Negative for chest pain and palpitations.   Gastrointestinal: Positive for abdominal pain (+ intermittent) and diarrhea. Negative for nausea and vomiting.   Endocrine: Negative for cold intolerance and heat intolerance.   Genitourinary: Negative for dysuria and hematuria.   Musculoskeletal: Negative for arthralgias and myalgias.   Skin: Negative for rash and wound.   Allergic/Immunologic: Negative for immunocompromised state.   Neurological: Positive for dizziness, tremors and light-headedness.        + some balance problems   Hematological: Does not bruise/bleed easily.   Psychiatric/Behavioral: Negative for agitation and confusion. The patient is nervous/anxious.         Problem List/PMH: reviewed in EMR, and made relevant updates today.  Medications: reviewed in EMR, and made relevant updates today.  Allergies: reviewed in EMR, and made relevant updates today.  I reviewed family and social history and made relevant updates today.  Social History     Tobacco Use     Smoking status: Former Smoker     Packs/day: 3.00     Years: 43.00     Pack years: 129.00     Types: Cigarettes     Last attempt to quit: 2000     Years since quittin.8     Smokeless tobacco: Never Used   Substance Use Topics     Alcohol use: No     Alcohol/week: 0.0 standard drinks     Drug use: No      Family History   Problem Relation Age of Onset     Heart Disease Father 83        Heart Disease,CHF  "    Heart Disease Mother 80        Heart Disease,MI     Family History Negative Sister         Good Health     Heart Disease Sister         Heart Disease,pacemaker     Diabetes Son         Diabetes,Type 1     Breast Cancer Daughter         Cancer-breast     Prostate Cancer No family hx of         Cancer-prostate       EXAM:   Vitals:    11/13/19 0853   BP: 110/76   BP Location: Right arm   Patient Position: Sitting   Cuff Size: Adult Regular   Pulse: 67   Resp: 16   Temp: 98  F (36.7  C)   TempSrc: Tympanic   SpO2: 95%   Weight: 79.2 kg (174 lb 9.6 oz)   Height: 1.702 m (5' 7.01\")       Current Pain Score: No Pain (0)     BP Readings from Last 3 Encounters:   11/13/19 110/76   10/30/19 100/62   05/02/19 106/70      Wt Readings from Last 3 Encounters:   11/13/19 79.2 kg (174 lb 9.6 oz)   10/30/19 79.4 kg (175 lb 2 oz)   10/02/19 77.4 kg (170 lb 9.6 oz)      Estimated body mass index is 27.34 kg/m  as calculated from the following:    Height as of this encounter: 1.702 m (5' 7.01\").    Weight as of this encounter: 79.2 kg (174 lb 9.6 oz).     Physical Exam  Constitutional:       General: He is not in acute distress.     Appearance: He is well-developed. He is not diaphoretic.   HENT:      Head: Normocephalic and atraumatic.   Eyes:      General: No scleral icterus.     Conjunctiva/sclera: Conjunctivae normal.   Neck:      Musculoskeletal: Neck supple.   Cardiovascular:      Rate and Rhythm: Normal rate and regular rhythm.   Pulmonary:      Effort: Pulmonary effort is normal.      Breath sounds: Normal breath sounds.   Abdominal:      Palpations: Abdomen is soft.      Tenderness: There is no abdominal tenderness.   Musculoskeletal:         General: No deformity.   Lymphadenopathy:      Cervical: No cervical adenopathy.   Skin:     General: Skin is warm and dry.      Findings: No rash.   Neurological:      General: No focal deficit present.      Mental Status: He is alert.   Psychiatric:         Mood and Affect: Mood " normal.         Behavior: Behavior normal.          Procedures   INVESTIGATIONS:  No results found for any visits on 11/13/19.    ASSESSMENT AND PLAN:  Problem List Items Addressed This Visit     None      Visit Diagnoses     Intermittent diarrhea    -  Primary    Relevant Medications    Wheat Dextrin (BENEFIBER) POWD    Other Relevant Orders    Elastase Fecal    Fat Stool Qual Random Collection        reviewed diet, exercise and weight control, recommended sodium restriction  -- Expected clinical course discussed    -- Medications and their side effects discussed    Patient Instructions   1. Intermittent diarrhea    Bring in stool sample for pancreas testing.     - Elastase Fecal; Future  - Fat Stool Qual Random Collection; Future    Start Benefiber or Metamucil... use as little as needed... regularly, for diarrhea control.     - Wheat Dextrin (BENEFIBER) POWD; Take 1-3 teaspoonful (3.5-10.5 g) by mouth 2 times daily as needed (Adjust dose as needed to control diarrhea)    Get some imodium tablets.... okay to use as little dose as possible to prevent constipation.     Return as needed for follow-up with Dr. Sen.    Clinic : [phone]  Appointment line: [phone]    Jovanni Sen MD  Internal Medicine  [facility]     Portions of this note were dictated using speech recognition software. The note has been proofread but errors in the text may have been overlooked. Please contact me if there are any concerns regarding the accuracy of the dictation.

## 2019-11-12 NOTE — TELEPHONE ENCOUNTER
Patient would like a work in today for diarrhea.     Angelique Peñaloza on 11/12/2019 at 8:22 AM

## 2019-11-12 NOTE — TELEPHONE ENCOUNTER
Patient is calling with concerns about diarrhea.  Patient had 5 episodes already today.  Patient has not taken any Immodium because he is afraid of constipation.  Diarrhea has been present on and off over the past 1-2 weeks.  Patient thinks that this is related to his diet but can not identify which foods trigger this.  Patient has not tried any changes in his diet or increased his fluid intake.  Patient denies any pain at this time.    Patient's daughter think patient might have Pancreatic Insufficiency-EPI.      Kate Gar LPN 11/12/2019 9:00 AM

## 2019-11-12 NOTE — TELEPHONE ENCOUNTER
Spoke with patient's wife.  He is unable to come in today but an appointment was made for tomorrow 11.13.2019 @ 8:40am with Dr. Sen.  Theresa Mckenzie on 11/12/2019 at 3:45 PM

## 2019-11-13 ENCOUNTER — OFFICE VISIT (OUTPATIENT)
Dept: INTERNAL MEDICINE | Facility: OTHER | Age: 79
End: 2019-11-13
Attending: INTERNAL MEDICINE
Payer: MEDICARE

## 2019-11-13 VITALS
OXYGEN SATURATION: 95 % | TEMPERATURE: 98 F | HEIGHT: 67 IN | DIASTOLIC BLOOD PRESSURE: 76 MMHG | RESPIRATION RATE: 16 BRPM | SYSTOLIC BLOOD PRESSURE: 110 MMHG | HEART RATE: 67 BPM | WEIGHT: 174.6 LBS | BODY MASS INDEX: 27.4 KG/M2

## 2019-11-13 DIAGNOSIS — R19.7 INTERMITTENT DIARRHEA: Primary | ICD-10-CM

## 2019-11-13 PROCEDURE — 99213 OFFICE O/P EST LOW 20 MIN: CPT | Performed by: INTERNAL MEDICINE

## 2019-11-13 PROCEDURE — G0463 HOSPITAL OUTPT CLINIC VISIT: HCPCS

## 2019-11-13 RX ORDER — WHEAT DEXTRIN 3 G/3.8 G
1-3 POWDER (GRAM) ORAL 2 TIMES DAILY PRN
COMMUNITY
Start: 2019-11-13 | End: 2020-08-05

## 2019-11-13 ASSESSMENT — ENCOUNTER SYMPTOMS
NAUSEA: 0
AGITATION: 0
FATIGUE: 1
VOMITING: 0
TREMORS: 1
WHEEZING: 0
CHILLS: 0
PALPITATIONS: 0
DIARRHEA: 1
WOUND: 0
ARTHRALGIAS: 0
ABDOMINAL PAIN: 1
LIGHT-HEADEDNESS: 1
COUGH: 0
HEMATURIA: 0
BRUISES/BLEEDS EASILY: 0
NERVOUS/ANXIOUS: 1
CONFUSION: 0
DIZZINESS: 1
FEVER: 0
SHORTNESS OF BREATH: 0
DYSURIA: 0
MYALGIAS: 0

## 2019-11-13 ASSESSMENT — MIFFLIN-ST. JEOR: SCORE: 1470.73

## 2019-11-13 ASSESSMENT — PAIN SCALES - GENERAL: PAINLEVEL: NO PAIN (0)

## 2019-11-13 NOTE — NURSING NOTE
"Chief Complaint   Patient presents with     Diarrhea     Pt states he's had persistent diarrhea for 2+ months       Initial /76 (BP Location: Right arm, Patient Position: Sitting, Cuff Size: Adult Regular)   Pulse 67   Temp 98  F (36.7  C) (Tympanic)   Resp 16   Ht 1.702 m (5' 7.01\")   Wt 79.2 kg (174 lb 9.6 oz)   SpO2 95%   BMI 27.34 kg/m   Estimated body mass index is 27.34 kg/m  as calculated from the following:    Height as of this encounter: 1.702 m (5' 7.01\").    Weight as of this encounter: 79.2 kg (174 lb 9.6 oz).  Medication Reconciliation: complete    Malena Cruz LPN on 11/13/2019 at 8:56 AM    "

## 2019-11-13 NOTE — PATIENT INSTRUCTIONS
1. Intermittent diarrhea    Bring in stool sample for pancreas testing.     - Elastase Fecal; Future  - Fat Stool Qual Random Collection; Future    Start Benefiber or Metamucil... use as little as needed... regularly, for diarrhea control.     - Wheat Dextrin (BENEFIBER) POWD; Take 1-3 teaspoonful (3.5-10.5 g) by mouth 2 times daily as needed (Adjust dose as needed to control diarrhea)    Get some imodium tablets.... okay to use as little dose as possible to prevent constipation.     Return as needed for follow-up with Dr. Sen.    Clinic : [phone]  Appointment line: [phone] b

## 2019-11-21 DIAGNOSIS — R19.7 INTERMITTENT DIARRHEA: ICD-10-CM

## 2019-11-21 PROCEDURE — 82705 FATS/LIPIDS FECES QUAL: CPT | Mod: ZL | Performed by: INTERNAL MEDICINE

## 2019-11-21 PROCEDURE — 82656 EL-1 FECAL QUAL/SEMIQ: CPT | Mod: ZL | Performed by: INTERNAL MEDICINE

## 2019-11-22 DIAGNOSIS — K86.81 EXOCRINE PANCREATIC INSUFFICIENCY: Primary | ICD-10-CM

## 2019-11-22 LAB — ELASTASE PANC STL-MCNT: 23.8 UG/G

## 2019-11-23 LAB
FAT STL QL: NORMAL
NEUTRAL FAT STL QL: NORMAL

## 2019-12-19 NOTE — PROGRESS NOTES
"Nursing Notes:   Kate Gar LPN  12/20/2019 10:06 AM  Signed  Patient presents to the clinic for 1 month follow up.      Previous A1C is at goal of <8  Lab Results   Component Value Date    A1C 6.5 12/20/2019    A1C 6.7 10/30/2019    A1C 6.8 05/02/2019     Urine microalbumin:creatine: n/a  Foot exam over year ago-declines today  Eye exam 8-19-19    Tobacco User no  Patient is on a daily aspirin  Patient is on a Statin.  Blood pressure today of:     BP Readings from Last 1 Encounters:   11/13/19 110/76      is at the goal of <139/89 for diabetics.    Chief Complaint   Patient presents with     Clinic Care Coordination - Follow-up       Initial /70 (BP Location: Right arm, Patient Position: Sitting, Cuff Size: Adult Regular)   Pulse 60   Temp 97.5  F (36.4  C) (Tympanic)   Resp 20   Wt 78.6 kg (173 lb 4 oz)   BMI 27.13 kg/m    Estimated body mass index is 27.13 kg/m  as calculated from the following:    Height as of 11/13/19: 1.702 m (5' 7.01\").    Weight as of this encounter: 78.6 kg (173 lb 4 oz).  Medication Reconciliation: complete    Kate Gar LPN          Nursing note reviewed with patient.  Accuracy and completeness verified.   Mr. Huynh is a 79 year old male who:  Patient presents with:  Clinic Care Coordination - Follow-up      ICD-10-CM    1. Exocrine pancreatic insufficiency - Severe Deficiency K86.81 amylase-lipase-protease (CREON) 58567-85419 units CPEP per EC capsule   2. Controlled type 2 diabetes mellitus without complication, without long-term current use of insulin (H) E11.9 blood glucose (NO BRAND SPECIFIED) test strip     blood glucose (NO BRAND SPECIFIED) lancets standard   3. Benign essential hypertension I10      HPI  Patient presents for one-month follow-up.  He had stool testing done that showed severe exocrine pancreatic insufficiency.  Creon has been very helpful.  It is very expensive however.  He paid $291 for his 1 month supply.  He was not given enough medications " to be used with snacks.  He would like to try taking up to 4 doses per day.  He has been using 2 capsules per dose.  Still having some loose stools but overall much improved.  New prescription was completed today with dose adjustments of 1 to 3 capsules up to 4 times daily.  Prescription printed.  He would like to check at different pharmacy for potentially a cheaper formulation.    Type 2 diabetes, currently well controlled.  Tolerating current medication regimen.  Doing well with metformin only.  Kidney function is stable.  Cholesterol is well controlled.  Needs updated testing strips and lancets.  States his blood sugars are typically running right around 100.    Hypertension, currently well controlled.  Tolerating current medication regimen.  No changes today.  Creatinine is stable.    Functional Capacity: > 4 METS.   Review of Systems   Constitutional: Positive for fatigue. Negative for chills and fever.   HENT: Negative for congestion and hearing loss.    Eyes: Negative for visual disturbance.   Respiratory: Negative for cough, shortness of breath and wheezing.    Cardiovascular: Negative for chest pain and palpitations.   Gastrointestinal: Positive for abdominal pain (+ intermittent) and diarrhea (  + much improved with Creon). Negative for nausea and vomiting.   Endocrine: Negative for cold intolerance and heat intolerance.   Genitourinary: Negative for dysuria and hematuria.   Musculoskeletal: Negative for arthralgias and myalgias.   Skin: Negative for rash and wound.   Allergic/Immunologic: Negative for immunocompromised state.   Neurological: Positive for dizziness, tremors and light-headedness.        + some balance problems   Hematological: Does not bruise/bleed easily.   Psychiatric/Behavioral: Positive for confusion. Negative for agitation. The patient is nervous/anxious.         Problem List/PMH: reviewed in EMR, and made relevant updates today.  Medications: reviewed in EMR, and made relevant  "updates today.  Allergies: reviewed in EMR, and made relevant updates today.  I reviewed family and social history and made relevant updates today.  Social History     Tobacco Use     Smoking status: Former Smoker     Packs/day: 3.00     Years: 43.00     Pack years: 129.00     Types: Cigarettes     Last attempt to quit: 2000     Years since quittin.9     Smokeless tobacco: Never Used   Substance Use Topics     Alcohol use: No     Alcohol/week: 0.0 standard drinks     Drug use: No      Family History   Problem Relation Age of Onset     Heart Disease Father 83        Heart Disease,CHF     Heart Disease Mother 80        Heart Disease,MI     Family History Negative Sister         Good Health     Heart Disease Sister         Heart Disease,pacemaker     Diabetes Son         Diabetes,Type 1     Breast Cancer Daughter         Cancer-breast     Prostate Cancer No family hx of         Cancer-prostate       EXAM:   Vitals:    19 0947   BP: 122/70   BP Location: Right arm   Patient Position: Sitting   Cuff Size: Adult Regular   Pulse: 60   Resp: 20   Temp: 97.5  F (36.4  C)   TempSrc: Tympanic   Weight: 78.6 kg (173 lb 4 oz)       Current Pain Score: No Pain (0)     BP Readings from Last 3 Encounters:   19 122/70   19 110/76   10/30/19 100/62      Wt Readings from Last 3 Encounters:   19 78.6 kg (173 lb 4 oz)   19 79.2 kg (174 lb 9.6 oz)   10/30/19 79.4 kg (175 lb 2 oz)      Estimated body mass index is 27.13 kg/m  as calculated from the following:    Height as of 19: 1.702 m (5' 7.01\").    Weight as of this encounter: 78.6 kg (173 lb 4 oz).     Physical Exam  Constitutional:       General: He is not in acute distress.     Appearance: He is well-developed. He is not diaphoretic.   HENT:      Head: Normocephalic and atraumatic.   Eyes:      General: No scleral icterus.     Conjunctiva/sclera: Conjunctivae normal.   Neck:      Musculoskeletal: Neck supple.   Cardiovascular:      Rate " and Rhythm: Normal rate and regular rhythm.   Pulmonary:      Effort: Pulmonary effort is normal.      Breath sounds: Normal breath sounds.   Abdominal:      Palpations: Abdomen is soft.      Tenderness: There is no abdominal tenderness.   Musculoskeletal:         General: No deformity.   Lymphadenopathy:      Cervical: No cervical adenopathy.   Skin:     General: Skin is warm and dry.      Findings: No rash.   Neurological:      General: No focal deficit present.      Mental Status: He is alert.   Psychiatric:         Mood and Affect: Mood normal.         Behavior: Behavior normal.       Procedures   INVESTIGATIONS:  -- see below.     ASSESSMENT AND PLAN:  Problem List Items Addressed This Visit        Digestive    Exocrine pancreatic insufficiency - Severe Deficiency - Primary    Relevant Medications    amylase-lipase-protease (CREON) 11304-57026 units CPEP per EC capsule       Endocrine    Controlled type 2 diabetes mellitus without complication, without long-term current use of insulin (H)    Relevant Medications    blood glucose (NO BRAND SPECIFIED) test strip    blood glucose (NO BRAND SPECIFIED) lancets standard       Circulatory    Benign essential hypertension        reviewed diet, exercise and weight control, cardiovascular risk and specific lipid/LDL goals reviewed, specific diabetic recommendations low cholesterol diet, weight control and daily exercise discussed, use of aspirin to prevent MI and TIA's discussed  -- Expected clinical course discussed    -- Medications and their side effects discussed    Patient Instructions     Glad the Creon has helped with your exocrine pancreatic insufficiency.   -- Check with [facility] pharmacy about cost/coverage alternatives.     -- Try a Super-B-Complex with B12 -- Daily -- to help energy / mood and balance.     -- Consider Under the tongue / Liquid.      Blood pressure is well controlled.   Diabetes is well controlled.     Results for  orders placed or performed in visit on 12/20/19   *UA reflex to Microscopic     Status: None   Result Value Ref Range    Color Urine Yellow     Appearance Urine Clear     Glucose Urine Negative NEG^Negative mg/dL    Bilirubin Urine Negative NEG^Negative    Ketones Urine Negative NEG^Negative mg/dL    Specific Gravity Urine 1.015 1.000 - 1.030    Blood Urine Negative NEG^Negative    pH Urine 8.0 5.0 - 9.0 pH    Protein Albumin Urine Negative NEG^Negative mg/dL    Urobilinogen Urine 1.0 0.2 - 1.0 EU/dL    Nitrite Urine Negative NEG^Negative    Leukocyte Esterase Urine Negative NEG^Negative    Source Midstream Urine    Lipid Profile     Status: None   Result Value Ref Range    Cholesterol 102 <200 mg/dL    Triglycerides 76 <150 mg/dL    HDL Cholesterol 42 23 - 92 mg/dL    LDL Cholesterol Calculated 45 <100 mg/dL    Non HDL Cholesterol 60 <130 mg/dL   Hemoglobin A1c     Status: Abnormal   Result Value Ref Range    Hemoglobin A1C 6.5 (H) 4.0 - 6.0 %   CBC with platelets     Status: Abnormal   Result Value Ref Range    WBC 9.5 4.0 - 11.0 10e9/L    RBC Count 4.17 (L) 4.4 - 5.9 10e12/L    Hemoglobin 13.9 13.3 - 17.7 g/dL    Hematocrit 41.4 40.0 - 53.0 %    MCV 99 78 - 100 fl    MCH 33.3 (H) 26.5 - 33.0 pg    MCHC 33.6 31.5 - 36.5 g/dL    RDW 12.5 10.0 - 15.0 %    Platelet Count 340 150 - 450 10e9/L   Comprehensive metabolic panel     Status: Abnormal   Result Value Ref Range    Sodium 132 (L) 134 - 144 mmol/L    Potassium 4.7 3.5 - 5.1 mmol/L    Chloride 99 98 - 107 mmol/L    Carbon Dioxide 29 21 - 31 mmol/L    Anion Gap 4 3 - 14 mmol/L    Glucose 157 (H) 70 - 105 mg/dL    Urea Nitrogen 13 7 - 25 mg/dL    Creatinine 0.89 0.70 - 1.30 mg/dL    GFR Estimate 83 >60 mL/min/[1.73_m2]    GFR Estimate If Black >90 >60 mL/min/[1.73_m2]    Calcium 9.2 8.6 - 10.3 mg/dL    Bilirubin Total 0.5 0.3 - 1.0 mg/dL    Albumin 4.3 3.5 - 5.7 g/dL    Protein Total 6.6 6.4 - 8.9 g/dL    Alkaline Phosphatase 49 34 - 104 U/L    ALT 21 7 - 52 U/L     AST 17 13 - 39 U/L      Return as needed for follow-up with Dr. Sen.    Clinic : [phone]  Appointment line: [phone]     Jovanni Sen MD  Internal Medicine  [facility]     Portions of this note were dictated using speech recognition software. The note has been proofread but errors in the text may have been overlooked. Please contact me if there are any concerns regarding the accuracy of the dictation.

## 2019-12-20 ENCOUNTER — OFFICE VISIT (OUTPATIENT)
Dept: INTERNAL MEDICINE | Facility: OTHER | Age: 79
End: 2019-12-20
Attending: INTERNAL MEDICINE
Payer: MEDICARE

## 2019-12-20 ENCOUNTER — TELEPHONE (OUTPATIENT)
Dept: INTERNAL MEDICINE | Facility: OTHER | Age: 79
End: 2019-12-20

## 2019-12-20 VITALS
WEIGHT: 173.25 LBS | SYSTOLIC BLOOD PRESSURE: 122 MMHG | BODY MASS INDEX: 27.13 KG/M2 | HEART RATE: 60 BPM | DIASTOLIC BLOOD PRESSURE: 70 MMHG | RESPIRATION RATE: 20 BRPM | TEMPERATURE: 97.5 F

## 2019-12-20 DIAGNOSIS — I10 BENIGN ESSENTIAL HYPERTENSION: ICD-10-CM

## 2019-12-20 DIAGNOSIS — K86.81 EXOCRINE PANCREATIC INSUFFICIENCY: Primary | ICD-10-CM

## 2019-12-20 DIAGNOSIS — E11.9 CONTROLLED TYPE 2 DIABETES MELLITUS WITHOUT COMPLICATION, WITHOUT LONG-TERM CURRENT USE OF INSULIN (H): ICD-10-CM

## 2019-12-20 DIAGNOSIS — E78.2 MIXED HYPERLIPIDEMIA: ICD-10-CM

## 2019-12-20 LAB
ALBUMIN SERPL-MCNC: 4.3 G/DL (ref 3.5–5.7)
ALBUMIN UR-MCNC: NEGATIVE MG/DL
ALP SERPL-CCNC: 49 U/L (ref 34–104)
ALT SERPL W P-5'-P-CCNC: 21 U/L (ref 7–52)
ANION GAP SERPL CALCULATED.3IONS-SCNC: 4 MMOL/L (ref 3–14)
APPEARANCE UR: CLEAR
AST SERPL W P-5'-P-CCNC: 17 U/L (ref 13–39)
BILIRUB SERPL-MCNC: 0.5 MG/DL (ref 0.3–1)
BILIRUB UR QL STRIP: NEGATIVE
BUN SERPL-MCNC: 13 MG/DL (ref 7–25)
CALCIUM SERPL-MCNC: 9.2 MG/DL (ref 8.6–10.3)
CHLORIDE SERPL-SCNC: 99 MMOL/L (ref 98–107)
CHOLEST SERPL-MCNC: 102 MG/DL
CO2 SERPL-SCNC: 29 MMOL/L (ref 21–31)
COLOR UR AUTO: YELLOW
CREAT SERPL-MCNC: 0.89 MG/DL (ref 0.7–1.3)
CREAT UR-MCNC: 118 MG/DL
ERYTHROCYTE [DISTWIDTH] IN BLOOD BY AUTOMATED COUNT: 12.5 % (ref 10–15)
GFR SERPL CREATININE-BSD FRML MDRD: 83 ML/MIN/{1.73_M2}
GLUCOSE SERPL-MCNC: 157 MG/DL (ref 70–105)
GLUCOSE UR STRIP-MCNC: NEGATIVE MG/DL
HBA1C MFR BLD: 6.5 % (ref 4–6)
HCT VFR BLD AUTO: 41.4 % (ref 40–53)
HDLC SERPL-MCNC: 42 MG/DL (ref 23–92)
HGB BLD-MCNC: 13.9 G/DL (ref 13.3–17.7)
HGB UR QL STRIP: NEGATIVE
KETONES UR STRIP-MCNC: NEGATIVE MG/DL
LDLC SERPL CALC-MCNC: 45 MG/DL
LEUKOCYTE ESTERASE UR QL STRIP: NEGATIVE
MCH RBC QN AUTO: 33.3 PG (ref 26.5–33)
MCHC RBC AUTO-ENTMCNC: 33.6 G/DL (ref 31.5–36.5)
MCV RBC AUTO: 99 FL (ref 78–100)
MICROALBUMIN UR-MCNC: <5 MG/L
MICROALBUMIN/CREAT UR: NORMAL MG/G CR (ref 0–17)
NITRATE UR QL: NEGATIVE
NONHDLC SERPL-MCNC: 60 MG/DL
PH UR STRIP: 8 PH (ref 5–9)
PLATELET # BLD AUTO: 340 10E9/L (ref 150–450)
POTASSIUM SERPL-SCNC: 4.7 MMOL/L (ref 3.5–5.1)
PROT SERPL-MCNC: 6.6 G/DL (ref 6.4–8.9)
RBC # BLD AUTO: 4.17 10E12/L (ref 4.4–5.9)
SODIUM SERPL-SCNC: 132 MMOL/L (ref 134–144)
SOURCE: NORMAL
SP GR UR STRIP: 1.01 (ref 1–1.03)
TRIGL SERPL-MCNC: 76 MG/DL
UROBILINOGEN UR STRIP-ACNC: 1 EU/DL (ref 0.2–1)
WBC # BLD AUTO: 9.5 10E9/L (ref 4–11)

## 2019-12-20 PROCEDURE — G0463 HOSPITAL OUTPT CLINIC VISIT: HCPCS

## 2019-12-20 PROCEDURE — 80061 LIPID PANEL: CPT | Mod: ZL | Performed by: INTERNAL MEDICINE

## 2019-12-20 PROCEDURE — 80053 COMPREHEN METABOLIC PANEL: CPT | Mod: ZL | Performed by: INTERNAL MEDICINE

## 2019-12-20 PROCEDURE — 81003 URINALYSIS AUTO W/O SCOPE: CPT | Mod: ZL | Performed by: INTERNAL MEDICINE

## 2019-12-20 PROCEDURE — 82043 UR ALBUMIN QUANTITATIVE: CPT | Mod: ZL | Performed by: INTERNAL MEDICINE

## 2019-12-20 PROCEDURE — 36415 COLL VENOUS BLD VENIPUNCTURE: CPT | Mod: ZL | Performed by: INTERNAL MEDICINE

## 2019-12-20 PROCEDURE — 99214 OFFICE O/P EST MOD 30 MIN: CPT | Performed by: INTERNAL MEDICINE

## 2019-12-20 PROCEDURE — 85027 COMPLETE CBC AUTOMATED: CPT | Mod: ZL | Performed by: INTERNAL MEDICINE

## 2019-12-20 PROCEDURE — 83036 HEMOGLOBIN GLYCOSYLATED A1C: CPT | Mod: ZL | Performed by: INTERNAL MEDICINE

## 2019-12-20 ASSESSMENT — ENCOUNTER SYMPTOMS
MYALGIAS: 0
PALPITATIONS: 0
FEVER: 0
COUGH: 0
DYSURIA: 0
NERVOUS/ANXIOUS: 1
WHEEZING: 0
DIZZINESS: 1
NAUSEA: 0
SHORTNESS OF BREATH: 0
LIGHT-HEADEDNESS: 1
DIARRHEA: 1
ARTHRALGIAS: 0
BRUISES/BLEEDS EASILY: 0
ABDOMINAL PAIN: 1
TREMORS: 1
FATIGUE: 1
WOUND: 0
AGITATION: 0
VOMITING: 0
HEMATURIA: 0
CONFUSION: 1
CHILLS: 0

## 2019-12-20 ASSESSMENT — ANXIETY QUESTIONNAIRES
2. NOT BEING ABLE TO STOP OR CONTROL WORRYING: NOT AT ALL
6. BECOMING EASILY ANNOYED OR IRRITABLE: NOT AT ALL
5. BEING SO RESTLESS THAT IT IS HARD TO SIT STILL: NOT AT ALL
IF YOU CHECKED OFF ANY PROBLEMS ON THIS QUESTIONNAIRE, HOW DIFFICULT HAVE THESE PROBLEMS MADE IT FOR YOU TO DO YOUR WORK, TAKE CARE OF THINGS AT HOME, OR GET ALONG WITH OTHER PEOPLE: NOT DIFFICULT AT ALL
GAD7 TOTAL SCORE: 0
1. FEELING NERVOUS, ANXIOUS, OR ON EDGE: NOT AT ALL
3. WORRYING TOO MUCH ABOUT DIFFERENT THINGS: NOT AT ALL
7. FEELING AFRAID AS IF SOMETHING AWFUL MIGHT HAPPEN: NOT AT ALL

## 2019-12-20 ASSESSMENT — PATIENT HEALTH QUESTIONNAIRE - PHQ9
5. POOR APPETITE OR OVEREATING: NOT AT ALL
SUM OF ALL RESPONSES TO PHQ QUESTIONS 1-9: 0

## 2019-12-20 ASSESSMENT — PAIN SCALES - GENERAL: PAINLEVEL: NO PAIN (0)

## 2019-12-20 NOTE — TELEPHONE ENCOUNTER
Patient will bring Creon to [facility], they will bring the printed prescription to them.      Kate Gar LPN 12/20/2019 2:20 PM

## 2019-12-20 NOTE — PATIENT INSTRUCTIONS
Bruce the Creon has helped with your exocrine pancreatic insufficiency.   -- Check with [facility] pharmacy about cost/coverage alternatives.     -- Try a Super-B-Complex with B12 -- Daily -- to help energy / mood and balance.     -- Consider Under the tongue / Liquid.      Blood pressure is well controlled.   Diabetes is well controlled.     Results for orders placed or performed in visit on 12/20/19   *UA reflex to Microscopic     Status: None   Result Value Ref Range    Color Urine Yellow     Appearance Urine Clear     Glucose Urine Negative NEG^Negative mg/dL    Bilirubin Urine Negative NEG^Negative    Ketones Urine Negative NEG^Negative mg/dL    Specific Gravity Urine 1.015 1.000 - 1.030    Blood Urine Negative NEG^Negative    pH Urine 8.0 5.0 - 9.0 pH    Protein Albumin Urine Negative NEG^Negative mg/dL    Urobilinogen Urine 1.0 0.2 - 1.0 EU/dL    Nitrite Urine Negative NEG^Negative    Leukocyte Esterase Urine Negative NEG^Negative    Source Midstream Urine    Lipid Profile     Status: None   Result Value Ref Range    Cholesterol 102 <200 mg/dL    Triglycerides 76 <150 mg/dL    HDL Cholesterol 42 23 - 92 mg/dL    LDL Cholesterol Calculated 45 <100 mg/dL    Non HDL Cholesterol 60 <130 mg/dL   Hemoglobin A1c     Status: Abnormal   Result Value Ref Range    Hemoglobin A1C 6.5 (H) 4.0 - 6.0 %   CBC with platelets     Status: Abnormal   Result Value Ref Range    WBC 9.5 4.0 - 11.0 10e9/L    RBC Count 4.17 (L) 4.4 - 5.9 10e12/L    Hemoglobin 13.9 13.3 - 17.7 g/dL    Hematocrit 41.4 40.0 - 53.0 %    MCV 99 78 - 100 fl    MCH 33.3 (H) 26.5 - 33.0 pg    MCHC 33.6 31.5 - 36.5 g/dL    RDW 12.5 10.0 - 15.0 %    Platelet Count 340 150 - 450 10e9/L   Comprehensive metabolic panel     Status: Abnormal   Result Value Ref Range    Sodium 132 (L) 134 - 144 mmol/L    Potassium 4.7 3.5 - 5.1 mmol/L    Chloride 99 98 - 107 mmol/L    Carbon Dioxide 29 21 - 31 mmol/L    Anion Gap 4 3 - 14 mmol/L    Glucose 157  (H) 70 - 105 mg/dL    Urea Nitrogen 13 7 - 25 mg/dL    Creatinine 0.89 0.70 - 1.30 mg/dL    GFR Estimate 83 >60 mL/min/[1.73_m2]    GFR Estimate If Black >90 >60 mL/min/[1.73_m2]    Calcium 9.2 8.6 - 10.3 mg/dL    Bilirubin Total 0.5 0.3 - 1.0 mg/dL    Albumin 4.3 3.5 - 5.7 g/dL    Protein Total 6.6 6.4 - 8.9 g/dL    Alkaline Phosphatase 49 34 - 104 U/L    ALT 21 7 - 52 U/L    AST 17 13 - 39 U/L      Return as needed for follow-up with Dr. Sen.    Clinic : [phone]  Appointment line: [phone]

## 2019-12-20 NOTE — NURSING NOTE
"Patient presents to the clinic for 1 month follow up.      Previous A1C is at goal of <8  Lab Results   Component Value Date    A1C 6.5 12/20/2019    A1C 6.7 10/30/2019    A1C 6.8 05/02/2019     Urine microalbumin:creatine: n/a  Foot exam over year ago-declines today  Eye exam 8-19-19    Tobacco User no  Patient is on a daily aspirin  Patient is on a Statin.  Blood pressure today of:     BP Readings from Last 1 Encounters:   11/13/19 110/76      is at the goal of <139/89 for diabetics.    Chief Complaint   Patient presents with     Clinic Care Coordination - Follow-up       Initial /70 (BP Location: Right arm, Patient Position: Sitting, Cuff Size: Adult Regular)   Pulse 60   Temp 97.5  F (36.4  C) (Tympanic)   Resp 20   Wt 78.6 kg (173 lb 4 oz)   BMI 27.13 kg/m   Estimated body mass index is 27.13 kg/m  as calculated from the following:    Height as of 11/13/19: 1.702 m (5' 7.01\").    Weight as of this encounter: 78.6 kg (173 lb 4 oz).  Medication Reconciliation: complete    Kate Gar LPN          "

## 2019-12-21 ASSESSMENT — ANXIETY QUESTIONNAIRES: GAD7 TOTAL SCORE: 0

## 2020-01-07 ENCOUNTER — MEDICAL CORRESPONDENCE (OUTPATIENT)
Dept: HEALTH INFORMATION MANAGEMENT | Facility: OTHER | Age: 80
End: 2020-01-07

## 2020-03-13 ENCOUNTER — TELEPHONE (OUTPATIENT)
Dept: INTERNAL MEDICINE | Facility: OTHER | Age: 80
End: 2020-03-13

## 2020-04-10 DIAGNOSIS — E11.9 CONTROLLED TYPE 2 DIABETES MELLITUS WITHOUT COMPLICATION, WITHOUT LONG-TERM CURRENT USE OF INSULIN (H): ICD-10-CM

## 2020-04-10 RX ORDER — BLOOD SUGAR DIAGNOSTIC
STRIP MISCELLANEOUS
Refills: 0 | OUTPATIENT
Start: 2020-04-10

## 2020-04-10 NOTE — TELEPHONE ENCOUNTER
Refill Request for: Blood glucose (NO BRAND SPECIFIED) test strip   Received From: [facility]  Last Written Prescription Date: 12/20/2019 for 200 strip and 11 refills; E-Prescribing Status: Receipt confirmed by pharmacy (12/20/2019 10:09 AM CST) to [facility]  LOV: 12/20/2019 with PCP  Next Appointment: 4/23/2020 with PCP  Protocol: Diabetic Supplies Protocol Passed - 04/10/2020 09:16 AM    Refill request denied; too soon, last written prescription should provide adequate medication supply. Will send note to pharmacy to reference last written RX on 12/20/2019.       Cortney IRENEN, RN on 4/10/2020 at 9:18 AM

## 2020-04-13 DIAGNOSIS — E11.9 CONTROLLED TYPE 2 DIABETES MELLITUS WITHOUT COMPLICATION, WITHOUT LONG-TERM CURRENT USE OF INSULIN (H): Primary | ICD-10-CM

## 2020-04-23 ENCOUNTER — VIRTUAL VISIT (OUTPATIENT)
Dept: INTERNAL MEDICINE | Facility: OTHER | Age: 80
End: 2020-04-23
Attending: INTERNAL MEDICINE
Payer: MEDICARE

## 2020-04-23 VITALS
SYSTOLIC BLOOD PRESSURE: 127 MMHG | BODY MASS INDEX: 27.58 KG/M2 | HEART RATE: 67 BPM | DIASTOLIC BLOOD PRESSURE: 74 MMHG | WEIGHT: 176.13 LBS

## 2020-04-23 DIAGNOSIS — E78.2 MIXED HYPERLIPIDEMIA: ICD-10-CM

## 2020-04-23 DIAGNOSIS — E11.9 CONTROLLED TYPE 2 DIABETES MELLITUS WITHOUT COMPLICATION, WITHOUT LONG-TERM CURRENT USE OF INSULIN (H): ICD-10-CM

## 2020-04-23 DIAGNOSIS — I10 BENIGN ESSENTIAL HYPERTENSION: ICD-10-CM

## 2020-04-23 DIAGNOSIS — I25.10 ATHEROSCLEROSIS OF CORONARY ARTERY OF NATIVE HEART WITHOUT ANGINA PECTORIS, UNSPECIFIED VESSEL OR LESION TYPE: ICD-10-CM

## 2020-04-23 DIAGNOSIS — K86.81 EXOCRINE PANCREATIC INSUFFICIENCY: ICD-10-CM

## 2020-04-23 DIAGNOSIS — L57.0 ACTINIC KERATOSES: ICD-10-CM

## 2020-04-23 DIAGNOSIS — B37.2 CANDIDIASIS, INTERTRIGINOUS: Primary | ICD-10-CM

## 2020-04-23 PROBLEM — R19.7 INTERMITTENT DIARRHEA: Status: RESOLVED | Noted: 2019-11-13 | Resolved: 2020-04-23

## 2020-04-23 LAB
ALBUMIN SERPL-MCNC: 4.4 G/DL (ref 3.5–5.7)
ALBUMIN UR-MCNC: NEGATIVE MG/DL
ALP SERPL-CCNC: 55 U/L (ref 34–104)
ALT SERPL W P-5'-P-CCNC: 18 U/L (ref 7–52)
ANION GAP SERPL CALCULATED.3IONS-SCNC: 5 MMOL/L (ref 3–14)
APPEARANCE UR: CLEAR
AST SERPL W P-5'-P-CCNC: 18 U/L (ref 13–39)
BILIRUB SERPL-MCNC: 0.5 MG/DL (ref 0.3–1)
BILIRUB UR QL STRIP: NEGATIVE
BUN SERPL-MCNC: 10 MG/DL (ref 7–25)
CALCIUM SERPL-MCNC: 9.4 MG/DL (ref 8.6–10.3)
CHLORIDE SERPL-SCNC: 95 MMOL/L (ref 98–107)
CHOLEST SERPL-MCNC: 111 MG/DL
CO2 SERPL-SCNC: 29 MMOL/L (ref 21–31)
COLOR UR AUTO: YELLOW
CREAT SERPL-MCNC: 0.84 MG/DL (ref 0.7–1.3)
CREAT UR-MCNC: 76 MG/DL
ERYTHROCYTE [DISTWIDTH] IN BLOOD BY AUTOMATED COUNT: 12.8 % (ref 10–15)
GFR SERPL CREATININE-BSD FRML MDRD: 88 ML/MIN/{1.73_M2}
GLUCOSE SERPL-MCNC: 139 MG/DL (ref 70–105)
GLUCOSE UR STRIP-MCNC: NEGATIVE MG/DL
HBA1C MFR BLD: 7.2 % (ref 4–6)
HCT VFR BLD AUTO: 41.6 % (ref 40–53)
HDLC SERPL-MCNC: 42 MG/DL (ref 23–92)
HGB BLD-MCNC: 14.1 G/DL (ref 13.3–17.7)
HGB UR QL STRIP: NEGATIVE
KETONES UR STRIP-MCNC: NEGATIVE MG/DL
LDLC SERPL CALC-MCNC: 54 MG/DL
LEUKOCYTE ESTERASE UR QL STRIP: NEGATIVE
MCH RBC QN AUTO: 33 PG (ref 26.5–33)
MCHC RBC AUTO-ENTMCNC: 33.9 G/DL (ref 31.5–36.5)
MCV RBC AUTO: 97 FL (ref 78–100)
MICROALBUMIN UR-MCNC: 13 MG/L
MICROALBUMIN/CREAT UR: 16.75 MG/G CR (ref 0–17)
NITRATE UR QL: NEGATIVE
NONHDLC SERPL-MCNC: 69 MG/DL
PH UR STRIP: 7.5 PH (ref 5–7)
PLATELET # BLD AUTO: 356 10E9/L (ref 150–450)
POTASSIUM SERPL-SCNC: 4.6 MMOL/L (ref 3.5–5.1)
PROT SERPL-MCNC: 6.9 G/DL (ref 6.4–8.9)
RBC # BLD AUTO: 4.27 10E12/L (ref 4.4–5.9)
SODIUM SERPL-SCNC: 129 MMOL/L (ref 134–144)
SOURCE: ABNORMAL
SP GR UR STRIP: 1.01 (ref 1–1.03)
TRIGL SERPL-MCNC: 73 MG/DL
UROBILINOGEN UR STRIP-MCNC: NORMAL MG/DL (ref 0–2)
WBC # BLD AUTO: 8.6 10E9/L (ref 4–11)

## 2020-04-23 PROCEDURE — 80061 LIPID PANEL: CPT | Mod: ZL | Performed by: INTERNAL MEDICINE

## 2020-04-23 PROCEDURE — 83036 HEMOGLOBIN GLYCOSYLATED A1C: CPT | Mod: ZL | Performed by: INTERNAL MEDICINE

## 2020-04-23 PROCEDURE — 36415 COLL VENOUS BLD VENIPUNCTURE: CPT | Mod: ZL | Performed by: INTERNAL MEDICINE

## 2020-04-23 PROCEDURE — 99214 OFFICE O/P EST MOD 30 MIN: CPT | Mod: GT | Performed by: INTERNAL MEDICINE

## 2020-04-23 PROCEDURE — 80053 COMPREHEN METABOLIC PANEL: CPT | Mod: ZL | Performed by: INTERNAL MEDICINE

## 2020-04-23 PROCEDURE — 81003 URINALYSIS AUTO W/O SCOPE: CPT | Mod: ZL | Performed by: INTERNAL MEDICINE

## 2020-04-23 PROCEDURE — 85027 COMPLETE CBC AUTOMATED: CPT | Mod: ZL | Performed by: INTERNAL MEDICINE

## 2020-04-23 PROCEDURE — 82043 UR ALBUMIN QUANTITATIVE: CPT | Mod: ZL | Performed by: INTERNAL MEDICINE

## 2020-04-23 RX ORDER — NYSTATIN 100000 [USP'U]/G
POWDER TOPICAL 2 TIMES DAILY PRN
Qty: 60 G | Refills: 1 | Status: SHIPPED | OUTPATIENT
Start: 2020-04-23 | End: 2020-06-04

## 2020-04-23 ASSESSMENT — ENCOUNTER SYMPTOMS
ABDOMINAL PAIN: 0
BRUISES/BLEEDS EASILY: 0
HEMATURIA: 0
CONFUSION: 0
SHORTNESS OF BREATH: 1
COUGH: 0
NECK PAIN: 0
CHILLS: 0
BACK PAIN: 0
FEVER: 0

## 2020-04-23 ASSESSMENT — PATIENT HEALTH QUESTIONNAIRE - PHQ9
SUM OF ALL RESPONSES TO PHQ QUESTIONS 1-9: 0
5. POOR APPETITE OR OVEREATING: NOT AT ALL

## 2020-04-23 ASSESSMENT — ANXIETY QUESTIONNAIRES
5. BEING SO RESTLESS THAT IT IS HARD TO SIT STILL: NOT AT ALL
6. BECOMING EASILY ANNOYED OR IRRITABLE: NOT AT ALL
GAD7 TOTAL SCORE: 0
7. FEELING AFRAID AS IF SOMETHING AWFUL MIGHT HAPPEN: NOT AT ALL
1. FEELING NERVOUS, ANXIOUS, OR ON EDGE: NOT AT ALL
3. WORRYING TOO MUCH ABOUT DIFFERENT THINGS: NOT AT ALL
2. NOT BEING ABLE TO STOP OR CONTROL WORRYING: NOT AT ALL
IF YOU CHECKED OFF ANY PROBLEMS ON THIS QUESTIONNAIRE, HOW DIFFICULT HAVE THESE PROBLEMS MADE IT FOR YOU TO DO YOUR WORK, TAKE CARE OF THINGS AT HOME, OR GET ALONG WITH OTHER PEOPLE: NOT DIFFICULT AT ALL

## 2020-04-23 ASSESSMENT — PAIN SCALES - GENERAL: PAINLEVEL: NO PAIN (0)

## 2020-04-23 NOTE — PROGRESS NOTES
"Video Visit: Diabetes Management.  Glucose levels increased 128-156 and blood pressure [id number]: all values obtained over the past 6 days.    Previous A1C is at goal of <8  Lab Results   Component Value Date    A1C 7.2 04/23/2020    A1C 6.5 12/20/2019    A1C 6.7 10/30/2019    A1C 6.8 05/02/2019     Urine microalbumin:creatine: n/a  Foot exam over a year ago-denies any concerns at this time  Eye exam 8-19-19    Tobacco User no  Patient is on a daily aspirin  Patient is on a Statin.  Blood pressure today of:     BP Readings from Last 1 Encounters:   12/20/19 122/70      is at the goal of <139/89 for diabetics.    Kate Todd LPN on 4/23/2020 at 4:24 PM    Rajesh Huynh is a 79 year old male who is being evaluated via a billable video visit.      The patient has been notified of following:     \"This video visit will be conducted via a call between you and your physician/provider. We have found that certain health care needs can be provided without the need for an in-person physical exam.  This service lets us provide the care you need with a video conversation.  If a prescription is necessary we can send it directly to your pharmacy.  If lab work is needed we can place an order for that and you can then stop by our lab to have the test done at a later time.    Video visits are billed at different rates depending on your insurance coverage.  Please reach out to your insurance provider with any questions.    If during the course of the call the physician/provider feels a video visit is not appropriate, you will not be charged for this service.\"    Patient has given verbal consent for Video visit? Yes    How would you like to obtain your AVS? Mail a copy    Patient would like the video invitation sent by: Other e-mail: [email or web address]    Will anyone else be joining your video visit? No    Subjective     Rajesh Huynh is a 79 year old male who presents to clinic today for the following health " "issues:    HPI  Video Start Time: 5:24 PM    Reviewed and updated as needed this visit by Provider  Tobacco  Allergies  Meds  Problems  Med Hx  Surg Hx  Fam Hx         Review of Systems   Constitutional: Negative for chills and fever.   HENT: Negative for congestion.    Respiratory: Positive for shortness of breath (occasionally will get mild shortness of breath, more with hard exertional ). Negative for cough.    Cardiovascular: Negative for chest pain.   Gastrointestinal: Negative for abdominal pain.   Genitourinary: Negative for hematuria.   Musculoskeletal: Negative for back pain and neck pain.   Skin: Positive for rash (itchy rash in groin, sitting too much).   Allergic/Immunologic: Negative for environmental allergies.   Neurological: Negative for syncope.   Hematological: Does not bruise/bleed easily.   Psychiatric/Behavioral: Negative for confusion.            Objective    /74 (BP Location: Right arm, Patient Position: Sitting, Cuff Size: Adult Regular)   Pulse 67   Wt 79.9 kg (176 lb 2 oz)   BMI 27.58 kg/m    Estimated body mass index is 27.58 kg/m  as calculated from the following:    Height as of 11/13/19: 1.702 m (5' 7.01\").    Weight as of this encounter: 79.9 kg (176 lb 2 oz).  Physical Exam     GENERAL: healthy, alert and no distress  EYES: Eyes grossly normal to inspection, conjunctivae and sclerae normal  RESP: no audible wheeze, cough, or visible cyanosis.  No visible retractions or increased work of breathing.  Able to speak fully in complete sentences.  NEURO: Cranial nerves grossly intact, mentation intact and speech normal  PSYCH: mentation appears normal, affect normal/bright, judgement and insight intact, normal speech and appearance well-groomed      Diagnostic Test Results:  Labs reviewed in Epic        ICD-10-CM    1. Candidiasis, intertriginous  B37.2 nystatin (MYCOSTATIN) 524240 UNIT/GM external powder   2. Benign essential hypertension  I10    3. Controlled type 2 " diabetes mellitus without complication, without long-term current use of insulin (H)  E11.9    4. Exocrine pancreatic insufficiency - Severe Deficiency  K86.81    5. Atherosclerosis of coronary artery of native heart without angina pectoris, unspecified vessel or lesion type  I25.10    6. Actinic keratoses -   L57.0    7. Mixed hyperlipidemia  E78.2      Skin fold rash.  He has been sitting a lot more at home recently.  States that he has been getting some itching and irritation in his inguinal area and skin folds near his and involving his scrotum.  He would like to get some antifungal cream/powder.  Discussed topical OTC antifungal cream.  His wife will pick some of this up but he would also like to get some nystatin powder.  Prescription sent to pharmacy.    Hypertension, blood pressures have been well controlled.  Doing well with current medication regimen.  No side effects reported.  Continue with current medications.  Creatinine is stable.  Just had lab work checked recently.    Severe pancreatic insufficiency, taking pancreatic enzymes about 1 capsule daily or every other day.  States if he takes more than this he ends up getting bound up and pretty bad constipation.  Otherwise does quite well.  He will intermittently get some diarrhea because he is taking the medication so intermittently.  He had a bout of diarrhea a couple days ago and another one a couple weeks ago.  Otherwise doing much better.  He was having severe diarrhea multiple times daily before he started the pancreatic enzymes.    Coronary artery disease, currently stable.  Denies exertional chest pain or heaviness.  Does report some mild exertional shortness of breath which is stable.      Skin lesions, has a few new ones on his scalp and forehead and something behind his ear.  He has had actinic keratoses in the past.  His wife is concerned he has similar lesions again and would like to get these treated with cryotherapy.  He will schedule a  clinic appointment in the next couple of weeks.    Mixed hyperlipidemia, cholesterol levels have been well controlled.  Doing well with current medication regimen.  No changes today.      Video-Visit Details    Type of service:  Video Visit    Video End Time:5:46 PM    Billing based on moderate complexity MDM     Originating Location (pt. Location): Home    Distant Location (provider location):  [facility]     Mode of Communication:  Video Conference    No follow-ups on file.     Jovanni Sen MD

## 2020-04-23 NOTE — NURSING NOTE
Telephone Visit: Diabetes Management.  Glucose levels increased 128-156 and blood pressure [id number]: all values obtained over the past 6 days.    Previous A1C is at goal of <8  Lab Results   Component Value Date    A1C 7.2 04/23/2020    A1C 6.5 12/20/2019    A1C 6.7 10/30/2019    A1C 6.8 05/02/2019     Urine microalbumin:creatine: n/a  Foot exam over a year ago-denies any concerns at this time  Eye exam 8-19-19    Tobacco User no  Patient is on a daily aspirin  Patient is on a Statin.  Blood pressure today of:     BP Readings from Last 1 Encounters:   12/20/19 122/70      is not at the goal of <139/89 for diabetics.    Kate Todd LPN on 4/23/2020 at 4:24 PM

## 2020-04-24 ASSESSMENT — ANXIETY QUESTIONNAIRES: GAD7 TOTAL SCORE: 0

## 2020-05-14 ENCOUNTER — OFFICE VISIT (OUTPATIENT)
Dept: INTERNAL MEDICINE | Facility: OTHER | Age: 80
End: 2020-05-14
Attending: INTERNAL MEDICINE
Payer: MEDICARE

## 2020-05-14 VITALS
DIASTOLIC BLOOD PRESSURE: 72 MMHG | SYSTOLIC BLOOD PRESSURE: 138 MMHG | BODY MASS INDEX: 28.09 KG/M2 | WEIGHT: 179.4 LBS | HEART RATE: 60 BPM | RESPIRATION RATE: 16 BRPM | TEMPERATURE: 97.8 F

## 2020-05-14 DIAGNOSIS — L57.0 MULTIPLE ACTINIC KERATOSES: Primary | ICD-10-CM

## 2020-05-14 PROCEDURE — 17003 DESTRUCT PREMALG LES 2-14: CPT | Performed by: INTERNAL MEDICINE

## 2020-05-14 PROCEDURE — G0463 HOSPITAL OUTPT CLINIC VISIT: HCPCS

## 2020-05-14 PROCEDURE — 17000 DESTRUCT PREMALG LESION: CPT | Performed by: INTERNAL MEDICINE

## 2020-05-14 ASSESSMENT — PAIN SCALES - GENERAL: PAINLEVEL: NO PAIN (0)

## 2020-05-14 NOTE — PROGRESS NOTES
Nursing Notes:   Agnes James LPN  2020 10:01 AM  Signed  Rajesh Huynh is a 79 year old male presenting today for: face and scalp lesions  Medication Reconciliation: complete    Agnes Peña LPN 2020 9:49 AM                Nursing note reviewed with patient.  Accuracy and completeness verified.   Mr. Huynh is a 79 year old male who:  Patient presents with:  Consult: lesions on face and scalp      ICD-10-CM    1. Multiple actinic keratoses x6 on face  L57.0 DESTRUCT PREMALIGNANT LESION, 2-14     DESTRUCT PREMALIGNANT LESION, FIRST     HPI  Patient comes in.  He found some new actinic keratosis lesions and he would like to get them treated with cryotherapy today today.  He has 3 on his left face and 3 on his right face.    He has had similar lesions treated in the past and has done well with them.    Functional Capacity: > 4 METS.   Review of Systems   Skin: Positive for rash.      Problem List/PMH: reviewed in EMR, and made relevant updates today.  Medications: reviewed in EMR, and made relevant updates today.  Allergies: reviewed in EMR, and made relevant updates today.  I reviewed family and social history and made relevant updates today.  Social History     Tobacco Use     Smoking status: Former Smoker     Packs/day: 3.00     Years: 43.00     Pack years: 129.00     Types: Cigarettes     Last attempt to quit: 2000     Years since quittin.3     Smokeless tobacco: Never Used   Substance Use Topics     Alcohol use: No     Alcohol/week: 0.0 standard drinks     Drug use: No      Family History   Problem Relation Age of Onset     Heart Disease Father 83        Heart Disease,CHF     Heart Disease Mother 80        Heart Disease,MI     Family History Negative Sister         Good Health     Heart Disease Sister         Heart Disease,pacemaker     Diabetes Son         Diabetes,Type 1     Breast Cancer Daughter         Cancer-breast     Prostate Cancer No family hx of          "Cancer-prostate       EXAM:   Vitals:    05/14/20 0952   BP: 138/72   Pulse: 60   Resp: 16   Temp: 97.8  F (36.6  C)   TempSrc: Tympanic   Weight: 81.4 kg (179 lb 6.4 oz)       Current Pain Score: No Pain (0)     BP Readings from Last 3 Encounters:   05/14/20 138/72   04/23/20 127/74   12/20/19 122/70      Wt Readings from Last 3 Encounters:   05/14/20 81.4 kg (179 lb 6.4 oz)   04/23/20 79.9 kg (176 lb 2 oz)   12/20/19 78.6 kg (173 lb 4 oz)      Estimated body mass index is 28.09 kg/m  as calculated from the following:    Height as of 11/13/19: 1.702 m (5' 7.01\").    Weight as of this encounter: 81.4 kg (179 lb 6.4 oz).     Physical Exam  Constitutional:       Appearance: Normal appearance.   Skin:     Comments: actinic keratosis x1 on the right ear, x2 on the right face, x1 in left scalp hairline, x2 on left face.    PROCEDURE:   Reviewed risks and benefits of cryotherapy.After informed consent was obtained, patient elected to proceed. These 6 lesions were treated today.   Performed cryotherapy to #6 lesions x 2 rounds of 30 second freeze/thaw cycles.   Tolerated well. No obvious complications.   Return for signs of infection.    The patient and I reviewed safe sun practices today: the importance of staying out of the sun;especially between 10AM-2PM, wearing sun screen SPF > 30, and protective clothing.   We also discussed the ABC's of skin cancers including: changes in size, uniformity, borders, coloration, bleeding, or any irregularityor changes. If these occur, seek medical attention.   The patient should have a complete skin exam by a medical professional every 6-12 months, and any questionable/suspicious, or changing lesions should be removed.     Neurological:      Mental Status: He is alert.        Procedures   INVESTIGATIONS:  - Labs reviewed in Epic     ASSESSMENT AND PLAN:  Problem List Items Addressed This Visit     None      Visit Diagnoses     Multiple actinic keratoses x6 on face    -  Primary    " Relevant Orders    DESTRUCT PREMALIGNANT LESION, 2-14 (Completed)    DESTRUCT PREMALIGNANT LESION, FIRST (Completed)        -- Expected clinical course discussed      Patient Instructions   What to Expect Following Cryosurgery (Liquid Nitrogen)   What is Cryosurgery?   Cryosurgery is a technique for removing skin lesions that primarily involve the surface of the skin, such as warts, seborrheic keratosis, or actinic keratosis. It is a quick method of removing the lesion with minimal scarring.   The liquid nitrogen needs to be applied long enough tofreeze the affected skin. By freezing the skin, a blister is created underneath the lesion. Ideally, as the new skin forms underneath the blister, the abnormal skin on the roof of the blister peels off. Occasionally, if thelesion is very thick (such as a large wart), only the surface is blistered off. The base or residual lesion may need to be frozen at another visit.   It takes about one to two weeks for the scab to fall off, which is whenthe new layer of skin has formed under the blister. Areas of thinner skin, such as the face, may heal a little faster.      What to Expect Over the Next Few Weeks     During Treatment - Area beingtreated will sting, burn, and then possibly itch.     Immediately After Treatment - Area will be red, sore, and swollen.     Next Day - Blister or blood blister has formed, tenderness starts to subside. Apply aBand-Aid if   necessary.     7 Days - Surface is dark red/brown and scab-like. Apply Vaseline  or an antibacterial ointment, such as Polysporin , if necessary.     2 to 4 Weeks - The surface startsto peel off. This may be encouraged gently during bathing, when the scab is softened.     No makeup shouldbe applied until area is fully healed.      Howto Take Care of the Skin after Cryosurgery     ABand-Aid can be used for larger blisters or blisters in areas that are more likely to be traumatized -such as fingers and toes. If the area becomes  dry or crusted, an ointment (Vaseline , Bacitracin , or Polysporin ) can also be applied.     Cleanse area with a mild cleanser and cool water.     Pat the area dry with a lint-free cloth and apply an ointment (Vaseline , Bacitracin , or Polysporin ).     Avoid glycolic acids, Vitamin C, scrubs, Tretinoin (Retin-A), and Retinol creams for 7 to 10 days.     If approved by your Provider, you may bathe, swim, exercise, and otherwise follow all of your normalactivities.     The area may get wet while bathing, but swimming or hot tub use should be avoided for oneweek following a treatment or while the skin is open.     Within 24 hours, you can expect the area to beswollen and/or blistered. The blister may not be visible to the naked eye.     Within one week, theswelling goes down. The top becomes dark red and scab-like. The scab will loosen over the next weeks, and should fall off within one month.      Adverse Effects     The most common adverse effects are pain, swelling/blistering, potential for infection, and pale discoloration of the skin after it heals.      Blisters        Anytime a blister surfaces, whether from ill-fitting shoes, an oven burn, or liquid nitrogen cryosurgery, it will be a bit painful. For most patients, the pain is a temporary stingwith some discomfort periodically over the next day as the blister forms.     The goal is to achieve ablister. This means, most commonly, patients will have a blister form following treatment. Sometimes, the blister is so thin that it can't be seen and may have minimal swelling. Occasionally, a blood blister forms that canbe quite dramatic but is harmless.     Rarely, the blister may become infected. When this happens, theblister becomes unusually tender, the fluid becomes cloudy, and the redness around it becomes more extensive (and may even form streaks). If this happens, contact our office.     Some lesions, especially those on theface, may leave a slight pale  discoloration.     True scarring,involving deeper layers of the skin is unlikely.       Jovanni Sen MD   Internal Medicine  [facility]     Portions of this note were dictated using speech recognition software. The note has been proofread but errors in the text may have been overlooked. Please contact me if there are any concerns regarding the accuracy of the dictation.

## 2020-06-03 DIAGNOSIS — B37.2 CANDIDIASIS, INTERTRIGINOUS: ICD-10-CM

## 2020-06-04 RX ORDER — NYSTATIN 100000 [USP'U]/G
POWDER TOPICAL
Qty: 60 G | Refills: 3 | Status: SHIPPED | OUTPATIENT
Start: 2020-06-04 | End: 2020-08-05

## 2020-06-04 NOTE — TELEPHONE ENCOUNTER
Routing refill request to provider for review/approval because:    New prescription sent on 4/23/2020 unsure if to continue    LOV: 5/14/2020  Lisa Priest RN on 6/4/2020 at 8:32 AM

## 2020-06-27 DIAGNOSIS — B37.2 CANDIDIASIS, INTERTRIGINOUS: ICD-10-CM

## 2020-06-29 RX ORDER — NYSTATIN 100000 [USP'U]/G
POWDER TOPICAL
Qty: 60 G | Refills: 0 | OUTPATIENT
Start: 2020-06-29

## 2020-06-29 NOTE — TELEPHONE ENCOUNTER
[facility] sent Rx request for the following:   NYSTOP [id number] UNIT/GM powder   Sig:APPLY  POWDER TOPICALLY TWICE DAILY AS NEEDED TO  SKIN  FOLD  RASH     Last Prescription Date:   06/04/2020  Last Fill Qty/Refills:         60g, R-3    Last Office Visit:              05/14/2020   Future Office visit:           08/05/2020     Called [facility] pharmacy, pharmacist reported not receiving this Rx on 06/04/2020. Verbal order given to pharmacist via phone.     Tiarra Ramirez RN  ....................  6/29/2020   2:16 PM

## 2020-07-24 DIAGNOSIS — B37.2 CANDIDIASIS, INTERTRIGINOUS: ICD-10-CM

## 2020-07-24 RX ORDER — NYSTATIN 100000 [USP'U]/G
POWDER TOPICAL
Qty: 60 G | Refills: 0 | OUTPATIENT
Start: 2020-07-24

## 2020-08-05 ENCOUNTER — OFFICE VISIT (OUTPATIENT)
Dept: INTERNAL MEDICINE | Facility: OTHER | Age: 80
End: 2020-08-05
Attending: INTERNAL MEDICINE
Payer: MEDICARE

## 2020-08-05 VITALS
RESPIRATION RATE: 16 BRPM | TEMPERATURE: 97.7 F | BODY MASS INDEX: 27.23 KG/M2 | HEIGHT: 67 IN | WEIGHT: 173.5 LBS | SYSTOLIC BLOOD PRESSURE: 126 MMHG | HEART RATE: 76 BPM | DIASTOLIC BLOOD PRESSURE: 80 MMHG

## 2020-08-05 DIAGNOSIS — E78.2 MIXED HYPERLIPIDEMIA: ICD-10-CM

## 2020-08-05 DIAGNOSIS — R41.3 MEMORY PROBLEM: ICD-10-CM

## 2020-08-05 DIAGNOSIS — B37.2 CANDIDIASIS, INTERTRIGINOUS: ICD-10-CM

## 2020-08-05 DIAGNOSIS — E53.8 VITAMIN B12 DEFICIENCY: ICD-10-CM

## 2020-08-05 DIAGNOSIS — I10 BENIGN ESSENTIAL HYPERTENSION: Primary | ICD-10-CM

## 2020-08-05 DIAGNOSIS — Z87.891 HISTORY OF TOBACCO USE: ICD-10-CM

## 2020-08-05 DIAGNOSIS — R39.12 WEAK URINARY STREAM: ICD-10-CM

## 2020-08-05 DIAGNOSIS — R19.7 DIARRHEA, UNSPECIFIED TYPE: ICD-10-CM

## 2020-08-05 DIAGNOSIS — E11.9 CONTROLLED TYPE 2 DIABETES MELLITUS WITHOUT COMPLICATION, WITHOUT LONG-TERM CURRENT USE OF INSULIN (H): ICD-10-CM

## 2020-08-05 DIAGNOSIS — I25.2 HISTORY OF ST ELEVATION MYOCARDIAL INFARCTION (STEMI): ICD-10-CM

## 2020-08-05 DIAGNOSIS — K86.81 EXOCRINE PANCREATIC INSUFFICIENCY: ICD-10-CM

## 2020-08-05 DIAGNOSIS — I10 BENIGN ESSENTIAL HYPERTENSION: ICD-10-CM

## 2020-08-05 DIAGNOSIS — Z12.5 SCREENING PSA (PROSTATE SPECIFIC ANTIGEN): ICD-10-CM

## 2020-08-05 LAB
ALBUMIN SERPL-MCNC: 4.1 G/DL (ref 3.5–5.7)
ALBUMIN UR-MCNC: NEGATIVE MG/DL
ALP SERPL-CCNC: 54 U/L (ref 34–104)
ALT SERPL W P-5'-P-CCNC: 17 U/L (ref 7–52)
ANION GAP SERPL CALCULATED.3IONS-SCNC: 8 MMOL/L (ref 3–14)
APPEARANCE UR: ABNORMAL
AST SERPL W P-5'-P-CCNC: 16 U/L (ref 13–39)
BACTERIA #/AREA URNS HPF: ABNORMAL /HPF
BILIRUB SERPL-MCNC: 0.5 MG/DL (ref 0.3–1)
BILIRUB UR QL STRIP: NEGATIVE
BUN SERPL-MCNC: 12 MG/DL (ref 7–25)
CALCIUM SERPL-MCNC: 9.4 MG/DL (ref 8.6–10.3)
CHLORIDE SERPL-SCNC: 96 MMOL/L (ref 98–107)
CHOLEST SERPL-MCNC: 99 MG/DL
CO2 SERPL-SCNC: 28 MMOL/L (ref 21–31)
COLOR UR AUTO: YELLOW
CREAT SERPL-MCNC: 0.94 MG/DL (ref 0.7–1.3)
CREAT UR-MCNC: 128 MG/DL
ERYTHROCYTE [DISTWIDTH] IN BLOOD BY AUTOMATED COUNT: 12.8 % (ref 10–15)
GFR SERPL CREATININE-BSD FRML MDRD: 77 ML/MIN/{1.73_M2}
GLUCOSE SERPL-MCNC: 142 MG/DL (ref 70–105)
GLUCOSE UR STRIP-MCNC: NEGATIVE MG/DL
HBA1C MFR BLD: 6.9 % (ref 4–6)
HCT VFR BLD AUTO: 40.4 % (ref 40–53)
HDLC SERPL-MCNC: 37 MG/DL (ref 23–92)
HGB BLD-MCNC: 13.6 G/DL (ref 13.3–17.7)
HGB UR QL STRIP: NEGATIVE
KETONES UR STRIP-MCNC: NEGATIVE MG/DL
LDLC SERPL CALC-MCNC: 46 MG/DL
LEUKOCYTE ESTERASE UR QL STRIP: NEGATIVE
MCH RBC QN AUTO: 32.3 PG (ref 26.5–33)
MCHC RBC AUTO-ENTMCNC: 33.7 G/DL (ref 31.5–36.5)
MCV RBC AUTO: 96 FL (ref 78–100)
MICROALBUMIN UR-MCNC: 9 MG/L
MICROALBUMIN/CREAT UR: 7.35 MG/G CR (ref 0–17)
MUCOUS THREADS #/AREA URNS LPF: PRESENT /LPF
NITRATE UR QL: NEGATIVE
NONHDLC SERPL-MCNC: 62 MG/DL
PH UR STRIP: 7.5 PH (ref 5–7)
PLATELET # BLD AUTO: 378 10E9/L (ref 150–450)
POTASSIUM SERPL-SCNC: 4.7 MMOL/L (ref 3.5–5.1)
PROT SERPL-MCNC: 6.7 G/DL (ref 6.4–8.9)
RBC # BLD AUTO: 4.21 10E12/L (ref 4.4–5.9)
RBC #/AREA URNS AUTO: 1 /HPF (ref 0–2)
SODIUM SERPL-SCNC: 132 MMOL/L (ref 134–144)
SOURCE: ABNORMAL
SP GR UR STRIP: 1.02 (ref 1–1.03)
TRIGL SERPL-MCNC: 80 MG/DL
UROBILINOGEN UR STRIP-MCNC: NORMAL MG/DL (ref 0–2)
VIT B12 SERPL-MCNC: >1500 PG/ML (ref 180–914)
WBC # BLD AUTO: 9.1 10E9/L (ref 4–11)
WBC #/AREA URNS AUTO: 2 /HPF (ref 0–5)
YEAST #/AREA URNS HPF: ABNORMAL /HPF

## 2020-08-05 PROCEDURE — 82607 VITAMIN B-12: CPT | Mod: ZL | Performed by: INTERNAL MEDICINE

## 2020-08-05 PROCEDURE — 85027 COMPLETE CBC AUTOMATED: CPT | Mod: ZL | Performed by: INTERNAL MEDICINE

## 2020-08-05 PROCEDURE — 36415 COLL VENOUS BLD VENIPUNCTURE: CPT | Mod: ZL | Performed by: INTERNAL MEDICINE

## 2020-08-05 PROCEDURE — 82043 UR ALBUMIN QUANTITATIVE: CPT | Mod: ZL | Performed by: INTERNAL MEDICINE

## 2020-08-05 PROCEDURE — G0463 HOSPITAL OUTPT CLINIC VISIT: HCPCS | Mod: 25

## 2020-08-05 PROCEDURE — 80053 COMPREHEN METABOLIC PANEL: CPT | Mod: ZL | Performed by: INTERNAL MEDICINE

## 2020-08-05 PROCEDURE — 25000128 H RX IP 250 OP 636: Performed by: INTERNAL MEDICINE

## 2020-08-05 PROCEDURE — 83036 HEMOGLOBIN GLYCOSYLATED A1C: CPT | Mod: ZL | Performed by: INTERNAL MEDICINE

## 2020-08-05 PROCEDURE — 80061 LIPID PANEL: CPT | Mod: ZL | Performed by: INTERNAL MEDICINE

## 2020-08-05 PROCEDURE — 81001 URINALYSIS AUTO W/SCOPE: CPT | Mod: ZL | Performed by: INTERNAL MEDICINE

## 2020-08-05 PROCEDURE — 99214 OFFICE O/P EST MOD 30 MIN: CPT | Performed by: INTERNAL MEDICINE

## 2020-08-05 PROCEDURE — 96372 THER/PROPH/DIAG INJ SC/IM: CPT

## 2020-08-05 PROCEDURE — G0463 HOSPITAL OUTPT CLINIC VISIT: HCPCS

## 2020-08-05 RX ORDER — ALBUTEROL SULFATE 90 UG/1
2 AEROSOL, METERED RESPIRATORY (INHALATION) EVERY 6 HOURS PRN
Qty: 3 INHALER | Refills: 3 | Status: SHIPPED | OUTPATIENT
Start: 2020-08-05 | End: 2021-03-02

## 2020-08-05 RX ORDER — PRIMIDONE 50 MG/1
100 TABLET ORAL AT BEDTIME
Qty: 180 TABLET | Refills: 3 | Status: SHIPPED | OUTPATIENT
Start: 2020-08-05 | End: 2021-06-01

## 2020-08-05 RX ORDER — TAMSULOSIN HYDROCHLORIDE 0.4 MG/1
0.4 CAPSULE ORAL EVERY EVENING
Qty: 90 CAPSULE | Refills: 3 | Status: SHIPPED | OUTPATIENT
Start: 2020-08-05 | End: 2021-06-01

## 2020-08-05 RX ORDER — CYANOCOBALAMIN 1000 UG/ML
1000 INJECTION, SOLUTION INTRAMUSCULAR; SUBCUTANEOUS ONCE
Status: COMPLETED | OUTPATIENT
Start: 2020-08-05 | End: 2020-08-05

## 2020-08-05 RX ORDER — LOPERAMIDE HYDROCHLORIDE 2 MG/1
2 TABLET ORAL 4 TIMES DAILY PRN
Qty: 120 TABLET | Refills: 11 | Status: ON HOLD | OUTPATIENT
Start: 2020-08-05 | End: 2022-01-01

## 2020-08-05 RX ORDER — PROPRANOLOL HYDROCHLORIDE 20 MG/1
20 TABLET ORAL 3 TIMES DAILY
Qty: 270 TABLET | Refills: 3 | Status: SHIPPED | OUTPATIENT
Start: 2020-08-05 | End: 2021-06-01

## 2020-08-05 RX ORDER — ATORVASTATIN CALCIUM 40 MG/1
40 TABLET, FILM COATED ORAL
Qty: 90 TABLET | Refills: 3 | Status: SHIPPED | OUTPATIENT
Start: 2020-08-05 | End: 2021-06-01

## 2020-08-05 RX ORDER — NYSTATIN 100000 [USP'U]/G
POWDER TOPICAL 2 TIMES DAILY PRN
Qty: 180 G | Refills: 4 | Status: SHIPPED | OUTPATIENT
Start: 2020-08-05

## 2020-08-05 RX ORDER — NITROGLYCERIN 0.4 MG/1
0.4 TABLET SUBLINGUAL EVERY 5 MIN PRN
Qty: 25 TABLET | Refills: 11 | Status: SHIPPED | OUTPATIENT
Start: 2020-08-05 | End: 2021-01-01

## 2020-08-05 RX ADMIN — CYANOCOBALAMIN 1000 MCG: 1000 INJECTION, SOLUTION INTRAMUSCULAR at 12:56

## 2020-08-05 ASSESSMENT — ANXIETY QUESTIONNAIRES
1. FEELING NERVOUS, ANXIOUS, OR ON EDGE: NOT AT ALL
2. NOT BEING ABLE TO STOP OR CONTROL WORRYING: NOT AT ALL
3. WORRYING TOO MUCH ABOUT DIFFERENT THINGS: NOT AT ALL
6. BECOMING EASILY ANNOYED OR IRRITABLE: NOT AT ALL
7. FEELING AFRAID AS IF SOMETHING AWFUL MIGHT HAPPEN: NOT AT ALL
GAD7 TOTAL SCORE: 0
IF YOU CHECKED OFF ANY PROBLEMS ON THIS QUESTIONNAIRE, HOW DIFFICULT HAVE THESE PROBLEMS MADE IT FOR YOU TO DO YOUR WORK, TAKE CARE OF THINGS AT HOME, OR GET ALONG WITH OTHER PEOPLE: NOT DIFFICULT AT ALL
5. BEING SO RESTLESS THAT IT IS HARD TO SIT STILL: NOT AT ALL

## 2020-08-05 ASSESSMENT — ENCOUNTER SYMPTOMS
AGITATION: 0
MYALGIAS: 0
DIZZINESS: 1
ABDOMINAL PAIN: 1
CHILLS: 0
COUGH: 0
SHORTNESS OF BREATH: 0
WOUND: 0
TREMORS: 1
HEMATURIA: 0
FEVER: 0
DYSURIA: 0
BRUISES/BLEEDS EASILY: 0
NAUSEA: 0
DIARRHEA: 1
FATIGUE: 1
LIGHT-HEADEDNESS: 1
PALPITATIONS: 0
CONFUSION: 1
NERVOUS/ANXIOUS: 1
WHEEZING: 0
VOMITING: 0
ARTHRALGIAS: 0

## 2020-08-05 ASSESSMENT — MIFFLIN-ST. JEOR: SCORE: 1460.62

## 2020-08-05 ASSESSMENT — PAIN SCALES - GENERAL: PAINLEVEL: NO PAIN (0)

## 2020-08-05 ASSESSMENT — PATIENT HEALTH QUESTIONNAIRE - PHQ9
SUM OF ALL RESPONSES TO PHQ QUESTIONS 1-9: 0
5. POOR APPETITE OR OVEREATING: NOT AT ALL

## 2020-08-05 NOTE — LETTER
August 7, 2020      Rajesh Huynh  [address]  [place] [address]        Dear ,    We are writing to inform you of your test results.    Vitamin B12 level has been replaced appropriately.    Continue with your  over-the-counter B12 supplements.    If your symptoms do not improve we might need to consider getting heart work-up completed with cardiology.  I had hoped this would be low and would be a good explanation for your fatigue.     Jovanni Sen MD     Resulted Orders   Vitamin B12   Result Value Ref Range    Vitamin B12 >1,500 (H) 180 - 914 pg/mL       If you have any questions or concerns, please call the clinic at the number listed above.       Sincerely,        Jovanni Sen MD

## 2020-08-05 NOTE — PATIENT INSTRUCTIONS
Medications refilled.   Blood pressures are well controlled.     Immunization History   Administered Date(s) Administered     Flu, Unspecified 10/15/2009     Influenza (High Dose) 3 valent vaccine 09/23/2014, 08/25/2016, 09/26/2017, 09/18/2018, 09/16/2019     Influenza (IIV3) PF 10/16/2008, 10/05/2011, 10/06/2012, 10/18/2013, 09/21/2015     Pneumo Conj 13-V (2010&after) 10/09/2015     Pneumococcal 23 valent 06/10/2008     TD (ADULT, 7+) 01/19/2005     TDAP Vaccine (Adacel) 02/23/2012     Zoster vaccine, live 06/16/2008      -- Consider annual Influenza vaccination -- every fall.     -- Get your tetanus shot updated - from one of the local pharmacies, at your convenience. -- in 2022.     -- Get the new shingles shot (2 in series) - from one of the local pharmacies, at your convenience.     Pneumococcal Pneumonia vaccines (PCV 13 and PCV 23) -- These are done.     Results for orders placed or performed in visit on 08/05/20   Lipid Profile     Status: None   Result Value Ref Range    Cholesterol 99 <200 mg/dL    Triglycerides 80 <150 mg/dL    HDL Cholesterol 37 23 - 92 mg/dL    LDL Cholesterol Calculated 46 <100 mg/dL    Non HDL Cholesterol 62 <130 mg/dL   Hemoglobin A1c     Status: Abnormal   Result Value Ref Range    Hemoglobin A1C 6.9 (H) 4.0 - 6.0 %   CBC with platelets     Status: Abnormal   Result Value Ref Range    WBC 9.1 4.0 - 11.0 10e9/L    RBC Count 4.21 (L) 4.4 - 5.9 10e12/L    Hemoglobin 13.6 13.3 - 17.7 g/dL    Hematocrit 40.4 40.0 - 53.0 %    MCV 96 78 - 100 fl    MCH 32.3 26.5 - 33.0 pg    MCHC 33.7 31.5 - 36.5 g/dL    RDW 12.8 10.0 - 15.0 %    Platelet Count 378 150 - 450 10e9/L   Comprehensive metabolic panel     Status: Abnormal   Result Value Ref Range    Sodium 132 (L) 134 - 144 mmol/L    Potassium 4.7 3.5 - 5.1 mmol/L    Chloride 96 (L) 98 - 107 mmol/L    Carbon Dioxide 28 21 - 31 mmol/L    Anion Gap 8 3 - 14 mmol/L    Glucose 142 (H) 70 - 105 mg/dL    Urea Nitrogen 12 7 - 25 mg/dL    Creatinine  0.94 0.70 - 1.30 mg/dL    GFR Estimate 77 >60 mL/min/[1.73_m2]    GFR Estimate If Black >90 >60 mL/min/[1.73_m2]    Calcium 9.4 8.6 - 10.3 mg/dL    Bilirubin Total 0.5 0.3 - 1.0 mg/dL    Albumin 4.1 3.5 - 5.7 g/dL    Protein Total 6.7 6.4 - 8.9 g/dL    Alkaline Phosphatase 54 34 - 104 U/L    ALT 17 7 - 52 U/L    AST 16 13 - 39 U/L   UA reflex to Microscopic ([place]; [facility])     Status: Abnormal   Result Value Ref Range    Color Urine Yellow     Appearance Urine Slightly Cloudy     Glucose Urine Negative NEG^Negative mg/dL    Bilirubin Urine Negative NEG^Negative    Ketones Urine Negative NEG^Negative mg/dL    Specific Gravity Urine 1.021 1.003 - 1.035    Blood Urine Negative NEG^Negative    pH Urine 7.5 (H) 5.0 - 7.0 pH    Protein Albumin Urine Negative NEG^Negative mg/dL    Urobilinogen mg/dL Normal 0.0 - 2.0 mg/dL    Nitrite Urine Negative NEG^Negative    Leukocyte Esterase Urine Negative NEG^Negative    Source Midstream Urine     RBC Urine 1 0 - 2 /HPF    WBC Urine 2 0 - 5 /HPF    Bacteria Urine Few (A) NEG^Negative /HPF    Yeast Urine Few (A) NEG^Negative /HPF    Mucous Urine Present (A) NEG^Negative /LPF      Return for Diabetes labs and clinic follow-up appointment every 3 to 4 months.  --- (Go for about 91 to 100 days)    Aspects of Diabetes we can improve:  Hemoglobin A1c Lab Results   Component Value Date    A1C 6.9 08/05/2020    A1C 7.2 04/23/2020    A1C 6.5 12/20/2019    A1C 6.7 10/30/2019    A1C 6.8 05/02/2019    Goal range is under 8. Best is 6.5 to 7   Blood Pressure 126/80 Goal to keep less than 140/90   Tobacco  reports that he quit smoking about 20 years ago. His smoking use included cigarettes. He has a 129.00 pack-year smoking history. He has never used smokeless tobacco. Goal to abstain from tobacco   Eye Exam -- Do Yearly -- Annual diabetic eye exam   Healthy weight Body mass index is 27.17 kg/m . Goal BMI under 30, best is under 25.      -- Trying to exercise daily (goal at least  20 min/day) with moderate aerobic activity   -- Eat healthy (resources from ADA at http://www.diabetes.org/)   -- Taking good care of my feet. Consider seeing the Podiatrist   -- Check blood sugars as directed, record in log book and bring to every appointment      Schedule lab only appointment --- A few days AFTER: 11/3/20   Schedule clinic appointment with Dr. Sen -- Same day as labs, or 1-2 days later.     Insurance companies are now grading you and I on your blood sugar control -- Goal is to get your A1c down to 7.9% or lower and NO Smoking!    -- Medicare and most insurance companies, will only cover Hemoglobin A1c labs to be rechecked every 91+ days.      Hemoglobin A1C   Date Value Ref Range Status   08/05/2020 6.9 (H) 4.0 - 6.0 % Final   04/23/2020 7.2 (H) 4.0 - 6.0 % Final       Next follow-up appointment with Dr. Sen should be scheduled:  -- Approximately a few days AFTER: 11/3/20

## 2020-08-05 NOTE — NURSING NOTE
"Patient presents to the clinic for diabetes management and swelling of the ankles this past month.      Previous A1C is at goal of <8  Lab Results   Component Value Date    A1C 6.9 08/05/2020    A1C 7.2 04/23/2020    A1C 6.5 12/20/2019    A1C 6.7 10/30/2019    A1C 6.8 05/02/2019     Urine microalbumin:creatine: n/a  Foot exam today  Eye exam 8-19-19    Tobacco User  no  Patient is on a daily aspirin  Patient is on a Statin.  Blood pressure today of:     BP Readings from Last 1 Encounters:   05/14/20 138/72      is at the goal of <139/89 for diabetics.    Chief Complaint   Patient presents with     Diabetes       Initial /80 (BP Location: Right arm, Patient Position: Sitting, Cuff Size: Adult Regular)   Pulse 76   Temp 97.7  F (36.5  C) (Tympanic)   Resp 16   Ht 1.702 m (5' 7\")   Wt 78.7 kg (173 lb 8 oz)   BMI 27.17 kg/m   Estimated body mass index is 27.17 kg/m  as calculated from the following:    Height as of this encounter: 1.702 m (5' 7\").    Weight as of this encounter: 78.7 kg (173 lb 8 oz).  Medication Reconciliation: complete    Kate Todd LPN        "

## 2020-08-05 NOTE — NURSING NOTE
Clinic Administered Medication Documentation      Injectable Medication Documentation    Patient was given Cyanocobalamin (B-12). Prior to medication administration, verified patients identity using patient s name and date of birth. Please see MAR and medication order for additional information. Patient instructed to remain in clinic for 15 minutes.      Was entire vial of medication used? Yes  Vial/Syringe: Single dose vial  Expiration Date:  08/2021  Was this medication supplied by the patient? No       Kate Todd LPN 8/5/2020 12:57 PM

## 2020-08-05 NOTE — PROGRESS NOTES
"Nursing Notes:   Kate Todd LPN  8/5/2020 10:12 AM  Signed  Patient presents to the clinic for diabetes management and swelling of the ankles this past month.      Previous A1C is at goal of <8  Lab Results   Component Value Date    A1C 6.9 08/05/2020    A1C 7.2 04/23/2020    A1C 6.5 12/20/2019    A1C 6.7 10/30/2019    A1C 6.8 05/02/2019     Urine microalbumin:creatine: n/a  Foot exam today  Eye exam 8-19-19    Tobacco User  no  Patient is on a daily aspirin  Patient is on a Statin.  Blood pressure today of:     BP Readings from Last 1 Encounters:   05/14/20 138/72      is at the goal of <139/89 for diabetics.    Chief Complaint   Patient presents with     Diabetes       Initial /80 (BP Location: Right arm, Patient Position: Sitting, Cuff Size: Adult Regular)   Pulse 76   Temp 97.7  F (36.5  C) (Tympanic)   Resp 16   Ht 1.702 m (5' 7\")   Wt 78.7 kg (173 lb 8 oz)   BMI 27.17 kg/m   Estimated body mass index is 27.17 kg/m  as calculated from the following:    Height as of this encounter: 1.702 m (5' 7\").    Weight as of this encounter: 78.7 kg (173 lb 8 oz).  Medication Reconciliation: MIGUEL Brannon Amy M., LPN  8/5/2020 12:57 PM  Signed  Clinic Administered Medication Documentation      Injectable Medication Documentation    Patient was given Cyanocobalamin (B-12). Prior to medication administration, verified patients identity using patient s name and date of birth. Please see MAR and medication order for additional information. Patient instructed to remain in clinic for 15 minutes.      Was entire vial of medication used? Yes  Vial/Syringe: Single dose vial  Expiration Date:  08/2021  Was this medication supplied by the patient? No       aKte Todd LPN 8/5/2020 12:57 PM        Nursing note reviewed with patient.  Accuracy and completeness verified.   Mr. Huynh is a 80 year old male who:  Patient presents with:  Diabetes      ICD-10-CM    1. Benign essential " hypertension  I10 CBC with platelets     Comprehensive metabolic panel   2. History of tobacco use  Z87.891 albuterol (PROAIR HFA/PROVENTIL HFA/VENTOLIN HFA) 108 (90 Base) MCG/ACT inhaler   3. Controlled type 2 diabetes mellitus without complication, without long-term current use of insulin (H)  E11.9 blood glucose (NO BRAND SPECIFIED) lancets standard     blood glucose (NO BRAND SPECIFIED) test strip     metFORMIN (GLUCOPHAGE) 1000 MG tablet     Albumin Random Urine Quantitative with Creat Ratio     Hemoglobin A1c     UA reflex to Microscopic     TSH Reflex GH   4. Mixed hyperlipidemia  E78.2 atorvastatin (LIPITOR) 40 MG tablet     Lipid Profile   5. Exocrine pancreatic insufficiency - Severe Deficiency  K86.81 amylase-lipase-protease (CREON) 05197-06481 units CPEP per EC capsule   6. History of ST elevation myocardial infarction (STEMI)  I25.2 primidone (MYSOLINE) 50 MG tablet     propranolol (INDERAL) 20 MG tablet     nitroGLYcerin (NITROSTAT) 0.4 MG sublingual tablet   7. Weak urinary stream  R39.12 tamsulosin (FLOMAX) 0.4 MG capsule     CANCELED: PSA Screen GH   8. Diarrhea, unspecified type  R19.7 loperamide (IMODIUM A-D) 2 MG tablet   9. Candidiasis, intertriginous  B37.2 nystatin (NYSTOP) 381003 UNIT/GM external powder   10. Vitamin B12 deficiency  E53.8 Vitamin B12     Vitamin B12     cyanocobalamin injection 1,000 mcg   11. Memory problem  R41.3 NEUROLOGY ADULT REFERRAL   12. Screening PSA (prostate specific antigen)  Z12.5 PSA Screen GH     HPI  Patient presents for follow-up of multiple issues.    Hypertension, blood pressures are currently controlled.  They have been a little high at home on occasion the last week or so, prior to this his blood pressures actually were doing very well in the 120s and 130s systolic.  He has not had any dizzy spells or lightheaded episodes.  At this point recommend continuing to watch his diet better.  He has been having ice cream and sweets on a regular basis.  Hopefully  with the dietary changes his blood pressures will come back down to normal at home.    Tobacco use history, reports breathing is stable.  Needs refills of his albuterol inhaler.    Type 2 diabetes, no insulin use at this time.  Needs updated standing orders.  Needs medication refills.    Mixed hyperlipidemia, currently on Lipitor, seems to be tolerating well.  No side effects reported.  Continue current dosing.    Pancreatic exocrine insufficiency, previous lab work showed severe deficiency of pancreatic enzymes.  Continue Creon.  Needs refills.    History of ST elevation MI, continues on propanolol.  Has not been using much of any nitroglycerin sublingual.  Needs medication refills.    Weak urine stream, continue Flomax.  Seems to be tolerating well.    Intertriginous candidiasis, intermittently gets skin fold rash.  He would like to restart nystatin powder.    Vitamin B12 deficiency, taking oral replacement.  He is worried his B12 is still low.  He has been having memory problems and fatigue and malaise.  Check B12 level, B12 shot today.    Memory issues, wife states that his memory is getting worse.  Patient does admit that his memory is worsening.  Patient and his wife would like to get neurology consult.  May end up needing to see neuropsych for memory testing as well.    Screening PSA today.    Functional Capacity: > or about 4 METS.   Review of Systems   Constitutional: Positive for fatigue. Negative for chills and fever.   HENT: Negative for congestion and hearing loss.    Eyes: Negative for visual disturbance.   Respiratory: Negative for cough, shortness of breath and wheezing.    Cardiovascular: Negative for chest pain and palpitations.   Gastrointestinal: Positive for abdominal pain (+intermittent) and diarrhea (+ much improved with Creon). Negative for nausea and vomiting.   Endocrine: Negative for cold intolerance and heat intolerance.   Genitourinary: Negative for dysuria and hematuria.  "  Musculoskeletal: Negative for arthralgias and myalgias.   Skin: Negative for rash and wound.   Allergic/Immunologic: Negative for immunocompromised state.   Neurological: Positive for dizziness, tremors and light-headedness.        + some balance problems   Hematological: Does not bruise/bleed easily.   Psychiatric/Behavioral: Positive for confusion. Negative for agitation. The patient is nervous/anxious.         + Memory problems        Reviewed and updated as needed this visit by Provider   Tobacco  Allergies  Meds  Problems  Med Hx  Surg Hx  Fam Hx         EXAM:   Vitals:    08/05/20 0955   BP: 126/80   BP Location: Right arm   Patient Position: Sitting   Cuff Size: Adult Regular   Pulse: 76   Resp: 16   Temp: 97.7  F (36.5  C)   TempSrc: Tympanic   Weight: 78.7 kg (173 lb 8 oz)   Height: 1.702 m (5' 7\")       Current Pain Score: No Pain (0)     BP Readings from Last 3 Encounters:   08/05/20 126/80   05/14/20 138/72   04/23/20 127/74      Wt Readings from Last 3 Encounters:   08/05/20 78.7 kg (173 lb 8 oz)   05/14/20 81.4 kg (179 lb 6.4 oz)   04/23/20 79.9 kg (176 lb 2 oz)      Estimated body mass index is 27.17 kg/m  as calculated from the following:    Height as of this encounter: 1.702 m (5' 7\").    Weight as of this encounter: 78.7 kg (173 lb 8 oz).     Physical Exam  Constitutional:       General: He is not in acute distress.     Appearance: He is well-developed. He is not diaphoretic.   HENT:      Head: Normocephalic and atraumatic.   Eyes:      General: No scleral icterus.     Conjunctiva/sclera: Conjunctivae normal.   Neck:      Musculoskeletal: Neck supple.   Cardiovascular:      Rate and Rhythm: Normal rate and regular rhythm.   Pulmonary:      Effort: Pulmonary effort is normal.      Breath sounds: Normal breath sounds.   Abdominal:      Palpations: Abdomen is soft.      Tenderness: There is no abdominal tenderness.   Musculoskeletal:         General: No deformity.   Lymphadenopathy:      " Cervical: No cervical adenopathy.   Skin:     General: Skin is warm and dry.      Findings: No rash.   Neurological:      Mental Status: He is alert and oriented to person, place, and time. Mental status is at baseline.   Psychiatric:         Mood and Affect: Mood normal.         Behavior: Behavior normal.          Procedures   INVESTIGATIONS:  - Labs reviewed in Epic     ASSESSMENT AND PLAN:  Problem List Items Addressed This Visit        Digestive    Exocrine pancreatic insufficiency - Severe Deficiency    Relevant Medications    amylase-lipase-protease (CREON) 91450-81702 units CPEP per EC capsule       Endocrine    Mixed hyperlipidemia    Relevant Medications    atorvastatin (LIPITOR) 40 MG tablet    Other Relevant Orders    Lipid Profile    Controlled type 2 diabetes mellitus without complication, without long-term current use of insulin (H)    Relevant Medications    blood glucose (NO BRAND SPECIFIED) lancets standard    blood glucose (NO BRAND SPECIFIED) test strip    metFORMIN (GLUCOPHAGE) 1000 MG tablet    Other Relevant Orders    Albumin Random Urine Quantitative with Creat Ratio    Hemoglobin A1c    UA reflex to Microscopic    TSH Reflex GH       Circulatory    Benign essential hypertension - Primary    Relevant Orders    CBC with platelets    Comprehensive metabolic panel       Behavioral    History of tobacco use    Relevant Medications    albuterol (PROAIR HFA/PROVENTIL HFA/VENTOLIN HFA) 108 (90 Base) MCG/ACT inhaler       Other    History of ST elevation myocardial infarction (STEMI)    Relevant Medications    primidone (MYSOLINE) 50 MG tablet    propranolol (INDERAL) 20 MG tablet    nitroGLYcerin (NITROSTAT) 0.4 MG sublingual tablet      Other Visit Diagnoses     Weak urinary stream        Relevant Medications    tamsulosin (FLOMAX) 0.4 MG capsule    Diarrhea, unspecified type        Relevant Medications    loperamide (IMODIUM A-D) 2 MG tablet    Candidiasis, intertriginous        Relevant  Medications    albuterol (PROAIR HFA/PROVENTIL HFA/VENTOLIN HFA) 108 (90 Base) MCG/ACT inhaler    nystatin (NYSTOP) 021742 UNIT/GM external powder    Vitamin B12 deficiency        Relevant Medications    cyanocobalamin injection 1,000 mcg (Completed)    Other Relevant Orders    Vitamin B12 (Completed)    Memory problem        Relevant Orders    NEUROLOGY ADULT REFERRAL    Screening PSA (prostate specific antigen)        Relevant Orders    PSA Screen GH        reviewed diet, exercise and weight control, recommended sodium restriction, cardiovascular risk and specific lipid/LDL goals reviewed, specific diabetic recommendations low cholesterol diet, weight control and daily exercise discussed, use of aspirin to prevent MI and TIA's discussed  -- Expected clinical course discussed    -- Medications and their side effects discussed    Patient Instructions     Medications refilled.   Blood pressures are well controlled.     Immunization History   Administered Date(s) Administered     Flu, Unspecified 10/15/2009     Influenza (High Dose) 3 valent vaccine 09/23/2014, 08/25/2016, 09/26/2017, 09/18/2018, 09/16/2019     Influenza (IIV3) PF 10/16/2008, 10/05/2011, 10/06/2012, 10/18/2013, 09/21/2015     Pneumo Conj 13-V (2010&after) 10/09/2015     Pneumococcal 23 valent 06/10/2008     TD (ADULT, 7+) 01/19/2005     TDAP Vaccine (Adacel) 02/23/2012     Zoster vaccine, live 06/16/2008      -- Consider annual Influenza vaccination -- every fall.     -- Get your tetanus shot updated - from one of the local pharmacies, at your convenience. -- in 2022.     -- Get the new shingles shot (2 in series) - from one of the local pharmacies, at your convenience.     Pneumococcal Pneumonia vaccines (PCV 13 and PCV 23) -- These are done.     Results for orders placed or performed in visit on 08/05/20   Lipid Profile     Status: None   Result Value Ref Range    Cholesterol 99 <200 mg/dL    Triglycerides 80 <150 mg/dL    HDL Cholesterol 37 23 - 92  mg/dL    LDL Cholesterol Calculated 46 <100 mg/dL    Non HDL Cholesterol 62 <130 mg/dL   Hemoglobin A1c     Status: Abnormal   Result Value Ref Range    Hemoglobin A1C 6.9 (H) 4.0 - 6.0 %   CBC with platelets     Status: Abnormal   Result Value Ref Range    WBC 9.1 4.0 - 11.0 10e9/L    RBC Count 4.21 (L) 4.4 - 5.9 10e12/L    Hemoglobin 13.6 13.3 - 17.7 g/dL    Hematocrit 40.4 40.0 - 53.0 %    MCV 96 78 - 100 fl    MCH 32.3 26.5 - 33.0 pg    MCHC 33.7 31.5 - 36.5 g/dL    RDW 12.8 10.0 - 15.0 %    Platelet Count 378 150 - 450 10e9/L   Comprehensive metabolic panel     Status: Abnormal   Result Value Ref Range    Sodium 132 (L) 134 - 144 mmol/L    Potassium 4.7 3.5 - 5.1 mmol/L    Chloride 96 (L) 98 - 107 mmol/L    Carbon Dioxide 28 21 - 31 mmol/L    Anion Gap 8 3 - 14 mmol/L    Glucose 142 (H) 70 - 105 mg/dL    Urea Nitrogen 12 7 - 25 mg/dL    Creatinine 0.94 0.70 - 1.30 mg/dL    GFR Estimate 77 >60 mL/min/[1.73_m2]    GFR Estimate If Black >90 >60 mL/min/[1.73_m2]    Calcium 9.4 8.6 - 10.3 mg/dL    Bilirubin Total 0.5 0.3 - 1.0 mg/dL    Albumin 4.1 3.5 - 5.7 g/dL    Protein Total 6.7 6.4 - 8.9 g/dL    Alkaline Phosphatase 54 34 - 104 U/L    ALT 17 7 - 52 U/L    AST 16 13 - 39 U/L   UA reflex to Microscopic ([place]; [facility])     Status: Abnormal   Result Value Ref Range    Color Urine Yellow     Appearance Urine Slightly Cloudy     Glucose Urine Negative NEG^Negative mg/dL    Bilirubin Urine Negative NEG^Negative    Ketones Urine Negative NEG^Negative mg/dL    Specific Gravity Urine 1.021 1.003 - 1.035    Blood Urine Negative NEG^Negative    pH Urine 7.5 (H) 5.0 - 7.0 pH    Protein Albumin Urine Negative NEG^Negative mg/dL    Urobilinogen mg/dL Normal 0.0 - 2.0 mg/dL    Nitrite Urine Negative NEG^Negative    Leukocyte Esterase Urine Negative NEG^Negative    Source Midstream Urine     RBC Urine 1 0 - 2 /HPF    WBC Urine 2 0 - 5 /HPF    Bacteria Urine Few (A) NEG^Negative /HPF    Yeast Urine Few (A)  NEG^Negative /HPF    Mucous Urine Present (A) NEG^Negative /LPF      Return for Diabetes labs and clinic follow-up appointment every 3 to 4 months.  --- (Go for about 91 to 100 days)    Aspects of Diabetes we can improve:  Hemoglobin A1c Lab Results   Component Value Date    A1C 6.9 08/05/2020    A1C 7.2 04/23/2020    A1C 6.5 12/20/2019    A1C 6.7 10/30/2019    A1C 6.8 05/02/2019    Goal range is under 8. Best is 6.5 to 7   Blood Pressure 126/80 Goal to keep less than 140/90   Tobacco  reports that he quit smoking about 20 years ago. His smoking use included cigarettes. He has a 129.00 pack-year smoking history. He has never used smokeless tobacco. Goal to abstain from tobacco   Eye Exam -- Do Yearly -- Annual diabetic eye exam   Healthy weight Body mass index is 27.17 kg/m . Goal BMI under 30, best is under 25.      -- Trying to exercise daily (goal at least 20 min/day) with moderate aerobic activity   -- Eat healthy (resources from ADA at http://www.diabetes.org/)   -- Taking good care of my feet. Consider seeing the Podiatrist   -- Check blood sugars as directed, record in log book and bring to every appointment      Schedule lab only appointment --- A few days AFTER: 11/3/20   Schedule clinic appointment with Dr. Sen -- Same day as labs, or 1-2 days later.     Insurance companies are now grading you and I on your blood sugar control -- Goal is to get your A1c down to 7.9% or lower and NO Smoking!    -- Medicare and most insurance companies, will only cover Hemoglobin A1c labs to be rechecked every 91+ days.      Hemoglobin A1C   Date Value Ref Range Status   08/05/2020 6.9 (H) 4.0 - 6.0 % Final   04/23/2020 7.2 (H) 4.0 - 6.0 % Final       Next follow-up appointment with Dr. Sen should be scheduled:  -- Approximately a few days AFTER: 11/3/20      Jovanni Sen MD   Internal Medicine  [facility]     Portions of this note were dictated using speech recognition software. The note has  been proofread but errors in the text may have been overlooked. Please contact me if there are any concerns regarding the accuracy of the dictation.

## 2020-08-06 ASSESSMENT — ANXIETY QUESTIONNAIRES: GAD7 TOTAL SCORE: 0

## 2020-09-11 ENCOUNTER — TRANSFERRED RECORDS (OUTPATIENT)
Dept: HEALTH INFORMATION MANAGEMENT | Facility: OTHER | Age: 80
End: 2020-09-11

## 2020-09-11 LAB — RETINOPATHY: NEGATIVE

## 2020-09-28 ENCOUNTER — TELEPHONE (OUTPATIENT)
Dept: INTERNAL MEDICINE | Facility: OTHER | Age: 80
End: 2020-09-28

## 2020-09-28 DIAGNOSIS — I25.10 ATHEROSCLEROSIS OF CORONARY ARTERY OF NATIVE HEART WITHOUT ANGINA PECTORIS, UNSPECIFIED VESSEL OR LESION TYPE: ICD-10-CM

## 2020-09-28 DIAGNOSIS — I25.2 HISTORY OF ST ELEVATION MYOCARDIAL INFARCTION (STEMI): Primary | ICD-10-CM

## 2020-09-28 NOTE — TELEPHONE ENCOUNTER
Fiorella has a question about the letter that they received about seeing Cardiology Please call and advise [phone]      Thank You  Zoie Abreu on 9/28/2020 at 8:55 AM      
Patient and spouse received recent letter suggesting a follow up with Cardiology.  Referral pending, they was like to see one in the area preferably in [place].    Referral pending.      Ktae Todd LPN 9/28/2020 11:52 AM      
stated

## 2020-10-08 ENCOUNTER — OFFICE VISIT (OUTPATIENT)
Dept: UROLOGY | Facility: OTHER | Age: 80
End: 2020-10-08
Attending: UROLOGY
Payer: MEDICARE

## 2020-10-08 VITALS — WEIGHT: 174.8 LBS | HEART RATE: 80 BPM | RESPIRATION RATE: 12 BRPM | BODY MASS INDEX: 27.38 KG/M2

## 2020-10-08 DIAGNOSIS — Z85.51 HISTORY OF BLADDER CANCER: Primary | ICD-10-CM

## 2020-10-08 PROCEDURE — 52000 CYSTOURETHROSCOPY: CPT | Performed by: UROLOGY

## 2020-10-08 PROCEDURE — G0463 HOSPITAL OUTPT CLINIC VISIT: HCPCS | Mod: 25

## 2020-10-08 ASSESSMENT — PAIN SCALES - GENERAL: PAINLEVEL: NO PAIN (0)

## 2020-10-08 NOTE — PATIENT INSTRUCTIONS
Home Care after Cystoscopy  Follow these guidelines for your care after your procedure.    Activity  No limitations    Bathing or showering  No limitations    Symptoms  You may notice some burning with urination but this usually resolves after 1-2 days.  You may also notice small amounts of blood in your urine.  Please increase water intake for the next few days to help with these symptoms.    Contacts  General Questions: [phone]  Appointments:  [phone]  Emergencies:  [phone]    When to call the clinic  If you develop any of the following symptoms please call the clinic immediately.  If the clinic is closed please be seen at an urgent care clinic or the Emergency Department.  - Burning with urination that worsens after 2 days  - Unable to urinate causing severe pelvic pain  - Fevers of greater than 101 degrees F  - Flank pain that is not responding to pain medication    Follow up  Please follow up as discussed at the appointment.   Pt brought in by EMS from vascular surgery for right foot gangrene. Sent in for further evaluation for IV antibiotics and possible toe amputation.

## 2020-10-08 NOTE — PROGRESS NOTES
Preprocedure diagnosis  History of bladder cancer    Postprocedure diagnosis  History of bladder cancer    Procedure  Flexible Cystourethroscopy    Surgeon  Anthony Haney MD    Anesthesia  2% lidocaine jelly intraurethrally    Complications  None    Indications  The patient is undergoing a flexible cystoscopy for the above mentioned indications.    Pathology   I personally reviewed the pathology report   11/10/2014   Low grade Ta (muscularis propria was present in specimen)    Findings  Cystoscopic findings included a normal anterior urethra.    There was a prominent median lobe.    The lateral lobes were obstructive in appearance.  The bladder appeared to be normal capacity.    There were no tumors, stones or foreign bodies.    The orifices were slit-shaped and in their normal location.    Procedure  The patient was placed in supine position and prepped and draped in sterile fashion with lidocaine jelly per urethra for anesthesia.    I passed a lubricated 14F flexible cystoscope through the penile urethra and into the bladder and the bladder was completely visualized.  The cystoscope was retroflexed and the bladder neck and prostate visualized.    The cystoscope was slowly withdrawn while visualizing the urethra and the procedure terminated.    The patient tolerated the procedure well.      Assessment  History of bladder cancer -cystoscopy was negative    BPH with weak stream  -continue Flomax    Plan  Continue Flomax 0.4mg every evening  Follow up surveillance cystoscopy annually

## 2020-10-08 NOTE — NURSING NOTE
Patient positioned in supine position, perineum area prepped with chlorhexidene Gluconate and patient draped per sterile technique. Per verbal order read back by Anthony Haney MD, Urojet 10mL 2% lidocaine jelly to be instilled into urethra.  Urojet- 10ml 2% Lidocaine jelly instilled into the urethra.    Urojet 2%  Lot#: [id number]  Expiration date: 5/22  : [employer]  NDC: [address]-0    [facility] Protocol    A. Pre-procedure verification complete Yes  1-relevant information / documentation available, reviewed and properly matched to the patient; 2-consent accurate and complete, 3-equipment and supplies available    B. Site marking complete N/A  Site marked if not in continuous attendance with patient    C. TIME OUT completed Yes  Time Out was conducted just prior to starting procedure to verify the eight required elements: 1-patient identity, 2-consent accurate and complete, 3-position, 4-correct side/site marked (if applicable), 5-procedure, 6-relevant images / results properly labeled and displayed (if applicable), 7-antibiotics / irrigation fluids (if applicable), 8-safety precautions.    After procedure perineum area rinsed. Discharge instructions reviewed with patient. Patient verbalized understanding of discharge instructions and discharged ambulatory.  Elinor Martinez RN..................10/8/2020  9:18 AM

## 2020-10-15 ENCOUNTER — OFFICE VISIT (OUTPATIENT)
Dept: CARDIOLOGY | Facility: OTHER | Age: 80
End: 2020-10-15
Attending: INTERNAL MEDICINE
Payer: MEDICARE

## 2020-10-15 VITALS
OXYGEN SATURATION: 97 % | WEIGHT: 173 LBS | HEIGHT: 67 IN | DIASTOLIC BLOOD PRESSURE: 80 MMHG | BODY MASS INDEX: 27.15 KG/M2 | SYSTOLIC BLOOD PRESSURE: 138 MMHG | RESPIRATION RATE: 18 BRPM | TEMPERATURE: 97.5 F

## 2020-10-15 DIAGNOSIS — N40.1 BENIGN PROSTATIC HYPERPLASIA WITH LOWER URINARY TRACT SYMPTOMS, SYMPTOM DETAILS UNSPECIFIED: ICD-10-CM

## 2020-10-15 DIAGNOSIS — I25.10 ATHEROSCLEROSIS OF CORONARY ARTERY OF NATIVE HEART WITHOUT ANGINA PECTORIS, UNSPECIFIED VESSEL OR LESION TYPE: ICD-10-CM

## 2020-10-15 DIAGNOSIS — E11.9 CONTROLLED TYPE 2 DIABETES MELLITUS WITHOUT COMPLICATION, WITHOUT LONG-TERM CURRENT USE OF INSULIN (H): ICD-10-CM

## 2020-10-15 DIAGNOSIS — I25.2 HISTORY OF ST ELEVATION MYOCARDIAL INFARCTION (STEMI): ICD-10-CM

## 2020-10-15 DIAGNOSIS — Z87.891 HISTORY OF TOBACCO USE: ICD-10-CM

## 2020-10-15 DIAGNOSIS — R53.83 FATIGUE, UNSPECIFIED TYPE: Primary | ICD-10-CM

## 2020-10-15 DIAGNOSIS — E87.1 HYPONATREMIA: ICD-10-CM

## 2020-10-15 DIAGNOSIS — Z85.51 HX OF BLADDER CANCER: ICD-10-CM

## 2020-10-15 DIAGNOSIS — I10 BENIGN ESSENTIAL HYPERTENSION: ICD-10-CM

## 2020-10-15 DIAGNOSIS — E78.2 MIXED HYPERLIPIDEMIA: ICD-10-CM

## 2020-10-15 DIAGNOSIS — I65.22 STENOSIS OF LEFT CAROTID ARTERY: ICD-10-CM

## 2020-10-15 DIAGNOSIS — I70.0 AORTIC ATHEROSCLEROSIS (H): ICD-10-CM

## 2020-10-15 DIAGNOSIS — Z87.19 HX OF PANCREATITIS: ICD-10-CM

## 2020-10-15 DIAGNOSIS — Z98.890 HISTORY OF CARDIAC CATH: ICD-10-CM

## 2020-10-15 PROCEDURE — 99205 OFFICE O/P NEW HI 60 MIN: CPT | Performed by: INTERNAL MEDICINE

## 2020-10-15 PROCEDURE — 93010 ELECTROCARDIOGRAM REPORT: CPT | Performed by: INTERNAL MEDICINE

## 2020-10-15 PROCEDURE — 93005 ELECTROCARDIOGRAM TRACING: CPT

## 2020-10-15 PROCEDURE — G0463 HOSPITAL OUTPT CLINIC VISIT: HCPCS

## 2020-10-15 PROCEDURE — G0463 HOSPITAL OUTPT CLINIC VISIT: HCPCS | Mod: 25

## 2020-10-15 ASSESSMENT — PAIN SCALES - GENERAL: PAINLEVEL: NO PAIN (0)

## 2020-10-15 ASSESSMENT — MIFFLIN-ST. JEOR: SCORE: 1458.35

## 2020-10-15 NOTE — PROGRESS NOTES
[facility] HEART CARE   CARDIOLOGY CONSULT     Rajesh Huynh   1940  [phone]    Jovanni Sen     Chief Complaint   Patient presents with     Consult For     history of STEMI          HPI:   Mr. Huynh is a 80-year-old gentleman who is being seen by cardiology for fatigue for the last 6 to 8 months.  He has a history of STEMI on 7/8/2013 with bare-metal stent to his RCA, tobacco abuse up to 3 packs a day, quitting in 1/1 2000, aortic plaque, left carotid artery stenosis, hyperlipidemia, DM-2, hypertension, BPH, history of pancreatitis, history of bladder cancer diagnosed on 11/10/2014, aortic atherosclerosis and hyponatremia.    He reports for the last 6 to 8 months he has been increasing fatigue.  He has not had any chest pain, chest tightness, or chest discomfort.  He has not had any arm, neck, shoulder, or intrascapular pain.  Denies nausea, vomiting, diaphoresis, or dizziness.  He has been short of breath with activities.  He gives an example of shoveling snow on 2019 which is almost a year ago.  This causes dyspnea on exertion.  He is able to walk to the mailbox back-and-forth without issue.  Wife does not believe he snores significantly or stops breathing at night, suggestive of sleep apnea.  He goes to bed at approximately 9 or 10 AM and gets up at 6 AM.  He gets up approximately x4 times a night to urinate but states is able to fall back asleep.  He feels like he gets appropriate sleep.  It is possible that his fatigue could be his angina equivalent with history of stent placed to the RCA on 7/8/2013 with a 4.0 x 18 mm bare-metal stent.  Risk factors include DM-2 with an A1c of 6.9% on 8/5/2020, hyperlipidemia, hypertension, obesity, and sedentary lifestyle.  He has not had stress testing or work-up since stent placement.  He has not had any significant medication changes and has been on propranolol 20 mg 3 times a day for extended period of time.    There is also history of chronic pancreatitis.   Imaging on 10/23/2014 was supportive of this.    He has history of DM-2.  He had an A1c was 6.9% on 8/5/2020.  He currently is on metformin at 1000 mgs twice a day.    He reports being diagnosed with bladder cancer with radiation on 11/10/2014.    He has history of left carotid artery disease with evidence for mild disease on MRI of the head and neck on 6/9/2015.    Normal PFT's in 6/7/2018.    IMAGING RESULTS:   MRA angiogram head and neck on 6/9/2015:  1.  Mild atherosclerotic plaque formation within the left carotid bulb. No evidence of significant stenosis.    Ultrasound aorta on 3/15/2013:    Mild atherosclerotic plaque without evidence of aneurysm, no  evidence of other concerning finding.          CURRENT MEDICATIONS:   Prior to Admission medications    Medication Sig Start Date End Date Taking? Authorizing Provider   albuterol (PROAIR HFA/PROVENTIL HFA/VENTOLIN HFA) 108 (90 Base) MCG/ACT inhaler Inhale 2 puffs into the lungs every 6 hours as needed for shortness of breath / dyspnea or wheezing -- use generic 8/5/20   Jovanni Sen MD   amylase-lipase-protease (CREON) 18400-48989 units CPEP per EC capsule Take 1-3 capsules (24,000-72,000 Units) by mouth 4 times daily (with meals and nightly) With snacks 8/5/20   Jovanni Sen MD   aspirin EC 81 MG EC tablet Take 81 mg by mouth daily with food 3/4/13   Reported, Patient   atorvastatin (LIPITOR) 40 MG tablet Take 1 tablet (40 mg) by mouth daily (with dinner) 8/5/20   Jovanni Sen MD   blood glucose (NO BRAND SPECIFIED) lancets standard Use to test blood sugar 2 times daily  E11.9 8/5/20   Jovanni Sen MD   blood glucose (NO BRAND SPECIFIED) test strip Use to test blood sugar 2 times daily  E11.9 8/5/20   Jovanni Sen MD   Blood Glucose Monitoring Suppl (FIFTY50 GLUCOSE METER 2.0) W/DEVICE KIT Dispense Accu-Chek Agnes  Meter. Dispense item covered by pt ins. E11.9 NIDDM type II - Test 1 time/day 1/4/18   Reported, Patient   calcium carbonate-vitamin  D (CALCIUM 600+D) 600-200 MG-UNIT TABS Take 1 tablet by mouth 2 times daily (with meals) 2/4/11   Reported, Patient   DOCOSAHEXAENOIC ACID PO Take 1 capsule by mouth 2 times daily 3/4/13   Reported, Patient   loperamide (IMODIUM A-D) 2 MG tablet Take 1 tablet (2 mg) by mouth 4 times daily as needed for diarrhea 8/5/20   Jovanni Sen MD   metFORMIN (GLUCOPHAGE) 1000 MG tablet Take 1 tablet (1,000 mg) by mouth 2 times daily (with meals) For diabetes prevention 8/5/20   Jovanni Sen MD   Multiple Vitamin (MULTI-VITAMINS) TABS Take 1 tablet by mouth daily 2/4/11   Reported, Patient   nitroGLYcerin (NITROSTAT) 0.4 MG sublingual tablet Place 1 tablet (0.4 mg) under the tongue every 5 minutes as needed for chest pain 8/5/20   Jovanni Sen MD   nystatin (NYSTOP) 077641 UNIT/GM external powder Apply topically 2 times daily as needed For skin fold rash 8/5/20   Jovanni Sen MD   primidone (MYSOLINE) 50 MG tablet Take 2 tablets (100 mg) by mouth At Bedtime 8/5/20   Jovanni Sen MD   propranolol (INDERAL) 20 MG tablet Take 1 tablet (20 mg) by mouth 3 times daily 8/5/20   Jovanni Sen MD   Respiratory Therapy Supplies (VORTEX HOLDING CHAMBER/MASK) JACOBY Use with albuterol HFA 5/8/18   Jarad Lawrence MD   tamsulosin (FLOMAX) 0.4 MG capsule Take 1 capsule (0.4 mg) by mouth every evening 8/5/20   Jovanni Sen MD       ALLERGIES:   No Known Allergies     PAST MEDICAL HISTORY:   Past Medical History:   Diagnosis Date     Chronic myeloproliferative disease (H)     questionable     Essential (primary) hypertension     No Comments Provided     Hyperlipidemia     No Comments Provided     Male erectile dysfunction     No Comments Provided     Malignant neoplasm of supraglottis (H)     2011,Radiation     Other chronic pancreatitis (H)     No Comments Provided     Personal history of irradiation     1/30/2012-3/2/2012     Personal history of malignant neoplasm of bladder     4/22/2015     Personal history of  nicotine dependence     2ppd - quit 2000     Pneumonia     2001     ST elevation (STEMI) myocardial infarction (H)     7/5/2013,Integrity BMS to RCA done at [facility]     No Comments Provided        PAST SURGICAL HISTORY:   Past Surgical History:   Procedure Laterality Date     APPENDECTOMY OPEN      1970     ARTHROSCOPY SHOULDER      11/2005,right shoulder     ARTHROSCOPY SHOULDER      1/2006,left shoulder     ARTHROSCOPY SHOULDER      2011,Left shoulder scope SAD, ac.  Open subscap, biceps     CHOLECYSTECTOMY      1994     COLONOSCOPY  02/01/2005 02/2005     COLONOSCOPY  02/19/2015 2/19/2015,no repeat due at this time     CYSTOSCOPY      Multiple times,Dr. Lyndon FRANK     HEART CATH, ANGIOPLASTY      07/05/2013,Bare-metal stent to dominant RCA     OTHER SURGICAL HISTORY      2004,[id number],OTHER,for chronic pancreatitis     OTHER SURGICAL HISTORY      6/22/2007,[id number],SHOULDER REPLACEMENT,Right,with biceps tenodesis by Dr. Mukesh Ayoub     OTHER SURGICAL HISTORY      1995/2002,,ERCP,xseveral, one with papillotomy     OTHER SURGICAL HISTORY      1/30/2012-3/2/2012,[id number],RADIATION TREATMENT     OTHER SURGICAL HISTORY      12/7/2011,02591,NECK/THORAX PROCEDURE,L modified radical neck surgery     OTHER SURGICAL HISTORY      11/10/14,[id number],OTHER,Dr. Lyndon FRANK     OTHER SURGICAL HISTORY      2012,51424,REVISION EYELID,Dr. Farias     OTHER SURGICAL HISTORY      10/22/15,[id number],OTHER     TONSILLECTOMY, ADENOIDECTOMY, COMBINED      1947     VASECTOMY      1980        FAMILY HISTORY:   Family History   Problem Relation Age of Onset     Heart Disease Father 83        Heart Disease,CHF     Heart Disease Mother 80        Heart Disease,MI     Family History Negative Sister         Good Health     Heart Disease Sister         Heart Disease,pacemaker     Diabetes Son         Diabetes,Type 1     Breast Cancer Daughter         Cancer-breast     Prostate Cancer No family hx of         Cancer-prostate         SOCIAL HISTORY:   Social History     Socioeconomic History     Marital status:      Spouse name: Not on file     Number of children: Not on file     Years of education: Not on file     Highest education level: Not on file   Occupational History     Not on file   Social Needs     Financial resource strain: Not on file     Food insecurity     Worry: Not on file     Inability: Not on file     Transportation needs     Medical: Not on file     Non-medical: Not on file   Tobacco Use     Smoking status: Former Smoker     Packs/day: 3.00     Years: 43.00     Pack years: 129.00     Types: Cigarettes     Quit date: 2000     Years since quittin.8     Smokeless tobacco: Never Used   Substance and Sexual Activity     Alcohol use: No     Alcohol/week: 0.0 standard drinks     Drug use: No     Sexual activity: Yes     Partners: Female   Lifestyle     Physical activity     Days per week: Not on file     Minutes per session: Not on file     Stress: Not on file   Relationships     Social connections     Talks on phone: Not on file     Gets together: Not on file     Attends Yazdanism service: Not on file     Active member of club or organization: Not on file     Attends meetings of clubs or organizations: Not on file     Relationship status: Not on file     Intimate partner violence     Fear of current or ex partner: Not on file     Emotionally abused: Not on file     Physically abused: Not on file     Forced sexual activity: Not on file   Other Topics Concern     Parent/sibling w/ CABG, MI or angioplasty before 65F 55M? Not Asked   Social History Narrative    Ruth Spouse   Patient is  with grown children.  [employer] at [employer] for 17 years          ROS:   CONSTITUTIONAL: No weight loss, fever, chills, but admits to generalized weakness and fatigue.   HEENT: Eyes: No visual changes. Ears, Nose, Throat: No hearing loss, congestion or difficulty swallowing.   CARDIOVASCULAR: No chest pain, chest pressure  or chest discomfort.  Occasional palpitations with scant peripheral extremity edema.   RESPIRATORY: (+) shortness of breath with dyspnea upon exertion, no cough or sputum production.   GASTROINTESTINAL: No abdominal pain. No anorexia, nausea, vomiting or diarrhea.   NEUROLOGICAL: No headache, lightheadedness, dizziness, syncope, ataxia but admits to generalized weakness.   HEMATOLOGIC: No anemia, bleeding or bruising.   PSYCHIATRIC: No history of depression or anxiety.   ENDOCRINOLOGIC: No reports of sweating, cold or heat intolerance. No polyuria or polydipsia.   SKIN: No abnormal rashes or itching.       PHYSICAL EXAM:   GENERAL: The patient is a well-developed, well-nourished, in no apparent distress. Alert and oriented x3.   HEENT: Head is normocephalic and atraumatic. Eyes are symmetrical with normal visual tracking.  HEART: Regular rate and rhythm, S1S2 present without murmur, rub or gallop.   LUNGS: Respirations regular and unlabored. Clear to auscultation.   GI: Abdomen is soft and nondistended.   EXTREMITIES: Scant peripheral edema present.   MUSCULOSKELETAL: No joint swelling.   NEUROLOGIC: Alert and oriented X3.    SKIN: No jaundice. No rashes or visible skin lesions present.        LAB RESULTS:   Transferred Records on 09/11/2020   Component Date Value Ref Range Status     RETINOPATHY 09/11/2020 NEGATIVE   Final          ASSESSMENT:       ICD-10-CM    1. Fatigue, unspecified type  R53.83 NM Lexiscan stress test     Echocardiogram Complete   2. H/O STEMI 7/3/2013 involving the RCA  I25.2 NM Lexiscan stress test     Echocardiogram Complete   3. Atherosclerosis of coronary artery of native heart without angina pectoris, unspecified vessel or lesion type  I25.10 US Carotid Bilateral     Echocardiogram Complete   4. History of cardiac cath on 7/8/2013  Z98.890 NM Lexiscan stress test     Echocardiogram Complete   5. Stenosis of left carotid artery  I65.22 US Carotid Bilateral   6. Mixed hyperlipidemia  E78.2     7. Controlled type 2 diabetes mellitus without complication, without long-term current use of insulin (H)  E11.9    8. Benign essential hypertension  I10 Echocardiogram Complete   9. History of tobacco use quitting in 1/1/2000 at 3 packs a day  Z87.891    10. Benign prostatic hyperplasia with lower urinary tract symptoms, symptom details unspecified  N40.1    11. Hx of pancreatitis on 10/23/2014  Z87.19    12. Hx of bladder cancer on 11/10/2014  Z85.51    13. Aortic atherosclerosis (H)  I70.0 US Carotid Bilateral   14. Hyponatremia  E87.1          PLAN:   1.  With increasing fatigue with history of bare-metal stent placement on 7/8/2013, this could be his anginal equivalent.  He does not endorse any chest pain.  It seems reasonable to do a stress test.  He said he would not be able to walk on a treadmill.  Cardiolite stress test ordered.  2.  Related fatigue/dyspnea on exertion, will obtain an echocardiogram to assess for structural issues as a cause of his dyspnea on exertion.  3.  If Douglas would wish to pursue a more exhaustive work-up for fatigue.  Could consider PFTs for COPD, CMV/mono, BNP, thyroid function, cortisol level, blood counts for anemia, potential work-up for malignancy, HIV which would seem to be low risk, evaluation for heavy metals, autoimmune disorders such as PMR, SLE, RA, Sjogren's, folate, B12, magnesium, troponin, sed rate, and look at his medical regimen.  5.  Ultrasound of carotids with history of mild left carotid artery disease.  6.  Follow-up if testing is abnormal to discuss testing.  Otherwise, will be seen in 6 months.    Thank you for allowing me to participate in the care of your patient. Please do not hesitate to contact me if you have any questions.     Mukesh Lopez, DO

## 2020-10-15 NOTE — NURSING NOTE
"Patient comes in for consult on history of STEMI.  Evelin De Anda LPN ....................10/15/2020   3:24 PM  Chief Complaint   Patient presents with     Consult For     history of STEMI       Initial /80 (BP Location: Right arm, Patient Position: Sitting, Cuff Size: Adult Regular)   Temp 97.5  F (36.4  C) (Temporal)   Resp 18   Ht 1.702 m (5' 7\")   Wt 78.5 kg (173 lb)   SpO2 97%   BMI 27.10 kg/m   Estimated body mass index is 27.1 kg/m  as calculated from the following:    Height as of this encounter: 1.702 m (5' 7\").    Weight as of this encounter: 78.5 kg (173 lb).  Meds Reconciled: complete  Pt is on Aspirin  Pt is on a Statin  PHQ and/or LAVINIA reviewed. Pt referred to PCP/MH Provider as appropriate.    Evelin De Anda LPN      "

## 2020-10-15 NOTE — PATIENT INSTRUCTIONS
You were seen by  Mukesh Lopez,        1. A Lexiscan (stress test) has been ordered. You will be called to schedule this. You will receive instructions for testing at that time. You will be contacted with results.       2. A Echocardiogram has been ordered. You will be called to schedule this. You will receive instructions for testing at that time. You will be contacted with results.      3. Follow up in 6 months if tests are normal otherwise sooner.    4. No other changes at this time.        You will follow up with [facility] in 6 months, sooner if needed.       Please call the cardiology office with problems, questions, or concerns at [phone].    If you experience chest pain, chest pressure, chest tightness, shortness of breath, fainting, lightheadedness, nausea, vomiting, or other concerning symptoms, please report to the Emergency Department or call 911. These symptoms may be emergent, and best treated in the Emergency Department.     Cardiology Nurses  GISELL Cook, MIGUEL ARENAS LPN  [facility] (Unit [facility])  [phone]

## 2020-10-20 LAB — INTERPRETATION ECG - MUSE: NORMAL

## 2020-10-23 ENCOUNTER — TRANSFERRED RECORDS (OUTPATIENT)
Dept: HEALTH INFORMATION MANAGEMENT | Facility: OTHER | Age: 80
End: 2020-10-23

## 2020-10-27 DIAGNOSIS — R41.3 MEMORY PROBLEM: ICD-10-CM

## 2020-10-27 DIAGNOSIS — F09 COGNITIVE DYSFUNCTION: Primary | ICD-10-CM

## 2020-11-02 ENCOUNTER — TELEPHONE (OUTPATIENT)
Dept: CARDIOLOGY | Facility: OTHER | Age: 80
End: 2020-11-02

## 2020-11-02 NOTE — TELEPHONE ENCOUNTER
Patient verified .  Reminder call for stress test with instructions given.  Patient verbalized understanding.   Emphasis on no caffeine for 12 hours prior to testing  Patient agrees and understands

## 2020-11-03 ENCOUNTER — HOSPITAL ENCOUNTER (OUTPATIENT)
Dept: ULTRASOUND IMAGING | Facility: OTHER | Age: 80
End: 2020-11-03
Attending: INTERNAL MEDICINE
Payer: MEDICARE

## 2020-11-03 ENCOUNTER — HOSPITAL ENCOUNTER (OUTPATIENT)
Dept: CARDIOLOGY | Facility: OTHER | Age: 80
End: 2020-11-03
Attending: INTERNAL MEDICINE
Payer: MEDICARE

## 2020-11-03 DIAGNOSIS — I70.0 AORTIC ATHEROSCLEROSIS (H): ICD-10-CM

## 2020-11-03 DIAGNOSIS — I65.22 STENOSIS OF LEFT CAROTID ARTERY: ICD-10-CM

## 2020-11-03 DIAGNOSIS — I25.10 ATHEROSCLEROSIS OF CORONARY ARTERY OF NATIVE HEART WITHOUT ANGINA PECTORIS, UNSPECIFIED VESSEL OR LESION TYPE: ICD-10-CM

## 2020-11-03 DIAGNOSIS — Z98.890 HISTORY OF CARDIAC CATH: ICD-10-CM

## 2020-11-03 DIAGNOSIS — R53.83 FATIGUE, UNSPECIFIED TYPE: ICD-10-CM

## 2020-11-03 DIAGNOSIS — I10 BENIGN ESSENTIAL HYPERTENSION: ICD-10-CM

## 2020-11-03 DIAGNOSIS — I25.2 HISTORY OF ST ELEVATION MYOCARDIAL INFARCTION (STEMI): ICD-10-CM

## 2020-11-03 PROCEDURE — 93880 EXTRACRANIAL BILAT STUDY: CPT

## 2020-11-03 PROCEDURE — 93306 TTE W/DOPPLER COMPLETE: CPT | Mod: 26 | Performed by: INTERNAL MEDICINE

## 2020-11-03 PROCEDURE — 93306 TTE W/DOPPLER COMPLETE: CPT

## 2020-11-05 ENCOUNTER — HOSPITAL ENCOUNTER (OUTPATIENT)
Dept: NUCLEAR MEDICINE | Facility: OTHER | Age: 80
End: 2020-11-05
Attending: INTERNAL MEDICINE
Payer: MEDICARE

## 2020-11-05 VITALS — TEMPERATURE: 97.2 F | HEIGHT: 67 IN | WEIGHT: 173 LBS | OXYGEN SATURATION: 99 % | BODY MASS INDEX: 27.15 KG/M2

## 2020-11-05 DIAGNOSIS — Z98.890 HISTORY OF CARDIAC CATH: ICD-10-CM

## 2020-11-05 DIAGNOSIS — R53.83 FATIGUE, UNSPECIFIED TYPE: ICD-10-CM

## 2020-11-05 DIAGNOSIS — I25.2 HISTORY OF ST ELEVATION MYOCARDIAL INFARCTION (STEMI): ICD-10-CM

## 2020-11-05 LAB
CV BLOOD PRESSURE: 67 %
CV STRESS MAX HR HE: 83
NUC STRESS EJECTION FRACTION: 74 %
RATE PRESSURE PRODUCT: NORMAL
STRESS ECHO BASELINE DIASTOLIC HE: 86
STRESS ECHO BASELINE HR: 63
STRESS ECHO BASELINE SYSTOLIC BP: 146
STRESS ECHO CALCULATED PERCENT HR: 59 %
STRESS ECHO LAST STRESS DIASTOLIC BP: 86
STRESS ECHO LAST STRESS SYSTOLIC BP: 146
STRESS ECHO POST ESTIMATED WORKLOAD: 1 METS
STRESS ECHO TARGET HR: 141

## 2020-11-05 PROCEDURE — 343N000001 HC RX 343: Performed by: INTERNAL MEDICINE

## 2020-11-05 PROCEDURE — 78452 HT MUSCLE IMAGE SPECT MULT: CPT

## 2020-11-05 PROCEDURE — 78452 HT MUSCLE IMAGE SPECT MULT: CPT | Mod: 26 | Performed by: INTERNAL MEDICINE

## 2020-11-05 PROCEDURE — A9500 TC99M SESTAMIBI: HCPCS | Performed by: INTERNAL MEDICINE

## 2020-11-05 PROCEDURE — 93016 CV STRESS TEST SUPVJ ONLY: CPT | Performed by: INTERNAL MEDICINE

## 2020-11-05 PROCEDURE — 250N000011 HC RX IP 250 OP 636: Performed by: INTERNAL MEDICINE

## 2020-11-05 PROCEDURE — 93017 CV STRESS TEST TRACING ONLY: CPT

## 2020-11-05 PROCEDURE — 93018 CV STRESS TEST I&R ONLY: CPT | Performed by: INTERNAL MEDICINE

## 2020-11-05 RX ORDER — REGADENOSON 0.08 MG/ML
0.4 INJECTION, SOLUTION INTRAVENOUS ONCE
Status: COMPLETED | OUTPATIENT
Start: 2020-11-05 | End: 2020-11-05

## 2020-11-05 RX ORDER — AMINOPHYLLINE 25 MG/ML
50 INJECTION, SOLUTION INTRAVENOUS
Status: DISCONTINUED | OUTPATIENT
Start: 2020-11-05 | End: 2020-11-06 | Stop reason: HOSPADM

## 2020-11-05 RX ADMIN — KIT FOR THE PREPARATION OF TECHNETIUM TC99M SESTAMIBI 31.4 MILLICURIE: 1 INJECTION, POWDER, LYOPHILIZED, FOR SOLUTION PARENTERAL at 12:50

## 2020-11-05 RX ADMIN — REGADENOSON 0.4 MG: 0.08 INJECTION, SOLUTION INTRAVENOUS at 12:47

## 2020-11-05 RX ADMIN — KIT FOR THE PREPARATION OF TECHNETIUM TC99M SESTAMIBI 8.55 MILLICURIE: 1 INJECTION, POWDER, LYOPHILIZED, FOR SOLUTION PARENTERAL at 11:00

## 2020-11-05 ASSESSMENT — MIFFLIN-ST. JEOR: SCORE: 1458.35

## 2020-11-05 NOTE — PROGRESS NOTES
1045The patient arrived for a Lexiscan Cardiolite stress test.  The procedure, risks, and benefits were discussed with the patient ,and the consent was signed.  A saline lock was started,and the Cardiolite was injected by Nuclear Medicine.  The patient was taken to the waiting area, to await resting images at 1115.  1230The patient returned from Nuclear Medicine and was prepped for the stress test.   Dr. Lawrence arrived, and the patient was administered the Lexiscan per procedure.  The patient tolerated the procedure and the test was complete.  He was given a snack and taken to Nuclear Medicine in stable condition for stress images.  The saline lock will be removed by Nuclear Medicine for proper disposal.  The patient was instructed that the ordering MD will call with results in one to two days.  Please see the chart for the complete test results.

## 2020-11-06 ENCOUNTER — TELEPHONE (OUTPATIENT)
Dept: CARDIOLOGY | Facility: OTHER | Age: 80
End: 2020-11-06

## 2020-11-06 NOTE — TELEPHONE ENCOUNTER
Patient call returned. Verified name and . Results relayed per Mukesh Lopez MD. Patient verbalized understanding, o further questions or concerns at this time.   Ale Huynh RN ....................  2020   10:29 AM

## 2020-11-06 NOTE — TELEPHONE ENCOUNTER
----- Message from Mukesh Lopez, DO sent at 11/5/2020  6:11 PM CST -----  Please let Douglas know of his stress test results.  His stress test was negative suggesting he does not have any significant blockages in the arteries of his heart.    Dr. Lopez    Call to patient. Left message to return call.   Ale Huynh RN ....................  11/6/2020   8:20 AM

## 2020-11-11 ENCOUNTER — OFFICE VISIT (OUTPATIENT)
Dept: INTERNAL MEDICINE | Facility: OTHER | Age: 80
End: 2020-11-11
Attending: FAMILY MEDICINE
Payer: MEDICARE

## 2020-11-11 VITALS
OXYGEN SATURATION: 96 % | SYSTOLIC BLOOD PRESSURE: 118 MMHG | TEMPERATURE: 97.9 F | HEART RATE: 61 BPM | DIASTOLIC BLOOD PRESSURE: 74 MMHG | HEIGHT: 66 IN | BODY MASS INDEX: 27.74 KG/M2 | WEIGHT: 172.6 LBS | RESPIRATION RATE: 16 BRPM

## 2020-11-11 DIAGNOSIS — K86.81 EXOCRINE PANCREATIC INSUFFICIENCY: ICD-10-CM

## 2020-11-11 DIAGNOSIS — I25.10 ATHEROSCLEROSIS OF CORONARY ARTERY OF NATIVE HEART WITHOUT ANGINA PECTORIS, UNSPECIFIED VESSEL OR LESION TYPE: ICD-10-CM

## 2020-11-11 DIAGNOSIS — E11.9 CONTROLLED TYPE 2 DIABETES MELLITUS WITHOUT COMPLICATION, WITHOUT LONG-TERM CURRENT USE OF INSULIN (H): ICD-10-CM

## 2020-11-11 DIAGNOSIS — I10 BENIGN ESSENTIAL HYPERTENSION: ICD-10-CM

## 2020-11-11 DIAGNOSIS — E78.2 MIXED HYPERLIPIDEMIA: ICD-10-CM

## 2020-11-11 DIAGNOSIS — I10 BENIGN ESSENTIAL HYPERTENSION: Primary | ICD-10-CM

## 2020-11-11 DIAGNOSIS — Z12.5 SCREENING PSA (PROSTATE SPECIFIC ANTIGEN): ICD-10-CM

## 2020-11-11 DIAGNOSIS — I25.2 HISTORY OF ST ELEVATION MYOCARDIAL INFARCTION (STEMI): ICD-10-CM

## 2020-11-11 PROBLEM — D47.Z9 NEOPLASM OF UNCERTAIN BEHAVIOR OF OTHER LYMPHATIC AND HEMATOPOIETIC TISSUES(238.79): Status: RESOLVED | Noted: 2018-01-24 | Resolved: 2020-11-11

## 2020-11-11 PROBLEM — L57.0 ACTINIC KERATOSES: Status: RESOLVED | Noted: 2018-03-08 | Resolved: 2020-11-11

## 2020-11-11 LAB
ALBUMIN SERPL-MCNC: 4.3 G/DL (ref 3.5–5.7)
ALBUMIN UR-MCNC: NEGATIVE MG/DL
ALP SERPL-CCNC: 67 U/L (ref 34–104)
ALT SERPL W P-5'-P-CCNC: 19 U/L (ref 7–52)
ANION GAP SERPL CALCULATED.3IONS-SCNC: 4 MMOL/L (ref 3–14)
APPEARANCE UR: CLEAR
AST SERPL W P-5'-P-CCNC: 19 U/L (ref 13–39)
BILIRUB SERPL-MCNC: 0.5 MG/DL (ref 0.3–1)
BILIRUB UR QL STRIP: NEGATIVE
BUN SERPL-MCNC: 12 MG/DL (ref 7–25)
CALCIUM SERPL-MCNC: 9.3 MG/DL (ref 8.6–10.3)
CHLORIDE SERPL-SCNC: 97 MMOL/L (ref 98–107)
CHOLEST SERPL-MCNC: 105 MG/DL
CO2 SERPL-SCNC: 30 MMOL/L (ref 21–31)
COLOR UR AUTO: YELLOW
CREAT SERPL-MCNC: 0.77 MG/DL (ref 0.7–1.3)
CREAT UR-MCNC: 96 MG/DL
ERYTHROCYTE [DISTWIDTH] IN BLOOD BY AUTOMATED COUNT: 12.6 % (ref 10–15)
GFR SERPL CREATININE-BSD FRML MDRD: >90 ML/MIN/{1.73_M2}
GLUCOSE SERPL-MCNC: 135 MG/DL (ref 70–105)
GLUCOSE UR STRIP-MCNC: NEGATIVE MG/DL
HBA1C MFR BLD: 7.1 % (ref 4–6)
HCT VFR BLD AUTO: 39.6 % (ref 40–53)
HDLC SERPL-MCNC: 39 MG/DL (ref 23–92)
HGB BLD-MCNC: 13.2 G/DL (ref 13.3–17.7)
HGB UR QL STRIP: NEGATIVE
KETONES UR STRIP-MCNC: NEGATIVE MG/DL
LDLC SERPL CALC-MCNC: 52 MG/DL
LEUKOCYTE ESTERASE UR QL STRIP: NEGATIVE
MCH RBC QN AUTO: 32.2 PG (ref 26.5–33)
MCHC RBC AUTO-ENTMCNC: 33.3 G/DL (ref 31.5–36.5)
MCV RBC AUTO: 97 FL (ref 78–100)
MICROALBUMIN UR-MCNC: 9 MG/L
MICROALBUMIN/CREAT UR: 9.3 MG/G CR (ref 0–17)
NITRATE UR QL: NEGATIVE
NONHDLC SERPL-MCNC: 66 MG/DL
PH UR STRIP: 7.5 PH (ref 5–7)
PLATELET # BLD AUTO: 402 10E9/L (ref 150–450)
POTASSIUM SERPL-SCNC: 4.8 MMOL/L (ref 3.5–5.1)
PROT SERPL-MCNC: 6.7 G/DL (ref 6.4–8.9)
PSA SERPL-ACNC: 0.75 NG/ML
RBC # BLD AUTO: 4.1 10E12/L (ref 4.4–5.9)
SODIUM SERPL-SCNC: 131 MMOL/L (ref 134–144)
SOURCE: ABNORMAL
SP GR UR STRIP: 1.02 (ref 1–1.03)
TRIGL SERPL-MCNC: 68 MG/DL
TSH SERPL DL<=0.05 MIU/L-ACNC: 5.13 IU/ML (ref 0.34–5.6)
UROBILINOGEN UR STRIP-MCNC: 2 MG/DL (ref 0–2)
WBC # BLD AUTO: 9.2 10E9/L (ref 4–11)

## 2020-11-11 PROCEDURE — 84443 ASSAY THYROID STIM HORMONE: CPT | Mod: ZL | Performed by: INTERNAL MEDICINE

## 2020-11-11 PROCEDURE — 80053 COMPREHEN METABOLIC PANEL: CPT | Mod: ZL | Performed by: INTERNAL MEDICINE

## 2020-11-11 PROCEDURE — G0463 HOSPITAL OUTPT CLINIC VISIT: HCPCS

## 2020-11-11 PROCEDURE — 80061 LIPID PANEL: CPT | Mod: ZL | Performed by: INTERNAL MEDICINE

## 2020-11-11 PROCEDURE — 85027 COMPLETE CBC AUTOMATED: CPT | Mod: ZL | Performed by: INTERNAL MEDICINE

## 2020-11-11 PROCEDURE — 81003 URINALYSIS AUTO W/O SCOPE: CPT | Mod: ZL | Performed by: INTERNAL MEDICINE

## 2020-11-11 PROCEDURE — 83036 HEMOGLOBIN GLYCOSYLATED A1C: CPT | Mod: ZL | Performed by: INTERNAL MEDICINE

## 2020-11-11 PROCEDURE — 82043 UR ALBUMIN QUANTITATIVE: CPT | Mod: ZL | Performed by: INTERNAL MEDICINE

## 2020-11-11 PROCEDURE — 99214 OFFICE O/P EST MOD 30 MIN: CPT | Performed by: INTERNAL MEDICINE

## 2020-11-11 PROCEDURE — 36415 COLL VENOUS BLD VENIPUNCTURE: CPT | Mod: ZL | Performed by: INTERNAL MEDICINE

## 2020-11-11 PROCEDURE — G0103 PSA SCREENING: HCPCS | Mod: ZL | Performed by: INTERNAL MEDICINE

## 2020-11-11 ASSESSMENT — ENCOUNTER SYMPTOMS
TREMORS: 1
COUGH: 0
HEMATURIA: 0
AGITATION: 0
CHILLS: 0
VOMITING: 0
NAUSEA: 0
DIARRHEA: 1
BRUISES/BLEEDS EASILY: 0
WHEEZING: 0
MYALGIAS: 0
ARTHRALGIAS: 0
NERVOUS/ANXIOUS: 1
LIGHT-HEADEDNESS: 1
WOUND: 0
PALPITATIONS: 0
DYSURIA: 0
SHORTNESS OF BREATH: 0
DIZZINESS: 1
ABDOMINAL PAIN: 1
FEVER: 0
CONFUSION: 1
FATIGUE: 1

## 2020-11-11 ASSESSMENT — PAIN SCALES - GENERAL: PAINLEVEL: NO PAIN (0)

## 2020-11-11 ASSESSMENT — MIFFLIN-ST. JEOR: SCORE: 1444.63

## 2020-11-11 NOTE — PATIENT INSTRUCTIONS
Labs are stable.     Blood pressure is well controlled.     -- will schedule your medicare wellness visit / physical as part of your next visit.     Aspects of Diabetes:   Recent Labs   Lab Test 11/11/20  0811 08/05/20  0836 04/23/20  0844   A1C 7.1* 6.9* 7.2*   LDL 52 46 54   HDL 39 37 42   TRIG 68 80 73   ALT 19 17 18   CR 0.77 0.94 0.84   GFRESTIMATED >90 77 88   GFRESTBLACK >90 >90 >90   POTASSIUM 4.8 4.7 4.6      Hemoglobin A1c  Goal range is under 8%. Best is 6.5 to 7   Blood Pressure 118/74 Goal to keep less than 140/90   Tobacco  reports that he quit smoking about 20 years ago. His smoking use included cigarettes. He has a 129.00 pack-year smoking history. He has never used smokeless tobacco. Goal to abstain from tobacco   Aspirin or Plavix Anti-platelet therapy Aspirin or Plavix reduces risk of heart disease and stroke  -- sometimes used with other blood thinners, depending on bleeding risk and risk factors.    ACE/ARB Specific blood pressure meds These medications can reduce risk of kidney disease   Cholesterol Statins (Lipitor, Crestor, vs others) Statins reduce risk of heart disease and stroke   Eye Exam -- Do Yearly -- Annual diabetic eye exam   Healthy weight Wt Readings from Last 3 Encounters:   11/11/20 78.3 kg (172 lb 9.6 oz)   11/05/20 78.5 kg (173 lb)   10/15/20 78.5 kg (173 lb)     Body mass index is 27.65 kg/m .  Goal BMI under 30, best is under 25.      -- Trying to exercise daily (goal at least 20 min/day) with moderate aerobic activity   -- Eat healthy (resources from ADA at http://www.diabetes.org/)   -- Taking good care of my feet. Consider seeing the Podiatrist   -- Check blood sugars as directed, record in log book and bring to every appointment    Insurance companies are grading you and I on your blood sugar control -- Goal is to get your A1c down to 7.9% or lower and NO Smoking!  -- Medicare and most insurance companies, will only cover Hemoglobin A1c labs to be rechecked every 91+  days.      Return for Diabetes labs and clinic follow-up appointment every 3 to 4 months.  --- (Go for about 91 to 100 days)  Schedule lab only appointment --- A few days AFTER: 2/9/21   Schedule clinic appointment with Dr. Sen -- Same day as labs, or 1-2 days later.

## 2020-11-11 NOTE — PROGRESS NOTES
"Nursing Notes:   Gosselin, Norma J., LPN  11/11/2020 10:01 AM  Signed  Chief Complaint   Patient presents with     Diabetes     Previous A1C is at goal of <8  Lab Results   Component Value Date    A1C 7.1 11/11/2020    A1C 6.9 08/05/2020    A1C 7.2 04/23/2020    A1C 6.5 12/20/2019    A1C 6.7 10/30/2019     Urine microalbumin:creatine: DONE TODAY    Foot exam 2021  Eye exam 2021    Tobacco User NO  Patient is on a daily aspirin  Patient is on a Statin.  Blood pressure today of:     BP Readings from Last 1 Encounters:   11/11/20 118/74      is at the goal of <139/89 for diabetics.    Norma J. Gosselin, LPN on 11/11/2020 at 9:28 AM        Initial /74   Pulse 61   Temp 97.9  F (36.6  C) (Temporal)   Resp 16   Ht 1.683 m (5' 6.25\")   Wt 78.3 kg (172 lb 9.6 oz)   SpO2 96%   BMI 27.65 kg/m   Estimated body mass index is 27.65 kg/m  as calculated from the following:    Height as of this encounter: 1.683 m (5' 6.25\").    Weight as of this encounter: 78.3 kg (172 lb 9.6 oz).    Medication Reconciliation: complete      Norma J. Gosselin, LPN    Nursing note reviewed with patient.  Accuracy and completeness verified.   Mr. Huynh is a 80 year old male who:  Patient presents with:  Diabetes      ICD-10-CM    1. Benign essential hypertension  I10    2. Controlled type 2 diabetes mellitus without complication, without long-term current use of insulin (H)  E11.9 blood glucose (NO BRAND SPECIFIED) lancets standard     blood glucose (NO BRAND SPECIFIED) test strip   3. Atherosclerosis of coronary artery of native heart without angina pectoris, unspecified vessel or lesion type  I25.10    4. Exocrine pancreatic insufficiency - Severe Deficiency  K86.81    5. H/O STEMI 7/3/2013 involving the RCA  I25.2    6. Mixed hyperlipidemia  E78.2      HPI  Patient comes in for follow-up of multiple issues with his wife.  Had memory testing done with neurology October 23, 2020.  It was found that he probably should not be driving " anymore so his wife is now doing the driving.  He did have abnormal memory testing.  See below.  They have not yet completely ruled out Parkinson's versus vascular causes of his memory issues.  Did encourage him to start some physical therapy and hopefully help improve his balance.  He has had some falls at home.    MOCA completed 10/23/2020 at [facility] -- scored 19/30 with errors in copying three dimensional figure, attention, verbal fluency, abstraction and delayed recall.    Hypertension, blood pressure is currently well controlled.  Doing well with current medication regimen.  No syncope or presyncope.  Kidney function/creatinine is stable.  Continue current dosing.    Type 2 diabetes, has been controlled.  A1c did go up slightly but overall doing well.  No hypoglycemia.  Needs testing strips supplies refills today.    Coronary artery disease, native artery.  No exertional angina.  Does have history of heart attack back in 2013.  Currently stable.  Continue current medications.    Severe exocrine pancreatic insufficiency.  Continues on oral pancreatic enzyme supplementation.  This has helped normalize his bowel habits.  No changes for now.    Mixed hyperlipidemia, cholesterol levels have been controlled and did improve slightly.  Doing well with Lipitor, currently 40 mg daily.  No changes for now.    Functional Capacity: about 4 METS. + has had falls at home.   Review of Systems   Constitutional: Positive for fatigue. Negative for chills and fever.   HENT: Negative for congestion and hearing loss.    Eyes: Negative for visual disturbance.   Respiratory: Negative for cough, shortness of breath and wheezing.    Cardiovascular: Negative for chest pain and palpitations.   Gastrointestinal: Positive for abdominal pain (+intermittent) and diarrhea (+ much improved with Creon). Negative for nausea and vomiting.   Endocrine: Negative for cold intolerance and heat intolerance.   Genitourinary: Negative for dysuria  "and hematuria.   Musculoskeletal: Negative for arthralgias and myalgias.   Skin: Negative for rash and wound.        Few scaly skin spots on forehead   Allergic/Immunologic: Negative for immunocompromised state.   Neurological: Positive for dizziness, tremors and light-headedness.        + some balance problems   Hematological: Does not bruise/bleed easily.   Psychiatric/Behavioral: Positive for confusion. Negative for agitation. The patient is nervous/anxious.         + Memory problems        Reviewed and updated as needed this visit by Provider   Tobacco  Allergies  Meds  Problems  Med Hx  Surg Hx  Fam Hx          EXAM:   Vitals:    11/11/20 0926   BP: 118/74   Pulse: 61   Resp: 16   Temp: 97.9  F (36.6  C)   TempSrc: Temporal   SpO2: 96%   Weight: 78.3 kg (172 lb 9.6 oz)   Height: 1.683 m (5' 6.25\")       Current Pain Score: No Pain (0)     BP Readings from Last 3 Encounters:   11/11/20 118/74   10/15/20 138/80   08/05/20 126/80      Wt Readings from Last 3 Encounters:   11/11/20 78.3 kg (172 lb 9.6 oz)   11/05/20 78.5 kg (173 lb)   10/15/20 78.5 kg (173 lb)      Estimated body mass index is 27.65 kg/m  as calculated from the following:    Height as of this encounter: 1.683 m (5' 6.25\").    Weight as of this encounter: 78.3 kg (172 lb 9.6 oz).     Physical Exam  Constitutional:       General: He is not in acute distress.     Appearance: He is well-developed. He is not diaphoretic.   HENT:      Head: Normocephalic and atraumatic.   Eyes:      General: No scleral icterus.     Conjunctiva/sclera: Conjunctivae normal.   Neck:      Musculoskeletal: Neck supple.   Cardiovascular:      Rate and Rhythm: Normal rate and regular rhythm.   Pulmonary:      Effort: Pulmonary effort is normal.      Breath sounds: Normal breath sounds.   Abdominal:      Palpations: Abdomen is soft.      Tenderness: There is no abdominal tenderness.   Musculoskeletal:         General: No deformity.      Right lower leg: No edema.      " Left lower leg: No edema.   Lymphadenopathy:      Cervical: No cervical adenopathy.   Skin:     General: Skin is warm and dry.      Findings: No rash.   Neurological:      Mental Status: He is alert and oriented to person, place, and time. Mental status is at baseline.   Psychiatric:         Mood and Affect: Mood normal.         Behavior: Behavior normal.          Procedures   INVESTIGATIONS:  - Labs reviewed in Epic     ASSESSMENT AND PLAN:  Problem List Items Addressed This Visit        Digestive    Exocrine pancreatic insufficiency - Severe Deficiency       Endocrine    Mixed hyperlipidemia    Controlled type 2 diabetes mellitus without complication, without long-term current use of insulin (H)    Relevant Medications    blood glucose (NO BRAND SPECIFIED) lancets standard    blood glucose (NO BRAND SPECIFIED) test strip       Circulatory    Benign essential hypertension - Primary    Atherosclerosis of coronary artery of native heart without angina pectoris, unspecified vessel or lesion type       Other    H/O STEMI 7/3/2013 involving the RCA        reviewed diet, exercise and weight control, recommended sodium restriction, cardiovascular risk and specific lipid/LDL goals reviewed, specific diabetic recommendations referral to Diabetic Education department and low cholesterol diet, weight control and daily exercise discussed, use of aspirin to prevent MI and TIA's discussed  -- Expected clinical course discussed    -- Medications and their side effects discussed    Patient Instructions     Labs are stable.     Blood pressure is well controlled.     -- will schedule your medicare wellness visit / physical as part of your next visit.     Aspects of Diabetes:   Recent Labs   Lab Test 11/11/20  0811 08/05/20  0836 04/23/20  0844   A1C 7.1* 6.9* 7.2*   LDL 52 46 54   HDL 39 37 42   TRIG 68 80 73   ALT 19 17 18   CR 0.77 0.94 0.84   GFRESTIMATED >90 77 88   GFRESTBLACK >90 >90 >90   POTASSIUM 4.8 4.7 4.6      Hemoglobin  A1c  Goal range is under 8%. Best is 6.5 to 7   Blood Pressure 118/74 Goal to keep less than 140/90   Tobacco  reports that he quit smoking about 20 years ago. His smoking use included cigarettes. He has a 129.00 pack-year smoking history. He has never used smokeless tobacco. Goal to abstain from tobacco   Aspirin or Plavix Anti-platelet therapy Aspirin or Plavix reduces risk of heart disease and stroke  -- sometimes used with other blood thinners, depending on bleeding risk and risk factors.    ACE/ARB Specific blood pressure meds These medications can reduce risk of kidney disease   Cholesterol Statins (Lipitor, Crestor, vs others) Statins reduce risk of heart disease and stroke   Eye Exam -- Do Yearly -- Annual diabetic eye exam   Healthy weight Wt Readings from Last 3 Encounters:   11/11/20 78.3 kg (172 lb 9.6 oz)   11/05/20 78.5 kg (173 lb)   10/15/20 78.5 kg (173 lb)     Body mass index is 27.65 kg/m .  Goal BMI under 30, best is under 25.      -- Trying to exercise daily (goal at least 20 min/day) with moderate aerobic activity   -- Eat healthy (resources from ADA at http://www.diabetes.org/)   -- Taking good care of my feet. Consider seeing the Podiatrist   -- Check blood sugars as directed, record in log book and bring to every appointment    Insurance companies are grading you and I on your blood sugar control -- Goal is to get your A1c down to 7.9% or lower and NO Smoking!  -- Medicare and most insurance companies, will only cover Hemoglobin A1c labs to be rechecked every 91+ days.      Return for Diabetes labs and clinic follow-up appointment every 3 to 4 months.  --- (Go for about 91 to 100 days)  Schedule lab only appointment --- A few days AFTER: 2/9/21   Schedule clinic appointment with Dr. Sen -- Same day as labs, or 1-2 days later.      Jovanni Sen MD   Internal Medicine  [facility]     Portions of this note were dictated using speech recognition software. The note has  been proofread but errors in the text may have been overlooked. Please contact me if there are any concerns regarding the accuracy of the dictation.

## 2020-11-11 NOTE — NURSING NOTE
"Chief Complaint   Patient presents with     Diabetes     Previous A1C is at goal of <8  Lab Results   Component Value Date    A1C 7.1 11/11/2020    A1C 6.9 08/05/2020    A1C 7.2 04/23/2020    A1C 6.5 12/20/2019    A1C 6.7 10/30/2019     Urine microalbumin:creatine: DONE TODAY    Foot exam 2021  Eye exam 2021    Tobacco User NO  Patient is on a daily aspirin  Patient is on a Statin.  Blood pressure today of:     BP Readings from Last 1 Encounters:   11/11/20 118/74      is at the goal of <139/89 for diabetics.    Norma J. Gosselin, LPN on 11/11/2020 at 9:28 AM        Initial /74   Pulse 61   Temp 97.9  F (36.6  C) (Temporal)   Resp 16   Ht 1.683 m (5' 6.25\")   Wt 78.3 kg (172 lb 9.6 oz)   SpO2 96%   BMI 27.65 kg/m   Estimated body mass index is 27.65 kg/m  as calculated from the following:    Height as of this encounter: 1.683 m (5' 6.25\").    Weight as of this encounter: 78.3 kg (172 lb 9.6 oz).    Medication Reconciliation: complete      Norma J. Gosselin, LPN  "

## 2020-11-16 ENCOUNTER — HOSPITAL ENCOUNTER (OUTPATIENT)
Dept: PHYSICAL THERAPY | Facility: OTHER | Age: 80
Setting detail: THERAPIES SERIES
End: 2020-11-16
Attending: NURSE PRACTITIONER
Payer: MEDICARE

## 2020-11-16 PROCEDURE — 97112 NEUROMUSCULAR REEDUCATION: CPT | Mod: GP | Performed by: PHYSICAL THERAPIST

## 2020-11-16 PROCEDURE — 97161 PT EVAL LOW COMPLEX 20 MIN: CPT | Mod: GP | Performed by: PHYSICAL THERAPIST

## 2020-11-16 NOTE — PROGRESS NOTES
"   11/16/20 0900   Quick Adds   Quick Adds Certification;Vestibular Eval   Type of Visit Initial OP PT Evaluation   General Information   Start of Care Date 11/16/20   Referring Physician Beronica Sands CNP   Orders Evaluate and Treat as Indicated   Order Date 10/23/20   Medical Diagnosis Loss of balance, fall   Precautions/Limitations fall precautions   Special Instructions Physical therapy evaluation and treat for unsteady gait and falling several times, also teach home exercise program for balance and review with patient possible hoe exercise equipment he could use.   Surgical/Medical history reviewed Yes   Pertinent history of current problem (include personal factors and/or comorbidities that impact the POC) Patient reports he fell 2-4 months ago (over the summer).  Feels he is doing just fine, but that his wife is concerned about his balance.  Very seldom has a brief feeling of unsteadiness, maybe 2 seconds.   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   Fall Risk Screen   Fall screen completed by PT   Have you fallen 2 or more times in the past year? Yes   Have you fallen and had an injury in the past year? No   Is patient a fall risk? Yes   Pain   Patient currently in pain No   Gait   Gait Comments Tinetti score = 23/28 (top end of \"moderate\" risk category).  Patient ambulates with a wide JENNA, genu valgum alignment, and bilateral foot/tibial internal rotation (unclear if from tibia or femur alignment).  Shuffles heels on floor frequently.  Minimal UE swing bilaterally, or pelvic/trunk rotation.  All indicate balance impairment.     Gait Special Tests " Dynamic Gait Index   Score out of 24 19   Comments Gait impairment (see above), difficulty with head motion, stepping over an object and needed to use railing to be safe on steps.   Balance Special Tests Modified CTSIB Conditions   Condition 1, seconds 30 Seconds   Condition 2, seconds 30 Seconds   Condition 4, seconds 30 Seconds   Condition 5, seconds 12 Seconds   Modified CTSIB Comments Condition 5, Trial 2 = 10 sec    Oculomotor Exam   Smooth Pursuit Normal   Smooth Pursuit Comment questionable mild saccadic intrusions   Saccades Abnormal   Saccades Comments occasional overshoot or multiple saccades to reach target   VOR Normal   VOR Cancellation Normal   Rapid Head Thrust Corrective Saccade L head thrust   Rapid Head Thrust Comments consistent corrective saccade to L side   Convergence Testing Normal   Planned Therapy Interventions   Planned Therapy Interventions balance training;neuromuscular re-education;transfer training  (vestibular therapy)   Clinical Impression   Criteria for Skilled Therapeutic Interventions Met yes, treatment indicated   PT Diagnosis impaired mobility   Influenced by the following impairments impaired balance; L vestibular hypofunction; visual dependancy   Functional limitations due to impairments head motion, walking   Clinical Presentation Stable/Uncomplicated   Clinical Presentation Rationale clinical observation   Clinical Decision Making (Complexity) Moderate complexity   Therapy Frequency 2 times/Week   Predicted Duration of Therapy Intervention (days/wks) 4 weeks   Risk & Benefits of therapy have been explained Yes   Patient, Family & other staff in agreement with plan of care Yes   Clinical Impression Comments Patient demonstrates objective signs of balance impairment.  Would benefit from physical therapy for balance and vestibular training to decrease fall risk.   GOALS   PT Eval Goals 1;2;3   Goal 1   Goal Identifier balance   Goal Description Patient will demonstrate increased  "time on Condition 5 of MCTSIB from 10-12 sec to 20 sec or more indicating decreased visual dependancy for balance.   Target Date 12/14/20   Goal 2   Goal Identifier balance   Goal Description Patient will score 24/28 on Tinetti balance assessment or better indicating \"low\" fall risk.   Target Date 12/14/20   Goal 3   Goal Identifier balance   Goal Description Patient will score 20/24 or better on DGI indicating low fall risk.   Target Date 12/14/20   Total Evaluation Time   PT Eval, Low Complexity Minutes (04274) 35   Therapy Certification   Certification date from 11/16/20   Certification date to 12/14/20   Medical Diagnosis loss of balance, falls     "

## 2020-11-16 NOTE — PROGRESS NOTES
"                                                                                              [facility]        OUTPATIENT PHYSICAL THERAPY FUNCTIONAL EVALUATION  PLAN OF TREATMENT FOR OUTPATIENT REHABILITATION  (COMPLETE FOR INITIAL CLAIMS ONLY)  Patient's Last Name, First Name, M.I.  YOB: 1940  Rajesh Huynh     Provider's Name   [facility]   Medical Record No.  [id number]     Start of Care Date:  11/16/20   Onset Date:   Fall 2020   Type:     _X__PT   ____OT  ____SLP Medical Diagnosis:  loss of balance, falls     PT Diagnosis:  impaired mobility Visits from SOC:  1                              __________________________________________________________________________________  Plan of Treatment/Functional Goals:  balance training, neuromuscular re-education, transfer training(vestibular therapy)           GOALS  balance  Patient will demonstrate increased time on Condition 5 of MCTSIB from 10-12 sec to 20 sec or more indicating decreased visual dependancy for balance.  12/14/20    balance  Patient will score 24/28 on Tinetti balance assessment or better indicating \"low\" fall risk.  12/14/20    balance  Patient will score 20/24 or better on DGI indicating low fall risk.  12/14/20             Therapy Frequency:  2 times/Week   Predicted Duration of Therapy Intervention:  4 weeks    Tri Thompson, PT                                    I CERTIFY THE NEED FOR THESE SERVICES FURNISHED UNDER        THIS PLAN OF TREATMENT AND WHILE UNDER MY CARE .             Physician Signature               Date    X_____________________________________________________                      Certification Date From:  11/16/20   Certification Date To:  12/14/20    Referring Provider:  Beronica Sands CNP    Initial Assessment  See Epic Evaluation- Start of Care Date: 11/16/20           "

## 2020-11-18 ENCOUNTER — HOSPITAL ENCOUNTER (OUTPATIENT)
Dept: PHYSICAL THERAPY | Facility: OTHER | Age: 80
Setting detail: THERAPIES SERIES
End: 2020-11-18
Attending: NURSE PRACTITIONER
Payer: MEDICARE

## 2020-11-18 PROCEDURE — 97112 NEUROMUSCULAR REEDUCATION: CPT | Mod: GP | Performed by: PHYSICAL THERAPIST

## 2020-11-23 ENCOUNTER — HOSPITAL ENCOUNTER (OUTPATIENT)
Dept: PHYSICAL THERAPY | Facility: OTHER | Age: 80
Setting detail: THERAPIES SERIES
End: 2020-11-23
Attending: NURSE PRACTITIONER
Payer: MEDICARE

## 2020-11-23 PROCEDURE — 97112 NEUROMUSCULAR REEDUCATION: CPT | Mod: GP | Performed by: PHYSICAL THERAPIST

## 2020-11-25 ENCOUNTER — HOSPITAL ENCOUNTER (OUTPATIENT)
Dept: PHYSICAL THERAPY | Facility: OTHER | Age: 80
Setting detail: THERAPIES SERIES
End: 2020-11-25
Attending: NURSE PRACTITIONER
Payer: MEDICARE

## 2020-11-25 PROCEDURE — 97112 NEUROMUSCULAR REEDUCATION: CPT | Mod: GP | Performed by: PHYSICAL THERAPIST

## 2020-11-30 ENCOUNTER — HOSPITAL ENCOUNTER (OUTPATIENT)
Dept: PHYSICAL THERAPY | Facility: OTHER | Age: 80
Setting detail: THERAPIES SERIES
End: 2020-11-30
Attending: NURSE PRACTITIONER
Payer: MEDICARE

## 2020-11-30 PROCEDURE — 97112 NEUROMUSCULAR REEDUCATION: CPT | Mod: GP | Performed by: PHYSICAL THERAPIST

## 2020-12-02 ENCOUNTER — HOSPITAL ENCOUNTER (OUTPATIENT)
Dept: PHYSICAL THERAPY | Facility: OTHER | Age: 80
Setting detail: THERAPIES SERIES
End: 2020-12-02
Attending: NURSE PRACTITIONER
Payer: MEDICARE

## 2020-12-02 PROCEDURE — 97112 NEUROMUSCULAR REEDUCATION: CPT | Mod: GP | Performed by: PHYSICAL THERAPIST

## 2020-12-09 ENCOUNTER — HOSPITAL ENCOUNTER (OUTPATIENT)
Dept: PHYSICAL THERAPY | Facility: OTHER | Age: 80
Setting detail: THERAPIES SERIES
End: 2020-12-09
Attending: NURSE PRACTITIONER
Payer: MEDICARE

## 2020-12-09 PROCEDURE — 97112 NEUROMUSCULAR REEDUCATION: CPT | Mod: GP | Performed by: PHYSICAL THERAPIST

## 2020-12-11 ENCOUNTER — HOSPITAL ENCOUNTER (OUTPATIENT)
Dept: PHYSICAL THERAPY | Facility: OTHER | Age: 80
Setting detail: THERAPIES SERIES
End: 2020-12-11
Attending: NURSE PRACTITIONER
Payer: MEDICARE

## 2020-12-11 PROCEDURE — 97112 NEUROMUSCULAR REEDUCATION: CPT | Mod: GP | Performed by: PHYSICAL THERAPIST

## 2020-12-11 NOTE — PROGRESS NOTES
"Outpatient Physical Therapy Discharge Note     Patient: Rajesh Huynh  : 1940    Beginning/End Dates of Reporting Period:  2020 to 2020    Referring Provider: Beronica Sands NP     Therapy Diagnosis: loss of balance, fall         Client Self Report: Patient feels balance has improved.  Still feels off at times, but better overall.  Still \"weaves\" a little bit at times when going out to get the mail, \"but not nearly like I used to\".      Objective Measurements:  Objective Measure: Condition 5 MCTSIB:   = 10 sec        = 30 sec    Objective Measure: Tinetti:   = 23         = 27    Objective Measure: DGI:   = 19         = /24       Goals:  Goal Identifier balance   Goal Description Patient will demonstrate increased time on Condition 5 of MCTSIB from 10-12 sec to 20 sec or more indicating decreased visual dependancy for balance.   Target Date 20   Date Met  20   Progress:  Goal met     Goal Identifier balance   Goal Description Patient will score 24/28 on Tinetti balance assessment or better indicating \"low\" fall risk.   Target Date 20   Date Met  20   Progress:  Goal met     Goal Identifier balance   Goal Description Patient will score 20/24 or better on DGI indicating low fall risk.   Target Date 20   Date Met  20   Progress:  Goal met       Progress Toward Goals:   Patient demonstrates improved static and dynamic balance and decreased visual dependency.  Scores as a Low risk for falls with all above assessment measures.     Plan:  Discharge from therapy.    Discharge:  Reason for Discharge: Patient has met all goals.    Equipment Issued: none    Discharge Plan: Patient has a follow up with physician next Tuesday 12/15/20.    "

## 2020-12-15 ENCOUNTER — TRANSFERRED RECORDS (OUTPATIENT)
Dept: HEALTH INFORMATION MANAGEMENT | Facility: OTHER | Age: 80
End: 2020-12-15

## 2021-01-01 ENCOUNTER — ALLIED HEALTH/NURSE VISIT (OUTPATIENT)
Dept: FAMILY MEDICINE | Facility: OTHER | Age: 81
End: 2021-01-01
Attending: FAMILY MEDICINE
Payer: MEDICARE

## 2021-01-01 ENCOUNTER — OFFICE VISIT (OUTPATIENT)
Dept: SURGERY | Facility: OTHER | Age: 81
End: 2021-01-01
Attending: INTERNAL MEDICINE
Payer: MEDICARE

## 2021-01-01 ENCOUNTER — OFFICE VISIT (OUTPATIENT)
Dept: UROLOGY | Facility: OTHER | Age: 81
End: 2021-01-01
Attending: UROLOGY
Payer: MEDICARE

## 2021-01-01 ENCOUNTER — TRANSFERRED RECORDS (OUTPATIENT)
Dept: HEALTH INFORMATION MANAGEMENT | Facility: OTHER | Age: 81
End: 2021-01-01
Payer: MEDICARE

## 2021-01-01 ENCOUNTER — OFFICE VISIT (OUTPATIENT)
Dept: INTERNAL MEDICINE | Facility: OTHER | Age: 81
End: 2021-01-01
Attending: INTERNAL MEDICINE
Payer: MEDICARE

## 2021-01-01 ENCOUNTER — LAB (OUTPATIENT)
Dept: LAB | Facility: OTHER | Age: 81
End: 2021-01-01
Attending: INTERNAL MEDICINE
Payer: MEDICARE

## 2021-01-01 ENCOUNTER — OFFICE VISIT (OUTPATIENT)
Dept: INTERNAL MEDICINE | Facility: OTHER | Age: 81
End: 2021-01-01
Attending: NURSE PRACTITIONER
Payer: MEDICARE

## 2021-01-01 VITALS
BODY MASS INDEX: 27.5 KG/M2 | HEART RATE: 64 BPM | WEIGHT: 175.2 LBS | RESPIRATION RATE: 16 BRPM | OXYGEN SATURATION: 94 %

## 2021-01-01 VITALS
SYSTOLIC BLOOD PRESSURE: 144 MMHG | DIASTOLIC BLOOD PRESSURE: 78 MMHG | HEART RATE: 68 BPM | WEIGHT: 173.2 LBS | BODY MASS INDEX: 27.18 KG/M2 | OXYGEN SATURATION: 99 % | RESPIRATION RATE: 16 BRPM | TEMPERATURE: 97.5 F

## 2021-01-01 VITALS
DIASTOLIC BLOOD PRESSURE: 72 MMHG | SYSTOLIC BLOOD PRESSURE: 126 MMHG | TEMPERATURE: 97.4 F | OXYGEN SATURATION: 100 % | HEART RATE: 60 BPM | BODY MASS INDEX: 27.18 KG/M2 | WEIGHT: 173.2 LBS | RESPIRATION RATE: 16 BRPM

## 2021-01-01 VITALS
WEIGHT: 172 LBS | BODY MASS INDEX: 27 KG/M2 | DIASTOLIC BLOOD PRESSURE: 88 MMHG | RESPIRATION RATE: 16 BRPM | OXYGEN SATURATION: 91 % | HEART RATE: 100 BPM | SYSTOLIC BLOOD PRESSURE: 132 MMHG | TEMPERATURE: 97.2 F

## 2021-01-01 DIAGNOSIS — K86.81 EXOCRINE PANCREATIC INSUFFICIENCY: ICD-10-CM

## 2021-01-01 DIAGNOSIS — E78.2 MIXED HYPERLIPIDEMIA: ICD-10-CM

## 2021-01-01 DIAGNOSIS — E11.9 CONTROLLED TYPE 2 DIABETES MELLITUS WITHOUT COMPLICATION, WITHOUT LONG-TERM CURRENT USE OF INSULIN (H): ICD-10-CM

## 2021-01-01 DIAGNOSIS — Z20.822 EXPOSURE TO 2019 NOVEL CORONAVIRUS: Primary | ICD-10-CM

## 2021-01-01 DIAGNOSIS — J41.0 SIMPLE CHRONIC BRONCHITIS (H): ICD-10-CM

## 2021-01-01 DIAGNOSIS — I25.10 ATHEROSCLEROSIS OF NATIVE CORONARY ARTERY OF NATIVE HEART WITHOUT ANGINA PECTORIS: ICD-10-CM

## 2021-01-01 DIAGNOSIS — I25.2 HISTORY OF ST ELEVATION MYOCARDIAL INFARCTION (STEMI): ICD-10-CM

## 2021-01-01 DIAGNOSIS — Z85.51 HISTORY OF BLADDER CANCER: Primary | ICD-10-CM

## 2021-01-01 DIAGNOSIS — G31.83 LEWY BODY DEMENTIA WITHOUT BEHAVIORAL DISTURBANCE (H): ICD-10-CM

## 2021-01-01 DIAGNOSIS — E11.9 CONTROLLED TYPE 2 DIABETES MELLITUS WITHOUT COMPLICATION, WITHOUT LONG-TERM CURRENT USE OF INSULIN (H): Primary | ICD-10-CM

## 2021-01-01 DIAGNOSIS — F02.80 LEWY BODY DEMENTIA WITHOUT BEHAVIORAL DISTURBANCE (H): ICD-10-CM

## 2021-01-01 DIAGNOSIS — Z20.822 COVID-19 RULED OUT: ICD-10-CM

## 2021-01-01 DIAGNOSIS — H61.92 SKIN LESION OF LEFT EAR: Primary | ICD-10-CM

## 2021-01-01 DIAGNOSIS — Z71.85 VACCINE COUNSELING: ICD-10-CM

## 2021-01-01 DIAGNOSIS — E03.4 HYPOTHYROIDISM DUE TO ACQUIRED ATROPHY OF THYROID: ICD-10-CM

## 2021-01-01 DIAGNOSIS — R39.89 URINARY PROBLEM: Primary | ICD-10-CM

## 2021-01-01 DIAGNOSIS — I10 BENIGN ESSENTIAL HYPERTENSION: ICD-10-CM

## 2021-01-01 DIAGNOSIS — N30.01 ACUTE CYSTITIS WITH HEMATURIA: ICD-10-CM

## 2021-01-01 LAB
ALBUMIN SERPL-MCNC: 4.4 G/DL (ref 3.5–5.7)
ALBUMIN UR-MCNC: NEGATIVE MG/DL
ALBUMIN UR-MCNC: NEGATIVE MG/DL
ALP SERPL-CCNC: 73 U/L (ref 34–104)
ALT SERPL W P-5'-P-CCNC: 18 U/L (ref 7–52)
ANION GAP SERPL CALCULATED.3IONS-SCNC: 5 MMOL/L (ref 3–14)
APPEARANCE UR: CLEAR
APPEARANCE UR: CLEAR
AST SERPL W P-5'-P-CCNC: 16 U/L (ref 13–39)
BACTERIA #/AREA URNS HPF: ABNORMAL /HPF
BACTERIA UR CULT: NO GROWTH
BILIRUB SERPL-MCNC: 0.5 MG/DL (ref 0.3–1)
BILIRUB UR QL STRIP: NEGATIVE
BILIRUB UR QL STRIP: NEGATIVE
BUN SERPL-MCNC: 10 MG/DL (ref 7–25)
CALCIUM SERPL-MCNC: 9.9 MG/DL (ref 8.6–10.3)
CHLORIDE BLD-SCNC: 96 MMOL/L (ref 98–107)
CHOLEST SERPL-MCNC: 115 MG/DL
CO2 SERPL-SCNC: 31 MMOL/L (ref 21–31)
COLOR UR AUTO: YELLOW
COLOR UR AUTO: YELLOW
CREAT SERPL-MCNC: 0.91 MG/DL (ref 0.7–1.3)
CREAT UR-MCNC: 68 MG/DL
ERYTHROCYTE [DISTWIDTH] IN BLOOD BY AUTOMATED COUNT: 12.8 % (ref 10–15)
FASTING STATUS PATIENT QL REPORTED: YES
GFR SERPL CREATININE-BSD FRML MDRD: 79 ML/MIN/1.73M2
GLUCOSE BLD-MCNC: 147 MG/DL (ref 70–105)
GLUCOSE UR STRIP-MCNC: NEGATIVE MG/DL
GLUCOSE UR STRIP-MCNC: NEGATIVE MG/DL
HBA1C MFR BLD: 7.2 % (ref 4–6.2)
HCT VFR BLD AUTO: 40.9 % (ref 40–53)
HDLC SERPL-MCNC: 46 MG/DL (ref 23–92)
HGB BLD-MCNC: 13.8 G/DL (ref 13.3–17.7)
HGB UR QL STRIP: ABNORMAL
HGB UR QL STRIP: NEGATIVE
KETONES UR STRIP-MCNC: NEGATIVE MG/DL
KETONES UR STRIP-MCNC: NEGATIVE MG/DL
LDLC SERPL CALC-MCNC: 47 MG/DL
LEUKOCYTE ESTERASE UR QL STRIP: ABNORMAL
LEUKOCYTE ESTERASE UR QL STRIP: NEGATIVE
MCH RBC QN AUTO: 31.9 PG (ref 26.5–33)
MCHC RBC AUTO-ENTMCNC: 33.7 G/DL (ref 31.5–36.5)
MCV RBC AUTO: 95 FL (ref 78–100)
MICROALBUMIN UR-MCNC: 11 MG/L
MICROALBUMIN/CREAT UR: 16.18 MG/G CR (ref 0–17)
MUCOUS THREADS #/AREA URNS LPF: PRESENT /LPF
NITRATE UR QL: NEGATIVE
NITRATE UR QL: NEGATIVE
NONHDLC SERPL-MCNC: 69 MG/DL
PH UR STRIP: 7 [PH] (ref 5–9)
PH UR STRIP: 8 [PH] (ref 5–9)
PLATELET # BLD AUTO: 411 10E3/UL (ref 150–450)
POTASSIUM BLD-SCNC: 5 MMOL/L (ref 3.5–5.1)
PROT SERPL-MCNC: 7 G/DL (ref 6.4–8.9)
RBC # BLD AUTO: 4.32 10E6/UL (ref 4.4–5.9)
RBC URINE: 145 /HPF
SARS-COV-2 RNA RESP QL NAA+PROBE: NEGATIVE
SODIUM SERPL-SCNC: 132 MMOL/L (ref 134–144)
SP GR UR STRIP: 1.01 (ref 1–1.03)
SP GR UR STRIP: 1.02 (ref 1–1.03)
T4 FREE SERPL-MCNC: 1.2 NG/DL (ref 0.6–1.6)
TRIGL SERPL-MCNC: 110 MG/DL
TSH SERPL DL<=0.005 MIU/L-ACNC: 7.46 MU/L (ref 0.4–4)
UROBILINOGEN UR STRIP-MCNC: NORMAL MG/DL
UROBILINOGEN UR STRIP-MCNC: NORMAL MG/DL
WBC # BLD AUTO: 9.8 10E3/UL (ref 4–11)
WBC URINE: 74 /HPF

## 2021-01-01 PROCEDURE — U0003 INFECTIOUS AGENT DETECTION BY NUCLEIC ACID (DNA OR RNA); SEVERE ACUTE RESPIRATORY SYNDROME CORONAVIRUS 2 (SARS-COV-2) (CORONAVIRUS DISEASE [COVID-19]), AMPLIFIED PROBE TECHNIQUE, MAKING USE OF HIGH THROUGHPUT TECHNOLOGIES AS DESCRIBED BY CMS-2020-01-R: HCPCS | Mod: ZL

## 2021-01-01 PROCEDURE — 85027 COMPLETE CBC AUTOMATED: CPT | Mod: ZL

## 2021-01-01 PROCEDURE — 87086 URINE CULTURE/COLONY COUNT: CPT | Mod: ZL | Performed by: NURSE PRACTITIONER

## 2021-01-01 PROCEDURE — 84439 ASSAY OF FREE THYROXINE: CPT | Mod: ZL

## 2021-01-01 PROCEDURE — C9803 HOPD COVID-19 SPEC COLLECT: HCPCS

## 2021-01-01 PROCEDURE — G0463 HOSPITAL OUTPT CLINIC VISIT: HCPCS

## 2021-01-01 PROCEDURE — 99212 OFFICE O/P EST SF 10 MIN: CPT | Performed by: NURSE PRACTITIONER

## 2021-01-01 PROCEDURE — 84443 ASSAY THYROID STIM HORMONE: CPT | Mod: ZL

## 2021-01-01 PROCEDURE — 52000 CYSTOURETHROSCOPY: CPT | Performed by: UROLOGY

## 2021-01-01 PROCEDURE — 11641 EXC F/E/E/N/L MAL+MRG 0.6-1: CPT | Performed by: SURGERY

## 2021-01-01 PROCEDURE — 83036 HEMOGLOBIN GLYCOSYLATED A1C: CPT | Mod: ZL

## 2021-01-01 PROCEDURE — 36415 COLL VENOUS BLD VENIPUNCTURE: CPT | Mod: ZL

## 2021-01-01 PROCEDURE — 81003 URINALYSIS AUTO W/O SCOPE: CPT | Mod: ZL | Performed by: NURSE PRACTITIONER

## 2021-01-01 PROCEDURE — 81001 URINALYSIS AUTO W/SCOPE: CPT | Mod: ZL | Performed by: NURSE PRACTITIONER

## 2021-01-01 PROCEDURE — 82040 ASSAY OF SERUM ALBUMIN: CPT | Mod: ZL

## 2021-01-01 PROCEDURE — 99214 OFFICE O/P EST MOD 30 MIN: CPT | Performed by: INTERNAL MEDICINE

## 2021-01-01 PROCEDURE — 82043 UR ALBUMIN QUANTITATIVE: CPT | Mod: ZL

## 2021-01-01 PROCEDURE — 11441 EXC FACE-MM B9+MARG 0.6-1 CM: CPT | Performed by: SURGERY

## 2021-01-01 PROCEDURE — 80061 LIPID PANEL: CPT | Mod: ZL

## 2021-01-01 PROCEDURE — 88305 TISSUE EXAM BY PATHOLOGIST: CPT

## 2021-01-01 RX ORDER — NITROGLYCERIN 0.4 MG/1
TABLET SUBLINGUAL
Qty: 25 TABLET | Refills: 0 | Status: SHIPPED | OUTPATIENT
Start: 2021-01-01 | End: 2022-01-01

## 2021-01-01 RX ORDER — NITROFURANTOIN 25; 75 MG/1; MG/1
100 CAPSULE ORAL 2 TIMES DAILY
Qty: 10 CAPSULE | Refills: 0 | Status: SHIPPED | OUTPATIENT
Start: 2021-01-01 | End: 2021-01-01

## 2021-01-01 RX ORDER — LEVOTHYROXINE SODIUM 50 UG/1
50 TABLET ORAL DAILY
Qty: 90 TABLET | Refills: 1 | Status: SHIPPED | OUTPATIENT
Start: 2021-01-01 | End: 2022-01-01

## 2021-01-01 ASSESSMENT — ENCOUNTER SYMPTOMS
COUGH: 1
BRUISES/BLEEDS EASILY: 0
NAUSEA: 0
CONFUSION: 1
SHORTNESS OF BREATH: 1
AGITATION: 0
PALPITATIONS: 0
WHEEZING: 0
ABDOMINAL PAIN: 1
LIGHT-HEADEDNESS: 1
DIZZINESS: 1
VOMITING: 0
FEVER: 0
DYSURIA: 0
NERVOUS/ANXIOUS: 1
TREMORS: 1
MYALGIAS: 0
ARTHRALGIAS: 0
CHILLS: 0
FATIGUE: 1
DIARRHEA: 1
HEMATURIA: 0
WOUND: 1

## 2021-01-01 ASSESSMENT — PAIN SCALES - GENERAL
PAINLEVEL: NO PAIN (0)

## 2021-01-08 ENCOUNTER — TRANSFERRED RECORDS (OUTPATIENT)
Dept: HEALTH INFORMATION MANAGEMENT | Facility: OTHER | Age: 81
End: 2021-01-08

## 2021-01-08 ENCOUNTER — MEDICAL CORRESPONDENCE (OUTPATIENT)
Dept: HEALTH INFORMATION MANAGEMENT | Facility: OTHER | Age: 81
End: 2021-01-08

## 2021-01-11 ENCOUNTER — HOSPITAL ENCOUNTER (OUTPATIENT)
Dept: MRI IMAGING | Facility: OTHER | Age: 81
Discharge: HOME | End: 2021-01-11
Attending: NURSE PRACTITIONER | Admitting: FAMILY MEDICINE
Payer: MEDICARE

## 2021-01-11 DIAGNOSIS — R41.89 COGNITIVE CHANGES: ICD-10-CM

## 2021-01-11 DIAGNOSIS — R41.3 MEMORY PROBLEM: ICD-10-CM

## 2021-01-11 PROCEDURE — 70553 MRI BRAIN STEM W/O & W/DYE: CPT

## 2021-01-11 PROCEDURE — A9575 INJ GADOTERATE MEGLUMI 0.1ML: HCPCS | Performed by: RADIOLOGY

## 2021-01-11 PROCEDURE — 255N000002 HC RX 255 OP 636: Performed by: RADIOLOGY

## 2021-01-11 RX ORDER — GADOTERATE MEGLUMINE 376.9 MG/ML
15 INJECTION INTRAVENOUS ONCE
Status: COMPLETED | OUTPATIENT
Start: 2021-01-11 | End: 2021-01-11

## 2021-01-11 RX ADMIN — GADOTERATE MEGLUMINE 15 ML: 376.9 INJECTION INTRAVENOUS at 13:57

## 2021-02-23 ENCOUNTER — OFFICE VISIT (OUTPATIENT)
Dept: INTERNAL MEDICINE | Facility: OTHER | Age: 81
End: 2021-02-23
Attending: INTERNAL MEDICINE
Payer: MEDICARE

## 2021-02-23 VITALS
HEIGHT: 67 IN | TEMPERATURE: 97.6 F | OXYGEN SATURATION: 97 % | DIASTOLIC BLOOD PRESSURE: 78 MMHG | WEIGHT: 176.8 LBS | SYSTOLIC BLOOD PRESSURE: 136 MMHG | RESPIRATION RATE: 16 BRPM | HEART RATE: 65 BPM | BODY MASS INDEX: 27.75 KG/M2

## 2021-02-23 DIAGNOSIS — E11.9 CONTROLLED TYPE 2 DIABETES MELLITUS WITHOUT COMPLICATION, WITHOUT LONG-TERM CURRENT USE OF INSULIN (H): Primary | ICD-10-CM

## 2021-02-23 DIAGNOSIS — I25.10 ATHEROSCLEROSIS OF NATIVE CORONARY ARTERY OF NATIVE HEART WITHOUT ANGINA PECTORIS: ICD-10-CM

## 2021-02-23 DIAGNOSIS — L57.0 ACTINIC KERATOSES: ICD-10-CM

## 2021-02-23 DIAGNOSIS — R12 HEARTBURN: ICD-10-CM

## 2021-02-23 DIAGNOSIS — I70.0 AORTIC ATHEROSCLEROSIS (H): ICD-10-CM

## 2021-02-23 DIAGNOSIS — Z85.51 HX OF BLADDER CANCER: ICD-10-CM

## 2021-02-23 DIAGNOSIS — L98.9 NON-HEALING SKIN LESION: ICD-10-CM

## 2021-02-23 DIAGNOSIS — J41.0 SIMPLE CHRONIC BRONCHITIS (H): ICD-10-CM

## 2021-02-23 DIAGNOSIS — E78.2 MIXED HYPERLIPIDEMIA: ICD-10-CM

## 2021-02-23 DIAGNOSIS — Z98.890 HISTORY OF PANCREATIC SURGERY: ICD-10-CM

## 2021-02-23 DIAGNOSIS — K86.81 EXOCRINE PANCREATIC INSUFFICIENCY: ICD-10-CM

## 2021-02-23 DIAGNOSIS — I10 BENIGN ESSENTIAL HYPERTENSION: ICD-10-CM

## 2021-02-23 DIAGNOSIS — R19.7 INTERMITTENT DIARRHEA: ICD-10-CM

## 2021-02-23 DIAGNOSIS — Z00.00 ENCOUNTER FOR MEDICARE ANNUAL WELLNESS EXAM: ICD-10-CM

## 2021-02-23 LAB
ALBUMIN SERPL-MCNC: 4.2 G/DL (ref 3.5–5.7)
ALBUMIN UR-MCNC: NEGATIVE MG/DL
ALP SERPL-CCNC: 62 U/L (ref 34–104)
ALT SERPL W P-5'-P-CCNC: 19 U/L (ref 7–52)
ANION GAP SERPL CALCULATED.3IONS-SCNC: 5 MMOL/L (ref 3–14)
APPEARANCE UR: CLEAR
AST SERPL W P-5'-P-CCNC: 17 U/L (ref 13–39)
BILIRUB SERPL-MCNC: 0.5 MG/DL (ref 0.3–1)
BILIRUB UR QL STRIP: NEGATIVE
BUN SERPL-MCNC: 9 MG/DL (ref 7–25)
CALCIUM SERPL-MCNC: 9.7 MG/DL (ref 8.6–10.3)
CHLORIDE SERPL-SCNC: 95 MMOL/L (ref 98–107)
CHOLEST SERPL-MCNC: 104 MG/DL
CO2 SERPL-SCNC: 29 MMOL/L (ref 21–31)
COLOR UR AUTO: YELLOW
CREAT SERPL-MCNC: 0.76 MG/DL (ref 0.7–1.3)
CREAT UR-MCNC: 75 MG/DL
ERYTHROCYTE [DISTWIDTH] IN BLOOD BY AUTOMATED COUNT: 12.8 % (ref 10–15)
GFR SERPL CREATININE-BSD FRML MDRD: >90 ML/MIN/{1.73_M2}
GLUCOSE SERPL-MCNC: 124 MG/DL (ref 70–105)
GLUCOSE UR STRIP-MCNC: NEGATIVE MG/DL
HBA1C MFR BLD: 7.1 % (ref 4–6)
HCT VFR BLD AUTO: 40.5 % (ref 40–53)
HDLC SERPL-MCNC: 38 MG/DL (ref 23–92)
HGB BLD-MCNC: 13.5 G/DL (ref 13.3–17.7)
HGB UR QL STRIP: NEGATIVE
KETONES UR STRIP-MCNC: NEGATIVE MG/DL
LDLC SERPL CALC-MCNC: 47 MG/DL
LEUKOCYTE ESTERASE UR QL STRIP: NEGATIVE
MCH RBC QN AUTO: 31.4 PG (ref 26.5–33)
MCHC RBC AUTO-ENTMCNC: 33.3 G/DL (ref 31.5–36.5)
MCV RBC AUTO: 94 FL (ref 78–100)
MICROALBUMIN UR-MCNC: 7 MG/L
MICROALBUMIN/CREAT UR: 9.12 MG/G CR (ref 0–17)
NITRATE UR QL: NEGATIVE
NONHDLC SERPL-MCNC: 66 MG/DL
PH UR STRIP: 7.5 PH (ref 5–7)
PLATELET # BLD AUTO: 387 10E9/L (ref 150–450)
POTASSIUM SERPL-SCNC: 4.6 MMOL/L (ref 3.5–5.1)
PROT SERPL-MCNC: 7 G/DL (ref 6.4–8.9)
RBC # BLD AUTO: 4.3 10E12/L (ref 4.4–5.9)
SODIUM SERPL-SCNC: 129 MMOL/L (ref 134–144)
SOURCE: ABNORMAL
SP GR UR STRIP: 1.01 (ref 1–1.03)
TRIGL SERPL-MCNC: 94 MG/DL
TSH SERPL DL<=0.05 MIU/L-ACNC: 5.67 IU/ML (ref 0.34–5.6)
UROBILINOGEN UR STRIP-MCNC: NORMAL MG/DL (ref 0–2)
WBC # BLD AUTO: 7.8 10E9/L (ref 4–11)

## 2021-02-23 PROCEDURE — G0463 HOSPITAL OUTPT CLINIC VISIT: HCPCS | Mod: 25

## 2021-02-23 PROCEDURE — 17003 DESTRUCT PREMALG LES 2-14: CPT | Performed by: INTERNAL MEDICINE

## 2021-02-23 PROCEDURE — G0438 PPPS, INITIAL VISIT: HCPCS | Performed by: INTERNAL MEDICINE

## 2021-02-23 PROCEDURE — 81003 URINALYSIS AUTO W/O SCOPE: CPT | Mod: ZL | Performed by: INTERNAL MEDICINE

## 2021-02-23 PROCEDURE — 84443 ASSAY THYROID STIM HORMONE: CPT | Mod: ZL | Performed by: INTERNAL MEDICINE

## 2021-02-23 PROCEDURE — 36415 COLL VENOUS BLD VENIPUNCTURE: CPT | Mod: ZL | Performed by: INTERNAL MEDICINE

## 2021-02-23 PROCEDURE — 82043 UR ALBUMIN QUANTITATIVE: CPT | Mod: ZL | Performed by: INTERNAL MEDICINE

## 2021-02-23 PROCEDURE — 83036 HEMOGLOBIN GLYCOSYLATED A1C: CPT | Mod: ZL | Performed by: INTERNAL MEDICINE

## 2021-02-23 PROCEDURE — 99214 OFFICE O/P EST MOD 30 MIN: CPT | Mod: 25 | Performed by: INTERNAL MEDICINE

## 2021-02-23 PROCEDURE — 85027 COMPLETE CBC AUTOMATED: CPT | Mod: ZL | Performed by: INTERNAL MEDICINE

## 2021-02-23 PROCEDURE — 80053 COMPREHEN METABOLIC PANEL: CPT | Mod: ZL | Performed by: INTERNAL MEDICINE

## 2021-02-23 PROCEDURE — 17000 DESTRUCT PREMALG LESION: CPT | Performed by: INTERNAL MEDICINE

## 2021-02-23 PROCEDURE — 80061 LIPID PANEL: CPT | Mod: ZL | Performed by: INTERNAL MEDICINE

## 2021-02-23 RX ORDER — OMEPRAZOLE 10 MG/1
10 CAPSULE, DELAYED RELEASE ORAL DAILY
Qty: 90 CAPSULE | Refills: 3 | Status: SHIPPED | OUTPATIENT
Start: 2021-02-23 | End: 2021-06-16

## 2021-02-23 RX ORDER — RIVASTIGMINE TARTRATE 1.5 MG/1
1.5 CAPSULE ORAL 2 TIMES DAILY
COMMUNITY
Start: 2021-02-23 | End: 2021-09-16

## 2021-02-23 RX ORDER — RIVASTIGMINE TARTRATE 1.5 MG/1
1 CAPSULE ORAL 2 TIMES DAILY
COMMUNITY
Start: 2021-01-22 | End: 2021-06-01

## 2021-02-23 ASSESSMENT — ENCOUNTER SYMPTOMS
SHORTNESS OF BREATH: 1
FATIGUE: 1
MYALGIAS: 0
FEVER: 0
LIGHT-HEADEDNESS: 1
VOMITING: 0
NAUSEA: 0
NERVOUS/ANXIOUS: 1
PALPITATIONS: 0
HEMATURIA: 0
CONFUSION: 1
DYSURIA: 0
CHILLS: 0
WOUND: 1
DIARRHEA: 1
DIZZINESS: 1
COUGH: 1
WHEEZING: 0
BRUISES/BLEEDS EASILY: 0
ABDOMINAL PAIN: 1
ARTHRALGIAS: 0
AGITATION: 0
TREMORS: 1

## 2021-02-23 ASSESSMENT — MIFFLIN-ST. JEOR: SCORE: 1474.46

## 2021-02-23 ASSESSMENT — PAIN SCALES - GENERAL: PAINLEVEL: NO PAIN (0)

## 2021-02-23 NOTE — PATIENT INSTRUCTIONS
Skin lesion on right leg - appears to be a basal cell carcinoma - recommend removal with procedure only appointment in Surgery clinic.     3 actinic keratosis pre-cancerous lesions treated with cryotherapy with Dr. Sen today.     Start Trelegy inhaler  -- Rinse mouth well after use to prevent Thrush     Switch omeprazole to 10 mg once daily -- before a meal.     Blood pressure is well controlled.     Check on cost of CREON and the New Inhaler -- Trelegy -- with [employer].       Return in approximately 1 year, or sooner as needed for follow-up with Dr. Sen.  - Annual Follow-up / Physical - Medicare Annual Wellness Visit     Clinic : [phone]  Appointment line: [phone]       Patient Education   Personalized Prevention Plan  You are due for the preventive services outlined below.  Your care team is available to assist you in scheduling these services.  If you have already completed any of these items, please share that information with your care team to update in your medical record.  Health Maintenance Due   Topic Date Due     COVID-19 Vaccine (1 of 2) 05/18/1956     Hepatitis C Screening  05/18/1958     Preventive Health Recommendations  See your health care provider every year to    Review health changes.     Discuss preventive care.      Review your medicines if your doctor has prescribed any.    Talk with your health care provider about whether you should have a test to screen for prostate cancer (PSA).    Every 3 years, have a diabetes test (fasting glucose). If you are at risk for diabetes, you should have this test more often.    Every 5 years, have a cholesterol test. Have this test more often if you are at risk for high cholesterol or heart disease.     Every 10 years, have a colonoscopy. Or, have a yearly FIT test (stool test). These exams will check for colon cancer.    Talk to with your health care provider about screening for Abdominal Aortic Aneurysm if you have a family history of  AAA or have a history of smoking.    Shots:     Get a flu shot each year.     Get a tetanus shot every 10 years.     Talk to your doctor about your pneumonia vaccines. There are now two you should receive - Pneumovax (PPSV 23) and Prevnar (PCV 13).    Talk to your pharmacist about a shingles vaccine.     Talk to your doctor about the hepatitis B vaccine.    Nutrition:     Eat at least 5 servings of fruits and vegetables each day.     Eat whole-grain bread, whole-wheat pasta and brown rice instead of white grains and rice.     Get adequate Calcium and Vitamin D.     Lifestyle    Exercise for at least 150 minutes a week (30 minutes a day, 5 days a week). This will help you control your weight and prevent disease.     Limit alcohol to one drink per day.     No smoking.     Wear sunscreen to prevent skin cancer.     See your dentist every six months for an exam and cleaning.     See your eye doctor every 1 to 2 years to screen for conditions such as glaucoma, macular degeneration and cataracts.    Personalized Prevention Plan  You are due for the preventive services outlined below.  Your care team is available to assist you in scheduling these services.  If you have already completed any of these items, please share that information with your care team to update in your medical record.  Health Maintenance   Topic Date Due     COVID-19 Vaccine (1 of 2) 05/18/1956     HEPATITIS C SCREENING  05/18/1959     A1C  05/11/2021     DIABETIC FOOT EXAM  08/05/2021     EYE EXAM  09/11/2021     FALL RISK ASSESSMENT  10/15/2021     BMP  11/11/2021     LIPID  11/11/2021     MICROALBUMIN  11/11/2021     MEDICARE ANNUAL WELLNESS VISIT  02/23/2022     DTAP/TDAP/TD IMMUNIZATION (3 - Td) 02/23/2022     ADVANCE CARE PLANNING  02/23/2026     PHQ-2  Completed     INFLUENZA VACCINE  Completed     Pneumococcal Vaccine: Pediatrics (0 to 5 Years) and At-Risk Patients (6 to 64 Years)  Completed     Pneumococcal Vaccine: 65+ Years  Completed      ZOSTER IMMUNIZATION  Completed     IPV IMMUNIZATION  Aged Out     MENINGITIS IMMUNIZATION  Aged Out       Preventing Falls at Home  A person can fall for many reasons. Older adults may fall because reaction time slows down as we age. Your muscles and joints may get stiff, weak, or less flexible because of illness, medicines, or a physical condition.   Other health problems that make falls more likely include:     Arthritis    Dizziness or lightheadedness when you stand up (orthostatic hypotension)    History of a stroke    Dizziness    Anemia    Certain medicines taken for mental illness or to control blood pressure.    Problems with balance or gait    Bladder or urinary problems    History of falling    Changes in vision (vision impairment)    Changes in thinking skills and memory (cognitive impairment)  Injuries from a fall can include serious injuries such as broken bones, dislocated joints, internal bleeding and cuts. Injuries like these can limit your independence.   Prevention tips  To help prevent falls and fall-related injuries, follow the tips below.    Floors  To make floors safer:     Put nonskid pads under area rugs.    Remove small rugs.    Replace worn floor coverings.    Tack carpets firmly to each step on carpeted stairs. Put nonskid strips on the edges of uncarpeted stairs.    Keep floors and stairs free of clutter and cords.    Arrange furniture so there are clear pathways.    Clean up any spills right away.  Bathrooms    To make bathrooms safer:     Install grab bars in the tub or shower.    Apply nonskid strips or put a nonskid rubber mat in the tub or shower.    Sit on a bath chair to bathe.    Use bathmats with nonskid backing.  Lighting  To improve visibility in your home:      Keep a flashlight in each room. Or put a lamp next to the bed within easy reach.    Put nightlights in the bedrooms, hallways, kitchen, and bathrooms.    Make sure all stairways have good lighting.    Take your time  when going up and down stairs.    Put handrails on both sides of stairs and in walkways for more support. To prevent injury to your wrist or arm, don t use handrails to pull yourself up.    Install grab bars to pull yourself up.    Move or rearrange items that you use often. This will make them easier to find or reach.    Look at your home to find any safety hazards. Especially look at doorways, walkways, and the driveway. Remove or repair any safety problems that you find.  Other changes to make    Look around to find any safety hazards. Look closely at doorways, walkways, and the driveway. Remove or repair any safety problems that you find.    Wear shoes that fit well.    Take your time when going up and down stairs.    Put handrails on both sides of stairs and in walkways for more support. To prevent injury to your wrist or arm, don t use handrails to pull yourself up.    Install grab bars wherever needed to pull yourself up.    Arrange items that you use often. This will make them easier to find or reach.    Kamlesh last reviewed this educational content on 3/1/2020    8153-5285 [employer]. [address] [facility], [place] [address]. All rights reserved. This information is not intended as a substitute for professional medical care. Always follow your healthcare professional's instructions.

## 2021-02-23 NOTE — PROGRESS NOTES
"SUBJECTIVE:   Rajesh Huynh is a 80 year old male who presents for Preventive Visit.  Patient has been advised of split billing requirements and indicates understanding: Yes  Are you in the first 12 months of your Medicare Part B coverage?  No    Physical Health:    In general, how would you rate your overall physical health? fair    Outside of work, how many days during the week do you exercise? 6-7 days/week    Outside of work, approximately how many minutes a day do you exercise?15-30 minutes    If you drink alcohol do you typically have >3 drinks per day or >7 drinks per week? Not Applicable    Do you usually eat at least 4 servings of fruit and vegetables a day, include whole grains & fiber and avoid regularly eating high fat or \"junk\" foods? NO    Do you have any problems taking medications regularly?  No    Do you have any side effects from medications? none    Needs assistance for the following daily activities: no assistance needed    Which of the following safety concerns are present in your home?  none identified     Hearing impairment: Yes, Difficulty following a conversation in a noisy restaurant or crowded room.    Feel that people are mumbling or not speaking clearly.    Difficult to understand a speaker at a public meeting or [employer] service.    Need to ask people to speak up or repeat themselves.    Difficulty understanding soft or whispered speech.    Difficulty understanding speech on the telephone.    Answers are with bilateral hearing aides in    In the past 6 months, have you been bothered by leaking of urine? no    Mental Health:    In general, how would you rate your overall mental or emotional health? fair  PHQ-2 Score: 0    Do you feel safe in your environment? Yes    Have you ever done Advance Care Planning? (For example, a Health Directive, POLST, or a discussion with a medical provider or your loved ones about your wishes): Yes, advance care planning is on file.    Additional " concerns to address?  No    Fall risk:  Fallen 2 or more times in the past year?: Yes  Any fall with injury in the past year?: No    Cognitive Screenin) Repeat 3 items (Leader, Season, Table)    2) Clock draw: NORMAL  3) 3 item recall: Recalls 3 objects  Results: 3 items recalled: COGNITIVE IMPAIRMENT LESS LIKELY    Mini-CogTM Copyright JASEN Jackson. Licensed by the author for use in [facility]; reprinted with permission (jules@[facility]). All rights reserved.      Do you have sleep apnea, excessive snoring or daytime drowsiness?: yes    Reviewed and updated as needed this visit by clinical staff  Tobacco  Allergies  Meds  Problems  Med Hx  Surg Hx  Fam Hx  Soc Hx          Reviewed and updated as needed this visit by Provider  Tobacco  Allergies  Meds  Problems  Med Hx  Surg Hx  Fam Hx         Social History     Tobacco Use     Smoking status: Former Smoker     Packs/day: 3.00     Years: 43.00     Pack years: 129.00     Types: Cigarettes     Quit date: 2000     Years since quittin.1     Smokeless tobacco: Never Used   Substance Use Topics     Alcohol use: No     Alcohol/week: 0.0 standard drinks                           Current providers sharing in care for this patient include:   Patient Care Team:  Jovanni Sen MD as PCP - General (Internal Medicine)  Jovanni Sen MD as Assigned PCP  Anthony Haney MD as Assigned Surgical Provider  Mukesh Lopez,  as Assigned Heart and Vascular Provider    The following health maintenance items are reviewed in Epic and correct as of today:  Health Maintenance   Topic Date Due     COVID-19 Vaccine (1 of 2) 1956     DIABETIC FOOT EXAM  2021     A1C  2021     EYE EXAM  2021     FALL RISK ASSESSMENT  10/15/2021     MEDICARE ANNUAL WELLNESS VISIT  2022     BMP  2022     LIPID  2022     MICROALBUMIN  2022     DTAP/TDAP/TD IMMUNIZATION (3 - Td) 2022     ADVANCE CARE PLANNING  2026  "    SPIROMETRY  Completed     PHQ-2  Completed     INFLUENZA VACCINE  Completed     Pneumococcal Vaccine: Pediatrics (0 to 5 Years) and At-Risk Patients (6 to 64 Years)  Completed     Pneumococcal Vaccine: 65+ Years  Completed     ZOSTER IMMUNIZATION  Completed     HEPATITIS C SCREENING  Addressed     COPD ACTION PLAN  Addressed     IPV IMMUNIZATION  Aged Out     MENINGITIS IMMUNIZATION  Aged Out     Review of Systems   Constitutional: Positive for fatigue. Negative for chills and fever.   HENT: Negative for congestion and hearing loss.    Eyes: Negative for visual disturbance.   Respiratory: Positive for cough (some purulent phlegm in AM, otherwise clear throughout the day) and shortness of breath. Negative for wheezing.    Cardiovascular: Negative for chest pain and palpitations.   Gastrointestinal: Positive for abdominal pain (+intermittent) and diarrhea (+ much improved with Creon). Negative for nausea and vomiting.   Endocrine: Negative for cold intolerance and heat intolerance.   Genitourinary: Negative for dysuria and hematuria.   Musculoskeletal: Negative for arthralgias and myalgias.   Skin: Positive for wound (right calf - for months). Negative for rash.        actinic keratosis x3 on head/scalp   Allergic/Immunologic: Negative for immunocompromised state.   Neurological: Positive for dizziness, tremors and light-headedness.        + some balance problems   Hematological: Does not bruise/bleed easily.   Psychiatric/Behavioral: Positive for confusion. Negative for agitation. The patient is nervous/anxious.         + Memory problems       OBJECTIVE:   /78 (BP Location: Right arm, Patient Position: Sitting, Cuff Size: Adult Regular)   Pulse 65   Temp 97.6  F (36.4  C) (Tympanic)   Resp 16   Ht 1.7 m (5' 6.93\")   Wt 80.2 kg (176 lb 12.8 oz)   SpO2 97%   BMI 27.75 kg/m   Estimated body mass index is 27.75 kg/m  as calculated from the following:    Height as of this encounter: 1.7 m (5' 6.93\").    " Weight as of this encounter: 80.2 kg (176 lb 12.8 oz).    Physical Exam  Constitutional:       General: He is not in acute distress.     Appearance: He is well-developed. He is not diaphoretic.   HENT:      Head: Normocephalic and atraumatic.   Eyes:      General: No scleral icterus.     Conjunctiva/sclera: Conjunctivae normal.   Neck:      Musculoskeletal: Neck supple.   Cardiovascular:      Rate and Rhythm: Normal rate and regular rhythm.   Pulmonary:      Effort: Pulmonary effort is normal.      Breath sounds: Normal breath sounds.   Abdominal:      Palpations: Abdomen is soft.      Tenderness: There is no abdominal tenderness.   Musculoskeletal:         General: No deformity.      Right lower leg: No edema.      Left lower leg: No edema.   Lymphadenopathy:      Cervical: No cervical adenopathy.   Skin:     General: Skin is warm and dry.      Findings: Lesion present. No rash.      Comments: Non-healing lesion on right calf. -- needs surgical excision.     3 actinic keratosis on face    PROCEDURE:   Reviewed risks and benefits of cryotherapy.After informed consent was obtained, patient elected to proceed. These 3 lesions were treated today.   Performed cryotherapy to #3 lesions x 2 rounds of 30 second freeze/thaw cycles.   Tolerated well. No obvious complications.   Return for signs of infection.    The patient and I reviewed safe sun practices today: the importance of staying out of the sun;especially between 10AM-2PM, wearing sun screen SPF > 30, and protective clothing.   We also discussed the ABC's of skin cancers including: changes in size, uniformity, borders, coloration, bleeding, or any irregularityor changes. If these occur, seek medical attention.   The patient should have a complete skin exam by a medical professional every 6-12 months, and any questionable/suspicious, or changing lesions should be removed.     Neurological:      Mental Status: He is alert and oriented to person, place, and time.  Mental status is at baseline.   Psychiatric:         Mood and Affect: Mood normal.         Behavior: Behavior normal.         Diagnostic Test Results:  Labs reviewed in Epic    ASSESSMENT / PLAN:       ICD-10-CM    1. Controlled type 2 diabetes mellitus without complication, without long-term current use of insulin (H)  E11.9 TSH Reflex GH     Hemoglobin A1c     UA reflex to Microscopic     Albumin Random Urine Quantitative with Creat Ratio   2. Aortic atherosclerosis (H)  I70.0    3. Actinic keratoses x3 - on head/scalp/face  L57.0 DESTRUCT PREMALIGNANT LESION, FIRST     DESTRUCT PREMALIGNANT LESION, 2-14     CANCELED: DESTRUCT PREMALIGNANT LESION, 2-14   4. Non-healing skin lesion - right calf  L98.9    5. Encounter for Medicare annual wellness exam  Z00.00    6. Benign essential hypertension  I10 Comprehensive metabolic panel     CBC with platelets   7. Mixed hyperlipidemia  E78.2 Lipid Profile   8. Atherosclerosis of native coronary artery of native heart without angina pectoris  I25.10    9. Exocrine pancreatic insufficiency - Severe Deficiency  K86.81    10. History of pancreatic surgery - Whipple  Z98.890    11. Hx of bladder cancer on 11/10/2014  Z85.51    12. Heartburn  R12 omeprazole (PRILOSEC) 10 MG DR capsule     DISCONTINUED: omeprazole (PRILOSEC) 20 MG DR capsule   13. Intermittent diarrhea  R19.7    14. Simple chronic bronchitis (H)  J41.0 Fluticasone-Umeclidin-Vilanterol (TRELEGY ELLIPTA) 100-62.5-25 MCG/INH oral inhaler   Patient presents for Medicare annual wellness visit as well as follow-up multiple issues.    Type 2 diabetes, has been well controlled.  Due for lab work today.  Denies hypoglycemia.  Currently on Metformin.  Check labs.    Aortic atherosclerosis, chronic.  Continues on aspirin and Lipitor.  No strokelike symptoms.    Actinic keratoses, has 3 scaly lesions on his head and scalp that he would like treated today.  Treatment options reviewed and discussed, he would like to proceed  with cryotherapy.  See below.    Has a nonhealing ulcerated lesion on the right posterior calf.  Has raised edges with pearly border and telangiectasias.  Concerning for basal cell carcinoma.  Recommend excision.  Procedure only appointment with surgery was scheduled.    Hypertension, blood pressure is currently well controlled.  Doing well with current medication regimen.  Denies syncope or presyncope.  No changes current dosing.  Check labs.    Mixed hyperlipidemia, currently on Lipitor 40 mg daily.  Tolerating well without side effects.  Check labs.    Coronary artery disease, currently stable.  No exertional chest pain symptoms.  Continue current medications.    History of pancreatic Whipple surgery.  Was found to have severe pancreatic exocrine insufficiency.  Continues on Creon.  This is been very effective for him it is very expensive however.  Encouraged him to check on pricing with good Rx online.    History of bladder cancer status post treatment back in November 2014.  No hematuria issues recently.    Heartburn, has been taking omeprazole 20 mg daily he would like to get a prescription and would like to try reducing the dose down to 10 mg daily.  Updated prescription sent to pharmacy.    Intermittent diarrhea, occasionally still taking some Imodium or Pepto-Bismol.  He takes Creon which does help considerably.  Has mostly soft stools now.    Chronic simple bronchitis, he smoked for many years.  Has been coughing up mostly clear phlegm but more colored in the morning.  This is been going on for months.  Treatment options reviewed and discussed.  He would like to start with an inhaler.  Start Trelegy inhaler once daily.  Advised to check at the local pharmacy versus online with good Rx.    Patient has been advised of split billing requirements and indicates understanding: Yes    COUNSELING:  Reviewed preventive health counseling, as reflected in patient instructions  Special attention given to:        "Regular exercise       Healthy diet/nutrition       Vision screening       Hearing screening       Dental care       Bladder control       Fall risk prevention       Immunizations    Estimated body mass index is 27.75 kg/m  as calculated from the following:    Height as of this encounter: 1.7 m (5' 6.93\").    Weight as of this encounter: 80.2 kg (176 lb 12.8 oz).        He reports that he quit smoking about 21 years ago. His smoking use included cigarettes. He has a 129.00 pack-year smoking history. He has never used smokeless tobacco.    Appropriate preventive services were discussed with this patient, including applicable screening as appropriate for cardiovascular disease, diabetes, osteopenia/osteoporosis, and glaucoma.  As appropriate for age/gender, discussed screening for colorectal cancer, prostate cancer, breast cancer, and cervical cancer. Checklist reviewing preventive services available has been given to the patient.    Reviewed patients plan of care and provided an AVS. The Complex Care Plan (for patients with higher acuity and needing more deliberate coordination of services) for Rajesh meets the Care Plan requirement. This Care Plan has been established and reviewed with the Patient and spouse.    Counseling Resources:  ATP IV Guidelines  Pooled Cohorts Equation Calculator  Breast Cancer Risk Calculator  BRCA-Related Cancer Risk Assessment: FHS-7 Tool  FRAX Risk Assessment  ICSI Preventive Guidelines  Dietary Guidelines for Americans, 2010  USDA's MyPlate  ASA Prophylaxis  Lung CA Screening    Jovanni Sen MD  [facility] AND [facility]  "

## 2021-02-23 NOTE — PROGRESS NOTES
He is at risk for falling and has been provided with information to reduce the risk of falling at home.

## 2021-02-23 NOTE — LETTER
February 24, 2021      Rajesh Huynh  [address] [place] [address]        Dear ,    We are writing to inform you of your test results.    TSH/thyroid levels have changed slightly.  Your thyroid levels appear to be dropping slightly.  We may need to consider starting a thyroid supplements.    Sodium levels remain mildly low.    Labs are otherwise stable.   Continue current medications.   Plan to recheck labs again in 3 to 4 months.     Jovanni Sen MD      Resulted Orders   TSH Reflex GH   Result Value Ref Range    TSH Reflex 5.67 (H) 0.34 - 5.60 IU/mL   Comprehensive metabolic panel   Result Value Ref Range    Sodium 129 (L) 134 - 144 mmol/L    Potassium 4.6 3.5 - 5.1 mmol/L    Chloride 95 (L) 98 - 107 mmol/L    Carbon Dioxide 29 21 - 31 mmol/L    Anion Gap 5 3 - 14 mmol/L    Glucose 124 (H) 70 - 105 mg/dL    Urea Nitrogen 9 7 - 25 mg/dL    Creatinine 0.76 0.70 - 1.30 mg/dL    GFR Estimate >90 >60 mL/min/[1.73_m2]    GFR Estimate If Black >90 >60 mL/min/[1.73_m2]    Calcium 9.7 8.6 - 10.3 mg/dL    Bilirubin Total 0.5 0.3 - 1.0 mg/dL    Albumin 4.2 3.5 - 5.7 g/dL    Protein Total 7.0 6.4 - 8.9 g/dL    Alkaline Phosphatase 62 34 - 104 U/L    ALT 19 7 - 52 U/L    AST 17 13 - 39 U/L   CBC with platelets   Result Value Ref Range    WBC 7.8 4.0 - 11.0 10e9/L    RBC Count 4.30 (L) 4.4 - 5.9 10e12/L    Hemoglobin 13.5 13.3 - 17.7 g/dL    Hematocrit 40.5 40.0 - 53.0 %    MCV 94 78 - 100 fl    MCH 31.4 26.5 - 33.0 pg    MCHC 33.3 31.5 - 36.5 g/dL    RDW 12.8 10.0 - 15.0 %    Platelet Count 387 150 - 450 10e9/L   Hemoglobin A1c   Result Value Ref Range    Hemoglobin A1C 7.1 (H) 4.0 - 6.0 %   Lipid Profile   Result Value Ref Range    Cholesterol 104 <200 mg/dL    Triglycerides 94 <150 mg/dL    HDL Cholesterol 38 23 - 92 mg/dL    LDL Cholesterol Calculated 47 <100 mg/dL      Comment:      Desirable:       <100 mg/dl    Non HDL Cholesterol 66 <130 mg/dL   UA reflex to Microscopic   Result Value Ref  Range    Color Urine Yellow     Appearance Urine Clear     Glucose Urine Negative NEG^Negative mg/dL    Bilirubin Urine Negative NEG^Negative    Ketones Urine Negative NEG^Negative mg/dL    Specific Gravity Urine 1.013 1.003 - 1.035    Blood Urine Negative NEG^Negative    pH Urine 7.5 (H) 5.0 - 7.0 pH    Protein Albumin Urine Negative NEG^Negative mg/dL    Urobilinogen mg/dL Normal 0.0 - 2.0 mg/dL    Nitrite Urine Negative NEG^Negative    Leukocyte Esterase Urine Negative NEG^Negative    Source Midstream Urine    Albumin Random Urine Quantitative with Creat Ratio   Result Value Ref Range    Creatinine Urine 75 mg/dL    Albumin Urine mg/L 7 mg/L    Albumin Urine mg/g Cr 9.12 0 - 17 mg/g Cr       If you have any questions or concerns, please call the clinic at the number listed above.       Sincerely,      Jovanni Sen MD

## 2021-03-02 ENCOUNTER — OFFICE VISIT (OUTPATIENT)
Dept: SURGERY | Facility: OTHER | Age: 81
End: 2021-03-02
Attending: INTERNAL MEDICINE
Payer: MEDICARE

## 2021-03-02 VITALS
HEART RATE: 64 BPM | WEIGHT: 176 LBS | BODY MASS INDEX: 27.62 KG/M2 | DIASTOLIC BLOOD PRESSURE: 62 MMHG | SYSTOLIC BLOOD PRESSURE: 122 MMHG | TEMPERATURE: 98.3 F | RESPIRATION RATE: 16 BRPM

## 2021-03-02 DIAGNOSIS — L98.9 SKIN LESION: Primary | ICD-10-CM

## 2021-03-02 PROCEDURE — 88305 TISSUE EXAM BY PATHOLOGIST: CPT

## 2021-03-02 PROCEDURE — G0463 HOSPITAL OUTPT CLINIC VISIT: HCPCS

## 2021-03-02 PROCEDURE — 11403 EXC TR-EXT B9+MARG 2.1-3CM: CPT | Performed by: SURGERY

## 2021-03-02 PROCEDURE — 12032 INTMD RPR S/A/T/EXT 2.6-7.5: CPT | Performed by: SURGERY

## 2021-03-02 ASSESSMENT — PAIN SCALES - GENERAL: PAINLEVEL: NO PAIN (0)

## 2021-03-02 NOTE — PROGRESS NOTES
.Procedure Note      Pre/Post Operative Diagnosis:  Right calf skin lesion     Procedure:   Removal of right calf skin lesion      Surgeon: Momo Armenta MD    Local Anesthesia: 1% lidocaine with 0.25% Marcaine with epinephrine    Indication for the procedure:  This is a 80 year old male  patient referred by Jovanni Sen with a Right calf skin lesion.       After explaining the risks to include bleeding, infection, recurrence or need for reexcision, and scarring the patientwished to proceed.    Procedure:   The area was prepped and draped in usual sterile fashion with ChloraPrep.   After, adequate local anesthesia, an 3 cm X 1 cm elliptical skin incision was made to encompass the 0.8 cm lesion with margins.  The deep tissues were approximated with 3-0 vicryl. The skin was closed with 4-0 Monocryl and Dermabond.      Plan:  The patient will be called to review the pathology. Patient will follow up if there any problems with the wound including redness or drainage.      Momo Armenta MD....................  3/2/2021   10:28 AM

## 2021-04-22 ENCOUNTER — OFFICE VISIT (OUTPATIENT)
Dept: PEDIATRICS | Facility: OTHER | Age: 81
End: 2021-04-22
Attending: INTERNAL MEDICINE
Payer: MEDICARE

## 2021-04-22 VITALS
BODY MASS INDEX: 27.99 KG/M2 | DIASTOLIC BLOOD PRESSURE: 80 MMHG | HEIGHT: 67 IN | RESPIRATION RATE: 16 BRPM | SYSTOLIC BLOOD PRESSURE: 122 MMHG | WEIGHT: 178.3 LBS | HEART RATE: 72 BPM | OXYGEN SATURATION: 95 % | TEMPERATURE: 98.8 F

## 2021-04-22 DIAGNOSIS — J38.7 MASS OF EPIGLOTTIS: ICD-10-CM

## 2021-04-22 DIAGNOSIS — R13.12 OROPHARYNGEAL DYSPHAGIA: Primary | ICD-10-CM

## 2021-04-22 PROCEDURE — G0463 HOSPITAL OUTPT CLINIC VISIT: HCPCS

## 2021-04-22 PROCEDURE — 99213 OFFICE O/P EST LOW 20 MIN: CPT | Performed by: INTERNAL MEDICINE

## 2021-04-22 ASSESSMENT — MIFFLIN-ST. JEOR: SCORE: 1481.26

## 2021-04-22 ASSESSMENT — PAIN SCALES - GENERAL: PAINLEVEL: NO PAIN (0)

## 2021-04-22 NOTE — PROGRESS NOTES
"Subjective   Rajesh Huynh is a 80 year old male who presents for swallowing troubles.  Over the last 2 weeks he is developed some hoarseness.  He thinks he may have some postnasal drainage.  He lost his smell but it has been chronic over many years.  He has some issues swallowing over the course of the last several days.  He almost was not able to swallow his pills, normally he would just throw them all down and swallow them together.  No sneezing or itchy eyes or ears.  No rhinorrhea.  No heartburn.  No fever.  No body aches.  No known sick contacts.  No loss of taste.  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    Objective   Vitals: /80 (BP Location: Right arm, Patient Position: Sitting, Cuff Size: Adult Regular)   Pulse 72   Temp 98.8  F (37.1  C) (Tympanic)   Resp 16   Ht 1.7 m (5' 6.93\")   Wt 80.9 kg (178 lb 4.8 oz)   SpO2 95%   BMI 27.99 kg/m      HEENT: Small amount of clear fluid behind the right tympanic membrane, no erythema or pus.  Left tympanic membrane normal.  Posterior oropharynx appears symmetric without masses or erythema.  No thrush.  Neck: No palpable lymphadenopathy or masses.    Review and Analysis of Data   I personally reviewed the following:  External notes: No  Results: No  Use of an independent historian: No  Independent review of a test performed by another physician: No  Discussion of management with another physician: No  Moderate risk of morbidity from additional diagnostic testing and/or treatment.    Assessment & Plan   1. Oropharyngeal dysphagia  2. Hx of mass of epiglottis s/p surgery  Differential diagnosis includes recurrence of oral cancer, globus sensation from either heartburn or sinusitis, adverse medication reaction from Trelegy Ellipta, extrinsic compression from something such as lymphoma, others.  Warning signs discussed and prompt repeat evaluation recommended if symptoms worsen, would request CT scan at that " time.    - OTOLARYNGOLOGY REFERRAL    Signed, Jarad Lawrence MD, FAAP, FACP  Internal Medicine & Pediatrics

## 2021-04-22 NOTE — NURSING NOTE
"Patient presents to the clinic today for issues swallowing and sore throat since this morning.  Patient states that his throat feels better now.  Patient also complains of a cough.  Cortney Marroquin LPN 4/22/2021   2:39 PM    Chief Complaint   Patient presents with     Throat Problem       Initial /80 (BP Location: Right arm, Patient Position: Sitting, Cuff Size: Adult Regular)   Pulse 72   Temp 98.8  F (37.1  C) (Tympanic)   Resp 16   Ht 1.7 m (5' 6.93\")   Wt 80.9 kg (178 lb 4.8 oz)   SpO2 95%   BMI 27.99 kg/m   Estimated body mass index is 27.99 kg/m  as calculated from the following:    Height as of this encounter: 1.7 m (5' 6.93\").    Weight as of this encounter: 80.9 kg (178 lb 4.8 oz).  Medication Reconciliation: complete  Cortney Marroquin LPN    "

## 2021-05-04 ENCOUNTER — OFFICE VISIT (OUTPATIENT)
Dept: OTOLARYNGOLOGY | Facility: OTHER | Age: 81
End: 2021-05-04
Attending: FAMILY MEDICINE
Payer: MEDICARE

## 2021-05-04 DIAGNOSIS — R49.0 HOARSENESS: Primary | ICD-10-CM

## 2021-05-04 PROCEDURE — G0463 HOSPITAL OUTPT CLINIC VISIT: HCPCS

## 2021-05-06 NOTE — PROGRESS NOTES
document embedded image  Patient Name: Rajesh Huynh    Address: [address] [place] [address]     YOB: 1940    [place] [address]    MR Number: [id number]    Phone: [phone]  PCP: Jovanni Sen MD            Appointment Date: 05/04/21   Visit Provider: Gordon Edwards MD    cc: Jovanni Sen MD; ~    ENT Progress Note  Visit Reasons: Voice Hoarseness/HX of throat lesion/DR in cities    HPI  History of Present Illness  Chief complaint:  Hoarseness    History  The patient is a 80-year-old man who was recently started on a a inhaler for his lungs and shortly afterward developed pain in his larynx and hoarseness.  Arrangements were made for him to visit with me today for inspection of his larynx.  He has since discontinued inhaler in his symptoms have improved dramatically.  He kept the appointment today because he has a history of previous supraglottic laryngeal cancer.  He was treated with transoral robotic resection of his epiglottis and neck dissection on the right.  He did undergo postoperative radiation therapy as well.  He has over 10 years from diagnosis.    Exam  Oral cavity oropharynx-free of lesions or inflammation  Indirect laryngoscopy-he gives exam.  I can see the entire length of his vocal cords.  His epiglottis is surgically absent.  There is no mucosal lesion to suggest neoplasm.  There is no neurologic deficit of his nose.  Nasal-no obstruction or purulence  Neck-no masses or adenopathy  Head and neck integument-Clear  General-the patient appears well and in no distress  Neuro-there are no focal cranial nerve deficits    Home Medications     - Last Reconciled 05/05/21 by Dayna Priest Med Assist    No Home Medications        Allergies    No Known Allergies Allergy (Unverified 05/05/21 10:23)    PFSH  PFSH:     Medical History (Updated 05/05/21 @ 10:24 by Dayna Priest Med Assist)    Cancer  Diabetes  Diagnosis unknown  No eCW History  Dizziness  Hearing loss  Heart  trouble  Neurological disorder     Family History (Updated 05/05/21 @ 10:24 by Dayna Priest, Med Assist)  Other  Cancer       Social History (Updated 05/05/21 @ 10:24 by Dayna Priest, Med Assist)  Smoking Status:  Former smoker  second hand exposure:  Yes       A&P  Assessment & Plan  (1) Hoarseness:        Status: Acute        Code(s):  R49.0 - Dysphonia  Additional Plan Details  Plan Details: The patient's hoarseness has resolved and it appears that was secondary to irritation from his inhaler.  He was reassured there is no evidence of malignancy in his hypopharynx or larynx.  He should follow up with his primary doctor regarding continued management of his lung condition.      Gordon Edwards MD    05/04/21 0772    <Electronically signed by Gordon Edwards MD> 05/05/21 [id number]

## 2021-05-07 ENCOUNTER — ALLIED HEALTH/NURSE VISIT (OUTPATIENT)
Dept: FAMILY MEDICINE | Facility: OTHER | Age: 81
End: 2021-05-07
Attending: INTERNAL MEDICINE
Payer: MEDICARE

## 2021-05-07 DIAGNOSIS — Z20.822 ENCOUNTER FOR LABORATORY TESTING FOR COVID-19 VIRUS: Primary | ICD-10-CM

## 2021-05-07 LAB
SARS-COV-2 RNA RESP QL NAA+PROBE: NORMAL
SPECIMEN SOURCE: NORMAL

## 2021-05-07 PROCEDURE — U0005 INFEC AGEN DETEC AMPLI PROBE: HCPCS | Mod: ZL | Performed by: INTERNAL MEDICINE

## 2021-05-07 PROCEDURE — C9803 HOPD COVID-19 SPEC COLLECT: HCPCS

## 2021-05-07 PROCEDURE — U0003 INFECTIOUS AGENT DETECTION BY NUCLEIC ACID (DNA OR RNA); SEVERE ACUTE RESPIRATORY SYNDROME CORONAVIRUS 2 (SARS-COV-2) (CORONAVIRUS DISEASE [COVID-19]), AMPLIFIED PROBE TECHNIQUE, MAKING USE OF HIGH THROUGHPUT TECHNOLOGIES AS DESCRIBED BY CMS-2020-01-R: HCPCS | Mod: ZL | Performed by: INTERNAL MEDICINE

## 2021-05-08 LAB
LABORATORY COMMENT REPORT: NORMAL
SARS-COV-2 RNA RESP QL NAA+PROBE: NEGATIVE
SPECIMEN SOURCE: NORMAL

## 2021-05-11 ENCOUNTER — THERAPY VISIT (OUTPATIENT)
Dept: SLEEP MEDICINE | Facility: OTHER | Age: 81
End: 2021-05-11
Attending: INTERNAL MEDICINE
Payer: MEDICARE

## 2021-05-11 DIAGNOSIS — G47.52 REM SLEEP BEHAVIOR DISORDER: Primary | ICD-10-CM

## 2021-05-11 PROCEDURE — 95810 POLYSOM 6/> YRS 4/> PARAM: CPT

## 2021-06-01 ENCOUNTER — OFFICE VISIT (OUTPATIENT)
Dept: INTERNAL MEDICINE | Facility: OTHER | Age: 81
End: 2021-06-01
Attending: INTERNAL MEDICINE
Payer: MEDICARE

## 2021-06-01 VITALS
HEART RATE: 60 BPM | SYSTOLIC BLOOD PRESSURE: 132 MMHG | RESPIRATION RATE: 16 BRPM | WEIGHT: 173.4 LBS | OXYGEN SATURATION: 99 % | TEMPERATURE: 96.9 F | BODY MASS INDEX: 27.22 KG/M2 | DIASTOLIC BLOOD PRESSURE: 78 MMHG

## 2021-06-01 DIAGNOSIS — I25.2 HISTORY OF ST ELEVATION MYOCARDIAL INFARCTION (STEMI): ICD-10-CM

## 2021-06-01 DIAGNOSIS — I10 BENIGN ESSENTIAL HYPERTENSION: ICD-10-CM

## 2021-06-01 DIAGNOSIS — R39.12 WEAK URINARY STREAM: ICD-10-CM

## 2021-06-01 DIAGNOSIS — E78.2 MIXED HYPERLIPIDEMIA: ICD-10-CM

## 2021-06-01 DIAGNOSIS — L57.0 MULTIPLE ACTINIC KERATOSES: ICD-10-CM

## 2021-06-01 DIAGNOSIS — E11.9 CONTROLLED TYPE 2 DIABETES MELLITUS WITHOUT COMPLICATION, WITHOUT LONG-TERM CURRENT USE OF INSULIN (H): Primary | ICD-10-CM

## 2021-06-01 DIAGNOSIS — I25.10 ATHEROSCLEROSIS OF NATIVE CORONARY ARTERY OF NATIVE HEART WITHOUT ANGINA PECTORIS: ICD-10-CM

## 2021-06-01 DIAGNOSIS — J41.0 SIMPLE CHRONIC BRONCHITIS (H): ICD-10-CM

## 2021-06-01 DIAGNOSIS — K86.81 EXOCRINE PANCREATIC INSUFFICIENCY: ICD-10-CM

## 2021-06-01 DIAGNOSIS — E11.9 CONTROLLED TYPE 2 DIABETES MELLITUS WITHOUT COMPLICATION, WITHOUT LONG-TERM CURRENT USE OF INSULIN (H): ICD-10-CM

## 2021-06-01 LAB
ALBUMIN SERPL-MCNC: 4.3 G/DL (ref 3.5–5.7)
ALBUMIN UR-MCNC: NEGATIVE MG/DL
ALP SERPL-CCNC: 63 U/L (ref 34–104)
ALT SERPL W P-5'-P-CCNC: 22 U/L (ref 7–52)
ANION GAP SERPL CALCULATED.3IONS-SCNC: 6 MMOL/L (ref 3–14)
APPEARANCE UR: CLEAR
AST SERPL W P-5'-P-CCNC: 19 U/L (ref 13–39)
BILIRUB SERPL-MCNC: 0.5 MG/DL (ref 0.3–1)
BILIRUB UR QL STRIP: NEGATIVE
BUN SERPL-MCNC: 13 MG/DL (ref 7–25)
CALCIUM SERPL-MCNC: 9.3 MG/DL (ref 8.6–10.3)
CHLORIDE SERPL-SCNC: 94 MMOL/L (ref 98–107)
CHOLEST SERPL-MCNC: 108 MG/DL
CO2 SERPL-SCNC: 29 MMOL/L (ref 21–31)
COLOR UR AUTO: YELLOW
CREAT SERPL-MCNC: 0.84 MG/DL (ref 0.7–1.3)
CREAT UR-MCNC: 60 MG/DL
ERYTHROCYTE [DISTWIDTH] IN BLOOD BY AUTOMATED COUNT: 13.5 % (ref 10–15)
GFR SERPL CREATININE-BSD FRML MDRD: 88 ML/MIN/{1.73_M2}
GLUCOSE SERPL-MCNC: 133 MG/DL (ref 70–105)
GLUCOSE UR STRIP-MCNC: NEGATIVE MG/DL
HBA1C MFR BLD: 7.2 % (ref 4–6)
HCT VFR BLD AUTO: 40 % (ref 40–53)
HDLC SERPL-MCNC: 40 MG/DL (ref 23–92)
HGB BLD-MCNC: 13.6 G/DL (ref 13.3–17.7)
HGB UR QL STRIP: NEGATIVE
KETONES UR STRIP-MCNC: NEGATIVE MG/DL
LDLC SERPL CALC-MCNC: 50 MG/DL
LEUKOCYTE ESTERASE UR QL STRIP: NEGATIVE
MCH RBC QN AUTO: 31.7 PG (ref 26.5–33)
MCHC RBC AUTO-ENTMCNC: 34 G/DL (ref 31.5–36.5)
MCV RBC AUTO: 93 FL (ref 78–100)
MICROALBUMIN UR-MCNC: 5 MG/L
MICROALBUMIN/CREAT UR: 9.13 MG/G CR (ref 0–17)
NITRATE UR QL: NEGATIVE
NONHDLC SERPL-MCNC: 68 MG/DL
PH UR STRIP: 7.5 PH (ref 5–9)
PLATELET # BLD AUTO: 425 10E9/L (ref 150–450)
POTASSIUM SERPL-SCNC: 4.2 MMOL/L (ref 3.5–5.1)
PROT SERPL-MCNC: 6.9 G/DL (ref 6.4–8.9)
RBC # BLD AUTO: 4.29 10E12/L (ref 4.4–5.9)
SODIUM SERPL-SCNC: 129 MMOL/L (ref 134–144)
SOURCE: NORMAL
SP GR UR STRIP: 1.01 (ref 1–1.03)
TRIGL SERPL-MCNC: 91 MG/DL
TSH SERPL DL<=0.05 MIU/L-ACNC: 3.95 IU/ML (ref 0.34–5.6)
UROBILINOGEN UR STRIP-ACNC: 0.2 EU/DL (ref 0.2–1)
WBC # BLD AUTO: 8.3 10E9/L (ref 4–11)

## 2021-06-01 PROCEDURE — 82043 UR ALBUMIN QUANTITATIVE: CPT | Mod: ZL | Performed by: INTERNAL MEDICINE

## 2021-06-01 PROCEDURE — 99214 OFFICE O/P EST MOD 30 MIN: CPT | Mod: 25 | Performed by: INTERNAL MEDICINE

## 2021-06-01 PROCEDURE — 84443 ASSAY THYROID STIM HORMONE: CPT | Mod: ZL | Performed by: INTERNAL MEDICINE

## 2021-06-01 PROCEDURE — 85027 COMPLETE CBC AUTOMATED: CPT | Mod: ZL | Performed by: INTERNAL MEDICINE

## 2021-06-01 PROCEDURE — 80061 LIPID PANEL: CPT | Mod: ZL | Performed by: INTERNAL MEDICINE

## 2021-06-01 PROCEDURE — G0463 HOSPITAL OUTPT CLINIC VISIT: HCPCS

## 2021-06-01 PROCEDURE — 17003 DESTRUCT PREMALG LES 2-14: CPT | Performed by: INTERNAL MEDICINE

## 2021-06-01 PROCEDURE — 36415 COLL VENOUS BLD VENIPUNCTURE: CPT | Mod: ZL | Performed by: INTERNAL MEDICINE

## 2021-06-01 PROCEDURE — 17000 DESTRUCT PREMALG LESION: CPT | Performed by: INTERNAL MEDICINE

## 2021-06-01 PROCEDURE — 83036 HEMOGLOBIN GLYCOSYLATED A1C: CPT | Mod: ZL | Performed by: INTERNAL MEDICINE

## 2021-06-01 PROCEDURE — 81003 URINALYSIS AUTO W/O SCOPE: CPT | Mod: ZL | Performed by: INTERNAL MEDICINE

## 2021-06-01 PROCEDURE — 80053 COMPREHEN METABOLIC PANEL: CPT | Mod: ZL | Performed by: INTERNAL MEDICINE

## 2021-06-01 PROCEDURE — G0463 HOSPITAL OUTPT CLINIC VISIT: HCPCS | Mod: 25

## 2021-06-01 RX ORDER — TIOTROPIUM BROMIDE 18 UG/1
18 CAPSULE ORAL; RESPIRATORY (INHALATION) DAILY
Qty: 30 CAPSULE | Refills: 4 | Status: SHIPPED | OUTPATIENT
Start: 2021-06-01 | End: 2021-08-19

## 2021-06-01 RX ORDER — PRIMIDONE 50 MG/1
100 TABLET ORAL AT BEDTIME
Qty: 180 TABLET | Refills: 3 | Status: SHIPPED | OUTPATIENT
Start: 2021-06-01 | End: 2022-01-01

## 2021-06-01 RX ORDER — ALBUTEROL SULFATE 90 UG/1
2 AEROSOL, METERED RESPIRATORY (INHALATION) EVERY 4 HOURS PRN
Qty: 18 G | Refills: 1 | Status: SHIPPED | OUTPATIENT
Start: 2021-06-01 | End: 2022-01-01

## 2021-06-01 RX ORDER — PROPRANOLOL HYDROCHLORIDE 20 MG/1
20 TABLET ORAL 3 TIMES DAILY
Qty: 270 TABLET | Refills: 3 | Status: SHIPPED | OUTPATIENT
Start: 2021-06-01 | End: 2022-01-01

## 2021-06-01 RX ORDER — TAMSULOSIN HYDROCHLORIDE 0.4 MG/1
0.4 CAPSULE ORAL EVERY EVENING
Qty: 90 CAPSULE | Refills: 3 | Status: SHIPPED | OUTPATIENT
Start: 2021-06-01 | End: 2022-01-01

## 2021-06-01 RX ORDER — ATORVASTATIN CALCIUM 40 MG/1
40 TABLET, FILM COATED ORAL
Qty: 90 TABLET | Refills: 3 | Status: SHIPPED | OUTPATIENT
Start: 2021-06-01 | End: 2022-01-01

## 2021-06-01 ASSESSMENT — ENCOUNTER SYMPTOMS
NAUSEA: 0
CONFUSION: 1
ABDOMINAL PAIN: 1
VOMITING: 0
FATIGUE: 1
DYSURIA: 0
ARTHRALGIAS: 0
BRUISES/BLEEDS EASILY: 0
TREMORS: 1
COUGH: 1
LIGHT-HEADEDNESS: 1
WOUND: 0
WHEEZING: 0
AGITATION: 0
DIARRHEA: 1
FEVER: 0
NERVOUS/ANXIOUS: 1
SHORTNESS OF BREATH: 1
MYALGIAS: 0
DIZZINESS: 1
PALPITATIONS: 0
CHILLS: 0
HEMATURIA: 0

## 2021-06-01 ASSESSMENT — PAIN SCALES - GENERAL: PAINLEVEL: NO PAIN (0)

## 2021-06-01 NOTE — PATIENT INSTRUCTIONS
7 skin lesions treated by Dr. Sen today.     Blood pressure is well controlled.   Diabetes has been well controlled.   Medications refilled.   Labs are pending.       Start trial of Spiriva inhaler... vs similar, for the chronic phlegm.       Aspects of Diabetes:   Recent Labs   Lab Test 02/23/21  1200 11/11/20  0811 08/05/20  0836   A1C 7.1* 7.1* 6.9*   LDL 47 52 46   HDL 38 39 37   TRIG 94 68 80   ALT 19 19 17   CR 0.76 0.77 0.94   GFRESTIMATED >90 >90 77   GFRESTBLACK >90 >90 >90   POTASSIUM 4.6 4.8 4.7      Hemoglobin A1c  Goal range is under 8%. Best is 6.5 to 7   Blood Pressure 132/78 Goal to keep less than 140/90   Tobacco  reports that he quit smoking about 21 years ago. His smoking use included cigarettes. He has a 129.00 pack-year smoking history. He has never used smokeless tobacco. Goal to abstain from tobacco   Aspirin or Plavix Anti-platelet therapy Aspirin or Plavix reduces risk of heart disease and stroke  -- sometimes used with other blood thinners, depending on bleeding risk and risk factors.    ACE/ARB Specific blood pressure meds These medications can reduce risk of kidney disease   Cholesterol Statins (Lipitor, Crestor, vs others) Statins reduce risk of heart disease and stroke   Eye Exam -- Do Yearly -- Annual diabetic eye exam   Healthy weight Wt Readings from Last 3 Encounters:   06/01/21 78.7 kg (173 lb 6.4 oz)   04/22/21 80.9 kg (178 lb 4.8 oz)   03/02/21 79.8 kg (176 lb)     Body mass index is 27.22 kg/m .  Goal BMI under 30, best is under 25.      -- Trying to exercise daily (goal at least 20 min/day) with moderate aerobic activity   -- Eat healthy (resources from ADA at http://www.diabetes.org/)   -- Taking good care of my feet. Consider seeing the Podiatrist   -- Check blood sugars as directed, record in log book and bring to every appointment    [employer]urance [employer] are grading you and I on your blood sugar control -- Goal is to get your A1c down to 7.9% or lower and NO Smoking!  --  Medicare and most insurance companies, will only cover Hemoglobin A1c labs to be rechecked every 91+ days.      Return for Diabetes labs and clinic follow-up appointment every 3 to 4 months.    Schedule lab only appointment --- A few days AFTER: 8/30/21   Schedule clinic appointment with Dr. Sen -- Same day as labs, or 1-2 days later.

## 2021-06-01 NOTE — PROGRESS NOTES
Mr. Huynh is a 81 year old male who presents to the clinic for evaluation of the following problems:      ICD-10-CM    1. Controlled type 2 diabetes mellitus without complication, without long-term current use of insulin (H)  E11.9 metFORMIN (GLUCOPHAGE) 1000 MG tablet     Albumin Random Urine Quantitative with Creat Ratio     *UA reflex to Microscopic     CANCELED: TSH Reflex GH     CANCELED: Hemoglobin A1c     CANCELED: UA reflex to Microscopic   2. Benign essential hypertension  I10 CANCELED: Comprehensive metabolic panel     CANCELED: CBC with platelets   3. Atherosclerosis of native coronary artery of native heart without angina pectoris  I25.10    4. Exocrine pancreatic insufficiency - Severe Deficiency  K86.81 amylase-lipase-protease (CREON) 38427-55958 units CPEP per EC capsule   5. Mixed hyperlipidemia  E78.2 atorvastatin (LIPITOR) 40 MG tablet     CANCELED: Lipid Profile   6. Simple chronic bronchitis (H)  J41.0 albuterol (PROAIR HFA/PROVENTIL HFA/VENTOLIN HFA) 108 (90 Base) MCG/ACT inhaler     tiotropium (SPIRIVA) 18 MCG inhaled capsule   7. Multiple actinic keratoses  L57.0 DESTRUCT PREMALIGNANT LESION, FIRST     DESTRUCT PREMALIGNANT LESION, 2-14   8. History of ST elevation myocardial infarction (STEMI)  I25.2 primidone (MYSOLINE) 50 MG tablet     propranolol (INDERAL) 20 MG tablet   9. Weak urinary stream  R39.12 tamsulosin (FLOMAX) 0.4 MG capsule     HPI  Patient comes in with his wife for follow-up of multiple issues.    Type 2 diabetes, unfortunately he forgot to get his labs drawn this morning even though he came into the outpatient labs.  We will get these after his appointment.  Otherwise seem to be doing well with current dosing.  Continues on Metformin.  Needs refills.    Hypertension, blood pressure is currently well controlled.  Doing well with current medication regimen.  Denies syncope or presyncope.  No changes for now.  Check labs.    Coronary disease, he denies exertional angina.   Continue aspirin, Lipitor.    Severe exocrine pancreatic insufficiency.  We may need to make some changes to his Creon.  Previous start of Creon was significantly helpful but he has been having some breakthrough diarrhea symptoms.  Advised that we may need to increase the dose slightly or even possibly increase an extra capsule daily.  They will do some adjustment at home and see if anything improves symptoms otherwise we will need to make some medication dose changes.    Mixed hyperlipidemia, continues on Lipitor, doing well with current dosing.  Needs refills.  Check labs.    Simple chronic bronchitis, got sore throat from the Trelegy inhaler likely due to the inhaled steroid.  We will switch over to tiotropium to help dry up some of his chronic phlegm and mucus.  Wife states that he has a chronic wet cough with quite a bit of phlegm production.  Still using albuterol inhaler intermittently and needs a refill.    Multiple skin lesions, actinic keratosis noted x7 on his ears and head.  Treatment options reviewed and discussed, he would like to proceed with cryotherapy.  See below.    History of myocardial infarction.  Still on propanolol.  Needs refills.    Weak urinary stream, continues with Flomax.  Seems to be doing well and has improved symptoms some.  Refill sent to pharmacy.    Functional Capacity: about 4 METS.   Review of Systems   Constitutional: Positive for fatigue. Negative for chills and fever.   HENT: Negative for congestion and hearing loss.    Eyes: Negative for visual disturbance.   Respiratory: Positive for cough (some purulent phlegm in AM, otherwise clear throughout the day) and shortness of breath. Negative for wheezing.    Cardiovascular: Negative for chest pain and palpitations.   Gastrointestinal: Positive for abdominal pain (+intermittent) and diarrhea (+ much improved with Creon. still intermittent diarrhea). Negative for nausea and vomiting.   Endocrine: Negative for cold intolerance and  "heat intolerance.   Genitourinary: Negative for dysuria and hematuria.   Musculoskeletal: Negative for arthralgias and myalgias.   Skin: Negative for rash and wound.        Many actinic keratosis on head/scalp   Allergic/Immunologic: Negative for immunocompromised state.   Neurological: Positive for dizziness, tremors and light-headedness.        + some balance problems   Hematological: Does not bruise/bleed easily.   Psychiatric/Behavioral: Positive for confusion. Negative for agitation. The patient is nervous/anxious.         + Memory problems        Reviewed and updated as needed this visit by Provider   Tobacco  Allergies  Meds  Problems  Med Hx  Surg Hx  Fam Hx          EXAM:   Vitals:    06/01/21 0850   BP: 132/78   BP Location: Right arm   Patient Position: Sitting   Cuff Size: Adult Regular   Pulse: 60   Resp: 16   Temp: 96.9  F (36.1  C)   TempSrc: Tympanic   SpO2: 99%   Weight: 78.7 kg (173 lb 6.4 oz)       Current Pain Score: No Pain (0)     BP Readings from Last 3 Encounters:   06/01/21 132/78   04/22/21 122/80   03/02/21 122/62      Wt Readings from Last 3 Encounters:   06/01/21 78.7 kg (173 lb 6.4 oz)   04/22/21 80.9 kg (178 lb 4.8 oz)   03/02/21 79.8 kg (176 lb)      Estimated body mass index is 27.22 kg/m  as calculated from the following:    Height as of 4/22/21: 1.7 m (5' 6.93\").    Weight as of this encounter: 78.7 kg (173 lb 6.4 oz).     Physical Exam  Constitutional:       General: He is not in acute distress.     Appearance: He is well-developed. He is not diaphoretic.   HENT:      Head: Normocephalic and atraumatic.   Eyes:      General: No scleral icterus.     Conjunctiva/sclera: Conjunctivae normal.   Neck:      Musculoskeletal: Neck supple.      Vascular: No carotid bruit.   Cardiovascular:      Rate and Rhythm: Normal rate and regular rhythm.      Pulses: Normal pulses.   Pulmonary:      Effort: Pulmonary effort is normal.      Breath sounds: Normal breath sounds.   Abdominal:      " Palpations: Abdomen is soft.      Tenderness: There is no abdominal tenderness.   Musculoskeletal:         General: No deformity.      Right lower leg: No edema.      Left lower leg: No edema.   Lymphadenopathy:      Cervical: No cervical adenopathy.   Skin:     General: Skin is warm and dry.      Findings: Lesion present. No rash.      Comments: 4 actinic keratosis on right face and 2 actinic keratosis on right ear and 1 actinic keratosis on left ear    PROCEDURE:   Reviewed risks and benefits of cryotherapy.After informed consent was obtained, patient elected to proceed. These 7 lesions were treated today.   Performed cryotherapy to #7 lesions x 2 rounds of 30 second freeze/thaw cycles.   Tolerated well. No obvious complications.   Return for signs of infection.    The patient and I reviewed safe sun practices today: the importance of staying out of the sun;especially between 10AM-2PM, wearing sun screen SPF > 30, and protective clothing.   We also discussed the ABC's of skin cancers including: changes in size, uniformity, borders, coloration, bleeding, or any irregularityor changes. If these occur, seek medical attention.   The patient should have a complete skin exam by a medical professional every 6-12 months, and any questionable/suspicious, or changing lesions should be removed.     Neurological:      Mental Status: He is alert and oriented to person, place, and time. Mental status is at baseline.   Psychiatric:         Mood and Affect: Mood normal.         Behavior: Behavior normal.          Procedures   INVESTIGATIONS:  - Labs reviewed in Epic   Results for orders placed or performed in visit on 06/01/21   *UA reflex to Microscopic     Status: None   Result Value Ref Range    Color Urine Yellow     Appearance Urine Clear     Glucose Urine Negative NEG^Negative mg/dL    Bilirubin Urine Negative NEG^Negative    Ketones Urine Negative NEG^Negative mg/dL    Specific Gravity Urine 1.015 1.000 - 1.030    Blood  Urine Negative NEG^Negative    pH Urine 7.5 5.0 - 9.0 pH    Protein Albumin Urine Negative NEG^Negative mg/dL    Urobilinogen Urine 0.2 0.2 - 1.0 EU/dL    Nitrite Urine Negative NEG^Negative    Leukocyte Esterase Urine Negative NEG^Negative    Source Midstream Urine    Results for orders placed or performed in visit on 06/01/21   TSH Reflex GH     Status: None   Result Value Ref Range    TSH Reflex 3.95 0.34 - 5.60 IU/mL   Lipid Profile     Status: None   Result Value Ref Range    Cholesterol 108 <200 mg/dL    Triglycerides 91 <150 mg/dL    HDL Cholesterol 40 23 - 92 mg/dL    LDL Cholesterol Calculated 50 <100 mg/dL    Non HDL Cholesterol 68 <130 mg/dL   Comprehensive metabolic panel     Status: Abnormal   Result Value Ref Range    Sodium 129 (L) 134 - 144 mmol/L    Potassium 4.2 3.5 - 5.1 mmol/L    Chloride 94 (L) 98 - 107 mmol/L    Carbon Dioxide 29 21 - 31 mmol/L    Anion Gap 6 3 - 14 mmol/L    Glucose 133 (H) 70 - 105 mg/dL    Urea Nitrogen 13 7 - 25 mg/dL    Creatinine 0.84 0.70 - 1.30 mg/dL    GFR Estimate 88 >60 mL/min/[1.73_m2]    GFR Estimate If Black >90 >60 mL/min/[1.73_m2]    Calcium 9.3 8.6 - 10.3 mg/dL    Bilirubin Total 0.5 0.3 - 1.0 mg/dL    Albumin 4.3 3.5 - 5.7 g/dL    Protein Total 6.9 6.4 - 8.9 g/dL    Alkaline Phosphatase 63 34 - 104 U/L    ALT 22 7 - 52 U/L    AST 19 13 - 39 U/L   CBC with platelets     Status: Abnormal   Result Value Ref Range    WBC 8.3 4.0 - 11.0 10e9/L    RBC Count 4.29 (L) 4.4 - 5.9 10e12/L    Hemoglobin 13.6 13.3 - 17.7 g/dL    Hematocrit 40.0 40.0 - 53.0 %    MCV 93 78 - 100 fl    MCH 31.7 26.5 - 33.0 pg    MCHC 34.0 31.5 - 36.5 g/dL    RDW 13.5 10.0 - 15.0 %    Platelet Count 425 150 - 450 10e9/L   Hemoglobin A1c     Status: Abnormal   Result Value Ref Range    Hemoglobin A1C 7.2 (H) 4.0 - 6.0 %      ASSESSMENT AND PLAN:  Problem List Items Addressed This Visit        Respiratory    Simple chronic bronchitis (H)    Relevant Medications    albuterol (PROAIR HFA/PROVENTIL  HFA/VENTOLIN HFA) 108 (90 Base) MCG/ACT inhaler    tiotropium (SPIRIVA) 18 MCG inhaled capsule       Digestive    Exocrine pancreatic insufficiency - Severe Deficiency    Relevant Medications    amylase-lipase-protease (CREON) 44534-71422 units CPEP per EC capsule       Endocrine    Mixed hyperlipidemia    Relevant Medications    atorvastatin (LIPITOR) 40 MG tablet    Controlled type 2 diabetes mellitus without complication, without long-term current use of insulin (H) - Primary    Relevant Medications    metFORMIN (GLUCOPHAGE) 1000 MG tablet    Other Relevant Orders    *UA reflex to Microscopic (Completed)       Circulatory    Benign essential hypertension    Atherosclerosis of native coronary artery of native heart without angina pectoris       Other    H/O STEMI 7/3/2013 involving the RCA    Relevant Medications    primidone (MYSOLINE) 50 MG tablet    propranolol (INDERAL) 20 MG tablet      Other Visit Diagnoses     Multiple actinic keratoses        Relevant Orders    DESTRUCT PREMALIGNANT LESION, FIRST (Completed)    DESTRUCT PREMALIGNANT LESION, 2-14 (Completed)    Weak urinary stream        Relevant Medications    tamsulosin (FLOMAX) 0.4 MG capsule        reviewed diet, exercise and weight control, recommended sodium restriction, cardiovascular risk and specific lipid/LDL goals reviewed, specific diabetic recommendations low cholesterol diet, weight control and daily exercise discussed, use of aspirin to prevent MI and TIA's discussed  -- Expected clinical course discussed    -- Medications and their side effects discussed    Patient Instructions     7 skin lesions treated by Dr. Sen today.     Blood pressure is well controlled.   Diabetes has been well controlled.   Medications refilled.   Labs are pending.       Start trial of Spiriva inhaler... vs similar, for the chronic phlegm.       Aspects of Diabetes:   Recent Labs   Lab Test 02/23/21  1200 11/11/20  0811 08/05/20  0836   A1C 7.1* 7.1* 6.9*   LDL  47 52 46   HDL 38 39 37   TRIG 94 68 80   ALT 19 19 17   CR 0.76 0.77 0.94   GFRESTIMATED >90 >90 77   GFRESTBLACK >90 >90 >90   POTASSIUM 4.6 4.8 4.7      Hemoglobin A1c  Goal range is under 8%. Best is 6.5 to 7   Blood Pressure 132/78 Goal to keep less than 140/90   Tobacco  reports that he quit smoking about 21 years ago. His smoking use included cigarettes. He has a 129.00 pack-year smoking history. He has never used smokeless tobacco. Goal to abstain from tobacco   Aspirin or Plavix Anti-platelet therapy Aspirin or Plavix reduces risk of heart disease and stroke  -- sometimes used with other blood thinners, depending on bleeding risk and risk factors.    ACE/ARB Specific blood pressure meds These medications can reduce risk of kidney disease   Cholesterol Statins (Lipitor, Crestor, vs others) Statins reduce risk of heart disease and stroke   Eye Exam -- Do Yearly -- Annual diabetic eye exam   Healthy weight Wt Readings from Last 3 Encounters:   06/01/21 78.7 kg (173 lb 6.4 oz)   04/22/21 80.9 kg (178 lb 4.8 oz)   03/02/21 79.8 kg (176 lb)     Body mass index is 27.22 kg/m .  Goal BMI under 30, best is under 25.      -- Trying to exercise daily (goal at least 20 min/day) with moderate aerobic activity   -- Eat healthy (resources from ADA at http://www.diabetes.org/)   -- Taking good care of my feet. Consider seeing the Podiatrist   -- Check blood sugars as directed, record in log book and bring to every appointment    Insurance companies are grading you and I on your blood sugar control -- Goal is to get your A1c down to 7.9% or lower and NO Smoking!  -- Medicare and most insurance companies, will only cover Hemoglobin A1c labs to be rechecked every 91+ days.      Return for Diabetes labs and clinic follow-up appointment every 3 to 4 months.    Schedule lab only appointment --- A few days AFTER: 8/30/21   Schedule clinic appointment with Dr. Sen -- Same day as labs, or 1-2 days later.      Jovanni Sen,  MD  Internal Medicine  [facility]

## 2021-06-16 ENCOUNTER — TELEPHONE (OUTPATIENT)
Dept: INTERNAL MEDICINE | Facility: OTHER | Age: 81
End: 2021-06-16

## 2021-06-16 DIAGNOSIS — R12 HEARTBURN: ICD-10-CM

## 2021-06-16 NOTE — TELEPHONE ENCOUNTER
Douglas was put on Omeprazole 10 mg last visit and he feels it is not enough. Please send increased dose to [facility].................6/16/2021   3:22 PM

## 2021-08-16 DIAGNOSIS — K86.81 EXOCRINE PANCREATIC INSUFFICIENCY: ICD-10-CM

## 2021-08-18 ENCOUNTER — TELEPHONE (OUTPATIENT)
Dept: INTERNAL MEDICINE | Facility: OTHER | Age: 81
End: 2021-08-18

## 2021-08-18 DIAGNOSIS — J41.0 SIMPLE CHRONIC BRONCHITIS (H): ICD-10-CM

## 2021-08-18 RX ORDER — PANCRELIPASE 24000; 76000; 120000 [USP'U]/1; [USP'U]/1; [USP'U]/1
CAPSULE, DELAYED RELEASE PELLETS ORAL
Qty: 360 CAPSULE | Refills: 0 | OUTPATIENT
Start: 2021-08-18

## 2021-08-18 NOTE — TELEPHONE ENCOUNTER
Verified with [facility] that there are refills on file from 6/1/21.  No refills needed at this time.  Elinor Martinez RN......August 18, 2021...10:36 AM

## 2021-08-19 RX ORDER — TIOTROPIUM BROMIDE 18 UG/1
18 CAPSULE ORAL; RESPIRATORY (INHALATION) DAILY
Qty: 90 CAPSULE | Refills: 3 | Status: SHIPPED | OUTPATIENT
Start: 2021-08-19 | End: 2021-09-16

## 2021-08-19 NOTE — TELEPHONE ENCOUNTER
Spoke with patient wife and let her know that forms were in front and prescriptions were at [facility]. She verbalized understanding.  Dorothea Reardon LPN on 8/19/2021 at 4:14 PM

## 2021-08-19 NOTE — TELEPHONE ENCOUNTER
Spiriva prescription sent to pharmacy.    Jennifer for handicap parking permit.  Jennifer to start the form.     Electronically signed by:  Jovanni Sen MD  on August 19, 2021 12:04 PM

## 2021-08-19 NOTE — TELEPHONE ENCOUNTER
Left message on machine to call back. Handicap form is at [address] check in.  Dorothea Reardon LPN on 8/19/2021 at 3:57 PM

## 2021-08-19 NOTE — TELEPHONE ENCOUNTER
After proper verification writer spoke with Ruth and patient on speaker phone regarding CREON rx. Pt states [facility] would not fill. Writer then called Emma and spoke with Jeancarlos, who stated the RX was ready for . Pt also would like a refill of Spiriva and was wanting to get a handicap sticker. Please advise if you would like the paperwork started on that.  Dorothea Reardon LPN on 8/19/2021 at 11:07 AM

## 2021-08-19 NOTE — TELEPHONE ENCOUNTER
Permit started and in [place] in basket. Call when finished. Also let them know scripts are ready at [facility].  Dorothea Reardon LPN on 8/19/2021 at 2:20 PM

## 2021-09-16 ENCOUNTER — LAB (OUTPATIENT)
Dept: LAB | Facility: OTHER | Age: 81
End: 2021-09-16
Attending: INTERNAL MEDICINE
Payer: MEDICARE

## 2021-09-16 ENCOUNTER — OFFICE VISIT (OUTPATIENT)
Dept: INTERNAL MEDICINE | Facility: OTHER | Age: 81
End: 2021-09-16
Attending: INTERNAL MEDICINE
Payer: MEDICARE

## 2021-09-16 VITALS
RESPIRATION RATE: 16 BRPM | HEART RATE: 74 BPM | WEIGHT: 171.2 LBS | TEMPERATURE: 97.1 F | BODY MASS INDEX: 26.87 KG/M2 | OXYGEN SATURATION: 96 % | DIASTOLIC BLOOD PRESSURE: 72 MMHG | SYSTOLIC BLOOD PRESSURE: 130 MMHG

## 2021-09-16 DIAGNOSIS — F09 COGNITIVE DYSFUNCTION: ICD-10-CM

## 2021-09-16 DIAGNOSIS — F02.80 LEWY BODY DEMENTIA WITHOUT BEHAVIORAL DISTURBANCE (H): ICD-10-CM

## 2021-09-16 DIAGNOSIS — E11.9 CONTROLLED TYPE 2 DIABETES MELLITUS WITHOUT COMPLICATION, WITHOUT LONG-TERM CURRENT USE OF INSULIN (H): Primary | ICD-10-CM

## 2021-09-16 DIAGNOSIS — E78.2 MIXED HYPERLIPIDEMIA: ICD-10-CM

## 2021-09-16 DIAGNOSIS — K86.81 EXOCRINE PANCREATIC INSUFFICIENCY: ICD-10-CM

## 2021-09-16 DIAGNOSIS — I10 BENIGN ESSENTIAL HYPERTENSION: ICD-10-CM

## 2021-09-16 DIAGNOSIS — Z98.890 HISTORY OF PANCREATIC SURGERY: ICD-10-CM

## 2021-09-16 DIAGNOSIS — E11.9 CONTROLLED TYPE 2 DIABETES MELLITUS WITHOUT COMPLICATION, WITHOUT LONG-TERM CURRENT USE OF INSULIN (H): ICD-10-CM

## 2021-09-16 DIAGNOSIS — Z71.85 VACCINE COUNSELING: ICD-10-CM

## 2021-09-16 DIAGNOSIS — J41.0 SIMPLE CHRONIC BRONCHITIS (H): ICD-10-CM

## 2021-09-16 DIAGNOSIS — Z87.891 HISTORY OF TOBACCO USE: ICD-10-CM

## 2021-09-16 DIAGNOSIS — G31.83 LEWY BODY DEMENTIA WITHOUT BEHAVIORAL DISTURBANCE (H): ICD-10-CM

## 2021-09-16 DIAGNOSIS — R41.3 MEMORY PROBLEM: ICD-10-CM

## 2021-09-16 DIAGNOSIS — I25.10 ATHEROSCLEROSIS OF NATIVE CORONARY ARTERY OF NATIVE HEART WITHOUT ANGINA PECTORIS: ICD-10-CM

## 2021-09-16 LAB
ALBUMIN SERPL-MCNC: 4.2 G/DL (ref 3.5–5.7)
ALP SERPL-CCNC: 51 U/L (ref 34–104)
ALT SERPL W P-5'-P-CCNC: 17 U/L (ref 7–52)
ANION GAP SERPL CALCULATED.3IONS-SCNC: 7 MMOL/L (ref 3–14)
AST SERPL W P-5'-P-CCNC: 15 U/L (ref 13–39)
BILIRUB SERPL-MCNC: 0.5 MG/DL (ref 0.3–1)
BUN SERPL-MCNC: 13 MG/DL (ref 7–25)
CALCIUM SERPL-MCNC: 9.8 MG/DL (ref 8.6–10.3)
CHLORIDE BLD-SCNC: 94 MMOL/L (ref 98–107)
CHOLEST SERPL-MCNC: 103 MG/DL
CO2 SERPL-SCNC: 30 MMOL/L (ref 21–31)
CREAT SERPL-MCNC: 0.93 MG/DL (ref 0.7–1.3)
ERYTHROCYTE [DISTWIDTH] IN BLOOD BY AUTOMATED COUNT: 13.1 % (ref 10–15)
FASTING STATUS PATIENT QL REPORTED: YES
GFR SERPL CREATININE-BSD FRML MDRD: 77 ML/MIN/1.73M2
GLUCOSE BLD-MCNC: 145 MG/DL (ref 70–105)
HBA1C MFR BLD: 7.1 % (ref 4–6.2)
HCT VFR BLD AUTO: 40.3 % (ref 40–53)
HDLC SERPL-MCNC: 45 MG/DL (ref 23–92)
HGB BLD-MCNC: 13.4 G/DL (ref 13.3–17.7)
LDLC SERPL CALC-MCNC: 41 MG/DL
MCH RBC QN AUTO: 31.2 PG (ref 26.5–33)
MCHC RBC AUTO-ENTMCNC: 33.3 G/DL (ref 31.5–36.5)
MCV RBC AUTO: 94 FL (ref 78–100)
NONHDLC SERPL-MCNC: 58 MG/DL
PLATELET # BLD AUTO: 413 10E3/UL (ref 150–450)
POTASSIUM BLD-SCNC: 4.8 MMOL/L (ref 3.5–5.1)
PROT SERPL-MCNC: 6.7 G/DL (ref 6.4–8.9)
RBC # BLD AUTO: 4.3 10E6/UL (ref 4.4–5.9)
SODIUM SERPL-SCNC: 131 MMOL/L (ref 134–144)
T4 FREE SERPL-MCNC: 1.04 NG/DL (ref 0.6–1.6)
TRIGL SERPL-MCNC: 87 MG/DL
TSH SERPL DL<=0.005 MIU/L-ACNC: 5.29 MU/L (ref 0.4–4)
WBC # BLD AUTO: 8.9 10E3/UL (ref 4–11)

## 2021-09-16 PROCEDURE — 82465 ASSAY BLD/SERUM CHOLESTEROL: CPT | Mod: ZL

## 2021-09-16 PROCEDURE — 84443 ASSAY THYROID STIM HORMONE: CPT | Mod: ZL

## 2021-09-16 PROCEDURE — G0463 HOSPITAL OUTPT CLINIC VISIT: HCPCS

## 2021-09-16 PROCEDURE — 82040 ASSAY OF SERUM ALBUMIN: CPT | Mod: ZL

## 2021-09-16 PROCEDURE — 83036 HEMOGLOBIN GLYCOSYLATED A1C: CPT | Mod: ZL

## 2021-09-16 PROCEDURE — 85014 HEMATOCRIT: CPT | Mod: ZL

## 2021-09-16 PROCEDURE — 36415 COLL VENOUS BLD VENIPUNCTURE: CPT | Mod: ZL

## 2021-09-16 PROCEDURE — 84439 ASSAY OF FREE THYROXINE: CPT | Mod: ZL

## 2021-09-16 PROCEDURE — 99214 OFFICE O/P EST MOD 30 MIN: CPT | Performed by: INTERNAL MEDICINE

## 2021-09-16 RX ORDER — RIVASTIGMINE TARTRATE 3 MG/1
3 CAPSULE ORAL 2 TIMES DAILY
COMMUNITY
Start: 2021-07-14

## 2021-09-16 RX ORDER — TIOTROPIUM BROMIDE 18 UG/1
18 CAPSULE ORAL; RESPIRATORY (INHALATION) DAILY
Qty: 90 CAPSULE | Refills: 3 | Status: ON HOLD | OUTPATIENT
Start: 2021-09-16 | End: 2022-01-01

## 2021-09-16 ASSESSMENT — ENCOUNTER SYMPTOMS
WOUND: 0
FEVER: 0
NAUSEA: 0
ARTHRALGIAS: 0
CONFUSION: 1
NERVOUS/ANXIOUS: 1
DIZZINESS: 1
AGITATION: 0
HEMATURIA: 0
COUGH: 1
PALPITATIONS: 0
BRUISES/BLEEDS EASILY: 0
CHILLS: 0
MYALGIAS: 0
FATIGUE: 1
TREMORS: 1
LIGHT-HEADEDNESS: 1
DYSURIA: 0
ABDOMINAL PAIN: 1
DIARRHEA: 1
WHEEZING: 0
SHORTNESS OF BREATH: 1
VOMITING: 0

## 2021-09-16 ASSESSMENT — PAIN SCALES - GENERAL: PAINLEVEL: NO PAIN (0)

## 2021-09-16 NOTE — PROGRESS NOTES
Lab Results   Component Value Date    A1C 7.2 06/01/2021    A1C 7.1 02/23/2021    A1C 7.1 11/11/2020    ALBUMIN 4.2 09/16/2021    ALBUMIN 4.3 06/01/2021     Previous A1C is at goal of <8  Date of last Foot exam 6-1-21  Eye exam No  Patient is not a Tobacco User  Patient is on a daily aspirin  Patient is on a Statin.  Blood pressure today of:     BP Readings from Last 1 Encounters:   09/16/21 130/72      is at the goal of <139/89 for diabetics.    Ale Ham LPN on 9/16/2021 at 9:50 AM

## 2021-09-16 NOTE — PATIENT INSTRUCTIONS
Blood pressure is well controlled.     Diabetes has been well controlled.   - labs pending.     Medications refilled.   Other Labs are stable.       Immunization History   Administered Date(s) Administered     COVID-19,PF,Moderna 02/26/2021, 03/26/2021     Flu, Unspecified 10/15/2009     Influenza (High Dose) 3 valent vaccine 09/23/2014, 08/25/2016, 09/26/2017, 09/18/2018, 09/16/2019, 10/13/2020     Influenza (IIV3) PF 10/16/2008, 10/05/2011, 10/06/2012, 10/18/2013, 09/21/2015     Influenza, Quad, High Dose, Pf, 65yr+ (Fluzone HD) 10/13/2020     Pneumo Conj 13-V (2010&after) 10/09/2015     Pneumococcal 23 valent 06/10/2008     TD (ADULT, 7+) 01/19/2005     TDAP Vaccine (Adacel) 02/23/2012     Zoster vaccine recombinant adjuvanted (SHINGRIX) 10/20/2020, 12/22/2020     Zoster vaccine, live 06/16/2008      -- Booster shots for COVID-19 are coming.    -- Moderna and Pfizer booster shot   - If immunocompromised, can get 3rd shot (booster shot) 28+ days from date of 2nd shot.   - Otherwise - (Tentative) vaccine schedule is to get 3rd shot (booster shot) 8+ months from date of 2nd shot.     -- Consider Annual Flu / Influenza vaccination - Every Fall (Starting about October 1st)    -- Pneumonia / Pneumococcal PCV 23 vaccines are done / completed.       Aspects of Diabetes:   Recent Labs   Lab Test 09/16/21  0832 06/01/21  0937 02/23/21  1200 02/23/21  1200 11/11/20  0811 11/11/20  0811   A1C  --  7.2*  --  7.1*  --  7.1*   LDL 41 50  --  47   < > 52   HDL 45 40  --  38   < > 39   TRIG 87 91  --  94   < > 68   ALT 17 22  --  19   < > 19   CR 0.93 0.84   < > 0.76   < > 0.77   GFRESTIMATED 77 88   < > >90   < > >90   GFRESTBLACK  --  >90  --  >90   < > >90   POTASSIUM 4.8 4.2   < > 4.6   < > 4.8   TSH 5.29*  --   --   --   --   --     < > = values in this interval not displayed.      Hemoglobin A1c  Goal range is under 8%. Best is 6.5 to 7   Blood Pressure 130/72 Goal to keep less than 140/90   Tobacco  reports that he quit  smoking about 21 years ago. His smoking use included cigarettes. He has a 129.00 pack-year smoking history. He has never used smokeless tobacco. Goal to abstain from tobacco   Aspirin or Plavix Anti-platelet therapy Aspirin or Plavix reduces risk of heart disease and stroke  -- sometimes used with other blood thinners, depending on bleeding risk and risk factors.    ACE/ARB Specific blood pressure meds These medications can reduce risk of kidney disease   Cholesterol Statins (Lipitor, Crestor, vs others) Statins reduce risk of heart disease and stroke   Eye Exam -- Do Yearly -- Annual diabetic eye exam   Healthy weight Wt Readings from Last 3 Encounters:   09/16/21 77.7 kg (171 lb 3.2 oz)   06/01/21 78.7 kg (173 lb 6.4 oz)   04/22/21 80.9 kg (178 lb 4.8 oz)     Body mass index is 26.87 kg/m .  Goal BMI under 30, best is under 25.      -- Trying to exercise daily (goal at least 20 min/day) with moderate aerobic activity   -- Eat healthy (resources from ADA at http://www.diabetes.org/)   -- Taking good care of my feet. Consider seeing the Podiatrist   -- Check blood sugars as directed, record in log book and bring to every appointment    Insurance companies are grading you and I on your blood sugar control -- Goal is to get your A1c down to 7.9% or lower and NO Smoking!  -- Medicare and most insurance companies, will only cover Hemoglobin A1c labs to be rechecked every 91+ days.      Return for Diabetes labs and clinic follow-up appointment every 3 to 4 months.    Schedule lab only appointment --- A few days AFTER: 12/15/21   Schedule clinic appointment with Dr. Sen -- Same day as labs, or 1-2 days later.

## 2021-09-16 NOTE — NURSING NOTE
"Chief Complaint   Patient presents with     Follow Up     diabetes       FOOD SECURITY SCREENING QUESTIONS  Hunger Vital Signs:  Within the past 12 months we worried whether our food would run out before we got money to buy more. Never  Within the past 12 months the food we bought just didn't last and we didn't have money to get more. Never  Ale Ham LPN 9/16/2021 9:50 AM      Initial /72 (BP Location: Right arm, Patient Position: Sitting, Cuff Size: Adult Regular)   Pulse 74   Temp 97.1  F (36.2  C) (Tympanic)   Resp 16   Wt 77.7 kg (171 lb 3.2 oz)   SpO2 96%   BMI 26.87 kg/m   Estimated body mass index is 26.87 kg/m  as calculated from the following:    Height as of 4/22/21: 1.7 m (5' 6.93\").    Weight as of this encounter: 77.7 kg (171 lb 3.2 oz).  Medication Reconciliation: complete    Ale Ham LPN  "

## 2021-09-16 NOTE — PROGRESS NOTES
Mr. Huynh is a 81 year old male who presents to the clinic for evaluation of the following problems:      ICD-10-CM    1. Controlled type 2 diabetes mellitus without complication, without long-term current use of insulin (H)  E11.9    2. Benign essential hypertension  I10    3. Atherosclerosis of native coronary artery of native heart without angina pectoris  I25.10    4. Exocrine pancreatic insufficiency - Severe Deficiency  K86.81    5. History of pancreatic surgery - Whipple  Z98.890    6. Mixed hyperlipidemia  E78.2    7. History of tobacco use quitting in 1/1/2000 at 3 packs a day  Z87.891    8. Simple chronic bronchitis (H)  J41.0 tiotropium (SPIRIVA) 18 MCG inhaled capsule   9. Lewy body dementia without behavioral disturbance (H)  G31.83     F02.80    10. Vaccine counseling  Z71.89      HPI  Patient comes in with his wife for diabetes follow-up, as well as follow-up multiple issues.     Type 2 diabetes, currently well controlled. Doing well with current medication regimen. Denies medication side effects. Denies hypoglycemia. Continues with Metformin. No changes for now.    Hypertension, blood pressure is currently well controlled. Wife indicates that he has been having some rather low blood pressures intermittently. Yesterday morning he had systolic pressures in the 90s. He does take Flomax as well as propanolol. We discussed importance of hydrating. With his Lewy body dementia. He is getting more and more forgetful. Wife states that he needs prompting to eat and drink. He does typically eat 3 meals per day but does not go out of his way to have anything to eat or drink beyond those times. He also has a tendency to be uncertain about taking his medications. Advised that she work with him to make sure he is drinking enough liquids. He does indicate that his urine has been quite dark lately. Advised that he try drinking more.   If needed, we can consider trying to reduce his propranolol dose. He is not on any  other blood pressure medications besides Flomax for his BPH.  Labs are just checked today. Creatinine is stable. No changes for now. Continue to monitor.    Coronary artery disease, continues with aspirin, Lipitor. Propanolol due to his tremors. Doing well with current dosing. Denies exertional angina.     Exocrine pancreatic insufficiency. Severe. Has history of pancreatic surgery, Whipple surgery in the past. Diarrhea has improved with use of Creon. We had to increase the dose at his appointment recently. No changes for now.    Mixed hyperlipidemia, cholesterol levels have improved with use of Lipitor. LDL is at goal, less than 70. No changes for now.    History of tobacco abuse. Based on symptoms has simple chronic bronchitis. Has been having some increased phlegm lately. Needs refills of his Spiriva. Breathing is not at goal at this time. Got better. Met with neurology recently. They made some changes to his Exelon capsules. Wife believes that his memory loss is seeming to slow down.    Vaccine counseling completed. Influenza shot later this fall. #19 booster shot later this fall when available.    Functional Capacity: about 4 METS.   Review of Systems   Constitutional: Positive for fatigue. Negative for chills and fever.   HENT: Negative for congestion and hearing loss.    Eyes: Negative for visual disturbance.   Respiratory: Positive for cough (some purulent phlegm in AM, otherwise clear throughout the day) and shortness of breath. Negative for wheezing.    Cardiovascular: Negative for chest pain and palpitations.   Gastrointestinal: Positive for abdominal pain (+intermittent) and diarrhea (+ much improved with Creon. still intermittent diarrhea). Negative for nausea and vomiting.   Endocrine: Negative for cold intolerance and heat intolerance.   Genitourinary: Negative for dysuria and hematuria.   Musculoskeletal: Negative for arthralgias and myalgias.   Skin: Negative for rash and wound.  "  Allergic/Immunologic: Negative for immunocompromised state.   Neurological: Positive for dizziness, tremors and light-headedness.        + some balance problems   Hematological: Does not bruise/bleed easily.   Psychiatric/Behavioral: Positive for confusion. Negative for agitation. The patient is nervous/anxious.         + Memory problems      Reviewed and updated as needed this visit by Provider   Tobacco  Allergies  Meds  Problems  Med Hx  Surg Hx  Fam Hx          EXAM:   Vitals:    09/16/21 0948   BP: 130/72   BP Location: Right arm   Patient Position: Sitting   Cuff Size: Adult Regular   Pulse: 74   Resp: 16   Temp: 97.1  F (36.2  C)   TempSrc: Tympanic   SpO2: 96%   Weight: 77.7 kg (171 lb 3.2 oz)       Current Pain Score: No Pain (0)     BP Readings from Last 3 Encounters:   09/16/21 130/72   06/01/21 132/78   04/22/21 122/80      Wt Readings from Last 3 Encounters:   09/16/21 77.7 kg (171 lb 3.2 oz)   06/01/21 78.7 kg (173 lb 6.4 oz)   04/22/21 80.9 kg (178 lb 4.8 oz)      Estimated body mass index is 26.87 kg/m  as calculated from the following:    Height as of 4/22/21: 1.7 m (5' 6.93\").    Weight as of this encounter: 77.7 kg (171 lb 3.2 oz).     Physical Exam  Constitutional:       General: He is not in acute distress.     Appearance: He is well-developed. He is not diaphoretic.   HENT:      Head: Normocephalic and atraumatic.   Eyes:      General: No scleral icterus.     Conjunctiva/sclera: Conjunctivae normal.   Neck:      Vascular: No carotid bruit.   Cardiovascular:      Rate and Rhythm: Normal rate and regular rhythm.      Pulses: Normal pulses.   Pulmonary:      Effort: Pulmonary effort is normal.      Breath sounds: Normal breath sounds.   Abdominal:      Palpations: Abdomen is soft.      Tenderness: There is no abdominal tenderness.   Musculoskeletal:         General: No deformity.      Cervical back: Neck supple.      Right lower leg: No edema.      Left lower leg: No edema. "   Lymphadenopathy:      Cervical: No cervical adenopathy.   Skin:     General: Skin is warm and dry.      Findings: No lesion or rash.      Comments: Diabetic Foot Exam:  Findings:   LEFT: normal DP and PT pulses, no trophic changes or ulcerative lesions and normal sensory exam    RIGHT: normal DP and PT pulses, no trophic changes or ulcerative lesions and normal sensory exam    Neurological:      Mental Status: He is alert and oriented to person, place, and time. Mental status is at baseline.      Comments: + memory loss   Psychiatric:         Mood and Affect: Mood normal.         Behavior: Behavior normal.        Procedures   INVESTIGATIONS:  - Labs reviewed in [facility]   Recent Labs   Lab Test 09/16/21  0832 06/01/21  0937 02/23/21  1200 02/23/21  1200   A1C 7.1* 7.2*  --  7.1*   LDL 41 50  --  47   HDL 45 40  --  38   TRIG 87 91  --  94   ALT 17 22  --  19   CR 0.93 0.84   < > 0.76   GFRESTIMATED 77 88   < > >90   GFRESTBLACK  --  >90  --  >90   POTASSIUM 4.8 4.2   < > 4.6   TSH 5.29*  --   --   --     < > = values in this interval not displayed.        ASSESSMENT AND PLAN:  Problem List Items Addressed This Visit        Nervous and Auditory    Lewy body dementia without behavioral disturbance (H)    Relevant Medications    rivastigmine (EXELON) 3 MG capsule       Respiratory    Simple chronic bronchitis (H)    Relevant Medications    tiotropium (SPIRIVA) 18 MCG inhaled capsule       Digestive    Exocrine pancreatic insufficiency - Severe Deficiency       Endocrine    Mixed hyperlipidemia    Controlled type 2 diabetes mellitus without complication, without long-term current use of insulin (H) - Primary       Circulatory    Benign essential hypertension    Atherosclerosis of native coronary artery of native heart without angina pectoris       Behavioral    History of tobacco use quitting in 1/1/2000 at 3 packs a day       Other    History of pancreatic surgery - Whipple      Other Visit Diagnoses     Vaccine  counseling            reviewed diet, exercise and weight control, recommended sodium restriction, cardiovascular risk and specific lipid/LDL goals reviewed, specific diabetic recommendations low cholesterol diet, weight control and daily exercise discussed, use of aspirin to prevent MI and TIA's discussed  -- Expected clinical course discussed    -- Medications and their side effects discussed    Patient Instructions     Blood pressure is well controlled.     Diabetes has been well controlled.   - labs pending.     Medications refilled.   Other Labs are stable.       Immunization History   Administered Date(s) Administered     COVID-19,PF,Moderna 02/26/2021, 03/26/2021     Flu, Unspecified 10/15/2009     Influenza (High Dose) 3 valent vaccine 09/23/2014, 08/25/2016, 09/26/2017, 09/18/2018, 09/16/2019, 10/13/2020     Influenza (IIV3) PF 10/16/2008, 10/05/2011, 10/06/2012, 10/18/2013, 09/21/2015     Influenza, Quad, High Dose, Pf, 65yr+ (Fluzone HD) 10/13/2020     Pneumo Conj 13-V (2010&after) 10/09/2015     Pneumococcal 23 valent 06/10/2008     TD (ADULT, 7+) 01/19/2005     TDAP Vaccine (Adacel) 02/23/2012     Zoster vaccine recombinant adjuvanted (SHINGRIX) 10/20/2020, 12/22/2020     Zoster vaccine, live 06/16/2008      -- Booster shots for COVID-19 are coming.    -- Moderna and Pfizer booster shot   - If immunocompromised, can get 3rd shot (booster shot) 28+ days from date of 2nd shot.   - Otherwise - (Tentative) vaccine schedule is to get 3rd shot (booster shot) 8+ months from date of 2nd shot.     -- Consider Annual Flu / Influenza vaccination - Every Fall (Starting about October 1st)    -- Pneumonia / Pneumococcal PCV 23 vaccines are done / completed.       Aspects of Diabetes:   Recent Labs   Lab Test 09/16/21  0832 06/01/21  0937 02/23/21  1200 02/23/21  1200 11/11/20  0811 11/11/20  0811   A1C  --  7.2*  --  7.1*  --  7.1*   LDL 41 50  --  47   < > 52   HDL 45 40  --  38   < > 39   TRIG 87 91  --  94   < > 68    ALT 17 22  --  19   < > 19   CR 0.93 0.84   < > 0.76   < > 0.77   GFRESTIMATED 77 88   < > >90   < > >90   GFRESTBLACK  --  >90  --  >90   < > >90   POTASSIUM 4.8 4.2   < > 4.6   < > 4.8   TSH 5.29*  --   --   --   --   --     < > = values in this interval not displayed.      Hemoglobin A1c  Goal range is under 8%. Best is 6.5 to 7   Blood Pressure 130/72 Goal to keep less than 140/90   Tobacco  reports that he quit smoking about 21 years ago. His smoking use included cigarettes. He has a 129.00 pack-year smoking history. He has never used smokeless tobacco. Goal to abstain from tobacco   Aspirin or Plavix Anti-platelet therapy Aspirin or Plavix reduces risk of heart disease and stroke  -- sometimes used with other blood thinners, depending on bleeding risk and risk factors.    ACE/ARB Specific blood pressure meds These medications can reduce risk of kidney disease   Cholesterol Statins (Lipitor, Crestor, vs others) Statins reduce risk of heart disease and stroke   Eye Exam -- Do Yearly -- Annual diabetic eye exam   Healthy weight Wt Readings from Last 3 Encounters:   09/16/21 77.7 kg (171 lb 3.2 oz)   06/01/21 78.7 kg (173 lb 6.4 oz)   04/22/21 80.9 kg (178 lb 4.8 oz)     Body mass index is 26.87 kg/m .  Goal BMI under 30, best is under 25.      -- Trying to exercise daily (goal at least 20 min/day) with moderate aerobic activity   -- Eat healthy (resources from ADA at http://www.diabetes.org/)   -- Taking good care of my feet. Consider seeing the Podiatrist   -- Check blood sugars as directed, record in log book and bring to every appointment    Insurance companies are grading you and I on your blood sugar control -- Goal is to get your A1c down to 7.9% or lower and NO Smoking!  -- Medicare and most insurance companies, will only cover Hemoglobin A1c labs to be rechecked every 91+ days.      Return for Diabetes labs and clinic follow-up appointment every 3 to 4 months.    Schedule lab only appointment --- A few  days AFTER: 12/15/21   Schedule clinic appointment with Dr. Sen -- Same day as labs, or 1-2 days later.      Electronically signed by:  Jovanni Sen MD on 9/16/2021  Internal Medicine  [facility]

## 2021-09-20 ENCOUNTER — TRANSFERRED RECORDS (OUTPATIENT)
Dept: HEALTH INFORMATION MANAGEMENT | Facility: OTHER | Age: 81
End: 2021-09-20

## 2021-09-20 LAB — RETINOPATHY: NEGATIVE

## 2021-10-03 ENCOUNTER — HEALTH MAINTENANCE LETTER (OUTPATIENT)
Age: 81
End: 2021-10-03

## 2021-10-14 NOTE — NURSING NOTE
Patient positioned in supine position, perineum area prepped with chlorhexidene Gluconate and patient draped per sterile technique. Per verbal order read back by Anthony Haney MD, Urojet 10mL 2% lidocaine jelly to be instilled into urethra.  Urojet- 10ml 2% Lidocaine jelly instilled into the urethra.    Urojet 2%  Lot#: [id number]  Expiration date: 4/2023  : [employer]  NDC: [address]-0    Bern Protocol    A. Pre-procedure verification complete Yes  1-relevant information / documentation available, reviewed and properly matched to the patient; 2-consent accurate and complete, 3-equipment and supplies available    B. Site marking complete N/A  Site marked if not in continuous attendance with patient    C. TIME OUT completed Yes  Time Out was conducted just prior to starting procedure to verify the eight required elements: 1-patient identity, 2-consent accurate and complete, 3-position, 4-correct side/site marked (if applicable), 5-procedure, 6-relevant images / results properly labeled and displayed (if applicable), 7-antibiotics / irrigation fluids (if applicable), 8-safety precautions.    After procedure perineum area rinsed. Discharge instructions reviewed with patient. Patient verbalized understanding of discharge instructions and discharged ambulatory.  Mer Conte LPN..................10/14/2021  9:02 AM

## 2021-11-16 NOTE — NURSING NOTE
"Chief Complaint   Patient presents with     UTI     possible uti     Patient presents for possible UTI; states burning sensations upon urination have been ongoing for about 3 days.    Initial /88 (BP Location: Right arm, Patient Position: Sitting, Cuff Size: Adult Regular)   Pulse 100   Temp 97.2  F (36.2  C) (Temporal)   Resp 16   Wt 78 kg (172 lb)   SpO2 91%   BMI 27.00 kg/m   Estimated body mass index is 27 kg/m  as calculated from the following:    Height as of 4/22/21: 1.7 m (5' 6.93\").    Weight as of this encounter: 78 kg (172 lb).     Medication Reconciliation: complete    FOOD SECURITY SCREENING QUESTIONS  Hunger Vital Signs:  Within the past 12 months we worried whether our food would run out before we got money to buy more. Never  Within the past 12 months the food we bought just didn't last and we didn't have money to get more. Never    Advance care plan reviewed      Nikki Sexton LPN    "

## 2021-11-16 NOTE — PROGRESS NOTES
Assessment & Plan     Urinary problem  - UA with Microscopic reflex to Culture  - Urine Culture, no growth    Controlled type 2 diabetes mellitus without complication, without long-term current use of insulin (H)  - UA reflex to Microscopic    Acute cystitis with hematuria  - Based off UA results, patient does meet criteria for antibiotic therapy.  Patient placed on Macrobid.  [x] Will add on urine culture and adjust treatment as indicated per results of culture.  [x] Will treat with antibiotics; encouraged intake of probiotics/yogurt while on antibiotics and for 1 week after.  [x]  Educated on prevention including avoidance of bubble baths, douching, powders, lotions, scented pads   [x] Control sugars, keep working on this.  [x] Drink plenty of liquids, especially water. Drinking water helps dilute your urine and ensures that you'll urinate more frequently -- allowing bacteria to be flushed from your urinary tract before an infection can begin.  [x] Drink sugar free cranberry juice or take cranberry tabs to help prevent symptoms.  [x] Good chele-care which means wipe from front to back, doing so after urinating and after a bowel movement.  [x] Empty your bladder as soon as possible after intercourse (remember, wipe front to back).   [] Change your birth control method. Diaphragms, or unlubricated or spermicide-treated condoms, can all contribute to bacterial growth.  [x] Eat a yogurt/drink Kefir or take a probiotic daily while on antibiotics and for a week after.  [x] Return/call as needed for follow-up should any new symptoms develop, for worsening of current symptoms or if symptoms do not resolve with above plan.  [x] Recommend alternating Tylenol and ibuprofen every 4-6 hours as needed, warm heating pad, pushing fluids/hydration, cranberry juice/pills, urinating when urge arises.   [x] May also try over the counter remedies such as Azo (as long as pyridium not already prescribed). Patient aware that if they do  "take Azo, that this medication can change color of urine to an \"orange\" color.   - nitroFURantoin macrocrystal-monohydrate (MACROBID) 100 MG capsule  Dispense: 10 capsule; Refill: 0      Ordering of each unique test  Prescription drug management  10 minutes spent on the date of the encounter doing chart review, history and exam, documentation and further activities per the note     BMI:   Estimated body mass index is 27 kg/m  as calculated from the following:    Height as of 4/22/21: 1.7 m (5' 6.93\").    Weight as of this encounter: 78 kg (172 lb).     Return if symptoms worsen or fail to improve.    Aaliyah Garrison NP  [facility]    Nicolette Cole is a 81 year old who presents for the following health issues     HPI     Genitourinary - Male  Onset/Duration: 3 days  Description:   Dysuria (painful urination): YES  Hematuria (blood in urine): no  Frequency: YES  Waking at night to urinate: YES- 2-3 times per night, usually towards morning  Hesitancy (delay in urine): YES  Retention (unable to empty): no  Decrease in urinary flow: YES  Incontinence: no  Progression of Symptoms:  same  Accompanying Signs & Symptoms:  Fever: no  Back/Flank pain: no  Urethral discharge: no  Testicle lumps/masses/pain: no  Nausea and/or vomiting: no  Abdominal pain: no  History:   History of frequent UTI s: no  History of kidney stones: no  History of hernias: no  Personal or Family history of Prostate problems: no  Sexually active: no  Precipitating or alleviating factors: None  Therapies tried and outcome: increase fluid intake and tamsulosin    Review of Systems   CONSTITUTIONAL: NEGATIVE for fever, chills, change in weight  ENT/MOUTH: NEGATIVE for ear, mouth and throat problems  RESP: NEGATIVE for significant cough or SOB  CV: NEGATIVE for chest pain, palpitations or peripheral edema  : decreased urinary stream, dysuria, frequency, hesitancy and nocturia x 3      Objective    /88 (BP Location: " Right arm, Patient Position: Sitting, Cuff Size: Adult Regular)   Pulse 100   Temp 97.2  F (36.2  C) (Temporal)   Resp 16   Wt 78 kg (172 lb)   SpO2 91%   BMI 27.00 kg/m    Body mass index is 27 kg/m .  Physical Exam   GENERAL: healthy, alert and no distress  EYES: Eyes grossly normal to inspection, PERRL and conjunctivae and sclerae normal  RESP: lungs clear to auscultation - no rales, rhonchi or wheezes  CV: regular rate and rhythm, normal S1 S2, no S3 or S4, no murmur, click or rub, no peripheral edema and peripheral pulses strong  ABDOMEN: soft, nontender, no hepatosplenomegaly, no masses and bowel sounds normal  MS: no gross musculoskeletal defects noted, no edema  SKIN: no suspicious lesions or rashes  NEURO: Normal strength and tone, mentation intact and speech normal  PSYCH: mentation appears normal, affect normal/bright    Results for orders placed or performed in visit on 11/16/21   UA with Microscopic reflex to Culture     Status: Abnormal    Specimen: Urine, Midstream   Result Value Ref Range    Color Urine Yellow Colorless, Straw, Light Yellow, Yellow    Appearance Urine Clear Clear    Glucose Urine Negative Negative mg/dL    Bilirubin Urine Negative Negative    Ketones Urine Negative Negative mg/dL    Specific Gravity Urine 1.020 1.005 - 1.030    Blood Urine Large (A) Negative    pH Urine 7.0 5.0 - 9.0    Protein Albumin Urine Negative Negative mg/dL    Urobilinogen Urine Normal Normal, 2.0 mg/dL    Nitrite Urine Negative Negative    Leukocyte Esterase Urine Small (A) Negative    Bacteria Urine Few (A) None Seen /HPF    Mucus Urine Present (A) None Seen /LPF    RBC Urine 145 (H) <=2 /HPF    WBC Urine 74 (H) <=5 /HPF    Narrative    Urine Culture ordered based on laboratory criteria   Urine Culture     Status: None    Specimen: Urine, Midstream   Result Value Ref Range    Culture No Growth

## 2021-12-08 NOTE — TELEPHONE ENCOUNTER
"[id number] sent Rx request for the following:      Requested Prescriptions   Pending Prescriptions Disp Refills     nitroGLYcerin (NITROSTAT) 0.4 MG sublingual tablet [Pharmacy Med Name: Nitroglycerin 0.4 MG Sublingual Tablet Sublingual] 25 tablet 0     Sig: DISSOLVE ONE TABLET UNDER THE TONGUE EVERY 5 MINUTES AS NEEDED FOR CHEST PAIN.  DO NOT EXCEED A TOTAL OF 3 DOSES IN 15 MINUTES       Nitrates Failed - 12/4/2021  6:01 PM        Failed - Sublingual nitro order needs review     If refill exceeds 1 bottle per month, please forward request to provider.         Passed - Blood pressure under 140/90 in past 12 months     BP Readings from Last 3 Encounters:   11/16/21 132/88   09/16/21 130/72   06/01/21 132/78             Passed - Pt is not on erectile dysfunction medications        Passed - Recent (12 mo) or future (30 days) visit within the authorizing provider's specialty     Patient has had an office visit with the authorizing provider or a provider within the authorizing providers department within the previous 12 mos or has a future within next 30 days. See \"Patient Info\" tab in inbasket, or \"Choose Columns\" in Meds & Orders section of the refill encounter.            Passed - Medication is active on med list        Passed - Patient is age 18 or older       Last Prescription Date:   8/5/20  Last Fill Qty/Refills:         25, R-11  Last Office Visit:              11/16/21  Future Office visit:             Next 5 appointments (look out 90 days)    Dec 16, 2021 10:00 AM  SHORT with Jovanni Sen MD  [facility] ([facility] ) [address]  [place] [address]  [phone]        Routing refill request to provider for review/approval because:  Unable to complete prescription refill per RN Medication Refill Policy. Zulma Marvin RN .............. 12/8/2021  12:12 PM  "

## 2021-12-16 PROBLEM — E03.4 HYPOTHYROIDISM DUE TO ACQUIRED ATROPHY OF THYROID: Status: ACTIVE | Noted: 2021-01-01

## 2021-12-16 NOTE — PROGRESS NOTES
Assessment & Plan       ICD-10-CM    1. Controlled type 2 diabetes mellitus without complication, without long-term current use of insulin (H)  E11.9    2. Atherosclerosis of native coronary artery of native heart without angina pectoris  I25.10    3. Benign essential hypertension  I10    4. Mixed hyperlipidemia  E78.2    5. Lewy body dementia without behavioral disturbance (H)  G31.83     F02.80    6. Simple chronic bronchitis (H)  J41.0    7. Exocrine pancreatic insufficiency - Severe Deficiency  K86.81    8. Hypothyroidism due to acquired atrophy of thyroid  E03.4 levothyroxine (SYNTHROID/LEVOTHROID) 50 MCG tablet   9. Vaccine counseling  Z71.85      Patient comes in with his wife for follow-up of multiple issues.    Type 2 diabetes, currently well controlled.  Doing well with current medication regimen.  No changes for now.  Denies hypoglycemia.    Coronary disease, doing well.  No exertional chest pain.  Continues on aspirin, propanolol, Lipitor.  Continue current dosing.    Hypertension, blood pressure is currently well controlled.  Denies syncope or presyncope.  Doing well with current medication regimen.  No changes for now.    Mixed hyperlipidemia, cholesterol panel is all at goal.  Currently on Lipitor 40 mg daily.  Continue current dosing.    Lewy body dementia.  Wife states his memory is still slowly slipping.  He does continue on Exelon capsules and feels that this probably has helped slow down his memory loss.  Continue current dosing.  Doing well with no reported side effects.    Chronic simple bronchitis, reports breathing is currently at baseline.  Continues to use his inhalers.  No changes for now.    Severe exocrine pancreatic insufficiency.  Bowels have improved with use of Creon.  Continue current dosing.    Vaccine counseling completed.  Encouraged COVID-19 booster shot.    Hypothyroidism due to acquired atrophy of the thyroid gland.  This is a new diagnosis.  Start Synthroid.  Recheck labs in  "about 3 months.  Hypothyroidism - new - start synthroid.      BMI:   Estimated body mass index is 27.18 kg/m  as calculated from the following:    Height as of 4/22/21: 1.7 m (5' 6.93\").    Weight as of this encounter: 78.6 kg (173 lb 3.2 oz).     Encouraged regular exercise    Return in about 3 months (around 3/16/2022) for - Labs every 91+ days, DM Follow-up 1-2 days later, with Dr. Sen.    Jovanni Sen MD  [facility] in [place]    Nicolette Cole is a 81 year old who presents for the following health issues DM check    History of Present Illness       Diabetes:   He presents for follow up of diabetes.  He is checking home blood glucose one time daily. He checks blood glucose before meals.  Blood glucose is never over 200 and never under 70. When his blood glucose is low, the patient is asymptomatic for confusion, blurred vision, lethargy and reports not feeling dizzy, shaky, or weak.  He has no concerns regarding his diabetes at this time.  He is not experiencing numbness or burning in feet, excessive thirst, blurry vision, weight changes or redness, sores or blisters on feet.             Diabetes Follow-up    How often are you checking your blood sugar? One time daily  What time of day are you checking your blood sugars (select all that apply)?  Before meals  Have you had any blood sugars above 200?  No  Have you had any blood sugars below 70?  No    What symptoms do you notice when your blood sugar is low?  None    What concerns do you have today about your diabetes? None     Do you have any of these symptoms? (Select all that apply)  No numbness or tingling in feet.  No redness, sores or blisters on feet.  No complaints of excessive thirst.  No reports of blurry vision.  No significant changes to weight.      BP Readings from Last 2 Encounters:   12/16/21 126/72   11/16/21 132/88     Hemoglobin A1C POCT (%)   Date Value   06/01/2021 7.2 (H)   02/23/2021 7.1 (H)     Hemoglobin A1C (%)   Date " Value   12/16/2021 7.2 (H)   09/16/2021 7.1 (H)     LDL Cholesterol Calculated (mg/dL)   Date Value   12/16/2021 47   09/16/2021 41   06/01/2021 50   02/23/2021 47       How many servings of fruits and vegetables do you eat daily?  2-3    On average, how many sweetened beverages do you drink each day (Examples: soda, juice, sweet tea, etc.  Do NOT count diet or artificially sweetened beverages)?   0    How many days per week do you exercise enough to make your heart beat faster? 7    How many minutes a day do you exercise enough to make your heart beat faster? 20 - 29    How many days per week do you miss taking your medication? 0      Review of Systems   Constitutional: Positive for fatigue. Negative for chills and fever.   HENT: Negative for congestion and hearing loss.    Eyes: Negative for visual disturbance.   Respiratory: Positive for cough (some purulent phlegm in AM, otherwise clear throughout the day) and shortness of breath. Negative for wheezing.    Cardiovascular: Negative for chest pain and palpitations.   Gastrointestinal: Positive for abdominal pain (+intermittent) and diarrhea (+ much improved with Creon. still intermittent diarrhea). Negative for nausea and vomiting.   Endocrine: Negative for cold intolerance and heat intolerance.   Genitourinary: Negative for dysuria and hematuria.   Musculoskeletal: Negative for arthralgias and myalgias.   Skin: Positive for wound (behind left ear). Negative for rash.   Allergic/Immunologic: Negative for immunocompromised state.   Neurological: Positive for dizziness, tremors and light-headedness.        + some balance problems   Hematological: Does not bruise/bleed easily.   Psychiatric/Behavioral: Positive for confusion. Negative for agitation. The patient is nervous/anxious.         + Memory problems          Objective    /72 (BP Location: Right arm, Patient Position: Sitting, Cuff Size: Adult Regular)   Pulse 60   Temp 97.4  F (36.3  C) (Temporal)    Resp 16   Wt 78.6 kg (173 lb 3.2 oz)   SpO2 100%   BMI 27.18 kg/m    Body mass index is 27.18 kg/m .  Physical Exam  Constitutional:       General: He is not in acute distress.     Appearance: He is well-developed. He is not diaphoretic.   HENT:      Head: Normocephalic and atraumatic.   Eyes:      General: No scleral icterus.     Conjunctiva/sclera: Conjunctivae normal.   Cardiovascular:      Rate and Rhythm: Normal rate and regular rhythm.      Pulses: Normal pulses.   Pulmonary:      Effort: Pulmonary effort is normal.      Breath sounds: Normal breath sounds.   Abdominal:      Palpations: Abdomen is soft.      Tenderness: There is no abdominal tenderness.   Musculoskeletal:         General: No deformity.      Cervical back: Neck supple.      Right lower leg: Edema present.      Left lower leg: Edema present.   Lymphadenopathy:      Cervical: No cervical adenopathy.   Skin:     General: Skin is warm and dry.      Coloration: Skin is not jaundiced.      Findings: Lesion (non-healing lesion behind left ear - needs excision) present. No rash.      Comments: actinic keratosis x2 on right face    PROCEDURE:   Reviewed risks and benefits of cryotherapy.After informed consent was obtained, patient elected to proceed. These 2 lesions were treated today.   Performed cryotherapy to #2 lesions x 2 rounds of 30 second freeze/thaw cycles.   Tolerated well. No obvious complications.   Return for signs of infection.    The patient and I reviewed safe sun practices today: the importance of staying out of the sun;especially between 10AM-2PM, wearing sun screen SPF > 30, and protective clothing.   We also discussed the ABC's of skin cancers including: changes in size, uniformity, borders, coloration, bleeding, or any irregularityor changes. If these occur, seek medical attention.   The patient should have a complete skin exam by a medical professional every 6-12 months, and any questionable/suspicious, or changing lesions  should be removed.     Neurological:      Mental Status: He is alert and oriented to person, place, and time. Mental status is at baseline.      Comments: + memory loss   Psychiatric:         Mood and Affect: Mood normal.         Behavior: Behavior normal.        Recent Labs   Lab Test 12/16/21  0843 09/16/21  0832 06/01/21  0937 02/23/21  1200   A1C 7.2* 7.1* 7.2* 7.1*   LDL 47 41 50 47   HDL 46 45 40 38   TRIG 110 87 91 94   ALT 18 17 22 19   CR 0.91 0.93 0.84 0.76   GFRESTIMATED 79 77 88 >90   GFRESTBLACK  --   --  >90 >90   POTASSIUM 5.0 4.8 4.2 4.6   TSH 7.46* 5.29*  --   --    Hemoglobin A1c is well controlled.  LDL HDL and triglycerides are all at goal.  ALT is normal.  Creatinine is stable.  Potassium is normal.  TSH is high.

## 2021-12-16 NOTE — PATIENT INSTRUCTIONS
Blood pressure is well controlled.   Diabetes is well controlled.       Will need biopsy of lesion behind left ear.     2 other skin lesions treated with cryotherapy today by Dr. Sen.       Medications refilled.   Thyroid levels are off... need to start supplement / synthroid.   Labs are otherwise stable.     Results for orders placed or performed in visit on 12/16/21   Comprehensive metabolic panel     Status: Abnormal   Result Value Ref Range    Sodium 132 (L) 134 - 144 mmol/L    Potassium 5.0 3.5 - 5.1 mmol/L    Chloride 96 (L) 98 - 107 mmol/L    Carbon Dioxide (CO2) 31 21 - 31 mmol/L    Anion Gap 5 3 - 14 mmol/L    Urea Nitrogen 10 7 - 25 mg/dL    Creatinine 0.91 0.70 - 1.30 mg/dL    Calcium 9.9 8.6 - 10.3 mg/dL    Glucose 147 (H) 70 - 105 mg/dL    Alkaline Phosphatase 73 34 - 104 U/L    AST 16 13 - 39 U/L    ALT 18 7 - 52 U/L    Protein Total 7.0 6.4 - 8.9 g/dL    Albumin 4.4 3.5 - 5.7 g/dL    Bilirubin Total 0.5 0.3 - 1.0 mg/dL    GFR Estimate 79 >60 mL/min/1.73m2   Lipid Profile     Status: None   Result Value Ref Range    Cholesterol 115 <200 mg/dL    Triglycerides 110 <150 mg/dL    Direct Measure HDL 46 23 - 92 mg/dL    LDL Cholesterol Calculated 47 <=100 mg/dL    Non HDL Cholesterol 69 <130 mg/dL    Patient Fasting > 8hrs? Yes     Narrative    Cholesterol  Desirable:  <200 mg/dL    Triglycerides  Normal:  Less than 150 mg/dL  Borderline High:  150-199 mg/dL  High:  200-499 mg/dL  Very High:  Greater than or equal to 500 mg/dL    Direct Measure HDL  Female:  Greater than or equal to 50 mg/dL   Male:  Greater than or equal to 40 mg/dL    LDL Cholesterol  Desirable:  <100mg/dL  Above Desirable:  100-129 mg/dL   Borderline High:  130-159 mg/dL   High:  160-189 mg/dL   Very High:  >= 190 mg/dL    Non HDL Cholesterol  Desirable:  130 mg/dL  Above Desirable:  130-159 mg/dL  Borderline High:  160-189 mg/dL  High:  190-219 mg/dL  Very High:  Greater than or equal to 220 mg/dL   CBC with platelets     Status:  Abnormal   Result Value Ref Range    WBC Count 9.8 4.0 - 11.0 10e3/uL    RBC Count 4.32 (L) 4.40 - 5.90 10e6/uL    Hemoglobin 13.8 13.3 - 17.7 g/dL    Hematocrit 40.9 40.0 - 53.0 %    MCV 95 78 - 100 fL    MCH 31.9 26.5 - 33.0 pg    MCHC 33.7 31.5 - 36.5 g/dL    RDW 12.8 10.0 - 15.0 %    Platelet Count 411 150 - 450 10e3/uL   Hemoglobin A1c     Status: Abnormal   Result Value Ref Range    Hemoglobin A1C 7.2 (H) 4.0 - 6.2 %   TSH with free T4 reflex     Status: Abnormal   Result Value Ref Range    TSH 7.46 (H) 0.40 - 4.00 mU/L   T4 free     Status: Normal   Result Value Ref Range    Free T4 1.20 0.60 - 1.60 ng/dL        Aspects of Diabetes:   Recent Labs   Lab Test 12/16/21  0843 09/16/21  0832 06/01/21  0937 02/23/21  1200   A1C 7.2* 7.1* 7.2* 7.1*   LDL 47 41 50 47   HDL 46 45 40 38   TRIG 110 87 91 94   ALT 18 17 22 19   CR 0.91 0.93 0.84 0.76   GFRESTIMATED 79 77 88 >90   GFRESTBLACK  --   --  >90 >90   POTASSIUM 5.0 4.8 4.2 4.6   TSH 7.46* 5.29*  --   --       Hemoglobin A1c  Goal range is under 8%. Best is 6.5 to 7   Blood Pressure 126/72 Goal to keep less than 140/90   Tobacco  reports that he quit smoking about 21 years ago. His smoking use included cigarettes. He has a 129.00 pack-year smoking history. He has never used smokeless tobacco. Goal to abstain from tobacco   Aspirin or Plavix Anti-platelet therapy Aspirin or Plavix reduces risk of heart disease and stroke  -- sometimes used with other blood thinners, depending on bleeding risk and risk factors.    ACE/ARB Specific blood pressure meds These medications can reduce risk of kidney disease   Cholesterol Statins (Lipitor, Crestor, vs others) Statins reduce risk of heart disease and stroke   Eye Exam -- Do Yearly -- Annual diabetic eye exam   Healthy weight Wt Readings from Last 3 Encounters:   12/16/21 78.6 kg (173 lb 3.2 oz)   11/16/21 78 kg (172 lb)   10/14/21 79.5 kg (175 lb 3.2 oz)     Body mass index is 27.18 kg/m .  Goal BMI under 30, best is  under 25.      -- Trying to exercise daily (goal at least 20 min/day) with moderate aerobic activity   -- Eat healthy (resources from ADA at http://www.diabetes.org/)   -- Taking good care of my feet. Consider seeing the Podiatrist   -- Check blood sugars as directed, record in log book and bring to every appointment    Insurance companies are grading you and I on your blood sugar control -- Goal is to get your A1c down to 7.9% or lower and NO Smoking!  -- Medicare and most insurance companies, will only cover Hemoglobin A1c labs to be rechecked every 91+ days.      Return for Diabetes labs and clinic follow-up appointment every 3 to 4 months.    Schedule lab only appointment --- A few days AFTER: 3/16/22   Schedule clinic appointment with Dr. Sen -- Same day as labs, or 1-2 days later.

## 2021-12-16 NOTE — NURSING NOTE
"Chief Complaint   Patient presents with     Diabetes     Lab Results   Component Value Date    A1C 7.2 12/16/2021    A1C 7.1 09/16/2021    A1C 7.2 06/01/2021    A1C 7.1 02/23/2021    A1C 7.1 11/11/2020    ALBUMIN 4.2 09/16/2021    ALBUMIN 4.3 06/01/2021     Previous A1C is at goal of <8  Date of last Foot exam 09/2021  Eye exam Yes-  Location: 2020  Patient is not a Tobacco User  Patient is on a daily aspirin  Patient is on a Statin.  Blood pressure today of:     BP Readings from Last 1 Encounters:   12/16/21 126/72      is at the goal of <139/89 for diabetics.    Ale Ham LPN on 12/16/2021 at 9:39 AM      FOOD SECURITY SCREENING QUESTIONS  Hunger Vital Signs:  Within the past 12 months we worried whether our food would run out before we got money to buy more. Never  Within the past 12 months the food we bought just didn't last and we didn't have money to get more. Never  Ale Ham LPN 12/16/2021 9:35 AM      Initial /72 (BP Location: Right arm, Patient Position: Sitting, Cuff Size: Adult Regular)   Pulse 60   Temp 97.4  F (36.3  C) (Temporal)   Resp 16   Wt 78.6 kg (173 lb 3.2 oz)   SpO2 100%   BMI 27.18 kg/m   Estimated body mass index is 27.18 kg/m  as calculated from the following:    Height as of 4/22/21: 1.7 m (5' 6.93\").    Weight as of this encounter: 78.6 kg (173 lb 3.2 oz).  Medication Reconciliation: complete    Ale Ham LPN  "

## 2021-12-31 PROBLEM — H61.92 SKIN LESION OF LEFT EAR: Status: ACTIVE | Noted: 2021-01-01

## 2021-12-31 NOTE — PATIENT INSTRUCTIONS
Your incision was closed with stitches that will NOT dissolve/need to be removed.     Please schedule an appointment to have your stitches removed in 1 week    It is ok to remove the dressing and get the incision wet in the shower on the day after your procedure.     Don't soak in a tub, pool or lake for 5 days.      If you have concerns, please call.

## 2021-12-31 NOTE — PROGRESS NOTES
Procedure Note  Pre/Post Operative Diagnosis:   1 cm ( with margins ) ulcerated skin lesion posterior left ear    Procedure:    Excision    Surgeon: Ag Doan MD FACS    Local Anesthesia: 1% lidocaine with0.25%Marcaine with epinephrine    Indication for the procedure:    This is a 81 year old male patient referred by Jovanni Sen  with an ulcerated skin lesion on backside of left ear.   After explaining the risks to include bleeding, infection, recurrence or need for re-excision,and scarring the patient wished to proceed.    Procedure:   The area was prepped and draped in usual sterile fashion with ChloraPrep . After, adequate local anesthesia,an elliptical skin incision was made to encompass the lesion.  The skin was closed with 5-0 Nylon.  A clean dry dressing was applied.    Plan:  The patient will followup in 10-14 days for suture removal and to review the pathology. Patient will followup if there any problems with the wound including redness or drainage.

## 2021-12-31 NOTE — NURSING NOTE
"Chief Complaint   Patient presents with     Procedure     skin lesion on ear       Initial BP (!) 144/78 (BP Location: Right arm, Patient Position: Sitting, Cuff Size: Adult Regular)   Pulse 68   Temp 97.5  F (36.4  C) (Tympanic)   Resp 16   Wt 78.6 kg (173 lb 3.2 oz)   SpO2 99%   BMI 27.18 kg/m   Estimated body mass index is 27.18 kg/m  as calculated from the following:    Height as of 4/22/21: 1.7 m (5' 6.93\").    Weight as of this encounter: 78.6 kg (173 lb 3.2 oz).  Medication Reconciliation: Completed     Advanced Care Directive Reviewed    Prior to the start of the procedure and with procedural staff participation, I verbally confirmed the patient s identity using two indicators, relevant allergies, that the procedure was appropriate and matched the consent or emergent situation, and that the correct equipment/implants were available. Immediately prior to starting the procedure I conducted the Time Out with the procedural staff and re-confirmed the patient s name, procedure, and site/side. (The Joint Commission universal protocol was followed.)  Yes    Sedation (Moderate or Deep): None        Tiarra Martin LPN  "

## 2022-01-01 ENCOUNTER — TELEPHONE (OUTPATIENT)
Dept: INTERNAL MEDICINE | Facility: OTHER | Age: 82
End: 2022-01-01

## 2022-01-01 ENCOUNTER — TELEPHONE (OUTPATIENT)
Dept: FAMILY MEDICINE | Facility: OTHER | Age: 82
End: 2022-01-01
Payer: MEDICARE

## 2022-01-01 ENCOUNTER — HOSPITAL ENCOUNTER (OUTPATIENT)
Dept: PHYSICAL THERAPY | Facility: OTHER | Age: 82
Setting detail: THERAPIES SERIES
End: 2022-02-16
Attending: NURSE PRACTITIONER
Payer: MEDICARE

## 2022-01-01 ENCOUNTER — RESULTS ONLY (OUTPATIENT)
Dept: RADIATION ONCOLOGY | Facility: HOSPITAL | Age: 82
End: 2022-01-01
Payer: MEDICARE

## 2022-01-01 ENCOUNTER — APPOINTMENT (OUTPATIENT)
Dept: RADIATION ONCOLOGY | Facility: HOSPITAL | Age: 82
End: 2022-01-01
Payer: MEDICARE

## 2022-01-01 ENCOUNTER — HEALTH MAINTENANCE LETTER (OUTPATIENT)
Age: 82
End: 2022-01-01

## 2022-01-01 ENCOUNTER — HOSPITAL ENCOUNTER (INPATIENT)
Facility: OTHER | Age: 82
LOS: 1 days | Discharge: HOME | DRG: 178 | End: 2022-08-12
Attending: FAMILY MEDICINE | Admitting: INTERNAL MEDICINE
Payer: MEDICARE

## 2022-01-01 ENCOUNTER — PATIENT OUTREACH (OUTPATIENT)
Dept: INTERNAL MEDICINE | Facility: OTHER | Age: 82
End: 2022-01-01

## 2022-01-01 ENCOUNTER — HOSPITAL ENCOUNTER (EMERGENCY)
Facility: OTHER | Age: 82
Discharge: HOME | End: 2022-08-15
Attending: EMERGENCY MEDICINE | Admitting: EMERGENCY MEDICINE
Payer: MEDICARE

## 2022-01-01 ENCOUNTER — OFFICE VISIT (OUTPATIENT)
Dept: FAMILY MEDICINE | Facility: OTHER | Age: 82
End: 2022-01-01
Attending: PHYSICIAN ASSISTANT
Payer: MEDICARE

## 2022-01-01 ENCOUNTER — HOSPITAL ENCOUNTER (EMERGENCY)
Facility: OTHER | Age: 82
Discharge: HOME | End: 2022-09-11
Attending: PHYSICIAN ASSISTANT | Admitting: PHYSICIAN ASSISTANT
Payer: MEDICARE

## 2022-01-01 ENCOUNTER — TRANSFERRED RECORDS (OUTPATIENT)
Dept: HEALTH INFORMATION MANAGEMENT | Facility: CLINIC | Age: 82
End: 2022-01-01

## 2022-01-01 ENCOUNTER — HOSPITAL ENCOUNTER (EMERGENCY)
Facility: OTHER | Age: 82
Discharge: HOME | End: 2022-09-02
Attending: PHYSICIAN ASSISTANT | Admitting: PHYSICIAN ASSISTANT
Payer: MEDICARE

## 2022-01-01 ENCOUNTER — NURSE TRIAGE (OUTPATIENT)
Dept: INTERNAL MEDICINE | Facility: OTHER | Age: 82
End: 2022-01-01
Payer: MEDICARE

## 2022-01-01 ENCOUNTER — APPOINTMENT (OUTPATIENT)
Dept: GENERAL RADIOLOGY | Facility: OTHER | Age: 82
End: 2022-01-01
Attending: PHYSICIAN ASSISTANT
Payer: MEDICARE

## 2022-01-01 ENCOUNTER — OFFICE VISIT (OUTPATIENT)
Dept: RADIATION ONCOLOGY | Facility: HOSPITAL | Age: 82
End: 2022-01-01
Payer: MEDICARE

## 2022-01-01 ENCOUNTER — HOSPITAL ENCOUNTER (OUTPATIENT)
Dept: CT IMAGING | Facility: OTHER | Age: 82
End: 2022-03-03
Attending: NURSE PRACTITIONER
Payer: MEDICARE

## 2022-01-01 ENCOUNTER — TELEPHONE (OUTPATIENT)
Dept: RADIATION ONCOLOGY | Facility: HOSPITAL | Age: 82
End: 2022-01-01
Payer: MEDICARE

## 2022-01-01 ENCOUNTER — HOSPITAL ENCOUNTER (OUTPATIENT)
Dept: PHYSICAL THERAPY | Facility: OTHER | Age: 82
Setting detail: THERAPIES SERIES
End: 2022-01-26
Attending: NURSE PRACTITIONER
Payer: MEDICARE

## 2022-01-01 ENCOUNTER — RESULTS ONLY (OUTPATIENT)
Dept: RADIATION ONCOLOGY | Facility: HOSPITAL | Age: 82
End: 2022-01-01

## 2022-01-01 ENCOUNTER — DOCUMENTATION ONLY (OUTPATIENT)
Dept: RADIATION ONCOLOGY | Facility: HOSPITAL | Age: 82
End: 2022-01-01
Payer: MEDICARE

## 2022-01-01 ENCOUNTER — OFFICE VISIT (OUTPATIENT)
Dept: INTERNAL MEDICINE | Facility: OTHER | Age: 82
End: 2022-01-01
Attending: INTERNAL MEDICINE
Payer: MEDICARE

## 2022-01-01 ENCOUNTER — MEDICAL CORRESPONDENCE (OUTPATIENT)
Dept: HEALTH INFORMATION MANAGEMENT | Facility: OTHER | Age: 82
End: 2022-01-01

## 2022-01-01 ENCOUNTER — PATIENT OUTREACH (OUTPATIENT)
Dept: CARE COORDINATION | Facility: CLINIC | Age: 82
End: 2022-01-01

## 2022-01-01 ENCOUNTER — TRANSFERRED RECORDS (OUTPATIENT)
Dept: HEALTH INFORMATION MANAGEMENT | Facility: OTHER | Age: 82
End: 2022-01-01
Payer: MEDICARE

## 2022-01-01 ENCOUNTER — OFFICE VISIT (OUTPATIENT)
Dept: FAMILY MEDICINE | Facility: OTHER | Age: 82
End: 2022-01-01
Attending: NURSE PRACTITIONER
Payer: MEDICARE

## 2022-01-01 ENCOUNTER — TELEPHONE (OUTPATIENT)
Dept: RADIATION ONCOLOGY | Facility: HOSPITAL | Age: 82
End: 2022-01-01

## 2022-01-01 ENCOUNTER — APPOINTMENT (OUTPATIENT)
Dept: PHYSICAL THERAPY | Facility: HOSPITAL | Age: 82
DRG: 194 | End: 2022-01-01
Attending: INTERNAL MEDICINE
Payer: MEDICARE

## 2022-01-01 ENCOUNTER — APPOINTMENT (OUTPATIENT)
Dept: GENERAL RADIOLOGY | Facility: OTHER | Age: 82
End: 2022-01-01
Attending: FAMILY MEDICINE
Payer: MEDICARE

## 2022-01-01 ENCOUNTER — HOSPITAL ENCOUNTER (OUTPATIENT)
Dept: INFUSION THERAPY | Facility: OTHER | Age: 82
Discharge: HOME | End: 2022-09-19
Attending: INTERNAL MEDICINE | Admitting: INTERNAL MEDICINE
Payer: MEDICARE

## 2022-01-01 ENCOUNTER — TRANSFERRED RECORDS (OUTPATIENT)
Dept: HEALTH INFORMATION MANAGEMENT | Facility: OTHER | Age: 82
End: 2022-01-01

## 2022-01-01 ENCOUNTER — APPOINTMENT (OUTPATIENT)
Dept: PHYSICAL THERAPY | Facility: HOSPITAL | Age: 82
DRG: 194 | End: 2022-01-01
Attending: NURSE PRACTITIONER
Payer: MEDICARE

## 2022-01-01 ENCOUNTER — TELEPHONE (OUTPATIENT)
Dept: INTERNAL MEDICINE | Facility: OTHER | Age: 82
End: 2022-01-01
Payer: MEDICARE

## 2022-01-01 ENCOUNTER — TELEPHONE (OUTPATIENT)
Dept: CT IMAGING | Facility: OTHER | Age: 82
End: 2022-01-01
Payer: MEDICARE

## 2022-01-01 ENCOUNTER — HOSPITAL ENCOUNTER (INPATIENT)
Facility: HOSPITAL | Age: 82
LOS: 3 days | Discharge: HOME | DRG: 194 | End: 2022-08-30
Attending: NURSE PRACTITIONER | Admitting: INTERNAL MEDICINE
Payer: MEDICARE

## 2022-01-01 ENCOUNTER — ONCOLOGY VISIT (OUTPATIENT)
Dept: RADIATION ONCOLOGY | Facility: HOSPITAL | Age: 82
End: 2022-01-01
Attending: RADIOLOGY
Payer: MEDICARE

## 2022-01-01 ENCOUNTER — HOSPITAL ENCOUNTER (OUTPATIENT)
Dept: PET IMAGING | Facility: OTHER | Age: 82
Discharge: HOME | End: 2022-04-06
Attending: INTERNAL MEDICINE | Admitting: FAMILY MEDICINE
Payer: MEDICARE

## 2022-01-01 ENCOUNTER — APPOINTMENT (OUTPATIENT)
Dept: OCCUPATIONAL THERAPY | Facility: HOSPITAL | Age: 82
DRG: 194 | End: 2022-01-01
Attending: NURSE PRACTITIONER
Payer: MEDICARE

## 2022-01-01 ENCOUNTER — HOSPITAL ENCOUNTER (EMERGENCY)
Facility: OTHER | Age: 82
Discharge: ANOTHER HOSPITAL | End: 2022-08-26
Attending: FAMILY MEDICINE | Admitting: FAMILY MEDICINE
Payer: MEDICARE

## 2022-01-01 ENCOUNTER — HOSPITAL ENCOUNTER (OUTPATIENT)
Dept: GENERAL RADIOLOGY | Facility: OTHER | Age: 82
Discharge: HOME | End: 2022-09-21
Attending: NURSE PRACTITIONER
Payer: MEDICARE

## 2022-01-01 ENCOUNTER — LAB (OUTPATIENT)
Dept: LAB | Facility: OTHER | Age: 82
End: 2022-01-01
Attending: INTERNAL MEDICINE
Payer: MEDICARE

## 2022-01-01 ENCOUNTER — CARE COORDINATION (OUTPATIENT)
Dept: RADIATION ONCOLOGY | Facility: HOSPITAL | Age: 82
End: 2022-01-01
Payer: MEDICARE

## 2022-01-01 ENCOUNTER — OFFICE VISIT (OUTPATIENT)
Dept: RADIATION ONCOLOGY | Facility: HOSPITAL | Age: 82
End: 2022-01-01

## 2022-01-01 ENCOUNTER — APPOINTMENT (OUTPATIENT)
Dept: GENERAL RADIOLOGY | Facility: OTHER | Age: 82
DRG: 178 | End: 2022-01-01
Attending: FAMILY MEDICINE
Payer: MEDICARE

## 2022-01-01 ENCOUNTER — HOSPITAL ENCOUNTER (OUTPATIENT)
Dept: MRI IMAGING | Facility: OTHER | Age: 82
Discharge: HOME | End: 2022-05-02
Attending: RADIOLOGY | Admitting: RADIOLOGY
Payer: MEDICARE

## 2022-01-01 ENCOUNTER — DOCUMENTATION ONLY (OUTPATIENT)
Dept: RADIATION ONCOLOGY | Facility: HOSPITAL | Age: 82
End: 2022-01-01

## 2022-01-01 ENCOUNTER — HOSPITAL ENCOUNTER (OUTPATIENT)
Dept: PHYSICAL THERAPY | Facility: OTHER | Age: 82
Setting detail: THERAPIES SERIES
End: 2022-02-24
Attending: NURSE PRACTITIONER
Payer: MEDICARE

## 2022-01-01 ENCOUNTER — ALLIED HEALTH/NURSE VISIT (OUTPATIENT)
Dept: FAMILY MEDICINE | Facility: OTHER | Age: 82
End: 2022-01-01
Attending: FAMILY MEDICINE
Payer: MEDICARE

## 2022-01-01 ENCOUNTER — HOSPITAL ENCOUNTER (OUTPATIENT)
Dept: GENERAL RADIOLOGY | Facility: OTHER | Age: 82
End: 2022-02-24
Attending: NURSE PRACTITIONER
Payer: MEDICARE

## 2022-01-01 ENCOUNTER — TELEPHONE (OUTPATIENT)
Dept: UROLOGY | Facility: OTHER | Age: 82
End: 2022-01-01

## 2022-01-01 ENCOUNTER — TRANSFERRED RECORDS (OUTPATIENT)
Dept: HEALTH INFORMATION MANAGEMENT | Facility: CLINIC | Age: 82
End: 2022-01-01
Payer: MEDICARE

## 2022-01-01 ENCOUNTER — APPOINTMENT (OUTPATIENT)
Dept: GENERAL RADIOLOGY | Facility: OTHER | Age: 82
DRG: 177 | End: 2022-01-01
Attending: FAMILY MEDICINE
Payer: MEDICARE

## 2022-01-01 ENCOUNTER — APPOINTMENT (OUTPATIENT)
Dept: SPEECH THERAPY | Facility: OTHER | Age: 82
DRG: 177 | End: 2022-01-01
Attending: FAMILY MEDICINE
Payer: MEDICARE

## 2022-01-01 ENCOUNTER — HOSPITAL ENCOUNTER (INPATIENT)
Facility: OTHER | Age: 82
LOS: 8 days | Discharge: HOSPICE | DRG: 177 | End: 2022-10-05
Attending: FAMILY MEDICINE | Admitting: FAMILY MEDICINE
Payer: MEDICARE

## 2022-01-01 ENCOUNTER — HOSPITAL ENCOUNTER (OUTPATIENT)
Dept: PHYSICAL THERAPY | Facility: OTHER | Age: 82
Setting detail: THERAPIES SERIES
End: 2022-03-01
Attending: NURSE PRACTITIONER
Payer: MEDICARE

## 2022-01-01 ENCOUNTER — HOSPITAL ENCOUNTER (EMERGENCY)
Facility: OTHER | Age: 82
Discharge: HOME | End: 2022-09-21
Attending: PHYSICIAN ASSISTANT | Admitting: PHYSICIAN ASSISTANT
Payer: MEDICARE

## 2022-01-01 ENCOUNTER — HOSPITAL ENCOUNTER (OUTPATIENT)
Dept: PHYSICAL THERAPY | Facility: OTHER | Age: 82
Setting detail: THERAPIES SERIES
End: 2022-03-03
Attending: NURSE PRACTITIONER
Payer: MEDICARE

## 2022-01-01 ENCOUNTER — HOSPITAL ENCOUNTER (EMERGENCY)
Facility: OTHER | Age: 82
Discharge: HOME | End: 2022-05-21
Attending: FAMILY MEDICINE | Admitting: FAMILY MEDICINE
Payer: MEDICARE

## 2022-01-01 ENCOUNTER — HOSPITAL ENCOUNTER (OUTPATIENT)
Dept: PHYSICAL THERAPY | Facility: OTHER | Age: 82
Setting detail: THERAPIES SERIES
End: 2022-01-24
Attending: NURSE PRACTITIONER
Payer: MEDICARE

## 2022-01-01 ENCOUNTER — NURSE TRIAGE (OUTPATIENT)
Dept: INTERNAL MEDICINE | Facility: OTHER | Age: 82
End: 2022-01-01

## 2022-01-01 ENCOUNTER — HOSPITAL ENCOUNTER (OUTPATIENT)
Dept: PHYSICAL THERAPY | Facility: OTHER | Age: 82
Setting detail: THERAPIES SERIES
End: 2022-02-09
Attending: NURSE PRACTITIONER
Payer: MEDICARE

## 2022-01-01 ENCOUNTER — OFFICE VISIT (OUTPATIENT)
Dept: SURGERY | Facility: OTHER | Age: 82
End: 2022-01-01
Attending: SURGERY
Payer: MEDICARE

## 2022-01-01 ENCOUNTER — HOSPITAL ENCOUNTER (OUTPATIENT)
Dept: PHYSICAL THERAPY | Facility: OTHER | Age: 82
Setting detail: THERAPIES SERIES
End: 2022-01-20
Attending: NURSE PRACTITIONER
Payer: MEDICARE

## 2022-01-01 ENCOUNTER — APPOINTMENT (OUTPATIENT)
Dept: OCCUPATIONAL THERAPY | Facility: HOSPITAL | Age: 82
DRG: 194 | End: 2022-01-01
Attending: INTERNAL MEDICINE
Payer: MEDICARE

## 2022-01-01 ENCOUNTER — HOSPITAL ENCOUNTER (OUTPATIENT)
Dept: PHYSICAL THERAPY | Facility: OTHER | Age: 82
Setting detail: THERAPIES SERIES
End: 2022-02-14
Attending: NURSE PRACTITIONER
Payer: MEDICARE

## 2022-01-01 ENCOUNTER — LAB (OUTPATIENT)
Dept: LAB | Facility: OTHER | Age: 82
End: 2022-01-01
Attending: NURSE PRACTITIONER
Payer: MEDICARE

## 2022-01-01 VITALS
WEIGHT: 159 LBS | HEART RATE: 82 BPM | TEMPERATURE: 100 F | DIASTOLIC BLOOD PRESSURE: 69 MMHG | OXYGEN SATURATION: 91 % | BODY MASS INDEX: 24.96 KG/M2 | HEIGHT: 67 IN | SYSTOLIC BLOOD PRESSURE: 124 MMHG | RESPIRATION RATE: 16 BRPM

## 2022-01-01 VITALS
RESPIRATION RATE: 18 BRPM | BODY MASS INDEX: 27.69 KG/M2 | DIASTOLIC BLOOD PRESSURE: 68 MMHG | WEIGHT: 176.4 LBS | TEMPERATURE: 96.8 F | HEART RATE: 72 BPM | SYSTOLIC BLOOD PRESSURE: 134 MMHG | OXYGEN SATURATION: 98 %

## 2022-01-01 VITALS
RESPIRATION RATE: 17 BRPM | OXYGEN SATURATION: 96 % | TEMPERATURE: 98.2 F | SYSTOLIC BLOOD PRESSURE: 112 MMHG | HEART RATE: 75 BPM | BODY MASS INDEX: 24.76 KG/M2 | DIASTOLIC BLOOD PRESSURE: 62 MMHG | WEIGHT: 158.1 LBS

## 2022-01-01 VITALS — WEIGHT: 165 LBS | WEIGHT: 165 LBS | BODY MASS INDEX: 25.84 KG/M2 | BODY MASS INDEX: 25.84 KG/M2

## 2022-01-01 VITALS
HEART RATE: 85 BPM | TEMPERATURE: 97.8 F | OXYGEN SATURATION: 95 % | WEIGHT: 161 LBS | RESPIRATION RATE: 18 BRPM | BODY MASS INDEX: 25.27 KG/M2 | HEIGHT: 67 IN | DIASTOLIC BLOOD PRESSURE: 88 MMHG | SYSTOLIC BLOOD PRESSURE: 107 MMHG

## 2022-01-01 VITALS
RESPIRATION RATE: 16 BRPM | OXYGEN SATURATION: 99 % | TEMPERATURE: 97.9 F | BODY MASS INDEX: 27.03 KG/M2 | HEART RATE: 66 BPM | DIASTOLIC BLOOD PRESSURE: 64 MMHG | WEIGHT: 172.2 LBS | SYSTOLIC BLOOD PRESSURE: 122 MMHG

## 2022-01-01 VITALS
HEART RATE: 71 BPM | OXYGEN SATURATION: 95 % | DIASTOLIC BLOOD PRESSURE: 84 MMHG | SYSTOLIC BLOOD PRESSURE: 149 MMHG | RESPIRATION RATE: 16 BRPM | TEMPERATURE: 96.9 F

## 2022-01-01 VITALS
TEMPERATURE: 97.6 F | RESPIRATION RATE: 16 BRPM | DIASTOLIC BLOOD PRESSURE: 82 MMHG | BODY MASS INDEX: 25.47 KG/M2 | OXYGEN SATURATION: 95 % | SYSTOLIC BLOOD PRESSURE: 132 MMHG | HEART RATE: 68 BPM | WEIGHT: 162.6 LBS

## 2022-01-01 VITALS — WEIGHT: 164.5 LBS | BODY MASS INDEX: 25.76 KG/M2 | BODY MASS INDEX: 25.47 KG/M2 | WEIGHT: 162.6 LBS

## 2022-01-01 VITALS
SYSTOLIC BLOOD PRESSURE: 120 MMHG | WEIGHT: 171 LBS | BODY MASS INDEX: 26.84 KG/M2 | TEMPERATURE: 99.2 F | DIASTOLIC BLOOD PRESSURE: 64 MMHG | HEART RATE: 73 BPM | RESPIRATION RATE: 18 BRPM | OXYGEN SATURATION: 98 %

## 2022-01-01 VITALS
WEIGHT: 166 LBS | SYSTOLIC BLOOD PRESSURE: 141 MMHG | DIASTOLIC BLOOD PRESSURE: 82 MMHG | BODY MASS INDEX: 26 KG/M2 | RESPIRATION RATE: 18 BRPM | OXYGEN SATURATION: 94 % | HEART RATE: 82 BPM | TEMPERATURE: 97.8 F

## 2022-01-01 VITALS — WEIGHT: 160.1 LBS | BODY MASS INDEX: 25.08 KG/M2

## 2022-01-01 VITALS
DIASTOLIC BLOOD PRESSURE: 66 MMHG | RESPIRATION RATE: 20 BRPM | WEIGHT: 166.4 LBS | SYSTOLIC BLOOD PRESSURE: 132 MMHG | HEIGHT: 67 IN | BODY MASS INDEX: 26.12 KG/M2 | TEMPERATURE: 97.7 F | HEART RATE: 72 BPM | OXYGEN SATURATION: 97 %

## 2022-01-01 VITALS
RESPIRATION RATE: 22 BRPM | WEIGHT: 161.4 LBS | SYSTOLIC BLOOD PRESSURE: 108 MMHG | TEMPERATURE: 97.5 F | DIASTOLIC BLOOD PRESSURE: 62 MMHG | HEART RATE: 84 BPM | BODY MASS INDEX: 25.28 KG/M2

## 2022-01-01 VITALS
HEART RATE: 82 BPM | TEMPERATURE: 97.8 F | SYSTOLIC BLOOD PRESSURE: 112 MMHG | OXYGEN SATURATION: 99 % | WEIGHT: 162.7 LBS | RESPIRATION RATE: 18 BRPM | BODY MASS INDEX: 25.48 KG/M2 | DIASTOLIC BLOOD PRESSURE: 69 MMHG

## 2022-01-01 VITALS
RESPIRATION RATE: 20 BRPM | DIASTOLIC BLOOD PRESSURE: 9 MMHG | HEIGHT: 67 IN | SYSTOLIC BLOOD PRESSURE: 140 MMHG | HEART RATE: 79 BPM | BODY MASS INDEX: 25.05 KG/M2 | WEIGHT: 159.6 LBS | TEMPERATURE: 98.6 F | OXYGEN SATURATION: 95 %

## 2022-01-01 VITALS
TEMPERATURE: 97.5 F | SYSTOLIC BLOOD PRESSURE: 102 MMHG | RESPIRATION RATE: 18 BRPM | HEART RATE: 88 BPM | DIASTOLIC BLOOD PRESSURE: 66 MMHG

## 2022-01-01 VITALS
HEART RATE: 78 BPM | WEIGHT: 131.7 LBS | BODY MASS INDEX: 20.67 KG/M2 | DIASTOLIC BLOOD PRESSURE: 62 MMHG | RESPIRATION RATE: 16 BRPM | TEMPERATURE: 97.8 F | HEIGHT: 67 IN | OXYGEN SATURATION: 93 % | SYSTOLIC BLOOD PRESSURE: 108 MMHG

## 2022-01-01 VITALS
BODY MASS INDEX: 26.14 KG/M2 | HEART RATE: 93 BPM | SYSTOLIC BLOOD PRESSURE: 127 MMHG | WEIGHT: 166.89 LBS | RESPIRATION RATE: 16 BRPM | OXYGEN SATURATION: 93 % | TEMPERATURE: 97.1 F | DIASTOLIC BLOOD PRESSURE: 65 MMHG

## 2022-01-01 VITALS — BODY MASS INDEX: 25.75 KG/M2 | WEIGHT: 164.4 LBS

## 2022-01-01 VITALS — BODY MASS INDEX: 25.98 KG/M2 | WEIGHT: 165.9 LBS

## 2022-01-01 VITALS
RESPIRATION RATE: 21 BRPM | WEIGHT: 161 LBS | DIASTOLIC BLOOD PRESSURE: 75 MMHG | HEART RATE: 74 BPM | BODY MASS INDEX: 25.27 KG/M2 | TEMPERATURE: 98.1 F | SYSTOLIC BLOOD PRESSURE: 119 MMHG | OXYGEN SATURATION: 94 % | HEIGHT: 67 IN

## 2022-01-01 VITALS
DIASTOLIC BLOOD PRESSURE: 92 MMHG | OXYGEN SATURATION: 97 % | HEART RATE: 68 BPM | TEMPERATURE: 97.4 F | SYSTOLIC BLOOD PRESSURE: 142 MMHG | RESPIRATION RATE: 16 BRPM | WEIGHT: 173 LBS | BODY MASS INDEX: 27.15 KG/M2

## 2022-01-01 VITALS
SYSTOLIC BLOOD PRESSURE: 120 MMHG | WEIGHT: 159.8 LBS | HEART RATE: 80 BPM | RESPIRATION RATE: 20 BRPM | DIASTOLIC BLOOD PRESSURE: 60 MMHG | TEMPERATURE: 97.4 F | OXYGEN SATURATION: 97 % | BODY MASS INDEX: 25.03 KG/M2

## 2022-01-01 VITALS
OXYGEN SATURATION: 94 % | BODY MASS INDEX: 24.42 KG/M2 | DIASTOLIC BLOOD PRESSURE: 79 MMHG | RESPIRATION RATE: 20 BRPM | SYSTOLIC BLOOD PRESSURE: 124 MMHG | TEMPERATURE: 97.7 F | WEIGHT: 155.6 LBS | HEART RATE: 103 BPM | HEIGHT: 67 IN

## 2022-01-01 VITALS
DIASTOLIC BLOOD PRESSURE: 72 MMHG | WEIGHT: 170 LBS | SYSTOLIC BLOOD PRESSURE: 124 MMHG | TEMPERATURE: 98.2 F | OXYGEN SATURATION: 97 % | HEART RATE: 67 BPM | RESPIRATION RATE: 16 BRPM | BODY MASS INDEX: 26.68 KG/M2

## 2022-01-01 VITALS — BODY MASS INDEX: 26.01 KG/M2 | WEIGHT: 166.1 LBS

## 2022-01-01 VITALS
TEMPERATURE: 98.7 F | RESPIRATION RATE: 18 BRPM | HEART RATE: 63 BPM | DIASTOLIC BLOOD PRESSURE: 58 MMHG | SYSTOLIC BLOOD PRESSURE: 113 MMHG

## 2022-01-01 VITALS — BODY MASS INDEX: 26.6 KG/M2 | WEIGHT: 169.5 LBS | HEIGHT: 67 IN

## 2022-01-01 VITALS
RESPIRATION RATE: 18 BRPM | HEART RATE: 67 BPM | OXYGEN SATURATION: 91 % | DIASTOLIC BLOOD PRESSURE: 62 MMHG | TEMPERATURE: 96.9 F | SYSTOLIC BLOOD PRESSURE: 107 MMHG

## 2022-01-01 DIAGNOSIS — C34.31 MALIGNANT NEOPLASM OF LOWER LOBE OF RIGHT LUNG (H): Primary | Chronic | ICD-10-CM

## 2022-01-01 DIAGNOSIS — I50.33 ACUTE ON CHRONIC HEART FAILURE WITH PRESERVED EJECTION FRACTION (HFPEF) (H): Primary | ICD-10-CM

## 2022-01-01 DIAGNOSIS — D64.9 ANEMIA, UNSPECIFIED TYPE: ICD-10-CM

## 2022-01-01 DIAGNOSIS — E11.22 CONTROLLED TYPE 2 DIABETES MELLITUS WITH STAGE 3 CHRONIC KIDNEY DISEASE, WITHOUT LONG-TERM CURRENT USE OF INSULIN (H): ICD-10-CM

## 2022-01-01 DIAGNOSIS — R68.89 FEELING UNWELL: Primary | ICD-10-CM

## 2022-01-01 DIAGNOSIS — R12 HEARTBURN: ICD-10-CM

## 2022-01-01 DIAGNOSIS — R91.8 MASS OF UPPER LOBE OF RIGHT LUNG: ICD-10-CM

## 2022-01-01 DIAGNOSIS — J69.0 ASPIRATION PNEUMONIA OF RIGHT LOWER LOBE, UNSPECIFIED ASPIRATION PNEUMONIA TYPE (H): ICD-10-CM

## 2022-01-01 DIAGNOSIS — C34.31 MALIGNANT NEOPLASM OF LOWER LOBE OF RIGHT LUNG (H): Primary | ICD-10-CM

## 2022-01-01 DIAGNOSIS — C34.31 MALIGNANT NEOPLASM OF LOWER LOBE OF RIGHT LUNG (H): ICD-10-CM

## 2022-01-01 DIAGNOSIS — I11.0 HYPERTENSIVE HEART DISEASE WITH HEART FAILURE (H): ICD-10-CM

## 2022-01-01 DIAGNOSIS — J69.0 RECURRENT ASPIRATION PNEUMONIA (H): Primary | ICD-10-CM

## 2022-01-01 DIAGNOSIS — C34.91 MALIGNANT NEOPLASM OF RIGHT LUNG, UNSPECIFIED PART OF LUNG (H): Primary | ICD-10-CM

## 2022-01-01 DIAGNOSIS — E87.6 HYPOKALEMIA: ICD-10-CM

## 2022-01-01 DIAGNOSIS — R06.6 HICCUPS: ICD-10-CM

## 2022-01-01 DIAGNOSIS — R62.7 ADULT FAILURE TO THRIVE: ICD-10-CM

## 2022-01-01 DIAGNOSIS — R05.9 COUGH: ICD-10-CM

## 2022-01-01 DIAGNOSIS — G31.83 LEWY BODY DEMENTIA WITHOUT BEHAVIORAL DISTURBANCE (H): ICD-10-CM

## 2022-01-01 DIAGNOSIS — E83.42 HYPOMAGNESEMIA: ICD-10-CM

## 2022-01-01 DIAGNOSIS — R39.12 WEAK URINARY STREAM: ICD-10-CM

## 2022-01-01 DIAGNOSIS — C34.81 MALIGNANT NEOPLASM OF OVERLAPPING SITES OF RIGHT LUNG (H): ICD-10-CM

## 2022-01-01 DIAGNOSIS — R06.02 SHORTNESS OF BREATH: ICD-10-CM
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
## 2022-01-01 DIAGNOSIS — E03.4 HYPOTHYROIDISM DUE TO ACQUIRED ATROPHY OF THYROID: ICD-10-CM

## 2022-01-01 DIAGNOSIS — J15.9 COMMUNITY ACQUIRED BACTERIAL PNEUMONIA: ICD-10-CM

## 2022-01-01 DIAGNOSIS — R06.6 CHRONIC HICCUPS: ICD-10-CM

## 2022-01-01 DIAGNOSIS — U07.1 INFECTION DUE TO 2019 NOVEL CORONAVIRUS: Primary | ICD-10-CM

## 2022-01-01 DIAGNOSIS — K86.81 EXOCRINE PANCREATIC INSUFFICIENCY: ICD-10-CM

## 2022-01-01 DIAGNOSIS — D72.829 LEUKOCYTOSIS, UNSPECIFIED TYPE: ICD-10-CM

## 2022-01-01 DIAGNOSIS — Z98.890 HISTORY OF PANCREATIC SURGERY: ICD-10-CM

## 2022-01-01 DIAGNOSIS — N18.30 CONTROLLED TYPE 2 DIABETES MELLITUS WITH STAGE 3 CHRONIC KIDNEY DISEASE, WITHOUT LONG-TERM CURRENT USE OF INSULIN (H): Primary | ICD-10-CM

## 2022-01-01 DIAGNOSIS — F02.80 LEWY BODY DEMENTIA WITHOUT BEHAVIORAL DISTURBANCE (H): ICD-10-CM

## 2022-01-01 DIAGNOSIS — R29.818 PROGRESSIVE NEUROLOGIC DECLINE: Primary | ICD-10-CM

## 2022-01-01 DIAGNOSIS — Z09 HOSPITAL DISCHARGE FOLLOW-UP: ICD-10-CM

## 2022-01-01 DIAGNOSIS — I25.2 HISTORY OF ST ELEVATION MYOCARDIAL INFARCTION (STEMI): ICD-10-CM

## 2022-01-01 DIAGNOSIS — R60.0 BILATERAL LEG EDEMA: ICD-10-CM

## 2022-01-01 DIAGNOSIS — E11.22 CONTROLLED TYPE 2 DIABETES MELLITUS WITH STAGE 3 CHRONIC KIDNEY DISEASE, WITHOUT LONG-TERM CURRENT USE OF INSULIN (H): Primary | ICD-10-CM

## 2022-01-01 DIAGNOSIS — R91.8 RIGHT LOWER LOBE LUNG MASS: Primary | ICD-10-CM

## 2022-01-01 DIAGNOSIS — E11.9 CONTROLLED TYPE 2 DIABETES MELLITUS WITHOUT COMPLICATION, WITHOUT LONG-TERM CURRENT USE OF INSULIN (H): ICD-10-CM

## 2022-01-01 DIAGNOSIS — R91.8 PULMONARY NODULES: ICD-10-CM

## 2022-01-01 DIAGNOSIS — Z98.890 POSTOPERATIVE STATE: Primary | ICD-10-CM

## 2022-01-01 DIAGNOSIS — Z01.812 PRE-PROCEDURE LAB EXAM: ICD-10-CM

## 2022-01-01 DIAGNOSIS — L57.0 ACTINIC KERATOSES: ICD-10-CM

## 2022-01-01 DIAGNOSIS — C34.11 MALIGNANT NEOPLASM OF UPPER LOBE OF RIGHT LUNG (H): Chronic | ICD-10-CM

## 2022-01-01 DIAGNOSIS — C34.31 MALIGNANT NEOPLASM OF LOWER LOBE OF RIGHT LUNG (H): Chronic | ICD-10-CM

## 2022-01-01 DIAGNOSIS — N18.30 CONTROLLED TYPE 2 DIABETES MELLITUS WITH STAGE 3 CHRONIC KIDNEY DISEASE, WITHOUT LONG-TERM CURRENT USE OF INSULIN (H): ICD-10-CM

## 2022-01-01 DIAGNOSIS — Z87.891 HISTORY OF TOBACCO USE: ICD-10-CM

## 2022-01-01 DIAGNOSIS — D50.9 IRON DEFICIENCY ANEMIA, UNSPECIFIED IRON DEFICIENCY ANEMIA TYPE: ICD-10-CM

## 2022-01-01 DIAGNOSIS — Z20.822 COVID-19 RULED OUT: Primary | ICD-10-CM

## 2022-01-01 DIAGNOSIS — J01.90 ACUTE BACTERIAL RHINOSINUSITIS: ICD-10-CM

## 2022-01-01 DIAGNOSIS — R91.1 LUNG NODULE: ICD-10-CM

## 2022-01-01 DIAGNOSIS — Z85.819 HISTORY OF THROAT CANCER: ICD-10-CM

## 2022-01-01 DIAGNOSIS — M62.81 GENERALIZED MUSCLE WEAKNESS: ICD-10-CM

## 2022-01-01 DIAGNOSIS — E78.2 MIXED HYPERLIPIDEMIA: ICD-10-CM

## 2022-01-01 DIAGNOSIS — Z01.812 PRE-PROCEDURE LAB EXAM: Primary | ICD-10-CM

## 2022-01-01 DIAGNOSIS — J41.0 SIMPLE CHRONIC BRONCHITIS (H): Primary | ICD-10-CM

## 2022-01-01 DIAGNOSIS — E86.0 DEHYDRATION: ICD-10-CM

## 2022-01-01 DIAGNOSIS — Z85.07 PERSONAL HISTORY OF PANCREATIC CANCER: ICD-10-CM

## 2022-01-01 DIAGNOSIS — R93.89 NODULAR RADIOLOGIC DENSITY: ICD-10-CM

## 2022-01-01 DIAGNOSIS — R19.7 DIARRHEA, UNSPECIFIED TYPE: ICD-10-CM

## 2022-01-01 DIAGNOSIS — G25.81 RESTLESS LEGS SYNDROME: ICD-10-CM

## 2022-01-01 DIAGNOSIS — Z00.00 ENCOUNTER FOR MEDICARE ANNUAL WELLNESS EXAM: ICD-10-CM

## 2022-01-01 DIAGNOSIS — G31.83 LEWY BODY DEMENTIA WITH BEHAVIORAL DISTURBANCE (H): Primary | ICD-10-CM

## 2022-01-01 DIAGNOSIS — R05.9 COUGH: Primary | ICD-10-CM

## 2022-01-01 DIAGNOSIS — R91.1 NODULE OF UPPER LOBE OF RIGHT LUNG: ICD-10-CM

## 2022-01-01 DIAGNOSIS — U07.1 INFECTION DUE TO 2019 NOVEL CORONAVIRUS: ICD-10-CM

## 2022-01-01 DIAGNOSIS — J18.9 PNEUMONIA OF BOTH LUNGS DUE TO INFECTIOUS ORGANISM, UNSPECIFIED PART OF LUNG: Primary | ICD-10-CM

## 2022-01-01 DIAGNOSIS — J18.9 UNRESOLVED PNEUMONIA: Primary | ICD-10-CM

## 2022-01-01 DIAGNOSIS — R06.6 INTRACTABLE HICCUPS: Primary | ICD-10-CM

## 2022-01-01 DIAGNOSIS — R06.6 HICCUP: ICD-10-CM

## 2022-01-01 DIAGNOSIS — B96.89 ACUTE BACTERIAL RHINOSINUSITIS: ICD-10-CM

## 2022-01-01 DIAGNOSIS — I25.10 ATHEROSCLEROSIS OF NATIVE CORONARY ARTERY OF NATIVE HEART WITHOUT ANGINA PECTORIS: ICD-10-CM

## 2022-01-01 DIAGNOSIS — I10 BENIGN ESSENTIAL HYPERTENSION: Primary | ICD-10-CM

## 2022-01-01 DIAGNOSIS — C34.90 NON-SMALL CELL LUNG CANCER, UNSPECIFIED LATERALITY (H): Primary | ICD-10-CM

## 2022-01-01 DIAGNOSIS — J18.9 PNEUMONIA: ICD-10-CM

## 2022-01-01 DIAGNOSIS — R06.6 HICCUPS: Primary | ICD-10-CM

## 2022-01-01 DIAGNOSIS — Z87.891 PERSONAL HISTORY OF TOBACCO USE, PRESENTING HAZARDS TO HEALTH: ICD-10-CM

## 2022-01-01 DIAGNOSIS — F02.818 LEWY BODY DEMENTIA WITH BEHAVIORAL DISTURBANCE (H): Primary | ICD-10-CM

## 2022-01-01 DIAGNOSIS — C44.219 BASAL CELL CARCINOMA OF LEFT EAR: ICD-10-CM

## 2022-01-01 DIAGNOSIS — E43 SEVERE PROTEIN-CALORIE MALNUTRITION (GOMEZ: LESS THAN 60% OF STANDARD WEIGHT) (H): ICD-10-CM

## 2022-01-01 DIAGNOSIS — J22 LOWER RESPIRATORY INFECTION (E.G., BRONCHITIS, PNEUMONIA, PNEUMONITIS, PULMONITIS): Primary | ICD-10-CM

## 2022-01-01 DIAGNOSIS — Z11.52 ENCOUNTER FOR SCREENING LABORATORY TESTING FOR COVID-19 VIRUS: ICD-10-CM

## 2022-01-01 DIAGNOSIS — R09.81 NASAL CONGESTION: ICD-10-CM

## 2022-01-01 DIAGNOSIS — R05.3 CHRONIC COUGH: ICD-10-CM

## 2022-01-01 DIAGNOSIS — J69.0 RECURRENT ASPIRATION PNEUMONIA (H): ICD-10-CM

## 2022-01-01 DIAGNOSIS — R91.8 RIGHT LOWER LOBE LUNG MASS: ICD-10-CM

## 2022-01-01 DIAGNOSIS — C34.90 NON-SMALL CELL LUNG CANCER, UNSPECIFIED LATERALITY (H): ICD-10-CM

## 2022-01-01 DIAGNOSIS — R26.81 UNSTEADY GAIT: Primary | ICD-10-CM

## 2022-01-01 DIAGNOSIS — I70.0 AORTIC ATHEROSCLEROSIS (H): ICD-10-CM

## 2022-01-01 DIAGNOSIS — I10 BENIGN ESSENTIAL HYPERTENSION: ICD-10-CM

## 2022-01-01 DIAGNOSIS — Z71.85 VACCINE COUNSELING: ICD-10-CM

## 2022-01-01 DIAGNOSIS — R41.0 CONFUSION: ICD-10-CM

## 2022-01-01 DIAGNOSIS — J01.00 ACUTE NON-RECURRENT MAXILLARY SINUSITIS: Primary | ICD-10-CM

## 2022-01-01 DIAGNOSIS — J18.9 PNEUMONIA DUE TO INFECTIOUS ORGANISM, UNSPECIFIED LATERALITY, UNSPECIFIED PART OF LUNG: ICD-10-CM

## 2022-01-01 DIAGNOSIS — R29.818 PROGRESSIVE NEUROLOGIC DECLINE: ICD-10-CM

## 2022-01-01 DIAGNOSIS — C34.31 SQUAMOUS CELL CARCINOMA OF BRONCHUS IN RIGHT LOWER LOBE (H): Primary | ICD-10-CM

## 2022-01-01 DIAGNOSIS — R63.4 ABNORMAL LOSS OF WEIGHT: ICD-10-CM

## 2022-01-01 DIAGNOSIS — J41.0 SIMPLE CHRONIC BRONCHITIS (H): ICD-10-CM

## 2022-01-01 LAB
ALBUMIN SERPL-MCNC: 2.2 G/DL (ref 3.4–5)
ALBUMIN SERPL-MCNC: 2.9 G/DL (ref 3.5–5.7)
ALBUMIN SERPL-MCNC: 3 G/DL (ref 3.5–5.7)
ALBUMIN SERPL-MCNC: 3 G/DL (ref 3.5–5.7)
ALBUMIN SERPL-MCNC: 3.2 G/DL (ref 3.5–5.7)
ALBUMIN SERPL-MCNC: 3.6 G/DL (ref 3.5–5.7)
ALBUMIN SERPL-MCNC: 3.6 G/DL (ref 3.5–5.7)
ALBUMIN SERPL-MCNC: 3.7 G/DL (ref 3.5–5.7)
ALBUMIN UR-MCNC: 20 MG/DL
ALBUMIN UR-MCNC: 30 MG/DL
ALBUMIN UR-MCNC: 30 MG/DL
ALBUMIN UR-MCNC: NEGATIVE MG/DL
ALP SERPL-CCNC: 52 U/L (ref 34–104)
ALP SERPL-CCNC: 58 U/L (ref 34–104)
ALP SERPL-CCNC: 58 U/L (ref 34–104)
ALP SERPL-CCNC: 60 U/L (ref 34–104)
ALP SERPL-CCNC: 61 U/L (ref 34–104)
ALP SERPL-CCNC: 66 U/L (ref 34–104)
ALP SERPL-CCNC: 67 U/L (ref 34–104)
ALP SERPL-CCNC: 67 U/L (ref 34–104)
ALP SERPL-CCNC: 70 U/L (ref 34–104)
ALP SERPL-CCNC: 73 U/L (ref 34–104)
ALP SERPL-CCNC: 78 U/L (ref 40–150)
ALT SERPL W P-5'-P-CCNC: 11 U/L (ref 7–52)
ALT SERPL W P-5'-P-CCNC: 12 U/L (ref 7–52)
ALT SERPL W P-5'-P-CCNC: 12 U/L (ref 7–52)
ALT SERPL W P-5'-P-CCNC: 13 U/L (ref 7–52)
ALT SERPL W P-5'-P-CCNC: 15 U/L (ref 7–52)
ALT SERPL W P-5'-P-CCNC: 17 U/L (ref 7–52)
ALT SERPL W P-5'-P-CCNC: 18 U/L (ref 7–52)
ALT SERPL W P-5'-P-CCNC: 19 U/L (ref 7–52)
ALT SERPL W P-5'-P-CCNC: 21 U/L (ref 7–52)
ALT SERPL W P-5'-P-CCNC: 22 U/L (ref 0–70)
ALT SERPL W P-5'-P-CCNC: 24 U/L (ref 7–52)
ANION GAP SERPL CALCULATED.3IONS-SCNC: 10 MMOL/L (ref 3–14)
ANION GAP SERPL CALCULATED.3IONS-SCNC: 11 MMOL/L (ref 3–14)
ANION GAP SERPL CALCULATED.3IONS-SCNC: 12 MMOL/L (ref 3–14)
ANION GAP SERPL CALCULATED.3IONS-SCNC: 12 MMOL/L (ref 3–14)
ANION GAP SERPL CALCULATED.3IONS-SCNC: 13 MMOL/L (ref 3–14)
ANION GAP SERPL CALCULATED.3IONS-SCNC: 4 MMOL/L (ref 3–14)
ANION GAP SERPL CALCULATED.3IONS-SCNC: 5 MMOL/L (ref 3–14)
ANION GAP SERPL CALCULATED.3IONS-SCNC: 5 MMOL/L (ref 3–14)
ANION GAP SERPL CALCULATED.3IONS-SCNC: 6 MMOL/L (ref 3–14)
ANION GAP SERPL CALCULATED.3IONS-SCNC: 7 MMOL/L (ref 3–14)
ANION GAP SERPL CALCULATED.3IONS-SCNC: 7 MMOL/L (ref 3–14)
ANION GAP SERPL CALCULATED.3IONS-SCNC: 8 MMOL/L (ref 3–14)
ANION GAP SERPL CALCULATED.3IONS-SCNC: 8 MMOL/L (ref 3–14)
ANION GAP SERPL CALCULATED.3IONS-SCNC: 9 MMOL/L (ref 3–14)
ANION GAP SERPL CALCULATED.3IONS-SCNC: 9 MMOL/L (ref 3–14)
APPEARANCE UR: CLEAR
AST SERPL W P-5'-P-CCNC: 10 U/L (ref 13–39)
AST SERPL W P-5'-P-CCNC: 12 U/L (ref 13–39)
AST SERPL W P-5'-P-CCNC: 14 U/L (ref 13–39)
AST SERPL W P-5'-P-CCNC: 15 U/L (ref 13–39)
AST SERPL W P-5'-P-CCNC: 19 U/L (ref 13–39)
AST SERPL W P-5'-P-CCNC: 20 U/L (ref 13–39)
AST SERPL W P-5'-P-CCNC: 23 U/L (ref 13–39)
AST SERPL W P-5'-P-CCNC: 24 U/L (ref 13–39)
AST SERPL W P-5'-P-CCNC: 28 U/L (ref 0–45)
ATRIAL RATE - MUSE: 79 BPM
ATRIAL RATE - MUSE: 81 BPM
BACTERIA #/AREA URNS HPF: ABNORMAL /HPF
BACTERIA BLD CULT: NO GROWTH
BACTERIA SPT CULT: ABNORMAL
BACTERIA SPT CULT: ABNORMAL
BACTERIA UR CULT: NORMAL
BASOPHILS # BLD AUTO: 0 10E3/UL (ref 0–0.2)
BASOPHILS NFR BLD AUTO: 0 %
BILIRUB SERPL-MCNC: 0.4 MG/DL (ref 0.3–1)
BILIRUB SERPL-MCNC: 0.5 MG/DL (ref 0.3–1)
BILIRUB SERPL-MCNC: 0.6 MG/DL (ref 0.3–1)
BILIRUB SERPL-MCNC: 0.8 MG/DL (ref 0.3–1)
BILIRUB SERPL-MCNC: 0.9 MG/DL (ref 0.2–1.3)
BILIRUB UR QL STRIP: NEGATIVE
BUN SERPL-MCNC: 10 MG/DL (ref 7–25)
BUN SERPL-MCNC: 10 MG/DL (ref 7–30)
BUN SERPL-MCNC: 11 MG/DL (ref 7–25)
BUN SERPL-MCNC: 13 MG/DL (ref 7–25)
BUN SERPL-MCNC: 14 MG/DL (ref 7–25)
BUN SERPL-MCNC: 17 MG/DL (ref 7–25)
BUN SERPL-MCNC: 2 MG/DL (ref 7–30)
BUN SERPL-MCNC: 4 MG/DL (ref 7–25)
BUN SERPL-MCNC: 5 MG/DL (ref 7–25)
BUN SERPL-MCNC: 5 MG/DL (ref 7–25)
BUN SERPL-MCNC: 5 MG/DL (ref 7–30)
BUN SERPL-MCNC: 6 MG/DL (ref 7–30)
BUN SERPL-MCNC: 7 MG/DL (ref 7–25)
BUN SERPL-MCNC: 7 MG/DL (ref 7–25)
BUN SERPL-MCNC: 8 MG/DL (ref 7–25)
BUN SERPL-MCNC: 8 MG/DL (ref 7–25)
C COLI+JEJUNI+LARI FUSA STL QL NAA+PROBE: NOT DETECTED
C DIFF TOX B STL QL: NEGATIVE
CALCIUM SERPL-MCNC: 7.9 MG/DL (ref 8.5–10.1)
CALCIUM SERPL-MCNC: 8.1 MG/DL (ref 8.5–10.1)
CALCIUM SERPL-MCNC: 8.3 MG/DL (ref 8.5–10.1)
CALCIUM SERPL-MCNC: 8.3 MG/DL (ref 8.6–10.3)
CALCIUM SERPL-MCNC: 8.4 MG/DL (ref 8.5–10.1)
CALCIUM SERPL-MCNC: 8.5 MG/DL (ref 8.6–10.3)
CALCIUM SERPL-MCNC: 8.6 MG/DL (ref 8.6–10.3)
CALCIUM SERPL-MCNC: 8.6 MG/DL (ref 8.6–10.3)
CALCIUM SERPL-MCNC: 8.8 MG/DL (ref 8.6–10.3)
CALCIUM SERPL-MCNC: 8.9 MG/DL (ref 8.6–10.3)
CALCIUM SERPL-MCNC: 8.9 MG/DL (ref 8.6–10.3)
CALCIUM SERPL-MCNC: 9 MG/DL (ref 8.6–10.3)
CALCIUM SERPL-MCNC: 9.4 MG/DL (ref 8.6–10.3)
CALCIUM SERPL-MCNC: 9.4 MG/DL (ref 8.6–10.3)
CHLORIDE BLD-SCNC: 100 MMOL/L (ref 94–109)
CHLORIDE BLD-SCNC: 100 MMOL/L (ref 98–107)
CHLORIDE BLD-SCNC: 101 MMOL/L (ref 98–107)
CHLORIDE BLD-SCNC: 102 MMOL/L (ref 94–109)
CHLORIDE BLD-SCNC: 103 MMOL/L (ref 98–107)
CHLORIDE BLD-SCNC: 108 MMOL/L (ref 98–107)
CHLORIDE BLD-SCNC: 108 MMOL/L (ref 98–107)
CHLORIDE BLD-SCNC: 111 MMOL/L (ref 98–107)
CHLORIDE BLD-SCNC: 93 MMOL/L (ref 98–107)
CHLORIDE BLD-SCNC: 94 MMOL/L (ref 98–107)
CHLORIDE BLD-SCNC: 94 MMOL/L (ref 98–107)
CHLORIDE BLD-SCNC: 95 MMOL/L (ref 98–107)
CHLORIDE BLD-SCNC: 96 MMOL/L (ref 98–107)
CHLORIDE BLD-SCNC: 96 MMOL/L (ref 98–107)
CHLORIDE BLD-SCNC: 98 MMOL/L (ref 94–109)
CHLORIDE BLD-SCNC: 98 MMOL/L (ref 98–107)
CHLORIDE BLD-SCNC: 98 MMOL/L (ref 98–107)
CHLORIDE BLD-SCNC: 99 MMOL/L (ref 94–109)
CHLORIDE BLD-SCNC: 99 MMOL/L (ref 98–107)
CHLORIDE BLD-SCNC: 99 MMOL/L (ref 98–107)
CHOLEST SERPL-MCNC: 100 MG/DL
CHOLEST SERPL-MCNC: 81 MG/DL
CO2 SERPL-SCNC: 22 MMOL/L (ref 21–31)
CO2 SERPL-SCNC: 24 MMOL/L (ref 21–31)
CO2 SERPL-SCNC: 25 MMOL/L (ref 20–32)
CO2 SERPL-SCNC: 25 MMOL/L (ref 20–32)
CO2 SERPL-SCNC: 25 MMOL/L (ref 21–31)
CO2 SERPL-SCNC: 26 MMOL/L (ref 20–32)
CO2 SERPL-SCNC: 26 MMOL/L (ref 20–32)
CO2 SERPL-SCNC: 26 MMOL/L (ref 21–31)
CO2 SERPL-SCNC: 26 MMOL/L (ref 21–31)
CO2 SERPL-SCNC: 27 MMOL/L (ref 21–31)
CO2 SERPL-SCNC: 28 MMOL/L (ref 21–31)
CO2 SERPL-SCNC: 29 MMOL/L (ref 21–31)
CO2 SERPL-SCNC: 30 MMOL/L (ref 21–31)
COLOR UR AUTO: ABNORMAL
COLOR UR AUTO: ABNORMAL
COLOR UR AUTO: NORMAL
COLOR UR AUTO: YELLOW
CREAT SERPL-MCNC: 0.48 MG/DL (ref 0.66–1.25)
CREAT SERPL-MCNC: 0.52 MG/DL (ref 0.7–1.3)
CREAT SERPL-MCNC: 0.57 MG/DL (ref 0.66–1.25)
CREAT SERPL-MCNC: 0.58 MG/DL (ref 0.66–1.25)
CREAT SERPL-MCNC: 0.59 MG/DL (ref 0.66–1.25)
CREAT SERPL-MCNC: 0.6 MG/DL (ref 0.7–1.3)
CREAT SERPL-MCNC: 0.62 MG/DL (ref 0.7–1.3)
CREAT SERPL-MCNC: 0.64 MG/DL (ref 0.7–1.3)
CREAT SERPL-MCNC: 0.64 MG/DL (ref 0.7–1.3)
CREAT SERPL-MCNC: 0.65 MG/DL (ref 0.7–1.3)
CREAT SERPL-MCNC: 0.67 MG/DL (ref 0.7–1.3)
CREAT SERPL-MCNC: 0.77 MG/DL (ref 0.7–1.3)
CREAT SERPL-MCNC: 0.85 MG/DL (ref 0.7–1.3)
CREAT SERPL-MCNC: 0.86 MG/DL (ref 0.7–1.3)
CREAT SERPL-MCNC: 0.9 MG/DL (ref 0.7–1.3)
CREAT SERPL-MCNC: 0.94 MG/DL (ref 0.7–1.3)
CREAT SERPL-MCNC: 0.96 MG/DL (ref 0.7–1.3)
CREAT SERPL-MCNC: 0.96 MG/DL (ref 0.7–1.3)
CREAT SERPL-MCNC: 0.98 MG/DL (ref 0.7–1.3)
CREAT SERPL-MCNC: 0.99 MG/DL (ref 0.7–1.3)
CREAT SERPL-MCNC: 1 MG/DL (ref 0.7–1.3)
CREAT SERPL-MCNC: 1.18 MG/DL (ref 0.7–1.3)
CREAT SERPL-MCNC: 1.22 MG/DL (ref 0.7–1.3)
CREAT UR-MCNC: 89 MG/DL
CRP SERPL-MCNC: 122 MG/L (ref 0–8)
CRP SERPL-MCNC: 122.3 MG/L
CRP SERPL-MCNC: 139 MG/L (ref 0–8)
CRP SERPL-MCNC: 147 MG/L (ref 0–8)
CRP SERPL-MCNC: 33 MG/L
CRP SERPL-MCNC: 49.5 MG/L
CRP SERPL-MCNC: 58 MG/L
CRP SERPL-MCNC: 61 MG/L
D DIMER PPP FEU-MCNC: 1.2 UG/ML FEU (ref 0–0.5)
D DIMER PPP FEU-MCNC: 1.23 UG/ML FEU (ref 0–0.5)
D DIMER PPP FEU-MCNC: 1.64 UG/ML FEU (ref 0–0.5)
D DIMER PPP FEU-MCNC: 2.15 UG/ML FEU (ref 0–0.5)
D DIMER PPP FEU-MCNC: 2.54 UG/ML FEU (ref 0–0.5)
DIASTOLIC BLOOD PRESSURE - MUSE: NORMAL MMHG
DIASTOLIC BLOOD PRESSURE - MUSE: NORMAL MMHG
EC STX1 GENE STL QL NAA+PROBE: NOT DETECTED
EC STX2 GENE STL QL NAA+PROBE: NOT DETECTED
EOSINOPHIL # BLD AUTO: 0 10E3/UL (ref 0–0.7)
EOSINOPHIL # BLD AUTO: 0.1 10E3/UL (ref 0–0.7)
EOSINOPHIL NFR BLD AUTO: 0 %
EOSINOPHIL NFR BLD AUTO: 1 %
ERYTHROCYTE [DISTWIDTH] IN BLOOD BY AUTOMATED COUNT: 14 % (ref 10–15)
ERYTHROCYTE [DISTWIDTH] IN BLOOD BY AUTOMATED COUNT: 14.9 % (ref 10–15)
ERYTHROCYTE [DISTWIDTH] IN BLOOD BY AUTOMATED COUNT: 14.9 % (ref 10–15)
ERYTHROCYTE [DISTWIDTH] IN BLOOD BY AUTOMATED COUNT: 15 % (ref 10–15)
ERYTHROCYTE [DISTWIDTH] IN BLOOD BY AUTOMATED COUNT: 15.1 % (ref 10–15)
ERYTHROCYTE [DISTWIDTH] IN BLOOD BY AUTOMATED COUNT: 15.1 % (ref 10–15)
ERYTHROCYTE [DISTWIDTH] IN BLOOD BY AUTOMATED COUNT: 15.3 % (ref 10–15)
ERYTHROCYTE [DISTWIDTH] IN BLOOD BY AUTOMATED COUNT: 15.4 % (ref 10–15)
ERYTHROCYTE [DISTWIDTH] IN BLOOD BY AUTOMATED COUNT: 15.5 % (ref 10–15)
ERYTHROCYTE [DISTWIDTH] IN BLOOD BY AUTOMATED COUNT: 15.9 % (ref 10–15)
ERYTHROCYTE [DISTWIDTH] IN BLOOD BY AUTOMATED COUNT: 16.1 % (ref 10–15)
ERYTHROCYTE [DISTWIDTH] IN BLOOD BY AUTOMATED COUNT: 16.1 % (ref 10–15)
ERYTHROCYTE [DISTWIDTH] IN BLOOD BY AUTOMATED COUNT: 16.3 % (ref 10–15)
ERYTHROCYTE [DISTWIDTH] IN BLOOD BY AUTOMATED COUNT: 16.9 % (ref 10–15)
ERYTHROCYTE [DISTWIDTH] IN BLOOD BY AUTOMATED COUNT: 17.8 % (ref 10–15)
ERYTHROCYTE [DISTWIDTH] IN BLOOD BY AUTOMATED COUNT: 18.2 % (ref 10–15)
ERYTHROCYTE [DISTWIDTH] IN BLOOD BY AUTOMATED COUNT: 18.4 % (ref 10–15)
FASTING STATUS PATIENT QL REPORTED: NORMAL
FASTING STATUS PATIENT QL REPORTED: YES
FERRITIN SERPL-MCNC: 195 NG/ML (ref 24–336)
FLUAV RNA SPEC QL NAA+PROBE: NEGATIVE
FLUAV RNA SPEC QL NAA+PROBE: NEGATIVE
FLUBV RNA RESP QL NAA+PROBE: NEGATIVE
FLUBV RNA RESP QL NAA+PROBE: NEGATIVE
GFR SERPL CREATININE-BSD FRML MDRD: 60 ML/MIN/1.73M2
GFR SERPL CREATININE-BSD FRML MDRD: 62 ML/MIN/1.73M2
GFR SERPL CREATININE-BSD FRML MDRD: 76 ML/MIN/1.73M2
GFR SERPL CREATININE-BSD FRML MDRD: 77 ML/MIN/1.73M2
GFR SERPL CREATININE-BSD FRML MDRD: 77 ML/MIN/1.73M2
GFR SERPL CREATININE-BSD FRML MDRD: 79 ML/MIN/1.73M2
GFR SERPL CREATININE-BSD FRML MDRD: 79 ML/MIN/1.73M2
GFR SERPL CREATININE-BSD FRML MDRD: 81 ML/MIN/1.73M2
GFR SERPL CREATININE-BSD FRML MDRD: 86 ML/MIN/1.73M2
GFR SERPL CREATININE-BSD FRML MDRD: 87 ML/MIN/1.73M2
GFR SERPL CREATININE-BSD FRML MDRD: 87 ML/MIN/1.73M2
GFR SERPL CREATININE-BSD FRML MDRD: >90 ML/MIN/1.73M2
GLUCOSE BLD-MCNC: 108 MG/DL (ref 70–105)
GLUCOSE BLD-MCNC: 122 MG/DL (ref 70–105)
GLUCOSE BLD-MCNC: 122 MG/DL (ref 70–99)
GLUCOSE BLD-MCNC: 126 MG/DL (ref 70–105)
GLUCOSE BLD-MCNC: 128 MG/DL (ref 70–105)
GLUCOSE BLD-MCNC: 131 MG/DL (ref 70–105)
GLUCOSE BLD-MCNC: 132 MG/DL (ref 70–105)
GLUCOSE BLD-MCNC: 134 MG/DL (ref 70–105)
GLUCOSE BLD-MCNC: 136 MG/DL (ref 70–105)
GLUCOSE BLD-MCNC: 137 MG/DL (ref 70–105)
GLUCOSE BLD-MCNC: 138 MG/DL (ref 70–99)
GLUCOSE BLD-MCNC: 138 MG/DL (ref 70–99)
GLUCOSE BLD-MCNC: 142 MG/DL (ref 70–99)
GLUCOSE BLD-MCNC: 150 MG/DL (ref 70–105)
GLUCOSE BLD-MCNC: 151 MG/DL (ref 70–105)
GLUCOSE BLD-MCNC: 152 MG/DL (ref 70–105)
GLUCOSE BLD-MCNC: 159 MG/DL (ref 70–105)
GLUCOSE BLD-MCNC: 161 MG/DL (ref 70–105)
GLUCOSE BLD-MCNC: 171 MG/DL (ref 70–105)
GLUCOSE BLD-MCNC: 179 MG/DL (ref 70–105)
GLUCOSE BLDC GLUCOMTR-MCNC: 107 MG/DL (ref 70–99)
GLUCOSE BLDC GLUCOMTR-MCNC: 110 MG/DL (ref 70–99)
GLUCOSE BLDC GLUCOMTR-MCNC: 113 MG/DL (ref 70–99)
GLUCOSE BLDC GLUCOMTR-MCNC: 123 MG/DL (ref 70–99)
GLUCOSE BLDC GLUCOMTR-MCNC: 123 MG/DL (ref 70–99)
GLUCOSE BLDC GLUCOMTR-MCNC: 124 MG/DL (ref 70–99)
GLUCOSE BLDC GLUCOMTR-MCNC: 124 MG/DL (ref 70–99)
GLUCOSE BLDC GLUCOMTR-MCNC: 127 MG/DL (ref 70–99)
GLUCOSE BLDC GLUCOMTR-MCNC: 128 MG/DL (ref 70–99)
GLUCOSE BLDC GLUCOMTR-MCNC: 129 MG/DL (ref 70–99)
GLUCOSE BLDC GLUCOMTR-MCNC: 131 MG/DL (ref 70–99)
GLUCOSE BLDC GLUCOMTR-MCNC: 131 MG/DL (ref 70–99)
GLUCOSE BLDC GLUCOMTR-MCNC: 133 MG/DL (ref 70–99)
GLUCOSE BLDC GLUCOMTR-MCNC: 137 MG/DL (ref 70–99)
GLUCOSE BLDC GLUCOMTR-MCNC: 138 MG/DL (ref 70–99)
GLUCOSE BLDC GLUCOMTR-MCNC: 140 MG/DL (ref 70–99)
GLUCOSE BLDC GLUCOMTR-MCNC: 148 MG/DL (ref 70–99)
GLUCOSE BLDC GLUCOMTR-MCNC: 148 MG/DL (ref 70–99)
GLUCOSE BLDC GLUCOMTR-MCNC: 153 MG/DL (ref 70–99)
GLUCOSE BLDC GLUCOMTR-MCNC: 153 MG/DL (ref 70–99)
GLUCOSE BLDC GLUCOMTR-MCNC: 157 MG/DL (ref 70–99)
GLUCOSE BLDC GLUCOMTR-MCNC: 159 MG/DL (ref 70–99)
GLUCOSE BLDC GLUCOMTR-MCNC: 163 MG/DL (ref 70–99)
GLUCOSE BLDC GLUCOMTR-MCNC: 171 MG/DL (ref 70–99)
GLUCOSE BLDC GLUCOMTR-MCNC: 180 MG/DL (ref 70–99)
GLUCOSE BLDC GLUCOMTR-MCNC: 183 MG/DL (ref 70–99)
GLUCOSE BLDC GLUCOMTR-MCNC: 185 MG/DL (ref 70–99)
GLUCOSE BLDC GLUCOMTR-MCNC: 91 MG/DL (ref 70–99)
GLUCOSE UR STRIP-MCNC: 70 MG/DL
GLUCOSE UR STRIP-MCNC: NEGATIVE MG/DL
GRAM STAIN RESULT: ABNORMAL
H PYLORI AG STL QL IA: NEGATIVE
HBA1C MFR BLD: 6.5 % (ref 4–6.2)
HBA1C MFR BLD: 7.7 % (ref 4–6.2)
HCT VFR BLD AUTO: 28.4 % (ref 40–53)
HCT VFR BLD AUTO: 29.3 % (ref 40–53)
HCT VFR BLD AUTO: 29.4 % (ref 40–53)
HCT VFR BLD AUTO: 29.5 % (ref 40–53)
HCT VFR BLD AUTO: 29.7 % (ref 40–53)
HCT VFR BLD AUTO: 30.2 % (ref 40–53)
HCT VFR BLD AUTO: 30.5 % (ref 40–53)
HCT VFR BLD AUTO: 31 % (ref 40–53)
HCT VFR BLD AUTO: 31.3 % (ref 40–53)
HCT VFR BLD AUTO: 31.5 % (ref 40–53)
HCT VFR BLD AUTO: 32.1 % (ref 40–53)
HCT VFR BLD AUTO: 32.2 % (ref 40–53)
HCT VFR BLD AUTO: 32.6 % (ref 40–53)
HCT VFR BLD AUTO: 33.6 % (ref 40–53)
HCT VFR BLD AUTO: 34.3 % (ref 40–53)
HCT VFR BLD AUTO: 34.9 % (ref 40–53)
HCT VFR BLD AUTO: 35.5 % (ref 40–53)
HDLC SERPL-MCNC: 31 MG/DL (ref 23–92)
HDLC SERPL-MCNC: 47 MG/DL (ref 23–92)
HGB BLD-MCNC: 10.1 G/DL (ref 13.3–17.7)
HGB BLD-MCNC: 10.5 G/DL (ref 13.3–17.7)
HGB BLD-MCNC: 10.5 G/DL (ref 13.3–17.7)
HGB BLD-MCNC: 10.7 G/DL (ref 13.3–17.7)
HGB BLD-MCNC: 10.9 G/DL (ref 13.3–17.7)
HGB BLD-MCNC: 11.3 G/DL (ref 13.3–17.7)
HGB BLD-MCNC: 11.4 G/DL (ref 13.3–17.7)
HGB BLD-MCNC: 11.6 G/DL (ref 13.3–17.7)
HGB BLD-MCNC: 11.7 G/DL (ref 13.3–17.7)
HGB BLD-MCNC: 11.7 G/DL (ref 13.3–17.7)
HGB BLD-MCNC: 11.8 G/DL (ref 13.3–17.7)
HGB BLD-MCNC: 11.9 G/DL (ref 13.3–17.7)
HGB BLD-MCNC: 11.9 G/DL (ref 13.3–17.7)
HGB BLD-MCNC: 9.5 G/DL (ref 13.3–17.7)
HGB BLD-MCNC: 9.9 G/DL (ref 13.3–17.7)
HGB UR QL STRIP: NEGATIVE
HOLD SPECIMEN: NORMAL
HYALINE CASTS: 1 /LPF
HYALINE CASTS: 3 /LPF
IMM GRANULOCYTES # BLD: 0 10E3/UL
IMM GRANULOCYTES # BLD: 0.1 10E3/UL
IMM GRANULOCYTES NFR BLD: 0 %
IMM GRANULOCYTES NFR BLD: 1 %
INTERPRETATION ECG - MUSE: NORMAL
INTERPRETATION ECG - MUSE: NORMAL
IRON SATN MFR SERPL: 15 % (ref 20–55)
IRON SERPL-MCNC: 28 UG/DL (ref 50–212)
KETONES UR STRIP-MCNC: 10 MG/DL
KETONES UR STRIP-MCNC: 15 MG/DL
KETONES UR STRIP-MCNC: NEGATIVE MG/DL
LACTATE SERPL-SCNC: 0.8 MMOL/L (ref 0.7–2)
LACTATE SERPL-SCNC: 1.4 MMOL/L (ref 0.7–2)
LACTATE SERPL-SCNC: 1.5 MMOL/L (ref 0.7–2)
LACTATE SERPL-SCNC: 1.5 MMOL/L (ref 0.7–2)
LACTATE SERPL-SCNC: 1.7 MMOL/L (ref 0.7–2)
LACTATE SERPL-SCNC: 2.5 MMOL/L (ref 0.7–2)
LDLC SERPL CALC-MCNC: 32 MG/DL
LDLC SERPL CALC-MCNC: 40 MG/DL
LEUKOCYTE ESTERASE UR QL STRIP: NEGATIVE
LYMPHOCYTES # BLD AUTO: 0.2 10E3/UL (ref 0.8–5.3)
LYMPHOCYTES # BLD AUTO: 0.3 10E3/UL (ref 0.8–5.3)
LYMPHOCYTES # BLD AUTO: 0.4 10E3/UL (ref 0.8–5.3)
LYMPHOCYTES # BLD AUTO: 0.4 10E3/UL (ref 0.8–5.3)
LYMPHOCYTES NFR BLD AUTO: 2 %
LYMPHOCYTES NFR BLD AUTO: 3 %
MAGNESIUM SERPL-MCNC: 1.3 MG/DL (ref 1.9–2.7)
MAGNESIUM SERPL-MCNC: 1.5 MG/DL (ref 1.9–2.7)
MAGNESIUM SERPL-MCNC: 1.6 MG/DL (ref 1.9–2.7)
MAGNESIUM SERPL-MCNC: 2.1 MG/DL (ref 1.9–2.7)
MCH RBC QN AUTO: 30.6 PG (ref 26.5–33)
MCH RBC QN AUTO: 31.1 PG (ref 26.5–33)
MCH RBC QN AUTO: 33.2 PG (ref 26.5–33)
MCH RBC QN AUTO: 33.3 PG (ref 26.5–33)
MCH RBC QN AUTO: 33.4 PG (ref 26.5–33)
MCH RBC QN AUTO: 33.5 PG (ref 26.5–33)
MCH RBC QN AUTO: 33.5 PG (ref 26.5–33)
MCH RBC QN AUTO: 33.6 PG (ref 26.5–33)
MCH RBC QN AUTO: 33.6 PG (ref 26.5–33)
MCH RBC QN AUTO: 33.7 PG (ref 26.5–33)
MCH RBC QN AUTO: 33.8 PG (ref 26.5–33)
MCH RBC QN AUTO: 33.8 PG (ref 26.5–33)
MCH RBC QN AUTO: 33.9 PG (ref 26.5–33)
MCH RBC QN AUTO: 34.1 PG (ref 26.5–33)
MCH RBC QN AUTO: 34.1 PG (ref 26.5–33)
MCH RBC QN AUTO: 34.2 PG (ref 26.5–33)
MCH RBC QN AUTO: 34.6 PG (ref 26.5–33)
MCHC RBC AUTO-ENTMCNC: 33.2 G/DL (ref 31.5–36.5)
MCHC RBC AUTO-ENTMCNC: 33.3 G/DL (ref 31.5–36.5)
MCHC RBC AUTO-ENTMCNC: 33.5 G/DL (ref 31.5–36.5)
MCHC RBC AUTO-ENTMCNC: 33.6 G/DL (ref 31.5–36.5)
MCHC RBC AUTO-ENTMCNC: 33.9 G/DL (ref 31.5–36.5)
MCHC RBC AUTO-ENTMCNC: 34 G/DL (ref 31.5–36.5)
MCHC RBC AUTO-ENTMCNC: 34.1 G/DL (ref 31.5–36.5)
MCHC RBC AUTO-ENTMCNC: 34.4 G/DL (ref 31.5–36.5)
MCHC RBC AUTO-ENTMCNC: 34.4 G/DL (ref 31.5–36.5)
MCHC RBC AUTO-ENTMCNC: 34.5 G/DL (ref 31.5–36.5)
MCHC RBC AUTO-ENTMCNC: 34.8 G/DL (ref 31.5–36.5)
MCHC RBC AUTO-ENTMCNC: 35 G/DL (ref 31.5–36.5)
MCHC RBC AUTO-ENTMCNC: 35.1 G/DL (ref 31.5–36.5)
MCHC RBC AUTO-ENTMCNC: 35.2 G/DL (ref 31.5–36.5)
MCHC RBC AUTO-ENTMCNC: 35.2 G/DL (ref 31.5–36.5)
MCV RBC AUTO: 100 FL (ref 78–100)
MCV RBC AUTO: 101 FL (ref 78–100)
MCV RBC AUTO: 90 FL (ref 78–100)
MCV RBC AUTO: 91 FL (ref 78–100)
MCV RBC AUTO: 96 FL (ref 78–100)
MCV RBC AUTO: 97 FL (ref 78–100)
MCV RBC AUTO: 98 FL (ref 78–100)
MCV RBC AUTO: 99 FL (ref 78–100)
MCV RBC AUTO: 99 FL (ref 78–100)
MICROALBUMIN UR-MCNC: 9 MG/L
MICROALBUMIN/CREAT UR: 10.11 MG/G CR (ref 0–17)
MONOCYTES # BLD AUTO: 0.7 10E3/UL (ref 0–1.3)
MONOCYTES # BLD AUTO: 0.9 10E3/UL (ref 0–1.3)
MONOCYTES # BLD AUTO: 1 10E3/UL (ref 0–1.3)
MONOCYTES # BLD AUTO: 1.1 10E3/UL (ref 0–1.3)
MONOCYTES # BLD AUTO: 1.2 10E3/UL (ref 0–1.3)
MONOCYTES # BLD AUTO: 1.3 10E3/UL (ref 0–1.3)
MONOCYTES # BLD AUTO: 1.6 10E3/UL (ref 0–1.3)
MONOCYTES # BLD AUTO: 1.8 10E3/UL (ref 0–1.3)
MONOCYTES # BLD AUTO: 2 10E3/UL (ref 0–1.3)
MONOCYTES NFR BLD AUTO: 11 %
MONOCYTES NFR BLD AUTO: 13 %
MONOCYTES NFR BLD AUTO: 14 %
MONOCYTES NFR BLD AUTO: 15 %
MONOCYTES NFR BLD AUTO: 17 %
MONOCYTES NFR BLD AUTO: 7 %
MONOCYTES NFR BLD AUTO: 9 %
MUCOUS THREADS #/AREA URNS LPF: PRESENT /LPF
NEUTROPHILS # BLD AUTO: 10.5 10E3/UL (ref 1.6–8.3)
NEUTROPHILS # BLD AUTO: 11.2 10E3/UL (ref 1.6–8.3)
NEUTROPHILS # BLD AUTO: 11.5 10E3/UL (ref 1.6–8.3)
NEUTROPHILS # BLD AUTO: 13.9 10E3/UL (ref 1.6–8.3)
NEUTROPHILS # BLD AUTO: 7.6 10E3/UL (ref 1.6–8.3)
NEUTROPHILS # BLD AUTO: 8.7 10E3/UL (ref 1.6–8.3)
NEUTROPHILS # BLD AUTO: 8.9 10E3/UL (ref 1.6–8.3)
NEUTROPHILS # BLD AUTO: 9.4 10E3/UL (ref 1.6–8.3)
NEUTROPHILS # BLD AUTO: 9.7 10E3/UL (ref 1.6–8.3)
NEUTROPHILS NFR BLD AUTO: 80 %
NEUTROPHILS NFR BLD AUTO: 81 %
NEUTROPHILS NFR BLD AUTO: 82 %
NEUTROPHILS NFR BLD AUTO: 84 %
NEUTROPHILS NFR BLD AUTO: 86 %
NEUTROPHILS NFR BLD AUTO: 89 %
NEUTROPHILS NFR BLD AUTO: 89 %
NEUTROPHILS NFR BLD AUTO: 90 %
NEUTROPHILS NFR BLD AUTO: 91 %
NITRATE UR QL: NEGATIVE
NONHDLC SERPL-MCNC: 50 MG/DL
NONHDLC SERPL-MCNC: 53 MG/DL
NOROV GI+II ORF1-ORF2 JNC STL QL NAA+PR: NOT DETECTED
NRBC # BLD AUTO: 0 10E3/UL
NRBC BLD AUTO-RTO: 0 /100
NT-PROBNP SERPL-MCNC: 106 PG/ML (ref 0–100)
NT-PROBNP SERPL-MCNC: 179 PG/ML (ref 0–100)
NT-PROBNP SERPL-MCNC: 373 PG/ML (ref 0–100)
P AXIS - MUSE: 2 DEGREES
P AXIS - MUSE: 24 DEGREES
PH UR STRIP: 5.5 [PH] (ref 5–9)
PH UR STRIP: 6 [PH] (ref 5–9)
PH UR STRIP: 6.5 [PH] (ref 4.7–8)
PH UR STRIP: 6.5 [PH] (ref 5–9)
PH UR STRIP: 6.5 [PH] (ref 5–9)
PH UR STRIP: 7 [PH] (ref 5–9)
PH UR STRIP: 8 [PH] (ref 5–9)
PLATELET # BLD AUTO: 134 10E3/UL (ref 150–450)
PLATELET # BLD AUTO: 154 10E3/UL (ref 150–450)
PLATELET # BLD AUTO: 161 10E3/UL (ref 150–450)
PLATELET # BLD AUTO: 176 10E3/UL (ref 150–450)
PLATELET # BLD AUTO: 192 10E3/UL (ref 150–450)
PLATELET # BLD AUTO: 198 10E3/UL (ref 150–450)
PLATELET # BLD AUTO: 214 10E3/UL (ref 150–450)
PLATELET # BLD AUTO: 215 10E3/UL (ref 150–450)
PLATELET # BLD AUTO: 219 10E3/UL (ref 150–450)
PLATELET # BLD AUTO: 239 10E3/UL (ref 150–450)
PLATELET # BLD AUTO: 251 10E3/UL (ref 150–450)
PLATELET # BLD AUTO: 257 10E3/UL (ref 150–450)
PLATELET # BLD AUTO: 261 10E3/UL (ref 150–450)
PLATELET # BLD AUTO: 282 10E3/UL (ref 150–450)
PLATELET # BLD AUTO: 284 10E3/UL (ref 150–450)
PLATELET # BLD AUTO: 289 10E3/UL (ref 150–450)
PLATELET # BLD AUTO: 322 10E3/UL (ref 150–450)
PLATELET # BLD AUTO: 336 10E3/UL (ref 150–450)
PLATELET # BLD AUTO: 344 10E3/UL (ref 150–450)
PLATELET # BLD AUTO: 361 10E3/UL (ref 150–450)
PLATELET # BLD AUTO: 419 10E3/UL (ref 150–450)
POTASSIUM BLD-SCNC: 2.4 MMOL/L (ref 3.5–5.1)
POTASSIUM BLD-SCNC: 2.5 MMOL/L (ref 3.5–5.1)
POTASSIUM BLD-SCNC: 2.7 MMOL/L (ref 3.5–5.1)
POTASSIUM BLD-SCNC: 2.8 MMOL/L (ref 3.5–5.1)
POTASSIUM BLD-SCNC: 3.1 MMOL/L (ref 3.5–5.1)
POTASSIUM BLD-SCNC: 3.2 MMOL/L (ref 3.4–5.3)
POTASSIUM BLD-SCNC: 3.2 MMOL/L (ref 3.4–5.3)
POTASSIUM BLD-SCNC: 3.2 MMOL/L (ref 3.5–5.1)
POTASSIUM BLD-SCNC: 3.2 MMOL/L (ref 3.5–5.1)
POTASSIUM BLD-SCNC: 3.3 MMOL/L (ref 3.4–5.3)
POTASSIUM BLD-SCNC: 3.3 MMOL/L (ref 3.4–5.3)
POTASSIUM BLD-SCNC: 3.3 MMOL/L (ref 3.5–5.1)
POTASSIUM BLD-SCNC: 3.4 MMOL/L (ref 3.4–5.3)
POTASSIUM BLD-SCNC: 3.4 MMOL/L (ref 3.5–5.1)
POTASSIUM BLD-SCNC: 3.5 MMOL/L (ref 3.5–5.1)
POTASSIUM BLD-SCNC: 3.6 MMOL/L (ref 3.5–5.1)
POTASSIUM BLD-SCNC: 3.7 MMOL/L (ref 3.4–5.3)
POTASSIUM BLD-SCNC: 3.7 MMOL/L (ref 3.5–5.1)
POTASSIUM BLD-SCNC: 3.8 MMOL/L (ref 3.5–5.1)
POTASSIUM BLD-SCNC: 3.9 MMOL/L (ref 3.5–5.1)
POTASSIUM BLD-SCNC: 3.9 MMOL/L (ref 3.5–5.1)
POTASSIUM BLD-SCNC: 4 MMOL/L (ref 3.5–5.1)
POTASSIUM BLD-SCNC: 4.1 MMOL/L (ref 3.5–5.1)
POTASSIUM BLD-SCNC: 4.2 MMOL/L (ref 3.5–5.1)
POTASSIUM BLD-SCNC: 4.4 MMOL/L (ref 3.4–5.3)
POTASSIUM BLD-SCNC: 4.9 MMOL/L (ref 3.5–5.1)
PR INTERVAL - MUSE: 148 MS
PR INTERVAL - MUSE: 178 MS
PROCALCITONIN SERPL-MCNC: 0.06 NG/ML
PROCALCITONIN SERPL-MCNC: 0.08 NG/ML
PROCALCITONIN SERPL-MCNC: 0.09 NG/ML
PROCALCITONIN SERPL-MCNC: 27.29 NG/ML
PROCALCITONIN SERPL-MCNC: 40.2 NG/ML
PROT SERPL-MCNC: 5.5 G/DL (ref 6.4–8.9)
PROT SERPL-MCNC: 6 G/DL (ref 6.4–8.9)
PROT SERPL-MCNC: 6 G/DL (ref 6.4–8.9)
PROT SERPL-MCNC: 6 G/DL (ref 6.8–8.8)
PROT SERPL-MCNC: 6.2 G/DL (ref 6.4–8.9)
PROT SERPL-MCNC: 6.3 G/DL (ref 6.4–8.9)
PROT SERPL-MCNC: 6.3 G/DL (ref 6.4–8.9)
PROT SERPL-MCNC: 6.4 G/DL (ref 6.4–8.9)
QRS DURATION - MUSE: 88 MS
QRS DURATION - MUSE: 92 MS
QT - MUSE: 412 MS
QT - MUSE: 414 MS
QTC - MUSE: 474 MS
QTC - MUSE: 478 MS
R AXIS - MUSE: 35 DEGREES
R AXIS - MUSE: 36 DEGREES
RAD ONC ARIA COURSE ID: NORMAL
RAD ONC ARIA COURSE LAST TREATMENT DATE: NORMAL
RAD ONC ARIA COURSE START DATE: NORMAL
RAD ONC ARIA COURSE TREATMENT ELAPSED DAYS: 0
RAD ONC ARIA COURSE TREATMENT ELAPSED DAYS: 1
RAD ONC ARIA COURSE TREATMENT ELAPSED DAYS: 12
RAD ONC ARIA COURSE TREATMENT ELAPSED DAYS: 13
RAD ONC ARIA COURSE TREATMENT ELAPSED DAYS: 14
RAD ONC ARIA COURSE TREATMENT ELAPSED DAYS: 15
RAD ONC ARIA COURSE TREATMENT ELAPSED DAYS: 16
RAD ONC ARIA COURSE TREATMENT ELAPSED DAYS: 2
RAD ONC ARIA COURSE TREATMENT ELAPSED DAYS: 20
RAD ONC ARIA COURSE TREATMENT ELAPSED DAYS: 22
RAD ONC ARIA COURSE TREATMENT ELAPSED DAYS: 23
RAD ONC ARIA COURSE TREATMENT ELAPSED DAYS: 26
RAD ONC ARIA COURSE TREATMENT ELAPSED DAYS: 27
RAD ONC ARIA COURSE TREATMENT ELAPSED DAYS: 28
RAD ONC ARIA COURSE TREATMENT ELAPSED DAYS: 29
RAD ONC ARIA COURSE TREATMENT ELAPSED DAYS: 30
RAD ONC ARIA COURSE TREATMENT ELAPSED DAYS: 33
RAD ONC ARIA COURSE TREATMENT ELAPSED DAYS: 34
RAD ONC ARIA COURSE TREATMENT ELAPSED DAYS: 35
RAD ONC ARIA COURSE TREATMENT ELAPSED DAYS: 36
RAD ONC ARIA COURSE TREATMENT ELAPSED DAYS: 37
RAD ONC ARIA COURSE TREATMENT ELAPSED DAYS: 40
RAD ONC ARIA COURSE TREATMENT ELAPSED DAYS: 41
RAD ONC ARIA COURSE TREATMENT ELAPSED DAYS: 42
RAD ONC ARIA COURSE TREATMENT ELAPSED DAYS: 43
RAD ONC ARIA COURSE TREATMENT ELAPSED DAYS: 44
RAD ONC ARIA COURSE TREATMENT ELAPSED DAYS: 47
RAD ONC ARIA COURSE TREATMENT ELAPSED DAYS: 48
RAD ONC ARIA COURSE TREATMENT ELAPSED DAYS: 5
RAD ONC ARIA COURSE TREATMENT ELAPSED DAYS: 6
RAD ONC ARIA COURSE TREATMENT ELAPSED DAYS: 7
RAD ONC ARIA COURSE TREATMENT ELAPSED DAYS: 8
RAD ONC ARIA COURSE TREATMENT ELAPSED DAYS: 9
RAD ONC ARIA FIRST TREATMENT DATE: NORMAL
RAD ONC ARIA PLAN FRACTIONS TREATED TO DATE: 1
RAD ONC ARIA PLAN FRACTIONS TREATED TO DATE: 10
RAD ONC ARIA PLAN FRACTIONS TREATED TO DATE: 11
RAD ONC ARIA PLAN FRACTIONS TREATED TO DATE: 12
RAD ONC ARIA PLAN FRACTIONS TREATED TO DATE: 13
RAD ONC ARIA PLAN FRACTIONS TREATED TO DATE: 14
RAD ONC ARIA PLAN FRACTIONS TREATED TO DATE: 15
RAD ONC ARIA PLAN FRACTIONS TREATED TO DATE: 16
RAD ONC ARIA PLAN FRACTIONS TREATED TO DATE: 17
RAD ONC ARIA PLAN FRACTIONS TREATED TO DATE: 18
RAD ONC ARIA PLAN FRACTIONS TREATED TO DATE: 19
RAD ONC ARIA PLAN FRACTIONS TREATED TO DATE: 2
RAD ONC ARIA PLAN FRACTIONS TREATED TO DATE: 20
RAD ONC ARIA PLAN FRACTIONS TREATED TO DATE: 21
RAD ONC ARIA PLAN FRACTIONS TREATED TO DATE: 22
RAD ONC ARIA PLAN FRACTIONS TREATED TO DATE: 23
RAD ONC ARIA PLAN FRACTIONS TREATED TO DATE: 24
RAD ONC ARIA PLAN FRACTIONS TREATED TO DATE: 25
RAD ONC ARIA PLAN FRACTIONS TREATED TO DATE: 26
RAD ONC ARIA PLAN FRACTIONS TREATED TO DATE: 27
RAD ONC ARIA PLAN FRACTIONS TREATED TO DATE: 28
RAD ONC ARIA PLAN FRACTIONS TREATED TO DATE: 29
RAD ONC ARIA PLAN FRACTIONS TREATED TO DATE: 3
RAD ONC ARIA PLAN FRACTIONS TREATED TO DATE: 30
RAD ONC ARIA PLAN FRACTIONS TREATED TO DATE: 31
RAD ONC ARIA PLAN FRACTIONS TREATED TO DATE: 32
RAD ONC ARIA PLAN FRACTIONS TREATED TO DATE: 33
RAD ONC ARIA PLAN FRACTIONS TREATED TO DATE: 4
RAD ONC ARIA PLAN FRACTIONS TREATED TO DATE: 5
RAD ONC ARIA PLAN FRACTIONS TREATED TO DATE: 6
RAD ONC ARIA PLAN FRACTIONS TREATED TO DATE: 7
RAD ONC ARIA PLAN FRACTIONS TREATED TO DATE: 8
RAD ONC ARIA PLAN FRACTIONS TREATED TO DATE: 9
RAD ONC ARIA PLAN ID: NORMAL
RAD ONC ARIA PLAN NAME: NORMAL
RAD ONC ARIA PLAN PRESCRIBED DOSE PER FRACTION: 2 GY
RAD ONC ARIA PLAN TOTAL FRACTIONS PRESCRIBED: 33
RAD ONC ARIA PLAN TOTAL PRESCRIBED DOSE: 6600 CGY
RAD ONC ARIA REFERENCE POINT DOSAGE GIVEN TO DATE: NORMAL GY
RAD ONC ARIA REFERENCE POINT ID: NORMAL
RBC # BLD AUTO: 2.82 10E6/UL (ref 4.4–5.9)
RBC # BLD AUTO: 2.96 10E6/UL (ref 4.4–5.9)
RBC # BLD AUTO: 2.98 10E6/UL (ref 4.4–5.9)
RBC # BLD AUTO: 2.99 10E6/UL (ref 4.4–5.9)
RBC # BLD AUTO: 2.99 10E6/UL (ref 4.4–5.9)
RBC # BLD AUTO: 3.08 10E6/UL (ref 4.4–5.9)
RBC # BLD AUTO: 3.13 10E6/UL (ref 4.4–5.9)
RBC # BLD AUTO: 3.15 10E6/UL (ref 4.4–5.9)
RBC # BLD AUTO: 3.16 10E6/UL (ref 4.4–5.9)
RBC # BLD AUTO: 3.24 10E6/UL (ref 4.4–5.9)
RBC # BLD AUTO: 3.36 10E6/UL (ref 4.4–5.9)
RBC # BLD AUTO: 3.36 10E6/UL (ref 4.4–5.9)
RBC # BLD AUTO: 3.45 10E6/UL (ref 4.4–5.9)
RBC # BLD AUTO: 3.46 10E6/UL (ref 4.4–5.9)
RBC # BLD AUTO: 3.48 10E6/UL (ref 4.4–5.9)
RBC # BLD AUTO: 3.49 10E6/UL (ref 4.4–5.9)
RBC # BLD AUTO: 3.55 10E6/UL (ref 4.4–5.9)
RBC # BLD AUTO: 3.56 10E6/UL (ref 4.4–5.9)
RBC # BLD AUTO: 3.83 10E6/UL (ref 4.4–5.9)
RBC URINE: 1 /HPF
RBC URINE: 1 /HPF
RBC URINE: 2 /HPF
RBC URINE: 3 /HPF
RBC URINE: <1 /HPF
RETINOPATHY: NORMAL
RIBOTYPE 027/NAP1/BI: NORMAL
RSV RNA SPEC NAA+PROBE: NEGATIVE
RSV RNA SPEC NAA+PROBE: NEGATIVE
RVA NSP5 STL QL NAA+PROBE: NOT DETECTED
SALMONELLA SP RPOD STL QL NAA+PROBE: NOT DETECTED
SARS-COV-2 RNA RESP QL NAA+PROBE: NEGATIVE
SARS-COV-2 RNA RESP QL NAA+PROBE: NEGATIVE
SARS-COV-2 RNA RESP QL NAA+PROBE: NOT DETECTED
SARS-COV-2 RNA RESP QL NAA+PROBE: POSITIVE
SHIGELLA SP+EIEC IPAH STL QL NAA+PROBE: NOT DETECTED
SODIUM SERPL-SCNC: 127 MMOL/L (ref 134–144)
SODIUM SERPL-SCNC: 127 MMOL/L (ref 134–144)
SODIUM SERPL-SCNC: 128 MMOL/L (ref 134–144)
SODIUM SERPL-SCNC: 129 MMOL/L (ref 133–144)
SODIUM SERPL-SCNC: 129 MMOL/L (ref 134–144)
SODIUM SERPL-SCNC: 130 MMOL/L (ref 133–144)
SODIUM SERPL-SCNC: 130 MMOL/L (ref 134–144)
SODIUM SERPL-SCNC: 131 MMOL/L (ref 133–144)
SODIUM SERPL-SCNC: 131 MMOL/L (ref 134–144)
SODIUM SERPL-SCNC: 133 MMOL/L (ref 133–144)
SODIUM SERPL-SCNC: 133 MMOL/L (ref 134–144)
SODIUM SERPL-SCNC: 133 MMOL/L (ref 134–144)
SODIUM SERPL-SCNC: 136 MMOL/L (ref 134–144)
SODIUM SERPL-SCNC: 143 MMOL/L (ref 134–144)
SODIUM SERPL-SCNC: 143 MMOL/L (ref 134–144)
SODIUM SERPL-SCNC: 144 MMOL/L (ref 134–144)
SODIUM SERPL-SCNC: 146 MMOL/L (ref 134–144)
SODIUM SERPL-SCNC: 147 MMOL/L (ref 134–144)
SP GR UR STRIP: 1.01 (ref 1–1.03)
SP GR UR STRIP: 1.02 (ref 1–1.03)
SP GR UR STRIP: 1.02 (ref 1–1.03)
SQUAMOUS EPITHELIAL: 0 /HPF
SYSTOLIC BLOOD PRESSURE - MUSE: NORMAL MMHG
SYSTOLIC BLOOD PRESSURE - MUSE: NORMAL MMHG
T AXIS - MUSE: -49 DEGREES
T AXIS - MUSE: 37 DEGREES
T4 FREE SERPL-MCNC: 1.1 NG/DL (ref 0.6–1.6)
TIBC SERPL-MCNC: 189 UG/DL (ref 245–400)
TRANSFERRIN SERPL-MCNC: 135 MG/DL (ref 203–362)
TRIGL SERPL-MCNC: 65 MG/DL
TRIGL SERPL-MCNC: 88 MG/DL
TROPONIN I SERPL-MCNC: 14.4 PG/ML (ref 0–34)
TSH SERPL DL<=0.005 MIU/L-ACNC: 1.44 MU/L (ref 0.4–4)
TSH SERPL DL<=0.005 MIU/L-ACNC: 3.05 MU/L (ref 0.4–4)
TSH SERPL DL<=0.005 MIU/L-ACNC: 6.07 MU/L (ref 0.4–4)
UIBC (UNSATURATED): 161 MG/DL
UROBILINOGEN UR STRIP-MCNC: 3 MG/DL
UROBILINOGEN UR STRIP-MCNC: 3 MG/DL
UROBILINOGEN UR STRIP-MCNC: NORMAL MG/DL
V CHOL+PARA RFBL+TRKH+TNAA STL QL NAA+PR: NOT DETECTED
VANCOMYCIN SERPL-MCNC: 14.4 MG/L
VENTRICULAR RATE- MUSE: 79 BPM
VENTRICULAR RATE- MUSE: 81 BPM
WBC # BLD AUTO: 10.6 10E3/UL (ref 4–11)
WBC # BLD AUTO: 10.7 10E3/UL (ref 4–11)
WBC # BLD AUTO: 10.8 10E3/UL (ref 4–11)
WBC # BLD AUTO: 11 10E3/UL (ref 4–11)
WBC # BLD AUTO: 11.3 10E3/UL (ref 4–11)
WBC # BLD AUTO: 12.6 10E3/UL (ref 4–11)
WBC # BLD AUTO: 12.7 10E3/UL (ref 4–11)
WBC # BLD AUTO: 13.6 10E3/UL (ref 4–11)
WBC # BLD AUTO: 14 10E3/UL (ref 4–11)
WBC # BLD AUTO: 14.2 10E3/UL (ref 4–11)
WBC # BLD AUTO: 15.3 10E3/UL (ref 4–11)
WBC # BLD AUTO: 5.8 10E3/UL (ref 4–11)
WBC # BLD AUTO: 6.8 10E3/UL (ref 4–11)
WBC # BLD AUTO: 7.9 10E3/UL (ref 4–11)
WBC # BLD AUTO: 8.7 10E3/UL (ref 4–11)
WBC # BLD AUTO: 9 10E3/UL (ref 4–11)
WBC # BLD AUTO: 9.5 10E3/UL (ref 4–11)
WBC # BLD AUTO: 9.8 10E3/UL (ref 4–11)
WBC # BLD AUTO: 9.8 10E3/UL (ref 4–11)
WBC URINE: 1 /HPF
WBC URINE: 2 /HPF
WBC URINE: 4 /HPF
WBC URINE: 4 /HPF
WBC URINE: <1 /HPF
Y ENTERO RECN STL QL NAA+PROBE: NOT DETECTED

## 2022-01-01 PROCEDURE — 83735 ASSAY OF MAGNESIUM: CPT | Mod: ZL | Performed by: NURSE PRACTITIONER

## 2022-01-01 PROCEDURE — 250N000011 HC RX IP 250 OP 636: Performed by: FAMILY MEDICINE

## 2022-01-01 PROCEDURE — 84443 ASSAY THYROID STIM HORMONE: CPT | Mod: ZL

## 2022-01-01 PROCEDURE — 84145 PROCALCITONIN (PCT): CPT | Mod: ZL

## 2022-01-01 PROCEDURE — 77014 PR CT GUIDE FOR PLACEMENT RADIATION THERAPY FIELDS: CPT | Mod: 26 | Performed by: RADIOLOGY

## 2022-01-01 PROCEDURE — 36592 COLLECT BLOOD FROM PICC: CPT | Performed by: INTERNAL MEDICINE

## 2022-01-01 PROCEDURE — 77386 HC IMRT TREATMENT DELIVERY, COMPLEX: CPT | Performed by: RADIOLOGY

## 2022-01-01 PROCEDURE — 250N000013 HC RX MED GY IP 250 OP 250 PS 637: Performed by: INTERNAL MEDICINE

## 2022-01-01 PROCEDURE — 77334 RADIATION TREATMENT AID(S): CPT | Performed by: RADIOLOGY

## 2022-01-01 PROCEDURE — 97112 NEUROMUSCULAR REEDUCATION: CPT | Mod: GP

## 2022-01-01 PROCEDURE — 258N000003 HC RX IP 258 OP 636: Performed by: INTERNAL MEDICINE

## 2022-01-01 PROCEDURE — 80061 LIPID PANEL: CPT | Mod: ZL

## 2022-01-01 PROCEDURE — 36591 DRAW BLOOD OFF VENOUS DEVICE: CPT | Performed by: INTERNAL MEDICINE

## 2022-01-01 PROCEDURE — 85027 COMPLETE CBC AUTOMATED: CPT | Performed by: FAMILY MEDICINE

## 2022-01-01 PROCEDURE — 99285 EMERGENCY DEPT VISIT HI MDM: CPT | Mod: 25 | Performed by: FAMILY MEDICINE

## 2022-01-01 PROCEDURE — 77338 DESIGN MLC DEVICE FOR IMRT: CPT | Performed by: RADIOLOGY

## 2022-01-01 PROCEDURE — 250N000013 HC RX MED GY IP 250 OP 250 PS 637: Performed by: FAMILY MEDICINE

## 2022-01-01 PROCEDURE — 84132 ASSAY OF SERUM POTASSIUM: CPT | Performed by: FAMILY MEDICINE

## 2022-01-01 PROCEDURE — 81003 URINALYSIS AUTO W/O SCOPE: CPT | Mod: ZL | Performed by: INTERNAL MEDICINE

## 2022-01-01 PROCEDURE — 77293 RESPIRATOR MOTION MGMT SIMUL: CPT | Performed by: RADIOLOGY

## 2022-01-01 PROCEDURE — 250N000011 HC RX IP 250 OP 636: Performed by: NURSE PRACTITIONER

## 2022-01-01 PROCEDURE — 99495 TRANSJ CARE MGMT MOD F2F 14D: CPT | Performed by: INTERNAL MEDICINE

## 2022-01-01 PROCEDURE — 77300 RADIATION THERAPY DOSE PLAN: CPT | Performed by: RADIOLOGY

## 2022-01-01 PROCEDURE — C9803 HOPD COVID-19 SPEC COLLECT: HCPCS | Performed by: FAMILY MEDICINE

## 2022-01-01 PROCEDURE — 77014 HC CT GUIDE FOR PLACEMENT RADIATION THERAPY FIELDS: CPT | Performed by: RADIOLOGY

## 2022-01-01 PROCEDURE — 83605 ASSAY OF LACTIC ACID: CPT | Performed by: FAMILY MEDICINE

## 2022-01-01 PROCEDURE — 81001 URINALYSIS AUTO W/SCOPE: CPT | Performed by: FAMILY MEDICINE

## 2022-01-01 PROCEDURE — 87637 SARSCOV2&INF A&B&RSV AMP PRB: CPT | Mod: ZL | Performed by: FAMILY MEDICINE

## 2022-01-01 PROCEDURE — 120N000001 HC R&B MED SURG/OB

## 2022-01-01 PROCEDURE — 85379 FIBRIN DEGRADATION QUANT: CPT | Mod: ZL | Performed by: INTERNAL MEDICINE

## 2022-01-01 PROCEDURE — 80048 BASIC METABOLIC PNL TOTAL CA: CPT | Performed by: PHYSICIAN ASSISTANT

## 2022-01-01 PROCEDURE — 93010 ELECTROCARDIOGRAM REPORT: CPT | Performed by: INTERNAL MEDICINE

## 2022-01-01 PROCEDURE — 87040 BLOOD CULTURE FOR BACTERIA: CPT | Performed by: NURSE PRACTITIONER

## 2022-01-01 PROCEDURE — G0463 HOSPITAL OUTPT CLINIC VISIT: HCPCS | Mod: TEL

## 2022-01-01 PROCEDURE — 85025 COMPLETE CBC W/AUTO DIFF WBC: CPT | Performed by: EMERGENCY MEDICINE

## 2022-01-01 PROCEDURE — 250N000011 HC RX IP 250 OP 636: Performed by: INTERNAL MEDICINE

## 2022-01-01 PROCEDURE — C9803 HOPD COVID-19 SPEC COLLECT: HCPCS

## 2022-01-01 PROCEDURE — 80053 COMPREHEN METABOLIC PANEL: CPT | Performed by: FAMILY MEDICINE

## 2022-01-01 PROCEDURE — 82962 GLUCOSE BLOOD TEST: CPT

## 2022-01-01 PROCEDURE — A9575 INJ GADOTERATE MEGLUMI 0.1ML: HCPCS | Performed by: RADIOLOGY

## 2022-01-01 PROCEDURE — 84132 ASSAY OF SERUM POTASSIUM: CPT | Performed by: INTERNAL MEDICINE

## 2022-01-01 PROCEDURE — 99283 EMERGENCY DEPT VISIT LOW MDM: CPT | Performed by: EMERGENCY MEDICINE

## 2022-01-01 PROCEDURE — 83735 ASSAY OF MAGNESIUM: CPT | Performed by: PHYSICIAN ASSISTANT

## 2022-01-01 PROCEDURE — 99495 TRANSJ CARE MGMT MOD F2F 14D: CPT | Performed by: FAMILY MEDICINE

## 2022-01-01 PROCEDURE — 99284 EMERGENCY DEPT VISIT MOD MDM: CPT | Mod: 25 | Performed by: PHYSICIAN ASSISTANT

## 2022-01-01 PROCEDURE — 87205 SMEAR GRAM STAIN: CPT | Performed by: NURSE PRACTITIONER

## 2022-01-01 PROCEDURE — G0463 HOSPITAL OUTPT CLINIC VISIT: HCPCS

## 2022-01-01 PROCEDURE — 99232 SBSQ HOSP IP/OBS MODERATE 35: CPT | Performed by: FAMILY MEDICINE

## 2022-01-01 PROCEDURE — 85025 COMPLETE CBC W/AUTO DIFF WBC: CPT | Performed by: FAMILY MEDICINE

## 2022-01-01 PROCEDURE — 96365 THER/PROPH/DIAG IV INF INIT: CPT | Performed by: FAMILY MEDICINE

## 2022-01-01 PROCEDURE — 99222 1ST HOSP IP/OBS MODERATE 55: CPT | Mod: AI | Performed by: FAMILY MEDICINE

## 2022-01-01 PROCEDURE — 85025 COMPLETE CBC W/AUTO DIFF WBC: CPT | Mod: ZL | Performed by: FAMILY MEDICINE

## 2022-01-01 PROCEDURE — 77334 RADIATION TREATMENT AID(S): CPT | Mod: 26 | Performed by: RADIOLOGY

## 2022-01-01 PROCEDURE — 255N000002 HC RX 255 OP 636: Performed by: RADIOLOGY

## 2022-01-01 PROCEDURE — 36591 DRAW BLOOD OFF VENOUS DEVICE: CPT | Performed by: FAMILY MEDICINE

## 2022-01-01 PROCEDURE — 77427 RADIATION TX MANAGEMENT X5: CPT | Performed by: RADIOLOGY

## 2022-01-01 PROCEDURE — 85025 COMPLETE CBC W/AUTO DIFF WBC: CPT | Mod: ZL | Performed by: NURSE PRACTITIONER

## 2022-01-01 PROCEDURE — 97530 THERAPEUTIC ACTIVITIES: CPT | Mod: GP

## 2022-01-01 PROCEDURE — 71045 X-RAY EXAM CHEST 1 VIEW: CPT | Mod: TC

## 2022-01-01 PROCEDURE — 85027 COMPLETE CBC AUTOMATED: CPT | Performed by: INTERNAL MEDICINE

## 2022-01-01 PROCEDURE — 97110 THERAPEUTIC EXERCISES: CPT | Mod: GP

## 2022-01-01 PROCEDURE — 258N000003 HC RX IP 258 OP 636: Performed by: FAMILY MEDICINE

## 2022-01-01 PROCEDURE — 36415 COLL VENOUS BLD VENIPUNCTURE: CPT | Performed by: INTERNAL MEDICINE

## 2022-01-01 PROCEDURE — 99232 SBSQ HOSP IP/OBS MODERATE 35: CPT | Performed by: INTERNAL MEDICINE

## 2022-01-01 PROCEDURE — 80053 COMPREHEN METABOLIC PANEL: CPT | Performed by: EMERGENCY MEDICINE

## 2022-01-01 PROCEDURE — 99283 EMERGENCY DEPT VISIT LOW MDM: CPT | Performed by: PHYSICIAN ASSISTANT

## 2022-01-01 PROCEDURE — 36415 COLL VENOUS BLD VENIPUNCTURE: CPT | Performed by: FAMILY MEDICINE

## 2022-01-01 PROCEDURE — 77300 RADIATION THERAPY DOSE PLAN: CPT | Mod: 26 | Performed by: RADIOLOGY

## 2022-01-01 PROCEDURE — G0378 HOSPITAL OBSERVATION PER HR: HCPCS

## 2022-01-01 PROCEDURE — 85014 HEMATOCRIT: CPT | Mod: ZL

## 2022-01-01 PROCEDURE — G0180 MD CERTIFICATION HHA PATIENT: HCPCS | Performed by: INTERNAL MEDICINE

## 2022-01-01 PROCEDURE — 250N000011 HC RX IP 250 OP 636: Performed by: PHYSICIAN ASSISTANT

## 2022-01-01 PROCEDURE — 250N000012 HC RX MED GY IP 250 OP 636 PS 637: Performed by: INTERNAL MEDICINE

## 2022-01-01 PROCEDURE — 77014 HC CT GUIDE FOR PLACEMENT RADIATION THERAPY FIELDS: CPT

## 2022-01-01 PROCEDURE — 85049 AUTOMATED PLATELET COUNT: CPT | Performed by: FAMILY MEDICINE

## 2022-01-01 PROCEDURE — 17000 DESTRUCT PREMALG LESION: CPT | Performed by: INTERNAL MEDICINE

## 2022-01-01 PROCEDURE — 250N000011 HC RX IP 250 OP 636

## 2022-01-01 PROCEDURE — 99282 EMERGENCY DEPT VISIT SF MDM: CPT | Performed by: FAMILY MEDICINE

## 2022-01-01 PROCEDURE — 99443 PR PHYSICIAN TELEPHONE EVALUATION 21-30 MIN: CPT | Performed by: INTERNAL MEDICINE

## 2022-01-01 PROCEDURE — 99212 OFFICE O/P EST SF 10 MIN: CPT | Performed by: SURGERY

## 2022-01-01 PROCEDURE — 86140 C-REACTIVE PROTEIN: CPT | Performed by: INTERNAL MEDICINE

## 2022-01-01 PROCEDURE — 17003 DESTRUCT PREMALG LES 2-14: CPT | Performed by: INTERNAL MEDICINE

## 2022-01-01 PROCEDURE — 82040 ASSAY OF SERUM ALBUMIN: CPT | Mod: ZL | Performed by: FAMILY MEDICINE

## 2022-01-01 PROCEDURE — 93005 ELECTROCARDIOGRAM TRACING: CPT | Performed by: FAMILY MEDICINE

## 2022-01-01 PROCEDURE — 85027 COMPLETE CBC AUTOMATED: CPT | Mod: ZL

## 2022-01-01 PROCEDURE — 86140 C-REACTIVE PROTEIN: CPT | Mod: ZL | Performed by: INTERNAL MEDICINE

## 2022-01-01 PROCEDURE — 85379 FIBRIN DEGRADATION QUANT: CPT | Performed by: INTERNAL MEDICINE

## 2022-01-01 PROCEDURE — 99222 1ST HOSP IP/OBS MODERATE 55: CPT | Performed by: INTERNAL MEDICINE

## 2022-01-01 PROCEDURE — 250N000013 HC RX MED GY IP 250 OP 250 PS 637: Performed by: PHYSICIAN ASSISTANT

## 2022-01-01 PROCEDURE — 85025 COMPLETE CBC W/AUTO DIFF WBC: CPT | Performed by: PHYSICIAN ASSISTANT

## 2022-01-01 PROCEDURE — 77293 RESPIRATOR MOTION MGMT SIMUL: CPT | Mod: 26 | Performed by: RADIOLOGY

## 2022-01-01 PROCEDURE — 77370 RADIATION PHYSICS CONSULT: CPT | Performed by: RADIOLOGY

## 2022-01-01 PROCEDURE — G0463 HOSPITAL OUTPT CLINIC VISIT: HCPCS | Mod: 25

## 2022-01-01 PROCEDURE — 84145 PROCALCITONIN (PCT): CPT | Performed by: INTERNAL MEDICINE

## 2022-01-01 PROCEDURE — 258N000003 HC RX IP 258 OP 636: Performed by: PHYSICIAN ASSISTANT

## 2022-01-01 PROCEDURE — 99283 EMERGENCY DEPT VISIT LOW MDM: CPT | Performed by: FAMILY MEDICINE

## 2022-01-01 PROCEDURE — 83550 IRON BINDING TEST: CPT | Mod: ZL

## 2022-01-01 PROCEDURE — 84443 ASSAY THYROID STIM HORMONE: CPT | Performed by: FAMILY MEDICINE

## 2022-01-01 PROCEDURE — 99205 OFFICE O/P NEW HI 60 MIN: CPT | Performed by: RADIOLOGY

## 2022-01-01 PROCEDURE — 80048 BASIC METABOLIC PNL TOTAL CA: CPT | Performed by: FAMILY MEDICINE

## 2022-01-01 PROCEDURE — 80053 COMPREHEN METABOLIC PANEL: CPT | Mod: ZL | Performed by: FAMILY MEDICINE

## 2022-01-01 PROCEDURE — 82565 ASSAY OF CREATININE: CPT | Mod: ZL

## 2022-01-01 PROCEDURE — 87506 IADNA-DNA/RNA PROBE TQ 6-11: CPT | Performed by: PHYSICIAN ASSISTANT

## 2022-01-01 PROCEDURE — 97161 PT EVAL LOW COMPLEX 20 MIN: CPT | Mod: GP

## 2022-01-01 PROCEDURE — 250N000009 HC RX 250: Performed by: NURSE PRACTITIONER

## 2022-01-01 PROCEDURE — 80053 COMPREHEN METABOLIC PANEL: CPT | Mod: ZL

## 2022-01-01 PROCEDURE — 99284 EMERGENCY DEPT VISIT MOD MDM: CPT | Performed by: PHYSICIAN ASSISTANT

## 2022-01-01 PROCEDURE — 77336 RADIATION PHYSICS CONSULT: CPT | Performed by: RADIOLOGY

## 2022-01-01 PROCEDURE — 99239 HOSP IP/OBS DSCHRG MGMT >30: CPT | Performed by: INTERNAL MEDICINE

## 2022-01-01 PROCEDURE — 71045 X-RAY EXAM CHEST 1 VIEW: CPT

## 2022-01-01 PROCEDURE — 99214 OFFICE O/P EST MOD 30 MIN: CPT | Performed by: NURSE PRACTITIONER

## 2022-01-01 PROCEDURE — 83880 ASSAY OF NATRIURETIC PEPTIDE: CPT | Performed by: FAMILY MEDICINE

## 2022-01-01 PROCEDURE — 97530 THERAPEUTIC ACTIVITIES: CPT | Mod: GO

## 2022-01-01 PROCEDURE — 36415 COLL VENOUS BLD VENIPUNCTURE: CPT | Performed by: PHYSICIAN ASSISTANT

## 2022-01-01 PROCEDURE — 77301 RADIOTHERAPY DOSE PLAN IMRT: CPT | Performed by: RADIOLOGY

## 2022-01-01 PROCEDURE — 87077 CULTURE AEROBIC IDENTIFY: CPT | Performed by: NURSE PRACTITIONER

## 2022-01-01 PROCEDURE — 82310 ASSAY OF CALCIUM: CPT | Performed by: INTERNAL MEDICINE

## 2022-01-01 PROCEDURE — 84484 ASSAY OF TROPONIN QUANT: CPT | Performed by: FAMILY MEDICINE

## 2022-01-01 PROCEDURE — 84439 ASSAY OF FREE THYROXINE: CPT | Mod: ZL

## 2022-01-01 PROCEDURE — 83036 HEMOGLOBIN GLYCOSYLATED A1C: CPT | Mod: ZL

## 2022-01-01 PROCEDURE — 36591 DRAW BLOOD OFF VENOUS DEVICE: CPT | Performed by: NURSE PRACTITIONER

## 2022-01-01 PROCEDURE — 80053 COMPREHEN METABOLIC PANEL: CPT | Performed by: NURSE PRACTITIONER

## 2022-01-01 PROCEDURE — G0439 PPPS, SUBSEQ VISIT: HCPCS | Performed by: INTERNAL MEDICINE

## 2022-01-01 PROCEDURE — 94640 AIRWAY INHALATION TREATMENT: CPT | Mod: 76

## 2022-01-01 PROCEDURE — 250N000009 HC RX 250: Performed by: PHYSICIAN ASSISTANT

## 2022-01-01 PROCEDURE — 51798 US URINE CAPACITY MEASURE: CPT | Performed by: PHYSICIAN ASSISTANT

## 2022-01-01 PROCEDURE — 36415 COLL VENOUS BLD VENIPUNCTURE: CPT | Mod: ZL

## 2022-01-01 PROCEDURE — 99214 OFFICE O/P EST MOD 30 MIN: CPT | Mod: 25 | Performed by: INTERNAL MEDICINE

## 2022-01-01 PROCEDURE — 83880 ASSAY OF NATRIURETIC PEPTIDE: CPT | Mod: ZL | Performed by: NURSE PRACTITIONER

## 2022-01-01 PROCEDURE — 250N000013 HC RX MED GY IP 250 OP 250 PS 637: Performed by: EMERGENCY MEDICINE

## 2022-01-01 PROCEDURE — 84145 PROCALCITONIN (PCT): CPT | Performed by: NURSE PRACTITIONER

## 2022-01-01 PROCEDURE — 82728 ASSAY OF FERRITIN: CPT | Mod: ZL

## 2022-01-01 PROCEDURE — 250N000013 HC RX MED GY IP 250 OP 250 PS 637: Performed by: NURSE PRACTITIONER

## 2022-01-01 PROCEDURE — C9803 HOPD COVID-19 SPEC COLLECT: HCPCS | Performed by: NURSE PRACTITIONER

## 2022-01-01 PROCEDURE — 84145 PROCALCITONIN (PCT): CPT | Performed by: FAMILY MEDICINE

## 2022-01-01 PROCEDURE — 94640 AIRWAY INHALATION TREATMENT: CPT

## 2022-01-01 PROCEDURE — 87338 HPYLORI STOOL AG IA: CPT | Performed by: PHYSICIAN ASSISTANT

## 2022-01-01 PROCEDURE — 82310 ASSAY OF CALCIUM: CPT | Performed by: FAMILY MEDICINE

## 2022-01-01 PROCEDURE — 97166 OT EVAL MOD COMPLEX 45 MIN: CPT | Mod: GO

## 2022-01-01 PROCEDURE — 83735 ASSAY OF MAGNESIUM: CPT | Performed by: FAMILY MEDICINE

## 2022-01-01 PROCEDURE — 110N000002 HC R&B RESPITE

## 2022-01-01 PROCEDURE — G1004 CDSM NDSC: HCPCS

## 2022-01-01 PROCEDURE — 99231 SBSQ HOSP IP/OBS SF/LOW 25: CPT | Performed by: INTERNAL MEDICINE

## 2022-01-01 PROCEDURE — U0005 INFEC AGEN DETEC AMPLI PROBE: HCPCS | Mod: ZL

## 2022-01-01 PROCEDURE — 96365 THER/PROPH/DIAG IV INF INIT: CPT | Performed by: PHYSICIAN ASSISTANT

## 2022-01-01 PROCEDURE — 86140 C-REACTIVE PROTEIN: CPT | Mod: ZL

## 2022-01-01 PROCEDURE — 96368 THER/DIAG CONCURRENT INF: CPT | Performed by: FAMILY MEDICINE

## 2022-01-01 PROCEDURE — 99417 PROLNG OP E/M EACH 15 MIN: CPT | Mod: GZ | Performed by: RADIOLOGY

## 2022-01-01 PROCEDURE — 74019 RADEX ABDOMEN 2 VIEWS: CPT

## 2022-01-01 PROCEDURE — 78815 PET IMAGE W/CT SKULL-THIGH: CPT | Mod: PI

## 2022-01-01 PROCEDURE — 83880 ASSAY OF NATRIURETIC PEPTIDE: CPT | Performed by: PHYSICIAN ASSISTANT

## 2022-01-01 PROCEDURE — 36415 COLL VENOUS BLD VENIPUNCTURE: CPT

## 2022-01-01 PROCEDURE — 99215 OFFICE O/P EST HI 40 MIN: CPT | Performed by: PHYSICIAN ASSISTANT

## 2022-01-01 PROCEDURE — 85025 COMPLETE CBC W/AUTO DIFF WBC: CPT | Performed by: NURSE PRACTITIONER

## 2022-01-01 PROCEDURE — 85027 COMPLETE CBC AUTOMATED: CPT | Mod: ZL | Performed by: INTERNAL MEDICINE

## 2022-01-01 PROCEDURE — 80048 BASIC METABOLIC PNL TOTAL CA: CPT | Performed by: INTERNAL MEDICINE

## 2022-01-01 PROCEDURE — 96365 THER/PROPH/DIAG IV INF INIT: CPT

## 2022-01-01 PROCEDURE — 77470 SPECIAL RADIATION TREATMENT: CPT | Mod: 26 | Performed by: RADIOLOGY

## 2022-01-01 PROCEDURE — 99285 EMERGENCY DEPT VISIT HI MDM: CPT | Mod: 25,CS | Performed by: FAMILY MEDICINE

## 2022-01-01 PROCEDURE — 71046 X-RAY EXAM CHEST 2 VIEWS: CPT

## 2022-01-01 PROCEDURE — 92610 EVALUATE SWALLOWING FUNCTION: CPT | Mod: GN

## 2022-01-01 PROCEDURE — 99238 HOSP IP/OBS DSCHRG MGMT 30/<: CPT | Performed by: INTERNAL MEDICINE

## 2022-01-01 PROCEDURE — 81001 URINALYSIS AUTO W/SCOPE: CPT | Mod: ZL | Performed by: FAMILY MEDICINE

## 2022-01-01 PROCEDURE — 36415 COLL VENOUS BLD VENIPUNCTURE: CPT | Mod: ZL | Performed by: NURSE PRACTITIONER

## 2022-01-01 PROCEDURE — 82040 ASSAY OF SERUM ALBUMIN: CPT | Performed by: FAMILY MEDICINE

## 2022-01-01 PROCEDURE — 77263 THER RADIOLOGY TX PLNG CPLX: CPT | Performed by: RADIOLOGY

## 2022-01-01 PROCEDURE — 36591 DRAW BLOOD OFF VENOUS DEVICE: CPT | Performed by: PHYSICIAN ASSISTANT

## 2022-01-01 PROCEDURE — 86140 C-REACTIVE PROTEIN: CPT | Performed by: PHYSICIAN ASSISTANT

## 2022-01-01 PROCEDURE — 80202 ASSAY OF VANCOMYCIN: CPT | Performed by: INTERNAL MEDICINE

## 2022-01-01 PROCEDURE — 80048 BASIC METABOLIC PNL TOTAL CA: CPT | Mod: ZL | Performed by: INTERNAL MEDICINE

## 2022-01-01 PROCEDURE — 80053 COMPREHEN METABOLIC PANEL: CPT | Performed by: PHYSICIAN ASSISTANT

## 2022-01-01 PROCEDURE — 258N000003 HC RX IP 258 OP 636: Performed by: NURSE PRACTITIONER

## 2022-01-01 PROCEDURE — 99223 1ST HOSP IP/OBS HIGH 75: CPT | Mod: AI | Performed by: NURSE PRACTITIONER

## 2022-01-01 PROCEDURE — 250N000012 HC RX MED GY IP 250 OP 636 PS 637: Performed by: NURSE PRACTITIONER

## 2022-01-01 PROCEDURE — U0005 INFEC AGEN DETEC AMPLI PROBE: HCPCS | Mod: ZL | Performed by: NURSE PRACTITIONER

## 2022-01-01 PROCEDURE — 99285 EMERGENCY DEPT VISIT HI MDM: CPT | Mod: CS | Performed by: FAMILY MEDICINE

## 2022-01-01 PROCEDURE — 82043 UR ALBUMIN QUANTITATIVE: CPT | Mod: ZL | Performed by: INTERNAL MEDICINE

## 2022-01-01 PROCEDURE — 87493 C DIFF AMPLIFIED PROBE: CPT | Performed by: PHYSICIAN ASSISTANT

## 2022-01-01 PROCEDURE — 82565 ASSAY OF CREATININE: CPT | Performed by: FAMILY MEDICINE

## 2022-01-01 PROCEDURE — 77338 DESIGN MLC DEVICE FOR IMRT: CPT | Mod: 26 | Performed by: RADIOLOGY

## 2022-01-01 PROCEDURE — 83605 ASSAY OF LACTIC ACID: CPT | Performed by: PHYSICIAN ASSISTANT

## 2022-01-01 PROCEDURE — 36415 COLL VENOUS BLD VENIPUNCTURE: CPT | Performed by: EMERGENCY MEDICINE

## 2022-01-01 PROCEDURE — 77301 RADIOTHERAPY DOSE PLAN IMRT: CPT | Mod: 26 | Performed by: RADIOLOGY

## 2022-01-01 PROCEDURE — 83605 ASSAY OF LACTIC ACID: CPT | Performed by: INTERNAL MEDICINE

## 2022-01-01 PROCEDURE — 81001 URINALYSIS AUTO W/SCOPE: CPT | Performed by: PHYSICIAN ASSISTANT

## 2022-01-01 PROCEDURE — 81003 URINALYSIS AUTO W/O SCOPE: CPT | Performed by: PHYSICIAN ASSISTANT

## 2022-01-01 PROCEDURE — 99220 PR INITIAL OBSERVATION CARE,LEVEL III: CPT | Performed by: INTERNAL MEDICINE

## 2022-01-01 PROCEDURE — 84132 ASSAY OF SERUM POTASSIUM: CPT | Mod: XU | Performed by: PHYSICIAN ASSISTANT

## 2022-01-01 PROCEDURE — 250N000009 HC RX 250: Performed by: FAMILY MEDICINE

## 2022-01-01 PROCEDURE — 99213 OFFICE O/P EST LOW 20 MIN: CPT | Performed by: NURSE PRACTITIONER

## 2022-01-01 PROCEDURE — 83605 ASSAY OF LACTIC ACID: CPT | Mod: ZL | Performed by: FAMILY MEDICINE

## 2022-01-01 PROCEDURE — 343N000001 HC RX 343: Performed by: INTERNAL MEDICINE

## 2022-01-01 PROCEDURE — 99284 EMERGENCY DEPT VISIT MOD MDM: CPT | Performed by: FAMILY MEDICINE

## 2022-01-01 PROCEDURE — G0463 HOSPITAL OUTPT CLINIC VISIT: HCPCS | Performed by: RADIOLOGY

## 2022-01-01 PROCEDURE — 81001 URINALYSIS AUTO W/SCOPE: CPT | Performed by: NURSE PRACTITIONER

## 2022-01-01 PROCEDURE — 87086 URINE CULTURE/COLONY COUNT: CPT | Performed by: NURSE PRACTITIONER

## 2022-01-01 PROCEDURE — 70553 MRI BRAIN STEM W/O & W/DYE: CPT | Mod: ME

## 2022-01-01 PROCEDURE — G0463 HOSPITAL OUTPT CLINIC VISIT: HCPCS | Mod: 25,27

## 2022-01-01 PROCEDURE — 96372 THER/PROPH/DIAG INJ SC/IM: CPT | Performed by: INTERNAL MEDICINE

## 2022-01-01 PROCEDURE — 83605 ASSAY OF LACTIC ACID: CPT | Performed by: NURSE PRACTITIONER

## 2022-01-01 PROCEDURE — 87040 BLOOD CULTURE FOR BACTERIA: CPT | Performed by: PHYSICIAN ASSISTANT

## 2022-01-01 PROCEDURE — 87637 SARSCOV2&INF A&B&RSV AMP PRB: CPT | Performed by: FAMILY MEDICINE

## 2022-01-01 PROCEDURE — 99284 EMERGENCY DEPT VISIT MOD MDM: CPT | Mod: CS | Performed by: FAMILY MEDICINE

## 2022-01-01 PROCEDURE — 999N000157 HC STATISTIC RCP TIME EA 10 MIN

## 2022-01-01 PROCEDURE — 80048 BASIC METABOLIC PNL TOTAL CA: CPT | Mod: ZL | Performed by: NURSE PRACTITIONER

## 2022-01-01 PROCEDURE — 99214 OFFICE O/P EST MOD 30 MIN: CPT | Performed by: PHYSICIAN ASSISTANT

## 2022-01-01 PROCEDURE — A9552 F18 FDG: HCPCS | Performed by: INTERNAL MEDICINE

## 2022-01-01 RX ORDER — NALOXONE HYDROCHLORIDE 0.4 MG/ML
0.2 INJECTION, SOLUTION INTRAMUSCULAR; INTRAVENOUS; SUBCUTANEOUS
Status: DISCONTINUED | OUTPATIENT
Start: 2022-01-01 | End: 2022-01-01

## 2022-01-01 RX ORDER — HEPARIN SODIUM,PORCINE 10 UNIT/ML
5-10 VIAL (ML) INTRAVENOUS EVERY 24 HOURS
Status: DISCONTINUED | OUTPATIENT
Start: 2022-01-01 | End: 2022-01-01

## 2022-01-01 RX ORDER — POTASSIUM CHLORIDE 7.45 MG/ML
10 INJECTION INTRAVENOUS ONCE
Status: COMPLETED | OUTPATIENT
Start: 2022-01-01 | End: 2022-01-01

## 2022-01-01 RX ORDER — PANTOPRAZOLE SODIUM 40 MG/1
40 TABLET, DELAYED RELEASE ORAL
Status: DISCONTINUED | OUTPATIENT
Start: 2022-01-01 | End: 2022-01-01 | Stop reason: HOSPADM

## 2022-01-01 RX ORDER — MAGNESIUM SULFATE HEPTAHYDRATE 40 MG/ML
2 INJECTION, SOLUTION INTRAVENOUS ONCE
Status: COMPLETED | OUTPATIENT
Start: 2022-01-01 | End: 2022-01-01

## 2022-01-01 RX ORDER — HEPARIN SODIUM (PORCINE) LOCK FLUSH IV SOLN 100 UNIT/ML 100 UNIT/ML
5 SOLUTION INTRAVENOUS
Status: DISCONTINUED | OUTPATIENT
Start: 2022-01-01 | End: 2022-01-01 | Stop reason: HOSPADM

## 2022-01-01 RX ORDER — POTASSIUM CHLORIDE 1.5 G/1.58G
40 POWDER, FOR SOLUTION ORAL ONCE
Status: COMPLETED | OUTPATIENT
Start: 2022-01-01 | End: 2022-01-01

## 2022-01-01 RX ORDER — HEPARIN SODIUM (PORCINE) LOCK FLUSH IV SOLN 100 UNIT/ML 100 UNIT/ML
5 SOLUTION INTRAVENOUS
Status: CANCELLED | OUTPATIENT
Start: 2022-01-01

## 2022-01-01 RX ORDER — AMOXICILLIN AND CLAVULANATE POTASSIUM 400; 57 MG/5ML; MG/5ML
875 POWDER, FOR SUSPENSION ORAL 2 TIMES DAILY
Qty: 220 ML | Refills: 0 | Status: SHIPPED | OUTPATIENT
Start: 2022-01-01 | End: 2022-01-01

## 2022-01-01 RX ORDER — ATORVASTATIN CALCIUM 40 MG/1
40 TABLET, FILM COATED ORAL
Qty: 90 TABLET | Refills: 4 | Status: SHIPPED | OUTPATIENT
Start: 2022-01-01 | End: 2022-01-01

## 2022-01-01 RX ORDER — HEPARIN SODIUM (PORCINE) LOCK FLUSH IV SOLN 100 UNIT/ML 100 UNIT/ML
5-10 SOLUTION INTRAVENOUS
Status: DISCONTINUED | OUTPATIENT
Start: 2022-01-01 | End: 2022-01-01 | Stop reason: HOSPADM

## 2022-01-01 RX ORDER — NALOXONE HYDROCHLORIDE 0.4 MG/ML
0.4 INJECTION, SOLUTION INTRAMUSCULAR; INTRAVENOUS; SUBCUTANEOUS
Status: DISCONTINUED | OUTPATIENT
Start: 2022-01-01 | End: 2022-01-01 | Stop reason: HOSPADM

## 2022-01-01 RX ORDER — NALOXONE HYDROCHLORIDE 0.4 MG/ML
0.4 INJECTION, SOLUTION INTRAMUSCULAR; INTRAVENOUS; SUBCUTANEOUS
Status: DISCONTINUED | OUTPATIENT
Start: 2022-01-01 | End: 2022-01-01

## 2022-01-01 RX ORDER — PROCHLORPERAZINE MALEATE 10 MG
TABLET ORAL
COMMUNITY
Start: 2022-01-01 | End: 2022-01-01

## 2022-01-01 RX ORDER — LEVOTHYROXINE SODIUM 50 UG/1
50 TABLET ORAL DAILY
Status: DISCONTINUED | OUTPATIENT
Start: 2022-01-01 | End: 2022-01-01 | Stop reason: HOSPADM

## 2022-01-01 RX ORDER — TORSEMIDE 10 MG/1
10-20 TABLET ORAL DAILY
Qty: 60 TABLET | Refills: 3 | Status: ON HOLD | OUTPATIENT
Start: 2022-01-01 | End: 2022-01-01

## 2022-01-01 RX ORDER — CETIRIZINE HYDROCHLORIDE 10 MG/1
10 TABLET ORAL AT BEDTIME
Status: ON HOLD | COMMUNITY
End: 2022-01-01

## 2022-01-01 RX ORDER — CETIRIZINE HYDROCHLORIDE 10 MG/1
10 TABLET ORAL DAILY
Status: DISCONTINUED | OUTPATIENT
Start: 2022-01-01 | End: 2022-01-01 | Stop reason: HOSPADM

## 2022-01-01 RX ORDER — CHLORPROMAZINE HYDROCHLORIDE 10 MG/1
TABLET, FILM COATED ORAL
Qty: 30 TABLET | Refills: 0 | Status: ON HOLD | OUTPATIENT
Start: 2022-01-01 | End: 2022-01-01

## 2022-01-01 RX ORDER — DIPHENHYDRAMINE HYDROCHLORIDE 50 MG/ML
50 INJECTION INTRAMUSCULAR; INTRAVENOUS
Status: DISCONTINUED | OUTPATIENT
Start: 2022-01-01 | End: 2022-01-01 | Stop reason: HOSPADM

## 2022-01-01 RX ORDER — SODIUM CHLORIDE 9 MG/ML
INJECTION, SOLUTION INTRAVENOUS CONTINUOUS
Status: DISCONTINUED | OUTPATIENT
Start: 2022-01-01 | End: 2022-01-01

## 2022-01-01 RX ORDER — NALOXONE HYDROCHLORIDE 0.4 MG/ML
0.2 INJECTION, SOLUTION INTRAMUSCULAR; INTRAVENOUS; SUBCUTANEOUS
Status: DISCONTINUED | OUTPATIENT
Start: 2022-01-01 | End: 2022-01-01 | Stop reason: HOSPADM

## 2022-01-01 RX ORDER — NALOXONE HYDROCHLORIDE 0.4 MG/ML
0.1 INJECTION, SOLUTION INTRAMUSCULAR; INTRAVENOUS; SUBCUTANEOUS
Status: DISCONTINUED | OUTPATIENT
Start: 2022-01-01 | End: 2022-01-01 | Stop reason: HOSPADM

## 2022-01-01 RX ORDER — HEPARIN SODIUM,PORCINE 10 UNIT/ML
5-10 VIAL (ML) INTRAVENOUS EVERY 24 HOURS
Status: DISCONTINUED | OUTPATIENT
Start: 2022-01-01 | End: 2022-01-01 | Stop reason: HOSPADM

## 2022-01-01 RX ORDER — DIPHENHYDRAMINE HYDROCHLORIDE 50 MG/ML
50 INJECTION INTRAMUSCULAR; INTRAVENOUS
Status: CANCELLED
Start: 2022-01-01

## 2022-01-01 RX ORDER — CHOLECALCIFEROL (VITAMIN D3) 50 MCG
1 TABLET ORAL 2 TIMES DAILY
Status: ON HOLD | COMMUNITY
End: 2022-01-01

## 2022-01-01 RX ORDER — ALBUTEROL SULFATE 90 UG/1
1-2 AEROSOL, METERED RESPIRATORY (INHALATION)
Status: CANCELLED
Start: 2022-01-01

## 2022-01-01 RX ORDER — CEFTRIAXONE SODIUM 2 G/50ML
2 INJECTION, SOLUTION INTRAVENOUS ONCE
Status: DISCONTINUED | OUTPATIENT
Start: 2022-01-01 | End: 2022-01-01

## 2022-01-01 RX ORDER — QUETIAPINE FUMARATE 25 MG/1
25 TABLET, FILM COATED ORAL 2 TIMES DAILY PRN
Qty: 30 TABLET | Refills: 0 | Status: SHIPPED | OUTPATIENT
Start: 2022-01-01

## 2022-01-01 RX ORDER — SODIUM CHLORIDE 9 MG/ML
INJECTION, SOLUTION INTRAVENOUS CONTINUOUS
Status: DISCONTINUED | OUTPATIENT
Start: 2022-01-01 | End: 2022-01-01 | Stop reason: HOSPADM

## 2022-01-01 RX ORDER — DOXYCYCLINE HYCLATE 100 MG
100 TABLET ORAL 2 TIMES DAILY
Qty: 20 TABLET | Refills: 0 | Status: SHIPPED | OUTPATIENT
Start: 2022-01-01 | End: 2022-01-01

## 2022-01-01 RX ORDER — MORPHINE SULFATE 20 MG/ML
5 SOLUTION ORAL EVERY 4 HOURS PRN
Status: DISCONTINUED | OUTPATIENT
Start: 2022-01-01 | End: 2022-01-01 | Stop reason: HOSPADM

## 2022-01-01 RX ORDER — CEFTRIAXONE SODIUM 1 G/50ML
1 INJECTION, SOLUTION INTRAVENOUS ONCE
Status: COMPLETED | OUTPATIENT
Start: 2022-01-01 | End: 2022-01-01

## 2022-01-01 RX ORDER — POTASSIUM CHLORIDE 7.45 MG/ML
10 INJECTION INTRAVENOUS
Status: COMPLETED | OUTPATIENT
Start: 2022-01-01 | End: 2022-01-01

## 2022-01-01 RX ORDER — LIDOCAINE 50 MG/G
1 PATCH TOPICAL DAILY
Status: DISCONTINUED | OUTPATIENT
Start: 2022-01-01 | End: 2022-01-01 | Stop reason: HOSPADM

## 2022-01-01 RX ORDER — LIDOCAINE 40 MG/G
CREAM TOPICAL
Status: DISCONTINUED | OUTPATIENT
Start: 2022-01-01 | End: 2022-01-01 | Stop reason: HOSPADM

## 2022-01-01 RX ORDER — SACCHAROMYCES BOULARDII 250 MG
250 CAPSULE ORAL 2 TIMES DAILY
Qty: 20 CAPSULE | Refills: 0 | Status: SHIPPED | OUTPATIENT
Start: 2022-01-01 | End: 2022-01-01

## 2022-01-01 RX ORDER — PROPRANOLOL HYDROCHLORIDE 20 MG/1
TABLET ORAL
Qty: 270 TABLET | Refills: 1 | Status: CANCELLED | OUTPATIENT
Start: 2022-01-01

## 2022-01-01 RX ORDER — IPRATROPIUM BROMIDE AND ALBUTEROL SULFATE 2.5; .5 MG/3ML; MG/3ML
3 SOLUTION RESPIRATORY (INHALATION)
Status: COMPLETED | OUTPATIENT
Start: 2022-01-01 | End: 2022-01-01

## 2022-01-01 RX ORDER — ONDANSETRON 4 MG/1
4 TABLET, ORALLY DISINTEGRATING ORAL EVERY 6 HOURS PRN
Status: DISCONTINUED | OUTPATIENT
Start: 2022-01-01 | End: 2022-01-01 | Stop reason: HOSPADM

## 2022-01-01 RX ORDER — HEPARIN SODIUM (PORCINE) LOCK FLUSH IV SOLN 100 UNIT/ML 100 UNIT/ML
500 SOLUTION INTRAVENOUS ONCE
Status: COMPLETED | OUTPATIENT
Start: 2022-01-01 | End: 2022-01-01

## 2022-01-01 RX ORDER — PROPRANOLOL HYDROCHLORIDE 20 MG/1
TABLET ORAL
Qty: 270 TABLET | Refills: 1 | Status: ON HOLD | OUTPATIENT
Start: 2022-01-01 | End: 2022-01-01

## 2022-01-01 RX ORDER — POTASSIUM CHLORIDE 1500 MG/1
40 TABLET, EXTENDED RELEASE ORAL ONCE
Status: COMPLETED | OUTPATIENT
Start: 2022-01-01 | End: 2022-01-01

## 2022-01-01 RX ORDER — NICOTINE POLACRILEX 4 MG
15-30 LOZENGE BUCCAL
Status: DISCONTINUED | OUTPATIENT
Start: 2022-01-01 | End: 2022-01-01 | Stop reason: HOSPADM

## 2022-01-01 RX ORDER — METOCLOPRAMIDE 10 MG/1
10 TABLET ORAL
COMMUNITY
End: 2022-01-01

## 2022-01-01 RX ORDER — GABAPENTIN 100 MG/1
100 CAPSULE ORAL 3 TIMES DAILY
Status: CANCELLED | OUTPATIENT
Start: 2022-01-01

## 2022-01-01 RX ORDER — TAMSULOSIN HYDROCHLORIDE 0.4 MG/1
0.4 CAPSULE ORAL AT BEDTIME
Status: DISCONTINUED | OUTPATIENT
Start: 2022-01-01 | End: 2022-01-01

## 2022-01-01 RX ORDER — IPRATROPIUM BROMIDE AND ALBUTEROL SULFATE 2.5; .5 MG/3ML; MG/3ML
3 SOLUTION RESPIRATORY (INHALATION) EVERY 4 HOURS PRN
Status: DISCONTINUED | OUTPATIENT
Start: 2022-01-01 | End: 2022-01-01 | Stop reason: HOSPADM

## 2022-01-01 RX ORDER — FENTANYL CITRATE 50 UG/ML
25 INJECTION, SOLUTION INTRAMUSCULAR; INTRAVENOUS ONCE
Status: COMPLETED | OUTPATIENT
Start: 2022-01-01 | End: 2022-01-01

## 2022-01-01 RX ORDER — LEVOTHYROXINE SODIUM 50 UG/1
50 TABLET ORAL DAILY
Qty: 90 TABLET | Refills: 1 | Status: SHIPPED | OUTPATIENT
Start: 2022-01-01

## 2022-01-01 RX ORDER — AMOXICILLIN 250 MG
2 CAPSULE ORAL 2 TIMES DAILY PRN
Status: DISCONTINUED | OUTPATIENT
Start: 2022-01-01 | End: 2022-01-01 | Stop reason: HOSPADM

## 2022-01-01 RX ORDER — CHLORPROMAZINE HCL 50 MG
TABLET ORAL
Qty: 9 ML | Refills: 0 | Status: SHIPPED | OUTPATIENT
Start: 2022-01-01 | End: 2022-01-01

## 2022-01-01 RX ORDER — METOCLOPRAMIDE 5 MG/1
5 TABLET ORAL 3 TIMES DAILY PRN
Qty: 30 TABLET | Refills: 0 | Status: ON HOLD | OUTPATIENT
Start: 2022-01-01 | End: 2022-01-01

## 2022-01-01 RX ORDER — EPINEPHRINE 1 MG/ML
0.3 INJECTION, SOLUTION, CONCENTRATE INTRAVENOUS EVERY 5 MIN PRN
Status: CANCELLED | OUTPATIENT
Start: 2022-01-01

## 2022-01-01 RX ORDER — POTASSIUM CHLORIDE 1.5 G/1.58G
20 POWDER, FOR SOLUTION ORAL DAILY
Qty: 7 PACKET | Refills: 0 | Status: ON HOLD | OUTPATIENT
Start: 2022-01-01 | End: 2022-01-01

## 2022-01-01 RX ORDER — NICOTINE POLACRILEX 4 MG/1
20 GUM, CHEWING ORAL DAILY
COMMUNITY

## 2022-01-01 RX ORDER — MEPERIDINE HYDROCHLORIDE 50 MG/ML
25 INJECTION INTRAMUSCULAR; INTRAVENOUS; SUBCUTANEOUS EVERY 30 MIN PRN
Status: CANCELLED | OUTPATIENT
Start: 2022-01-01

## 2022-01-01 RX ORDER — LACTOBACILLUS RHAMNOSUS GG 10B CELL
1 CAPSULE ORAL 2 TIMES DAILY
Status: DISCONTINUED | OUTPATIENT
Start: 2022-01-01 | End: 2022-01-01 | Stop reason: HOSPADM

## 2022-01-01 RX ORDER — PRIMIDONE 50 MG/1
100 TABLET ORAL AT BEDTIME
Status: DISCONTINUED | OUTPATIENT
Start: 2022-01-01 | End: 2022-01-01 | Stop reason: HOSPADM

## 2022-01-01 RX ORDER — HEPARIN SODIUM,PORCINE 10 UNIT/ML
5-10 VIAL (ML) INTRAVENOUS
Status: DISCONTINUED | OUTPATIENT
Start: 2022-01-01 | End: 2022-01-01

## 2022-01-01 RX ORDER — POTASSIUM CHLORIDE 1.5 G/1.58G
20 POWDER, FOR SOLUTION ORAL ONCE
Status: DISCONTINUED | OUTPATIENT
Start: 2022-01-01 | End: 2022-01-01 | Stop reason: CLARIF

## 2022-01-01 RX ORDER — EPINEPHRINE 1 MG/ML
0.3 INJECTION, SOLUTION, CONCENTRATE INTRAVENOUS EVERY 5 MIN PRN
Status: DISCONTINUED | OUTPATIENT
Start: 2022-01-01 | End: 2022-01-01 | Stop reason: HOSPADM

## 2022-01-01 RX ORDER — ACETAMINOPHEN 500 MG
1000 TABLET ORAL EVERY 6 HOURS PRN
Status: DISCONTINUED | OUTPATIENT
Start: 2022-01-01 | End: 2022-01-01 | Stop reason: HOSPADM

## 2022-01-01 RX ORDER — METHYLPREDNISOLONE SODIUM SUCCINATE 125 MG/2ML
125 INJECTION, POWDER, LYOPHILIZED, FOR SOLUTION INTRAMUSCULAR; INTRAVENOUS
Status: DISCONTINUED | OUTPATIENT
Start: 2022-01-01 | End: 2022-01-01 | Stop reason: HOSPADM

## 2022-01-01 RX ORDER — MAGNESIUM HYDROXIDE/ALUMINUM HYDROXICE/SIMETHICONE 120; 1200; 1200 MG/30ML; MG/30ML; MG/30ML
15 SUSPENSION ORAL ONCE
Status: COMPLETED | OUTPATIENT
Start: 2022-01-01 | End: 2022-01-01

## 2022-01-01 RX ORDER — GABAPENTIN 100 MG/1
100 CAPSULE ORAL 3 TIMES DAILY
Status: DISCONTINUED | OUTPATIENT
Start: 2022-01-01 | End: 2022-01-01 | Stop reason: HOSPADM

## 2022-01-01 RX ORDER — PROCHLORPERAZINE MALEATE 5 MG
5 TABLET ORAL EVERY 6 HOURS PRN
Status: DISCONTINUED | OUTPATIENT
Start: 2022-01-01 | End: 2022-01-01 | Stop reason: HOSPADM

## 2022-01-01 RX ORDER — AMOXICILLIN 250 MG
1 CAPSULE ORAL 2 TIMES DAILY PRN
Status: DISCONTINUED | OUTPATIENT
Start: 2022-01-01 | End: 2022-01-01 | Stop reason: HOSPADM

## 2022-01-01 RX ORDER — CHLORPROMAZINE HYDROCHLORIDE 25 MG/1
25 TABLET, FILM COATED ORAL 3 TIMES DAILY
Qty: 60 TABLET | Refills: 0 | Status: SHIPPED | OUTPATIENT
Start: 2022-01-01 | End: 2022-01-01

## 2022-01-01 RX ORDER — AZITHROMYCIN 250 MG/1
TABLET, FILM COATED ORAL
Qty: 6 TABLET | Refills: 0 | Status: SHIPPED | OUTPATIENT
Start: 2022-01-01 | End: 2022-01-01

## 2022-01-01 RX ORDER — BACLOFEN 20 MG/1
10 TABLET ORAL 3 TIMES DAILY
Qty: 90 TABLET | Refills: 0 | Status: SHIPPED | OUTPATIENT
Start: 2022-01-01 | End: 2022-01-01

## 2022-01-01 RX ORDER — POTASSIUM CHLORIDE 20MEQ/15ML
40 LIQUID (ML) ORAL ONCE
Status: COMPLETED | OUTPATIENT
Start: 2022-01-01 | End: 2022-01-01

## 2022-01-01 RX ORDER — ATORVASTATIN CALCIUM 40 MG/1
40 TABLET, FILM COATED ORAL
Status: DISCONTINUED | OUTPATIENT
Start: 2022-01-01 | End: 2022-01-01 | Stop reason: HOSPADM

## 2022-01-01 RX ORDER — ENOXAPARIN SODIUM 100 MG/ML
40 INJECTION SUBCUTANEOUS EVERY 24 HOURS
Status: DISCONTINUED | OUTPATIENT
Start: 2022-01-01 | End: 2022-01-01

## 2022-01-01 RX ORDER — RIVASTIGMINE TARTRATE 3 MG/1
3 CAPSULE ORAL 2 TIMES DAILY
Status: DISCONTINUED | OUTPATIENT
Start: 2022-01-01 | End: 2022-01-01 | Stop reason: HOSPADM

## 2022-01-01 RX ORDER — LIDOCAINE 40 MG/G
CREAM TOPICAL
Status: DISCONTINUED | OUTPATIENT
Start: 2022-01-01 | End: 2022-01-01

## 2022-01-01 RX ORDER — ONDANSETRON 2 MG/ML
4 INJECTION INTRAMUSCULAR; INTRAVENOUS EVERY 6 HOURS PRN
Status: DISCONTINUED | OUTPATIENT
Start: 2022-01-01 | End: 2022-01-01 | Stop reason: HOSPADM

## 2022-01-01 RX ORDER — CHLORPROMAZINE HCL 50 MG
TABLET ORAL
Qty: 10 ML | Refills: 0 | Status: SHIPPED | OUTPATIENT
Start: 2022-01-01 | End: 2022-01-01

## 2022-01-01 RX ORDER — TAMSULOSIN HYDROCHLORIDE 0.4 MG/1
0.4 CAPSULE ORAL DAILY
Status: DISCONTINUED | OUTPATIENT
Start: 2022-01-01 | End: 2022-01-01 | Stop reason: HOSPADM

## 2022-01-01 RX ORDER — IOPAMIDOL 755 MG/ML
100 INJECTION, SOLUTION INTRAVASCULAR ONCE
Status: COMPLETED | OUTPATIENT
Start: 2022-01-01 | End: 2022-01-01

## 2022-01-01 RX ORDER — MAGNESIUM SULFATE 1 G/100ML
1 INJECTION INTRAVENOUS ONCE
Status: COMPLETED | OUTPATIENT
Start: 2022-01-01 | End: 2022-01-01

## 2022-01-01 RX ORDER — GUAIFENESIN 600 MG/1
600 TABLET, EXTENDED RELEASE ORAL 2 TIMES DAILY
Status: DISCONTINUED | OUTPATIENT
Start: 2022-01-01 | End: 2022-01-01 | Stop reason: HOSPADM

## 2022-01-01 RX ORDER — CIDER VINEGAR 300 MG
1 TABLET ORAL 2 TIMES DAILY
COMMUNITY

## 2022-01-01 RX ORDER — LEVOTHYROXINE SODIUM 50 UG/1
50 TABLET ORAL
Status: DISCONTINUED | OUTPATIENT
Start: 2022-01-01 | End: 2022-01-01 | Stop reason: HOSPADM

## 2022-01-01 RX ORDER — BENZONATATE 100 MG/1
100 CAPSULE ORAL 3 TIMES DAILY PRN
Status: DISCONTINUED | OUTPATIENT
Start: 2022-01-01 | End: 2022-01-01 | Stop reason: HOSPADM

## 2022-01-01 RX ORDER — MAGNESIUM OXIDE 400 MG/1
400 TABLET ORAL AT BEDTIME
Status: ON HOLD | COMMUNITY
End: 2022-01-01

## 2022-01-01 RX ORDER — OLANZAPINE 10 MG/2ML
5 INJECTION, POWDER, FOR SOLUTION INTRAMUSCULAR DAILY PRN
Status: DISCONTINUED | OUTPATIENT
Start: 2022-01-01 | End: 2022-01-01

## 2022-01-01 RX ORDER — LORAZEPAM 0.5 MG/1
1 TABLET ORAL
Status: DISCONTINUED | OUTPATIENT
Start: 2022-01-01 | End: 2022-01-01 | Stop reason: HOSPADM

## 2022-01-01 RX ORDER — KETOROLAC TROMETHAMINE 15 MG/ML
15 INJECTION, SOLUTION INTRAMUSCULAR; INTRAVENOUS EVERY 6 HOURS PRN
Status: DISCONTINUED | OUTPATIENT
Start: 2022-01-01 | End: 2022-01-01

## 2022-01-01 RX ORDER — PROCHLORPERAZINE 25 MG
12.5 SUPPOSITORY, RECTAL RECTAL EVERY 12 HOURS PRN
Status: DISCONTINUED | OUTPATIENT
Start: 2022-01-01 | End: 2022-01-01 | Stop reason: HOSPADM

## 2022-01-01 RX ORDER — HEPARIN SODIUM,PORCINE 10 UNIT/ML
5 VIAL (ML) INTRAVENOUS
Status: DISCONTINUED | OUTPATIENT
Start: 2022-01-01 | End: 2022-01-01 | Stop reason: HOSPADM

## 2022-01-01 RX ORDER — RIVASTIGMINE 4.6 MG/24H
1 PATCH, EXTENDED RELEASE TRANSDERMAL DAILY
Status: DISCONTINUED | OUTPATIENT
Start: 2022-01-01 | End: 2022-01-01

## 2022-01-01 RX ORDER — NITROGLYCERIN 0.4 MG/1
TABLET SUBLINGUAL
Qty: 25 TABLET | Refills: 0 | Status: ON HOLD | OUTPATIENT
Start: 2022-01-01 | End: 2022-01-01

## 2022-01-01 RX ORDER — DEXTROSE MONOHYDRATE 25 G/50ML
25-50 INJECTION, SOLUTION INTRAVENOUS
Status: DISCONTINUED | OUTPATIENT
Start: 2022-01-01 | End: 2022-01-01 | Stop reason: HOSPADM

## 2022-01-01 RX ORDER — HEPARIN SODIUM (PORCINE) LOCK FLUSH IV SOLN 100 UNIT/ML 100 UNIT/ML
5-10 SOLUTION INTRAVENOUS
Status: DISCONTINUED | OUTPATIENT
Start: 2022-01-01 | End: 2022-01-01

## 2022-01-01 RX ORDER — ACETAMINOPHEN 325 MG/1
325-650 TABLET ORAL EVERY 6 HOURS PRN
COMMUNITY

## 2022-01-01 RX ORDER — DIAZEPAM 10 MG/2ML
5 INJECTION, SOLUTION INTRAMUSCULAR; INTRAVENOUS EVERY 6 HOURS PRN
Status: DISCONTINUED | OUTPATIENT
Start: 2022-01-01 | End: 2022-01-01 | Stop reason: HOSPADM

## 2022-01-01 RX ORDER — METOCLOPRAMIDE HYDROCHLORIDE 5 MG/ML
5 INJECTION INTRAMUSCULAR; INTRAVENOUS EVERY 6 HOURS
Status: DISCONTINUED | OUTPATIENT
Start: 2022-01-01 | End: 2022-01-01 | Stop reason: HOSPADM

## 2022-01-01 RX ORDER — AMPICILLIN AND SULBACTAM 2; 1 G/1; G/1
3 INJECTION, POWDER, FOR SOLUTION INTRAMUSCULAR; INTRAVENOUS EVERY 6 HOURS
Status: DISCONTINUED | OUTPATIENT
Start: 2022-01-01 | End: 2022-01-01

## 2022-01-01 RX ORDER — GABAPENTIN 100 MG/1
100 CAPSULE ORAL 3 TIMES DAILY
Qty: 90 CAPSULE | Refills: 0 | Status: SHIPPED | OUTPATIENT
Start: 2022-01-01 | End: 2022-01-01

## 2022-01-01 RX ORDER — FENTANYL CITRATE 50 UG/ML
25 INJECTION, SOLUTION INTRAMUSCULAR; INTRAVENOUS
Status: DISCONTINUED | OUTPATIENT
Start: 2022-01-01 | End: 2022-01-01

## 2022-01-01 RX ORDER — QUETIAPINE FUMARATE 25 MG/1
25 TABLET, FILM COATED ORAL AT BEDTIME
Qty: 30 TABLET | Refills: 0 | Status: SHIPPED | OUTPATIENT
Start: 2022-01-01 | End: 2022-11-04

## 2022-01-01 RX ORDER — ENOXAPARIN SODIUM 100 MG/ML
40 INJECTION SUBCUTANEOUS 2 TIMES DAILY
Status: DISCONTINUED | OUTPATIENT
Start: 2022-01-01 | End: 2022-01-01 | Stop reason: HOSPADM

## 2022-01-01 RX ORDER — PRIMIDONE 50 MG/1
100 TABLET ORAL AT BEDTIME
Qty: 180 TABLET | Refills: 4 | Status: ON HOLD | OUTPATIENT
Start: 2022-01-01 | End: 2022-01-01

## 2022-01-01 RX ORDER — PROPRANOLOL HYDROCHLORIDE 20 MG/1
40 TABLET ORAL EVERY EVENING
Status: DISCONTINUED | OUTPATIENT
Start: 2022-01-01 | End: 2022-01-01 | Stop reason: HOSPADM

## 2022-01-01 RX ORDER — QUETIAPINE FUMARATE 25 MG/1
25 TABLET, FILM COATED ORAL ONCE
Status: COMPLETED | OUTPATIENT
Start: 2022-01-01 | End: 2022-01-01

## 2022-01-01 RX ORDER — ALBUTEROL SULFATE 90 UG/1
2 AEROSOL, METERED RESPIRATORY (INHALATION) EVERY 6 HOURS PRN
COMMUNITY
End: 2022-01-01

## 2022-01-01 RX ORDER — ACETAMINOPHEN 650 MG/1
650 SUPPOSITORY RECTAL EVERY 6 HOURS PRN
Status: DISCONTINUED | OUTPATIENT
Start: 2022-01-01 | End: 2022-01-01 | Stop reason: HOSPADM

## 2022-01-01 RX ORDER — GADOTERATE MEGLUMINE 376.9 MG/ML
15 INJECTION INTRAVENOUS ONCE
Status: COMPLETED | OUTPATIENT
Start: 2022-01-01 | End: 2022-01-01

## 2022-01-01 RX ORDER — CHLORPROMAZINE HCL 50 MG
5 TABLET ORAL 3 TIMES DAILY
Qty: 9 ML | Refills: 0 | Status: SHIPPED | OUTPATIENT
Start: 2022-01-01 | End: 2022-01-01

## 2022-01-01 RX ORDER — TAMSULOSIN HYDROCHLORIDE 0.4 MG/1
0.4 CAPSULE ORAL DAILY
Status: ON HOLD | COMMUNITY
End: 2022-01-01

## 2022-01-01 RX ORDER — METHYLPREDNISOLONE SODIUM SUCCINATE 125 MG/2ML
125 INJECTION, POWDER, LYOPHILIZED, FOR SOLUTION INTRAMUSCULAR; INTRAVENOUS
Status: CANCELLED
Start: 2022-01-01

## 2022-01-01 RX ORDER — LEVOTHYROXINE SODIUM 50 UG/1
50 TABLET ORAL DAILY
Qty: 90 TABLET | Refills: 4 | Status: SHIPPED | OUTPATIENT
Start: 2022-01-01 | End: 2022-01-01

## 2022-01-01 RX ORDER — HEPARIN SODIUM (PORCINE) LOCK FLUSH IV SOLN 100 UNIT/ML 100 UNIT/ML
100 SOLUTION INTRAVENOUS EVERY 8 HOURS
Status: DISCONTINUED | OUTPATIENT
Start: 2022-01-01 | End: 2022-01-01 | Stop reason: DRUGHIGH

## 2022-01-01 RX ORDER — PIPERACILLIN SODIUM, TAZOBACTAM SODIUM 4; .5 G/20ML; G/20ML
4.5 INJECTION, POWDER, LYOPHILIZED, FOR SOLUTION INTRAVENOUS EVERY 6 HOURS
Status: DISCONTINUED | OUTPATIENT
Start: 2022-01-01 | End: 2022-01-01

## 2022-01-01 RX ORDER — ALBUTEROL SULFATE 0.83 MG/ML
2.5 SOLUTION RESPIRATORY (INHALATION)
Status: DISCONTINUED | OUTPATIENT
Start: 2022-01-01 | End: 2022-01-01 | Stop reason: HOSPADM

## 2022-01-01 RX ORDER — TAMSULOSIN HYDROCHLORIDE 0.4 MG/1
0.4 CAPSULE ORAL EVERY EVENING
Qty: 90 CAPSULE | Refills: 4 | Status: ON HOLD | OUTPATIENT
Start: 2022-01-01 | End: 2022-01-01

## 2022-01-01 RX ORDER — SULFAMETHOXAZOLE/TRIMETHOPRIM 800-160 MG
1 TABLET ORAL 2 TIMES DAILY
Qty: 10 TABLET | Refills: 0 | Status: SHIPPED | OUTPATIENT
Start: 2022-01-01 | End: 2022-01-01

## 2022-01-01 RX ORDER — PROPRANOLOL HYDROCHLORIDE 20 MG/1
TABLET ORAL
COMMUNITY
End: 2022-01-01

## 2022-01-01 RX ORDER — ALBUTEROL SULFATE 0.83 MG/ML
2.5 SOLUTION RESPIRATORY (INHALATION)
Status: CANCELLED | OUTPATIENT
Start: 2022-01-01

## 2022-01-01 RX ORDER — ACETAMINOPHEN 325 MG/1
650 TABLET ORAL EVERY 6 HOURS PRN
Status: DISCONTINUED | OUTPATIENT
Start: 2022-01-01 | End: 2022-01-01 | Stop reason: HOSPADM

## 2022-01-01 RX ORDER — LORAZEPAM 1 MG/1
1 TABLET ORAL
Qty: 30 TABLET | Refills: 0 | Status: SHIPPED | OUTPATIENT
Start: 2022-01-01

## 2022-01-01 RX ORDER — RIVASTIGMINE TARTRATE 1.5 MG/1
3 CAPSULE ORAL 2 TIMES DAILY
Status: DISCONTINUED | OUTPATIENT
Start: 2022-01-01 | End: 2022-01-01 | Stop reason: HOSPADM

## 2022-01-01 RX ORDER — ALBUTEROL SULFATE 90 UG/1
1-2 AEROSOL, METERED RESPIRATORY (INHALATION)
Status: DISCONTINUED | OUTPATIENT
Start: 2022-01-01 | End: 2022-01-01 | Stop reason: HOSPADM

## 2022-01-01 RX ORDER — GABAPENTIN 100 MG/1
100 CAPSULE ORAL 3 TIMES DAILY PRN
COMMUNITY
End: 2022-01-01

## 2022-01-01 RX ORDER — LORAZEPAM 1 MG/1
1 TABLET ORAL
Status: DISCONTINUED | OUTPATIENT
Start: 2022-01-01 | End: 2022-01-01 | Stop reason: HOSPADM

## 2022-01-01 RX ORDER — QUETIAPINE FUMARATE 25 MG/1
25 TABLET, FILM COATED ORAL 2 TIMES DAILY PRN
Status: DISCONTINUED | OUTPATIENT
Start: 2022-01-01 | End: 2022-01-01 | Stop reason: HOSPADM

## 2022-01-01 RX ORDER — QUETIAPINE FUMARATE 25 MG/1
25 TABLET, FILM COATED ORAL AT BEDTIME
Status: DISCONTINUED | OUTPATIENT
Start: 2022-01-01 | End: 2022-01-01 | Stop reason: HOSPADM

## 2022-01-01 RX ORDER — LIDOCAINE HYDROCHLORIDE 20 MG/ML
15 SOLUTION OROPHARYNGEAL ONCE
Status: COMPLETED | OUTPATIENT
Start: 2022-01-01 | End: 2022-01-01

## 2022-01-01 RX ORDER — GABAPENTIN 100 MG/1
100 CAPSULE ORAL 3 TIMES DAILY
Qty: 30 CAPSULE | Refills: 0 | Status: SHIPPED | OUTPATIENT
Start: 2022-01-01 | End: 2022-01-01

## 2022-01-01 RX ORDER — ENOXAPARIN SODIUM 100 MG/ML
40 INJECTION SUBCUTANEOUS EVERY 24 HOURS
Status: DISCONTINUED | OUTPATIENT
Start: 2022-01-01 | End: 2022-01-01 | Stop reason: HOSPADM

## 2022-01-01 RX ORDER — MEPERIDINE HYDROCHLORIDE 50 MG/ML
25 INJECTION INTRAMUSCULAR; INTRAVENOUS; SUBCUTANEOUS EVERY 30 MIN PRN
Status: DISCONTINUED | OUTPATIENT
Start: 2022-01-01 | End: 2022-01-01 | Stop reason: HOSPADM

## 2022-01-01 RX ORDER — DEXTROSE MONOHYDRATE, SODIUM CHLORIDE, AND POTASSIUM CHLORIDE 50; 1.49; 4.5 G/1000ML; G/1000ML; G/1000ML
INJECTION, SOLUTION INTRAVENOUS CONTINUOUS
Status: DISCONTINUED | OUTPATIENT
Start: 2022-01-01 | End: 2022-01-01

## 2022-01-01 RX ORDER — PROPRANOLOL HYDROCHLORIDE 20 MG/1
TABLET ORAL
Qty: 90 TABLET | Refills: 0 | Status: ON HOLD | OUTPATIENT
Start: 2022-01-01 | End: 2022-01-01

## 2022-01-01 RX ORDER — LOPERAMIDE HCL 2 MG
2 CAPSULE ORAL 4 TIMES DAILY PRN
Status: DISCONTINUED | OUTPATIENT
Start: 2022-01-01 | End: 2022-01-01 | Stop reason: HOSPADM

## 2022-01-01 RX ORDER — GABAPENTIN 100 MG/1
100 CAPSULE ORAL 3 TIMES DAILY PRN
Qty: 30 CAPSULE | Refills: 0 | Status: ON HOLD | OUTPATIENT
Start: 2022-01-01 | End: 2022-01-01

## 2022-01-01 RX ORDER — METOCLOPRAMIDE 5 MG/1
5 TABLET ORAL 3 TIMES DAILY PRN
Status: DISCONTINUED | OUTPATIENT
Start: 2022-01-01 | End: 2022-01-01 | Stop reason: HOSPADM

## 2022-01-01 RX ORDER — HEPARIN SODIUM,PORCINE 10 UNIT/ML
5 VIAL (ML) INTRAVENOUS
Status: CANCELLED | OUTPATIENT
Start: 2022-01-01

## 2022-01-01 RX ORDER — DOXYCYCLINE 100 MG/1
100 CAPSULE ORAL 2 TIMES DAILY
Qty: 20 CAPSULE | Refills: 0 | Status: SHIPPED | OUTPATIENT
Start: 2022-01-01 | End: 2022-01-01

## 2022-01-01 RX ORDER — BACLOFEN 10 MG/1
5 TABLET ORAL 3 TIMES DAILY
Status: DISCONTINUED | OUTPATIENT
Start: 2022-01-01 | End: 2022-01-01

## 2022-01-01 RX ORDER — BENZONATATE 100 MG/1
100-200 CAPSULE ORAL EVERY 8 HOURS PRN
COMMUNITY
End: 2022-01-01

## 2022-01-01 RX ORDER — HEPARIN SODIUM,PORCINE 10 UNIT/ML
5-10 VIAL (ML) INTRAVENOUS
Status: DISCONTINUED | OUTPATIENT
Start: 2022-01-01 | End: 2022-01-01 | Stop reason: HOSPADM

## 2022-01-01 RX ORDER — OLANZAPINE 5 MG/1
5 TABLET, ORALLY DISINTEGRATING ORAL AT BEDTIME
Status: DISCONTINUED | OUTPATIENT
Start: 2022-01-01 | End: 2022-01-01

## 2022-01-01 RX ORDER — POTASSIUM CHLORIDE 7.45 MG/ML
10 INJECTION INTRAVENOUS
Status: DISCONTINUED | OUTPATIENT
Start: 2022-01-01 | End: 2022-01-01

## 2022-01-01 RX ORDER — PROPRANOLOL HYDROCHLORIDE 20 MG/1
20 TABLET ORAL DAILY
Status: DISCONTINUED | OUTPATIENT
Start: 2022-01-01 | End: 2022-01-01 | Stop reason: HOSPADM

## 2022-01-01 RX ORDER — MAGNESIUM 200 MG
200 TABLET ORAL
Status: ON HOLD | COMMUNITY
End: 2022-01-01

## 2022-01-01 RX ORDER — HEPARIN SODIUM,PORCINE 10 UNIT/ML
3 VIAL (ML) INTRAVENOUS
Status: DISCONTINUED | OUTPATIENT
Start: 2022-01-01 | End: 2022-01-01 | Stop reason: CLARIF

## 2022-01-01 RX ORDER — BENZONATATE 100 MG/1
CAPSULE ORAL
Status: ON HOLD | COMMUNITY
Start: 2022-01-01 | End: 2022-01-01

## 2022-01-01 RX ADMIN — PANTOPRAZOLE SODIUM 40 MG: 40 TABLET, DELAYED RELEASE ORAL at 06:58

## 2022-01-01 RX ADMIN — Medication 1 CAPSULE: at 08:13

## 2022-01-01 RX ADMIN — POTASSIUM CHLORIDE 10 MEQ: 7.46 INJECTION, SOLUTION INTRAVENOUS at 00:57

## 2022-01-01 RX ADMIN — POTASSIUM CHLORIDE 40 MEQ: 1500 TABLET, EXTENDED RELEASE ORAL at 20:22

## 2022-01-01 RX ADMIN — RIVASTIGMINE TARTRATE 3 MG: 3 CAPSULE ORAL at 20:44

## 2022-01-01 RX ADMIN — CETIRIZINE HYDROCHLORIDE 10 MG: 10 TABLET, FILM COATED ORAL at 08:49

## 2022-01-01 RX ADMIN — SODIUM CHLORIDE 1000 ML: 9 INJECTION, SOLUTION INTRAVENOUS at 17:48

## 2022-01-01 RX ADMIN — IPRATROPIUM BROMIDE AND ALBUTEROL SULFATE 3 ML: 2.5; .5 SOLUTION RESPIRATORY (INHALATION) at 12:17

## 2022-01-01 RX ADMIN — VANCOMYCIN HYDROCHLORIDE 1250 MG: 500 INJECTION, POWDER, LYOPHILIZED, FOR SOLUTION INTRAVENOUS at 10:04

## 2022-01-01 RX ADMIN — PROPRANOLOL HYDROCHLORIDE 40 MG: 20 TABLET ORAL at 20:44

## 2022-01-01 RX ADMIN — FERUMOXYTOL 510 MG: 510 INJECTION INTRAVENOUS at 14:24

## 2022-01-01 RX ADMIN — DOXYCYCLINE 100 MG: 100 INJECTION, POWDER, LYOPHILIZED, FOR SOLUTION INTRAVENOUS at 22:13

## 2022-01-01 RX ADMIN — RIVASTIGMINE TARTRATE 3 MG: 3 CAPSULE ORAL at 20:22

## 2022-01-01 RX ADMIN — POTASSIUM CHLORIDE 40 MEQ: 1.5 SOLUTION ORAL at 17:57

## 2022-01-01 RX ADMIN — PANTOPRAZOLE SODIUM 40 MG: 40 TABLET, DELAYED RELEASE ORAL at 09:23

## 2022-01-01 RX ADMIN — GUAIFENESIN 600 MG: 600 TABLET, EXTENDED RELEASE ORAL at 08:49

## 2022-01-01 RX ADMIN — TAZOBACTAM SODIUM AND PIPERACILLIN SODIUM 4.5 G: 500; 4 INJECTION, SOLUTION INTRAVENOUS at 06:10

## 2022-01-01 RX ADMIN — QUETIAPINE FUMARATE 25 MG: 25 TABLET ORAL at 11:15

## 2022-01-01 RX ADMIN — HEPARIN 500 UNITS: 100 SYRINGE at 16:13

## 2022-01-01 RX ADMIN — DEXTROSE MONOHYDRATE, SODIUM CHLORIDE, AND POTASSIUM CHLORIDE: 50; 4.5; 1.49 INJECTION, SOLUTION INTRAVENOUS at 18:03

## 2022-01-01 RX ADMIN — AMPICILLIN SODIUM AND SULBACTAM SODIUM 3 G: 2; 1 INJECTION, POWDER, FOR SOLUTION INTRAMUSCULAR; INTRAVENOUS at 18:23

## 2022-01-01 RX ADMIN — TAZOBACTAM SODIUM AND PIPERACILLIN SODIUM 4.5 G: 500; 4 INJECTION, SOLUTION INTRAVENOUS at 00:17

## 2022-01-01 RX ADMIN — SODIUM CHLORIDE: 9 INJECTION, SOLUTION INTRAVENOUS at 16:24

## 2022-01-01 RX ADMIN — PANCRELIPASE 9 CAPSULE: 60000; 12000; 38000 CAPSULE, DELAYED RELEASE PELLETS ORAL at 13:29

## 2022-01-01 RX ADMIN — PIPERACILLIN AND TAZOBACTAM 4.5 G: 4; .5 INJECTION, POWDER, LYOPHILIZED, FOR SOLUTION INTRAVENOUS at 17:21

## 2022-01-01 RX ADMIN — RIVASTIGMINE TARTRATE 3 MG: 3 CAPSULE ORAL at 08:13

## 2022-01-01 RX ADMIN — CETIRIZINE HYDROCHLORIDE 10 MG: 10 TABLET, FILM COATED ORAL at 09:24

## 2022-01-01 RX ADMIN — PANCRELIPASE 9 CAPSULE: 60000; 12000; 38000 CAPSULE, DELAYED RELEASE PELLETS ORAL at 16:16

## 2022-01-01 RX ADMIN — TAMSULOSIN HYDROCHLORIDE 0.4 MG: 0.4 CAPSULE ORAL at 08:03

## 2022-01-01 RX ADMIN — METOCLOPRAMIDE HYDROCHLORIDE 5 MG: 5 INJECTION INTRAMUSCULAR; INTRAVENOUS at 12:10

## 2022-01-01 RX ADMIN — PIPERACILLIN AND TAZOBACTAM 4.5 G: 4; .5 INJECTION, POWDER, LYOPHILIZED, FOR SOLUTION INTRAVENOUS at 17:25

## 2022-01-01 RX ADMIN — SODIUM CHLORIDE: 9 INJECTION, SOLUTION INTRAVENOUS at 04:19

## 2022-01-01 RX ADMIN — DOXYCYCLINE 100 MG: 100 INJECTION, POWDER, LYOPHILIZED, FOR SOLUTION INTRAVENOUS at 10:20

## 2022-01-01 RX ADMIN — KETOROLAC TROMETHAMINE 15 MG: 15 INJECTION, SOLUTION INTRAMUSCULAR; INTRAVENOUS at 12:17

## 2022-01-01 RX ADMIN — PIPERACILLIN AND TAZOBACTAM 4.5 G: 4; .5 INJECTION, POWDER, LYOPHILIZED, FOR SOLUTION INTRAVENOUS at 00:09

## 2022-01-01 RX ADMIN — DEXTROSE MONOHYDRATE, SODIUM CHLORIDE, AND POTASSIUM CHLORIDE: 50; 4.5; 1.49 INJECTION, SOLUTION INTRAVENOUS at 09:57

## 2022-01-01 RX ADMIN — DOXYCYCLINE 100 MG: 100 INJECTION, POWDER, LYOPHILIZED, FOR SOLUTION INTRAVENOUS at 10:10

## 2022-01-01 RX ADMIN — BENZONATATE 100 MG: 100 CAPSULE ORAL at 13:39

## 2022-01-01 RX ADMIN — KETOROLAC TROMETHAMINE 15 MG: 15 INJECTION, SOLUTION INTRAMUSCULAR; INTRAVENOUS at 04:29

## 2022-01-01 RX ADMIN — DEXTROSE MONOHYDRATE, SODIUM CHLORIDE, AND POTASSIUM CHLORIDE: 50; 4.5; 1.49 INJECTION, SOLUTION INTRAVENOUS at 12:19

## 2022-01-01 RX ADMIN — ENOXAPARIN SODIUM 40 MG: 40 INJECTION SUBCUTANEOUS at 23:04

## 2022-01-01 RX ADMIN — TAZOBACTAM SODIUM AND PIPERACILLIN SODIUM 4.5 G: 500; 4 INJECTION, SOLUTION INTRAVENOUS at 12:37

## 2022-01-01 RX ADMIN — TAZOBACTAM SODIUM AND PIPERACILLIN SODIUM 4.5 G: 500; 4 INJECTION, SOLUTION INTRAVENOUS at 13:05

## 2022-01-01 RX ADMIN — PANCRELIPASE LIPASE, PANCRELIPASE PROTEASE, PANCRELIPASE AMYLASE 6 CAPSULE: 5000; 17000; 24000 CAPSULE, DELAYED RELEASE ORAL at 22:04

## 2022-01-01 RX ADMIN — PANCRELIPASE LIPASE, PANCRELIPASE PROTEASE, PANCRELIPASE AMYLASE 6 CAPSULE: 5000; 17000; 24000 CAPSULE, DELAYED RELEASE ORAL at 18:32

## 2022-01-01 RX ADMIN — AMPICILLIN SODIUM AND SULBACTAM SODIUM 3 G: 2; 1 INJECTION, POWDER, FOR SOLUTION INTRAMUSCULAR; INTRAVENOUS at 05:37

## 2022-01-01 RX ADMIN — METFORMIN HYDROCHLORIDE 1000 MG: 1000 TABLET, FILM COATED ORAL at 17:14

## 2022-01-01 RX ADMIN — DEXTROSE MONOHYDRATE, SODIUM CHLORIDE, AND POTASSIUM CHLORIDE: 50; 4.5; 1.49 INJECTION, SOLUTION INTRAVENOUS at 15:52

## 2022-01-01 RX ADMIN — SODIUM CHLORIDE 1000 ML: 9 INJECTION, SOLUTION INTRAVENOUS at 22:42

## 2022-01-01 RX ADMIN — GADOTERATE MEGLUMINE 15 ML: 376.9 INJECTION INTRAVENOUS at 16:46

## 2022-01-01 RX ADMIN — HEPARIN, PORCINE (PF) 10 UNIT/ML INTRAVENOUS SYRINGE 5 ML: at 07:21

## 2022-01-01 RX ADMIN — Medication 1 MG: at 20:27

## 2022-01-01 RX ADMIN — DOXYCYCLINE 100 MG: 100 INJECTION, POWDER, LYOPHILIZED, FOR SOLUTION INTRAVENOUS at 21:36

## 2022-01-01 RX ADMIN — UMECLIDINIUM 1 PUFF: 62.5 AEROSOL, POWDER ORAL at 08:14

## 2022-01-01 RX ADMIN — RIVASTIGMINE TARTRATE 3 MG: 1.5 CAPSULE ORAL at 20:53

## 2022-01-01 RX ADMIN — BENZOCAINE AND MENTHOL 1 LOZENGE: 15; 3.6 LOZENGE ORAL at 16:21

## 2022-01-01 RX ADMIN — SODIUM CHLORIDE: 9 INJECTION, SOLUTION INTRAVENOUS at 12:54

## 2022-01-01 RX ADMIN — LIDOCAINE 5% 1 PATCH: 700 PATCH TOPICAL at 18:06

## 2022-01-01 RX ADMIN — QUETIAPINE FUMARATE 12.5 MG: 25 TABLET ORAL at 17:40

## 2022-01-01 RX ADMIN — RIVASTIGMINE TARTRATE 3 MG: 1.5 CAPSULE ORAL at 11:09

## 2022-01-01 RX ADMIN — QUETIAPINE FUMARATE 25 MG: 25 TABLET ORAL at 13:42

## 2022-01-01 RX ADMIN — METFORMIN HYDROCHLORIDE 1000 MG: 1000 TABLET, FILM COATED ORAL at 09:24

## 2022-01-01 RX ADMIN — QUETIAPINE FUMARATE 12.5 MG: 25 TABLET ORAL at 21:19

## 2022-01-01 RX ADMIN — KETOROLAC TROMETHAMINE 15 MG: 15 INJECTION, SOLUTION INTRAMUSCULAR; INTRAVENOUS at 19:34

## 2022-01-01 RX ADMIN — LIDOCAINE 5% 1 PATCH: 700 PATCH TOPICAL at 18:23

## 2022-01-01 RX ADMIN — DEXTROSE MONOHYDRATE, SODIUM CHLORIDE, AND POTASSIUM CHLORIDE 1000 ML: 50; 4.5; 1.49 INJECTION, SOLUTION INTRAVENOUS at 06:52

## 2022-01-01 RX ADMIN — PIPERACILLIN AND TAZOBACTAM 4.5 G: 4; .5 INJECTION, POWDER, LYOPHILIZED, FOR SOLUTION INTRAVENOUS at 12:08

## 2022-01-01 RX ADMIN — LEVOTHYROXINE SODIUM 50 MCG: 50 TABLET ORAL at 09:35

## 2022-01-01 RX ADMIN — ATORVASTATIN CALCIUM 40 MG: 40 TABLET, FILM COATED ORAL at 17:19

## 2022-01-01 RX ADMIN — OLANZAPINE 5 MG: 10 INJECTION, POWDER, LYOPHILIZED, FOR SOLUTION INTRAMUSCULAR at 21:16

## 2022-01-01 RX ADMIN — ALTEPLASE 2 MG: 2.2 INJECTION, POWDER, LYOPHILIZED, FOR SOLUTION INTRAVENOUS at 08:52

## 2022-01-01 RX ADMIN — METFORMIN HYDROCHLORIDE 1000 MG: 1000 TABLET, FILM COATED ORAL at 16:16

## 2022-01-01 RX ADMIN — QUETIAPINE FUMARATE 12.5 MG: 25 TABLET ORAL at 20:33

## 2022-01-01 RX ADMIN — POTASSIUM CHLORIDE 10 MEQ: 7.46 INJECTION, SOLUTION INTRAVENOUS at 20:54

## 2022-01-01 RX ADMIN — POTASSIUM CHLORIDE 10 MEQ: 7.46 INJECTION, SOLUTION INTRAVENOUS at 06:58

## 2022-01-01 RX ADMIN — GUAIFENESIN 600 MG: 600 TABLET, EXTENDED RELEASE ORAL at 08:13

## 2022-01-01 RX ADMIN — RIVASTIGMINE TARTRATE 3 MG: 1.5 CAPSULE ORAL at 10:36

## 2022-01-01 RX ADMIN — LEVOTHYROXINE SODIUM 50 MCG: 50 TABLET ORAL at 09:23

## 2022-01-01 RX ADMIN — TAMSULOSIN HYDROCHLORIDE 0.4 MG: 0.4 CAPSULE ORAL at 22:54

## 2022-01-01 RX ADMIN — ENOXAPARIN SODIUM 40 MG: 40 INJECTION SUBCUTANEOUS at 08:53

## 2022-01-01 RX ADMIN — PIPERACILLIN AND TAZOBACTAM 4.5 G: 4; .5 INJECTION, POWDER, LYOPHILIZED, FOR SOLUTION INTRAVENOUS at 00:01

## 2022-01-01 RX ADMIN — ATORVASTATIN CALCIUM 40 MG: 40 TABLET, FILM COATED ORAL at 16:22

## 2022-01-01 RX ADMIN — PIPERACILLIN AND TAZOBACTAM 4.5 G: 4; .5 INJECTION, POWDER, LYOPHILIZED, FOR SOLUTION INTRAVENOUS at 18:00

## 2022-01-01 RX ADMIN — PANTOPRAZOLE SODIUM 40 MG: 40 TABLET, DELAYED RELEASE ORAL at 08:09

## 2022-01-01 RX ADMIN — QUETIAPINE FUMARATE 25 MG: 25 TABLET ORAL at 11:36

## 2022-01-01 RX ADMIN — RIVASTIGMINE TARTRATE 3 MG: 1.5 CAPSULE ORAL at 09:53

## 2022-01-01 RX ADMIN — Medication 500 UNITS: at 09:34

## 2022-01-01 RX ADMIN — QUETIAPINE FUMARATE 12.5 MG: 25 TABLET ORAL at 09:58

## 2022-01-01 RX ADMIN — GABAPENTIN 100 MG: 100 CAPSULE ORAL at 08:41

## 2022-01-01 RX ADMIN — INSULIN ASPART 1 UNITS: 100 INJECTION, SOLUTION INTRAVENOUS; SUBCUTANEOUS at 12:11

## 2022-01-01 RX ADMIN — MAGNESIUM SULFATE HEPTAHYDRATE 2 G: 40 INJECTION, SOLUTION INTRAVENOUS at 15:25

## 2022-01-01 RX ADMIN — PIPERACILLIN AND TAZOBACTAM 4.5 G: 4; .5 INJECTION, POWDER, LYOPHILIZED, FOR SOLUTION INTRAVENOUS at 05:43

## 2022-01-01 RX ADMIN — RIVASTIGMINE TARTRATE 3 MG: 3 CAPSULE ORAL at 08:03

## 2022-01-01 RX ADMIN — SODIUM CHLORIDE 1000 ML: 9 INJECTION, SOLUTION INTRAVENOUS at 13:00

## 2022-01-01 RX ADMIN — RIVASTIGMINE TARTRATE 3 MG: 1.5 CAPSULE ORAL at 08:55

## 2022-01-01 RX ADMIN — RIVASTIGMINE TARTRATE 3 MG: 1.5 CAPSULE ORAL at 22:21

## 2022-01-01 RX ADMIN — SODIUM CHLORIDE 250 ML: 9 INJECTION, SOLUTION INTRAVENOUS at 14:36

## 2022-01-01 RX ADMIN — TAMSULOSIN HYDROCHLORIDE 0.4 MG: 0.4 CAPSULE ORAL at 09:24

## 2022-01-01 RX ADMIN — POTASSIUM CHLORIDE 10 MEQ: 7.46 INJECTION, SOLUTION INTRAVENOUS at 22:05

## 2022-01-01 RX ADMIN — SODIUM CHLORIDE 250 ML: 9 INJECTION, SOLUTION INTRAVENOUS at 14:20

## 2022-01-01 RX ADMIN — LIDOCAINE 5% 1 PATCH: 700 PATCH TOPICAL at 18:24

## 2022-01-01 RX ADMIN — IPRATROPIUM BROMIDE AND ALBUTEROL SULFATE 3 ML: 2.5; .5 SOLUTION RESPIRATORY (INHALATION) at 11:49

## 2022-01-01 RX ADMIN — TAZOBACTAM SODIUM AND PIPERACILLIN SODIUM 4.5 G: 500; 4 INJECTION, SOLUTION INTRAVENOUS at 06:08

## 2022-01-01 RX ADMIN — ENOXAPARIN SODIUM 40 MG: 40 INJECTION SUBCUTANEOUS at 08:14

## 2022-01-01 RX ADMIN — PANCRELIPASE 9 CAPSULE: 60000; 12000; 38000 CAPSULE, DELAYED RELEASE PELLETS ORAL at 01:45

## 2022-01-01 RX ADMIN — POTASSIUM CHLORIDE 10 MEQ: 7.46 INJECTION, SOLUTION INTRAVENOUS at 12:19

## 2022-01-01 RX ADMIN — RIVASTIGMINE TARTRATE 3 MG: 1.5 CAPSULE ORAL at 20:54

## 2022-01-01 RX ADMIN — BENZONATATE 100 MG: 100 CAPSULE ORAL at 00:21

## 2022-01-01 RX ADMIN — METOCLOPRAMIDE HYDROCHLORIDE 5 MG: 5 INJECTION INTRAMUSCULAR; INTRAVENOUS at 01:17

## 2022-01-01 RX ADMIN — BENZONATATE 100 MG: 100 CAPSULE ORAL at 11:56

## 2022-01-01 RX ADMIN — LEVOTHYROXINE SODIUM 50 MCG: 0.05 TABLET ORAL at 08:06

## 2022-01-01 RX ADMIN — PANCRELIPASE LIPASE, PANCRELIPASE PROTEASE, PANCRELIPASE AMYLASE 6 CAPSULE: 5000; 17000; 24000 CAPSULE, DELAYED RELEASE ORAL at 09:09

## 2022-01-01 RX ADMIN — POTASSIUM CHLORIDE 40 MEQ: 1.5 POWDER, FOR SOLUTION ORAL at 18:40

## 2022-01-01 RX ADMIN — PIPERACILLIN AND TAZOBACTAM 4.5 G: 4; .5 INJECTION, POWDER, LYOPHILIZED, FOR SOLUTION INTRAVENOUS at 01:18

## 2022-01-01 RX ADMIN — VANCOMYCIN HYDROCHLORIDE 1250 MG: 500 INJECTION, POWDER, LYOPHILIZED, FOR SOLUTION INTRAVENOUS at 20:27

## 2022-01-01 RX ADMIN — POTASSIUM CHLORIDE 40 MEQ: 1.5 POWDER, FOR SOLUTION ORAL at 11:43

## 2022-01-01 RX ADMIN — CEFTRIAXONE SODIUM 1 G: 1 INJECTION, SOLUTION INTRAVENOUS at 13:33

## 2022-01-01 RX ADMIN — PANTOPRAZOLE SODIUM 40 MG: 40 TABLET, DELAYED RELEASE ORAL at 09:35

## 2022-01-01 RX ADMIN — OLANZAPINE 5 MG: 5 TABLET, ORALLY DISINTEGRATING ORAL at 21:05

## 2022-01-01 RX ADMIN — PIPERACILLIN AND TAZOBACTAM 4.5 G: 4; .5 INJECTION, POWDER, LYOPHILIZED, FOR SOLUTION INTRAVENOUS at 06:02

## 2022-01-01 RX ADMIN — KETOROLAC TROMETHAMINE 15 MG: 15 INJECTION, SOLUTION INTRAMUSCULAR; INTRAVENOUS at 01:19

## 2022-01-01 RX ADMIN — PANCRELIPASE 9 CAPSULE: 60000; 12000; 38000 CAPSULE, DELAYED RELEASE PELLETS ORAL at 17:14

## 2022-01-01 RX ADMIN — AMPICILLIN SODIUM AND SULBACTAM SODIUM 3 G: 2; 1 INJECTION, POWDER, FOR SOLUTION INTRAMUSCULAR; INTRAVENOUS at 11:59

## 2022-01-01 RX ADMIN — QUETIAPINE FUMARATE 25 MG: 25 TABLET ORAL at 09:27

## 2022-01-01 RX ADMIN — HEPARIN 500 UNITS: 100 SYRINGE at 13:23

## 2022-01-01 RX ADMIN — METFORMIN HYDROCHLORIDE 1000 MG: 1000 TABLET, FILM COATED ORAL at 08:50

## 2022-01-01 RX ADMIN — TAZOBACTAM SODIUM AND PIPERACILLIN SODIUM 4.5 G: 500; 4 INJECTION, SOLUTION INTRAVENOUS at 11:35

## 2022-01-01 RX ADMIN — TAZOBACTAM SODIUM AND PIPERACILLIN SODIUM 4.5 G: 500; 4 INJECTION, SOLUTION INTRAVENOUS at 06:38

## 2022-01-01 RX ADMIN — FENTANYL CITRATE 25 MCG: 50 INJECTION, SOLUTION INTRAMUSCULAR; INTRAVENOUS at 01:41

## 2022-01-01 RX ADMIN — Medication 1 CAPSULE: at 20:22

## 2022-01-01 RX ADMIN — PRIMIDONE 100 MG: 50 TABLET ORAL at 20:44

## 2022-01-01 RX ADMIN — ENOXAPARIN SODIUM 40 MG: 40 INJECTION SUBCUTANEOUS at 08:03

## 2022-01-01 RX ADMIN — KETOROLAC TROMETHAMINE 15 MG: 15 INJECTION, SOLUTION INTRAMUSCULAR; INTRAVENOUS at 16:42

## 2022-01-01 RX ADMIN — POTASSIUM CHLORIDE 10 MEQ: 7.46 INJECTION, SOLUTION INTRAVENOUS at 08:53

## 2022-01-01 RX ADMIN — FENTANYL CITRATE 25 MCG: 50 INJECTION, SOLUTION INTRAMUSCULAR; INTRAVENOUS at 17:35

## 2022-01-01 RX ADMIN — PIPERACILLIN AND TAZOBACTAM 4.5 G: 4; .5 INJECTION, POWDER, LYOPHILIZED, FOR SOLUTION INTRAVENOUS at 06:11

## 2022-01-01 RX ADMIN — CETIRIZINE HYDROCHLORIDE 10 MG: 10 TABLET, FILM COATED ORAL at 08:13

## 2022-01-01 RX ADMIN — PANCRELIPASE LIPASE, PANCRELIPASE PROTEASE, PANCRELIPASE AMYLASE 6 CAPSULE: 5000; 17000; 24000 CAPSULE, DELAYED RELEASE ORAL at 12:13

## 2022-01-01 RX ADMIN — PROPRANOLOL HYDROCHLORIDE 20 MG: 20 TABLET ORAL at 09:24

## 2022-01-01 RX ADMIN — BACLOFEN 5 MG: 10 TABLET ORAL at 11:08

## 2022-01-01 RX ADMIN — ALUMINUM HYDROXIDE, MAGNESIUM HYDROXIDE, AND SIMETHICONE 15 ML: 200; 200; 20 SUSPENSION ORAL at 14:15

## 2022-01-01 RX ADMIN — POTASSIUM CHLORIDE 40 MEQ: 1.5 SOLUTION ORAL at 18:03

## 2022-01-01 RX ADMIN — PIPERACILLIN AND TAZOBACTAM 4.5 G: 4; .5 INJECTION, POWDER, LYOPHILIZED, FOR SOLUTION INTRAVENOUS at 05:16

## 2022-01-01 RX ADMIN — TAZOBACTAM SODIUM AND PIPERACILLIN SODIUM 4.5 G: 500; 4 INJECTION, SOLUTION INTRAVENOUS at 12:47

## 2022-01-01 RX ADMIN — MAGNESIUM SULFATE HEPTAHYDRATE 2 G: 40 INJECTION, SOLUTION INTRAVENOUS at 17:48

## 2022-01-01 RX ADMIN — METFORMIN HYDROCHLORIDE 1000 MG: 1000 TABLET, FILM COATED ORAL at 08:13

## 2022-01-01 RX ADMIN — LEVOTHYROXINE SODIUM 50 MCG: 0.05 TABLET ORAL at 06:40

## 2022-01-01 RX ADMIN — ENOXAPARIN SODIUM 40 MG: 40 INJECTION SUBCUTANEOUS at 21:05

## 2022-01-01 RX ADMIN — PANCRELIPASE LIPASE, PANCRELIPASE PROTEASE, PANCRELIPASE AMYLASE 6 CAPSULE: 5000; 17000; 24000 CAPSULE, DELAYED RELEASE ORAL at 18:01

## 2022-01-01 RX ADMIN — METFORMIN HYDROCHLORIDE 1000 MG: 1000 TABLET, FILM COATED ORAL at 08:06

## 2022-01-01 RX ADMIN — ENOXAPARIN SODIUM 40 MG: 40 INJECTION SUBCUTANEOUS at 09:22

## 2022-01-01 RX ADMIN — IPRATROPIUM BROMIDE AND ALBUTEROL SULFATE 3 ML: 2.5; .5 SOLUTION RESPIRATORY (INHALATION) at 11:57

## 2022-01-01 RX ADMIN — FENTANYL CITRATE 25 MCG: 50 INJECTION, SOLUTION INTRAMUSCULAR; INTRAVENOUS at 11:56

## 2022-01-01 RX ADMIN — TAMSULOSIN HYDROCHLORIDE 0.4 MG: 0.4 CAPSULE ORAL at 08:49

## 2022-01-01 RX ADMIN — POTASSIUM CHLORIDE 40 MEQ: 1500 TABLET, EXTENDED RELEASE ORAL at 08:13

## 2022-01-01 RX ADMIN — TAZOBACTAM SODIUM AND PIPERACILLIN SODIUM 4.5 G: 500; 4 INJECTION, SOLUTION INTRAVENOUS at 01:45

## 2022-01-01 RX ADMIN — PIPERACILLIN AND TAZOBACTAM 4.5 G: 4; .5 INJECTION, POWDER, LYOPHILIZED, FOR SOLUTION INTRAVENOUS at 11:35

## 2022-01-01 RX ADMIN — GUAIFENESIN 600 MG: 600 TABLET, EXTENDED RELEASE ORAL at 20:43

## 2022-01-01 RX ADMIN — DEXTROSE MONOHYDRATE, SODIUM CHLORIDE, AND POTASSIUM CHLORIDE: 50; 4.5; 1.49 INJECTION, SOLUTION INTRAVENOUS at 16:21

## 2022-01-01 RX ADMIN — PRIMIDONE 100 MG: 50 TABLET ORAL at 21:34

## 2022-01-01 RX ADMIN — TAZOBACTAM SODIUM AND PIPERACILLIN SODIUM 4.5 G: 500; 4 INJECTION, SOLUTION INTRAVENOUS at 18:37

## 2022-01-01 RX ADMIN — TAZOBACTAM SODIUM AND PIPERACILLIN SODIUM 4.5 G: 500; 4 INJECTION, SOLUTION INTRAVENOUS at 18:05

## 2022-01-01 RX ADMIN — UMECLIDINIUM 1 PUFF: 62.5 AEROSOL, POWDER ORAL at 09:37

## 2022-01-01 RX ADMIN — BENZONATATE 100 MG: 100 CAPSULE ORAL at 21:36

## 2022-01-01 RX ADMIN — MAGNESIUM SULFATE HEPTAHYDRATE 1 G: 1 INJECTION, SOLUTION INTRAVENOUS at 15:02

## 2022-01-01 RX ADMIN — Medication 5 ML: at 14:34

## 2022-01-01 RX ADMIN — INSULIN ASPART 1 UNITS: 100 INJECTION, SOLUTION INTRAVENOUS; SUBCUTANEOUS at 18:31

## 2022-01-01 RX ADMIN — POTASSIUM CHLORIDE 10 MEQ: 7.46 INJECTION, SOLUTION INTRAVENOUS at 14:36

## 2022-01-01 RX ADMIN — AMPICILLIN SODIUM AND SULBACTAM SODIUM 3 G: 2; 1 INJECTION, POWDER, FOR SOLUTION INTRAMUSCULAR; INTRAVENOUS at 23:14

## 2022-01-01 RX ADMIN — POTASSIUM CHLORIDE 10 MEQ: 7.46 INJECTION, SOLUTION INTRAVENOUS at 19:52

## 2022-01-01 RX ADMIN — AMPICILLIN SODIUM AND SULBACTAM SODIUM 3 G: 2; 1 INJECTION, POWDER, FOR SOLUTION INTRAMUSCULAR; INTRAVENOUS at 23:22

## 2022-01-01 RX ADMIN — ENOXAPARIN SODIUM 40 MG: 40 INJECTION SUBCUTANEOUS at 22:04

## 2022-01-01 RX ADMIN — DOXYCYCLINE 100 MG: 100 INJECTION, POWDER, LYOPHILIZED, FOR SOLUTION INTRAVENOUS at 11:39

## 2022-01-01 RX ADMIN — PIPERACILLIN AND TAZOBACTAM 4.5 G: 4; .5 INJECTION, POWDER, LYOPHILIZED, FOR SOLUTION INTRAVENOUS at 12:18

## 2022-01-01 RX ADMIN — POTASSIUM CHLORIDE 40 MEQ: 1500 TABLET, EXTENDED RELEASE ORAL at 14:55

## 2022-01-01 RX ADMIN — ENOXAPARIN SODIUM 40 MG: 40 INJECTION SUBCUTANEOUS at 23:09

## 2022-01-01 RX ADMIN — TAZOBACTAM SODIUM AND PIPERACILLIN SODIUM 4.5 G: 500; 4 INJECTION, SOLUTION INTRAVENOUS at 23:55

## 2022-01-01 RX ADMIN — PRIMIDONE 100 MG: 50 TABLET ORAL at 22:54

## 2022-01-01 RX ADMIN — RIVASTIGMINE TARTRATE 3 MG: 1.5 CAPSULE ORAL at 09:41

## 2022-01-01 RX ADMIN — PRIMIDONE 100 MG: 50 TABLET ORAL at 22:04

## 2022-01-01 RX ADMIN — POTASSIUM CHLORIDE 10 MEQ: 7.46 INJECTION, SOLUTION INTRAVENOUS at 02:26

## 2022-01-01 RX ADMIN — RIVASTIGMINE TARTRATE 3 MG: 3 CAPSULE ORAL at 21:35

## 2022-01-01 RX ADMIN — SODIUM CHLORIDE: 9 INJECTION, SOLUTION INTRAVENOUS at 22:21

## 2022-01-01 RX ADMIN — POTASSIUM CHLORIDE 40 MEQ: 1.5 POWDER, FOR SOLUTION ORAL at 01:39

## 2022-01-01 RX ADMIN — PIPERACILLIN AND TAZOBACTAM 4.5 G: 4; .5 INJECTION, POWDER, LYOPHILIZED, FOR SOLUTION INTRAVENOUS at 12:25

## 2022-01-01 RX ADMIN — PANTOPRAZOLE SODIUM 40 MG: 40 TABLET, DELAYED RELEASE ORAL at 06:08

## 2022-01-01 RX ADMIN — ENOXAPARIN SODIUM 40 MG: 40 INJECTION SUBCUTANEOUS at 22:55

## 2022-01-01 RX ADMIN — RIVASTIGMINE TARTRATE 3 MG: 3 CAPSULE ORAL at 09:25

## 2022-01-01 RX ADMIN — INSULIN ASPART 1 UNITS: 100 INJECTION, SOLUTION INTRAVENOUS; SUBCUTANEOUS at 11:40

## 2022-01-01 RX ADMIN — LIDOCAINE 5% 1 PATCH: 700 PATCH TOPICAL at 17:35

## 2022-01-01 RX ADMIN — ACETAMINOPHEN 1000 MG: 500 TABLET ORAL at 15:52

## 2022-01-01 RX ADMIN — TAZOBACTAM SODIUM AND PIPERACILLIN SODIUM 4.5 G: 500; 4 INJECTION, SOLUTION INTRAVENOUS at 17:22

## 2022-01-01 RX ADMIN — LIDOCAINE HYDROCHLORIDE 15 ML: 20 SOLUTION ORAL; TOPICAL at 14:15

## 2022-01-01 RX ADMIN — INSULIN ASPART 1 UNITS: 100 INJECTION, SOLUTION INTRAVENOUS; SUBCUTANEOUS at 16:15

## 2022-01-01 RX ADMIN — CEFTRIAXONE SODIUM 1 G: 1 INJECTION, SOLUTION INTRAVENOUS at 22:21

## 2022-01-01 RX ADMIN — POTASSIUM CHLORIDE 10 MEQ: 7.46 INJECTION, SOLUTION INTRAVENOUS at 12:57

## 2022-01-01 RX ADMIN — SODIUM CHLORIDE 1000 ML: 9 INJECTION, SOLUTION INTRAVENOUS at 11:09

## 2022-01-01 RX ADMIN — ATORVASTATIN CALCIUM 40 MG: 40 TABLET, FILM COATED ORAL at 17:14

## 2022-01-01 RX ADMIN — VANCOMYCIN HYDROCHLORIDE 1250 MG: 500 INJECTION, POWDER, LYOPHILIZED, FOR SOLUTION INTRAVENOUS at 20:12

## 2022-01-01 RX ADMIN — PANCRELIPASE 9 CAPSULE: 60000; 12000; 38000 CAPSULE, DELAYED RELEASE PELLETS ORAL at 12:51

## 2022-01-01 RX ADMIN — VANCOMYCIN HYDROCHLORIDE 1500 MG: 500 INJECTION, POWDER, LYOPHILIZED, FOR SOLUTION INTRAVENOUS at 08:23

## 2022-01-01 RX ADMIN — ENOXAPARIN SODIUM 40 MG: 40 INJECTION SUBCUTANEOUS at 20:54

## 2022-01-01 RX ADMIN — DEXTROSE MONOHYDRATE, SODIUM CHLORIDE, AND POTASSIUM CHLORIDE: 50; 4.5; 1.49 INJECTION, SOLUTION INTRAVENOUS at 21:40

## 2022-01-01 RX ADMIN — POTASSIUM CHLORIDE 40 MEQ: 1.5 POWDER, FOR SOLUTION ORAL at 18:25

## 2022-01-01 RX ADMIN — PANCRELIPASE 9 CAPSULE: 60000; 12000; 38000 CAPSULE, DELAYED RELEASE PELLETS ORAL at 11:41

## 2022-01-01 RX ADMIN — ENOXAPARIN SODIUM 40 MG: 40 INJECTION SUBCUTANEOUS at 09:34

## 2022-01-01 RX ADMIN — PRIMIDONE 100 MG: 50 TABLET ORAL at 20:22

## 2022-01-01 RX ADMIN — LIDOCAINE 5% 1 PATCH: 700 PATCH TOPICAL at 18:00

## 2022-01-01 RX ADMIN — ENOXAPARIN SODIUM 40 MG: 40 INJECTION SUBCUTANEOUS at 00:03

## 2022-01-01 RX ADMIN — POTASSIUM CHLORIDE 10 MEQ: 7.46 INJECTION, SOLUTION INTRAVENOUS at 09:59

## 2022-01-01 RX ADMIN — POTASSIUM CHLORIDE 40 MEQ: 1500 TABLET, EXTENDED RELEASE ORAL at 13:29

## 2022-01-01 RX ADMIN — BENZONATATE 100 MG: 100 CAPSULE ORAL at 16:16

## 2022-01-01 RX ADMIN — OLANZAPINE 5 MG: 10 INJECTION, POWDER, LYOPHILIZED, FOR SOLUTION INTRAMUSCULAR at 23:41

## 2022-01-01 RX ADMIN — POTASSIUM CHLORIDE 40 MEQ: 1.5 POWDER, FOR SOLUTION ORAL at 17:00

## 2022-01-01 RX ADMIN — ACETAMINOPHEN 325MG 650 MG: 325 TABLET ORAL at 00:21

## 2022-01-01 RX ADMIN — HEPARIN 5 ML: 100 SYRINGE at 15:18

## 2022-01-01 RX ADMIN — PANCRELIPASE 9 CAPSULE: 60000; 12000; 38000 CAPSULE, DELAYED RELEASE PELLETS ORAL at 09:30

## 2022-01-01 RX ADMIN — FENTANYL CITRATE 25 MCG: 50 INJECTION, SOLUTION INTRAMUSCULAR; INTRAVENOUS at 13:25

## 2022-01-01 RX ADMIN — PANCRELIPASE 9 CAPSULE: 60000; 12000; 38000 CAPSULE, DELAYED RELEASE PELLETS ORAL at 21:35

## 2022-01-01 RX ADMIN — PANCRELIPASE 9 CAPSULE: 60000; 12000; 38000 CAPSULE, DELAYED RELEASE PELLETS ORAL at 18:36

## 2022-01-01 RX ADMIN — GUAIFENESIN 600 MG: 600 TABLET, EXTENDED RELEASE ORAL at 08:06

## 2022-01-01 RX ADMIN — ACETAMINOPHEN 325MG 650 MG: 325 TABLET ORAL at 21:35

## 2022-01-01 RX ADMIN — AMPICILLIN SODIUM AND SULBACTAM SODIUM 3 G: 2; 1 INJECTION, POWDER, FOR SOLUTION INTRAMUSCULAR; INTRAVENOUS at 06:51

## 2022-01-01 RX ADMIN — KETOROLAC TROMETHAMINE 15 MG: 15 INJECTION, SOLUTION INTRAMUSCULAR; INTRAVENOUS at 23:04

## 2022-01-01 RX ADMIN — RIVASTIGMINE TARTRATE 3 MG: 1.5 CAPSULE ORAL at 23:04

## 2022-01-01 RX ADMIN — IOPAMIDOL 100 ML: 755 INJECTION, SOLUTION INTRAVENOUS at 11:18

## 2022-01-01 RX ADMIN — HEPARIN 500 UNITS: 100 SYRINGE at 21:14

## 2022-01-01 RX ADMIN — PRIMIDONE 100 MG: 50 TABLET ORAL at 01:45

## 2022-01-01 RX ADMIN — PANCRELIPASE 9 CAPSULE: 60000; 12000; 38000 CAPSULE, DELAYED RELEASE PELLETS ORAL at 20:21

## 2022-01-01 RX ADMIN — POTASSIUM CHLORIDE 10 MEQ: 7.46 INJECTION, SOLUTION INTRAVENOUS at 13:22

## 2022-01-01 RX ADMIN — QUETIAPINE FUMARATE 25 MG: 25 TABLET ORAL at 23:04

## 2022-01-01 RX ADMIN — PIPERACILLIN AND TAZOBACTAM 4.5 G: 4; .5 INJECTION, POWDER, LYOPHILIZED, FOR SOLUTION INTRAVENOUS at 23:10

## 2022-01-01 RX ADMIN — BENZONATATE 100 MG: 100 CAPSULE ORAL at 17:19

## 2022-01-01 RX ADMIN — GUAIFENESIN 600 MG: 600 TABLET, EXTENDED RELEASE ORAL at 09:24

## 2022-01-01 RX ADMIN — SODIUM CHLORIDE 1000 ML: 9 INJECTION, SOLUTION INTRAVENOUS at 20:50

## 2022-01-01 RX ADMIN — LEVOTHYROXINE SODIUM 50 MCG: 0.05 TABLET ORAL at 06:08

## 2022-01-01 RX ADMIN — PANCRELIPASE LIPASE, PANCRELIPASE PROTEASE, PANCRELIPASE AMYLASE 6 CAPSULE: 5000; 17000; 24000 CAPSULE, DELAYED RELEASE ORAL at 22:54

## 2022-01-01 RX ADMIN — PANCRELIPASE 9 CAPSULE: 60000; 12000; 38000 CAPSULE, DELAYED RELEASE PELLETS ORAL at 08:13

## 2022-01-01 RX ADMIN — POTASSIUM CHLORIDE 40 MEQ: 1.5 POWDER, FOR SOLUTION ORAL at 08:55

## 2022-01-01 RX ADMIN — QUETIAPINE FUMARATE 25 MG: 25 TABLET ORAL at 20:54

## 2022-01-01 RX ADMIN — LEVOTHYROXINE SODIUM 50 MCG: 0.05 TABLET ORAL at 06:58

## 2022-01-01 RX ADMIN — TAZOBACTAM SODIUM AND PIPERACILLIN SODIUM 4.5 G: 500; 4 INJECTION, SOLUTION INTRAVENOUS at 06:58

## 2022-01-01 RX ADMIN — FLUDEOXYGLUCOSE F-18 11.64 MCI.: 500 INJECTION, SOLUTION INTRAVENOUS at 16:29

## 2022-01-01 RX ADMIN — RIVASTIGMINE TARTRATE 3 MG: 3 CAPSULE ORAL at 10:07

## 2022-01-01 RX ADMIN — AMPICILLIN SODIUM AND SULBACTAM SODIUM 3 G: 2; 1 INJECTION, POWDER, FOR SOLUTION INTRAMUSCULAR; INTRAVENOUS at 17:35

## 2022-01-01 RX ADMIN — METFORMIN HYDROCHLORIDE 1000 MG: 1000 TABLET, FILM COATED ORAL at 18:37

## 2022-01-01 RX ADMIN — METOCLOPRAMIDE HYDROCHLORIDE 5 MG: 5 INJECTION INTRAMUSCULAR; INTRAVENOUS at 17:58

## 2022-01-01 RX ADMIN — PANCRELIPASE LIPASE, PANCRELIPASE PROTEASE, PANCRELIPASE AMYLASE 6 CAPSULE: 5000; 17000; 24000 CAPSULE, DELAYED RELEASE ORAL at 08:49

## 2022-01-01 RX ADMIN — BENZONATATE 100 MG: 100 CAPSULE ORAL at 22:06

## 2022-01-01 RX ADMIN — PANCRELIPASE 9 CAPSULE: 60000; 12000; 38000 CAPSULE, DELAYED RELEASE PELLETS ORAL at 08:05

## 2022-01-01 RX ADMIN — VANCOMYCIN HYDROCHLORIDE 1250 MG: 500 INJECTION, POWDER, LYOPHILIZED, FOR SOLUTION INTRAVENOUS at 08:14

## 2022-01-01 RX ADMIN — METOCLOPRAMIDE HYDROCHLORIDE 5 MG: 5 INJECTION INTRAMUSCULAR; INTRAVENOUS at 05:20

## 2022-01-01 RX ADMIN — PIPERACILLIN AND TAZOBACTAM 4.5 G: 4; .5 INJECTION, POWDER, LYOPHILIZED, FOR SOLUTION INTRAVENOUS at 18:35

## 2022-01-01 RX ADMIN — ENOXAPARIN SODIUM 40 MG: 40 INJECTION SUBCUTANEOUS at 09:27

## 2022-01-01 RX ADMIN — POTASSIUM CHLORIDE 10 MEQ: 7.46 INJECTION, SOLUTION INTRAVENOUS at 23:08

## 2022-01-01 RX ADMIN — GUAIFENESIN 600 MG: 600 TABLET, EXTENDED RELEASE ORAL at 20:22

## 2022-01-01 RX ADMIN — DIAZEPAM 5 MG: 5 INJECTION, SOLUTION INTRAMUSCULAR; INTRAVENOUS at 14:56

## 2022-01-01 RX ADMIN — QUETIAPINE FUMARATE 25 MG: 25 TABLET ORAL at 22:36

## 2022-01-01 RX ADMIN — GUAIFENESIN 600 MG: 600 TABLET, EXTENDED RELEASE ORAL at 21:35

## 2022-01-01 RX ADMIN — SODIUM CHLORIDE: 9 INJECTION, SOLUTION INTRAVENOUS at 01:00

## 2022-01-01 RX ADMIN — CETIRIZINE HYDROCHLORIDE 10 MG: 10 TABLET, FILM COATED ORAL at 08:05

## 2022-01-01 RX ADMIN — TAZOBACTAM SODIUM AND PIPERACILLIN SODIUM 4.5 G: 500; 4 INJECTION, SOLUTION INTRAVENOUS at 00:21

## 2022-01-01 RX ADMIN — PANTOPRAZOLE SODIUM 40 MG: 40 TABLET, DELAYED RELEASE ORAL at 06:40

## 2022-01-01 RX ADMIN — FENTANYL CITRATE 25 MCG: 50 INJECTION, SOLUTION INTRAMUSCULAR; INTRAVENOUS at 00:32

## 2022-01-01 RX ADMIN — TAMSULOSIN HYDROCHLORIDE 0.4 MG: 0.4 CAPSULE ORAL at 08:13

## 2022-01-01 RX ADMIN — ENOXAPARIN SODIUM 40 MG: 40 INJECTION SUBCUTANEOUS at 22:05

## 2022-01-01 ASSESSMENT — ACTIVITIES OF DAILY LIVING (ADL)
ADLS_ACUITY_SCORE: 73
ADLS_ACUITY_SCORE: 44
ADLS_ACUITY_SCORE: 51
ADLS_ACUITY_SCORE: 65
DRESSING/BATHING_DIFFICULTY: YES
ADLS_ACUITY_SCORE: 43
ADLS_ACUITY_SCORE: 32
ADLS_ACUITY_SCORE: 73
DRESSING/BATHING_DIFFICULTY: YES
ADLS_ACUITY_SCORE: 70
ADLS_ACUITY_SCORE: 73
WERE_AUXILIARY_AIDS_OFFERED?: YES
TOILETING_ISSUES: YES
ADLS_ACUITY_SCORE: 47
ADLS_ACUITY_SCORE: 52
ADLS_ACUITY_SCORE: 32
ADLS_ACUITY_SCORE: 51
ADLS_ACUITY_SCORE: 47
WALKING_OR_CLIMBING_STAIRS_DIFFICULTY: YES
ADLS_ACUITY_SCORE: 72
ADLS_ACUITY_SCORE: 52
ADLS_ACUITY_SCORE: 71
ADLS_ACUITY_SCORE: 70
ADLS_ACUITY_SCORE: 47
ADLS_ACUITY_SCORE: 25
ADLS_ACUITY_SCORE: 44
ADLS_ACUITY_SCORE: 51
ADLS_ACUITY_SCORE: 70
TOILETING: 0-->INDEPENDENT
ADLS_ACUITY_SCORE: 46
ADLS_ACUITY_SCORE: 29
ADLS_ACUITY_SCORE: 71
ADLS_ACUITY_SCORE: 46
ADLS_ACUITY_SCORE: 47
ADLS_ACUITY_SCORE: 67
TOILETING: 1-->ASSISTANCE (EQUIPMENT/PERSON) NEEDED (NOT DEVELOPMENTALLY APPROPRIATE)
ADLS_ACUITY_SCORE: 52
ADLS_ACUITY_SCORE: 32
ADLS_ACUITY_SCORE: 62
ADLS_ACUITY_SCORE: 32
ADLS_ACUITY_SCORE: 32
ADLS_ACUITY_SCORE: 72
WALKING_OR_CLIMBING_STAIRS: AMBULATION DIFFICULTY, REQUIRES EQUIPMENT
ADLS_ACUITY_SCORE: 73
ADLS_ACUITY_SCORE: 73
CHANGE_IN_FUNCTIONAL_STATUS_SINCE_ONSET_OF_CURRENT_ILLNESS/INJURY: YES
ADLS_ACUITY_SCORE: 51
ADLS_ACUITY_SCORE: 50
ADLS_ACUITY_SCORE: 51
BATHING: 2-->COMPLETELY DEPENDENT (NOT DEVELOPMENTALLY APPROPRIATE)
THE_FOLLOWING_AIDS_WERE_PROVIDED;: PATIENT DECLINED OFFER OF AUXILIARY AIDS
HEARING_DIFFICULTY_OR_DEAF: YES
SWALLOWING: 0-->SWALLOWS FOODS/LIQUIDS WITHOUT DIFFICULTY
WALKING_OR_CLIMBING_STAIRS: AMBULATION DIFFICULTY, REQUIRES EQUIPMENT
ADLS_ACUITY_SCORE: 32
TOILETING_MANAGEMENT: BRIEFS
ADLS_ACUITY_SCORE: 65
ADLS_ACUITY_SCORE: 73
ADLS_ACUITY_SCORE: 71
ADLS_ACUITY_SCORE: 47
ADLS_ACUITY_SCORE: 72
ADLS_ACUITY_SCORE: 32
VISION_MANAGEMENT: GLASSES
DESCRIBE_HEARING_LOSS: BILATERAL HEARING LOSS
ADLS_ACUITY_SCORE: 66
ADLS_ACUITY_SCORE: 35
DOING_ERRANDS_INDEPENDENTLY_DIFFICULTY: YES
ADLS_ACUITY_SCORE: 47
ADLS_ACUITY_SCORE: 70
ADLS_ACUITY_SCORE: 67
DIFFICULTY_COMMUNICATING: NO
ADLS_ACUITY_SCORE: 47
ADLS_ACUITY_SCORE: 69
ADLS_ACUITY_SCORE: 52
PATIENT'S_PREFERRED_MEANS_OF_COMMUNICATION: VERBAL
ADLS_ACUITY_SCORE: 32
ADLS_ACUITY_SCORE: 51
ADLS_ACUITY_SCORE: 68
ADLS_ACUITY_SCORE: 71
ADLS_ACUITY_SCORE: 32
ADLS_ACUITY_SCORE: 52
ADLS_ACUITY_SCORE: 35
ADLS_ACUITY_SCORE: 74
ADLS_ACUITY_SCORE: 74
CONCENTRATING,_REMEMBERING_OR_MAKING_DECISIONS_DIFFICULTY: YES
ADLS_ACUITY_SCORE: 44
ADLS_ACUITY_SCORE: 32
DRESSING/BATHING_MANAGEMENT: ASSISTANCE
ADLS_ACUITY_SCORE: 71
ADLS_ACUITY_SCORE: 25
DIFFICULTY_COMMUNICATING: YES
ADLS_ACUITY_SCORE: 71
ADLS_ACUITY_SCORE: 51
ADLS_ACUITY_SCORE: 46
ADLS_ACUITY_SCORE: 71
TOILETING_ASSISTANCE: TOILETING DIFFICULTY, DEPENDENT
ADLS_ACUITY_SCORE: 71
ADLS_ACUITY_SCORE: 44
DOING_ERRANDS_INDEPENDENTLY_DIFFICULTY: NO
ADLS_ACUITY_SCORE: 51
ADLS_ACUITY_SCORE: 62
ADLS_ACUITY_SCORE: 35
ADLS_ACUITY_SCORE: 65
ADLS_ACUITY_SCORE: 66
ADLS_ACUITY_SCORE: 32
TRANSFERRING: 2-->COMPLETELY DEPENDENT
ADLS_ACUITY_SCORE: 29
WEAR_GLASSES_OR_BLIND: YES
ADLS_ACUITY_SCORE: 52
ADLS_ACUITY_SCORE: 66
ADLS_ACUITY_SCORE: 50
ADLS_ACUITY_SCORE: 31
FALL_HISTORY_WITHIN_LAST_SIX_MONTHS: YES
ADLS_ACUITY_SCORE: 66
BATHING: 1-->ASSISTANCE NEEDED
EATING: 0-->INDEPENDENT
ADLS_ACUITY_SCORE: 72
ADLS_ACUITY_SCORE: 32
ADLS_ACUITY_SCORE: 32
ADLS_ACUITY_SCORE: 70
ADLS_ACUITY_SCORE: 51
SWALLOWING: 2-->DIFFICULTY SWALLOWING LIQUIDS
TOILETING_ISSUES: YES
ADLS_ACUITY_SCORE: 32
ADLS_ACUITY_SCORE: 27
ADLS_ACUITY_SCORE: 29
ADLS_ACUITY_SCORE: 32
TOILETING: 0-->INDEPENDENT
ADLS_ACUITY_SCORE: 35
DRESS: 1-->ASSISTANCE (EQUIPMENT/PERSON) NEEDED (NOT DEVELOPMENTALLY APPROPRIATE)
ADLS_ACUITY_SCORE: 35
ADLS_ACUITY_SCORE: 69
ADLS_ACUITY_SCORE: 73
TRANSFERRING: 0-->INDEPENDENT
ADLS_ACUITY_SCORE: 72
ADLS_ACUITY_SCORE: 29
ADLS_ACUITY_SCORE: 46
ADLS_ACUITY_SCORE: 29
ADLS_ACUITY_SCORE: 32
ADLS_ACUITY_SCORE: 70
ADLS_ACUITY_SCORE: 32
ADLS_ACUITY_SCORE: 72
ADLS_ACUITY_SCORE: 35
ADLS_ACUITY_SCORE: 47
ADLS_ACUITY_SCORE: 71
ADLS_ACUITY_SCORE: 64
ADLS_ACUITY_SCORE: 71
CONCENTRATING,_REMEMBERING_OR_MAKING_DECISIONS_DIFFICULTY: YES
ADLS_ACUITY_SCORE: 72
SWALLOWING: 2-->DIFFICULTY SWALLOWING FOODS
ADLS_ACUITY_SCORE: 35
ADLS_ACUITY_SCORE: 32
ADLS_ACUITY_SCORE: 72
ADLS_ACUITY_SCORE: 66
ADLS_ACUITY_SCORE: 31
WALKING_OR_CLIMBING_STAIRS_DIFFICULTY: YES
ADLS_ACUITY_SCORE: 65
CHANGE_IN_FUNCTIONAL_STATUS_SINCE_ONSET_OF_CURRENT_ILLNESS/INJURY: NO
ADLS_ACUITY_SCORE: 71
EATING: 1-->ASSISTANCE (EQUIPMENT/PERSON) NEEDED
ADLS_ACUITY_SCORE: 27
ADLS_ACUITY_SCORE: 29
ADLS_ACUITY_SCORE: 32
ADLS_ACUITY_SCORE: 32
ADLS_ACUITY_SCORE: 35
ADLS_ACUITY_SCORE: 72
TOILETING: 2-->COMPLETELY DEPENDENT
ADLS_ACUITY_SCORE: 71
ADLS_ACUITY_SCORE: 50
TRANSFERRING: 1-->ASSISTANCE (EQUIPMENT/PERSON) NEEDED (NOT DEVELOPMENTALLY APPROPRIATE)
ADLS_ACUITY_SCORE: 47
EQUIPMENT_CURRENTLY_USED_AT_HOME: WALKER, ROLLING
ADLS_ACUITY_SCORE: 35
ADLS_ACUITY_SCORE: 32
ADLS_ACUITY_SCORE: 73
ADLS_ACUITY_SCORE: 29
TOILETING_ASSISTANCE: TOILETING DIFFICULTY, ASSISTANCE 1 PERSON
ADLS_ACUITY_SCORE: 29
ADLS_ACUITY_SCORE: 47
EATING: 0-->INDEPENDENT
ADLS_ACUITY_SCORE: 64
ADLS_ACUITY_SCORE: 35
ADLS_ACUITY_SCORE: 73
ADLS_ACUITY_SCORE: 32
ADLS_ACUITY_SCORE: 51
ADLS_ACUITY_SCORE: 48
ADLS_ACUITY_SCORE: 72
EATING/SWALLOWING: SWALLOWING LIQUIDS;SWALLOWING SOLID FOOD
EATING: 0-->ASSISTANCE NEEDED (DEVELOPMENTALLY APPROPRIATE)
ADLS_ACUITY_SCORE: 50
DRESS: 1-->ASSISTANCE (EQUIPMENT/PERSON) NEEDED
ADLS_ACUITY_SCORE: 71
ADLS_ACUITY_SCORE: 35
ADLS_ACUITY_SCORE: 50
ADLS_ACUITY_SCORE: 67
ADLS_ACUITY_SCORE: 66
DRESSING/BATHING: BATHING DIFFICULTY, ASSISTANCE 1 PERSON;DRESSING DIFFICULTY, ASSISTANCE 1 PERSON
ADLS_ACUITY_SCORE: 67
ADLS_ACUITY_SCORE: 29
EQUIPMENT_CURRENTLY_USED_AT_HOME: WALKER, ROLLING
DIFFICULTY_EATING/SWALLOWING: YES
ADLS_ACUITY_SCORE: 71
ADLS_ACUITY_SCORE: 31
ADLS_ACUITY_SCORE: 32
TRANSFERRING: 0-->INDEPENDENT
ADLS_ACUITY_SCORE: 29
ADLS_ACUITY_SCORE: 50
ADLS_ACUITY_SCORE: 73
ADLS_ACUITY_SCORE: 71
USE_OF_HEARING_ASSISTIVE_DEVICES: BILATERAL HEARING AIDS
SWALLOWING: 0-->SWALLOWS FOODS/LIQUIDS WITHOUT DIFFICULTY (DEVELOPMENTALLY APPROPRIATE)
ADLS_ACUITY_SCORE: 50
DRESS: 2-->COMPLETELY DEPENDENT
ADLS_ACUITY_SCORE: 38
DIFFICULTY_EATING/SWALLOWING: NO
ADLS_ACUITY_SCORE: 51
ADLS_ACUITY_SCORE: 50
ADLS_ACUITY_SCORE: 62
TOILETING_MANAGEMENT: BRIEF
FALL_HISTORY_WITHIN_LAST_SIX_MONTHS: YES
ADLS_ACUITY_SCORE: 39
ADLS_ACUITY_SCORE: 46
DRESS: 0-->ASSISTANCE NEEDED (DEVELOPMENTALLY APPROPRIATE)
ADLS_ACUITY_SCORE: 32
ADLS_ACUITY_SCORE: 66
ADLS_ACUITY_SCORE: 66
ADLS_ACUITY_SCORE: 27
ADLS_ACUITY_SCORE: 66
ADLS_ACUITY_SCORE: 46
ADLS_ACUITY_SCORE: 35
NUMBER_OF_TIMES_PATIENT_HAS_FALLEN_WITHIN_LAST_SIX_MONTHS: 3

## 2022-01-01 ASSESSMENT — ENCOUNTER SYMPTOMS
LIGHT-HEADEDNESS: 0
ACTIVITY CHANGE: 0
SHORTNESS OF BREATH: 0
FEVER: 0
DYSURIA: 0
FEVER: 0
FATIGUE: 1
PALPITATIONS: 0
FEVER: 0
FEVER: 0
TREMORS: 1
BRUISES/BLEEDS EASILY: 0
DIZZINESS: 0
ACTIVITY CHANGE: 1
NAUSEA: 0
DYSURIA: 0
ABDOMINAL PAIN: 0
COUGH: 1
WHEEZING: 0
VOMITING: 0
ANAL BLEEDING: 0
MYALGIAS: 0
NAUSEA: 0
VOMITING: 0
WEAKNESS: 0
APPETITE CHANGE: 0
SHORTNESS OF BREATH: 1
DYSURIA: 0
ABDOMINAL PAIN: 1
BACK PAIN: 0
DIARRHEA: 1
SHORTNESS OF BREATH: 1
HALLUCINATIONS: 1
DYSURIA: 0
NAUSEA: 0
ARTHRALGIAS: 0
COUGH: 0
MYALGIAS: 0
CONFUSION: 1
CHEST TIGHTNESS: 0
PALPITATIONS: 0
DIARRHEA: 1
VOMITING: 0
SORE THROAT: 0
DIZZINESS: 1
HEMATURIA: 0
NERVOUS/ANXIOUS: 1
DIARRHEA: 0
WOUND: 0
HEMATURIA: 0
PALPITATIONS: 0
TREMORS: 1
DIARRHEA: 1
WOUND: 0
TREMORS: 1
CHILLS: 1
CHILLS: 0
CARDIOVASCULAR NEGATIVE: 1
FEVER: 0
ABDOMINAL PAIN: 1
HEADACHES: 0
ABDOMINAL PAIN: 1
DECREASED CONCENTRATION: 1
HEMATURIA: 0
VOMITING: 0
NERVOUS/ANXIOUS: 1
FEVER: 1
TREMORS: 1
ARTHRALGIAS: 0
PHOTOPHOBIA: 0
BLOOD IN STOOL: 0
VOMITING: 0
ARTHRALGIAS: 0
NERVOUS/ANXIOUS: 1
COUGH: 1
BLOOD IN STOOL: 0
CONFUSION: 0
RHINORRHEA: 1
LIGHT-HEADEDNESS: 1
FATIGUE: 1
AGITATION: 0
COUGH: 1
SHORTNESS OF BREATH: 0
EYES NEGATIVE: 1
DIZZINESS: 1
NECK PAIN: 0
SHORTNESS OF BREATH: 0
MYALGIAS: 0
BRUISES/BLEEDS EASILY: 0
WEAKNESS: 1
CONFUSION: 1
VOICE CHANGE: 0
DYSURIA: 0
DIFFICULTY URINATING: 0
CHILLS: 0
COUGH: 1
DYSURIA: 0
LIGHT-HEADEDNESS: 1
HEADACHES: 0
COUGH: 1
SINUS PRESSURE: 1
NAUSEA: 0
SHORTNESS OF BREATH: 0
VOMITING: 0
SHORTNESS OF BREATH: 1
ABDOMINAL PAIN: 1
FATIGUE: 1
MYALGIAS: 0
FATIGUE: 1
EYE REDNESS: 0
DIZZINESS: 1
SHORTNESS OF BREATH: 0
HEMATURIA: 0
WEAKNESS: 1
SORE THROAT: 0
NECK STIFFNESS: 0
SEIZURES: 0
SLEEP DISTURBANCE: 1
TREMORS: 1
LIGHT-HEADEDNESS: 0
ARTHRALGIAS: 0
AGITATION: 0
DIARRHEA: 1
SORE THROAT: 0
AGITATION: 0
CONFUSION: 0
WHEEZING: 0
ABDOMINAL PAIN: 0
UNEXPECTED WEIGHT CHANGE: 0
WOUND: 0
NAUSEA: 0
NAUSEA: 0
ARTHRALGIAS: 0
SLEEP DISTURBANCE: 0
CHILLS: 0
CHEST TIGHTNESS: 0
CHILLS: 0
SHORTNESS OF BREATH: 1
FEVER: 0
CONFUSION: 1
CONFUSION: 1
ACTIVITY CHANGE: 1
TROUBLE SWALLOWING: 1
FEVER: 0
SWOLLEN GLANDS: 1
COLOR CHANGE: 0
HEADACHES: 0
COUGH: 1
FEVER: 0
WEAKNESS: 1
FEVER: 0
NAUSEA: 0
SHORTNESS OF BREATH: 0
WOUND: 0
NAUSEA: 0
ABDOMINAL PAIN: 0
FATIGUE: 0
BRUISES/BLEEDS EASILY: 0
VOMITING: 0
SEIZURES: 0
ABDOMINAL DISTENTION: 0
WHEEZING: 0
FACIAL SWELLING: 0
BACK PAIN: 0
CONFUSION: 1
AGITATION: 0
COUGH: 1
SHORTNESS OF BREATH: 0
SHORTNESS OF BREATH: 1
DIFFICULTY URINATING: 0
ADENOPATHY: 0
CONFUSION: 0
CONSTIPATION: 0
VOMITING: 0
ABDOMINAL PAIN: 0
LIGHT-HEADEDNESS: 1
CONSTITUTIONAL NEGATIVE: 1
DIZZINESS: 1
UNEXPECTED WEIGHT CHANGE: 0
AGITATION: 0

## 2022-01-01 ASSESSMENT — PAIN SCALES - GENERAL
PAINLEVEL: NO PAIN (0)
PAINLEVEL: NO PAIN (0)
PAINLEVEL: SEVERE PAIN (6)
PAINLEVEL: NO PAIN (0)

## 2022-01-01 ASSESSMENT — ANXIETY QUESTIONNAIRES
7. FEELING AFRAID AS IF SOMETHING AWFUL MIGHT HAPPEN: NOT AT ALL
2. NOT BEING ABLE TO STOP OR CONTROL WORRYING: NOT AT ALL
6. BECOMING EASILY ANNOYED OR IRRITABLE: SEVERAL DAYS
8. IF YOU CHECKED OFF ANY PROBLEMS, HOW DIFFICULT HAVE THESE MADE IT FOR YOU TO DO YOUR WORK, TAKE CARE OF THINGS AT HOME, OR GET ALONG WITH OTHER PEOPLE?: SOMEWHAT DIFFICULT
GAD7 TOTAL SCORE: 6
GAD7 TOTAL SCORE: 6
5. BEING SO RESTLESS THAT IT IS HARD TO SIT STILL: SEVERAL DAYS
3. WORRYING TOO MUCH ABOUT DIFFERENT THINGS: NOT AT ALL
IF YOU CHECKED OFF ANY PROBLEMS ON THIS QUESTIONNAIRE, HOW DIFFICULT HAVE THESE PROBLEMS MADE IT FOR YOU TO DO YOUR WORK, TAKE CARE OF THINGS AT HOME, OR GET ALONG WITH OTHER PEOPLE: SOMEWHAT DIFFICULT
7. FEELING AFRAID AS IF SOMETHING AWFUL MIGHT HAPPEN: NOT AT ALL
4. TROUBLE RELAXING: SEVERAL DAYS
1. FEELING NERVOUS, ANXIOUS, OR ON EDGE: NEARLY EVERY DAY
GAD7 TOTAL SCORE: 6

## 2022-01-01 ASSESSMENT — PATIENT HEALTH QUESTIONNAIRE - PHQ9
10. IF YOU CHECKED OFF ANY PROBLEMS, HOW DIFFICULT HAVE THESE PROBLEMS MADE IT FOR YOU TO DO YOUR WORK, TAKE CARE OF THINGS AT HOME, OR GET ALONG WITH OTHER PEOPLE: NOT DIFFICULT AT ALL
10. IF YOU CHECKED OFF ANY PROBLEMS, HOW DIFFICULT HAVE THESE PROBLEMS MADE IT FOR YOU TO DO YOUR WORK, TAKE CARE OF THINGS AT HOME, OR GET ALONG WITH OTHER PEOPLE: VERY DIFFICULT
SUM OF ALL RESPONSES TO PHQ QUESTIONS 1-9: 9
SUM OF ALL RESPONSES TO PHQ QUESTIONS 1-9: 3
SUM OF ALL RESPONSES TO PHQ QUESTIONS 1-9: 3
SUM OF ALL RESPONSES TO PHQ QUESTIONS 1-9: 6
SUM OF ALL RESPONSES TO PHQ QUESTIONS 1-9: 17
SUM OF ALL RESPONSES TO PHQ QUESTIONS 1-9: 6
10. IF YOU CHECKED OFF ANY PROBLEMS, HOW DIFFICULT HAVE THESE PROBLEMS MADE IT FOR YOU TO DO YOUR WORK, TAKE CARE OF THINGS AT HOME, OR GET ALONG WITH OTHER PEOPLE: VERY DIFFICULT
SUM OF ALL RESPONSES TO PHQ QUESTIONS 1-9: 17
SUM OF ALL RESPONSES TO PHQ QUESTIONS 1-9: 3

## 2022-01-03 PROBLEM — C44.320 SQUAMOUS CELL CARCINOMA OF SKIN OF FACE: Status: ACTIVE | Noted: 2021-01-01

## 2022-01-11 PROBLEM — C44.219 BASAL CELL CARCINOMA OF LEFT EAR: Status: ACTIVE | Noted: 2021-01-01

## 2022-01-11 NOTE — PROGRESS NOTES
Subjective:  This is a follow up after excision basal cell  on left ear. The patient has no complaints. Pathology reviewed with patient/wife.    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 Margin is positive. Discussed option of watchful waiting v re-excision.     Plan:  Follow-up if any signs of recurrence

## 2022-01-11 NOTE — NURSING NOTE
"Chief Complaint   Patient presents with     Clinic Care Coordination - Follow-up     follow up suture removal       Initial BP (!) 142/92 (BP Location: Right arm, Patient Position: Sitting, Cuff Size: Adult Large)   Pulse 68   Temp 97.4  F (36.3  C) (Tympanic)   Resp 16   Wt 78.5 kg (173 lb)   SpO2 97%   BMI 27.15 kg/m   Estimated body mass index is 27.15 kg/m  as calculated from the following:    Height as of 4/22/21: 1.7 m (5' 6.93\").    Weight as of this encounter: 78.5 kg (173 lb).  Medication Reconciliation: complete    Lisa Lovelace LPN  "

## 2022-01-20 NOTE — PROGRESS NOTES
01/20/22 1300   Quick Adds   Quick Adds Certification   Type of Visit Initial OP PT Evaluation   General Information   Start of Care Date 01/20/22   Referring Physician Beronica Sands NP   Orders Evaluate and Treat as Indicated   Order Date 12/22/21   Medical Diagnosis Unsteady gait R26.81, Fall W19.XXXA    Onset of illness/injury or Date of Surgery 12/22/21   Surgical/Medical history reviewed Yes   Pertinent history of current problem (include personal factors and/or comorbidities that impact the POC) Patient has diagnosis of Lewy body dementia. He reports over the past several months he is noted a decline in his gait and balance. Patient reports that often when he stands up from a chair he feels unsteady and needs to wait a short period of time to gain his balance before walking. Patient also notices at times he stumbles when walking. He is noted when walking in the snow that he often does not  his feet as high as he should. Patient reports that he has not had any specific falls but several close calls. Patient is very motivated to initiate physical therapy to work on strength balance and ambulation.   Prior level of function comment No limitations    Current Community Support Family/friend caregiver   Patient role/Employment history Retired   Current Assistive Devices   (None )   Patient/Family Goals Statement Improve strengt, gait and balance    Fall Risk Screen   Fall screen completed by PT   Have you fallen 2 or more times in the past year? No   Have you fallen and had an injury in the past year? No   Is patient a fall risk? Yes;Department fall risk interventions implemented   Fall screen comments Patient stumbles and feels unsteady but does not fall.    Pain   Patient currently in pain No   Cognitive Status Examination   Orientation orientation to person, place and time   Level of Consciousness alert   Follows Commands and Answers Questions 100% of the time   Personal Safety and Judgment intact    Memory impaired  (Patient and his wife endorse declining memory )   Observation   Observation sit to stand independently. stands a few seconds to ensure steadiness.    Range of Motion (ROM)   ROM Comment Lower extremity ROM is WFL for Hip, knee and ankles bilaterally    Strength   Manual Muscle Testing Quick Adds MMT: Hip;MMT: Knee;MMT: Ankle   MMT: Hip, Rehab Eval   Hip Flexion - Left Side (4/5) good, left   Hip ABduction - Left Side (4/5) good, left   Hip Flexion - Right Side (4/5) good, right   Hip ABduction - Right Side (4/5) good, right   MMT: Knee, Rehab Eval   Knee Flexion - Left Side (4/5) good, left   Knee Extension - Left Side (4/5) good, left   Knee Flexion - Right Side (4/5) good, right   Knee Extension - Right Side (4/5) good, right   MMT: Ankle, Rehab Eval   Ankle Dorsiflexion - Left Side (5/5) normal,left   Ankle Plantarflexion - Left Side (5/5) normal,left   Ankle Dorsiflexion - Right Side (5/5) normal,right   Ankle Plantarflexion - Right Side (5/5) normal,right   Transfer Skills   Transfer Comments sit to stand is independent from chair    Gait   Gait Comments Normal gait pattern    Gait Special Tests   Gait Special Tests DYNAMIC GAIT INDEX   Gait Special Tests Dynamic Gait Index   Score out of 24 20   Balance   Balance Comments Feet together on the floor with Eyes open and Eyes closed was WNL,  Feet together on foam:  Eyes open-noted minimal postural sway.  Eyes closed-loss of balance within 15 seconds.    Coordination   Coordination Comments Patient demonstrated normal findings with the following activities: Sliding heel on shin, finger-to-nose, tracing a square shape on the floor with his foot   Planned Therapy Interventions   Planned Therapy Interventions balance training;gait training;neuromuscular re-education;ROM;strengthening;stretching   Clinical Impression   Criteria for Skilled Therapeutic Interventions Met yes, treatment indicated   PT Diagnosis Unsteady gait R26.81, Fall W19.XXXA     Influenced by the following impairments Weakness, impaired gait, impaired balance   Functional limitations due to impairments Walking over outdoor surfaces, sit to stand, walking over changes in indoor surfaces such as floor to rug   Clinical Presentation Stable/Uncomplicated   Clinical Presentation Rationale Symptoms are consistent with her current diagnosis   Clinical Decision Making (Complexity) Low complexity   Therapy Frequency   (16 visits)   Predicted Duration of Therapy Intervention (days/wks) 8 weeks   Risk & Benefits of therapy have been explained Yes   Patient, Family & other staff in agreement with plan of care Yes   Education Assessment   Preferred Learning Style Listening;Demonstration;Pictures/video   Barriers to Learning No barriers   GOALS   PT Eval Goals 1;2;3   Goal 1   Goal Identifier Ambulation   Goal Description Patient will report the ability to ambulate over outdoor surfaces and observe appropriate foot clearance to avoid loss of balance.   Target Date 03/17/22   Goal 2   Goal Identifier Balance   Goal Description Patient will report 75% or greater reduction in feeling of unsteadiness when walking within his home.  He will demonstrate a DGI score of 22/24   Target Date 03/17/22   Goal 3   Goal Identifier Strength   Goal Description Patient will demonstrate improved functional strength to perform sit to stand from standard height chair without being unsteady upon standing.  He will demonstrate 5/5 strength throughout the lower extremities.   Target Date 03/17/22   Total Evaluation Time   PT Eval, Low Complexity Minutes (81737) 30   Therapy Certification   Certification date from 01/20/22   Certification date to 03/17/22   Medical Diagnosis Unsteady gait R26.81, Fall W19.XXXA    Certification I certify the need for these services furnished under this plan of treatment and while under my care.  (Physician co-signature of this document indicates review and certification of the therapy plan).

## 2022-01-20 NOTE — PROGRESS NOTES
[facility]                                                                                   OUTPATIENT PHYSICAL THERAPY FUNCTIONAL EVALUATION  PLAN OF TREATMENT FOR OUTPATIENT REHABILITATION  (COMPLETE FOR INITIAL CLAIMS ONLY)  Patient's Last Name, First Name, M.I.  YOB: 1940  LinoRajesh HAHN     Provider's Name   [facility]   Medical Record No.  [id number]     Start of Care Date:  01/20/22   Onset Date:  12/22/21   Type:     _X__PT   ____OT  ____SLP Medical Diagnosis:  (P) Unsteady gait R26.81, Fall W19.XXXA      PT Diagnosis:  Unsteady gait R26.81, Fall W19.XXXA  Visits from SOC:  1                              __________________________________________________________________________________  Plan of Treatment/Functional Goals:  balance training,gait training,neuromuscular re-education,ROM,strengthening,stretching           GOALS  Ambulation  Patient will report the ability to ambulate over outdoor surfaces and observe appropriate foot clearance to avoid loss of balance.  03/17/22    Balance  Patient will report 75% or greater reduction in feeling of unsteadiness when walking within his home.  He will demonstrate a DGI score of 22/24  03/17/22    Strength  Patient will demonstrate improved functional strength to perform sit to stand from standard height chair without being unsteady upon standing.  He will demonstrate 5/5 strength throughout the lower extremities.  03/17/22                                                           Therapy Frequency:   (16 visits)   Predicted Duration of Therapy Intervention:  8 weeks    Ang Bunch, PT                                    I CERTIFY THE NEED FOR THESE SERVICES FURNISHED UNDER        THIS PLAN OF TREATMENT AND WHILE UNDER MY CARE .             Physician Signature                Date    X_____________________________________________________                      Certification Date From:  (P) 01/20/22   Certification Date To:  (P) 03/17/22    Referring Provider:  Beronica Sands NP    Initial Assessment  See Epic Evaluation- Start of Care Date: 01/20/22

## 2022-01-26 NOTE — NURSING NOTE
Patient is here with wife for concerns of on going cold. States has cough, runny nose, congestion, ears plugged. States has been over a month.     Medication Reconciliation: complete    FOOD SECURITY SCREENING QUESTIONS  Hunger Vital Signs:  Within the past 12 months we worried whether our food would run out before we got money to buy more. Never  Within the past 12 months the food we bought just didn't last and we didn't have money to get more. Never  Radha Alvarado LPN 1/26/2022 1:48 PM       Advance care directive on file? yes  Advance care directive provided to patient? na       Radha Alvarado LPN

## 2022-01-26 NOTE — PROGRESS NOTES
Assessment & Plan   Problem List Items Addressed This Visit     None      Visit Diagnoses     Acute non-recurrent maxillary sinusitis    -  Primary    Relevant Medications    azithromycin (ZITHROMAX) 250 MG tablet      Symptoms greater than 4 weeks that have not significantly improved.  Opted to treat for acute sinus infection.  Treated with azithromycin x5 days, discussed ongoing symptomatic management and signs symptoms that warrant follow-up.    Prescription drug management  24 minutes spent on the date of the encounter doing chart review, history and exam, documentation and further activities per the note    No follow-ups on file.    JOSEPHINE Serrato [facility]    Nicolette Cole is a 81 year old who presents for the following health issues  accompanied by his spouse.    Patient comes in today with his wife for cough and sinus congestion.  He reports he had symptoms for 4 weeks.  Denies any fevers but has significant runny nose and mild headache.  Has had a history of sinus infections in the past.  He was never tested for Covid with this illness.  No ill contacts.  Wife reports he is used multiple over-the-counter medications for symptomatic management.      History of Present Illness       He eats 2-3 servings of fruits and vegetables daily.He consumes 1 sweetened beverage(s) daily.He exercises with enough effort to increase his heart rate 9 or less minutes per day.  He exercises with enough effort to increase his heart rate 4 days per week.   He is taking medications regularly.  Sinus Problem   This is a new problem. The current episode started more than 1 week ago. The problem has not changed since onset.There has been no fever. The pain is mild. Associated symptoms include congestion, sinus pressure, swollen glands and cough. Pertinent negatives include no ear pain, no sore throat and no shortness of breath. He has tried acetaminophen and other medications for the symptoms.  The treatment provided mild relief.        Review of Systems   Constitutional: Negative.    HENT: Positive for congestion, postnasal drip, rhinorrhea and sinus pressure. Negative for ear pain and sore throat.    Eyes: Negative.    Respiratory: Positive for cough. Negative for shortness of breath.    Cardiovascular: Negative.             Objective    There were no vitals taken for this visit.  There is no height or weight on file to calculate BMI.  Physical Exam

## 2022-01-30 NOTE — PROGRESS NOTES
ASSESSMENT/PLAN:    I have reviewed the nursing notes.  I have reviewed the findings, diagnosis, plan and need for follow up with the patient.    1. Cough  - Symptomatic; Yes; 1/23/2022 COVID-19 Virus (Coronavirus) by PCR Nose  Pending     3. Acute bacterial rhinosinusitis  Patient was seen 4 days ago and treated with azithromycin for nonrecurrent maxillary sinusitis.  Today symptoms are still consistent with frontal and maxillary sinusitis without improvement from azithromycin.  I discussed with patient and his wife that it is possible that his symptoms are caused by a virus, which would explain why he did not improve with azithromycin, however I did opt to treat him with Augmentin as now frontal sinuses are involved, too. I discussed with him the risks of multiple antibiotic use and recommend yogurt or probiotic while taking Augmentin as it is hard on the stomach at times.  He is agreeable that he will wait until Covid test returns to start taking this antibiotic.  If the Covid test returns positive do not take Augmentin.  It is possible even if the Covid is negative that this is a different viral infection that is taking time to improve.  Recommend symptomatic treatment and Flonase for few days daily.  - amoxicillin-clavulanate (AUGMENTIN) 875-125 MG tablet; Take 1 tablet by mouth 2 times daily for 7 days  Dispense: 14 tablet; Refill: 0  -Symptomatic treatment - Encouraged fluids, salt water gargles, honey (only if greater than 1 year in age due to risk of botulism), elevation, humidifier, sinus rinse/netti pot, lozenges, tea, topical vapor rub, popsicles, rest, etc     Discussed warning signs/symptoms indicative of need to f/u    Follow up if symptoms persist or worsen or concerns    I explained my diagnostic considerations and recommendations to the patient, who voiced understanding and agreement with the treatment plan. All questions were answered. We discussed potential side effects of any prescribed or  recommended therapies, as well as expectations for response to treatments.    Betzaida Mahoney NP  1/30/2022  10:45 AM    HPI:  Rajesh Huynh is a 81 year old male who presents to [facility] today for concerns of ongoing sinus symptoms that started 4 weeks ago, reports forehead and cheeks are very sore still. He is on his last day of azithromycin that he was prescribed earlier in the week for sinus infection. He has had very little, if any improvement with zpak. He figured he would be better by now and was advised to return if not improving. He has not been using flonase nasal spray or netti pot. He has been using robitussin for cough and cough drops. Reports a productive cough. Is vaccinated and boosted for covid. No known contacts. But agreeable to test today. He does report consistent sinus drainage. Not using netti pot or flonase.     Past Medical History:   Diagnosis Date     Chronic myeloproliferative disease (H)     questionable     Essential (primary) hypertension     No Comments Provided     Hyperlipidemia     No Comments Provided     Male erectile dysfunction     No Comments Provided     Malignant neoplasm of supraglottis (H)     2011,Radiation     Other chronic pancreatitis (H)     No Comments Provided     Personal history of irradiation     1/30/2012-3/2/2012     Personal history of malignant neoplasm of bladder     4/22/2015     Personal history of nicotine dependence     2ppd - quit 2000     Pneumonia     2001     ST elevation (STEMI) myocardial infarction (H)     7/5/2013,Integrity BMS to RCA done at [facility]     No Comments Provided     Past Surgical History:   Procedure Laterality Date     APPENDECTOMY OPEN      1970     ARTHROSCOPY SHOULDER      11/2005,right shoulder     ARTHROSCOPY SHOULDER      1/2006,left shoulder     ARTHROSCOPY SHOULDER      2011,Left shoulder scope SAD, ac.  Open subscap, biceps     CHOLECYSTECTOMY      1994     COLONOSCOPY  02/01/2005 02/2005      COLONOSCOPY  2015,no repeat due at this time     CYSTOSCOPY      Multiple times,Dr. Lyndon FRANK     HEART CATH, ANGIOPLASTY      2013,Bare-metal stent to dominant RCA     OTHER SURGICAL HISTORY      ,145541,OTHER,for chronic pancreatitis     OTHER SURGICAL HISTORY      2007,[id number],SHOULDER REPLACEMENT,Right,with biceps tenodesis by Dr. Mukesh Ayoub     OTHER SURGICAL HISTORY      ,,ERCP,xseveral, one with papillotomy     OTHER SURGICAL HISTORY      2012-3/2/2012,[id number],RADIATION TREATMENT     OTHER SURGICAL HISTORY      2011,83876,NECK/THORAX PROCEDURE,L modified radical neck surgery     OTHER SURGICAL HISTORY      11/10/14,[id number],OTHER,Dr. Lyndon FRANK     OTHER SURGICAL HISTORY      ,97486,REVISION EYELID,Dr. Farias     OTHER SURGICAL HISTORY      10/22/15,[id number],OTHER     TONSILLECTOMY, ADENOIDECTOMY, COMBINED      194     VASECTOMY           Social History     Tobacco Use     Smoking status: Former Smoker     Packs/day: 3.00     Years: 43.00     Pack years: 129.00     Types: Cigarettes     Quit date: 2000     Years since quittin.0     Smokeless tobacco: Never Used   Substance Use Topics     Alcohol use: No     Alcohol/week: 0.0 standard drinks     Current Outpatient Medications   Medication Sig Dispense Refill     albuterol (PROAIR HFA/PROVENTIL HFA/VENTOLIN HFA) 108 (90 Base) MCG/ACT inhaler Inhale 2 puffs into the lungs every 4 hours as needed for shortness of breath / dyspnea or wheezing 18 g 1     amoxicillin-clavulanate (AUGMENTIN) 875-125 MG tablet Take 1 tablet by mouth 2 times daily for 7 days 14 tablet 0     amylase-lipase-protease (CREON) 96350-77148 units CPEP per EC capsule Take 1-3 capsules by mouth 4 times daily (with meals and nightly) With snacks 360 capsule 11     aspirin EC 81 MG EC tablet Take 81 mg by mouth daily with food       atorvastatin (LIPITOR) 40 MG tablet Take 1 tablet (40 mg) by mouth daily (with dinner)  90 tablet 3     azithromycin (ZITHROMAX) 250 MG tablet Take 2 tablets (500 mg) by mouth daily for 1 day, THEN 1 tablet (250 mg) daily for 4 days. 6 tablet 0     blood glucose (NO BRAND SPECIFIED) lancets standard Use to test blood sugar 2 times daily  E11.9 200 each 3     blood glucose (NO BRAND SPECIFIED) test strip Use to test blood sugar 2 times daily  E11.9 200 strip 11     Blood Glucose Monitoring Suppl (FIFTY50 GLUCOSE METER 2.0) W/DEVICE KIT Dispense Accu-Chek Agnes  Meter. Dispense item covered by pt ins. E11.9 NIDDM type II - Test 1 time/day       calcium carbonate-vitamin D (CALCIUM 600+D) 600-200 MG-UNIT TABS Take 1 tablet by mouth 2 times daily (with meals)       DOCOSAHEXAENOIC ACID PO Take 1 capsule by mouth 2 times daily       levothyroxine (SYNTHROID/LEVOTHROID) 50 MCG tablet Take 1 tablet (50 mcg) by mouth daily -- Take in AM on empty stomach 90 tablet 1     loperamide (IMODIUM A-D) 2 MG tablet Take 1 tablet (2 mg) by mouth 4 times daily as needed for diarrhea 120 tablet 11     metFORMIN (GLUCOPHAGE) 1000 MG tablet Take 1 tablet (1,000 mg) by mouth 2 times daily (with meals) For diabetes prevention 180 tablet 3     Multiple Vitamin (MULTI-VITAMINS) TABS Take 1 tablet by mouth daily       nitroGLYcerin (NITROSTAT) 0.4 MG sublingual tablet DISSOLVE ONE TABLET UNDER THE TONGUE EVERY 5 MINUTES AS NEEDED FOR CHEST PAIN.  DO NOT EXCEED A TOTAL OF 3 DOSES IN 15 MINUTES 25 tablet 0     nystatin (NYSTOP) 688091 UNIT/GM external powder Apply topically 2 times daily as needed For skin fold rash 180 g 4     omeprazole (PRILOSEC) 20 MG DR capsule Take 1 capsule (20 mg) by mouth daily Take 30 to 60 minutes prior to a meal 90 capsule 3     primidone (MYSOLINE) 50 MG tablet Take 2 tablets (100 mg) by mouth At Bedtime 180 tablet 3     propranolol (INDERAL) 20 MG tablet Take 1 tablet (20 mg) by mouth 3 times daily 270 tablet 3     rivastigmine (EXELON) 3 MG capsule Take 3 mg by mouth       tamsulosin (FLOMAX) 0.4 MG  capsule Take 1 capsule (0.4 mg) by mouth every evening 90 capsule 3     tiotropium (SPIRIVA) 18 MCG inhaled capsule Inhale 1 capsule (18 mcg) into the lungs daily - for chronic phlegm/Bronchitis 90 capsule 3     Allergies   Allergen Reactions     Other Drug Allergy (See Comments)      Trelegy Ellipta  Sore throat       Past medical history, past surgical history, current medications and allergies reviewed and accurate to the best of my knowledge.      ROS:  Refer to HPI    /64   Pulse 73   Temp 99.2  F (37.3  C) (Temporal)   Resp 18   Wt 77.6 kg (171 lb)   SpO2 98%   BMI 26.84 kg/m      EXAM:  General Appearance: Well appearing 81 year old male, appropriate appearance for age. No acute distress   Ears: Left TM intact, translucent with bony landmarks appreciated, no erythema, no effusion, no bulging, no purulence.  Right TM intact, translucent with bony landmarks appreciated, no erythema, no effusion, no bulging, no purulence.  Left auditory canal clear.  Right auditory canal clear.  Normal external ears, non tender.  Eyes: conjunctivae normal without erythema or irritation, corneas clear, no drainage or crusting, no eyelid swelling, pupils equal   Orophayrnx: moist mucous membranes, posterior pharynx without erythema, tonsils symmetric, no erythema, no exudates or petechiae, +  post nasal drip seen, no trismus, voice clear.    Sinuses:  + sinus tenderness upon palpation of the frontal AND maxillary sinuses bilaterally   Nose:  Bilateral nares: + erythema, no edema, + rhinorrhea, clear   Neck: supple without adenopathy  Respiratory: normal chest wall and respirations.  Normal effort.  Clear to auscultation bilaterally, no wheezing, crackles or rhonchi.  No increased work of breathing.  + cough   Cardiac: RRR with no murmurs  Psychological: normal affect, alert, oriented, and pleasant.

## 2022-01-30 NOTE — NURSING NOTE
"Chief Complaint   Patient presents with     Sinus Problem     He has been having a sinus issues for the last month. He was in here the beginning of the week and got antibiotics for his sinus infection. He was told to come back if he was not better in a couple of days.     Initial There were no vitals taken for this visit. Estimated body mass index is 27.69 kg/m  as calculated from the following:    Height as of 4/22/21: 1.7 m (5' 6.93\").    Weight as of 1/26/22: 80 kg (176 lb 6.4 oz).       Medication Reconciliation: Complete      FOOD SECURITY SCREENING QUESTIONS:    The next two questions are to help us understand your food security.  If you are feeling you need any assistance in this area, we have resources available to support you today.    Hunger Vital Signs:  Within the past 12 months we worried whether our food would run out before we got money to buy more. Never  Within the past 12 months the food we bought just didn't last and we didn't have money to get more. Never  Thaddeus Conte LPN,LPN on 1/30/2022 at 10:31 AM          Thaddeus Conte LPN   "

## 2022-01-30 NOTE — PATIENT INSTRUCTIONS
Take augmentin for sinus infection; recommend waiting until covid test results return to start. If negative may take. If positive; then do not take as antibiotic will not cure viral infeciton. It is possible that this is all viral and will not go away with antibiotics and take more time.   Also recommend FLONASE nasal spray x3 days, daily. Netti pot/saline rinse may be helpful.

## 2022-02-24 NOTE — PROGRESS NOTES
Assessment & Plan   Problem List Items Addressed This Visit     None      Visit Diagnoses     Lower respiratory infection (e.g., bronchitis, pneumonia, pneumonitis, pulmonitis)    -  Primary    Relevant Medications    doxycycline hyclate (VIBRA-TABS) 100 MG tablet    Cough        Relevant Orders    XR Chest 2 Views (Completed)    CT Chest w/o Contrast    Nodular radiologic density        Relevant Orders    CT Chest w/o Contrast         Treated with doxycycline for lower respiratory infection.  X-ray does show nodular densities.  Chest CT was ordered.  We will follow-up with test results when available.          No follow-ups on file.    JOSEPHINE Serrato [facility]    Nicolette Cole is a 81 year old who presents for the following health issues  accompanied by his spouse.    Cough    History of Present Illness     Reason for visit:  Cough  prolonged cold  Symptom onset:  More than a month  Symptom intensity:  Moderate  Symptom progression:  Staying the same  Had these symptoms before:  No  What makes it worse:  No nothings has helped    He eats 2-3 servings of fruits and vegetables daily.He consumes 0 sweetened beverage(s) daily.He exercises with enough effort to increase his heart rate 9 or less minutes per day.  He exercises with enough effort to increase his heart rate 3 or less days per week.   He is taking medications regularly.       Concern - cough  Onset: ongoing  Description: productive  Intensity: mild  Progression of Symptoms:  worsening  Accompanying Signs & Symptoms: yellow, to green and bloody this morning  Previous history of similar problem: none  Precipitating factors:        Worsened by:   Alleviating factors:        Improved by:   Therapies tried and outcome:  none     He continues have ongoing cough, productive and this morning had a mild amount of blood.  Continues with some mild sinus congestion.  He has been using over-the-counter cough medication.  Denies any  shortness or pain.    Review of Systems   Respiratory: Positive for cough.    See above      Objective    /64   Pulse 66   Temp 97.9  F (36.6  C)   Resp 16   Wt 78.1 kg (172 lb 3.2 oz)   SpO2 99%   BMI 27.03 kg/m    Body mass index is 27.03 kg/m .  Physical Exam   GENERAL: healthy, alert and no distress  EYES: Eyes grossly normal to inspection, PERRL and conjunctivae and sclerae normal  HENT: ear canals and TM's normal, nose and mouth without ulcers or lesions  NECK: no adenopathy, no asymmetry, masses, or scars and thyroid normal to palpation  RESP: lungs clear to auscultation - no rales, rhonchi or wheezes.  O2 sats 91% on room air.  CV: regular rate and rhythm, normal S1 S2, no S3 or S4, no murmur, click or rub, no peripheral edema and peripheral pulses strong  PSYCH: mentation appears normal, affect normal/bright    Xray - Reviewed and interpreted by me.  Concerns for nodules and/or infiltrate to right lobe            Answers for HPI/ROS submitted by the patient on 2/24/2022  If you checked off any problems, how difficult have these problems made it for you to do your work, take care of things at home, or get along with other people?: Not difficult at all  PHQ9 TOTAL SCORE: 3

## 2022-03-03 NOTE — PROGRESS NOTES
[facility]    Outpatient Physical Therapy Discharge Note  Patient: Rajesh Huynh  : 1940    Beginning/End Dates of Reporting Period:  2021 to 3/3/2022     Referring Provider: Sera Sands NP    Therapy Diagnosis: Unsteady gait, history of fall     Client Self Report: Patient and his wife both feel physical therapy is been helpful to improve strength and balance.  He remains active at home with housework and yard work. Patient and wife both agree that he is ready to proceed with progression of home program at this time.  Patient's wife reports she will help Douglas with his home exercises.    Objective Measurements:  Objective Measure: Dynamic Gait Index Test  Details:     Lower extremity strength is 5/5 bilaterally with hip flexion, knee extension, knee flexion, ankle dorsiflexion, and ankle plantarflexion    Goals:  Goal Identifier Ambulation   Goal Description Patient will report the ability to ambulate over outdoor surfaces and observe appropriate foot clearance to avoid loss of balance.   Target Date 22   Date Met   3/3/2022     Progress (detail required for progress note): Goal met.  Patient reports he has been focusing on foot clearance when ambulating outdoors.     Goal Identifier Balance   Goal Description Patient will report 75% or greater reduction in feeling of unsteadiness when walking within his home.  He will demonstrate a DGI score of 22/24   Target Date 22   Date Met   3/3/2022    Progress (detail required for progress note): Goal met.  Patient scored 22/24 on the dynamic gait index test.  Patient also reports he feels more steady at home.  Patient's wife is also noted as improvements with balance.     Goal Identifier Strength   Goal Description Patient will demonstrate improved functional strength to perform sit to stand from standard height chair without being  unsteady upon standing.  He will demonstrate 5/5 strength throughout the lower extremities.   Target Date 03/17/22   Date Met   3/30/2022   Progress (detail required for progress note): Goal met.  Patient demonstrates improved ability to stand up from a standard height chair.  At times he does require use of his upper extremities to assist with this task due to low back discomfort.         Plan:  Discharge from therapy.    Discharge:    Reason for Discharge: Patient has met all goals.    Discharge Plan: Patient to continue home program.

## 2022-03-17 NOTE — TELEPHONE ENCOUNTER
Chest CT results were reviewed with primary care provider as well as interventional radiologist.  Interventional radiologist here at [facility] feels that he could do a lung biopsy.  I did discuss the case with patient and his wife.  They would like to try to have the biopsy completed here at [facility] versus traveling to [place].  Order is placed for lung biopsy.  Pulmonology consult is also being placed.  We will follow-up with patient with test results and further recommendations. JOSEPHINE Serrato CNP on 3/17/2022 at 9:41 AM

## 2022-04-01 PROBLEM — R91.8 MASS OF UPPER LOBE OF RIGHT LUNG: Status: ACTIVE | Noted: 2022-01-01

## 2022-04-01 PROBLEM — I11.0 HYPERTENSIVE HEART DISEASE WITH HEART FAILURE (H): Status: ACTIVE | Noted: 2022-01-01

## 2022-04-01 PROBLEM — R91.8 MASS OF RIGHT LUNG: Status: ACTIVE | Noted: 2022-01-01

## 2022-04-01 PROBLEM — R91.1 NODULE OF UPPER LOBE OF RIGHT LUNG: Status: ACTIVE | Noted: 2022-01-01

## 2022-04-01 NOTE — NURSING NOTE
"Chief Complaint   Patient presents with     Cold Symptoms     ongoing for a month        FOOD SECURITY SCREENING QUESTIONS  Hunger Vital Signs:  Within the past 12 months we worried whether our food would run out before we got money to buy more. Never  Within the past 12 months the food we bought just didn't last and we didn't have money to get more. Never  Ale Ham LPN 4/1/2022 9:56 AM      Initial /72 (BP Location: Right arm, Patient Position: Sitting, Cuff Size: Adult Regular)   Pulse 67   Temp 98.2  F (36.8  C) (Tympanic)   Resp 16   Wt 77.1 kg (170 lb)   SpO2 97%   BMI 26.68 kg/m   Estimated body mass index is 26.68 kg/m  as calculated from the following:    Height as of 4/22/21: 1.7 m (5' 6.93\").    Weight as of this encounter: 77.1 kg (170 lb).  Medication Reconciliation: complete    Ale Ham LPN  "

## 2022-04-01 NOTE — PATIENT INSTRUCTIONS
To help with weight loss and improve blood sugar control....    -- Try to avoid Carbohydrates as much as possible -- breads, pasta, baked goods, cakes, oatmeal, cold cereal, potatoes.   -- Eat more lean meats, proteins, eggs, nuts, vegetables.    -- Start or Continue regular daily exercise.     Get out and exercise, bike ride, walk for 10 to 15 minutes after each meal -- this can significantly lowers the spike in blood sugar after eating.       4 skin lesions treated with Cryotherapy today by Dr. Sen.          Return in approximately 8 week(s), or sooner as needed for follow-up with Dr. Sen.  -- Annual Medicare Wellness Visit, + Get Diabetic labs prior to clinic appointment    Clinic : [phone]  Appointment line: [phone]

## 2022-04-01 NOTE — PROGRESS NOTES
Assessment & Plan       ICD-10-CM    1. Simple chronic bronchitis (H)  J41.0    2. Right lower lobe lung mass  R91.8    3. Nodule of upper lobe of right lung  R91.1    4. History of tobacco use quitting in 1/1/2000 at 3 packs a day  Z87.891    5. Hypertensive heart disease with heart failure (H)  I11.0    6. Lewy body dementia without behavioral disturbance (H)  G31.83     F02.80    7. Aortic atherosclerosis (H)  I70.0    8. Controlled type 2 diabetes mellitus without complication, without long-term current use of insulin (H)  E11.9    9. Benign essential hypertension  I10    10. Actinic keratoses x4  L57.0 DESTRUCT PREMALIGNANT LESION, FIRST     DESTRUCT PREMALIGNANT LESION, 2-14   11. Exocrine pancreatic insufficiency - Severe Deficiency  K86.81 amylase-lipase-protease (CREON) [id number] units CPEP   12. Personal history of pancreatic cancer  Z85.07    13. History of pancreatic surgery - Whipple  Z98.890    14. History of throat cancer  Z85.819    Patient presents with his wife for follow-up of multiple issues.    Recently found to have 2 different lung masses in his right lung.  He has a nodule in the right upper lobe as well as a much larger mass in the right lower lobe.  Now scheduled for bronchoscopy and hopefully biopsy of both lesions done in [place].    Reports breathing is otherwise basically at baseline.  Anoro Ellipta inhaler was very expensive so he did not pick this prescription up.  He is still having some phlegm.  Start trial of Spiriva.    Lewy body dementia without behavioral disturbance.  Appears stable.  Wife is with him today.    Hypertensive heart disease with history of heart failure.  Aortic atherosclerosis.  Currently stable.    Type 2 diabetes without complication.  Currently diet controlled.    Hypertension.  Blood pressure is currently well controlled.  Continues on metoprolol, hydrochlorothiazide.  No changes for now.    Recurrent skin lesions.  Has 4 additional actinic keratosis  that he would like evaluated today.  Treatment options reviewed and discussed.  He would like to proceed with cryotherapy.  See below.    Severe exocrine pancreatic insufficiency.  Seems to have ongoing issues.  Still having a lot of diarrhea.  We discussed treatment options.  Has been using 3 tablets of the lower dose Creon daily currently dosed at 24,000-76,000 units.  We will increase this up to 36,000-114,000 units up to 3 capsules daily.  New prescription sent to pharmacy.    Patient denies history of pancreatic cancer and Whipple surgery.  Also has history of throat cancer.    Encouraged regular exercise.     Return in about 8 weeks (around 5/27/2022) for Annual Medicare Wellness Visit, + Get Diabetic labs prior to clinic appointment.    Jovanni Sen MD  [facility]     Nicolette Cole is a 81 year old who presents for the following health issues cold symptoms ongoing for a month     HPI     Review of Systems   Constitutional: Positive for fatigue. Negative for chills and fever.   HENT: Negative for congestion and hearing loss.    Eyes: Negative for visual disturbance.   Respiratory: Positive for cough (mostly clear phlegm ) and shortness of breath. Negative for wheezing.         + Right pulmonary nodule and mass   Cardiovascular: Negative for chest pain and palpitations.   Gastrointestinal: Positive for abdominal pain (+intermittent) and diarrhea (+ much improved with Creon. still intermittent diarrhea). Negative for nausea and vomiting.   Endocrine: Negative for cold intolerance and heat intolerance.        + Eating more carbs, Hyperglycemia   Genitourinary: Negative for dysuria and hematuria.   Musculoskeletal: Negative for arthralgias and myalgias.   Skin: Negative for rash and wound.   Allergic/Immunologic: Negative for immunocompromised state.   Neurological: Positive for dizziness, tremors and light-headedness.        + some balance problems   Hematological: Does not  bruise/bleed easily.   Psychiatric/Behavioral: Positive for confusion. Negative for agitation. The patient is nervous/anxious.         + Memory problems            Objective    /72 (BP Location: Right arm, Patient Position: Sitting, Cuff Size: Adult Regular)   Pulse 67   Temp 98.2  F (36.8  C) (Tympanic)   Resp 16   Wt 77.1 kg (170 lb)   SpO2 97%   BMI 26.68 kg/m    Body mass index is 26.68 kg/m .  Physical Exam  Constitutional:       General: He is not in acute distress.     Appearance: He is well-developed. He is not diaphoretic.   HENT:      Head: Normocephalic and atraumatic.   Eyes:      General: No scleral icterus.     Conjunctiva/sclera: Conjunctivae normal.   Cardiovascular:      Rate and Rhythm: Normal rate and regular rhythm.      Pulses: Normal pulses.   Pulmonary:      Effort: Pulmonary effort is normal.      Breath sounds: Normal breath sounds.   Abdominal:      Palpations: Abdomen is soft.      Tenderness: There is no abdominal tenderness.   Musculoskeletal:         General: No deformity.      Cervical back: Neck supple.      Right lower leg: Edema present.      Left lower leg: Edema present.   Lymphadenopathy:      Cervical: No cervical adenopathy.   Skin:     General: Skin is warm and dry.      Coloration: Skin is not jaundiced.      Findings: Lesion present. No rash.      Comments: actinic keratosis x4 on head/scalp    PROCEDURE:   Reviewed risks and benefits of cryotherapy.After informed consent was obtained, patient elected to proceed. These 4 lesions were treated today.   Performed cryotherapy to #4 lesions x 2 rounds of 30 second freeze/thaw cycles.   Tolerated well. No obvious complications.   Return for signs of infection.    The patient and I reviewed safe sun practices today: the importance of staying out of the sun;especially between 10AM-2PM, wearing sun screen SPF > 30, and protective clothing.   We also discussed the ABC's of skin cancers including: changes in size,  uniformity, borders, coloration, bleeding, or any irregularityor changes. If these occur, seek medical attention.   The patient should have a complete skin exam by a medical professional every 6-12 months, and any questionable/suspicious, or changing lesions should be removed.     Neurological:      Mental Status: He is alert and oriented to person, place, and time. Mental status is at baseline.      Comments: + memory loss   Psychiatric:         Mood and Affect: Mood normal.         Behavior: Behavior normal.

## 2022-04-07 NOTE — TELEPHONE ENCOUNTER
Patient and wife notified this writer that they were able to get in at [facility] for the lung biopsy sooner than they would have been able to get in here at [facility]. Appointments were cancelled.

## 2022-04-08 NOTE — PROGRESS NOTES
Patient here today for Covid test. Procedure in [place] on 4/13     Lety Mcbride CNA .............................on 4/8/2022 at 9:42 AM

## 2022-04-14 PROBLEM — C34.11 MALIGNANT NEOPLASM OF UPPER LOBE OF RIGHT LUNG (H): Chronic | Status: ACTIVE | Noted: 2022-01-01

## 2022-04-14 PROBLEM — C34.31 MALIGNANT NEOPLASM OF LOWER LOBE OF RIGHT LUNG (H): Chronic | Status: ACTIVE | Noted: 2022-01-01

## 2022-04-19 NOTE — TELEPHONE ENCOUNTER
Spoke with Mrs. Huynh on behalf of her  Douglas. They were offered and accepted an appointment for April 22, 2022 for simulation for radiation therapy.

## 2022-04-19 NOTE — PROGRESS NOTES
"  [facility]  DEPARTMENT OF RADIATION ONCOLOGY  CONSULTATION NOTE    Name: Rajesh Huynh MRN: [id number]   : 1940 (81 year old)  Date of Service: 2022 Referring: Dr. Chambers and Dr. 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  Delivery method: Intensity modulated radiation therapy (IMRT) with static gantry angles.   Energy: 6 MV.  Dose:  25 fractions of 200 cGy each for 5000 cGy.  Dates: 2012-2012 (Elapsed days: 32).    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 Right upper lobe TTF-1 positive moderately to poorly differentiated squamous cell carcinoma measuring 1.3-cm (cT1b).. Nodes: N0 per PET-CT. Metastasis: M0 per PET-CT. Markers: Opposing TTF-1 staining as above). Based upon the different TTF-1 staining profile, note that elected to designate the patient as having 2 synchronous ipsilateral " lung cancers rather than a single T4 cancer (metastasis to a separate ipsilateral lobe). Diagnostic biopsy 2022. 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) treated with surgery and radiation therapy as above (no chemotherapy),  TURBT for non-invasive bladder cancer (low-grade papillary urothelial carcinoma without muscle invasion),  local excision for left ear basal cell carcinoma (positive margin being observed), chronic occasional cough since the  epiglottic surgery, bilateral shoulder replacements (good range of motion but caution for positioning for radiation therapy), Lewy body dementia (signs medical sent; does not drive), mild extremity tremors, chronic myeloproliferative disorder (carries the diagnosis stomach no transfusion), peptic ulcer disease, gastroesophageal reflux disease, 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   2022 PET-CT    (Please note that EMR interfaces between institutions can result in loss of embedded images.)    History of Present Illness (CE)   The patient's oncologic history across multiple institutions is abstracted as follows:    Diagnosis/Stagin Pancreatic benign disease (no pancreatic cancer)    2004 Whipple procedure ([facility]): Procedure recommended for pancreatic duct stone and pancreatitis, and possible pancreatic cancer. Operative report described an uncomplicated procedure. Pathology yielded marked chronic pancreatitis, pancreatic duct calculus (2-cm), benign pancreas pathology, and multiple benign nodes (0/3). No carcinoma.    Patient requires supplemental pancreatic enzymes (Creon) but  has a broad normal diet. 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 Left glottis cancer did not clinically extend into the base of tongue or vocal cords. No clinically fixed nodes. Epiglottic specimen yielded a 1.8-cm invasive moderately differentiated squamous cell carcinoma (HPV negative) with lymphovascular invasion and negative surgical margins. Left neck specimen (levels II-V) yielded 3/32 positive nodes with up to 0.5-cm of disease across 3 levels. No extranodal extension. 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    Patient acknowledges slight intolerance of liquids at times without paulo aspiration. Denies xerostomia or poor phonation as of 04/19/2022.  2014 Bladder cancer (non-muscle invasive)    11/10/2014 TURBT ([facility]): Presented with pelvic pain and microscopic hematuria without visible hematuria. Procedure yielded non-invasive low-grade papillary urothelial carcinoma without muscle invasion. Designated as [id number] disease.    10/14/2021 Yearly surveillance cystoscopy: Remained not suspicious for recurrent disease.    Patient requires tamsulosin for lower urinary tract symptoms. Denies visible hematuria as of 04/19/2022.  2021 Left ear basal cell carcinoma (no additional planned surgery )    01/11/2021 Diagnostic brain MRI with contrast. Obtained for cognitive and memory issues. Since 2017, progressive generalized volume loss with mild chronic microvascular changes without lesion suspicious for metastatic disease.    12/31/2021 Wide local excision of the the left ear: Lesion of the posterior ear managed initially with cryotherapy. Remained ulcerated. Excision of basal cell  carcinoma with positive margin. Surgical service continue to follow and recommended against additional resection at that time.    Patient wishes to continue surveillance as of 04/19/2022.  2022 Right upper lobe squamous cell carcinoma (TTF-1 positive) and synchronous right lower lobe squamous cell carcinoma (TTF-1 negative)    01/26/2022 Medicine evaluation: Presented with several weeks of cough and sinus congestion. Subsequently persisted despite antibiotic therapies.    02/24/2022 Diagnostic chest x-ray: Obtained for cough. Since 2018, interval development of a right lung base mass and a right upper lung field mass.    03/03/2022 Diagnostic chest CT with contrast: Right lower lobe solid mass measuring 7-cm with no significant necrosis or loculation. Right upper lobe mass measuring 1-cm. No suspicious hilar or mediastinal nodes.    03/24/2022 Interventional pulmonary consultation: Recommended PET-CT for staging and robot-assisted navigational bronchoscopy with endoscopic ultrasound and biopsies for diagnostic purposes.    04/06/2022 PET-CT (exported): Index mass is a right lower lobe intensely hypermetabolic mass with apparent postobstructive change and necrosis and worsening aeration of the lobe since the CT on 03/03/2022. Second mass is a right upper lobe moderately hypermetabolic lesion measuring 1.3-cm. Possible right middle lobe mass measuring less than 1 cm and faintly hypermetabolic. No suspicious regional crescencio or distant metastatic disease. Small right pleural effusion.    04/13/2022 Navigational bronchoscopy with endoscopic ultrasound and endobronchial biopsies: Operative report described an uncomplicated robotic-assisted procedure. Images demonstrate endobronchial involvement by the right lower lobe tumor. Biopsy of the right lower lobe specimen yielded TTF-1 negative moderately-differentiated squamous cell carcinoma. Biopsy of the right upper lobe yielded TTF-1 positive moderately to poorly  "differentiated squamous cell carcinoma. Preliminary consensus recommendation of concurrent chemoradiation followed by adjuvant durvalumab for the index right lower lobe mass and SBRT alone for the much smaller right upper lobe mass.   Pending:    Staging brain MRI for stage IIIA lung cancer (wife arranging study at [facility]).    [place] [facility] reevaluation.    05/02/2022 Medical oncology consultation.    06/24/2022 Neurology routine follow-up for Lewy body disease and symptomatic complaints of mild memory loss, visual change, imbalance, tremors, etc.    We reviewed other conditions that can influence the choice of therapy and its morbidity. Despite his numerous comorbidities, the patient feels in excellent general health. His principal complaints are neurologic/visual deficits that the patient and his wife attribute to Lewy body disease. They include mild memory loss, cognitive deficits, dizziness/unsteadiness, and a spectrum of visual symptoms (e.g., diminished acuity of smaller letters, seemingly random visualization of unspecified objects that the patient loosely refers to as, \"hallucinations\"; the patient has no history of a seizure disorder). He often sleeps during much of the day and then becomes very active late at night. His secondary complaint is occasional bouts of coughing with minimal production of clear sputum, rare slight hemoptysis, and occasional associated focal pleuritic pain of the right lower posterior chest wall (radiographically correlates to an area of apparent pleural tethering by the lower lobe disease). He denies fevers, chills, malaise, or expulsion of purulent material (the lower lobe lesion appears loculated nor necrotic). 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 He denies a history of collagen vascular diseases or chemotherapy. We counseled the patient about COVID-19 risks, precautions, and potential impact on his " radiation therapy. He denied recent known exposure to COVID-19, risky behaviors, or related symptoms including febrile, chest, gustatory, gastrointestinal, and systemic complaints.    Chemotherapy History: None.  Radiation History: Yes (as above).  Implanted Cardiac Devices: None.  Implanted Chest Wall Infusion Port: None.    Past Medical History:   Diagnosis Date     Acute myocardial infarction, unspecified (H) 07/05/2013     Bicipital tenosynovitis 07/05/2013     CAD (coronary artery disease)      Chronic myeloproliferative disease (H)     questionable     Diabetes mellitus, type 2 (H)      Dysphagia (partial resection of epiglottis) 07/05/2013     Essential (primary) hypertension 01/12/2004    unspecified     Glucose intolerance 11/14/2003     Hiccough 07/05/2013     Hyperlipidemia     No Comments Provided     Lewy body dementia (H)      Male erectile dysfunction     No Comments Provided     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)     4/22/2015     Malignant neoplasm of lower lobe of right lung (H) 04/14/2022     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,Radiation     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     Peptic ulcer disease 11/10/2003     Pneumonia     2001     Resolved: chronic pancreatitis (has undergone Whipple procedure)     No Comments Provided     Resolved: Pancreatitis 01/12/2004     Resolved: Personal history of nicotine dependence     2ppd - quit 2001     Resolved: Thoracic back pain 04/08/2010    sharp pain with thoracic movements; s/p 9 visits of PT     Resovled: Pancreatic duct stones 01/12/2004     ST elevation (STEMI) myocardial infarction (H)     7/5/2013,Integrity BMS to RCA done at [facility]     Subscapularis (muscle) sprain 07/05/2013     Tremor     No Comments Provided     Past Surgical History:   Procedure Laterality Date     APPENDECTOMY OPEN      1970     ARTHROSCOPY SHOULDER      11/2005,right shoulder     ARTHROSCOPY SHOULDER      1/2006,left shoulder      ARTHROSCOPY SHOULDER      2011,Left shoulder scope SAD, ac.  Open subscap, biceps     BRONCHOSCOPY  04/13/2022    bronchoscopy with endobronchial biopsies     CHOLECYSTECTOMY      1994     COLONOSCOPY  02/01/2005 02/2005     COLONOSCOPY  02/19/2015 2/19/2015,no repeat due at this time     CYSTOSCOPY      Multiple times,Dr. Lyndon FRANK     FINE NEEDLE ASPIRATION  04/13/2022    transbronchial needle aspiration and biopsy     HEART CATH, ANGIOPLASTY      07/05/2013,Bare-metal stent to dominant RCA     OTHER SURGICAL HISTORY      2004,019511,OTHER,for chronic pancreatitis     OTHER SURGICAL HISTORY      6/22/2007,[id number],SHOULDER REPLACEMENT,Right,with biceps tenodesis by Dr. Mukesh Ayoub     OTHER SURGICAL HISTORY      1995/2002,,ERCP,xseveral, one with papillotomy     OTHER SURGICAL HISTORY      1/30/2012-3/2/2012,[id number],RADIATION TREATMENT     OTHER SURGICAL HISTORY      12/7/2011,[id number],NECK/THORAX PROCEDURE,L modified radical neck surgery     OTHER SURGICAL HISTORY      11/10/14,[id number],OTHER,Dr. Lyndon FRANK     OTHER SURGICAL HISTORY      2012,04732,REVISION EYELID,Dr. Farias     OTHER SURGICAL HISTORY      10/22/15,[id number],OTHER     TONSILLECTOMY, ADENOIDECTOMY, COMBINED      1947     VASECTOMY      1980     Outpatient Encounter Medications as of 4/19/2022   Medication Sig Dispense Refill     amylase-lipase-protease (CREON) [id number] units CPEP Take 1-3 capsules by mouth 4 times daily (with meals and nightly) 360 capsule 11     atorvastatin (LIPITOR) 40 MG tablet Take 1 tablet (40 mg) by mouth daily (with dinner) 90 tablet 3     blood glucose (NO BRAND SPECIFIED) lancets standard Use to test blood sugar 2 times daily  E11.9 200 each 3     blood glucose (NO BRAND SPECIFIED) test strip Use to test blood sugar 2 times daily  E11.9 200 strip 11     Blood Glucose Monitoring Suppl (FIFTY50 GLUCOSE METER 2.0) W/DEVICE KIT Dispense Accu-Chek Agnes  Meter. Dispense item covered by pt ins. E11.9  NIDDM type II - Test 1 time/day       calcium carbonate-vitamin D 600-200 MG-UNIT TABS Take 1 tablet by mouth 2 times daily (with meals)       cholecalciferol 50 MCG (2000 UT) CAPS Take 4,000 Units by mouth       DOCOSAHEXAENOIC ACID PO Take 1 capsule by mouth 2 times daily       levothyroxine (SYNTHROID/LEVOTHROID) 50 MCG tablet Take 1 tablet (50 mcg) by mouth daily -- Take in AM on empty stomach 90 tablet 1     loperamide (IMODIUM A-D) 2 MG tablet Take 1 tablet (2 mg) by mouth 4 times daily as needed for diarrhea 120 tablet 11     magnesium 200 MG TABS Take 200 mg by mouth       metFORMIN (GLUCOPHAGE) 1000 MG tablet Take 1 tablet (1,000 mg) by mouth 2 times daily (with meals) For diabetes prevention 180 tablet 3     Multiple Vitamin (MULTI-VITAMINS) TABS Take 1 tablet by mouth daily       nystatin (NYSTOP) 915796 UNIT/GM external powder Apply topically 2 times daily as needed For skin fold rash 180 g 4     omeprazole (PRILOSEC) 20 MG DR capsule Take 1 capsule (20 mg) by mouth daily Take 30 to 60 minutes prior to a meal 90 capsule 3     potassium 99 MG TABS Take 1 tablet by mouth       primidone (MYSOLINE) 50 MG tablet Take 2 tablets (100 mg) by mouth At Bedtime 180 tablet 3     propranolol (INDERAL) 20 MG tablet Take 1 tablet (20 mg) by mouth 3 times daily (Patient taking differently: Take 20 mg by mouth 2 times daily 1 tab in AM and two tab in PM) 270 tablet 3     rivastigmine (EXELON) 3 MG capsule Take 3 mg by mouth       tamsulosin (FLOMAX) 0.4 MG capsule Take 1 capsule (0.4 mg) by mouth every evening 90 capsule 3     tiotropium (SPIRIVA) 18 MCG inhaled capsule Inhale 1 capsule (18 mcg) into the lungs daily - for chronic phlegm/Bronchitis 90 capsule 3     albuterol (PROAIR HFA/PROVENTIL HFA/VENTOLIN HFA) 108 (90 Base) MCG/ACT inhaler Inhale 2 puffs into the lungs every 4 hours as needed for shortness of breath / dyspnea or wheezing (Patient not taking: Reported on 4/19/2022) 18 g 1     nitroGLYcerin (NITROSTAT)  0.4 MG sublingual tablet DISSOLVE ONE TABLET UNDER THE TONGUE EVERY 5 MINUTES AS NEEDED FOR CHEST PAIN.  DO NOT EXCEED A TOTAL OF 3 DOSES IN 15 MINUTES (Patient not taking: Reported on 2022) 25 tablet 0     [DISCONTINUED] aspirin EC 81 MG EC tablet Take 81 mg by mouth daily with food       No facility-administered encounter medications on file as of 2022.     Allergies/Reactions: Other drug allergy (see comments)    Orders via Epic EMR: No orders of the defined types were placed in this encounter.    Social History     Social History Narrative    Douglas, is  to Ruth.  They have two grown children.  He is currently retired.  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  He enjoys deer hunting(quit due to shoulders).       Socioeconomic History     Marital status:      Spouse name: Ruth     Number of children: 2     Years of education: high school graduate   Occupational History     Occupation: retired: from [employer]     Employer: [employer]     Comment: worked on assembly line then later as the Supervisor     Social History     Tobacco Use     Smoking status: Former Smoker     Packs/day: 3.00     Years: 43.00     Pack years: 129.00     Types: Cigarettes     Quit date: 2001     Years since quittin.3     Smokeless tobacco: Never Used   Vaping Use     Vaping Use: Never used   Substance Use Topics     Alcohol use: Not Currently     Drug use: No     Family History   Problem Relation Age of Onset     Breast Cancer Mother      Heart Disease Mother 80        Heart Disease,MI     Heart Disease Father 83        Heart Disease,CHF     Lung Cancer Father      Family History Negative Sister         Good Health     Heart Disease Sister         Heart Disease,pacemaker     Breast Cancer Daughter         Cancer-breast     Diabetes Son         Diabetes,Type 1     Alcoholism Son      Prostate Cancer No family hx of         Cancer-prostate   No other  "cancers in first or second degree relatives.    Baseline Review of Systems (prior to radiation therapy; supplemental to the History)   ROS (score of 0 indicates that symptom is at baseline for most sections below): See Flowsheet for additional comments as indicated.  No Pain (0)    Review of Systems   Constitutional: Negative for activity change, fatigue, fever and unexpected weight change.   HENT: Positive for hearing loss (bilateral hearing aids) and trouble swallowing. Negative for sore throat and voice change.    Eyes: Positive for visual disturbance. Negative for photophobia.   Respiratory: Positive for cough. Negative for shortness of breath.    Gastrointestinal: Negative for abdominal pain, blood in stool, diarrhea, nausea and vomiting.   Genitourinary: Negative for difficulty urinating and hematuria.   Musculoskeletal: Negative for back pain, gait problem, myalgias and neck pain.   Skin: Negative for wound.   Neurological: Positive for dizziness (chronic; no falls; does not require gait assistance) and tremors. Negative for weakness and headaches.   Hematological: Negative for adenopathy.   Psychiatric/Behavioral: Positive for confusion, decreased concentration and hallucinations (references visual images/shapes as \"halluciinations\" (not hallucinations per se)). Negative for agitation.     Patient Health Questionnaire-9 (PHQ-9)     PHQ 4/23/2020 8/5/2020 2/24/2022   PHQ-9 Total Score 0 0 3   Q9: Thoughts of better off dead/self-harm past 2 weeks Not at all Not at all Not at all     Physical Exam   KPS: 70 (cares for self but unable to carry on normal activity or do active work) due to cognitive deficits but remains easily functionally independent and has a good quality of life.  ECOG Status: 2 (Ambulatory and capable of all selfcare but unable to carry out any work activities; may need help with IADLs up and about > 50% of waking hours)  Vitals: Ht 1.702 m (5' 7\")   Wt 76.9 kg (169 lb 8 oz)   BMI 26.55 " kg/m    Wt Readings from Last 3 Encounters:   04/19/22 76.9 kg (169 lb 8 oz)   04/01/22 77.1 kg (170 lb)   02/24/22 78.1 kg (172 lb 3.2 oz)     Clinical Examination:  General: Upbeat, bright, dynamic, engaging, jovial. Initiates conversation and lines of thought. Appears clinically/medically stable. Appears very fit and in good general health. Overweight (BMI 25-29). Appears rested. Appears stated age. Appears well-developed, well-nourished, and in no acute distress. Does not appear acutely or chronically ill. Appears well groomed.  Head: No alopecia. Normocephalic.  Eyes: Glasses. Anicteric, vision intact.  ENT: Bilateral hearing aids. No coughing. No aspiration. No clearing of throat. Good volume. No hoarseness.  Neck: Well-healed left neck surgical scar. No crepitus. Soft, supple, symmetric, trachea midline.  Lymphatics: No parotid, periparotid, cervical, or supraclavicular adenopathy.  Chest/Breasts: No port. No implanted cardiac device.  Lungs: Easy respirations, no use of accessory muscles. Clear to auscultation  Cardiovascular: No jugulovenous distension. No carotid pulsations. RRR, S1, S2.  Abdomen: Nondistended, nontender. Soft. Bowel sounds positive.  Pelvis: Nondistended, nontender. Soft. Bowel sounds positive.  MSK: Muscular build. Shoulder range of motion is good. No arm edema or hand edema. Good muscle tone. No tenderness on palpation. Good posture. No cords or calf tenderness.  Integument: No jaundice or pallor.   Neurologic: Right-handed. Mild intention tremors. Alert, oriented, fluent. Cognitive deficits are subtle (neurology is counseled the patient to not drive). Memory intact. No errors in recall or distant memory. Briefly differential to wife for a couple of answers. Wife cautiously watch the patient given answer but did not have to correct errors of omission or erroneous information. Patient asked insightful questions and follow-up questions. EOMI. Motor intact. Sensory intact. No  ataxia.  Psychologic: Well-spoken about his cancer and therapy. Good dynamic with his wife. Upbeat, relaxed, confident, engaging, and motivated. Pleasant, cooperative, insightful, and concrete. Good judgment, logical thinking, good thought process, concrete, abstracts. Did not require redirection for repeating of questions. Not tangential.    Physician Time on Day of Encounter, and MDM Data Reviewed and Analyzed on Day of Encounter   Time spent on patient activities is based on Chart review, Visit, and Documentation. Total time: 267 minutes.    MDM based on:       High risk element:  o Synchronous cancers of the right lung. Pending right lung SBRT. Pending right lung concurrent chemoradiation. Previous head neck radiation therapy adjacent to the proposed field of right lung radiation therapy.       Tests, documents, or independent historian(s) analyses include but are not limited to:  o Those referenced above in the HPI.       Independent Interpretations of tests include but are not limited to:  o Those referenced above in the HPI.  o Ordered CT-based simulation.  o Ordered brain MRI with contrast.    Physician Information Review   I reviewed the case with the patient and his wife, evaluated him, and discussed his treatment options and risks of therapy. I reviewed the medical records, radiographic reports, and pathologic studies. I reviewed the imaging studies via PACS and with the patient and his wife. I reviewed our intake sheets. I reviewed the case with pulmonary medicine and medical oncology. The patient and his wife are good historians, began the visit with good insights into the disease process, and acknowledged the potentially poor consequences of his disease. The wife has educated herself through through a variety of educational media including the Internet. They demonstrated good comprehension of our discussion and explored the treatment options with good understanding. They asked pertinent questions and  insightful follow-up questions. I illustrated the basic anatomy and field of treatment. We discussed the onsite and telemedicine coverage of our clinic. I offered to speak with his family members and friends today by speakerphone. The patient declined my offer but granted me permission to speak with them if they contact me or come to clinic. They declined referral for an additional opinion or conservative care. They accepted referral to our social work staff for additional resources including potential counseling. The patient granted me permission to exchange medical information and records with other healthcare professionals. No therapeutic radiation protocols for the patient's disease are available at our Center.. The patient was ready to learn and demonstrated no learning barriers. His learning preferences included listening. He was given ample opportunity to ask questions, and all questions were answered to his satisfaction. We reviewed educational materials including Internet resources and provided the patient with written materials. The patient and his wife confirm that the patient signed his own medical consent including his recent surgical consent. They feel that he has sufficient capacity and judgment to continue to provide consent for medical therapy.    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 We reviewed the fact that the diagnosis of 2 early stage, node-negative cancer is likely more favorable than the diagnosis of a single locally advanced cancer with an ipsilateral pulmonary metastasis. We reviewed the regional anatomy, stage designation, and risk-group. We reviewed patterns of failure after various local and systemic treatments. We reviewed his relatively good prognosis with multimodality treatment. We discussed factors specific to his disease including his prior head & neck radiation  therapy, prior cancer of the upper aerodigestive tract, prior bladder cancer, neurologic disease, cognitive and visual complaints, personal circumstances, comorbidities, and other factors potentially impacting the risk of toxicity and clinical outcomes. We reviewed the concept of multimodality care and the relative contribution of each to the paradigm of treating cancer. We discussed the use of radiation therapy  as the principal local therapeutic modality and omission of surgery for upfront local therapy. We discussed limitations of radiographic imaging in identifying the extent of disease. We discussed the potentially beneficial role of radiation therapy in the context of evolving standards and national guidelines of oncologic care such as the NCCN, ACR, and MADHU as well as management during the COVID-19 pandemic.    We discussed the concept external beam radiation therapy via stereotactic body radiotherapy (SBRT) vs. conventionally delivered, non-stereotactic techniques. We discussed the nature of external beam radiation therapy from a Varian TrueBeam linac, concept of CT-based simulation, role of computerized treatment planning, and potential interventions to reduce the toxicity of radiation while improving the efficacy of treatment for the two lung cancers after prior head & neck radiation therapy whose field likely ended just above the anticipated upper border of treatment of the right upper lobe lung cancer (SBRT). For both cancers, we would likley use image-guidance of either 3D or VMAT beam arrangements. Together, these techniques permit good coverage of complex target tissues (primary site with or without limbic tip coverage of regional nodes) while maintaining adequate sparing of normal critical structures (brachial plexus, lung, heart, spinal cord, esophagus,, etc.). For motion management, we would use 4D-CT simulation and perhaps deep-inspiration breath hold (DIBH) for incorporation of respiratory  motion control to assess the impact of pulmonary and cardiac movement on radiation therapy. I would not use protons, TomoTherapy, brachytherapy, or radioprotectant agents. I do not feel that these methods offer a clear benefit for this patient. The patient appears to have no absolute contraindications to radiation therapy.    We discussed the relative risks, benefits, rationale, potential side effects, alternatives, and adjuncts to radiation therapy for either synchronous right lung cancers or a single lung cancer with an ipsilateral lung metastasis. The toxicities can be acute or chronic, and can negatively impact long-term quality of life. They can require high levels of supportive care and breaks in radiation therapy. I anticipate at least a mild degree of acute esophageal toxicity. Among the factors will increase the risk of toxicity are the co-morbidities and circumstances noted above. The toxicities include but are not limited to the lung (dyspnea, oxygen dependence, cough, pneumonitis, pleuritis, pleural effusion, etc.), esophagus (pain, odynophagia, dysmotility, perforation, fistula, etc.), primary airways (bronchial necrosis, fistula, obstruction, etc.), great vessels (catastrophic hemorrhage, aneurysm, obstruction, etc.), heart (infarction, pericarditis, pericardial effusion, etc.), chest wall (soft tissue and bone necrosis, etc.), skin (poor cosmesis, erythema, hyperpigmentation, desquamation, breakdown, fibrosis, pruritus, etc.), hematopoietic system (anemia, thrombocytopenia, neutropenia, etc.), nerves (brachial plexus weakness, paresthesia, pain, etc.), spinal cord (myelitis, weakness, paralysis, sensory loss, pain, bowel or bladder incontinence, etc.), vasculature (hemorrhage, fistula, obstruction, etc.), soft tissue necrosis, bone necrosis, pain, infection, and other organs as well as systemic toxicities (e.g., fatigue) and long-term risks such as second malignancy. We spoke about the potential  general health and oncologic visits of continued physical activity and exercise as well as healthful dietary changes.    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 I have classify the patient as having two synchronous ipsilateral lung cancers in different lobe by virtue of the differential TTF-1 staining. Despite his outwardly healthy appearance, patient seems to be a suboptimal candidate for surgery due to his multiple comorbidities, large volume of the lower lobe disease, and apparent extension to the pleura (radiographic and symptomatic). For the right upper lobe lesion measuring 1.3-cm, I recommend definitive treatment with stereotactic body radiotherapy (SBRT). For the centrally-located right lower lobe lesion that measures at least 7-cm and extends to the pleura, I recommend concurrent chemoradiation with a platinum-based doublet regimen. The radiation treatment field for this larger lesion will extend to the hilum and include only a small portion of the adjacent mediastinum. A final recommendation will be rendered after the patient's pending consultation with medical oncology. Due to the progressive enlargement and possible increasing airway obstruction of the lower lobe mass, we will proceed with an expedited CT-based prior to the medical oncology consultation. The patient and his wife wish to proceed as recommended and noted below. We therefore obtained written consent (the patient and his wife confirmed that he signs medical  "consents including his recent lung surgery). We answered all questions to their satisfaction. Thank for allowing us to participate in the care of this very pleasant patient.    Physician Radiation Therapy Plan   1) Brain MRI with contrast: Radiology is working with the wife to quickly schedule the study for staging of lung cancer in the background of chronic degenerative neurologic disease (Lewy body disease, visual acuity change, etc.).  2) Ophthalmologic follow-up: Follow-up evaluation scheduled for 04/21/2022 at the [facility] for chronic vision issues (e.g., \"hallucinations\") and perhaps recent acuity changes for close vision.  3) Radiation therapy simulation: We will contact the patient to schedule CT-based simulation (anticipate this week).  4) Medical oncology consultation: Visit scheduled for 05/02/2022. Anticipate coordinating with the service the delivery of concurrent cytotoxic chemotherapy (platinum-based doublet) with supportive care and labs as indicated.  5) Radiation therapy treatment: Sequencing of radiation therapy for the right upper lobe cancer and the right lower lobe cancer is to be determined. SBRT to the right upper lobe cancer will likely be completed in 1-3 fractions. Concurrent chemoradiation for the right lower lobe cancer will likely consist of standard fractionated radiation therapy delivered in 30-33 fractions of 200 cGy each to a total dose of 9057-9391 cGy  6) Neurology follow-up: Routine visit scheduled for 06/24/2022 for management of his chronic neurologic and vision conditions.  7) COVID-19: Discussed the potential impact of the pandemic on his treatment and our ability to deliver therapy. Discussed current strategies and approaches currently being used in our Department to treat positive, negative, and suspected patients regarding COVID-19.      Faisal Marie MD, PhD  Department of Radiation Oncology  Tel: [phone]  Fax: [phone]    Care Team:  Miguel Chambers D.O. " (interventional pulmonary medicine)  Mukesh Olivera M.D., Ph.D. (medical oncology)  DEEPA Yates M.D. (ophthalmology)  Ag Doan M.D. (surgery)  Beronica Sands N.P. (neurology)  Jovanni Sen M.D. (medicine)

## 2022-04-19 NOTE — PROGRESS NOTES
"Douglas presents today with his wife, Essie, of 58 years.  They are a very pleasant and talkative couple.  They moved to [place] from [place] in 1997.  They vacationed here many times before moving here.  They have two children, three grandchildren, and four great grandchildren.  The grandchildren came to visit them from Friday and left this morning. He worked for [employer] for 37 years and retired at age 56 and his wife is retired from the postal service.  He reports he has had a cough \"for a long time\" and takes cough syrup and lozenges to manage.  He also informed us that he stopped taking Asprin 1 week ago per Dr. Chambers. Quit smoking 2001 after smoking 3 packs per day for 43 years.  "

## 2022-04-19 NOTE — TELEPHONE ENCOUNTER
Called to schedule appointment for patient for MRI Brain w/o & w contrast; indications:  non-small cell lung cancer; staging for untreated stage IIIA/IIIB NSCLC.  Awaiting prior authorization approval for secondary [facility] insurance in order to schedule.  [facility], will call patient to schedule once approved.  Will let patient know about the process for appointment.

## 2022-04-22 PROBLEM — C44.219 BASAL CELL CARCINOMA OF LEFT EAR: Status: RESOLVED | Noted: 2021-01-01 | Resolved: 2022-01-01

## 2022-04-22 NOTE — TELEPHONE ENCOUNTER
Patient's spouse called back regarding rescheduling their simulation appointment. I informed her that our machine currently needs maintenance so we shall give her a call back when that is completed and we can schedule Douglas again.     Keven MONTGOMERY)  Department of Radiation Oncology

## 2022-04-27 NOTE — PROGRESS NOTES
[facility]  DEPARTMENT OF RADIATION ONCOLOGY   SIMULATION NOTE    Name: Rajesh Huynh MRN: [id number]   : 1940 (81 year old)  Date of Service: 2022        Diagnosis and Cancer Staging  Malignant neoplasm of lower lobe of right lung (H)  Staging form: Lung, AJCC 8th Edition  - Clinical stage from 2022: Stage IIIA (cT4, cN0, cM0) - Signed by Faisal Marie MD on 2022    Malignant neoplasm of upper lobe of right lung (H)  Staging form: Lung, AJCC 8th Edition  - Clinical stage from 2022: Stage IA2 (cT1b, cN0, cM0) - Signed by Faisal Marie MD on 2022       ADDENDUM 2022 (9:47 AM): Received notice that the patient's wife had canceled her 's consultation with medical oncology at [facility] this coming Monday, 2022, and was interested in receiving chemotherapy at [facility]. I therefore telephoned the patient and his wife at their home to discuss coordination of care. Reviewed yesterday's discussion with her staff. Informed them of the degree of coordination of care that had already occurred with the [facility] thoracic oncology team. They were appreciative of our discussion and strongly wished see medical oncology at [facility] as originally scheduled this coming Monday. The wife will immediately contact the service with the hope that the appointment is still available. I will contact the service as well to notify them of the updated request to retain the appointment. I answered all questions to their satisfaction.    Procedure   Virtual video visit ([facility] [employer]) assisted by patient and staff. For non-SBRT treatment, the patient comes to clinic for simulation and radiation therapy for treatment as specified on the written consent (site of treatment, type of cancer). Therapy, Treatment Planning, and I reviewed the anticipated radiation therapy, clinical history and documentation, and radiographic information and images for treatment of the lower lobe cancer with  concurrent chemoradiation (stereotactic body radiotherapy (SBRT) of the right upper lobe will be deferred until completion of treatment of the lower lobe). The patient appeared well and in no acute distress. KPS 80.     We verified written consent for treatment. With the patient, we verified their identification, site, and side of treatment. We evaluated multiple setup positions and elected to simulate the patient in a modified supine position. We used orthogonal lasers to align them with the CT simulator. We immobilized the patient with a customized torso mold and accessories to improve the reproducibility and safety for daily radiation therapy. We placed radiopaque markers to assist in identifying topographical landmarks for simulation. We obtained  and axial CT imaging through the target region. We used virtual simulation techniques to verify the adequacy of the CT images and to create a preliminary setup isocenter. Motion management was not utilized. We placed tattoos to jose luis the setup isocenter. The patient tolerated the procedure well and without complications.     We will use available diagnostic and radiation therapy imaging studies for CT-based treatment planning with image fusion as indicated. I anticipate utilizing a form of intensity-modulated or 3D-conformal radiation therapy to develop a computerized treatment plan whose dosimetric analysis (e.g., dose-volume histogram (DVH)) indicates adequate coverage of target tissues and sparing of nearby normal structures. We will complete routine QA procedures. The patient wished to proceed as recommended. We answered all questions to his satisfaction.    Special Treatment Procedure/Special Physics Consult   Special Treatment Procedure is based on delivery of radiation therapy that will require additional time, effort, and resources due to administration of cytotoxic chemotherapy during radiation therapy (concurrent chemoradiation) plus supportive care and  labs.    Special Physics Consult request is based on the need for formal assessment due to prior radiation therapy (head & neck) near the pending field of treatment (thorax).      Faisal Marie M.D., Ph.D.  Department of Radiation Oncology  Tel: [phone]  Fax: [phone]

## 2022-04-28 NOTE — TELEPHONE ENCOUNTER
Douglas's wife, Rae called to request that the patient have his chemo therapy at [facility] with Dr. Xie since he will be having his radiation therapy here.  She let me know that she cancelled the appointment with Dr. Olivera for Douglas on, May 2nd. Notified Dr. Marie regarding request, Dr. Marie, will contact patient.

## 2022-05-03 NOTE — PROGRESS NOTES
Radiation Oncology - Clinic Note (4:35 PM)      I reviewed the patient's brain MRI images and report. I notified the patient and his wife by phone that the study was not clinically suspicious for brain metastases. They expressed tremendous appreciation with the time and attention that medical oncology spent with them and particularly with his level of preparation prior to the visit. Port placement is pending through [facility]. Once completed, medical oncology and my service will coordinate the start of concurrent chemoradiation with carboplatin and paclitaxel for treatment of the right lower lobe lung cancer. After completion, adjuvant therapy with nivolumab is anticipated. We will also likely treat the right upper lobe lung cancer with SBRT. They wish to proceed as recommended. I answered all questions to their satisfaction.      Faisal Marie M.D., Ph.D.  Department of Radiation Oncology  Tel: [phone]  Fax: [phone]

## 2022-05-04 NOTE — TELEPHONE ENCOUNTER
Spoke with pt and wife to schedule Radiation appts to start Wed 5/11/22 at 8:45am. Pt confirmed that chemo is to follow.  Priyanka MONTGOMERY)

## 2022-05-04 NOTE — TELEPHONE ENCOUNTER
Message left for [facility] to coordinate dates when patient can start Chemo/Radiation, awating callback.

## 2022-05-04 NOTE — TELEPHONE ENCOUNTER
Spoke with Adrienne at [facility]: Oncology regarding coordination of scheduling chemo/radiation.  Tentative consult date with, Dr. Jackson, May 10th @ 1500; Radiation treatment at [facility] at 8:45 a.m.; then oncology appointment at [facility] for IV chemo infusion at 9:15 a.m..  Adrienne, will call patient to set up [facility]'s appointments. Radiation Therapist aware of appointment to be scheduled and will call patient.

## 2022-05-12 NOTE — PROGRESS NOTES
"  Radiation Oncology - Clinic Note (8:26 AM)      Telephoned the patient and his wife at home as a follow-up check.    Reported that he has experienced a \"scratchy throat\" for 3 consecutive nights. Denied fevers or chills. Counseled them that the symptom is not due to radiation and recommended that they contact their primary care physician for guidance.     Offhandedly disclosed that the patient experienced mild hiccups yesterday afternoon and at night. Gradually improved. Were not severe or bothersome. Counseled them that the symptoms is not typically associated with radiation therapy to the L1 region. Nevertheless, the symptom might be caused by the treatment since the field extends to the T12 region near the  diaphragm. Hiccups are more commonly due to involvement of the diaphragm by disease (the disease appears to extend to the diaphragm). Recommended observation since the symptom is not particularly bothersome and pharmacological interventions (e.g., Thorazine) can have significant toxicity at times.  Counseled the the patient and his wife to contact me if symptoms worsen or new ones develop. They wished to proceed as recommended and to continue chemoradiation. I answered all questions to their satisfaction.      aFisal Marie M.D., Ph.D.  Department of Radiation Oncology  Tel: [phone]  Fax: [phone]  "

## 2022-05-18 NOTE — PROGRESS NOTES
"[facility]  DEPARTMENT OF RADIATION ONCOLOGY  ON TREATMENT VISIT (OTV) NOTE    Name: Rajesh Huynh MRN: [id number]   : 1940 (82 year old)  Date of Service: May 18, 2022 Referring: Dr. Chambers and Dr. Olivera        Diagnosis and Cancer Staging  Malignant neoplasm of lower lobe of right lung (H)  Staging form: Lung, AJCC 8th Edition  - Clinical stage from 2022: Stage IIIA (cT4, cN0, cM0) - Signed by Faisal Marie MD on 2022    Malignant neoplasm of upper lobe of right lung (H)  Staging form: Lung, AJCC 8th Edition  - Clinical stage from 2022: Stage IA2 (cT1b, cN0, cM0) - Signed by Faisal Marie MD on 2022      Radiation Therapy   Site: Right lower lobe, hilum, and lower mediastinum with concurrent chemotherapy (followed by SBRT to the right upper lobe).   Treatment to Date    Received 6 fraction(s) = 1200 cGy (at 200 each)    Remaining 28 fraction(s)    Goal 33 fractions = 6600 cGy     Concurrent chemoradiation week 0 (fractions 1-5): Grade 1 hiccups possibly due to radiation therapy (CTCAE 5.0).  Week 1 (06-10): Grade 2 cough and hiccups (Baclofen).  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  Delivery method: Intensity modulated radiation therapy (IMRT) with static gantry angles.   Energy: 6 MV.  Dose:  25 fractions of 200 cGy each for 5000 cGy.  Dates: 2012-2012 (Elapsed days: 32).    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 Right upper lobe TTF-1 positive moderately to poorly differentiated squamous cell carcinoma measuring 1.3-cm (cT1b). Nodes: N0 per PET-CT. Metastasis: M0 per PET-CT and MRI. Markers: Opposing TTF-1 staining as above). Based upon the different TTF-1 staining profile, note that elected to designate the patient as having 2 synchronous ipsilateral lung cancers rather than a single T4 cancer (metastasis to a separate ipsilateral lobe). Diagnostic biopsy 04/13/2022. 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) treated with surgery and radiation therapy as above (no chemotherapy), 2014 TURBT for non-invasive bladder cancer (low-grade papillary urothelial carcinoma without muscle invasion), 2021 local excision for left ear basal cell carcinoma (positive margin being observed), chronic occasional cough since the 2011 epiglottic surgery, bilateral shoulder replacements (good range of motion but caution for positioning for radiation therapy), Lewy body dementia (signs medical sent; does not drive), mild extremity tremors, chronic myeloproliferative disorder (carries the diagnosis stomach no transfusion), peptic ulcer disease, gastroesophageal reflux disease, 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  04/06/2022 PET-CT    (Please note that EMR interfaces between institutions can result in loss of embedded images.)    Recent History (CE)     General: Seen with wife and son. Cough and hiccups below.  Otherwise, feels well. Will see his daughter today for lunch. Yesterday, medical oncology evaluated the patient and recommend proceeding with concurrent chemoradiation. Pending port placement on 06/01/2022. Stable chronic CNS (e.g., Lewy body disease) and visual complaints. Feels well. Offers no new complaints. Finds setup and treatment to be easy.     Chemotherapy: Concurrent cytotoxic chemotherapy.  Regimen: Weekly carboplatin and Abraxane (C1D1 05/11/2022).  Most recent: 05/17/2022.  Next dose: 05/242022.  Labs: Reviewed from 05/17/2022. Adequate to continue therapy.  Growth factors: None.  Scheduled supplemental IV fluids: None.    Pulmonary: Since 5/11/2022, has experienced paroxysms of severe hiccups. Subsequently developed paroxysms of severe cough including overnight. Has become the dominant principal complaint. No trigger. No hemoptysis, air hunger, or wheezing. No sinus congestion, postnasal drip, or upper throat thick secretions. No history of seasonal allergies or yearly use of antihistamines.  Index: Grade 2 due to radiation therapy vs. tumor irritation/invasion of diaphragm..  Intervention: Start Baclofen.  Impression: Reviewed anatomy and pathophysiology (images, plan). Reviewed rationale, risks, and anticipated experience of Baclofen. No significant drug interactions identified with current medications. Fatigue: Stable at baseline (multiple naps daily).  Index: Grade 0.  Intervention: Observe.  Impression: Progressive changes possible within 1-2 weeks.    Pain: Denies pleuritic pain.  Index: Grade 0.  Intervention: Observe.  Impression: Anticipate changes within 1-2 weeks.    Mucositis, xerostomia, skin: Asymptomatic.  Index: Grade 0.  Intervention: Observe.  Impression: Rapid changes possible within 1-2 weeks.    Dysgeusia, anorexia, FEN, weight: Stable at baseline.  Index: Grade 0.  Intervention: Observe.  Impression: Rapid changes possible within 1-2 weeks.    Speech & swallowing: Stable at  baseline.  Index: Grade 0.  Intervention: Observe.    Disease: Denies impression of progression of primary or node disease including symptoms such as worsening breathing, chest wall pain, hiccups, or hemoptysis.  Response: 0% per cone-beam CT.  Intervention: Continue therapy. Re-simulation ordered placed if procedure indicated clinically.  Impression: Anticipate Possible re-aeration of lung that might require a repeat CT simulation and plan of radiation therapy. .    ID: Daily COVID-19 screening negative for barriers to proceeding with radiation therapy.    Follow-up: After radiation therapy, refer back to medical oncology for maintenance therapy. Will also schedule right upper lobe SBRT.    Prior Oncologic History   The patient's oncologic history across multiple institutions is abstracted as follows:    Diagnosis/Stagin Pancreatic benign disease (no pancreatic cancer)    2004 Whipple procedure ([facility]): Procedure recommended for pancreatic duct stone and pancreatitis, and possible pancreatic cancer. Operative report described an uncomplicated procedure. Pathology yielded marked chronic pancreatitis, pancreatic duct calculus (2-cm), benign pancreas pathology, and multiple benign nodes (0/3). No carcinoma.    Patient requires supplemental pancreatic enzymes (Creon) but has a broad normal diet. 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 Left glottis cancer did not clinically extend into the base of tongue or vocal cords. No clinically fixed nodes. Epiglottic specimen yielded a 1.8-cm invasive moderately differentiated squamous cell carcinoma (HPV negative) with lymphovascular invasion and  negative surgical margins. Left neck specimen (levels II-V) yielded 3/32 positive nodes with up to 0.5-cm of disease across 3 levels. No extranodal extension. 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    Patient acknowledges slight intolerance of liquids at times without paulo aspiration. Denies xerostomia or poor phonation as of 04/19/2022.  2014 Bladder cancer (non-muscle invasive)    11/10/2014 TURBT (GI): Presented with pelvic pain and microscopic hematuria without visible hematuria. Procedure yielded non-invasive low-grade papillary urothelial carcinoma without muscle invasion. Designated as [id number] disease.    10/14/2021 Yearly surveillance cystoscopy: Remained not suspicious for recurrent disease.    Patient requires tamsulosin for lower urinary tract symptoms. Denies visible hematuria as of 04/19/2022.  2021 Left ear basal cell carcinoma (no additional planned surgery )    01/11/2021 Diagnostic brain MRI with contrast. Obtained for cognitive and memory issues. Since 2017, progressive generalized volume loss with mild chronic microvascular changes without lesion suspicious for metastatic disease.    12/31/2021 Wide local excision of the the left ear: Lesion of the posterior ear managed initially with cryotherapy. Remained ulcerated. Excision of basal cell carcinoma with positive margin. Surgical service continue to follow and recommended against additional resection at that time.    Patient wishes to continue surveillance as of 04/19/2022.  2022 Right upper lobe squamous cell carcinoma (TTF-1 positive) and synchronous right lower lobe squamous cell carcinoma (TTF-1 negative)    01/26/2022 Medicine evaluation: Presented with several weeks of cough and sinus congestion. Subsequently persisted despite antibiotic therapies.    02/24/2022 Diagnostic chest x-ray: Obtained for cough. Since 2018, interval development of a right lung base mass and a right upper lung field mass.    03/03/2022  Diagnostic chest CT with contrast: Right lower lobe solid mass measuring 7-cm with no significant necrosis or loculation. Right upper lobe mass measuring 1-cm. No suspicious hilar or mediastinal nodes.    2022 Interventional pulmonary consultation: Recommended PET-CT for staging and robot-assisted navigational bronchoscopy with endoscopic ultrasound and biopsies for diagnostic purposes.    2022 PET-CT (exported): Index mass is a right lower lobe intensely hypermetabolic mass with apparent postobstructive change and necrosis and worsening aeration of the lobe since the CT on 2022. Second mass is a right upper lobe moderately hypermetabolic lesion measuring 1.3-cm. Possible right middle lobe mass measuring less than 1 cm and faintly hypermetabolic. No suspicious regional crescencio or distant metastatic disease. Small right pleural effusion.    2022 Navigational bronchoscopy with endoscopic ultrasound and endobronchial biopsies: Operative report described an uncomplicated robotic-assisted procedure. Images demonstrate endobronchial involvement by the right lower lobe tumor. Biopsy of the right lower lobe specimen yielded TTF-1 negative moderately-differentiated squamous cell carcinoma. Biopsy of the right upper lobe yielded TTF-1 positive moderately to poorly differentiated squamous cell carcinoma. Preliminary consensus recommendation of concurrent chemoradiation followed by adjuvant durvalumab for the index right lower lobe mass and SBRT alone for the much smaller right upper lobe mass.    2022 Brain MRI with contrast: Not clinically suspicious for metastatic disease.    2022 Medical oncology consultation: Consensus recommendation of definitive concurrent chemoradiation for right lower lobe carcinoma and definitive SBRT for right upper lobe carcinoma.    [place]20[facility].    2022 Neurology follow-up: Lewy body disease and symptomatic  complaints of mild memory loss, visual change, imbalance, tremors, etc.    Chemotherapy History: Yes (as above).  Radiation History: Yes (as above).  Implanted Cardiac Devices: None.  Implanted Chest Wall Infusion Port: Pending placement.    Past Medical History:   Diagnosis Date     Acute myocardial infarction, unspecified (H) 07/05/2013     Basal cell carcinoma of left ear 12/31/2021     Bicipital tenosynovitis 07/05/2013     CAD (coronary artery disease)      Chronic myeloproliferative disease (H)     questionable     Diabetes mellitus, type 2 (H)      Dysphagia (partial resection of epiglottis) 07/05/2013     Essential (primary) hypertension 01/12/2004    unspecified     Glucose intolerance 11/14/2003     Hiccough 07/05/2013     Hyperlipidemia     No Comments Provided     Lewy body dementia (H)      Male erectile dysfunction     No Comments Provided     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)     4/22/2015     Malignant neoplasm of lower lobe of right lung (H) 04/14/2022     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     Subscapularis (muscle) sprain 07/05/2013     Tremor     No Comments Provided     Past Surgical History:   Procedure Laterality Date     APPENDECTOMY OPEN      1970     ARTHROSCOPY SHOULDER      11/2005,right shoulder     ARTHROSCOPY SHOULDER      1/2006,left shoulder     ARTHROSCOPY SHOULDER      2011,Left  shoulder scope SAD, ac.  Open subscap, biceps     BRONCHOSCOPY  04/13/2022    bronchoscopy with endobronchial biopsies     CHOLECYSTECTOMY      1994     COLONOSCOPY  02/01/2005 02/2005     COLONOSCOPY  02/19/2015 2/19/2015,no repeat due at this time     CYSTOSCOPY      Multiple times,Dr. Lyndon FRANK     FINE NEEDLE ASPIRATION  04/13/2022    transbronchial needle aspiration and biopsy     HEART CATH, ANGIOPLASTY      07/05/2013,Bare-metal stent to dominant RCA     OTHER SURGICAL HISTORY      2004,034770,OTHER,for chronic pancreatitis     OTHER SURGICAL HISTORY      6/22/2007,[id number],SHOULDER REPLACEMENT,Right,with biceps tenodesis by Dr. Mukesh Ayoub     OTHER SURGICAL HISTORY      1995/2002,,ERCP,xseveral, one with papillotomy     OTHER SURGICAL HISTORY      1/30/2012-3/2/2012,[id number],RADIATION TREATMENT     OTHER SURGICAL HISTORY      12/7/2011,[id number],NECK/THORAX PROCEDURE,L modified radical neck surgery     OTHER SURGICAL HISTORY      11/10/14,[id number],OTHER,Dr. Lyndon FRANK     OTHER SURGICAL HISTORY      2012,[id number],REVISION EYELID,Dr. Farias     OTHER SURGICAL HISTORY      10/22/15,[id number],OTHER     TONSILLECTOMY, ADENOIDECTOMY, COMBINED      1947     VASECTOMY      1980     Outpatient Encounter Medications as of 5/18/2022   Medication Sig Dispense Refill     albuterol (PROAIR HFA/PROVENTIL HFA/VENTOLIN HFA) 108 (90 Base) MCG/ACT inhaler Inhale 2 puffs into the lungs every 4 hours as needed for shortness of breath / dyspnea or wheezing 18 g 1     amylase-lipase-protease (CREON) 195199-98065-579753 units CPEP Take 1-3 capsules by mouth 4 times daily (with meals and nightly) (Patient taking differently: Take 1-3 capsules by mouth 4 times daily (with meals and nightly) Taking 59537 units) 360 capsule 11     atorvastatin (LIPITOR) 40 MG tablet Take 1 tablet (40 mg) by mouth daily (with dinner) 90 tablet 3     baclofen (LIORESAL) 20 MG tablet Take 0.5 tablets (10 mg) by mouth 3 times daily (for hiccups and  cough) 90 tablet 0     blood glucose (NO BRAND SPECIFIED) lancets standard Use to test blood sugar 2 times daily  E11.9 200 each 3     blood glucose (NO BRAND SPECIFIED) test strip Use to test blood sugar 2 times daily  E11.9 200 strip 11     Blood Glucose Monitoring Suppl (FIFTY50 GLUCOSE METER 2.0) W/DEVICE KIT Dispense Accu-Chek Agnes  Meter. Dispense item covered by pt ins. E11.9 NIDDM type II - Test 1 time/day       calcium carbonate-vitamin D 600-200 MG-UNIT TABS Take 1 tablet by mouth 2 times daily (with meals)       cholecalciferol 50 MCG (2000 UT) CAPS Take 4,000 Units by mouth       DOCOSAHEXAENOIC ACID PO Take 1 capsule by mouth 2 times daily       levothyroxine (SYNTHROID/LEVOTHROID) 50 MCG tablet Take 1 tablet (50 mcg) by mouth daily -- Take in AM on empty stomach 90 tablet 1     loperamide (IMODIUM A-D) 2 MG tablet Take 1 tablet (2 mg) by mouth 4 times daily as needed for diarrhea 120 tablet 11     magnesium 200 MG TABS Take 200 mg by mouth       metFORMIN (GLUCOPHAGE) 1000 MG tablet Take 1 tablet (1,000 mg) by mouth 2 times daily (with meals) For diabetes prevention 180 tablet 3     Multiple Vitamin (MULTI-VITAMINS) TABS Take 1 tablet by mouth daily       nystatin (NYSTOP) 887551 UNIT/GM external powder Apply topically 2 times daily as needed For skin fold rash 180 g 4     omeprazole (PRILOSEC) 20 MG DR capsule Take 1 capsule (20 mg) by mouth daily Take 30 to 60 minutes prior to a meal 90 capsule 3     primidone (MYSOLINE) 50 MG tablet Take 2 tablets (100 mg) by mouth At Bedtime 180 tablet 3     prochlorperazine (COMPAZINE) 10 MG tablet        propranolol (INDERAL) 20 MG tablet Take 1 tablet (20 mg) by mouth 3 times daily (Patient taking differently: Take 20 mg by mouth 2 times daily 1 tab in AM and two tab in PM) 270 tablet 3     rivastigmine (EXELON) 3 MG capsule Take 3 mg by mouth daily       tiotropium (SPIRIVA) 18 MCG inhaled capsule Inhale 1 capsule (18 mcg) into the lungs daily - for chronic  phlegm/Bronchitis 90 capsule 3     nitroGLYcerin (NITROSTAT) 0.4 MG sublingual tablet DISSOLVE ONE TABLET UNDER THE TONGUE EVERY 5 MINUTES AS NEEDED FOR CHEST PAIN.  DO NOT EXCEED A TOTAL OF 3 DOSES IN 15 MINUTES (Patient not taking: No sig reported) 25 tablet 0     potassium 99 MG TABS Take 1 tablet by mouth (Patient not taking: Reported on 2022)       tamsulosin (FLOMAX) 0.4 MG capsule Take 1 capsule (0.4 mg) by mouth every evening (Patient not taking: No sig reported) 90 capsule 3     No facility-administered encounter medications on file as of 2022.     Allergies/Reactions: Other drug allergy (see comments)    Orders via Epic EMR: No orders of the defined types were placed in this encounter.    Social History     Social History Narrative    Douglas, is  to Ruth.  They have two grown children.  He is currently retired.  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  He enjoys deer hunting(quit due to shoulders).       Socioeconomic History     Marital status:      Spouse name: Ruth     Number of children: 2     Years of education: high school graduate   Occupational History     Occupation: retired: from [employer]     Employer: [employer]     Comment: worked on assembly line then later as the Supervisor     Social History     Tobacco Use     Smoking status: Former Smoker     Packs/day: 3.00     Years: 43.00     Pack years: 129.00     Types: Cigarettes     Quit date: 2001     Years since quittin.3     Smokeless tobacco: Never Used   Vaping Use     Vaping Use: Never used   Substance Use Topics     Alcohol use: Not Currently     Drug use: No     Family History   Problem Relation Age of Onset     Breast Cancer Mother      Heart Disease Mother 80        Heart Disease,MI     Heart Disease Father 83        Heart Disease,CHF     Lung Cancer Father      Family History Negative Sister         Good Health     Heart Disease Sister         Heart  Disease,pacemaker     Breast Cancer Daughter         Cancer-breast     Diabetes Son         Diabetes,Type 1     Alcoholism Son      Prostate Cancer No family hx of         Cancer-prostate   No other cancers in first or second degree relatives.    Baseline Review of Systems (prior to radiation therapy; supplemental to the History)   ROS (score of 0 indicates that symptom is at baseline for most sections below): See Flowsheet for additional comments as indicated.  No Pain (0)       Patient Health Questionnaire-9 (PHQ-9)     PHQ 4/23/2020 8/5/2020 2/24/2022   PHQ-9 Total Score 0 0 3   Q9: Thoughts of better off dead/self-harm past 2 weeks Not at all Not at all Not at all     Physical Exam   KPS: 70 (cares for self but unable to carry on normal activity or do active work) due to cognitive deficits but remains easily functionally independent and has a good quality of life.  ECOG Status: 2 (Ambulatory and capable of all selfcare but unable to carry out any work activities; may need help with IADLs up and about > 50% of waking hours)  Vitals: Wt 75.3 kg (166 lb 1.6 oz)   BMI 26.01 kg/m    Wt Readings from Last 3 Encounters:   05/18/22 75.3 kg (166 lb 1.6 oz)   05/11/22 75.3 kg (165 lb 14.4 oz)   04/19/22 76.9 kg (169 lb 8 oz)     Clinical Examination:  General: Upbeat, bright, dynamic, engaging, jovial. Initiates conversation and lines of thought. Appears clinically/medically stable. Appears very fit and in good general health. Overweight (BMI 25-29). Appears rested. Appears stated age. Appears well-developed, well-nourished, and in no acute distress. Does not appear acutely or chronically ill. Appears well groomed.  Head: No alopecia. Normocephalic.  Eyes: Glasses. Anicteric, vision intact.  ENT: Bilateral hearing aids. No coughing. No aspiration. No clearing of throat. Good volume. No hoarseness.  Neck: Well-healed left neck surgical scar. No crepitus. Soft, supple, symmetric, trachea midline.  Lymphatics: No parotid,  periparotid, cervical, or supraclavicular adenopathy.  Chest/Breasts: No port. No implanted cardiac device.  Lungs: Single slight cough during visit. No hiccups. Easy respirations, no use of accessory muscles. Clear to auscultation  Cardiovascular: No jugulovenous distension. No carotid pulsations. RRR, S1, S2.  Abdomen: Nondistended.  Pelvis: Nondistended.  MSK: Muscular build. Shoulder range of motion is good. No arm edema or hand edema. Good muscle tone. No tenderness on palpation. Good posture. No cords or calf tenderness.  Integument: No jaundice or pallor.   Neurologic: Right-handed. Mild intention tremors. Alert, oriented, fluent. Cognitive deficits are subtle (neurology is counseled the patient to not drive). Memory intact on observation. No errors in recall or distant memory. Briefly differential to wife for a couple of answers. Wife did not correct. Patient asked insightful questions and follow-up questions. EOMI. Motor intact. Sensory intact. No ataxia.  Psychologic: Well-spoken about his cancer and therapy. Good dynamic with his wife and son. Upbeat, relaxed, confident, engaging, and motivated. Pleasant, cooperative, insightful, and concrete. Good judgment, logical thinking, good thought process, concrete, abstracts. Did not require redirection for repeating of questions. Not tangential.    Physician Time on Day of Encounter, and [facility] Data Reviewed and Analyzed on Day of Encounter   Not applicable.    Physician Radiation Therapy Information Review   Radiation Oncology weekly review: Reviewed available labs and diagnostic studies. Interpreted as adequate to proceed with radiation therapy. Discussed management with Therapy, Treatment Planning, and Nursing. Review of weekly routine QA, linac imaging, and clinical set up are adequate to proceed with radiation therapy as prescribed.    Physician Radiation Therapy Assessment   Routine tolerance to radiation therapy.    Physician Radiation Therapy Plan   No new  radiation therapy interventions. No boost/cone down. Again reviewed the graphical 3D plan with the patient with attention to dose to the lung, heart, mediastinum, upper lobe lesion, spinal cord, liver, etc. Reviewed anticipated time course, nature, and potential interventions for managing toxicity during and after radiation therapy. Patient aware of onsite and telemedicine coverage of our clinic. He wished to proceed with treatment. All questions answered to the satisfaction of the patient and his family.      Faisal Marie MD, PhD  Department of Radiation Oncology  Tel: [phone]  Fax: [phone]    Care Team:  Miguel Chambers D.O. (interventional pulmonary medicine)  Mukesh Olivera M.D., Ph.D. (medical oncology)  DEEPA Yates M.D. (ophthalmology)  Ag Doan M.D. (surgery)  Beronica Sands N.P. (neurology)  Jovanni Sen M.D. (medicine)

## 2022-05-19 NOTE — PROGRESS NOTES
Radiation Oncology - Clinic Note      Patient seen briefly with his wife. As expected, no significant overnight change in hiccups and coughing after starting 10 mg of baclofen taken every 8 hours. Patient mildly hiccuping during today's visit. Denies sedation or lightheadedness. We will therefore proceed as planned with another 24 hours of the starting dose of baclofen. We will reevaluate tomorrow with the anticipation of dose escalation of baclofen for palliation of hiccups and cough likely due to diaphragmatic irritation/involvement by disease. The patient and his wife wish to proceed as recommended. I answered all questions to their satisfaction.      Faisal Marie M.D., Ph.D.  Department of Radiation Oncology  Tel: [phone]  Fax: [phone]

## 2022-05-20 NOTE — PROGRESS NOTES
Radiation Oncology - Clinic Note      Seen briefly in clinic with his wife. No significant improvement of hiccups or cough (attributed to diaphragmatic irritation through direct contact of the tumor to the diaphragm and exacerbation by chemoradiation that includes a portion of the diaphragm). The hiccups (and cough) are most significant clinically at night when they wake his wife. Denies fatigue or sedation attributable to the current dose of baclofen. As anticipated, I recommended increasing baclofen to 10 mg 3 times daily. Again reviewed the rationale, risk, and expectations of the dose escalation. Again reviewed the pathophysiology of the process. If relief is not achieved through additional escalations, may discontinue baclofen and transition to Thorazine or Reglan.    Current Outpatient Medications   Medication Instructions     albuterol (PROAIR HFA/PROVENTIL HFA/VENTOLIN HFA) 108 (90 Base) MCG/ACT inhaler 2 puffs, Inhalation, EVERY 4 HOURS PRN     amylase-lipase-protease (CREON) 933313-40978-860225 units CPEP 1-3 capsules, Oral, 4 TIMES DAILY WITH MEALS & NIGHTLY     atorvastatin (LIPITOR) 40 mg, Oral, DAILY WITH SUPPER     baclofen (LIORESAL) 10 mg, Oral, 3 TIMES DAILY, (for hiccups and cough)     blood glucose (NO BRAND SPECIFIED) lancets standard Use to test blood sugar 2 times daily  E11.9     blood glucose (NO BRAND SPECIFIED) test strip Use to test blood sugar 2 times daily  E11.9     Blood Glucose Monitoring Suppl (FIFTY50 GLUCOSE METER 2.0) W/DEVICE KIT Dispense Accu-Chek Agnes  Meter. Dispense item covered by pt ins. E11.9 NIDDM type II - Test 1 time/day     calcium carbonate-vitamin D 600-200 MG-UNIT TABS 1 tablet, Oral, 2 TIMES DAILY WITH MEALS     cholecalciferol 4,000 Units, Oral     DOCOSAHEXAENOIC ACID PO 1 capsule, Oral, 2 TIMES DAILY     levothyroxine (SYNTHROID/LEVOTHROID) 50 mcg, Oral, DAILY     loperamide (IMODIUM A-D) 2 mg, Oral, 4 TIMES DAILY PRN     magnesium 200 mg, Oral     metFORMIN  (GLUCOPHAGE) 1,000 mg, Oral, 2 TIMES DAILY WITH MEALS, For diabetes prevention      Multiple Vitamin (MULTI-VITAMINS) TABS 1 tablet, Oral, DAILY     nitroGLYcerin (NITROSTAT) 0.4 MG sublingual tablet DISSOLVE ONE TABLET UNDER THE TONGUE EVERY 5 MINUTES AS NEEDED FOR CHEST PAIN.  DO NOT EXCEED A TOTAL OF 3 DOSES IN 15 MINUTES     nystatin (NYSTOP) 266243 UNIT/GM external powder Topical, 2 TIMES DAILY PRN, For skin fold rash     omeprazole (PRILOSEC) 20 mg, Oral, DAILY, Take 30 to 60 minutes prior to a meal     potassium 99 MG TABS 1 tablet, Oral     primidone (MYSOLINE) 100 mg, Oral, AT BEDTIME     prochlorperazine (COMPAZINE) 10 MG tablet No dose, route, or frequency recorded.     propranolol (INDERAL) 20 mg, Oral, 3 TIMES DAILY     rivastigmine (EXELON) 3 mg, Oral, DAILY     tamsulosin (FLOMAX) 0.4 mg, Oral, EVERY EVENING     tiotropium (SPIRIVA) 18 mcg, Inhalation, DAILY, - for chronic phlegm/Bronchitis        Allergies   Allergen Reactions     Other Drug Allergy (See Comments)      Trelegy Ellipta  Sore throat         Faisal Marie M.D., Ph.D.  Department of Radiation Oncology  Tel: [phone]  Fax: [phone]

## 2022-05-21 NOTE — ED TRIAGE NOTES
Patient presents via private vehicle with complaints of generalized weakness over the past few days. Patient has hx of lung cancer, just started chemo and radiation 8 days ago. Also started on Baclofen and believes this to be contributing to weakness. Patient also has hx of Lewy Body Dementia     Triage Assessment     Row Name 05/21/22 0302       Triage Assessment (Adult)    Airway WDL WDL       Respiratory WDL    Respiratory WDL WDL       Skin Circulation/Temperature WDL    Skin Circulation/Temperature WDL WDL       Cardiac WDL    Cardiac WDL WDL       Peripheral/Neurovascular WDL    Peripheral Neurovascular WDL WDL       Cognitive/Neuro/Behavioral WDL    Cognitive/Neuro/Behavioral WDL WDL

## 2022-05-21 NOTE — ED PROVIDER NOTES
"  History     Chief Complaint   Patient presents with     Generalized Weakness     HPI  Rajesh Huynh is a 82 year old male with history of lung neoplasm, essential tremor, hypertension, STEMI, throat cancer, hyperlipidemia, new diagnosis of Lewy body dementia who presents to the emergency department with weakness progressing over the last few days.  History of lung cancer started chemo and radiation about 8 days ago.  Recently started on baclofen to control his hiccups, did work to control his hiccups but they think it might be causing some weakness.  Patient had a \"accident\" in the bed this evening where he had urinary incontinence and he was very weak when the wife went to get him up and they decided to come in to be evaluated in the emergency department.  No recent fevers chills coughs or pneumonialike symptoms.    Reviewed nurses notes below, similar history is related to me.  Patient presents via private vehicle with complaints of generalized weakness over the past few days. Patient has hx of lung cancer, just started chemo and radiation 8 days ago. Also started on Baclofen and believes this to be contributing to weakness. Patient also has hx of Lewy Body Dementia  Allergies:  Allergies   Allergen Reactions     Other Drug Allergy (See Comments)      Trelegy Ellipta  Sore throat         Problem List:    Patient Active Problem List    Diagnosis Date Noted     Malignant neoplasm of lower lobe of right lung (H) 04/14/2022     Priority: Medium     Malignant neoplasm of upper lobe of right lung (H) 04/14/2022     Priority: Medium     Right lower lobe lung mass 04/01/2022     Priority: Medium     Nodule of upper lobe of right lung 04/01/2022     Priority: Medium     Hypertensive heart disease with heart failure (H) 04/01/2022     Priority: Medium     Hypothyroidism due to acquired atrophy of thyroid 12/16/2021     Priority: Medium     Lewy body dementia without behavioral disturbance (H) 09/16/2021     Priority: " Medium     Heartburn 02/23/2021     Priority: Medium     Simple chronic bronchitis (H) 02/23/2021     Priority: Medium     History of cardiac cath on 7/8/2013 10/15/2020     Priority: Medium     Benign prostatic hyperplasia with lower urinary tract symptoms, symptom details unspecified 10/15/2020     Priority: Medium     Hx of pancreatitis on 10/23/2014 10/15/2020     Priority: Medium     Hx of bladder cancer on 11/10/2014 10/15/2020     Priority: Medium     Aortic atherosclerosis (H) 10/15/2020     Priority: Medium     Hyponatremia 10/15/2020     Priority: Medium     Stenosis of left carotid artery 10/15/2020     Priority: Medium     Exocrine pancreatic insufficiency - Severe Deficiency 11/22/2019     Priority: Medium     Intermittent diarrhea 11/13/2019     Priority: Medium     Controlled type 2 diabetes mellitus without complication, without long-term current use of insulin (H) 05/03/2019     Priority: Medium     History of pancreatic surgery - Whipple 03/21/2019     Priority: Medium     History of tobacco use quitting in 1/1/2000 at 3 packs a day 05/08/2018     Priority: Medium     Benign essential tremor 01/24/2018     Priority: Medium     Psychosexual dysfunction with inhibited sexual excitement 01/24/2018     Priority: Medium     Benign essential hypertension 01/24/2018     Priority: Medium     Mixed hyperlipidemia 01/24/2018     Priority: Medium     History of throat cancer 12/13/2017     Priority: Medium     Partial tear of right rotator cuff 06/21/2017     Priority: Medium     History of urinary retention 12/23/2016     Priority: Medium     Overview:   Postoperative       Atherosclerosis of native coronary artery of native heart without angina pectoris 10/03/2016     Priority: Medium     Personal history of malignant neoplasm of bladder 11/19/2014     Priority: Medium     H/O STEMI 7/3/2013 involving the RCA 07/11/2013     Priority: Medium        Past Medical History:    Past Medical History:   Diagnosis  Date     Acute myocardial infarction, unspecified (H) 07/05/2013     Basal cell carcinoma of left ear 12/31/2021     Bicipital tenosynovitis 07/05/2013     CAD (coronary artery disease)      Chronic myeloproliferative disease (H)      Diabetes mellitus, type 2 (H)      Dysphagia (partial resection of epiglottis) 07/05/2013     Essential (primary) hypertension 01/12/2004     Glucose intolerance 11/14/2003     Hiccough 07/05/2013     Hyperlipidemia      Lewy body dementia (H)      Male erectile dysfunction      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)      Malignant neoplasm of lower lobe of right lung (H) 04/14/2022     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     Peptic ulcer disease 11/10/2003     Pneumonia      Resolved: chronic pancreatitis (has undergone Whipple procedure)      Resolved: Pancreatitis 01/12/2004     Resolved: Personal history of nicotine dependence      Resolved: Thoracic back pain 04/08/2010     Resovled: Pancreatic duct stones 01/12/2004     ST elevation (STEMI) myocardial infarction (H)      Subscapularis (muscle) sprain 07/05/2013     Tremor        Past Surgical History:    Past Surgical History:   Procedure Laterality Date     APPENDECTOMY OPEN      1970     ARTHROSCOPY SHOULDER      11/2005,right shoulder     ARTHROSCOPY SHOULDER      1/2006,left shoulder     ARTHROSCOPY SHOULDER      2011,Left shoulder scope SAD, ac.  Open subscap, biceps     BRONCHOSCOPY  04/13/2022    bronchoscopy with endobronchial biopsies     CHOLECYSTECTOMY      1994     COLONOSCOPY  02/01/2005 02/2005     COLONOSCOPY  02/19/2015 2/19/2015,no repeat due at this time     CYSTOSCOPY      Multiple times,Dr. Lyndon FRANK     FINE NEEDLE ASPIRATION  04/13/2022    transbronchial needle aspiration and biopsy     HEART CATH, ANGIOPLASTY      07/05/2013,Bare-metal stent to dominant RCA     OTHER SURGICAL HISTORY      2004,[id number],OTHER,for chronic  pancreatitis     OTHER SURGICAL HISTORY      2007,[id number],SHOULDER REPLACEMENT,Right,with biceps tenodesis by Dr. Mukesh Ayoub     OTHER SURGICAL HISTORY      ,,ERCP,xseveral, one with papillotomy     OTHER SURGICAL HISTORY      2012-3/2/2012,[id number],RADIATION TREATMENT     OTHER SURGICAL HISTORY      2011,47586,NECK/THORAX PROCEDURE,L modified radical neck surgery     OTHER SURGICAL HISTORY      11/10/14,132808,OTHER,Dr. Lyndon FRANK     OTHER SURGICAL HISTORY      ,02824,REVISION EYELID,Dr. Farias     OTHER SURGICAL HISTORY      10/22/15,945903,OTHER     TONSILLECTOMY, ADENOIDECTOMY, COMBINED      1947     VASECTOMY             Family History:    Family History   Problem Relation Age of Onset     Breast Cancer Mother      Heart Disease Mother 80        Heart Disease,MI     Heart Disease Father 83        Heart Disease,CHF     Lung Cancer Father      Family History Negative Sister         Good Health     Heart Disease Sister         Heart Disease,pacemaker     Breast Cancer Daughter         Cancer-breast     Diabetes Son         Diabetes,Type 1     Alcoholism Son      Prostate Cancer No family hx of         Cancer-prostate       Social History:  Marital Status:   [2]  Social History     Tobacco Use     Smoking status: Former Smoker     Packs/day: 3.00     Years: 43.00     Pack years: 129.00     Types: Cigarettes     Quit date: 2001     Years since quittin.3     Smokeless tobacco: Never Used   Vaping Use     Vaping Use: Never used   Substance Use Topics     Alcohol use: Not Currently     Drug use: No        Medications:    albuterol (PROAIR HFA/PROVENTIL HFA/VENTOLIN HFA) 108 (90 Base) MCG/ACT inhaler  amylase-lipase-protease (CREON) 375427-82091-075074 units CPEP  atorvastatin (LIPITOR) 40 MG tablet  blood glucose (NO BRAND SPECIFIED) lancets standard  blood glucose (NO BRAND SPECIFIED) test strip  Blood Glucose Monitoring Suppl (FIFTY50 GLUCOSE METER 2.0) W/DEVICE  KIT  calcium carbonate-vitamin D 600-200 MG-UNIT TABS  cholecalciferol 50 MCG (2000 UT) CAPS  DOCOSAHEXAENOIC ACID PO  levothyroxine (SYNTHROID/LEVOTHROID) 50 MCG tablet  loperamide (IMODIUM A-D) 2 MG tablet  magnesium 200 MG TABS  metFORMIN (GLUCOPHAGE) 1000 MG tablet  Multiple Vitamin (MULTI-VITAMINS) TABS  nitroGLYcerin (NITROSTAT) 0.4 MG sublingual tablet  nystatin (NYSTOP) 581692 UNIT/GM external powder  omeprazole (PRILOSEC) 20 MG DR capsule  potassium 99 MG TABS  primidone (MYSOLINE) 50 MG tablet  prochlorperazine (COMPAZINE) 10 MG tablet  propranolol (INDERAL) 20 MG tablet  rivastigmine (EXELON) 3 MG capsule  tamsulosin (FLOMAX) 0.4 MG capsule  tiotropium (SPIRIVA) 18 MCG inhaled capsule          Review of Systems   Constitutional: Negative for fever.   HENT: Negative for congestion.    Eyes: Negative for redness.   Respiratory: Negative for shortness of breath.    Cardiovascular: Negative for chest pain.   Gastrointestinal: Negative for abdominal pain.   Genitourinary: Negative for difficulty urinating.   Musculoskeletal: Negative for arthralgias and neck stiffness.   Skin: Negative for color change.   Neurological: Positive for weakness. Negative for seizures and headaches.   Psychiatric/Behavioral: Negative for confusion.       Physical Exam   BP: (!) 191/102  Pulse: 77  Temp: 96.9  F (36.1  C)  Resp: 16  SpO2: 96 %      Physical Exam  Vitals and nursing note reviewed.   HENT:      Head: Atraumatic.   Eyes:      Pupils: Pupils are equal, round, and reactive to light.   Cardiovascular:      Rate and Rhythm: Regular rhythm.      Heart sounds: Normal heart sounds.   Pulmonary:      Effort: No respiratory distress.      Breath sounds: Normal breath sounds.   Chest:      Chest wall: No tenderness.   Abdominal:      Tenderness: There is no abdominal tenderness.   Musculoskeletal:      Cervical back: No tenderness.      Thoracic back: No tenderness.      Lumbar back: No tenderness.   Skin:     Capillary Refill:  Capillary refill takes less than 2 seconds.   Neurological:      General: No focal deficit present.      Mental Status: He is oriented to person, place, and time.      Cranial Nerves: No cranial nerve deficit.         ED Course     Results for orders placed or performed during the hospital encounter of 05/21/22 (from the past 24 hour(s))   CBC with platelets differential    Narrative    The following orders were created for panel order CBC with platelets differential.  Procedure                               Abnormality         Status                     ---------                               -----------         ------                     CBC with platelets and d...[[id number]]  Abnormal            Final result                 Please view results for these tests on the individual orders.   Comprehensive metabolic panel   Result Value Ref Range    Sodium 129 (L) 134 - 144 mmol/L    Potassium 4.2 3.5 - 5.1 mmol/L    Chloride 96 (L) 98 - 107 mmol/L    Carbon Dioxide (CO2) 27 21 - 31 mmol/L    Anion Gap 6 3 - 14 mmol/L    Urea Nitrogen 10 7 - 25 mg/dL    Creatinine 0.52 (L) 0.70 - 1.30 mg/dL    Calcium 8.8 8.6 - 10.3 mg/dL    Glucose 161 (H) 70 - 105 mg/dL    Alkaline Phosphatase 52 34 - 104 U/L    AST 10 (L) 13 - 39 U/L    ALT 13 7 - 52 U/L    Protein Total 6.0 (L) 6.4 - 8.9 g/dL    Albumin 3.2 (L) 3.5 - 5.7 g/dL    Bilirubin Total 0.6 0.3 - 1.0 mg/dL    GFR Estimate >90 >60 mL/min/1.73m2   TSH Reflex GH   Result Value Ref Range    TSH 1.44 0.40 - 4.00 mU/L   CBC with platelets and differential   Result Value Ref Range    WBC Count 9.8 4.0 - 11.0 10e3/uL    RBC Count 3.56 (L) 4.40 - 5.90 10e6/uL    Hemoglobin 10.9 (L) 13.3 - 17.7 g/dL    Hematocrit 32.2 (L) 40.0 - 53.0 %    MCV 90 78 - 100 fL    MCH 30.6 26.5 - 33.0 pg    MCHC 33.9 31.5 - 36.5 g/dL    RDW 14.0 10.0 - 15.0 %    Platelet Count 344 150 - 450 10e3/uL    % Neutrophils 89 %    % Lymphocytes 2 %    % Monocytes 7 %    % Eosinophils 1 %    % Basophils 0 %    %  Immature Granulocytes 1 %    NRBCs per 100 WBC 0 <1 /100    Absolute Neutrophils 8.7 (H) 1.6 - 8.3 10e3/uL    Absolute Lymphocytes 0.2 (L) 0.8 - 5.3 10e3/uL    Absolute Monocytes 0.7 0.0 - 1.3 10e3/uL    Absolute Eosinophils 0.1 0.0 - 0.7 10e3/uL    Absolute Basophils 0.0 0.0 - 0.2 10e3/uL    Absolute Immature Granulocytes 0.1 <=0.4 10e3/uL    Absolute NRBCs 0.0 10e3/uL   UA with Microscopic reflex to Culture    Specimen: Urine, Clean Catch   Result Value Ref Range    Color Urine Light Yellow Colorless, Straw, Light Yellow, Yellow    Appearance Urine Clear Clear    Glucose Urine 70  (A) Negative mg/dL    Bilirubin Urine Negative Negative    Ketones Urine Negative Negative mg/dL    Specific Gravity Urine 1.014 1.000 - 1.030    Blood Urine Negative Negative    pH Urine 7.0 5.0 - 9.0    Protein Albumin Urine Negative Negative mg/dL    Urobilinogen Urine Normal Normal, 2.0 mg/dL    Nitrite Urine Negative Negative    Leukocyte Esterase Urine Negative Negative    Mucus Urine Present (A) None Seen /LPF    RBC Urine 1 <=2 /HPF    WBC Urine <1 <=5 /HPF    Narrative    Urine Culture not indicated       Medications - No data to display    Assessments & Plan (with Medical Decision Making)     I have reviewed the nursing notes.    I have reviewed the findings, diagnosis, plan and need for follow up with the patient.  Patient does have low sodium but he had a low sodium in the past, clinically I do not feel this is contributing to his current symptoms.  Discussed [facility] [place] diversion status as well as widespread diversions throughout the hospital system in [place].  Patient would prefer to go home, labs are reassuring.  Work-up reassuring, serial neurologic exams reassuring.  Differential diagnosis is broad of course and includes progressive dementia, progressive malignancy, infection, polypharmacy among others.  Favor trial off baclofen, he has not been hiccuping and hopefully we broke that cycle and may be able to start  hiccuping again and will have less side effects from the baclofen.  Return to ED if worsening symptoms, follow-up with oncology provider.  Patient and family verbalized understanding plan are in agreement left ED in improving condition.    Current Discharge Medication List          Final diagnoses:   Generalized muscle weakness       5/21/2022   [facility] AND [facility]     Andrew Miranda MD  05/21/22 9834

## 2022-05-22 NOTE — PROGRESS NOTES
ASSESSMENT/PLAN:    I have reviewed the nursing notes.  I have reviewed the findings, diagnosis, plan and need for follow up with the patient.    1. Hiccup  - Vitals stable. Lab results and ER visit from 5/21/22 was reviewed. Hiccups likely due to pharmacologic etiology, reviewed medication list with patient and his wife at length. Reassuring that he does not appear toxic at this time.  Stable cardiopulmonary and HEENT examination. Reviewed UpToDate guidelines with patient and his wife, Gabapentin versus Baclofen. Patient currently on PPI, high risk with Exelon - not a great option at this time.   - Discussed with Dr. Li, recommend to retrial 10 mg dose of Baclofen. Also printed out list of other symptomatic remedies to trial: suck on lemon, hold breath, valsalva maneuver, pull knees to chest, etc. Keep follow up visit with oncology tomorrow AM.   - Warning signs to prompt return visit were reviewed with patient and his wife (airway compromise, worsening symptoms, etc.).   - Patient is in agreement and understanding of the above treatment plan. All questions and concerns were addressed and answered to patient's satisfaction. AVS reviewed with patient.     41 minutes between face to face and consult on care.     Discussed warning signs/symptoms indicative of need to f/u    Follow up if symptoms persist or worsen or concerns    I explained my diagnostic considerations and recommendations to the patient, who voiced understanding and agreement with the treatment plan. All questions were answered. We discussed potential side effects of any prescribed or recommended therapies, as well as expectations for response to treatments.    Jasmin Noel PA-C  5/22/2022  1:06 PM    HPI:    Rajesh Huynh is a 82 year old male  who presents to [facility] today for concerns of history of lung neoplasm, essential tremor, hypertension, STEMI, throat cancer, hyperlipidemia, new diagnosis of Lewy body dementia who presents to  [facility] with concerns of ongoing hiccups x 4-5 days duration.  History of lung cancer started chemo and radiation about 8 days ago.  Recently started on baclofen to control his hiccups, did work to control his hiccups but they think it might be causing some weakness.      History of similar symptoms in 2013 - was placed on medication for this    He was placed on Baclofen 1/2 tablet (20 mg) by Dr. Marie - increased to 20 mg (whole tablet) - generalized weakness     Past Medical History:   Diagnosis Date     Acute myocardial infarction, unspecified (H) 07/05/2013     Basal cell carcinoma of left ear 12/31/2021     Bicipital tenosynovitis 07/05/2013     CAD (coronary artery disease)      Chronic myeloproliferative disease (H)     questionable     Diabetes mellitus, type 2 (H)      Dysphagia (partial resection of epiglottis) 07/05/2013     Essential (primary) hypertension 01/12/2004    unspecified     Glucose intolerance 11/14/2003     Hiccough 07/05/2013     Hyperlipidemia     No Comments Provided     Lewy body dementia (H)      Male erectile dysfunction     No Comments Provided     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)     4/22/2015     Malignant neoplasm of lower lobe of right lung (H) 04/14/2022     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,Radiation     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     Peptic ulcer disease 11/10/2003     Pneumonia     2001     Resolved: chronic pancreatitis (has undergone Whipple procedure)     No Comments Provided     Resolved: Pancreatitis 01/12/2004     Resolved: Personal history of nicotine dependence     2ppd - quit 2001     Resolved: Thoracic back pain 04/08/2010    sharp pain with thoracic movements; s/p 9 visits of PT     Resovled: Pancreatic duct stones 01/12/2004     ST elevation (STEMI) myocardial infarction (H)     7/5/2013,[facility] BMS to RCA [facility]     [facility]/05[facility]2013     Tremor     No Comments Provided      Past Surgical History:   Procedure Laterality Date     APPENDECTOMY OPEN      1970     ARTHROSCOPY SHOULDER      2005,right shoulder     ARTHROSCOPY SHOULDER      2006,left shoulder     ARTHROSCOPY SHOULDER      ,Left shoulder scope SAD, ac.  Open subscap, biceps     BRONCHOSCOPY  2022    bronchoscopy with endobronchial biopsies     CHOLECYSTECTOMY           COLONOSCOPY  2005     COLONOSCOPY  2015,no repeat due at this time     CYSTOSCOPY      Multiple times,Dr. Lyndon FRANK     FINE NEEDLE ASPIRATION  2022    transbronchial needle aspiration and biopsy     HEART CATH, ANGIOPLASTY      2013,Bare-metal stent to dominant RCA     OTHER SURGICAL HISTORY      ,[id number],OTHER,for chronic pancreatitis     OTHER SURGICAL HISTORY      2007,[id number],SHOULDER REPLACEMENT,Right,with biceps tenodesis by Dr. Mukesh Ayoub     OTHER SURGICAL HISTORY      ,,ERCP,xseveral, one with papillotomy     OTHER SURGICAL HISTORY      2012-3/2/2012,622234,RADIATION TREATMENT     OTHER SURGICAL HISTORY      2011,34170,NECK/THORAX PROCEDURE,L modified radical neck surgery     OTHER SURGICAL HISTORY      11/10/14,[id number],OTHER,Dr. Lyndon FRANK     OTHER SURGICAL HISTORY      ,[id number],REVISION EYELID,Dr. Farias     OTHER SURGICAL HISTORY      10/22/15,[id number],OTHER     TONSILLECTOMY, ADENOIDECTOMY, COMBINED      194     VASECTOMY           Social History     Tobacco Use     Smoking status: Former Smoker     Packs/day: 3.00     Years: 43.00     Pack years: 129.00     Types: Cigarettes     Quit date: 2001     Years since quittin.4     Smokeless tobacco: Never Used   Substance Use Topics     Alcohol use: Not Currently     Current Outpatient Medications   Medication Sig Dispense Refill     albuterol (PROAIR HFA/PROVENTIL HFA/VENTOLIN HFA) 108 (90 Base) MCG/ACT inhaler Inhale 2 puffs into the lungs every 4 hours as needed for shortness of  breath / dyspnea or wheezing 18 g 1     atorvastatin (LIPITOR) 40 MG tablet Take 1 tablet (40 mg) by mouth daily (with dinner) 90 tablet 3     blood glucose (NO BRAND SPECIFIED) lancets standard Use to test blood sugar 2 times daily  E11.9 200 each 3     blood glucose (NO BRAND SPECIFIED) test strip Use to test blood sugar 2 times daily  E11.9 200 strip 11     Blood Glucose Monitoring Suppl (FIFTY50 GLUCOSE METER 2.0) W/DEVICE KIT Dispense Accu-Chek Agnes  Meter. Dispense item covered by pt ins. E11.9 NIDDM type II - Test 1 time/day       calcium carbonate-vitamin D 600-200 MG-UNIT TABS Take 1 tablet by mouth 2 times daily (with meals)       cholecalciferol 50 MCG (2000 UT) CAPS Take 4,000 Units by mouth       DOCOSAHEXAENOIC ACID PO Take 1 capsule by mouth 2 times daily       levothyroxine (SYNTHROID/LEVOTHROID) 50 MCG tablet Take 1 tablet (50 mcg) by mouth daily 90 tablet 0     loperamide (IMODIUM A-D) 2 MG tablet Take 1 tablet (2 mg) by mouth 4 times daily as needed for diarrhea 120 tablet 11     magnesium 200 MG TABS Take 200 mg by mouth       metFORMIN (GLUCOPHAGE) 1000 MG tablet Take 1 tablet (1,000 mg) by mouth 2 times daily (with meals) For diabetes prevention 180 tablet 3     Multiple Vitamin (MULTI-VITAMINS) TABS Take 1 tablet by mouth daily       nitroGLYcerin (NITROSTAT) 0.4 MG sublingual tablet DISSOLVE ONE TABLET UNDER THE TONGUE EVERY 5 MINUTES AS NEEDED FOR CHEST PAIN.  DO NOT EXCEED A TOTAL OF 3 DOSES IN 15 MINUTES 25 tablet 0     nystatin (NYSTOP) 417046 UNIT/GM external powder Apply topically 2 times daily as needed For skin fold rash 180 g 4     omeprazole (PRILOSEC) 20 MG DR capsule Take 1 capsule (20 mg) by mouth daily Take 30 to 60 minutes prior to a meal 90 capsule 3     potassium 99 MG TABS Take 1 tablet by mouth       primidone (MYSOLINE) 50 MG tablet Take 2 tablets (100 mg) by mouth At Bedtime 180 tablet 3     prochlorperazine (COMPAZINE) 10 MG tablet        rivastigmine (EXELON) 3 MG  capsule Take 3 mg by mouth daily       tamsulosin (FLOMAX) 0.4 MG capsule Take 1 capsule (0.4 mg) by mouth every evening 90 capsule 3     tiotropium (SPIRIVA) 18 MCG inhaled capsule Inhale 1 capsule (18 mcg) into the lungs daily - for chronic phlegm/Bronchitis 90 capsule 3     Allergies   Allergen Reactions     Other Drug Allergy (See Comments)      Trelegy Ellipta  Sore throat       Past medical history, past surgical history, current medications and allergies reviewed and accurate to the best of my knowledge.      ROS:  Refer to HPI    /82 (BP Location: Right arm, Patient Position: Sitting, Cuff Size: Adult Regular)   Pulse 68   Temp 97.6  F (36.4  C) (Tympanic)   Resp 16   Wt 73.8 kg (162 lb 9.6 oz)   SpO2 95%   BMI 25.47 kg/m      EXAM:  General Appearance: Well appearing 82 year old male, appropriate appearance for age. No acute distress   Ears: Left TM intact, translucent with bony landmarks appreciated, no erythema, no effusion, no bulging, no purulence.  Right TM intact, translucent with bony landmarks appreciated, no erythema, no effusion, no bulging, no purulence.  Left auditory canal clear.  Right auditory canal clear.  Normal external ears, non tender.  Eyes: conjunctivae normal without erythema or irritation, corneas clear, no drainage or crusting, no eyelid swelling, pupils equal   Oropharynx: moist mucous membranes, posterior pharynx without erythema, tonsils symmetric, no erythema, no exudates or petechiae, no post nasal drip seen, no trismus, voice clear. Patent airway.  Mild to moderate hiccups.   Sinuses:  No sinus tenderness upon palpation of the frontal or maxillary sinuses  Nose:  Bilateral nares: no erythema, no edema, no drainage or congestion   Neck: supple without adenopathy  Respiratory: normal chest wall and respirations.  Normal effort.  Clear to auscultation bilaterally, no wheezing, crackles or rhonchi.  No increased work of breathing.  No cough appreciated.  Cardiac: RRR  with no murmurs   Dermatological: no rashes noted of exposed skin  Psychological: normal affect, alert, oriented, and pleasant.     Labs:  None     Xray:  None

## 2022-05-22 NOTE — NURSING NOTE
Chief Complaint   Patient presents with     Hiccups   Patient presents to the clinic today for hiccups for the last 4 days. Patient states that he was prescribed Baclofen 20 mg from a provider in [place] but had a reaction to the medication which includes hallucinations. Patient stopped taking medication. Patient states that on his way into town today he was struggling to get air through his throat for the first time. Ptient states that has gotten better and is breathing better.  Medication Reconciliation: completed   Phylicia José LPN  5/22/2022 12:44 PM

## 2022-05-23 NOTE — PROGRESS NOTES
Patient seen in clinic today prior to radiation therapy treatment due to having had a reaction to his medication over the weekend. He was started on Baclofen 10 mg TID for hiccups last week. On 05/20/22 he was to increase his Baclofen as his hiccups had not improved. On Saturday 05/21/22 for generalized weakness. His wife explains he was unable to walk, talk, hold a glass of water, and he was cognitively impaired. He had almost fallen but his family was able to catch him. He was treated in the emergency department and by exclusion his symptoms are thought to be from the baclofen. He was told to stop taking the baclofen, and follow up with radiation oncology. He then presented to urgent care the following day due to the hiccups getting so bothersome that he could not catch his breath and was getting a choking sensation. He at one point stuck his finger down his throat as he didn't know what else to do.     Patient over all feels better since stopping the medication and he has regained his strength. He continues to hiccup, however he has been taking Tums and eating ice chips.     Dr. Marie saw patient, plan is to continue Ice chips and Tums. If symptoms progress or worsen we will likely start Thorazine. Patient and wife agreeable to plan. A handout of non pharmacologic remedies given to patient.

## 2022-05-24 NOTE — TELEPHONE ENCOUNTER
Received a PA from [facility] for chlorproMAZINE HCl 25MG tablets. Submitted on CMM. Waiting for a response.

## 2022-05-25 NOTE — PATIENT INSTRUCTIONS
Instructions from Dr. Marie     - Start taking Flonase nasal spray   - Start taking Zyrtec     - Pending Thorazine  from pharmacy. Prior authorization approved by pharmacy this am. Pharmacy needs to order prescription.

## 2022-05-25 NOTE — TELEPHONE ENCOUNTER
"Patient is experiencing ongoing diaphragmatic spasms that become so severe they are continuing to cause episodes where the patient feels like he is choking. During these episodes he \"shoves his finger down his throat\" in order to \"break the choking sensation\". His wife finds these episodes very concerning and scary. We discussed the importance of starting the Thorazine. The wife explains that there was an issue with insurance and they were not able to  the medication. I called the pharmacy and they were able to get a prior auth to go through, the patient should be able to  the medication tomorrow. Patient and wife are agreeable to plan. We also increased his PPI. Patient and wife educated to seek medical care if new or worsening symptoms.   "

## 2022-05-25 NOTE — PROGRESS NOTES
"  [facility]  DEPARTMENT OF RADIATION ONCOLOGY  ON TREATMENT VISIT (OTV) NOTE    Name: Rajesh Huynh MRN: [id number]   : 1940 (82 year old)  Date of Service: May 25, 2022 Referring: Dr. Chambers and Dr. Olivera        Diagnosis and Cancer Staging  Malignant neoplasm of lower lobe of right lung (H)  Staging form: Lung, AJCC 8th Edition  - Clinical stage from 2022: Stage IIIA (cT4, cN0, cM0) - Signed by Faisal Marie MD on 2022    Malignant neoplasm of upper lobe of right lung (H)  Staging form: Lung, AJCC 8th Edition  - Clinical stage from 2022: Stage IA2 (cT1b, cN0, cM0) - Signed by Faisal Marie MD on 2022      Radiation Therapy   Site: Right lower lobe, hilum, and lower mediastinum with concurrent chemotherapy (followed by SBRT to the right upper lobe).   Treatment to Date    Received 11 fraction(s) = 2200 cGy (at 200 each)    Remaining 22 fraction(s)    Goal 33 fractions = 6600 cGy     Concurrent chemoradiation week 0 (fractions 1-5): Grade 1 hiccups possibly due to radiation therapy (CTCAE 5.0).  Week 1 (06-10): Grade 2 cough and hiccups (Baclofen).  Week 2 (11-15): Grade 2 cough and hiccups (Thorazine).  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  Delivery method: Intensity modulated radiation therapy (IMRT) with static gantry angles.   Energy: 6 MV.  Dose:  25 fractions of 200 cGy each for 5000 cGy.  Dates: 2012-2012 (Elapsed days: 32).    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 Right upper lobe TTF-1 positive moderately to poorly differentiated squamous cell carcinoma measuring 1.3-cm (cT1b). Nodes: N0 per PET-CT. Metastasis: M0 per PET-CT and MRI. Markers: Opposing TTF-1 staining as above). Based upon the different TTF-1 staining profile, note that elected to designate the patient as having 2 synchronous ipsilateral lung cancers rather than a single T4 cancer (metastasis to a separate ipsilateral lobe). Diagnostic biopsy 04/13/2022. 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) treated with surgery and radiation therapy as above (no chemotherapy), 2014 TURBT for non-invasive bladder cancer (low-grade papillary urothelial carcinoma without muscle invasion), 2021 local excision for left ear basal cell carcinoma (positive margin being observed), chronic occasional cough since the 2011 epiglottic surgery, bilateral shoulder replacements (good range of motion but caution for positioning for radiation therapy), Lewy body dementia (signs medical sent; does not drive), mild extremity tremors, chronic myeloproliferative disorder (carries the diagnosis stomach no transfusion), peptic ulcer disease, gastroesophageal reflux disease, 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  04/06/2022 PET-CT    (Please note that EMR interfaces between institutions can result in loss of embedded images.)    Recent History (CE)     Virtual video visit  ([place]) assisted by patient and his wife, and then NP.    General: Cough and hiccups below. Otherwise, feels well. Offers no new complaints. Pending port placement on 06/01/2022. Stable chronic CNS (e.g., Lewy body disease) and visual complaints. Finds setup and treatment to be easy.     Chemotherapy: Concurrent cytotoxic chemotherapy.  Regimen: Weekly carboplatin and Abraxane (C1D1 05/11/2022).  Most recent: 05/24/2022 (C3D1).  Next dose: 05/31/2022.  Labs: Reviewed from 05/24/2022. Adequate to continue therapy.  Growth factors: None.  Scheduled supplemental IV fluids: None.    Pulmonary: Thorazine prescription ready by tomorrow (prior authorization received by pharmacy). Receive chemotherapy yesterday (with dexamethasone). Last night, had another episode of severe cough and hiccups. Reviewed the nightly pattern of lying down, coughing, and then repeatedly coughing/hiccuping with increasing severity (finger in mouth). Much better during the day but still has paroxysms of cough and hiccups. No hemoptysis, air hunger, or wheezing. No sinus congestion, postnasal drip, or upper throat thick secretions. No history of seasonal allergies or yearly use of antihistamines.  Index: Grade 2 classified as radiation therapy toxicity due to timing (due to principally tethering of disease to diaphragm and mediastinum).  Intervention: Currently off specific pharmacologic intervention since Baclofen was stopped. Pending start of Thorazine. Transitioning GERD regimen to Protonix. Due to position trigger, will start Zyrtec and Flonase in case occult postnasal drip/allergies are contributors.  Impression: Reviewed anatomy and pathophysiology (images, plan, presumed diaphragmatic tension due to tethering of disease to the diaphragm, mediastinum, and chest wall). Reviewed management with extended oncology team (e.g., methylprednisone rotation for carboplatin, gabapentin, etc.)    Fatigue: Increased due to poor nighttime sleep (baseline:  multiple naps daily).  Index: Grade 2.  Intervention: Observe.  Impression: Progressive changes possible within 1-2 weeks.    Pain: Denies pleuritic pain.  Index: Grade 0.  Intervention: Observe.  Impression: Anticipate changes within 1-2 weeks.    Mucositis, xerostomia, skin: Asymptomatic.  Index: Grade 0.  Intervention: Observe.  Impression: Rapid changes possible within 1-2 weeks.    Dysgeusia, anorexia, FEN, weight: Stable at baseline.  Index: Grade 0.  Intervention: Observe.  Impression: Rapid changes possible within 1-2 weeks.    Speech & swallowing: Stable at baseline.  Index: Grade 0.  Intervention: Observe.    Disease: Denies impression of progression of primary or node disease including symptoms such as worsening breathing, chest wall pain, hiccups, or hemoptysis.  Response: <25% per cone-beam CT.  Intervention: Continue therapy. Re-simulation ordered placed if procedure indicated clinically.  Impression: Anticipate Possible re-aeration of lung that might require a repeat CT simulation and plan of radiation therapy. .    ID: Daily COVID-19 screening negative for barriers to proceeding with radiation therapy.    Follow-up: After radiation therapy, refer back to medical oncology for maintenance therapy. Will also schedule right upper lobe SBRT.    Prior Oncologic History   The patient's oncologic history across multiple institutions is abstracted as follows:    Diagnosis/Stagin Pancreatic benign disease (no pancreatic cancer)    2004 Whipple procedure ([facility]): Procedure recommended for pancreatic duct stone and pancreatitis, and possible pancreatic cancer. Operative report described an uncomplicated procedure. Pathology yielded marked chronic pancreatitis, pancreatic duct calculus (2-cm), benign pancreas pathology, and multiple benign nodes (0/3). No carcinoma.    Patient requires supplemental pancreatic enzymes (Creon) but has a broad normal diet. 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 Left glottis cancer did not clinically extend into the base of tongue or vocal cords. No clinically fixed nodes. Epiglottic specimen yielded a 1.8-cm invasive moderately differentiated squamous cell carcinoma (HPV negative) with lymphovascular invasion and negative surgical margins. Left neck specimen (levels II-V) yielded 3/32 positive nodes with up to 0.5-cm of disease across 3 levels. No extranodal extension. 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    Patient acknowledges slight intolerance of liquids at times without paulo aspiration. Denies xerostomia or poor phonation as of 04/19/2022.  2014 Bladder cancer (non-muscle invasive)    11/10/2014 TURBT ([facility]): Presented with pelvic pain and microscopic hematuria without visible hematuria. Procedure yielded non-invasive low-grade papillary urothelial carcinoma without muscle invasion. Designated as tUtL0B1 disease.    10/14/2021 Yearly surveillance cystoscopy: Remained not suspicious for recurrent disease.    Patient requires tamsulosin for lower urinary tract symptoms. Denies visible hematuria as of 04/19/2022.  2021 Left ear basal cell carcinoma (no additional planned surgery )    01/11/2021 Diagnostic brain MRI with contrast. Obtained for cognitive and memory issues. Since 2017, progressive generalized volume loss with mild chronic microvascular changes without lesion suspicious for metastatic disease.    12/31/2021 Wide local excision of the the left ear: Lesion of the posterior ear managed initially with cryotherapy. Remained ulcerated. Excision of basal cell carcinoma with positive margin. Surgical service continue to  follow and recommended against additional resection at that time.    Patient wishes to continue surveillance as of 04/19/2022.  2022 Right upper lobe squamous cell carcinoma (TTF-1 positive) and synchronous right lower lobe squamous cell carcinoma (TTF-1 negative)    01/26/2022 Medicine evaluation: Presented with several weeks of cough and sinus congestion. Subsequently persisted despite antibiotic therapies.    02/24/2022 Diagnostic chest x-ray: Obtained for cough. Since 2018, interval development of a right lung base mass and a right upper lung field mass.    03/03/2022 Diagnostic chest CT with contrast: Right lower lobe solid mass measuring 7-cm with no significant necrosis or loculation. Right upper lobe mass measuring 1-cm. No suspicious hilar or mediastinal nodes.    03/24/2022 Interventional pulmonary consultation: Recommended PET-CT for staging and robot-assisted navigational bronchoscopy with endoscopic ultrasound and biopsies for diagnostic purposes.    04/06/2022 PET-CT (exported): Index mass is a right lower lobe intensely hypermetabolic mass with apparent postobstructive change and necrosis and worsening aeration of the lobe since the CT on 03/03/2022. Second mass is a right upper lobe moderately hypermetabolic lesion measuring 1.3-cm. Possible right middle lobe mass measuring less than 1 cm and faintly hypermetabolic. No suspicious regional crescencio or distant metastatic disease. Small right pleural effusion.    04/13/2022 Navigational bronchoscopy with endoscopic ultrasound and endobronchial biopsies: Operative report described an uncomplicated robotic-assisted procedure. Images demonstrate endobronchial involvement by the right lower lobe tumor. Biopsy of the right lower lobe specimen yielded TTF-1 negative moderately-differentiated squamous cell carcinoma. Biopsy of the right upper lobe yielded TTF-1 positive moderately to poorly differentiated squamous cell carcinoma. Preliminary consensus  recommendation of concurrent chemoradiation followed by adjuvant durvalumab for the index right lower lobe mass and SBRT alone for the much smaller right upper lobe mass.    2022 Brain MRI with contrast: Not clinically suspicious for metastatic disease.    2022 Medical oncology consultation: Consensus recommendation of definitive concurrent chemoradiation for right lower lobe carcinoma and definitive SBRT for right upper lobe carcinoma.    [place]2022 Port placements: [facility].    2022 Neurology follow-up: Lewy body disease and symptomatic complaints of mild memory loss, visual change, imbalance, tremors, etc.    Chemotherapy History: Yes (as above).  Radiation History: Yes (as above).  Implanted Cardiac Devices: None.  Implanted Chest Wall Infusion Port: Pending placement.    Past Medical History:   Diagnosis Date     Acute myocardial infarction, unspecified (H) 2013     Basal cell carcinoma of left ear 2021     Bicipital tenosynovitis 2013     CAD (coronary artery disease)      Chronic myeloproliferative disease (H)     questionable     Diabetes mellitus, type 2 (H)      Dysphagia (partial resection of epiglottis) 2013     Essential (primary) hypertension 2004    unspecified     Glucose intolerance 2003     Hiccough 2013     Hyperlipidemia     No Comments Provided     Lewy body dementia (H)      Male erectile dysfunction     No Comments Provided     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)     2015     Malignant neoplasm of lower lobe of right lung (H) 2022     Malignant neoplasm of supraglottis (resolved) 2011,Radiation     Osteoarthritis of shoulder 2013     Peptic ulcer disease 11/10/2003     Pneumonia          Resolved: chronic pancreatitis (has undergone Whipple procedure)     No Comments Provided     Resolved: Pancreatitis 2004     Resolved: Personal history of nicotine dependence      2ppd - quit 2001     Resolved: Thoracic back pain 04/08/2010    sharp pain with thoracic movements; s/p 9 visits of PT     Resovled: Pancreatic duct stones 01/12/2004     ST elevation (STEMI) myocardial infarction (H)     7/5/2013,Integrity BMS to RCA done at [facility]     Subscapularis (muscle) sprain 07/05/2013     Tremor     No Comments Provided     Past Surgical History:   Procedure Laterality Date     APPENDECTOMY OPEN      1970     ARTHROSCOPY SHOULDER      11/2005,right shoulder     ARTHROSCOPY SHOULDER      1/2006,left shoulder     ARTHROSCOPY SHOULDER      2011,Left shoulder scope SAD, ac.  Open subscap, biceps     BRONCHOSCOPY  04/13/2022    bronchoscopy with endobronchial biopsies     CHOLECYSTECTOMY      1994     COLONOSCOPY  02/01/2005 02/2005     COLONOSCOPY  02/19/2015 2/19/2015,no repeat due at this time     CYSTOSCOPY      Multiple times,Dr. Lyndon FRANK     FINE NEEDLE ASPIRATION  04/13/2022    transbronchial needle aspiration and biopsy     HEART CATH, ANGIOPLASTY      07/05/2013,Bare-metal stent to dominant RCA     OTHER SURGICAL HISTORY      2004,220213,OTHER,for chronic pancreatitis     OTHER SURGICAL HISTORY      6/22/2007,[id number],SHOULDER REPLACEMENT,Right,with biceps tenodesis by Dr. Mukesh Ayoub     OTHER SURGICAL HISTORY      1995/2002,,ERCP,xseveral, one with papillotomy     OTHER SURGICAL HISTORY      1/30/2012-3/2/2012,[id number],RADIATION TREATMENT     OTHER SURGICAL HISTORY      12/7/2011,[id number],NECK/THORAX PROCEDURE,L modified radical neck surgery     OTHER SURGICAL HISTORY      11/10/14,[id number],OTHER,Dr. Lyndon FRANK     OTHER SURGICAL HISTORY      2012,[id number],REVISION EYELID,Dr. Farias     OTHER SURGICAL HISTORY      10/22/15,[id number],OTHER     TONSILLECTOMY, ADENOIDECTOMY, COMBINED      1947     VASECTOMY      1980     Outpatient Encounter Medications as of 5/25/2022   Medication Sig Dispense Refill     amylase-lipase-protease (CREON 36) [id number] units CPEP Take 3  capsules by mouth 4 times daily (with meals and nightly) Taking 24108 units       atorvastatin (LIPITOR) 40 MG tablet Take 1 tablet (40 mg) by mouth daily (with dinner) 90 tablet 3     blood glucose (NO BRAND SPECIFIED) lancets standard Use to test blood sugar 2 times daily  E11.9 200 each 3     blood glucose (NO BRAND SPECIFIED) test strip Use to test blood sugar 2 times daily  E11.9 200 strip 11     Blood Glucose Monitoring Suppl (FIFTY50 GLUCOSE METER 2.0) W/DEVICE KIT Dispense Accu-Chek Agnes  Meter. Dispense item covered by pt ins. E11.9 NIDDM type II - Test 1 time/day       calcium carbonate-vitamin D 600-200 MG-UNIT TABS Take 1 tablet by mouth 2 times daily (with meals)       cholecalciferol 50 MCG (2000 UT) CAPS Take 4,000 Units by mouth       DOCOSAHEXAENOIC ACID PO Take 1 capsule by mouth 2 times daily       levothyroxine (SYNTHROID/LEVOTHROID) 50 MCG tablet Take 1 tablet (50 mcg) by mouth daily 90 tablet 0     loperamide (IMODIUM A-D) 2 MG tablet Take 1 tablet (2 mg) by mouth 4 times daily as needed for diarrhea 120 tablet 11     magnesium 200 MG TABS Take 200 mg by mouth       metFORMIN (GLUCOPHAGE) 1000 MG tablet Take 1 tablet (1,000 mg) by mouth 2 times daily (with meals) For diabetes prevention 180 tablet 3     Multiple Vitamin (MULTI-VITAMINS) TABS Take 1 tablet by mouth daily       nystatin (NYSTOP) 432752 UNIT/GM external powder Apply topically 2 times daily as needed For skin fold rash 180 g 4     omeprazole (PRILOSEC) 20 MG DR capsule Take 1 capsule (20 mg) by mouth daily Take 30 to 60 minutes prior to a meal 90 capsule 3     primidone (MYSOLINE) 50 MG tablet Take 2 tablets (100 mg) by mouth At Bedtime 180 tablet 3     rivastigmine (EXELON) 3 MG capsule Take 3 mg by mouth daily       tamsulosin (FLOMAX) 0.4 MG capsule Take 1 capsule (0.4 mg) by mouth every evening 90 capsule 3     tiotropium (SPIRIVA) 18 MCG inhaled capsule Inhale 1 capsule (18 mcg) into the lungs daily - for chronic  phlegm/Bronchitis 90 capsule 3     albuterol (PROAIR HFA/PROVENTIL HFA/VENTOLIN HFA) 108 (90 Base) MCG/ACT inhaler Inhale 2 puffs into the lungs every 4 hours as needed for shortness of breath / dyspnea or wheezing (Patient not taking: Reported on 2022) 18 g 1     chlorproMAZINE (THORAZINE) 25 MG tablet Take 1 tablet (25 mg) by mouth 3 times daily for 20 days (Taking for intractable hiccups) (Patient not taking: Reported on 2022) 60 tablet 0     nitroGLYcerin (NITROSTAT) 0.4 MG sublingual tablet DISSOLVE ONE TABLET UNDER THE TONGUE EVERY 5 MINUTES AS NEEDED FOR CHEST PAIN.  DO NOT EXCEED A TOTAL OF 3 DOSES IN 15 MINUTES (Patient not taking: Reported on 2022) 25 tablet 0     potassium 99 MG TABS Take 1 tablet by mouth (Patient not taking: Reported on 2022)       prochlorperazine (COMPAZINE) 10 MG tablet  (Patient not taking: Reported on 2022)       No facility-administered encounter medications on file as of 2022.     Allergies/Reactions: Other drug allergy (see comments)    Orders via Epic EMR: No orders of the defined types were placed in this encounter.    Social History     Social History Narrative    Douglas, is  to Ruth.  They have two grown children.  He is currently retired.  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  He enjoys deer hunting(quit due to shoulders).       Socioeconomic History     Marital status:      Spouse name: Ruth     Number of children: 2     Years of education: high school graduate   Occupational History     Occupation: retired: from [employer]     Employer: [email or web address]     Comment: worked on assembly line then later as the Supervisor     Social History     Tobacco Use     Smoking status: Former Smoker     Packs/day: 3.00     Years: 43.00     Pack years: 129.00     Types: Cigarettes     Quit date: 2001     Years since quittin.4     Smokeless tobacco: Never Used   Vaping Use     Vaping  Use: Never used   Substance Use Topics     Alcohol use: Not Currently     Drug use: No     Family History   Problem Relation Age of Onset     Breast Cancer Mother      Heart Disease Mother 80        Heart Disease,MI     Heart Disease Father 83        Heart Disease,CHF     Lung Cancer Father      Family History Negative Sister         Good Health     Heart Disease Sister         Heart Disease,pacemaker     Breast Cancer Daughter         Cancer-breast     Diabetes Son         Diabetes,Type 1     Alcoholism Son      Prostate Cancer No family hx of         Cancer-prostate   No other cancers in first or second degree relatives.    Baseline Review of Systems (prior to radiation therapy; supplemental to the History)   ROS (score of 0 indicates that symptom is at baseline for most sections below): See Flowsheet for additional comments as indicated.  No Pain (0)       Patient Health Questionnaire-9 (PHQ-9)     PHQ 4/23/2020 8/5/2020 2/24/2022   PHQ-9 Total Score 0 0 3   Q9: Thoughts of better off dead/self-harm past 2 weeks Not at all Not at all Not at all     Physical Exam   KPS: 70 (cares for self but unable to carry on normal activity or do active work) due to cognitive deficits but remains easily functionally independent and has a good quality of life.  ECOG Status: 2 (Ambulatory and capable of all selfcare but unable to carry out any work activities; may need help with IADLs up and about > 50% of waking hours)  Vitals: Wt 73.8 kg (162 lb 9.6 oz)   BMI 25.47 kg/m    Wt Readings from Last 3 Encounters:   05/25/22 73.8 kg (162 lb 9.6 oz)   05/22/22 73.8 kg (162 lb 9.6 oz)   05/20/22 74.6 kg (164 lb 8 oz)     Clinical Examination:  General: No cough or hiccup. No clearing of throat. Less bright upbeat, bright, dynamic, engaging, and jovial than at consult. Still initiates conversation and lines of thought. Appears mildly fatigued but not worn. Appears clinically/medically stable. Appears very fit and in good general  health. Overweight (BMI 25-29). Appears stated age. Appears well-developed, well-nourished, and in no acute distress. Does not appear acutely or chronically ill. Appears well groomed.  Head: No alopecia. Normocephalic.  Eyes: Glasses. Anicteric, vision intact.  ENT: Bilateral hearing aids. Good volume. No hoarseness.  Neck: Well-healed left neck surgical scar. No crepitus. Soft, supple, symmetric, trachea midline.  Lymphatics: Deferred.  Chest/Breasts: Slight erythema of the chest wall. No port. No implanted cardiac device.  Lungs: Single slight cough during visit. No hiccups. Easy respirations, no use of accessory muscles  Cardiovascular: No jugulovenous distension. No carotid pulsations.  Abdomen: Nondistended.  Pelvis: Nondistended.  MSK: Muscular build. Good muscle tone. Good posture.  Integument: No jaundice or pallor.   Neurologic: Right-handed. Mild intention tremors. Alert, oriented, fluent. Cognitive deficits are subtle (neurology is counseled the patient to not drive). Memory intact on observation. No errors in recall or distant memory. Wife did not correct his answers. Patient asked insightful questions and follow-up questions. EOMI. Motor intact. Sensory intact. No ataxia.  Psychologic: Well-spoken about his cancer and therapy. Good dynamic with his wife. Upbeat, relaxed, confident, engaging, and motivated. Pleasant, cooperative, insightful, and concrete. Good judgment, logical thinking, good thought process, concrete, abstracts. Did not require redirection for repeating of questions. Not tangential.    Physician Time on Day of Encounter, and [facility] Data Reviewed and Analyzed on Day of Encounter   Not applicable.    Physician Radiation Therapy Information Review   Radiation Oncology weekly review: Reviewed available labs and diagnostic studies. Interpreted as adequate to proceed with radiation therapy. Discussed management with Therapy, Treatment Planning, and Nursing. Review of weekly routine QA, linac  imaging, and clinical set up are adequate to proceed with radiation therapy as prescribed.    Physician Radiation Therapy Assessment   Routine tolerance to radiation therapy.    Physician Radiation Therapy Plan   No new radiation therapy interventions. No boost/cone down. Again reviewed the graphical 3D plan with the patient with attention to dose to the lung, heart, mediastinum, upper lobe lesion, spinal cord, liver, etc. Reviewed anticipated time course, nature, and potential interventions for managing toxicity during and after radiation therapy. Patient aware of onsite and telemedicine coverage of our clinic. He wished to proceed with treatment. All questions answered to the satisfaction of the patient and his family.      Faisal Marie MD, PhD  Department of Radiation Oncology  Tel: [phone]  Fax: [phone]    Care Team:  Miguel Chambers D.O. (interventional pulmonary medicine)  Ace Jackson III, M.D. (medical oncology)  Mukesh Olivera M.D., Ph.D. (medical oncology)  DEEPA Yates M.D. (ophthalmology)  Ag Doan M.D. (surgery)  Beronica Sands N.P. (neurology)  Jovanni Sen M.D. (medicine)

## 2022-05-25 NOTE — TELEPHONE ENCOUNTER
Received an APPROVAL from [facility] for Chlorpromazine 25mg tablets. Effective 05/24/2022 to 12/31/2022. Forms scanned to [facility].

## 2022-05-27 NOTE — PROGRESS NOTES
Patient seen in clinic post treatment today for complaints of dizziness and being overly tired. The wife explains he became tired and dizzy when they got to clinic today. He recently started taking his thorazine for the hiccups which did stop the hiccups, however they feel that the medication is the cause for his drowsiness. Discussed case with Dr. Marie.     Patient is dosing off during the visit, however is easily responsive to voice. He is often tired/ sleepy during visits. Plan of care is to switch medications to low dose gabapentin. Medication sent to preferred pharmacy. He is to STOP taking the Thorazine. Patient and wife agreeable to plan.    Educated to seek emergency medical care if any new or worsening symptoms.

## 2022-05-31 PROBLEM — G25.81 RESTLESS LEGS SYNDROME: Status: ACTIVE | Noted: 2022-01-01

## 2022-05-31 NOTE — PATIENT INSTRUCTIONS
Blood pressure is well controlled.   Diabetes is still controlled.     + Higher glucose levels, due to steroids with the chemotherapy.     Medications refilled.   Labs are otherwise stable.     Results for orders placed or performed in visit on 05/31/22   UA reflex to Microscopic     Status: Abnormal   Result Value Ref Range    Color Urine Yellow Colorless, Straw, Light Yellow, Yellow    Appearance Urine Clear Clear    Glucose Urine Negative Negative mg/dL    Bilirubin Urine Negative Negative    Ketones Urine Negative Negative mg/dL    Specific Gravity Urine 1.015 1.000 - 1.030    Blood Urine Negative Negative    pH Urine 8.0 5.0 - 9.0    Protein Albumin Urine Negative Negative mg/dL    Urobilinogen Urine 3.0 (A) Normal, 2.0 mg/dL    Nitrite Urine Negative Negative    Leukocyte Esterase Urine Negative Negative    Narrative    Microscopic not indicated   Results for orders placed or performed in visit on 05/31/22   Comprehensive metabolic panel     Status: Abnormal   Result Value Ref Range    Sodium 128 (L) 134 - 144 mmol/L    Potassium 4.9 3.5 - 5.1 mmol/L    Chloride 94 (L) 98 - 107 mmol/L    Carbon Dioxide (CO2) 30 21 - 31 mmol/L    Anion Gap 4 3 - 14 mmol/L    Urea Nitrogen 7 7 - 25 mg/dL    Creatinine 0.64 (L) 0.70 - 1.30 mg/dL    Calcium 8.8 8.6 - 10.3 mg/dL    Glucose 137 (H) 70 - 105 mg/dL    Alkaline Phosphatase 58 34 - 104 U/L    AST 15 13 - 39 U/L    ALT 15 7 - 52 U/L    Protein Total 6.4 6.4 - 8.9 g/dL    Albumin 3.7 3.5 - 5.7 g/dL    Bilirubin Total 0.5 0.3 - 1.0 mg/dL    GFR Estimate >90 >60 mL/min/1.73m2   Lipid Profile     Status: None   Result Value Ref Range    Cholesterol 100 <200 mg/dL    Triglycerides 65 <150 mg/dL    Direct Measure HDL 47 23 - 92 mg/dL    LDL Cholesterol Calculated 40 <=100 mg/dL    Non HDL Cholesterol 53 <130 mg/dL    Patient Fasting > 8hrs? Yes     Narrative    Cholesterol  Desirable:  <200 mg/dL    Triglycerides  Normal:  Less than 150 mg/dL  Borderline High:  150-199  mg/dL  High:  200-499 mg/dL  Very High:  Greater than or equal to 500 mg/dL    Direct Measure HDL  Female:  Greater than or equal to 50 mg/dL   Male:  Greater than or equal to 40 mg/dL    LDL Cholesterol  Desirable:  <100mg/dL  Above Desirable:  100-129 mg/dL   Borderline High:  130-159 mg/dL   High:  160-189 mg/dL   Very High:  >= 190 mg/dL    Non HDL Cholesterol  Desirable:  130 mg/dL  Above Desirable:  130-159 mg/dL  Borderline High:  160-189 mg/dL  High:  190-219 mg/dL  Very High:  Greater than or equal to 220 mg/dL   CBC with platelets     Status: Abnormal   Result Value Ref Range    WBC Count 7.9 4.0 - 11.0 10e3/uL    RBC Count 3.83 (L) 4.40 - 5.90 10e6/uL    Hemoglobin 11.9 (L) 13.3 - 17.7 g/dL    Hematocrit 34.9 (L) 40.0 - 53.0 %    MCV 91 78 - 100 fL    MCH 31.1 26.5 - 33.0 pg    MCHC 34.1 31.5 - 36.5 g/dL    RDW 15.0 10.0 - 15.0 %    Platelet Count 322 150 - 450 10e3/uL   Hemoglobin A1c     Status: Abnormal   Result Value Ref Range    Hemoglobin A1C 7.7 (H) 4.0 - 6.2 %   TSH with free T4 reflex     Status: Normal   Result Value Ref Range    TSH 3.05 0.40 - 4.00 mU/L     Aspects of Diabetes:   Recent Labs   Lab Test 05/31/22  0836 05/21/22  0334 03/03/22  1019 12/16/21  0843 09/16/21  0832 09/16/21  0832 06/01/21  0937 02/23/21  1200   A1C 7.7*  --   --  7.2*  --  7.1* 7.2* 7.1*   LDL 40  --   --  47  --  41 50 47   HDL 47  --   --  46  --  45 40 38   TRIG 65  --   --  110  --  87 91 94   ALT 15 13  --  18   < > 17 22 19   CR 0.64* 0.52*   < > 0.91   < > 0.93 0.84 0.76   GFRESTIMATED >90 >90   < > 79   < > 77 88 >90   GFRESTBLACK  --   --   --   --   --   --  >90 >90   POTASSIUM 4.9 4.2  --  5.0   < > 4.8 4.2 4.6   TSH 3.05 1.44  --  7.46*   < > 5.29*  --   --     < > = values in this interval not displayed.      Hemoglobin A1c  Goal range is under 8%. Best is 6.5 to 7   Blood Pressure 132/66 Goal to keep less than 140/90   Tobacco  reports that he quit smoking about 21 years ago. His smoking use included  cigarettes. He has a 129.00 pack-year smoking history. He has never used smokeless tobacco. Goal to abstain from tobacco   Aspirin or Plavix Anti-platelet therapy Aspirin or Plavix reduces risk of heart disease and stroke  -- sometimes used with other blood thinners, depending on bleeding risk and risk factors.    ACE/ARB Specific blood pressure meds These medications can reduce risk of kidney disease   Cholesterol Statins (Lipitor, Crestor, vs others) Statins reduce risk of heart disease and stroke   Eye Exam -- Do Yearly -- Annual diabetic eye exam   Healthy weight Wt Readings from Last 3 Encounters:   05/31/22 75.5 kg (166 lb 6.4 oz)   05/25/22 73.8 kg (162 lb 9.6 oz)   05/22/22 73.8 kg (162 lb 9.6 oz)     Body mass index is 26.06 kg/m .  Goal BMI under 30, best is under 25.      -- Trying to exercise daily (goal at least 20 min/day) with moderate aerobic activity   -- Eat healthy (resources from ADA at http://www.diabetes.org/)   -- Taking good care of my feet. Consider seeing the Podiatrist   -- Check blood sugars as directed, record in log book and bring to every appointment    Insurance companies are grading you and I on your blood sugar control -- Goal is to get your A1c down to 7.9% or lower and NO Smoking!  -- Medicare and most insurance companies, will only cover Hemoglobin A1c labs to be rechecked every 91+ days.      Return for Diabetes labs and clinic follow-up appointment every 3 to 4 months.    Schedule lab only appointment --- A few days AFTER: 8/29/22   Schedule clinic appointment with Dr. Sen -- Same day as labs, or 1-2 days later.    Patient Education   Personalized Prevention Plan  You are due for the preventive services outlined below.  Your care team is available to assist you in scheduling these services.  If you have already completed any of these items, please share that information with your care team to update in your medical record.  Health Maintenance Due   Topic Date Due      "Diptheria Tetanus Pertussis (DTAP/TDAP/TD) Vaccine (3 - Td or Tdap) 02/23/2022       Depression and Suicide in Older Adults    Nearly 2 million older Americans have some type of depression. Some of them even take their own lives. Yet depression among older adults is often ignored. Learn the warning signs. You may help spare a loved one needless pain. You may also save a life.   What is depression?  Depression is a common and serious illness that affects the way you think and feel. It is not a normal part of aging, nor is it a sign of weakness, a character flaw, or something you can snap out of. Most people with depression need treatment to get better. The most common symptom is a feeling of deep sadness. People who are depressed also may seem tired and listless. And nothing seems to give them pleasure. It s normal to grieve or be sad sometimes. But sadness lessens or passes with time. Depression rarely goes away or improves on its own. A person with clinical depression can't \"snap out of it.\" Other symptoms of depression are:     Sleeping more or less than normal    Eating more or less than normal    Having headaches, stomachaches, or other pains that don t go away    Feeling nervous,  empty,  or worthless    Crying a great deal    Thinking or talking about suicide or death    Loss of interest in activities previously enjoyed    Social isolation    Feeling confused or forgetful  What causes it?  The causes of depression aren t fully known. But it is thought to result from a complex blend of these factors:     Biochemistry. Certain chemicals in the brain play a role.    Genes. Depression does run in families.    Life stress. Life stresses can also trigger depression in some people. Older adults often face many stressors, such as death of friends or a spouse, health problems, and financial concerns.    Chronic conditions. This includes conditions such as diabetes, heart disease, or cancer. These can cause symptoms of " depression. Medicine side effects can cause changes in thoughts and behaviors.  How you can help  Often, depressed people may not want to ask for help. When they do, they may be ignored. Or, they may receive the wrong treatment. You can help by showing parents and older friends love and support. If they seem depressed, don t lecture the person, ignore the symptoms, or discount the symptoms as a  normal  part of aging -which they are not. Get involved, listen, and show interest and support.   Help them understand that depression is a treatable illness. Tell them you can help them find the right treatment. Offer to go to their healthcare provider's appointment with them for support when the symptoms are discussed. With their approval, contact a local mental health center, social service agency, or hospital about services.   You can be an advocate for him or her at healthcare appointments. Many older adults have chronic illnesses that can cause symptoms of depression. Medicine side effects can change thoughts and behaviors. You can help make sure that the healthcare provider looks at all of these factors. He or she should refer your family member or friend to a mental healthcare provider when needed. in some cases, untreated depression can lead to a misdiagnosis. A person may be diagnosed with a brain disorder such as dementia. If the healthcare provider does not take the issue of depression seriously, help your family member or friend to find another provider.   Don't be afraid to ask  If you think an older person you care about could be suicidal, ask,  Have you thought about suicide?  Most people will tell you the truth. If they say  yes,  they may already have a plan for how and when they will attempt it. Find out as much as you can. The more detailed the plan, and the easier it is to carry out, the more danger the person is in right now. Tell the person you are there for them and do not want them to harm him or  herself. Don't wait to get help for the person. Call the person's healthcare provider, local hospital, or emergency services.   To learn more    National Suicide Prevention Lifeline (crisis hotline) 617-415-DDGA (141-462-4523)    National [place] of Mental Szdhxg416-542-5736mxp.[facility].nih.gov    National [place] on Mental Wxrynkt172-314-3194xju.tiit.org    Mental Health Zfjusxq038-228-5235mkm.[facility].org    National Suicide Ncoindj192-QLHFCXD (271-045-6940)    Call 911  Never leave the person alone. A person who is actively suicidal needs psychiatric care right away. They will need constant supervision. Never leave the person out of sight. Call 911 or the national 24-hour suicide crisis hotline at 647-597-FNTS (142-193-5621). You can also take the person to the closest emergency room.   Kamlesh last reviewed this educational content on 5/1/2020 2000-2021 [employer]. All rights reserved. This information is not intended as a substitute for professional medical care. Always follow your healthcare professional's instructions.

## 2022-05-31 NOTE — PROGRESS NOTES
Radiation Oncology - Clinic Note      Received fax from [facility] in [place] that they are not able to obtain Thorazine liquid. Called [facility] in [place] who again stated that they believe they have access to this formulation. Anticipate delivery by tomorrow if prescription submitted today. Therefore, [facility] prescription canceled, and [facility] prescription submitted for Thorazine liquid starting at 5 mg as needed for severe hiccups. Son notified by telephone of the pharmacy change and that the medication would not be available until tomorrow at the earliest. For now, patient will remain off Thorazine and other medications for hiccups. Will discuss with the patient and his wife at his next OTV (no radiation therapy tomorrow due to port placement).      Faisal Marie M.D., Ph.D.  Department of Radiation Oncology  Tel: [phone]  Fax: [phone]

## 2022-05-31 NOTE — PROGRESS NOTES
Radiation Oncology - Clinic Note (1:36 PM)      Telephone patient and wife at home. Patient doing well off medications for hiccups. We will therefore continue to observe. Will submit prescription for liquid Thorazine as backup if hiccups return.      Faisal Marie M.D., Ph.D.  Department of Radiation Oncology  Tel: [phone]  Fax: [phone]

## 2022-05-31 NOTE — NURSING NOTE
"No chief complaint on file.        Initial /66 (BP Location: Right arm, Patient Position: Sitting, Cuff Size: Adult Large)   Pulse 72   Temp 97.7  F (36.5  C) (Tympanic)   Resp 20   Ht 1.702 m (5' 7\")   Wt 75.5 kg (166 lb 6.4 oz)   SpO2 97%   BMI 26.06 kg/m   Estimated body mass index is 26.06 kg/m  as calculated from the following:    Height as of this encounter: 1.702 m (5' 7\").    Weight as of this encounter: 75.5 kg (166 lb 6.4 oz).       FOOD SECURITY SCREENING QUESTIONS:    The next two questions are to help us understand your food security.  If you are feeling you need any assistance in this area, we have resources available to support you today.    Hunger Vital Signs:  Within the past 12 months we worried whether our food would run out before we got money to buy more. Never  Within the past 12 months the food we bought just didn't last and we didn't have money to get more. Never    Advance Care Directive on file? yes      Medication reconciliation complete.      Jay Cannon,on 5/31/2022 at 10:02 AM          "

## 2022-05-31 NOTE — PROGRESS NOTES
SUBJECTIVE:   Rajesh Huynh is a 82 year old male who presents for Preventive Visit.      Patient has been advised of split billing requirements and indicates understanding: No  Are you in the first 12 months of your Medicare coverage?  Yes,  History of Present Illness       Diabetes:   He presents for follow up of diabetes.  He is checking home blood glucose one time daily. He checks blood glucose before meals.  Blood glucose is never over 200 and never under 70. He is aware of hypoglycemia symptoms including confusion. He has no concerns regarding his diabetes at this time.  He is not experiencing numbness or burning in feet, excessive thirst, blurry vision, weight changes or redness, sores or blisters on feet. The patient has had a diabetic eye exam in the last 12 months. Eye exam performed on 09/2021. Location of last eye exam [facility]  [facility].        Reason for visit:  Lab work ,feeling tired ,up date on  what s  going on.  Symptom onset:  More than a month  Symptom intensity:  Moderate  Symptom progression:  Improving  Had these symptoms before:  No    He eats 2-3 servings of fruits and vegetables daily.He consumes 0 sweetened beverage(s) daily.He exercises with enough effort to increase his heart rate 9 or less minutes per day.  He exercises with enough effort to increase his heart rate 3 or less days per week.   He is taking medications regularly.    Today's PHQ-9         PHQ-9 Total Score: 17    PHQ-9 Q9 Thoughts of better off dead/self-harm past 2 weeks :   Not at all    How difficult have these problems made it for you to do your work, take care of things at home, or get along with other people: Very difficult    Do you feel safe in your environment? Yes    Have you ever done Advance Care Planning? (For example, a Health Directive, POLST, or a discussion with a medical provider or your loved ones about your wishes): Yes, patient states has an Advance Care Planning document and will bring a copy to  the clinic.     Fall risk  Fallen 2 or more times in the past year?: No  Any fall with injury in the past year?: No    Cognitive Screening   1) Repeat 3 items (Leader, Season, Table)    2) Clock draw: NORMAL  3) 3 item recall: Recalls 2 objects   Results: NORMAL clock, 1-2 items recalled: COGNITIVE IMPAIRMENT LESS LIKELY    Mini-CogTM Copyright JASEN Jackson. Licensed by the author for use in [facility]; reprinted with permission (jules@[facility]). All rights reserved.      Do you have sleep apnea, excessive snoring or daytime drowsiness?: yes    Reviewed and updated as needed this visit by clinical staff   Tobacco  Allergies  Meds  Problems  Med Hx  Surg Hx  Fam Hx  Soc   Hx          Reviewed and updated as needed this visit by Provider   Tobacco  Allergies  Meds  Problems  Med Hx  Surg Hx  Fam Hx           Social History     Tobacco Use     Smoking status: Former Smoker     Packs/day: 3.00     Years: 43.00     Pack years: 129.00     Types: Cigarettes     Quit date: 2001     Years since quittin.4     Smokeless tobacco: Never Used   Substance Use Topics     Alcohol use: Not Currently     No flowsheet data found.    Current providers sharing in care for this patient include:   Patient Care Team:  Jovanni Sen MD as PCP - General (Internal Medicine)  Jovanni Sen MD as Assigned PCP  Ag Doan MD as Assigned Surgical Provider  Beronica Sands, Mukesh Jameson MD as MD (Hematology & Oncology)  Miguel Chambers DO as MD (Internal Medicine)  Clem Yates MD as MD (Ophthalmology)  Faisal Marie MD as Assigned Cancer Care Provider    The following health maintenance items are reviewed in Epic and correct as of today:  Health Maintenance Due   Topic Date Due     DTAP/TDAP/TD IMMUNIZATION (3 - Td or Tdap) 2022     Review of Systems   Constitutional: Positive for fatigue. Negative for chills and fever.   HENT: Negative for congestion and hearing loss.    Eyes: Negative for  "visual disturbance.   Respiratory: Positive for cough (mostly clear phlegm ) and shortness of breath. Negative for wheezing.         + Right pulmonary nodule and mass   Cardiovascular: Negative for chest pain and palpitations.   Gastrointestinal: Positive for abdominal pain (+intermittent) and diarrhea (+ much improved with Creon. still intermittent diarrhea). Negative for nausea and vomiting.   Endocrine: Negative for cold intolerance and heat intolerance.        + Trying to eat less carbs, + Hyperglycemia   Genitourinary: Negative for dysuria and hematuria.   Musculoskeletal: Negative for arthralgias and myalgias.   Skin: Negative for rash and wound.   Allergic/Immunologic: Positive for immunocompromised state (Getting steroids with chemotherapy/radiation treatments for Lung Cancer).   Neurological: Positive for dizziness, tremors and light-headedness.        + some balance problems   Hematological: Does not bruise/bleed easily.   Psychiatric/Behavioral: Positive for confusion. Negative for agitation. The patient is nervous/anxious.         + Memory problems       OBJECTIVE:   /66 (BP Location: Right arm, Patient Position: Sitting, Cuff Size: Adult Large)   Pulse 72   Temp 97.7  F (36.5  C) (Tympanic)   Resp 20   Ht 1.702 m (5' 7\")   Wt 75.5 kg (166 lb 6.4 oz)   SpO2 97%   BMI 26.06 kg/m   Estimated body mass index is 26.06 kg/m  as calculated from the following:    Height as of this encounter: 1.702 m (5' 7\").    Weight as of this encounter: 75.5 kg (166 lb 6.4 oz).  Physical Exam  Constitutional:       General: He is not in acute distress.     Appearance: He is well-developed. He is not diaphoretic.   HENT:      Head: Normocephalic and atraumatic.   Eyes:      General: No scleral icterus.     Conjunctiva/sclera: Conjunctivae normal.   Cardiovascular:      Rate and Rhythm: Normal rate and regular rhythm.      Pulses: Normal pulses.   Pulmonary:      Effort: Pulmonary effort is normal.      Breath " sounds: Normal breath sounds.   Abdominal:      Palpations: Abdomen is soft.      Tenderness: There is no abdominal tenderness.   Musculoskeletal:         General: No deformity.      Cervical back: Neck supple.      Right lower leg: Edema present.      Left lower leg: Edema present.   Lymphadenopathy:      Cervical: No cervical adenopathy.   Skin:     General: Skin is warm and dry.      Coloration: Skin is not jaundiced.      Findings: No lesion or rash.   Neurological:      Mental Status: He is alert and oriented to person, place, and time. Mental status is at baseline.      Comments: + memory loss   Psychiatric:         Mood and Affect: Mood normal.         Behavior: Behavior normal.        Recent Labs   Lab Test 05/31/22  0836 05/21/22  0334 03/03/22  1019 12/16/21  0843 09/16/21  0832 09/16/21  0832 06/01/21  0937 02/23/21  1200   A1C 7.7*  --   --  7.2*  --  7.1* 7.2* 7.1*   LDL 40  --   --  47  --  41 50 47   HDL 47  --   --  46  --  45 40 38   TRIG 65  --   --  110  --  87 91 94   ALT 15 13  --  18   < > 17 22 19   CR 0.64* 0.52*   < > 0.91   < > 0.93 0.84 0.76   GFRESTIMATED >90 >90   < > 79   < > 77 88 >90   GFRESTBLACK  --   --   --   --   --   --  >90 >90   POTASSIUM 4.9 4.2  --  5.0   < > 4.8 4.2 4.6   TSH 3.05 1.44  --  7.46*   < > 5.29*  --   --     < > = values in this interval not displayed.   A1c is controlled. LDL, HDL and triglycerides are controlled. ALT is normal. Creatinine is at baseline. Potassium and TSH are normal.     ASSESSMENT / PLAN:       ICD-10-CM    1. Benign essential hypertension  I10    2. Controlled type 2 diabetes mellitus without complication, without long-term current use of insulin (H)  E11.9 Albumin Random Urine Quantitative with Creat Ratio     UA reflex to Microscopic     metFORMIN (GLUCOPHAGE) 1000 MG tablet     Hemoglobin A1c     CBC with platelets     Comprehensive metabolic panel   3. Atherosclerosis of native coronary artery of native heart without angina pectoris   I25.10    4. Exocrine pancreatic insufficiency - Severe Deficiency  K86.81 amylase-lipase-protease (CREON 36) [id number] units CPEP   5. History of pancreatic surgery - [facility]  Z98.890 amylase-lipase-protease (CREON 36) [id number] units CPEP   6. Hypothyroidism due to acquired atrophy of thyroid  E03.4 levothyroxine (SYNTHROID/LEVOTHROID) 50 MCG tablet     TSH with free T4 reflex   7. Lewy body dementia without behavioral disturbance (H)  G31.83     F02.80    8. Malignant neoplasm of lower lobe of right lung (H)  C34.31    9. Malignant neoplasm of upper lobe of right lung (H)  C34.11    10. Mixed hyperlipidemia  E78.2 atorvastatin (LIPITOR) 40 MG tablet     Lipid Profile   11. Simple chronic bronchitis (H)  J41.0    12. Heartburn  R12 omeprazole (PRILOSEC) 20 MG DR capsule   13. Weak urinary stream  R39.12 tamsulosin (FLOMAX) 0.4 MG capsule   14. Restless legs syndrome  G25.81 primidone (MYSOLINE) 50 MG tablet   15. Vaccine counseling  Z71.85    16. Encounter for Medicare annual wellness exam  Z00.00    Patient is brought in by his wife for Medicare annual wellness visit and follow-up multiple issues.    Hypertension.  Blood pressure is currently well controlled.  Denies syncope or presyncope.  Doing well with current medications.  No changes for now.    Type 2 diabetes, blood sugars are higher than baseline but he has been getting steroids with his chemotherapy treatments.  Also getting radiation therapy for his lung cancer.  Needs refills of metformin.  Lab work today.    Coronary disease, appears stable.  Denies exertional angina.  No changes for now.    Severe pancreatic exocrine deficiency.  History of pancreatic surgery.  Continues with Creon.  Needs refills.    Hypothyroidism.  Labs have been stable.  Needs refills.    Lewy body dementia.  No behavioral disturbances.  Wife indicates his memory issues are approximately at baseline.    Recent diagnosis of upper and lower lung cancer.  Getting  "radiation and chemo.    Mixed hyperlipidemia.  Cholesterol levels are at goal.  Continues with Lipitor.  Needs refills.    Simple chronic bronchitis.  Breathing appears to be at baseline.  No changes for now.    Heartburn, had more problems and increase his omeprazole to twice daily dosing.  Needs refills.    Weak urine stream.  Has been an ongoing issue.  History of TURP in the past.  Still taking Flomax.  Needs refills.    Restless leg syndrome.  Ongoing.  Primidone has been used for years and seems effective.  Needs refills.    Vaccine counseling completed.  Encouraged consideration of tetanus vaccination.  COVID vaccination is up-to-date.    Patient has been advised of split billing requirements and indicates understanding: Yes    COUNSELING:  Reviewed preventive health counseling, as reflected in patient instructions  Special attention given to:       Regular exercise       Healthy diet/nutrition       Vision screening       Hearing screening       Dental care       Bladder control       Fall risk prevention       Immunizations    Estimated body mass index is 26.06 kg/m  as calculated from the following:    Height as of this encounter: 1.702 m (5' 7\").    Weight as of this encounter: 75.5 kg (166 lb 6.4 oz).        He reports that he quit smoking about 21 years ago. His smoking use included cigarettes. He has a 129.00 pack-year smoking history. He has never used smokeless tobacco.      Appropriate preventive services were discussed with this patient, including applicable screening as appropriate for cardiovascular disease, diabetes, osteopenia/osteoporosis, and glaucoma.  As appropriate for age/gender, discussed screening for colorectal cancer, prostate cancer, breast cancer, and cervical cancer. Checklist reviewing preventive services available has been given to the patient.    Reviewed patients plan of care and provided an AVS. The Complex Care Plan (for patients with higher acuity and needing more deliberate " coordination of services) for Rajesh meets the Care Plan requirement. This Care Plan has been established and reviewed with the Patient and spouse.    Counseling Resources:  ATP IV Guidelines  Pooled Cohorts Equation Calculator  Breast Cancer Risk Calculator  Breast Cancer: Medication to Reduce Risk  FRAX Risk Assessment  ICSI Preventive Guidelines  Dietary Guidelines for Americans, 2010  USDA's MyPlate  ASA Prophylaxis  Lung CA Screening    Jovanni Sen MD  [facility]    Identified Health Risks:    The patient s PHQ-9 score is consistent with moderate depression. He was provided with information regarding depression and was advised to schedule a follow up appointment in 4 to 12 weeks to further address this issue.

## 2022-05-31 NOTE — TELEPHONE ENCOUNTER
Patient did not arrive for scheduled radiation therapy appointment, called patient and left voicemail. Patient does have other appointments today, possibly running late.

## 2022-05-31 NOTE — PROGRESS NOTES
Radiation Oncology - Clinic Note      ADDENDUM 05/31/2022 (1:24 PM): Medicine and medical oncology graciously reviewed the case with me. Due to updated chemotherapy regimen, patient might not require prophylactic steroid therapy today for chemotherapy.      Briefly evaluated prior to today's treatment. Reviewed management in clinic with his wife with JOSAFAT. KPS 70. Patient appears bright and upbeat but nevertheless using a walker for ambulation for safety purposes.     The patient remains without hiccups despite stopping Thorazine last week. The wife and I again reviewed my concern that paroxysms of hiccups might start again, especially after today's chemoradiation. Reviewed the likely pathophysiology and contributors to the hiccups as well as potential risks versus benefits of pharmacologic intervention in the setting of the patient's chronic neurologic syndrome. Due to the severity of the prior episodes of hiccups, I again reviewed my recommendation for resuming Thorazine if needed but with a liquid form so that dose could be titrated upwards to the minimum effective dose and then discontinue it again (the dose would start at 5 mg liquid rather than the prior dose of 25 mg tablet). Again reviewed the potential interactions of the medication with the patient's neurologic disease, Lewy body disease. Discussed the fact that Thorazine can worsen the Parkinson-like symptoms and that the symptoms have been the principal focus and component of neurologic management rather than dementia. Discussed the fact that alternative medications can also adversely affect Parkinson's-like symptoms as well as cognitive function and other manifestations of the patient's neurologic syndrome. The wife did not fill last week's prescription for gabapentin. She is aware that the liquid Thorazine likely will not be available until tomorrow. At tomorrow's scheduled OTV, will discuss whether or not the new Thorazine prescription should be  picked up. The patient's wife wished to proceed as recommended. I answered all questions to her satisfaction. Will review the the extended multidisciplinary oncology team.       Faisal Marie M.D., Ph.D.  Department of Radiation Oncology  Tel: [phone]  Fax: [phone]

## 2022-06-01 NOTE — TELEPHONE ENCOUNTER
Per pharmacy fax chlorproMAZINE (THORAZINE) 50 mg/mL solution is not stocked at this phamacy. Available in 30mg/ml or 100mg/ml. Brand name comes in 10 mg/ml.

## 2022-06-02 NOTE — PROGRESS NOTES
"[facility]  DEPARTMENT OF RADIATION ONCOLOGY  ON TREATMENT VISIT (OTV) NOTE    Name: Rajesh Huynh MRN: [id number]   : 1940 (82 year old)  Date of Service: 2022 Referring: Dr. Chambers and Dr. Olivera        Diagnosis and Cancer Staging  Malignant neoplasm of lower lobe of right lung (H)  Staging form: Lung, AJCC 8th Edition  - Clinical stage from 2022: Stage IIIA (cT4, cN0, cM0) - Signed by Faisal Marie MD on 2022    Malignant neoplasm of upper lobe of right lung (H)  Staging form: Lung, AJCC 8th Edition  - Clinical stage from 2022: Stage IA2 (cT1b, cN0, cM0) - Signed by Faisal Marie MD on 2022      Radiation Therapy   Site: Right lower lobe, hilum, and lower mediastinum with concurrent chemotherapy (followed by SBRT to the right upper lobe).   Treatment to Date    Received 15 fraction(s) = 3000 cGy (at 200 each)    Remaining 18 fraction(s)    Goal 33 fractions = 6600 cGy     Concurrent chemoradiation week 0 (fractions 1-5): Grade 1 hiccups possibly due to radiation therapy (CTCAE 5.0).  Week 1 (06-10): Grade 2 cough and hiccups (Baclofen then Thorazine).  Week 2 (11-15): Grade 0 toxicity.  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  Delivery method: Intensity modulated radiation therapy (IMRT) with static gantry angles.   Energy: 6 MV.  Dose:  25 fractions of 200 cGy each for 5000 cGy.  Dates: 2012-2012 (Elapsed days: 32).    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 Right upper lobe TTF-1 positive moderately to poorly differentiated squamous cell carcinoma measuring 1.3-cm (cT1b). Nodes: N0 per PET-CT. Metastasis: M0 per PET-CT and MRI. Markers: Opposing TTF-1 staining as above). Based upon the different TTF-1 staining profile, note that elected to designate the patient as having 2 synchronous ipsilateral lung cancers rather than a single T4 cancer (metastasis to a separate ipsilateral lobe). Diagnostic biopsy 04/13/2022. 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) treated with surgery and radiation therapy as above (no chemotherapy), 2014 TURBT for non-invasive bladder cancer (low-grade papillary urothelial carcinoma without muscle invasion), 2021 local excision for left ear basal cell carcinoma (positive margin being observed), chronic occasional cough since the 2011 epiglottic surgery, bilateral shoulder replacements (good range of motion but caution for positioning for radiation therapy), Lewy body dementia (signs medical sent; does not drive), mild extremity tremors, chronic myeloproliferative disorder (carries the diagnosis stomach no transfusion), peptic ulcer disease, gastroesophageal reflux disease, 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  04/06/2022 PET-CT    (Please note that EMR interfaces between institutions can result in loss of embedded images.)    Recent History (CE)     Note includes clinic visit and  "subsequent calls to pharmacies, patient, and wife.     General: Accompanied by his wife. Offers no new complaints. Infusion port placed on 06/01/2022.     Chemotherapy: Concurrent cytotoxic chemotherapy.  Regimen: Weekly carboplatin and Abraxane (C1D1 05/11/2022). Prophylactic steroids stopped on 05/31/2022.  Most recent: 05/31/2022 (C4D1).  Next dose: 06/07/2022.  Labs: Reviewed from 05/31/2022. Adequate to continue therapy.  Growth factors: None.  Scheduled supplemental IV fluids: None.    Hiccups, cough, dyspnea: No additional paroxysms of hiccups and cough. Remains off medications (Thorazine held, Baclofen stopped. Gabapentin not started). Since receiving Abraxane, no dexamethasone with most recent dose of chemotherapy. No overall change in neurologic symptoms (gradually worsening over weeks to months prior to the start of radiation therapy).  Index: Grade 0 since no additional episodes and no active medication.  Intervention: Due to lack of access, prescription changed from liquid to back to tablet (10-mg). Tablet can be split per pharmacy. Prescription now at [facility]. If hiccups return, will start with 5-mg dosing and titrate upward to the lowest effective dose.   Impression: Reviewed anatomy and pathophysiology (images, plan, presumed diaphragmatic tension due to tethering of disease to the diaphragm, mediastinum, and chest wall). Reviewed management with extended oncology team. Reviewed issues due to age, comorbidities, \"dementia\" (neurology managing Lewy body disease mainly for Parkinson-like symptoms and rather than dementia). Aware or potential severe risks of Thorazine and that neurologic symptoms might worsen with Thorazine.     Fatigue: Remains highly variable day-to-day and hour-to-hour. Chronically poor nighttime sleep (baseline: multiple naps daily).  Index: Grade 0.  Intervention: Observe.  Impression: Progressive changes possible within 1-2 weeks.    Pain: Denies pleuritic " pain.  Index: Grade 0.  Intervention: Observe.  Impression: Anticipate changes within 1-2 weeks.    Mucositis, xerostomia, skin: Asymptomatic.  Index: Grade 0.  Intervention: Observe.  Impression: Rapid changes possible within 1-2 weeks.    Dysgeusia, anorexia, FEN, weight: Stable at baseline.  Index: Grade 0.  Intervention: Observe.  Impression: Rapid changes possible within 1-2 weeks.    Speech & swallowing: Stable at baseline.  Index: Grade 0.  Intervention: Observe.    Disease: Previously denied impression of progression of primary or node disease including symptoms such as worsening breathing, chest wall pain, hiccups, or hemoptysis.  Response: <25% per cone-beam CT.  Intervention: Continue therapy. Re-simulation ordered placed if procedure indicated clinically.  Impression: Anticipate Possible re-aeration of lung that might require a repeat CT simulation and plan of radiation therapy. .    ID: Daily COVID-19 screening negative for barriers to proceeding with radiation therapy.    Follow-up: After radiation therapy, refer back to medical oncology for maintenance therapy. Will also schedule right upper lobe SBRT.    Prior Oncologic History   The patient's oncologic history across multiple institutions is abstracted as follows:    Diagnosis/Stagin Pancreatic benign disease (no pancreatic cancer)    2004 Whipple procedure ([facility]): Procedure recommended for pancreatic duct stone and pancreatitis, and possible pancreatic cancer. Operative report described an uncomplicated procedure. Pathology yielded marked chronic pancreatitis, pancreatic duct calculus (2-cm), benign pancreas pathology, and multiple benign nodes (0/3). No carcinoma.    Patient requires supplemental pancreatic enzymes (Creon) but has a broad normal diet. 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 Left glottis cancer did not clinically extend into the base of tongue or vocal cords. No clinically fixed nodes. Epiglottic specimen yielded a 1.8-cm invasive moderately differentiated squamous cell carcinoma (HPV negative) with lymphovascular invasion and negative surgical margins. Left neck specimen (levels II-V) yielded 3/32 positive nodes with up to 0.5-cm of disease across 3 levels. No extranodal extension. 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    Patient acknowledges slight intolerance of liquids at times without paulo aspiration. Denies xerostomia or poor phonation as of 04/19/2022.  2014 Bladder cancer (non-muscle invasive)    11/10/2014 TURBT ([facility]): Presented with pelvic pain and microscopic hematuria without visible hematuria. Procedure yielded non-invasive low-grade papillary urothelial carcinoma without muscle invasion. Designated as [id number] disease.    10/14/2021 Yearly surveillance cystoscopy: Remained not suspicious for recurrent disease.    Patient requires tamsulosin for lower urinary tract symptoms. Denies visible hematuria as of 04/19/2022.  2021 Left ear basal cell carcinoma (no additional planned surgery )    01/11/2021 Diagnostic brain MRI with contrast. Obtained for cognitive and memory issues. Since 2017, progressive generalized volume loss with mild chronic microvascular changes without lesion suspicious for metastatic disease.    12/31/2021 Wide local excision of the the left ear: Lesion of the posterior ear managed initially with cryotherapy. Remained ulcerated. Excision of basal cell carcinoma with positive margin. Surgical service continue to follow and recommended against additional resection at that time.    Patient wishes to continue surveillance as of 04/19/2022.  2022 Right  upper lobe squamous cell carcinoma (TTF-1 positive) and synchronous right lower lobe squamous cell carcinoma (TTF-1 negative)    01/26/2022 Medicine evaluation: Presented with several weeks of cough and sinus congestion. Subsequently persisted despite antibiotic therapies.    02/24/2022 Diagnostic chest x-ray: Obtained for cough. Since 2018, interval development of a right lung base mass and a right upper lung field mass.    03/03/2022 Diagnostic chest CT with contrast: Right lower lobe solid mass measuring 7-cm with no significant necrosis or loculation. Right upper lobe mass measuring 1-cm. No suspicious hilar or mediastinal nodes.    03/24/2022 Interventional pulmonary consultation: Recommended PET-CT for staging and robot-assisted navigational bronchoscopy with endoscopic ultrasound and biopsies for diagnostic purposes.    04/06/2022 PET-CT (exported): Index mass is a right lower lobe intensely hypermetabolic mass with apparent postobstructive change and necrosis and worsening aeration of the lobe since the CT on 03/03/2022. Second mass is a right upper lobe moderately hypermetabolic lesion measuring 1.3-cm. Possible right middle lobe mass measuring less than 1 cm and faintly hypermetabolic. No suspicious regional crescencio or distant metastatic disease. Small right pleural effusion.    04/13/2022 Navigational bronchoscopy with endoscopic ultrasound and endobronchial biopsies: Operative report described an uncomplicated robotic-assisted procedure. Images demonstrate endobronchial involvement by the right lower lobe tumor. Biopsy of the right lower lobe specimen yielded TTF-1 negative moderately-differentiated squamous cell carcinoma. Biopsy of the right upper lobe yielded TTF-1 positive moderately to poorly differentiated squamous cell carcinoma. Preliminary consensus recommendation of concurrent chemoradiation followed by adjuvant durvalumab for the index right lower lobe mass and SBRT alone for the much smaller  right upper lobe mass.    2022 Brain MRI with contrast: Not clinically suspicious for metastatic disease.    2022 Medical oncology consultation: Consensus recommendation of definitive concurrent chemoradiation for right lower lobe carcinoma and definitive SBRT for right upper lobe carcinoma.    2022 Port placement: [facility].    [place]2022 Neurology follow-up: Lewy body disease and symptomatic complaints of mild memory loss, visual change, imbalance, tremors, etc.    Chemotherapy History: Yes (as above).  Radiation History: Yes (as above).  Implanted Cardiac Devices: None.  Implanted Chest Wall Infusion Port: Left chest wall port.    Past Medical History:   Diagnosis Date     Acute myocardial infarction, unspecified (H) 2013     Basal cell carcinoma of left ear 2021     Bicipital tenosynovitis 2013     CAD (coronary artery disease)      Chronic myeloproliferative disease (H)     questionable     Diabetes mellitus, type 2 (H)      Dysphagia (partial resection of epiglottis) 2013     Essential (primary) hypertension 2004    unspecified     Glucose intolerance 2003     Hiccough 2013     Hyperlipidemia     No Comments Provided     Lewy body dementia (H)      Male erectile dysfunction     No Comments Provided     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)     2015     Malignant neoplasm of lower lobe of right lung (H) 2022     Malignant neoplasm of supraglottis (resolved) 2011,Radiation     Osteoarthritis of shoulder 2013     Peptic ulcer disease 11/10/2003     Pneumonia          Resolved: chronic pancreatitis (has undergone Whipple procedure)     No Comments Provided     Resolved: Pancreatitis 2004     Resolved: Personal history of nicotine dependence     2ppd - quit      Resolved: Thoracic back pain 2010    sharp pain with thoracic movements; s/p 9 visits of PT     Resovled:  Pancreatic duct stones 01/12/2004     ST elevation (STEMI) myocardial infarction (H)     7/5/2013,Integrity BMS to RCA done at [facility]     Subscapularis (muscle) sprain 07/05/2013     Tremor     No Comments Provided     Past Surgical History:   Procedure Laterality Date     APPENDECTOMY OPEN      1970     ARTHROSCOPY SHOULDER      11/2005,right shoulder     ARTHROSCOPY SHOULDER      1/2006,left shoulder     ARTHROSCOPY SHOULDER      2011,Left shoulder scope SAD, ac.  Open subscap, biceps     BRONCHOSCOPY  04/13/2022    bronchoscopy with endobronchial biopsies     CHOLECYSTECTOMY      1994     COLONOSCOPY  02/01/2005 02/2005     COLONOSCOPY  02/19/2015 2/19/2015,no repeat due at this time     CYSTOSCOPY      Multiple times,Dr. Lyndon FRANK     FINE NEEDLE ASPIRATION  04/13/2022    transbronchial needle aspiration and biopsy     HEART CATH, ANGIOPLASTY      07/05/2013,Bare-metal stent to dominant RCA     OTHER SURGICAL HISTORY      2004,006190,OTHER,for chronic pancreatitis     OTHER SURGICAL HISTORY      6/22/2007,[id number],SHOULDER REPLACEMENT,Right,with biceps tenodesis by Dr. Mukesh Ayoub     OTHER SURGICAL HISTORY      1995/2002,,ERCP,xseveral, one with papillotomy     OTHER SURGICAL HISTORY      1/30/2012-3/2/2012,[id number],RADIATION TREATMENT     OTHER SURGICAL HISTORY      12/7/2011,[id number],NECK/THORAX PROCEDURE,L modified radical neck surgery     OTHER SURGICAL HISTORY      11/10/14,[id number],OTHER,Dr. Lyndon FRANK     OTHER SURGICAL HISTORY      2012,[id number],REVISION EYELID,Dr. Farias     OTHER SURGICAL HISTORY      10/22/15,[id number],OTHER     TONSILLECTOMY, ADENOIDECTOMY, COMBINED      1947     VASECTOMY      1980     Outpatient Encounter Medications as of 6/2/2022   Medication Sig Dispense Refill     amylase-lipase-protease (CREON 36) 128770-20558-990152 units CPEP Take 3 capsules by mouth 4 times daily (with meals and nightly) Taking 90342 units 1080 capsule 4     atorvastatin (LIPITOR) 40 MG tablet Take 1  tablet (40 mg) by mouth daily (with dinner) 90 tablet 4     blood glucose (NO BRAND SPECIFIED) lancets standard Use to test blood sugar 2 times daily  E11.9 200 each 3     blood glucose (NO BRAND SPECIFIED) test strip Use to test blood sugar 2 times daily  E11.9 200 strip 11     Blood Glucose Monitoring Suppl (FIFTY50 GLUCOSE METER 2.0) W/DEVICE KIT Dispense Accu-Chek Agnes  Meter. Dispense item covered by pt ins. E11.9 NIDDM type II - Test 1 time/day       calcium carbonate-vitamin D 600-200 MG-UNIT TABS Take 1 tablet by mouth 2 times daily (with meals)       chlorproMAZINE (THORAZINE) 50 mg/mL solution For severe hiccups, take 0.1 mLs (5 mg) by mouth 3 times daily as needed (Stop if patient develops bothersome Parkinson-like symptoms, light-headedness, dizziness, mental status changes, or other neurologic complaints.) 9 mL 0     cholecalciferol 50 MCG (2000 UT) CAPS Take 4,000 Units by mouth       DOCOSAHEXAENOIC ACID PO Take 1 capsule by mouth 2 times daily       levothyroxine (SYNTHROID/LEVOTHROID) 50 MCG tablet Take 1 tablet (50 mcg) by mouth daily 90 tablet 4     loperamide (IMODIUM A-D) 2 MG tablet Take 1 tablet (2 mg) by mouth 4 times daily as needed for diarrhea 120 tablet 11     magnesium 200 MG TABS Take 200 mg by mouth       metFORMIN (GLUCOPHAGE) 1000 MG tablet Take 1 tablet (1,000 mg) by mouth 2 times daily (with meals) For diabetes prevention 180 tablet 1     Multiple Vitamin (MULTI-VITAMINS) TABS Take 1 tablet by mouth daily       nitroGLYcerin (NITROSTAT) 0.4 MG sublingual tablet DISSOLVE ONE TABLET UNDER THE TONGUE EVERY 5 MINUTES AS NEEDED FOR CHEST PAIN.  DO NOT EXCEED A TOTAL OF 3 DOSES IN 15 MINUTES 25 tablet 0     nystatin (NYSTOP) 347243 UNIT/GM external powder Apply topically 2 times daily as needed For skin fold rash 180 g 4     omeprazole (PRILOSEC) 20 MG DR capsule Take 1 capsule (20 mg) by mouth 2 times daily Take 30 to 60 minutes prior to meals 180 capsule 4     primidone (MYSOLINE) 50  MG tablet Take 2 tablets (100 mg) by mouth At Bedtime 180 tablet 4     rivastigmine (EXELON) 3 MG capsule Take 3 mg by mouth daily       tamsulosin (FLOMAX) 0.4 MG capsule Take 1 capsule (0.4 mg) by mouth every evening 90 capsule 4     tiotropium (SPIRIVA) 18 MCG inhaled capsule Inhale 1 capsule (18 mcg) into the lungs daily - for chronic phlegm/Bronchitis 90 capsule 3     No facility-administered encounter medications on file as of 2022.     Allergies/Reactions: Other drug allergy (see comments)    Orders via Epic EMR: No orders of the defined types were placed in this encounter.    Social History     Social History Narrative    Douglas, is  to Ruth.  They have two grown children.  He is currently retired.  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  He enjoys deer hunting(quit due to shoulders).       Socioeconomic History     Marital status:      Spouse name: Ruth     Number of children: 2     Years of education: high school graduate   Occupational History     Occupation: retired: from [employer]     Employer: [employer]     Comment: worked on assembly line then later as the Supervisor     Social History     Tobacco Use     Smoking status: Former Smoker     Packs/day: 3.00     Years: 43.00     Pack years: 129.00     Types: Cigarettes     Quit date: 2001     Years since quittin.4     Smokeless tobacco: Never Used   Vaping Use     Vaping Use: Never used   Substance Use Topics     Alcohol use: Not Currently     Drug use: No     Family History   Problem Relation Age of Onset     Breast Cancer Mother      Heart Disease Mother 80        Heart Disease,MI     Heart Disease Father 83        Heart Disease,CHF     Lung Cancer Father      Family History Negative Sister         Good Health     Heart Disease Sister         Heart Disease,pacemaker     Breast Cancer Daughter         Cancer-breast     Diabetes Son         Diabetes,Type 1     Alcoholism  Son      Prostate Cancer No family hx of         Cancer-prostate   No other cancers in first or second degree relatives.    Baseline Review of Systems (prior to radiation therapy; supplemental to the History)   ROS (score of 0 indicates that symptom is at baseline for most sections below): See Flowsheet for additional comments as indicated.  Data Unavailable       Patient Health Questionnaire-9 (PHQ-9)     PHQ 8/5/2020 2/24/2022 5/31/2022   PHQ-9 Total Score 0 3 17   Q9: Thoughts of better off dead/self-harm past 2 weeks Not at all Not at all Not at all     Physical Exam   KPS: 70 (cares for self but unable to carry on normal activity or do active work) due to cognitive deficits but remains easily functionally independent and has a good quality of life.  ECOG Status: 2 (Ambulatory and capable of all selfcare but unable to carry out any work activities; may need help with IADLs up and about > 50% of waking hours)  Vitals: There were no vitals taken for this visit.  Wt Readings from Last 3 Encounters:   05/31/22 75.5 kg (166 lb 6.4 oz)   05/25/22 73.8 kg (162 lb 9.6 oz)   05/22/22 73.8 kg (162 lb 9.6 oz)     Clinical Examination:  General: Appears slightly improved overall compared to last visit. No cough or hiccup. No clearing of throat. Mildly fatigued but not worn. No longer frustrated. Initiates conversation and lines of thought. Appears clinically/medically stable. Appears very fit and in good general health. Overweight (BMI 25-29). Appears stated age. Appears well-developed, well-nourished, and in no acute distress. Does not appear acutely or chronically ill. Appears well groomed.  Head: No alopecia. Normocephalic.  Eyes: Glasses. Anicteric, vision intact.  ENT: Bilateral hearing aids. Good volume. No hoarseness.  Neck: Well-healed left neck surgical scar. No crepitus. Trachea midline.  Lymphatics: Deferred.  Chest/Breasts: Slight erythema of the chest wall. Left chest wall port site is clean, dry, and intact.  No erythema, discharge, or cellulitis. No implanted cardiac device.  Lungs: Single slight cough during visit. No hiccups. Easy respirations, no use of accessory muscles. Clear to auscultation  Cardiovascular: No jugulovenous distension. No carotid pulsations. RRR, S1, S2.  Abdomen: Nondistended.  Pelvis: Nondistended.  MSK: Rolling walker. Shoulder range of motion is good. No arm edema or hand edema. Good muscle tone. No tenderness on palpation. Good posture. No cords or calf tenderness.  Integument: No jaundice or pallor.   Neurologic: Right-handed. Mild intention tremors. Alert, oriented, fluent. Cognitive deficits are subtle (neurology previously counseled the patient to not drive). Memory intact on observation. No errors in recall or distant memory. Wife did not correct his answers. Patient asked insightful questions and follow-up questions. EOMI. Motor intact. Sensory intact.  Psychologic: Well-spoken about his cancer and therapy. Good dynamic with his wife. Upbeat, relaxed, confident, engaging, and motivated. Pleasant, cooperative, insightful, and concrete. Good judgment, logical thinking, good thought process, concrete, abstracts. Did not require redirection for repeating of questions. No grandiosity. Not tangential.    Physician Time on Day of Encounter, and [facility] Data Reviewed and Analyzed on Day of Encounter   Not applicable.    Physician Radiation Therapy Information Review   Radiation Oncology weekly review: Reviewed available labs and diagnostic studies. Interpreted as adequate to proceed with radiation therapy. Discussed management with Therapy, Treatment Planning, and Nursing. Review of weekly routine QA, linac imaging, and clinical set up are adequate to proceed with radiation therapy as prescribed.    Physician Radiation Therapy Assessment   Routine tolerance to radiation therapy.    Physician Radiation Therapy Plan   No new radiation therapy interventions. No boost/cone down. Have reviewed the  graphical 3D plan with the patient with attention to dose to the lung, heart, mediastinum, upper lobe lesion, spinal cord, liver, etc. Reviewed anticipated time course, nature, and potential interventions for managing toxicity during and after radiation therapy. Patient aware of onsite and telemedicine coverage of our clinic. He wished to proceed with treatment. All questions answered to the satisfaction of the patient and his family.      Faisal Marie MD, PhD  Department of Radiation Oncology  Tel: [phone]  Fax: [phone]    Care Team:  Miguel Chambers D.O. (interventional pulmonary medicine)  Ace Jackson III, M.D. (medical oncology)  Mukesh Olivera M.D., Ph.D. (medical oncology)  DEEPA Yates M.D. (ophthalmology)  Ag Doan M.D. (surgery)  Beronica Sands N.P. (neurology)  Jovanni Sen M.D. (medicine)

## 2022-06-02 NOTE — TELEPHONE ENCOUNTER
"Ruth Douglas's wife called reporting, [facility] pharmacy in [place], does not have a prescription on file for Douglas.  Looking for a \"liquid prescription for Douglas's hiccups from, Dr. Marie\" (liquid thorazine?). She denies that he currently has hiccups but was going to pick it up just in case.  Dr. Marie made aware of request for medication.   "

## 2022-06-03 NOTE — PROGRESS NOTES
Radiation Oncology - Clinic Note      Reviewed with the patient's wife and dispensing pharmacist. The liquid version of Thorazine is not readily available. The service recommended ordering 10 mg tablets prescribed as taking half a tablet for each dose. Service reported that the tablet can be cut. Therefore, Thorazine liquid canceled, and Thorazine tablet ordered. Notify the patient's wife of the change in recommendation. She was proceed as recommended. Answered all questions to her satisfaction.      Faisal Marie M.D., Ph.D.  Department of Radiation Oncology  Tel: [phone]  Fax: [phone]

## 2022-06-08 NOTE — PROGRESS NOTES
"Progress Notes  Encounter Date: Jun 8, 2022  Yomaira Dugan MD     RADIATION ONCOLOGY WEEKLY MANAGEMENT PROGRESS NOTE     Patient Care Team       Relationship Specialty Notifications Start End    Jovanni Sen MD PCP - General Internal Medicine  4/22/22     Phone: [phone] Fax: [phone]         [address] [place] [address]    Jovanni Sen MD Assigned PCP   4/6/18     Phone: [phone] Fax: [phone]         [address] [place] [address]    Ag Doan MD Assigned Surgical Provider   1/16/22     Phone: [phone] Fax: [phone]         [address]  [place] [address]    Beronica Sands NP    4/19/22     Phone: [phone] Fax: [phone]         [address] [facility] [address]    Mukesh Olivera MD MD Hematology & Oncology  4/19/22     Phone: [phone] Fax: [phone]         [address] [facility] [address]    Miguel Chambers DO MD Internal Medicine  4/19/22     Phone: [phone] Fax: [phone]         [address] [facility] [address]    Clem Yates MD MD Ophthalmology  4/19/22     Phone: [phone] Fax: [phone] [address]  [place] [address]    Faisal Marie MD Assigned Cancer Care Provider   4/24/22     Phone: [phone] Fax: [phone]         [address] [facility] [address]                DIAGNOSIS: Lung cancer of the right lower lobe currently receiving external radiation. Right upper lobe malignancy treatment will follow with SBRT      RADIATION THERAPY:    Rajesh Huynh has received 38 Gy to date to right lower lobe malignancy.       SUBJECTIVE:    Currently He complains of coughing \"on and off\" he denies pain and nausea vomiting.  He is eating well.  He also notes a scratchy throat and dry cough.   Appetite is good and weight is stable.      OBJECTIVE:    WEIGHT: 164 lbs 6.4 oz.  Examination reveals well-developed well-nourished male in no apparent distress.  He is " breathing comfortably on room air.  He has no skin changes of on the right chest.      IMPRESSION:  Routine tolerance to radiation therapy.  Minimal side effects at this time.      PLAN:  Continue treatment as planned .        Medical record and imaging reviewed.      Yomaira Dugan MD

## 2022-06-15 NOTE — PROGRESS NOTES
Progress Notes  Encounter Date: Jun 14, 2022  Yomaira Dugan MD     RADIATION ONCOLOGY WEEKLY MANAGEMENT PROGRESS NOTE     Patient Care Team       Relationship Specialty Notifications Start End    Jovanni Sen MD PCP - General Internal Medicine  4/22/22     Phone: [phone] Fax: [phone]         [address]  [place] [address]    Jovanni Sen MD Assigned PCP   4/6/18     Phone: [phone] Fax: [phone]         [address] [place] [address]    Ag Doan MD Assigned Surgical Provider   1/16/22     Phone: [phone] Fax: [phone]         [address]  [place] [address]    Beronica Sands NP    4/19/22     Phone: [phone] Fax: [phone]         [address] [facility] [address]    Mukesh Olivera MD MD Hematology & Oncology  4/19/22     Phone: [phone] Fax: [phone]         [address] [facility] [address]    Miguel Chambers DO MD Internal Medicine  4/19/22     Phone: [phone] Fax: [phone]         [address] [facility] [address]    Clem Yates MD MD Ophthalmology  4/19/22     Phone: [phone] Fax: [phone] [address]  [place] [address]    Faisal Marie MD Assigned Cancer Care Provider   4/24/22     Phone: [phone] Fax: [phone]         [address] [facility] [address]              DIAGNOSIS: Right upper and lower lobe lung cancer, stage IIIA in the lower lobe and stage IA2 in the upper lobe.      RADIATION THERAPY:    Rajesh Huynh has received 46 Gy to date to the right lower lobe, hilum and lower mediastinum given concomitantly with chemotherapy.  The right upper lobe lesion will be treated with stereotactic body radiotherapy following completion of the radiation to the lower lobe lesion.       SUBJECTIVE:    Currently He complains of a raspy throat with a foamy green phlegm and a constant cough.  He is eating poorly.  He receives weekly chemotherapy from   [facility].         OBJECTIVE:    WEIGHT: 165 lbs 0 oz.  Examination reveals a well-developed male, fatigued appearing.  He has a raspy hoarse voice.  Mild to moderate erythema of the skin.      IMPRESSION:  Routine tolerance to radiation therapy.  Increasing side effects from a course of chemoradiation.      PLAN:  Continue treatment as planned .  A prescription for Tessalon Perles given to help with his cough       Medical record and imaging reviewed.      Yomaira Dugan MD

## 2022-06-16 NOTE — TELEPHONE ENCOUNTER
"[facility] sent Rx request for the following:   nitroGLYcerin (NITROSTAT) 0.4 MG sublingual tablet  SigDISSOLVE ONE TABLET UNDER THE TONGUE EVERY 5 MINUTES AS NEEDED FOR CHEST PAIN.  DO NOT EXCEED A TOTAL OF 3 DOSES IN 15 MINUTES    Last Prescription Date:   12/08/2021  Last Fill Qty/Refills:         25, R-0    Last Office Visit:              05/31/2022 ([place])   Future Office visit:           09/01/2022 ([place])     Nitrates Failed - 6/16/2022  7:04 AM        Failed - Sublingual nitro order needs review     If refill exceeds 1 bottle per month, please forward request to provider.           Passed - Blood pressure under 140/90 in past 12 months     BP Readings from Last 3 Encounters:   05/31/22 132/66   05/22/22 132/82   05/21/22 (!) 149/84                 Passed - Pt is not on erectile dysfunction medications        Passed - Recent (12 mo) or future (30 days) visit within the authorizing provider's specialty     Patient has had an office visit with the authorizing provider or a provider within the authorizing providers department within the previous 12 mos or has a future within next 30 days. See \"Patient Info\" tab in inbasket, or \"Choose Columns\" in Meds & Orders section of the refill encounter.              Passed - Medication is active on med list        Passed - Patient is age 18 or older         Unable to complete prescription refill per RN Medication Refill Policy.................... Ale Thomas RN ....................  6/16/2022   2:31 PM          "

## 2022-06-27 PROBLEM — C34.91 MALIGNANT NEOPLASM OF RIGHT LUNG, UNSPECIFIED PART OF LUNG (H): Status: ACTIVE | Noted: 2022-01-01

## 2022-06-27 NOTE — PROGRESS NOTES
Progress Notes  Encounter Date: Jun 22, 2022  Pacheco Valadez MD     RADIATION ONCOLOGY WEEKLY MANAGEMENT PROGRESS NOTE     Patient Care Team       Relationship Specialty Notifications Start End    Jovanni Sen MD PCP - General Internal Medicine  4/22/22     Phone: [phone] Fax: [phone]         [address]  [place] [address]    Jovanni Sen MD Assigned PCP   4/6/18     Phone: [phone] Fax: [phone]         [address] [place] [address]    Ag Doan MD Assigned Surgical Provider   1/16/22     Phone: [phone] Fax: [phone]         [address] [place] [address]    Beronica Sands NP    4/19/22     Phone: [phone] Fax: [phone]         [address] [facility] [address]    Mukesh Olivera MD MD Hematology & Oncology  4/19/22     Phone: [phone] Fax: [phone]         [address] [facility] [address]    Miguel Chambers DO MD Internal Medicine  4/19/22     Phone: [phone] Fax: [phone]         [address] [facility] [address]    Clem Yates MD MD Ophthalmology  4/19/22     Phone: [phone] Fax: [phone]         [address]  [place] [address]    Faisal Marie MD Assigned Cancer Care Provider   4/24/22     Phone: [phone] Fax: [phone]         [address] [facility] [address]                  DIAGNOSIS: Carcinoma of the right lower lobe hilum and mediastinum    RADIATION THERAPY:    Rajesh Huynh is receiving IMRT radiation treatment   for a carcinoma of the right lower lobe hilum and lower mediastinum with systemic chemotherapy.  Be being treated then with concomitant chemotherapy as administered by Dr. Jackson.  Tentative plans to follow with SBRT for a nodule in the right upper lobe.  SUBJECTIVE:    Currently He has received a dose of 5800 cGy.  The patient was given Tessalon Perles last week for cough last week.  I encouraged him to take him and told him he could take 1 or 2  3 times a day.  He is not to a chew them but should just swallow them and I told him I thought they were very effective.  He denies any fever or significant shortness of breath.      OBJECTIVE:    WEIGHT: 160 lbs 1.6 oz.  Examination reveals cranial nerves II through XII are intact..  Chest is clear to auscultation and percussion.  Cardiac examination reveals regular rate rhythm with no murmurs or gallops.  Abdominal examination reveals no enlarged liver or spleen he has some mild erythema on the chest and in the back.      IMPRESSION:  Routine tolerance to radiation therapy.        PLAN:  Continue treatment as planned        Medical record and imaging reviewed.      Pacheco Valadez MD

## 2022-06-28 NOTE — PROGRESS NOTES
Progress Notes  Encounter Date: Jun 28, 2022   RADIATION ONCOLOGY WEEKLY MANAGEMENT PROGRESS NOTE     Patient Care Team       Relationship Specialty Notifications Start End    Jovanni Sen MD PCP - General Internal Medicine  4/22/22     Phone: [phone] Fax: [phone]         [address] [place] [address]    Jovanni Sen MD Assigned PCP   4/6/18     Phone: [phone] Fax: [phone]         [address] [place] [address]    Ag Doan MD Assigned Surgical Provider   1/16/22     Phone: [phone] Fax: [phone]         [address]  [place] [address]    Beronica Sands NP    4/19/22     Phone: [phone] Fax: [phone]         [address] [facility] [address]    Mukesh Olivera MD MD Hematology & Oncology  4/19/22     Phone: [phone] Fax: [phone]         [address] [facility] [address]    Miguel Chambers DO MD Internal Medicine  4/19/22     Phone: [phone] Fax: [phone]         [address] [facility] [address]    Clem Yates MD MD Ophthalmology  4/19/22     Phone: [phone] Fax: [phone] [address] [place] [address]    Faisal Marie MD Assigned Cancer Care Provider   4/24/22     Phone: [phone] Fax: [phone]         [address] [facility] [address]              DIAGNOSIS: Right upper and lower lobe lung cancer, stage IIIA in the lower lobe and stage IA2 in the upper lobe.      RADIATION THERAPY:    Rajesh Huynh has received 6600 cGy to date to the right lower lobe, hilum and lower mediastinum given concomitantly with chemotherapy.       SUBJECTIVE:    Patient completed radiation therapy today for a total of 33/33 fractions. He reportedly is doing well, his previous complains of hiccups has generally resolved, with exception to some mild intermittent hiccups. However they are much less bothersome. He and his wife questioned continuing on the 20 mg omeprazole in the  morning and 20 mg in the evening.  He receives weekly chemotherapy from Dr Jackson.         OBJECTIVE:    WEIGHT: 162 lbs 11.2 oz.  Examination reveals a well-developed male.  He has a raspy hoarse voice.  Moderate erythema noted on his right lower back, wife has been applying Aquaphor to the affected area.  Lungs clear to auscultation throughout, regular heart rate, S1 and S2, no murmurs, rubs, or gallops.  Bowel sounds present in all 4 quadrants abdomen nontender to palpation.      IMPRESSION:  Routine tolerance to radiation therapy.  Increasing side effects from a course of chemoradiation.       PLAN:    Plan to continue using Aquaphor to affected erythematous skin region.  I discussed with patient returning to his original dose of 20 mg of omeprazole in the morning.  If hiccups return patient may resume taking 20 mg of omeprazole twice daily.  Patient and wife state understanding and are agreeable with the plan.  Patient and wife to call with any concerns or questions.  We will see patient on an as-needed basis if radiation therapy side effects occur. Patient will transition care and follow-up care to medical oncology.  Patient is scheduled to come back for consideration of SBRT to a separate lung nodule.      Gloria Cao DNP, APRN, FNP-C   Pacheco Valadez MD  Department of Radiation Oncology

## 2022-06-28 NOTE — PROGRESS NOTES
RADIATION THERAPY TREATMENT SUMMARY        Name: Rajesh Huynh MRN: [id number]  : 1940   Date of Service: 2022  Dr. Leyva      DIAGNOSIS:poorly differentiated squamous cell carcinoma    TREATMENT INTENT:   Curative      PRIMARY AREA TREATED:  radiation treatment to a left lower lobe      PRIMARY TREATMENT TECHNIQUE:  3D conformal.       PRIMARY ENERGY:  10 MV.       PRIMARY TUMOR DOSE: 6600 cGy.       NUMBER OF TREATMENTS: 33fractions.               COMPLETION [id number]              Pacheco Valadez MD  Ml :-Dr leyva

## 2022-07-05 NOTE — PROGRESS NOTES
Patient seen briefly in clinic after radiation therapy treatment. Patient and wife explain he was up all night with hiccups. They would like to try the new medication that was ordered yesterday. Patient told to  prescription and start the medication today. He was told to take one as soon as he get the prescription and then one before bed. Then 3 times per day as prescribed. Up dated Dr. Marie with plan of care.     Vascular & Interventional Radiology Pre-Procedure Note    Procedure Name: gastrostomy tube placement    HPI: 57y Female with rhabdomyolysis c/b dysphagia presents for G tube placement.    Allergies: HMG-CoA reductase inhibitors (Other (Severe))      Medications:  amLODIPine   Tablet: 10 milliGRAM(s) Oral (07-04 @ 05:32)  heparin   Injectable: 5000 Unit(s) SubCutaneous (07-04 @ 17:37)  losartan: 100 milliGRAM(s) Oral (07-04 @ 05:32)  trimethoprim  40 mG/sulfamethoxazole 200 mG Suspension: 160 milliGRAM(s) Oral (07-04 @ 17:38)      Data:  Vital Signs Last 24 Hrs  T(C): 36.7 (05 Jul 2022 05:00), Max: 36.7 (04 Jul 2022 10:23)  T(F): 98 (05 Jul 2022 05:00), Max: 98.1 (04 Jul 2022 16:16)  HR: 92 (05 Jul 2022 05:00) (92 - 96)  BP: 131/84 (05 Jul 2022 05:00) (129/83 - 136/90)  BP(mean): --  RR: 18 (05 Jul 2022 05:00) (18 - 19)  SpO2: 94% (05 Jul 2022 05:00) (94% - 99%)    LABS:                        12.2   12.84 )-----------( 242      ( 05 Jul 2022 03:10 )             39.7     07-05    144  |  104  |  72<H>  ----------------------------<  161<H>  4.4   |  28  |  0.98    Ca    9.5      05 Jul 2022 03:10    TPro  6.5  /  Alb  3.1<L>  /  TBili  0.5  /  DBili  x   /  AST  544<H>  /  ALT  597<H>  /  AlkPhos  122<H>  07-05    PT/INR - ( 05 Jul 2022 03:10 )   PT: 10.0 sec;   INR: <0.90 ratio         PTT - ( 05 Jul 2022 03:10 )  PTT:34.7 sec      Plan:   -57y Female presents for gastrostomy tube placement  -Risks/Benefits/alternatives explained with the patient and witnessed informed consent obtained.

## 2022-07-08 NOTE — TELEPHONE ENCOUNTER
Patient return phone call regarding scheduling of 6 week follow up.   Follow up appointment was schedule for august 15 at 11 am  b 07/08/2022

## 2022-07-08 NOTE — PROGRESS NOTES
Mr. Douglas Huynh is a gentleman who is under the care of Dr. Jackson.  Had a T scan on 3/3/2002 which indicated a right lower lobe mass as well as a small right upper lobe lung nodule.  He underwent a bronchoscopy on 4/13/2020 to establish in the diagnosis of non-small cell carcinoma of the lung followed by a PET/CT on 4/6/2022.  He just recently completed a course of IMRT radiation with concomitant chemotherapy for a carcinoma of the lung.  He had a peripheral nodule stage I T1BN0 which is being followed.  Dr. Jackson indicated in his recent note on 6/21/2022 we will be following up with a CT scan I imagine in 2 to 3 months..    I have decided to see the patient back in follow-up in the next 6 to 8 weeks to we can assess how he is doing.  I do not believe that Dr. Mckay will have the scan by then but we can establish contact with the patient while he is receiving his adjuvant immunotherapy.  The biopsies of both the right upper lobe nodule and the right lower lobe gross (stage III AT4N0) biopsy to squamous cell carcinoma.  Is unclear whether this is a separate primary or could possibly be abscess from the larger right lower lobe mass however we could give the patient the benefit of the doubt and treat them as separate primaries.  There would be consideration for SBRT to the right upper lobe.  He will be scheduled to see me in the next 6 to 8 weeks.  Signed Pacheco Gonsalez MD

## 2022-08-10 PROBLEM — U07.1 INFECTION DUE TO 2019 NOVEL CORONAVIRUS: Status: ACTIVE | Noted: 2022-01-01

## 2022-08-10 PROBLEM — C34.91 MALIGNANT NEOPLASM OF RIGHT LUNG, UNSPECIFIED PART OF LUNG (H): Status: RESOLVED | Noted: 2022-01-01 | Resolved: 2022-01-01

## 2022-08-10 NOTE — ED NOTES
Dr. Neves here to see pt, pt remains cooperative with no new complaints, VSS, pt over to [facility] via cart

## 2022-08-10 NOTE — TELEPHONE ENCOUNTER
[id number] [place] sent Rx request for the following:      propranolol (INDERAL) 20 MG tablet (Discontinued) 270 tablet 3 6/1/2021 5/22/2022 No   Sig - Route: Take 1 tablet (20 mg) by mouth 3 times daily - Oral     Attempted reaching Pt for more information. Left message on machine to call back. Rosio Rivera RN .............. 8/10/2022  4:53 PM

## 2022-08-10 NOTE — ED PROVIDER NOTES
History     Chief Complaint   Patient presents with     Altered Mental Status     Generalized Weakness     Here with his son Douglas    The history is provided by the patient, a relative and medical records.     Rajesh Huynh is a 82 year old male here with weakness and inability to walk for the past two days. No pain, no shortness of breath.     Per his son Douglas: Douglas thinks this is a medication reaction to a medicine he was taking for hiccups. He took this before and had weakness and apparently he took 1/2 a pill yesterday. He is not sure if Rajesh found the medicine or if Rajesh's wife gave it to him.    He has a prescription for Thorazine at home, 10 mg tablet, to be used for hiccups.  He had a reaction to this before (weakness) when he took a dose.    Allergies:  Allergies   Allergen Reactions     Other Drug Allergy (See Comments)      Trelegy Ellipta  Sore throat         Problem List:    Patient Active Problem List    Diagnosis Date Noted     Infection due to 2019 novel coronavirus 08/10/2022     Priority: Medium     Malignant neoplasm of right lung, unspecified part of lung (H) 06/27/2022     Priority: Medium     Restless legs syndrome 05/31/2022     Priority: Medium     Malignant neoplasm of lower lobe of right lung (H) 04/14/2022     Priority: Medium     Malignant neoplasm of upper lobe of right lung (H) 04/14/2022     Priority: Medium     Right lower lobe lung mass 04/01/2022     Priority: Medium     Nodule of upper lobe of right lung 04/01/2022     Priority: Medium     Hypertensive heart disease with heart failure (H) 04/01/2022     Priority: Medium     Hypothyroidism due to acquired atrophy of thyroid 12/16/2021     Priority: Medium     Lewy body dementia without behavioral disturbance (H) 09/16/2021     Priority: Medium     Heartburn 02/23/2021     Priority: Medium     Simple chronic bronchitis (H) 02/23/2021     Priority: Medium     History of cardiac cath on 7/8/2013 10/15/2020     Priority:  Medium     Benign prostatic hyperplasia with lower urinary tract symptoms, symptom details unspecified 10/15/2020     Priority: Medium     Hx of pancreatitis on 10/23/2014 10/15/2020     Priority: Medium     Hx of bladder cancer on 11/10/2014 10/15/2020     Priority: Medium     Aortic atherosclerosis (H) 10/15/2020     Priority: Medium     Hyponatremia 10/15/2020     Priority: Medium     Stenosis of left carotid artery 10/15/2020     Priority: Medium     Exocrine pancreatic insufficiency - Severe Deficiency 11/22/2019     Priority: Medium     Intermittent diarrhea 11/13/2019     Priority: Medium     Controlled type 2 diabetes mellitus without complication, without long-term current use of insulin (H) 05/03/2019     Priority: Medium     History of pancreatic surgery - Whipple 03/21/2019     Priority: Medium     History of tobacco use quitting in 1/1/2000 at 3 packs a day 05/08/2018     Priority: Medium     Benign essential tremor 01/24/2018     Priority: Medium     Psychosexual dysfunction with inhibited sexual excitement 01/24/2018     Priority: Medium     Benign essential hypertension 01/24/2018     Priority: Medium     Mixed hyperlipidemia 01/24/2018     Priority: Medium     History of throat cancer 12/13/2017     Priority: Medium     Partial tear of right rotator cuff 06/21/2017     Priority: Medium     History of urinary retention 12/23/2016     Priority: Medium     Overview:   Postoperative       Atherosclerosis of native coronary artery of native heart without angina pectoris 10/03/2016     Priority: Medium     Personal history of malignant neoplasm of bladder 11/19/2014     Priority: Medium     H/O STEMI 7/3/2013 involving the RCA 07/11/2013     Priority: Medium        Past Medical History:    Past Medical History:   Diagnosis Date     Acute myocardial infarction, unspecified (H) 07/05/2013     Basal cell carcinoma of left ear 12/31/2021     Bicipital tenosynovitis 07/05/2013     CAD (coronary artery disease)       Chronic myeloproliferative disease (H)      Diabetes mellitus, type 2 (H)      Dysphagia (partial resection of epiglottis) 07/05/2013     Essential (primary) hypertension 01/12/2004     Glucose intolerance 11/14/2003     Hiccough 07/05/2013     Hyperlipidemia      Lewy body dementia (H)      Male erectile dysfunction      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)      Malignant neoplasm of lower lobe of right lung (H) 04/14/2022     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     Peptic ulcer disease 11/10/2003     Pneumonia      Resolved: chronic pancreatitis (has undergone Whipple procedure)      Resolved: Pancreatitis 01/12/2004     Resolved: Personal history of nicotine dependence      Resolved: Thoracic back pain 04/08/2010     Resovled: Pancreatic duct stones 01/12/2004     ST elevation (STEMI) myocardial infarction (H)      Subscapularis (muscle) sprain 07/05/2013     Tremor        Past Surgical History:    Past Surgical History:   Procedure Laterality Date     APPENDECTOMY OPEN      1970     ARTHROSCOPY SHOULDER      11/2005,right shoulder     ARTHROSCOPY SHOULDER      1/2006,left shoulder     ARTHROSCOPY SHOULDER      2011,Left shoulder scope SAD, ac.  Open subscap, biceps     BRONCHOSCOPY  04/13/2022    bronchoscopy with endobronchial biopsies     CHOLECYSTECTOMY      1994     COLONOSCOPY  02/01/2005 02/2005     COLONOSCOPY  02/19/2015 2/19/2015,no repeat due at this time     CYSTOSCOPY      Multiple times,Dr. Lyndon FRANK     FINE NEEDLE ASPIRATION  04/13/2022    transbronchial needle aspiration and biopsy     HEART CATH, ANGIOPLASTY      07/05/2013,Bare-metal stent to dominant RCA     OTHER SURGICAL HISTORY      2004,[id number],OTHER,for chronic pancreatitis     OTHER SURGICAL HISTORY      6/22/2007,[id number],SHOULDER REPLACEMENT,Right,with biceps tenodesis by Dr. Mukesh Ayoub     OTHER SURGICAL HISTORY      1995/2002,,ERCP,xseveral,  one with papillotomy     OTHER SURGICAL HISTORY      2012-3/2/2012,[id number],RADIATION TREATMENT     OTHER SURGICAL HISTORY      2011,[id number],NECK/THORAX PROCEDURE,L modified radical neck surgery     OTHER SURGICAL HISTORY      11/10/14,[id number],OTHER,Dr. Lyndon FRANK     OTHER SURGICAL HISTORY      ,[id number],REVISION EYELID,Dr. Farias     OTHER SURGICAL HISTORY      10/22/15,[id number],OTHER     TONSILLECTOMY, ADENOIDECTOMY, COMBINED      1947     VASECTOMY             Family History:    Family History   Problem Relation Age of Onset     Breast Cancer Mother      Heart Disease Mother 80        Heart Disease,MI     Heart Disease Father 83        Heart Disease,CHF     Lung Cancer Father      Family History Negative Sister         Good Health     Heart Disease Sister         Heart Disease,pacemaker     Breast Cancer Daughter         Cancer-breast     Diabetes Son         Diabetes,Type 1     Alcoholism Son      Prostate Cancer No family hx of         Cancer-prostate       Social History:  Marital Status:   [2]  Social History     Tobacco Use     Smoking status: Former Smoker     Packs/day: 3.00     Years: 43.00     Pack years: 129.00     Types: Cigarettes     Quit date: 2001     Years since quittin.6     Smokeless tobacco: Never Used   Vaping Use     Vaping Use: Never used   Substance Use Topics     Alcohol use: Not Currently     Drug use: No        Medications:    amylase-lipase-protease (CREON 36) 526715-08756-777341 units CPEP  atorvastatin (LIPITOR) 40 MG tablet  Benzonatate (TESSALON PERLES PO)  benzonatate (TESSALON) 100 MG capsule  blood glucose (NO BRAND SPECIFIED) lancets standard  blood glucose (NO BRAND SPECIFIED) test strip  Blood Glucose Monitoring Suppl (FIFTY50 GLUCOSE METER 2.0) W/DEVICE KIT  calcium carbonate-vitamin D 600-200 MG-UNIT TABS  chlorproMAZINE (THORAZINE) 10 MG tablet  cholecalciferol 50 MCG ( UT) CAPS  DOCOSAHEXAENOIC ACID PO  levothyroxine (SYNTHROID/LEVOTHROID) 50  "MCG tablet  loperamide (IMODIUM A-D) 2 MG tablet  magnesium 200 MG TABS  metFORMIN (GLUCOPHAGE) 1000 MG tablet  Multiple Vitamin (MULTI-VITAMINS) TABS  nitroGLYcerin (NITROSTAT) 0.4 MG sublingual tablet  nystatin (NYSTOP) 036406 UNIT/GM external powder  omeprazole (PRILOSEC) 20 MG DR capsule  primidone (MYSOLINE) 50 MG tablet  rivastigmine (EXELON) 3 MG capsule  tamsulosin (FLOMAX) 0.4 MG capsule  tiotropium (SPIRIVA) 18 MCG inhaled capsule        Review of Systems   Neurological: Positive for weakness.   All other systems reviewed and are negative.      Physical Exam   BP: 139/75  Pulse: 80  Temp: 99.4  F (37.4  C)  Resp: 16  Height: 170.2 cm (5' 7\")  Weight: 73.5 kg (162 lb)  SpO2: 95 %      Physical Exam  Vitals and nursing note reviewed.   Constitutional:       General: He is not in acute distress.     Appearance: He is well-developed. He is not ill-appearing.   Cardiovascular:      Rate and Rhythm: Normal rate and regular rhythm.      Heart sounds: Normal heart sounds. No murmur heard.  Pulmonary:      Effort: Pulmonary effort is normal. No respiratory distress.      Breath sounds: Normal breath sounds.   Abdominal:      General: Bowel sounds are normal. There is no distension.      Palpations: Abdomen is soft.      Tenderness: There is no abdominal tenderness. There is no guarding.   Musculoskeletal:         General: No swelling or tenderness. Normal range of motion.      Comments: He has good strength of arms and legs on exam   Skin:     General: Skin is warm and dry.   Neurological:      Mental Status: He is alert.   Psychiatric:         Mood and Affect: Mood normal.         Behavior: Behavior normal.       EKG:  NSR, normal axis, rate 79    Results for orders placed or performed during the hospital encounter of 08/10/22 (from the past 24 hour(s))   Symptomatic; Yes; 8/9/2022 Influenza A/B & SARS-CoV2 (COVID-19) Virus PCR Multiplex Nose    Specimen: Nose; Swab   Result Value Ref Range    Influenza A PCR " Negative Negative    Influenza B PCR Negative Negative    RSV PCR Negative Negative    SARS CoV2 PCR Positive (A) Negative    Narrative    Testing was performed using the Xpert Xpress CoV2/Flu/RSV Assay on the [employer] GeneXpert Instrument. This test should be ordered for the detection of SARS-CoV-2 and influenza viruses in individuals who meet clinical and/or epidemiological criteria. Test performance is unknown in asymptomatic patients. This test is for in vitro diagnostic use under the FDA EUA for laboratories certified under CLIA to perform high or moderate complexity testing. This test has not been FDA cleared or approved. A negative result does not rule out the presence of PCR inhibitors in the specimen or target RNA in concentration below the limit of detection for the assay. If only one viral target is positive but coinfection with multiple targets is suspected, the sample should be re-tested with another FDA cleared, approved, or authorized test, if coinfection would change clinical management. This test was validated by the [facility]. These laboratories are certified under the Clinical  Laboratory Improvement Amendments of 1988 (CLIA-88) as qualified to perform high complexity laboratory testing.   XR Chest Port 1 View    Narrative    PROCEDURE: XR CHEST PORT 1 VIEW 8/10/2022 12:19 PM    HISTORY: weakness    COMPARISONS: 2/24/2022.    TECHNIQUE: Single portable view.    FINDINGS: There is a left central venous catheter with its tip in the  SVC. There is no pneumothorax.    Heart is stable in size. There is ectasia of the aorta. Previously  seen mass at the right lung base is less well seen on the current  study. There is still a small nodular density in the right upper lobe  along the undersurface of the right anterior second rib.         Impression    IMPRESSION: Relatively stable nodule in the right upper lobe. Right  basilar nodule is less well seen than on the prior exam.    ADEEL  MD BETTIE         SYSTEM ID:  [id number]   CBC with platelets differential    Narrative    The following orders were created for panel order CBC with platelets differential.  Procedure                               Abnormality         Status                     ---------                               -----------         ------                     CBC with platelets and d...[[id number]]  Abnormal            Final result                 Please view results for these tests on the individual orders.   Comprehensive metabolic panel   Result Value Ref Range    Sodium 127 (L) 134 - 144 mmol/L    Potassium 3.8 3.5 - 5.1 mmol/L    Chloride 94 (L) 98 - 107 mmol/L    Carbon Dioxide (CO2) 26 21 - 31 mmol/L    Anion Gap 7 3 - 14 mmol/L    Urea Nitrogen 10 7 - 25 mg/dL    Creatinine 0.65 (L) 0.70 - 1.30 mg/dL    Calcium 8.8 8.6 - 10.3 mg/dL    Glucose 128 (H) 70 - 105 mg/dL    Alkaline Phosphatase 67 34 - 104 U/L    AST 20 13 - 39 U/L    ALT 21 7 - 52 U/L    Protein Total 6.3 (L) 6.4 - 8.9 g/dL    Albumin 3.6 3.5 - 5.7 g/dL    Bilirubin Total 0.6 0.3 - 1.0 mg/dL    GFR Estimate >90 >60 mL/min/1.73m2   Lactic acid whole blood   Result Value Ref Range    Lactic Acid 0.8 0.7 - 2.0 mmol/L   CBC with platelets and differential   Result Value Ref Range    WBC Count 9.5 4.0 - 11.0 10e3/uL    RBC Count 3.36 (L) 4.40 - 5.90 10e6/uL    Hemoglobin 11.4 (L) 13.3 - 17.7 g/dL    Hematocrit 32.6 (L) 40.0 - 53.0 %    MCV 97 78 - 100 fL    MCH 33.9 (H) 26.5 - 33.0 pg    MCHC 35.0 31.5 - 36.5 g/dL    RDW 18.2 (H) 10.0 - 15.0 %    Platelet Count 154 150 - 450 10e3/uL    % Neutrophils 80 %    % Lymphocytes 3 %    % Monocytes 17 %    % Eosinophils 0 %    % Basophils 0 %    % Immature Granulocytes 0 %    NRBCs per 100 WBC 0 <1 /100    Absolute Neutrophils 7.6 1.6 - 8.3 10e3/uL    Absolute Lymphocytes 0.3 (L) 0.8 - 5.3 10e3/uL    Absolute Monocytes 1.6 (H) 0.0 - 1.3 10e3/uL    Absolute Eosinophils 0.0 0.0 - 0.7 10e3/uL    Absolute Basophils 0.0  "0.0 - 0.2 10e3/uL    Absolute Immature Granulocytes 0.0 <=0.4 10e3/uL    Absolute NRBCs 0.0 10e3/uL   UA with Microscopic reflex to Culture    Specimen: Urine, Clean Catch   Result Value Ref Range    Color Urine Yellow Colorless, Straw, Light Yellow, Yellow    Appearance Urine Clear Clear    Glucose Urine Negative Negative mg/dL    Bilirubin Urine Negative Negative    Ketones Urine 15  (A) Negative mg/dL    Specific Gravity Urine 1.020 1.005 - 1.030    Blood Urine Negative Negative    pH Urine 6.5 5.0 - 9.0    Protein Albumin Urine Negative Negative mg/dL    Urobilinogen Urine Normal Normal, 2.0 mg/dL    Nitrite Urine Negative Negative    Leukocyte Esterase Urine Negative Negative    Mucus Urine Present (A) None Seen /LPF    RBC Urine 2 <=2 /HPF    WBC Urine 1 <=5 /HPF    Hyaline Casts Urine 1 <=2 /LPF    Narrative    Urine Culture not indicated       Medications   0.9% sodium chloride BOLUS (1,000 mLs Intravenous New Bag 8/10/22 1300)       Assessments & Plan (with Medical Decision Making)  Rajesh Huynh is a 82 year old male here with weakness and inability to walk for the past two days. No pain, no shortness of breath.  Per his son: His son thinks this is a medication reaction to a medicine he was taking for hiccups. He took this before and had weakness and apparently he took 1/2 a pill yesterday. He is not sure if Rajesh found the medicine or if Rajesh's wife gave it to him.  He has a prescription for Thorazine at home, 10 mg tablet, to be used for hiccups.  He had a reaction to this before (weakness) when he took a dose.  VS in the ED /75   Pulse 80   Temp 99.4  F (37.4  C) (Tympanic)   Resp 16   Ht 1.702 m (5' 7\")   Wt 73.5 kg (162 lb)   SpO2 95%   BMI 25.37 kg/m    Exam shows good strength of UE and LE. Heart and lungs sound normal. He has normal bowel sounds and no abdominal tenderness. He has hiccups during exam. EKG is stable.  Labs show CBC with hgb 11.4, CMP with Na 127, Cl 94, glucose " 128, lactic acid normal, UA with 15 ketones and no concern for UTI, 4 Plex positive for COVID. Chest xray stable.  His MASSBP score is 7 and he would be eligible for Paxlovid. I spoke with pharmacy: hold the statin until one day after the Paxlovid is done, Primodone is a major interaction and they recommend he hold this for a day before starting Paxlovid.  I spoke with Douglas about this and he feels that they cannot take care of Rajesh at home as he is unable to walk. I spoke with Dr Neves who is willing to bring him in as an observation patient.      I have reviewed the nursing notes.    I have reviewed the findings, diagnosis, plan with the patient and his son Douglas.    Final diagnoses:   Infection due to 2019 novel coronavirus       8/10/2022   [facility] AND [facility], Romie Barbosa MD  08/10/22 [id number]

## 2022-08-10 NOTE — ED TRIAGE NOTES
Pt here by [facility] 1 into [facility], pt lives at home with wife with dementia, pt has had increased confusion and increased weakness for past couple of days, VSS, pt denies pain or any complaints at this time     Triage Assessment     Row Name 08/10/22 2082       Triage Assessment (Adult)    Airway WDL WDL       Respiratory WDL    Respiratory WDL WDL       Skin Circulation/Temperature WDL    Skin Circulation/Temperature WDL WDL       Cardiac WDL    Cardiac WDL WDL       Peripheral/Neurovascular WDL    Peripheral Neurovascular WDL WDL

## 2022-08-10 NOTE — H&P
"[facility]    History and Physical  Hospitalist       Date of Admission:  8/10/2022    Assessment & Plan   Rajesh Huynh is a 82 year old male who presents with weakness and COVID-19 infection    Clinically Significant Risk Factors Present on Admission                  # DMII: A1C = N/A within past 3 months  # Overweight: Estimated body mass index is 25.37 kg/m  as calculated from the following:    Height as of this encounter: 1.702 m (5' 7\").    Weight as of this encounter: 73.5 kg (162 lb).      Principal Problem:    Infection due to 2019 novel coronavirus    Assessment: Currently asymptomatic other than some nonspecific weakness, will monitor his inflammatory markers vitals and symptoms but at this point, after discussion of risks and benefits with patient and family, will manage conservatively without active treatment unless having clinical deterioration.    Plan:   -COVID order set in addition to COVID anticoagulation placed  -Check orthostatics  -Appreciate PT/OT involvement for possible addition of home care    Active Problems:    Benign essential hypertension    Assessment: Stable    Plan:   -Continue home Flomax    Dementia  Assessment: Mild and remains ambulatory needing assistance from family, potential risk for sundowning.  Plan:  -Encephalopathy order set placed    Hyponatremia: Na = 127 mmol/L (Ref range: 134 - 144 mmol/L) on admission  -chronic and stable  -Monitor daily      Controlled type 2 diabetes mellitus without complication, without long-term current use of insulin (H)    Assessment: Previous stable control with last A1c of 7.7    Plan:   -Hold metformin  -ISS AC at bedtime      Malignant neoplasm of lower lobe of right lung (H)    Assessment: Non-small cell lung cancer.  Stage III aT4 N0 M0, recently seen by Dr. Olivera on 8/1    Plan:   -Follow-up with radiation oncology for further management  -Hold Thorazine at this point for hiccups  -Zofran as needed    DVT " Prophylaxis: Enoxaparin (Lovenox) SQ  Code Status: No Order    Emery Neves MD    Primary Care Physician   Jovanni Sen    Chief Complaint   Weakness    History is obtained from the patient and chart review.    History of Present Illness   Rajesh Huynh is a 82 year old male history of mild to moderate dementia and stage IIIa non-small cell lung cancer, type 2 diabetes and hypertension presenting with progressive weakness and COVID-19 positive.  Patient was seen by oncology on 8/1 for follow-up of non-small cell lung cancer.  He tolerated treatment well at this point with plan for follow-up with radiation oncology for further treatments.  Over the last 48 hours has been increasingly weak as he is been taking as needed Thorazine for intractable hiccups that he gets from his chemotherapy.  He has been having intermittent hallucinations, took over an hour and a half to get up to the bathroom and family could not care for him so they brought him to the ER today.    In the ER noted be weak but hemodynamically stable and he was subsequently placed on observation for further monitoring and management.    Past Medical History    I have reviewed this patient's medical history and updated it with pertinent information if needed.   Past Medical History:   Diagnosis Date     Acute myocardial infarction, unspecified (H) 07/05/2013     Basal cell carcinoma of left ear 12/31/2021     Bicipital tenosynovitis 07/05/2013     CAD (coronary artery disease)      Chronic myeloproliferative disease (H)     questionable     Diabetes mellitus, type 2 (H)      Dysphagia (partial resection of epiglottis) 07/05/2013     Essential (primary) hypertension 01/12/2004    unspecified     Glucose intolerance 11/14/2003     Hiccough 07/05/2013     Hyperlipidemia     No Comments Provided     Lewy body dementia (H)      Male erectile dysfunction     No Comments Provided     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)     4/22/2015      Malignant neoplasm of lower lobe of right lung (H) 04/14/2022     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,Radiation     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     Peptic ulcer disease 11/10/2003     Pneumonia     2001     Resolved: chronic pancreatitis (has undergone Whipple procedure)     No Comments Provided     Resolved: Pancreatitis 01/12/2004     Resolved: Personal history of nicotine dependence     2ppd - quit 2001     Resolved: Thoracic back pain 04/08/2010    sharp pain with thoracic movements; s/p 9 visits of PT     Resovled: Pancreatic duct stones 01/12/2004     ST elevation (STEMI) myocardial infarction (H)     7/5/2013,[facility] BMS to RCA [facility]muscle[facility]2013     Tremor     No Comments Provided       Past Surgical History   I have reviewed this patient's surgical history and updated it with pertinent information if needed.  Past Surgical History:   Procedure Laterality Date     APPENDECTOMY OPEN      1970     ARTHROSCOPY SHOULDER      11/2005,right shoulder     ARTHROSCOPY SHOULDER      1/2006,left shoulder     ARTHROSCOPY SHOULDER      2011,Left shoulder scope SAD, ac.  Open subscap, biceps     BRONCHOSCOPY  04/13/2022    bronchoscopy with endobronchial biopsies     CHOLECYSTECTOMY      1994     COLONOSCOPY  02/01/2005 02/2005     COLONOSCOPY  02/19/2015 2/19/2015,no repeat due at this time     CYSTOSCOPY      Multiple times,Dr. Lyndon FRANK     FINE NEEDLE ASPIRATION  04/13/2022    transbronchial needle aspiration and biopsy     HEART CATH, ANGIOPLASTY      07/05/2013,Bare-metal stent to dominant RCA     OTHER SURGICAL HISTORY      2004,[id number],OTHER,for chronic pancreatitis     OTHER SURGICAL HISTORY      6/22/2007,[id number],SHOULDER REPLACEMENT,Right,with biceps tenodesis by Dr. Mukesh Ayoub     OTHER SURGICAL HISTORY      1995/2002,,ERCP,xseveral, one with papillotomy     OTHER SURGICAL HISTORY       1/30/2012-3/2/2012,[id number],RADIATION TREATMENT     OTHER SURGICAL HISTORY      12/7/2011,[id number],NECK/THORAX PROCEDURE,L modified radical neck surgery     OTHER SURGICAL HISTORY      11/10/14,[id number],OTHER,Dr. Lyndon FRANK     OTHER SURGICAL HISTORY      2012,[id number],REVISION EYELID,Dr. Farias     OTHER SURGICAL HISTORY      10/22/15,[id number],OTHER     TONSILLECTOMY, ADENOIDECTOMY, COMBINED      1947     VASECTOMY      1980       Prior to Admission Medications   Prior to Admission Medications   Prescriptions Last Dose Informant Patient Reported? Taking?   Benzonatate (TESSALON PERLES PO)   Yes No   Sig: Has been taking 1-2 times a day   Blood Glucose Monitoring Suppl (FIFTY50 GLUCOSE METER 2.0) W/DEVICE KIT Unknown at N/A  Yes Yes   Sig: Dispense Accu-Chek Agnes  Meter. Dispense item covered by pt ins. E11.9 NIDDM type II - Test 1 time/day   DOCOSAHEXAENOIC ACID PO   Yes No   Sig: Take 1 capsule by mouth 2 times daily   Multiple Vitamin (MULTI-VITAMINS) TABS   Yes No   Sig: Take 1 tablet by mouth daily   amylase-lipase-protease (CREON 36) 183889-14505-064357 units CPEP   No No   Sig: Take 3 capsules by mouth 4 times daily (with meals and nightly) Taking 60467 units   atorvastatin (LIPITOR) 40 MG tablet   No No   Sig: Take 1 tablet (40 mg) by mouth daily (with dinner)   benzonatate (TESSALON) 100 MG capsule   Yes No   blood glucose (NO BRAND SPECIFIED) lancets standard Unknown at N/A  No Yes   Sig: Use to test blood sugar 2 times daily  E11.9   blood glucose (NO BRAND SPECIFIED) test strip Unknown at N/A  No Yes   Sig: Use to test blood sugar 2 times daily  E11.9   calcium carbonate-vitamin D 600-200 MG-UNIT TABS   Yes No   Sig: Take 1 tablet by mouth 2 times daily (with meals)   chlorproMAZINE (THORAZINE) 10 MG tablet   No No   Sig: For severe hiccups, take 5 mg (1/2 tablet) by mouth 3 times daily as needed (Stop if patient develops bothersome Parkinson-like symptoms, light-headedness, dizziness, mental status changes, or  other neurologic complaints.)   cholecalciferol 50 MCG (2000 UT) CAPS   Yes No   Sig: Take 4,000 Units by mouth   levothyroxine (SYNTHROID/LEVOTHROID) 50 MCG tablet   No No   Sig: Take 1 tablet (50 mcg) by mouth daily   loperamide (IMODIUM A-D) 2 MG tablet   No No   Sig: Take 1 tablet (2 mg) by mouth 4 times daily as needed for diarrhea   magnesium 200 MG TABS   Yes No   Sig: Take 200 mg by mouth   metFORMIN (GLUCOPHAGE) 1000 MG tablet   No No   Sig: Take 1 tablet (1,000 mg) by mouth 2 times daily (with meals) For diabetes prevention   nitroGLYcerin (NITROSTAT) 0.4 MG sublingual tablet   No No   Sig: DISSOLVE ONE TABLET UNDER THE TONGUE EVERY 5 MINUTES AS NEEDED FOR CHEST PAIN.  DO NOT EXCEED A TOTAL OF 3 DOSES IN 15 MINUTES   nystatin (NYSTOP) 241575 UNIT/GM external powder   No No   Sig: Apply topically 2 times daily as needed For skin fold rash   omeprazole (PRILOSEC) 20 MG DR capsule   No No   Sig: Take 1 capsule (20 mg) by mouth 2 times daily Take 30 to 60 minutes prior to meals   primidone (MYSOLINE) 50 MG tablet   No No   Sig: Take 2 tablets (100 mg) by mouth At Bedtime   rivastigmine (EXELON) 3 MG capsule   Yes No   Sig: Take 3 mg by mouth daily   tamsulosin (FLOMAX) 0.4 MG capsule   No No   Sig: Take 1 capsule (0.4 mg) by mouth every evening   tiotropium (SPIRIVA) 18 MCG inhaled capsule   No No   Sig: Inhale 1 capsule (18 mcg) into the lungs daily - for chronic phlegm/Bronchitis      Facility-Administered Medications: None     Allergies   Allergies   Allergen Reactions     Other Drug Allergy (See Comments)      Asha Ellipta  Sore throat         Social History   I have reviewed this patient's social history and updated it with pertinent information if needed. Rajesh Huynh  reports that he quit smoking about 21 years ago. His smoking use included cigarettes. He has a 129.00 pack-year smoking history. He has never used smokeless tobacco. He reports previous alcohol use. He reports that he does not use  drugs.    Family History   I have reviewed this patient's family history and updated it with pertinent information if needed.   Family History   Problem Relation Age of Onset     Breast Cancer Mother      Heart Disease Mother 80        Heart Disease,MI     Heart Disease Father 83        Heart Disease,CHF     Lung Cancer Father      Family History Negative Sister         Good Health     Heart Disease Sister         Heart Disease,pacemaker     Breast Cancer Daughter         Cancer-breast     Diabetes Son         Diabetes,Type 1     Alcoholism Son      Prostate Cancer No family hx of         Cancer-prostate       Review of Systems     Constitutional: Poor energy and appetite, no recent sick contacts, wife is now feeling poorly today.  Eyes: no changes in vision, no headaches.  Ears, nose, mouth, throat, and face: no mouth sores, dysphagia, or odynophagia  Respiratory: no shortness of breath, cough, or wheezing.   Cardiovascular: no chest pain, palpitations, orthopnea, increased lower extremity edema, or syncope.   Gastrointestinal: no consistent constipation, diarrhea, nausea, vomiting or abdominal pain.  Genitourinary: no dysuria, hematuria, urgency or frequency.   Hematologic/lymphatic: no unintentional weight loss or night sweats.  Musculoskeletal: no pain to extremities or falls.   Neurological: no new weakness, tingling, numbness.   Psychiatric: Intermittent visual hallucinations at home  Endocrine: blood sugars have been well controlled.     Physical Exam   Temp: 99.4  F (37.4  C) Temp src: Tympanic BP: (!) 157/107 Pulse: 74   Resp: 16 SpO2: 95 % O2 Device: None (Room air)    Vital Signs with Ranges  Temp:  [99.4  F (37.4  C)] 99.4  F (37.4  C)  Pulse:  [72-80] 74  Resp:  [16] 16  BP: (117-159)/() 157/107  SpO2:  [95 %] 95 %  162 lbs 0 oz    Exam:  GENERAL: Talkative, in no apparent distress.  Head: normocephalic and atraumatic  Eyes: anicteric and non-injected sclera  Nose: no rhinorrhea or epistaxis.    Throat: Dry mucous membranes with no active oral lesions.  NECK: Supple, jugular venous distension not present.  CARDIOVASCULAR: regular rate and rhythm, no murmurs, rubs, or gallops. Normal S1/S2. No lower extremity edema.   RESPIRATORY: clear to auscultation bilaterally, no wheezes, no crackles.  No accessory muscle use or evidence of respiratory distress.   GI: soft, non-tender, non-distended, normoactive bowel sounds.  No suprapubic pain on palpation.  MUSCULOSKELETAL: warm and well perfused, 2+ dorsalis pedis pulses.    SKIN: no pallor, jaundice or rashes.  NEUROLOGY: AAOx3, becomes easily confused however and trails off with his speech, easily redirectable, follows commands, speech and language without focal deficits.      Data   Data reviewed today:  I personally reviewed no images or EKG's today.  Recent Labs   Lab 08/10/22  1222   WBC 9.5   HGB 11.4*   MCV 97      *   POTASSIUM 3.8   CHLORIDE 94*   CO2 26   BUN 10   CR 0.65*   ANIONGAP 7   ERICA 8.8   *   ALBUMIN 3.6   PROTTOTAL 6.3*   BILITOTAL 0.6   ALKPHOS 67   ALT 21   AST 20       Recent Results (from the past 24 hour(s))   XR Chest Port 1 View    Narrative    PROCEDURE: XR CHEST PORT 1 VIEW 8/10/2022 12:19 PM    HISTORY: weakness    COMPARISONS: 2/24/2022.    TECHNIQUE: Single portable view.    FINDINGS: There is a left central venous catheter with its tip in the  SVC. There is no pneumothorax.    Heart is stable in size. There is ectasia of the aorta. Previously  seen mass at the right lung base is less well seen on the current  study. There is still a small nodular density in the right upper lobe  along the undersurface of the right anterior second rib.         Impression    IMPRESSION: Relatively stable nodule in the right upper lobe. Right  basilar nodule is less well seen than on the prior exam.    ADEEL ALEXANDRE MD         SYSTEM ID:  [id number]

## 2022-08-11 NOTE — PLAN OF CARE
PRIMARY DIAGNOSIS: Infection due to Covid - 19  OUTPATIENT/OBSERVATION GOALS TO BE MET BEFORE DISCHARGE:  ADLs back to baseline: Yes    Activity and level of assistance: Up with standby assistance.    Pain status: Pain free.    Return to near baseline physical activity: Yes     Discharge Planner Nurse   Safe discharge environment identified: Yes  Barriers to discharge: No       Entered by: Sera Wong RN 08/11/2022 10:36 AM     Dementia at baseline; VS stable; on RA; Denies pain; Tolerating food & liquids

## 2022-08-11 NOTE — PROVIDER NOTIFICATION
08/11/22 0859   Valuables   Patient Belongings Remaining with Patient clothing;shoes     A               Admission:  I am responsible for any personal items that are not sent to the safe or pharmacy.  [facility] is not responsible for loss, theft or damage of any property in my possession.    Signature:  _________________________________ Date: _______  Time: _____                                              Staff Signature:  ____________________________ Date: ________  Time: _____      2nd Staff person, if patient is unable/unwilling to sign:    Signature: ________________________________ Date: ________  Time: _____     Discharge:  [facility] has returned all of my personal belongings:    Signature: _________________________________ Date: ________  Time: _____                                          Staff Signature:  ____________________________ Date: ________  Time: _____

## 2022-08-11 NOTE — PHARMACY-ADMISSION MEDICATION HISTORY
Pharmacy -- Admission Medication Reconciliation    Prior to admission (PTA) medications were reviewed and the patient's PTA medication list was updated.    Sources Consulted: sure scripts, chart review, interview with patient's wife Ruth    The reliability of this Medication Reconciliation is: Reliability: Reliable    The following significant changes were made:    Updated:    Tamsulosin to daily     Rivastigmine to twice daily    benzonatate dosing directions    Calcium to calcium PO (wife did not know dose)    Vitamin d to vitamin d PO    Magnesium to magnesium PO     Omeprazole to daily (per wife only taking once daily)    Removed:    Benzonatate duplicate    Thorazine    Docosahexaenoic acid     Added:    Propranolol     Fish oil    Cetirizine    Acetaminophen PRN    Note:    Wife stated patient is using his tiotropium only once in a while when needed along with an unknown albuterol inhaler (no fills in sure scripts).     Wife stated that baclofen and thorazine for hiccups resulted in total loss of muscle tone. Wife said there was also a third medication used for hiccups that also resulted in loss of muscle tone but she couldn't remember what it was. No other dispense history in sure scripts for a PRN hiccup medication.    In addition, the patient's allergies were reviewed with the patient and updated as follows:   Allergies: Other drug allergy (see comments)    The pharmacist has reviewed with the patient that all personal medications should be removed from the building or locked in the belongings safe.  Patient shall only take medications ordered by the physician and administered by the nursing staff.     Medication barriers identified: dementia at baseline- wife manages medications   Medication adherence concerns: inhaler adherence concern    Understanding of emergency medications: never used nitroglycerin; checks blood glucose daily- highs around 150 per wife    Juana Pagan [facility], 8/11/2022,  10:51 AM

## 2022-08-11 NOTE — PROGRESS NOTES
[facility]    Hospitalist Progress Note      Assessment & Plan   Rajesh Huynh is a 82 year old male who was admitted on 8/10/2022.     Clinically Significant Risk Factors Present on Admission         # Hyponatremia: Na = 131 mmol/L (Ref range: 134 - 144 mmol/L) on admission, will monitor as appropriate            # DMII: A1C = N/A within past 3 months       Principal Problem:    Infection due to 2019 novel coronavirus    Assessment: Currently asymptomatic other than some nonspecific weakness, improved inflammatory markers vitals and symptoms but at this point, after discussion of risks and benefits with patient and family, will manage conservatively without active treatment unless having clinical deterioration.    Plan:   -COVID order set in addition to COVID anticoagulation placed  -Appreciate PT/OT involvement for possible addition of home care       Malignant neoplasm of lower lobe of right lung (H)    Assessment: Non-small cell lung cancer.  Stage III aT4 N0 M0, recently seen by Dr. Olivera on 8/1    Plan:   -Follow-up with radiation oncology for further management  -Zofran as needed    Intractable hiccups  Assessment: Ongoing and persistent, difficulty with oral intake because of this.  Has not tolerated baclofen in the past due to significant somnolence and altered mental status.  Thorazine with significant weakness and lightheadedness.  Plan:  -Schedule IV Reglan today     Active Problems:    Benign essential hypertension    Assessment: Persistent orthostatic hypotension today likely from hypovolemia and he will benefit from ongoing IV fluids today to ensure stability.    Plan:   -Hold home Flomax  -Liter fluid bolus with maintenance fluids afterward  -Anand orthostatics in a.m. to ensure stability     Dementia  Assessment: Mild and remains ambulatory needing assistance from family, potential risk for sundowning.  Plan:  -Encephalopathy order set placed     Hyponatremia: Na = 127  mmol/L (Ref range: 134 - 144 mmol/L) on admission, likely from hypovolemia  -IV fluids  -Monitor daily       Controlled type 2 diabetes mellitus without complication, without long-term current use of insulin (H)    Assessment: Previous stable control with last A1c of 7.7    Plan:   -Hold metformin  -ISS AC at bedtime    Diet: Regular Diet Adult    DVT Prophylaxis: Enoxaparin (Lovenox) SQ  Gomez Catheter: Not present  Code Status: No CPR- Do NOT Intubate           Disposition Plan      Entered: Emery Neves MD 08/11/2022, 11:16 AM       The patient's care was discussed with the Bedside Nurse, Patient and Patient's Family.    Emery Neves MD  Hospitalist Service  [facility]  Contact information available via [facility] Paging/Directory    ______________________________________________________________________    Interval History   CC: Weakness    Overnight afebrile, no new respiratory symptoms cough shortness of breath increased lower extremity edema, has been up and out of a chair yet, continues to just have constant hiccups making eating breakfast more difficult, no aspiration symptoms, no other new complaints.    -Data reviewed today: I reviewed all new labs and imaging results over the last 24 hours. I personally reviewed no images or EKG's today.    Physical Exam   Temp: 97.4  F (36.3  C) Temp src: Tympanic BP: 125/69 Pulse: 84   Resp: 16 SpO2: 95 % O2 Device: None (Room air)    Vitals:    08/10/22 1149 08/11/22 0543   Weight: 73.5 kg (162 lb) 68 kg (150 lb)     Vital Signs with Ranges  Temp:  [97.4  F (36.3  C)-99.4  F (37.4  C)] 97.4  F (36.3  C)  Pulse:  [64-84] 84  Resp:  [16-18] 16  BP: (117-159)/() 125/69  SpO2:  [92 %-96 %] 95 %  I/O last 3 completed shifts:  In: 10 [I.V.:10]  Out: -      Exam:  GENERAL: Talkative, sitting up in the recliner after eating breakfast, having intermittent hiccuping in no apparent distress.  CARDIOVASCULAR: regular rate and rhythm, no murmurs, rubs, or  gallops. Normal S1/S2. No lower extremity edema.   RESPIRATORY: clear to auscultation bilaterally, no wheezes, no crackles.    GI: soft, non-tender, non-distended, normoactive bowel sounds.  No suprapubic pain on palpation.  MUSCULOSKELETAL: warm and well perfused, 2+ dorsalis pedis pulses.    SKIN: no pallor, jaundice or rashes.  NEUROLOGY: AAOx3, sensorium clear today, does not trail off with his discussions, follows commands, speech and language without focal deficits.         Medications     - MEDICATION INSTRUCTIONS -       sodium chloride         sodium chloride 0.9%  1,000 mL Intravenous Once     enoxaparin ANTICOAGULANT  40 mg Subcutaneous BID     insulin aspart  1-7 Units Subcutaneous TID AC     insulin aspart  1-5 Units Subcutaneous At Bedtime     levothyroxine  50 mcg Oral Daily     lipase-protease-amylase  6 capsule Oral 4x Daily w/meals     metoclopramide  5 mg Intravenous Q6H     pantoprazole  40 mg Oral QAM AC     primidone  100 mg Oral At Bedtime     sodium chloride (PF)  3 mL Intracatheter Q8H       Data   Recent Labs   Lab 08/11/22  0727 08/11/22  0704 08/10/22  2131 08/10/22  1628 08/10/22  1222   WBC  --  5.8  --   --  9.5   HGB  --  10.9*  --   --  11.4*   MCV  --  98  --   --  97   PLT  --  134*  --   --  154   NA  --  131*  --   --  127*   POTASSIUM  --  3.9  --   --  3.8   CHLORIDE  --  98  --   --  94*   CO2  --  27  --   --  26   BUN  --  10  --   --  10   CR  --  0.67*  --   --  0.65*   ANIONGAP  --  6  --   --  7   ERICA  --  8.6  --   --  8.8   * 126* 127*   < > 128*   ALBUMIN  --   --   --   --  3.6   PROTTOTAL  --   --   --   --  6.3*   BILITOTAL  --   --   --   --  0.6   ALKPHOS  --   --   --   --  67   ALT  --   --   --   --  21   AST  --   --   --   --  20    < > = values in this interval not displayed.       Recent Results (from the past 24 hour(s))   XR Chest Port 1 View    Narrative    PROCEDURE: XR CHEST PORT 1 VIEW 8/10/2022 12:19 PM    HISTORY: weakness    COMPARISONS:  2/24/2022.    TECHNIQUE: Single portable view.    FINDINGS: There is a left central venous catheter with its tip in the  SVC. There is no pneumothorax.    Heart is stable in size. There is ectasia of the aorta. Previously  seen mass at the right lung base is less well seen on the current  study. There is still a small nodular density in the right upper lobe  along the undersurface of the right anterior second rib.         Impression    IMPRESSION: Relatively stable nodule in the right upper lobe. Right  basilar nodule is less well seen than on the prior exam.    ADEEL ALEXANDRE MD         SYSTEM ID:  [id number]

## 2022-08-11 NOTE — PLAN OF CARE
"Pt is confused to place, time and situation, denies any pain. LS are clear, cardiac is WDL. Pt ambulated in his room with stand bye assist and redirection.       Goal Outcome Evaluation:    Plan of Care Reviewed With: patient     Overall Patient Progress: no change         Problem: Plan of Care - These are the overarching goals to be used throughout the patient stay.    Goal: Plan of Care Review/Shift Note  Description: The Plan of Care Review/Shift note should be completed every shift.  The Outcome Evaluation is a brief statement about your assessment that the patient is improving, declining, or no change.  This information will be displayed automatically on your shift note.  Outcome: Ongoing, Progressing  Flowsheets (Taken 8/11/2022 0336)  Plan of Care Reviewed With: patient  Overall Patient Progress: no change  Goal: Patient-Specific Goal (Individualized)  Description: You can add care plan individualizations to a care plan. Examples of Individualization might be:  \"Parent requests to be called daily at 9am for status\", \"I have a hard time hearing out of my right ear\", or \"Do not touch me to wake me up as it startles me\".  Outcome: Ongoing, Progressing  Flowsheets (Taken 8/11/2022 0336)  Anxieties, Fears or Concerns: None  Goal: Absence of Hospital-Acquired Illness or Injury  Outcome: Ongoing, Progressing  Intervention: Identify and Manage Fall Risk  Recent Flowsheet Documentation  Taken 8/10/2022 2300 by Horacio Heredia, RN  Safety Promotion/Fall Prevention:   safety round/check completed   room organization consistent   room door open   nonskid shoes/slippers when out of bed   clutter free environment maintained   assistive device/personal items within reach  Intervention: Prevent and Manage VTE (Venous Thromboembolism) Risk  Recent Flowsheet Documentation  Taken 8/10/2022 2300 by Horacio Heredia, RN  VTE Prevention/Management:   SCDs (sequential compression devices) off   other (see comments)  Goal: Optimal Comfort " and Wellbeing  Outcome: Ongoing, Progressing  Goal: Readiness for Transition of Care  Outcome: Ongoing, Progressing

## 2022-08-12 NOTE — TELEPHONE ENCOUNTER
Reason for call: Medication or medication refill    Name of medication requested: Propranalol (sp?)    Are you out of the medication? 1 week left     What pharmacy do you use? [facility]    Preferred method for responding to this message: Telephone Call    Phone number patient can be reached at: Home number on file [phone] (home)    If we cannot reach you directly, may we leave a detailed response at the number you provided? Yes       Lety Conte on 8/12/2022 at 11:50 AM

## 2022-08-12 NOTE — TELEPHONE ENCOUNTER
-- Schedule OV with Jovanni Sen    Signed, Jarad Lawrence MD, FAAP, FACP  Internal Medicine & Pediatrics

## 2022-08-12 NOTE — PROGRESS NOTES
[facility] DISCHARGE NOTE    Patient discharged to home at 9:46 AM via wheel chair. Accompanied by spouse and staff. Discharge instructions reviewed with patient and spouse, opportunity offered to ask questions. Prescriptions sent to patients preferred pharmacy. All belongings sent with patient.    Linnea Mueller RN  All questions asked and answered. Linnea Mueller RN on 8/12/2022 at 9:47 AM

## 2022-08-12 NOTE — PLAN OF CARE
Patient adequate for discharge. Linnea Mueller RN on 8/12/2022 at 1:56 PM    Problem: Plan of Care - These are the overarching goals to be used throughout the patient stay.    Goal: Absence of Hospital-Acquired Illness or Injury  Intervention: Identify and Manage Fall Risk  Recent Flowsheet Documentation  Taken 8/12/2022 0712 by Linnea Mueller RN  Safety Promotion/Fall Prevention: safety round/check completed     Problem: Fall Injury Risk  Goal: Absence of Fall and Fall-Related Injury  Intervention: Identify and Manage Contributors  Recent Flowsheet Documentation  Taken 8/12/2022 0712 by iLnnea Mueller RN  Medication Review/Management: medications reviewed  Intervention: Promote Injury-Free Environment  Recent Flowsheet Documentation  Taken 8/12/2022 0712 by Linnea Mueller RN  Safety Promotion/Fall Prevention: safety round/check completed   Goal Outcome Evaluation:

## 2022-08-12 NOTE — DISCHARGE SUMMARY
[facility]    Discharge Summary  Hospitalist    Date of Admission:  8/10/2022  Date of Discharge:  8/12/2022  Discharging Provider: Emery Neves MD  Date of Service (when I saw the patient): 08/12/22    Discharge Diagnoses   Principal Problem:    Infection due to 2019 novel coronavirus (8/10/2022)  Active Problems:    Benign essential hypertension (1/24/2018)    Controlled type 2 diabetes mellitus without complication, without long-term current use of insulin (H) (5/3/2019)    Malignant neoplasm of lower lobe of right lung (H) (4/14/2022)      History of Present Illness   Rajesh Huynh is an 82 year old male who presented with COVID-19 infection and dehydration.  History of mild to moderate dementia and stage IIIa non-small cell lung cancer, type 2 diabetes and hypertension presenting with progressive weakness and COVID-19 positive.  Patient was seen by oncology on 8/1 for follow-up of non-small cell lung cancer.  He tolerated treatment well at this point with plan for follow-up with radiation oncology for further treatments.  Over the last 48 hours has been increasingly weak as he is been taking as needed Thorazine for intractable hiccups that he gets from his chemotherapy.  He has been having intermittent hallucinations, took over an hour and a half to get up to the bathroom and family could not care for him so they brought him to the ER today.      Hospital Course   Rajesh Huynh was admitted on 8/10/2022.  The following problems were addressed during his hospitalization:    Infection due to 2019 novel coronavirus: Asymptomatic throughout his stay, downtrending inflammatory markers and no supplemental oxygen requirement during his stay.  He was gently rehydrated and was not actively treated for COVID after discussion risks and benefits with patient and family.          Malignant neoplasm of lower lobe of right lung (H): Non-small cell lung cancer.  Stage III aT4 N0 M0, recently seen by  Detail Level: [facility] Dr. Olivera on 8/1.  Follow-up with radiation oncology as already scheduled.       Intractable hiccups: Persistent, has not tolerated baclofen in the past due to significant somnolence and altered mental status.    When taking Thorazine his significant weakness and lightheadedness.  He was started on scheduled Reglan with some improvement.  Going forward we will continue with Reglan 3 times daily as needed and then only if hiccups remain severe or escalating can continue with Thorazine as needed.         Benign essential hypertension: Initially orthostatic and rehydrated with additional fluid bolus day prior to discharge.     Dementia: Mild and remains ambulatory without significant assistance with ambulation at this point.       Controlled type 2 diabetes mellitus without complication, without long-term current use of insulin (H)    Previous stable control with last A1c of 7.7 he will continue with home metformin.      Emery Neves MD    Significant Results and Procedures   none    Pending Results   These results will be followed up by NA  Unresulted Labs Ordered in the Past 30 Days of this Admission     No orders found from 7/11/2022 to 8/11/2022.          Code Status   DNR / DNI       Primary Care Physician   Jovanni Sen    Physical Exam   Temp: 97.7  F (36.5  C) Temp src: Tympanic BP: 124/79 Pulse: 103   Resp: 20 SpO2: 94 % O2 Device: None (Room air)    Vitals:    08/10/22 1149 08/11/22 0543 08/12/22 0521   Weight: 73.5 kg (162 lb) 68 kg (150 lb) 70.6 kg (155 lb 9.6 oz)     Vital Signs with Ranges  Temp:  [97.4  F (36.3  C)-97.8  F (36.6  C)] 97.7  F (36.5  C)  Pulse:  [] 103  Resp:  [16-20] 20  BP: (114-175)/(66-95) 124/79  SpO2:  [94 %-98 %] 94 %  I/O last 3 completed shifts:  In: 2086 [P.O.:2076; I.V.:10]  Out: 450 [Urine:450]    Exam on day of discharge:   GENERAL: Talkative, sitting in the recliner, pleasant and appropriate, in no apparent distress.  CARDIOVASCULAR: regular rate and rhythm, no  Plan: Reviewed probable cause and effect. Continue T Gel shampoo. Discussed oral ABX to topical Clindamycin. Discussed not caused from hair color. Will start Clindamycin solution BID PRN. Rx for Clobetasol solution to apply PRN for itching. murmurs, rubs, or gallops. Normal S1/S2. No lower extremity edema.   RESPIRATORY: clear to auscultation bilaterally, no wheezes, no crackles.   GI: soft, non-tender, non-distended, normoactive bowel sounds.  MUSCULOSKELETAL: warm and well perfused, 2+ dorsalis pedis pulses bilaterally.    SKIN: no pallor,jaundice, or rashes, port accessed in left upper chest, covered with Tegaderm, clean dry and intact.      Discharge Disposition   Discharged to home  Condition at discharge: Stable    Consultations This Hospital Stay   None    Time Spent on this Encounter   I, Emery Neves MD, personally saw the patient today and spent less than or equal to 30 minutes discharging this patient.    Discharge Orders      Reason for your hospital stay    Dehydration and COVID     Follow-up and recommended labs and tests     Liebe on 8/23 @ 3:20pm  8/22/2022 at 11:00 AM with Pacheco Valadez MD [facility]     Activity    Your activity upon discharge: activity as tolerated     When to contact your care team    Call your primary doctor if you have any of the following: temperature greater than 101,  increased shortness of breath, increased drainage, increased swelling, or increased pain.     Discharge Instructions    - Try using Reglan that I have prescribed 3 times daily as needed for your hiccups only if your hiccups are very severe and you are unable to eat or drink much because of them then you can use the Thorazine you have available but be very careful with this as it can make you sleepy and lightheaded and dizzy.     Diet    Follow this diet upon discharge: Orders Placed This Encounter      Regular Diet Adult     Discharge Medications   Current Discharge Medication List      START taking these medications    Details   metoclopramide (REGLAN) 5 MG tablet Take 1 tablet (5 mg) by mouth 3 times daily as needed  Qty: 30 tablet, Refills: 0    Associated Diagnoses: Intractable hiccups         CONTINUE these medications which have  NOT CHANGED    Details   acetaminophen (TYLENOL) 325 MG tablet Take 325-650 mg by mouth every 6 hours as needed for mild pain      amylase-lipase-protease (CREON 36) 169610-14609-257019 units CPEP Take 3 capsules by mouth 4 times daily (with meals and nightly) Taking 72580 units  Qty: 1080 capsule, Refills: 4    Associated Diagnoses: Exocrine pancreatic insufficiency; History of pancreatic surgery      atorvastatin (LIPITOR) 40 MG tablet Take 1 tablet (40 mg) by mouth daily (with dinner)  Qty: 90 tablet, Refills: 4    Associated Diagnoses: Mixed hyperlipidemia      Benzonatate (TESSALON PERLES PO) Take 100-200 mg by mouth every 8 hours as needed      blood glucose (NO BRAND SPECIFIED) lancets standard Use to test blood sugar 2 times daily  E11.9  Qty: 200 each, Refills: 3    Associated Diagnoses: Controlled type 2 diabetes mellitus without complication, without long-term current use of insulin (H)      blood glucose (NO BRAND SPECIFIED) test strip Use to test blood sugar 2 times daily  E11.9  Qty: 200 strip, Refills: 11    Associated Diagnoses: Controlled type 2 diabetes mellitus without complication, without long-term current use of insulin (H)      Blood Glucose Monitoring Suppl (FIFTY50 GLUCOSE METER 2.0) W/DEVICE KIT Dispense Accu-Chek Agnes  Meter. Dispense item covered by pt ins. E11.9 NIDDM type II - Test 1 time/day      CALCIUM PO Take 2 each by mouth At Bedtime      cetirizine (ZYRTEC) 10 MG tablet Take 10 mg by mouth daily      levothyroxine (SYNTHROID/LEVOTHROID) 50 MCG tablet Take 1 tablet (50 mcg) by mouth daily  Qty: 90 tablet, Refills: 4    Associated Diagnoses: Hypothyroidism due to acquired atrophy of thyroid      loperamide (IMODIUM A-D) 2 MG tablet Take 1 tablet (2 mg) by mouth 4 times daily as needed for diarrhea  Qty: 120 tablet, Refills: 11    Associated Diagnoses: Diarrhea, unspecified type      MAGNESIUM PO Take 1 each by mouth At Bedtime      metFORMIN (GLUCOPHAGE) 1000 MG tablet Take 1  tablet (1,000 mg) by mouth 2 times daily (with meals) For diabetes prevention  Qty: 180 tablet, Refills: 1    Associated Diagnoses: Controlled type 2 diabetes mellitus without complication, without long-term current use of insulin (H)      Multiple Vitamin (MULTI-VITAMINS) TABS Take 1 tablet by mouth daily      nystatin (NYSTOP) 549162 UNIT/GM external powder Apply topically 2 times daily as needed For skin fold rash  Qty: 180 g, Refills: 4    Associated Diagnoses: Candidiasis, intertriginous      Omega-3 Fatty Acids (FISH OIL PO) Take 1 each by mouth 2 times daily      omeprazole 20 MG tablet Take 20 mg by mouth daily      primidone (MYSOLINE) 50 MG tablet Take 2 tablets (100 mg) by mouth At Bedtime  Qty: 180 tablet, Refills: 4    Associated Diagnoses: Restless legs syndrome      propranolol (INDERAL) 20 MG tablet Take 1 tablet by mouth in the morning and 2 tablets by mouth in the evening.      rivastigmine (EXELON) 3 MG capsule Take 3 mg by mouth 2 times daily      tamsulosin (FLOMAX) 0.4 MG capsule Take 0.4 mg by mouth daily      tiotropium (SPIRIVA) 18 MCG inhaled capsule Inhale 1 capsule (18 mcg) into the lungs daily - for chronic phlegm/Bronchitis  Qty: 90 capsule, Refills: 3    Associated Diagnoses: Simple chronic bronchitis (H)      VITAMIN D PO Take 1 each by mouth daily      nitroGLYcerin (NITROSTAT) 0.4 MG sublingual tablet DISSOLVE ONE TABLET UNDER THE TONGUE EVERY 5 MINUTES AS NEEDED FOR CHEST PAIN.  DO NOT EXCEED A TOTAL OF 3 DOSES IN 15 MINUTES  Qty: 25 tablet, Refills: 0    Associated Diagnoses: History of ST elevation myocardial infarction (STEMI)           Allergies   Allergies   Allergen Reactions     Other Drug Allergy (See Comments) Other (See Comments)     Trelegy Ellipta  Sore throat       Data   Most Recent 3 CBC's:  Recent Labs   Lab Test 08/12/22  0643 08/11/22  0704 08/10/22  1222   WBC 6.8 5.8 9.5   HGB 11.7* 10.9* 11.4*   MCV 97 98 97    134* 154      Most Recent 3 BMP's:  Recent  Labs   Lab Test 08/12/22  0733 08/12/22  0643 08/11/22  2151 08/11/22  0727 08/11/22  0704 08/10/22  1628 08/10/22  1222   NA  --  133*  --   --  131*  --  127*   POTASSIUM  --  3.5  --   --  3.9  --  3.8   CHLORIDE  --  99  --   --  98  --  94*   CO2  --  26  --   --  27  --  26   BUN  --  7  --   --  10  --  10   CR  --  0.60*  --   --  0.67*  --  0.65*   ANIONGAP  --  8  --   --  6  --  7   ERICA  --  8.5*  --   --  8.6  --  8.8   * 134* 180*   < > 126*   < > 128*    < > = values in this interval not displayed.     Most Recent 2 LFT's:  Recent Labs   Lab Test 08/10/22  1222 05/31/22  0836   AST 20 15   ALT 21 15   ALKPHOS 67 58   BILITOTAL 0.6 0.5     Most Recent INR's and Anticoagulation Dosing History:  Anticoagulation Dose History     Recent Dosing and Labs Latest Ref Rng & Units 7/5/2013 5/29/2015    INR <1.3 1.0 1.1        Most Recent 3 Troponin's:No lab results found.  Most Recent Cholesterol Panel:  Recent Labs   Lab Test 05/31/22  0836   CHOL 100   LDL 40   HDL 47   TRIG 65     Most Recent 6 Bacteria Isolates From Any Culture (See EPIC Reports for Culture Details):No lab results found.  Most Recent TSH, T4 and A1c Labs:  Recent Labs   Lab Test 05/31/22  0836 05/21/22  0334 12/16/21  0843   TSH 3.05   < > 7.46*   T4  --   --  1.20   A1C 7.7*  --  7.2*    < > = values in this interval not displayed.     Results for orders placed or performed during the hospital encounter of 08/10/22   XR Chest Port 1 View    Narrative    PROCEDURE: XR CHEST PORT 1 VIEW 8/10/2022 12:19 PM    HISTORY: weakness    COMPARISONS: 2/24/2022.    TECHNIQUE: Single portable view.    FINDINGS: There is a left central venous catheter with its tip in the  SVC. There is no pneumothorax.    Heart is stable in size. There is ectasia of the aorta. Previously  seen mass at the right lung base is less well seen on the current  study. There is still a small nodular density in the right upper lobe  along the undersurface of the right  anterior second rib.         Impression    IMPRESSION: Relatively stable nodule in the right upper lobe. Right  basilar nodule is less well seen than on the prior exam.    ADEEL ALEXANDRE MD         SYSTEM ID:  [id number]

## 2022-08-12 NOTE — PHARMACY - DISCHARGE MEDICATION RECONCILIATION AND EDUCATION
Pharmacy:  Discharge Counseling and Medication Reconciliation    Rajesh Huynh  [id number]  [place] [id number] [phone] (home)   82 year old male  PCP: Jovanni Sen    Allergies: Other drug allergy (see comments)    Discharge Counseling:    Pharmacist did not meet with patient prior to discharge as patient was discharged prior to having the ability to speak with patient, but did call patient's wife and spoke with her in regard to plan for medications and new medication of Metoclopramide including indication, administration, and possible common side effects.    Summary of Education: Advised patient that Metoclopramide is usually used for nausea and vomiting, but can be used off-label as needed for hiccups. Advised wife only to give medication if needed for hiccups. Advised that this medication can cause drowsiness and potentially extrapyramidal symptoms such as uncontrolled muscle movement/jerks.    Materials Provided:  MedCounselor sheets printed from Clinical Pharmacology on: N/A- patient discharged prior to having the chance to meet with patient    Discharge Medication Reconciliation:    It has been determined that the patient has an adequate supply of medications available or which can be obtained from the patient's preferred pharmacy, which has been confirmed as: [facility] [place].    Thank you for the consult.    Darrick Wong [facility]........August 12, 2022 12:12 PM

## 2022-08-12 NOTE — TELEPHONE ENCOUNTER
Noted Pt was admitted from ED on 8/10 and discharged this morning. Routing to provider for review.    In clinical absence of patient's primary, Jovanni Sen, patient is requesting that this message be sent to the covering provider for consideration please.    Rosio Rivera RN .............. 8/12/2022  10:28 AM

## 2022-08-12 NOTE — TELEPHONE ENCOUNTER
Called and spoke to Patient after verifying last name and date of birth. Pt informed that prescription was sent to [facility] today.    Next 5 appointments (look out 90 days)    Aug 23, 2022  3:20 PM  Office Visit with Juan Lovelace MD  [facility] ([facility] ) [address]  [place] [address]  [phone]   Sep 01, 2022  8:20 AM  SHORT with Jovanni Sen MD  [facility] ([facility] ) [address]  [place] [address]  [phone]         Rosio Rivera RN .............. 8/12/2022  12:35 PM

## 2022-08-12 NOTE — PLAN OF CARE
"Pt is oriented to self, has not had any pain. He has not used his call light this shift, he sets the bed alarm off by sitting on the edge of the bed, but does not get up and ambulate prior to us getting there. Pt has been pulling at his IV and locking the clamps tonight. LS have been clear, diminished in LLL. Cardiac has been WDL, Pt has been confused, looking for his wife, and his cloths this shift.     /66 (BP Location: Right arm, Patient Position: Chair, Cuff Size: Adult Regular)   Pulse 105   Temp 97.6  F (36.4  C) (Tympanic)   Resp 16   Ht 1.702 m (5' 7\")   Wt 70.6 kg (155 lb 9.6 oz)   SpO2 95%   BMI 24.37 kg/m            Goal Outcome Evaluation:    Plan of Care Reviewed With: patient     Overall Patient Progress: no change           Problem: Plan of Care - These are the overarching goals to be used throughout the patient stay.    Goal: Plan of Care Review/Shift Note  Description: The Plan of Care Review/Shift note should be completed every shift.  The Outcome Evaluation is a brief statement about your assessment that the patient is improving, declining, or no change.  This information will be displayed automatically on your shift note.  Outcome: Ongoing, Progressing  Flowsheets (Taken 8/12/2022 0600)  Plan of Care Reviewed With: patient  Overall Patient Progress: no change  Goal: Patient-Specific Goal (Individualized)  Description: You can add care plan individualizations to a care plan. Examples of Individualization might be:  \"Parent requests to be called daily at 9am for status\", \"I have a hard time hearing out of my right ear\", or \"Do not touch me to wake me up as it startles me\".  Outcome: Ongoing, Progressing  Goal: Absence of Hospital-Acquired Illness or Injury  Outcome: Ongoing, Progressing  Intervention: Identify and Manage Fall Risk  Recent Flowsheet Documentation  Taken 8/12/2022 0320 by Horacio Heredia RN  Safety Promotion/Fall Prevention: safety round/check completed  Taken 8/11/2022 " 1948 by Horacio Heredia RN  Safety Promotion/Fall Prevention:   activity supervised   safety round/check completed   supervised activity  Intervention: Prevent and Manage VTE (Venous Thromboembolism) Risk  Recent Flowsheet Documentation  Taken 8/12/2022 0320 by Horacio Heredia RN  VTE Prevention/Management:   SCDs (sequential compression devices) off   other (see comments)  Taken 8/11/2022 1948 by Horacio Heredia RN  VTE Prevention/Management:   SCDs (sequential compression devices) off   other (see comments)  Goal: Optimal Comfort and Wellbeing  Outcome: Ongoing, Progressing  Goal: Readiness for Transition of Care  Outcome: Ongoing, Progressing     Problem: Fall Injury Risk  Goal: Absence of Fall and Fall-Related Injury  Outcome: Ongoing, Progressing  Intervention: Identify and Manage Contributors  Recent Flowsheet Documentation  Taken 8/12/2022 0320 by Horacio Heredia RN  Medication Review/Management: medications reviewed  Taken 8/11/2022 1948 by Horacio Heredia RN  Medication Review/Management: medications reviewed  Intervention: Promote Injury-Free Environment  Recent Flowsheet Documentation  Taken 8/12/2022 0320 by Horacio Heredia RN  Safety Promotion/Fall Prevention: safety round/check completed  Taken 8/11/2022 1948 by Horacio Heredia RN  Safety Promotion/Fall Prevention:   activity supervised   safety round/check completed   supervised activity     Problem: Skin Injury Risk Increased  Goal: Skin Health and Integrity  Outcome: Ongoing, Progressing

## 2022-08-12 NOTE — PROGRESS NOTES
Patient up to recliner for meals, vss. Remains afebrile this shift, No further issues at this time. Linnea Mueller RN on 8/12/2022 at 9:46 AM

## 2022-08-15 NOTE — TELEPHONE ENCOUNTER
Attempt for TCM not made at this time. Patient is currently being evaluation in the ED. Will route to care [facility] for follow up attempt tomorrow am. Melissa Almaguer RN on 8/15/2022 at 10:32 AM

## 2022-08-15 NOTE — ED PROVIDER NOTES
History     Chief Complaint   Patient presents with     Hiccups     Has hx of hiccups that last for several days.  Was discharged this past weekend from hospital, has had hiccups since.     HPI  Rajesh Huynh is a 82 year old male who is here with his daughter and they are complaining of ongoing intractable hiccups.  Was recently hospitalized for this, that note was reviewed.  The note mentions that they were using Thorazine for this.  They have previously tried baclofen.  While in the hospital is switched to Reglan.  The daughter says they have also tried Neurontin and probably a couple other things as well.  She believes Compazine was 1 of these.  She says that many of them will temporarily help with her symptoms, however but they also cause an acceptable side effects.  He has had worsening dementia and confusion with them.  He had like an overwhelming total body weakness after some Neurontin.  They say he is not sleeping well because of the hiccups.  He also becomes afraid that the hiccups are going to make him stop breathing.  He has been sticking his finger down his throat as that apparently temporarily stops his hiccups.  Otherwise no other changes, no signs of infection.  No change in bowel or bladder or oral intake    Allergies:  Allergies   Allergen Reactions     Other Drug Allergy (See Comments) Other (See Comments)     Trelegy Ellipta  Sore throat         Problem List:    Patient Active Problem List    Diagnosis Date Noted     Infection due to 2019 novel coronavirus - 8/10/2022 08/10/2022     Priority: Medium     Restless legs syndrome 05/31/2022     Priority: Medium     Malignant neoplasm of lower lobe of right lung (H) 04/14/2022     Priority: Medium     Malignant neoplasm of upper lobe of right lung (H) 04/14/2022     Priority: Medium     Right lower lobe lung mass 04/01/2022     Priority: Medium     Nodule of upper lobe of right lung 04/01/2022     Priority: Medium     Hypertensive heart disease  with heart failure (H) 04/01/2022     Priority: Medium     Hypothyroidism due to acquired atrophy of thyroid 12/16/2021     Priority: Medium     Lewy body dementia without behavioral disturbance (H) 09/16/2021     Priority: Medium     Heartburn 02/23/2021     Priority: Medium     Simple chronic bronchitis (H) 02/23/2021     Priority: Medium     History of cardiac cath on 7/8/2013 10/15/2020     Priority: Medium     Benign prostatic hyperplasia with lower urinary tract symptoms, symptom details unspecified 10/15/2020     Priority: Medium     Hx of pancreatitis on 10/23/2014 10/15/2020     Priority: Medium     Hx of bladder cancer on 11/10/2014 10/15/2020     Priority: Medium     Aortic atherosclerosis (H) 10/15/2020     Priority: Medium     Hyponatremia 10/15/2020     Priority: Medium     Stenosis of left carotid artery 10/15/2020     Priority: Medium     Exocrine pancreatic insufficiency - Severe Deficiency 11/22/2019     Priority: Medium     Intermittent diarrhea 11/13/2019     Priority: Medium     Controlled type 2 diabetes mellitus without complication, without long-term current use of insulin (H) 05/03/2019     Priority: Medium     History of pancreatic surgery - Whipple 03/21/2019     Priority: Medium     History of tobacco use quitting in 1/1/2000 at 3 packs a day 05/08/2018     Priority: Medium     Benign essential tremor 01/24/2018     Priority: Medium     Psychosexual dysfunction with inhibited sexual excitement 01/24/2018     Priority: Medium     Benign essential hypertension 01/24/2018     Priority: Medium     Mixed hyperlipidemia 01/24/2018     Priority: Medium     History of throat cancer 12/13/2017     Priority: Medium     Partial tear of right rotator cuff 06/21/2017     Priority: Medium     History of urinary retention 12/23/2016     Priority: Medium     Overview:   Postoperative       Atherosclerosis of native coronary artery of native heart without angina pectoris 10/03/2016     Priority: Medium      Personal history of malignant neoplasm of bladder 11/19/2014     Priority: Medium     H/O STEMI 7/3/2013 involving the RCA 07/11/2013     Priority: Medium        Past Medical History:    Past Medical History:   Diagnosis Date     Acute myocardial infarction, unspecified (H) 07/05/2013     Basal cell carcinoma of left ear 12/31/2021     Bicipital tenosynovitis 07/05/2013     CAD (coronary artery disease)      Chronic myeloproliferative disease (H)      Diabetes mellitus, type 2 (H)      Dysphagia (partial resection of epiglottis) 07/05/2013     Essential (primary) hypertension 01/12/2004     Glucose intolerance 11/14/2003     Hiccough 07/05/2013     Hyperlipidemia      Lewy body dementia (H)      Male erectile dysfunction      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)      Malignant neoplasm of lower lobe of right lung (H) 04/14/2022     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     Peptic ulcer disease 11/10/2003     Pneumonia      Resolved: chronic pancreatitis (has undergone Whipple procedure)      Resolved: Pancreatitis 01/12/2004     Resolved: Personal history of nicotine dependence      Resolved: Thoracic back pain 04/08/2010     Resovled: Pancreatic duct stones 01/12/2004     ST elevation (STEMI) myocardial infarction (H)      Subscapularis (muscle) sprain 07/05/2013     Tremor        Past Surgical History:    Past Surgical History:   Procedure Laterality Date     APPENDECTOMY OPEN      1970     ARTHROSCOPY SHOULDER      11/2005,right shoulder     ARTHROSCOPY SHOULDER      1/2006,left shoulder     ARTHROSCOPY SHOULDER      2011,Left shoulder scope SAD, ac.  Open subscap, biceps     BRONCHOSCOPY  04/13/2022    bronchoscopy with endobronchial biopsies     CHOLECYSTECTOMY      1994     COLONOSCOPY  02/01/2005 02/2005     COLONOSCOPY  02/19/2015 2/19/2015,no repeat due at this time     CYSTOSCOPY      Multiple times,Dr. Lyndon SANTIAGO  NEEDLE ASPIRATION  2022    transbronchial needle aspiration and biopsy     HEART CATH, ANGIOPLASTY      2013,Bare-metal stent to dominant RCA     OTHER SURGICAL HISTORY      ,549142,OTHER,for chronic pancreatitis     OTHER SURGICAL HISTORY      2007,[id number],SHOULDER REPLACEMENT,Right,with biceps tenodesis by Dr. Mukesh Ayoub     OTHER SURGICAL HISTORY      ,,ERCP,xseveral, one with papillotomy     OTHER SURGICAL HISTORY      2012-3/2/2012,866572,RADIATION TREATMENT     OTHER SURGICAL HISTORY      2011,87789,NECK/THORAX PROCEDURE,L modified radical neck surgery     OTHER SURGICAL HISTORY      11/10/14,[id number],OTHER,Dr. Lyndon FRANK     OTHER SURGICAL HISTORY      ,68714,REVISION EYELID,Dr. Farias     OTHER SURGICAL HISTORY      10/22/15,[id number],OTHER     TONSILLECTOMY, ADENOIDECTOMY, COMBINED      1947     VASECTOMY             Family History:    Family History   Problem Relation Age of Onset     Breast Cancer Mother      Heart Disease Mother 80        Heart Disease,MI     Heart Disease Father 83        Heart Disease,CHF     Lung Cancer Father      Family History Negative Sister         Good Health     Heart Disease Sister         Heart Disease,pacemaker     Breast Cancer Daughter         Cancer-breast     Diabetes Son         Diabetes,Type 1     Alcoholism Son      Prostate Cancer No family hx of         Cancer-prostate       Social History:  Marital Status:   [2]  Social History     Tobacco Use     Smoking status: Former Smoker     Packs/day: 3.00     Years: 43.00     Pack years: 129.00     Types: Cigarettes     Quit date: 2001     Years since quittin.6     Smokeless tobacco: Never Used   Vaping Use     Vaping Use: Never used   Substance Use Topics     Alcohol use: Not Currently     Drug use: No        Medications:    acetaminophen (TYLENOL) 325 MG tablet  amylase-lipase-protease (CREON 36) 918140-78414-737074 units CPEP  atorvastatin (LIPITOR) 40  "MG tablet  Benzonatate (TESSALON PERLES PO)  CALCIUM PO  cetirizine (ZYRTEC) 10 MG tablet  gabapentin (NEURONTIN) 100 MG capsule  levothyroxine (SYNTHROID/LEVOTHROID) 50 MCG tablet  loperamide (IMODIUM A-D) 2 MG tablet  MAGNESIUM PO  metFORMIN (GLUCOPHAGE) 1000 MG tablet  metoclopramide (REGLAN) 5 MG tablet  Multiple Vitamin (MULTI-VITAMINS) TABS  nitroGLYcerin (NITROSTAT) 0.4 MG sublingual tablet  Omega-3 Fatty Acids (FISH OIL PO)  omeprazole 20 MG tablet  primidone (MYSOLINE) 50 MG tablet  propranolol (INDERAL) 20 MG tablet  rivastigmine (EXELON) 3 MG capsule  tamsulosin (FLOMAX) 0.4 MG capsule  tiotropium (SPIRIVA) 18 MCG inhaled capsule  VITAMIN D PO  blood glucose (NO BRAND SPECIFIED) lancets standard  blood glucose (NO BRAND SPECIFIED) test strip  Blood Glucose Monitoring Suppl (FIFTY50 GLUCOSE METER 2.0) W/DEVICE KIT  nystatin (NYSTOP) 408586 UNIT/GM external powder  propranolol (INDERAL) 20 MG tablet          Review of Systems   Constitutional: Negative for chills and fever.   HENT: Negative for congestion.    Eyes: Negative for visual disturbance.   Respiratory: Negative for chest tightness and shortness of breath.    Cardiovascular: Negative for chest pain.   Gastrointestinal: Negative for nausea and vomiting.   Genitourinary: Negative for dysuria.   Musculoskeletal: Negative for arthralgias.   Skin: Negative for rash.   Neurological: Negative for light-headedness.        Hiccups   Psychiatric/Behavioral: Negative for agitation.       Physical Exam   BP: (!) 171/106  Pulse: 85  Temp: 98.6  F (37  C)  Resp: 20  Height: 170.2 cm (5' 7\")  Weight: 72.4 kg (159 lb 9.6 oz)  SpO2: 95 %      Physical Exam  Vitals and nursing note reviewed.   Constitutional:       Appearance: Normal appearance.      Comments: Constant hiccups throughout exam   HENT:      Head: Atraumatic.      Mouth/Throat:      Mouth: Mucous membranes are moist.   Eyes:      Conjunctiva/sclera: Conjunctivae normal.   Cardiovascular:      Rate and " Rhythm: Normal rate and regular rhythm.      Heart sounds: Normal heart sounds.   Pulmonary:      Effort: Pulmonary effort is normal.      Breath sounds: Normal breath sounds.   Abdominal:      General: Abdomen is flat.   Skin:     General: Skin is warm and dry.   Neurological:      Mental Status: He is alert and oriented to person, place, and time.   Psychiatric:         Behavior: Behavior normal.         ED Course                 Procedures                  Results for orders placed or performed during the hospital encounter of 08/15/22 (from the past 24 hour(s))   CBC with platelets differential    Narrative    The following orders were created for panel order CBC with platelets differential.  Procedure                               Abnormality         Status                     ---------                               -----------         ------                     CBC with platelets and d...[[id number]]  Abnormal            Final result                 Please view results for these tests on the individual orders.   Comprehensive metabolic panel   Result Value Ref Range    Sodium 130 (L) 134 - 144 mmol/L    Potassium 3.5 3.5 - 5.1 mmol/L    Chloride 93 (L) 98 - 107 mmol/L    Carbon Dioxide (CO2) 28 21 - 31 mmol/L    Anion Gap 9 3 - 14 mmol/L    Urea Nitrogen 8 7 - 25 mg/dL    Creatinine 0.64 (L) 0.70 - 1.30 mg/dL    Calcium 8.9 8.6 - 10.3 mg/dL    Glucose 152 (H) 70 - 105 mg/dL    Alkaline Phosphatase 60 34 - 104 U/L    AST 19 13 - 39 U/L    ALT 24 7 - 52 U/L    Protein Total 6.3 (L) 6.4 - 8.9 g/dL    Albumin 3.6 3.5 - 5.7 g/dL    Bilirubin Total 0.6 0.3 - 1.0 mg/dL    GFR Estimate >90 >60 mL/min/1.73m2   CBC with platelets and differential   Result Value Ref Range    WBC Count 10.8 4.0 - 11.0 10e3/uL    RBC Count 3.36 (L) 4.40 - 5.90 10e6/uL    Hemoglobin 11.3 (L) 13.3 - 17.7 g/dL    Hematocrit 32.1 (L) 40.0 - 53.0 %    MCV 96 78 - 100 fL    MCH 33.6 (H) 26.5 - 33.0 pg    MCHC 35.2 31.5 - 36.5 g/dL    RDW 16.9 (H)  10.0 - 15.0 %    Platelet Count 176 150 - 450 10e3/uL    % Neutrophils 86 %    % Lymphocytes 2 %    % Monocytes 11 %    % Eosinophils 0 %    % Basophils 0 %    % Immature Granulocytes 1 %    NRBCs per 100 WBC 0 <1 /100    Absolute Neutrophils 9.4 (H) 1.6 - 8.3 10e3/uL    Absolute Lymphocytes 0.2 (L) 0.8 - 5.3 10e3/uL    Absolute Monocytes 1.2 0.0 - 1.3 10e3/uL    Absolute Eosinophils 0.0 0.0 - 0.7 10e3/uL    Absolute Basophils 0.0 0.0 - 0.2 10e3/uL    Absolute Immature Granulocytes 0.1 <=0.4 10e3/uL    Absolute NRBCs 0.0 10e3/uL       Medications   gabapentin (NEURONTIN) capsule 100 mg (100 mg Oral Given 8/15/22 0834)       Assessments & Plan (with Medical Decision Making)     I have reviewed the nursing notes.    I have reviewed the findings, diagnosis, plan and need for follow up with the patient.  We try to small 100 mg dose of Neurontin.  It seems like that has stopped his hiccups at least for the time being.  He is feeling better.  Lab work seems stable.  He does have some chronic hyponatremia.  At this point we will discharge him to home.  He is not at the time having any side effects from the Neurontin.  I will write prescription for 100 mg 3 times daily as needed for the hiccups.  If this is not working follow-up in clinic, or return here if worse.    Current Discharge Medication List      START taking these medications    Details   gabapentin (NEURONTIN) 100 MG capsule Take 1 capsule (100 mg) by mouth 3 times daily as needed (hiccups)  Qty: 30 capsule, Refills: 0             Final diagnoses:   Hiccups       8/15/2022   [facility]     Jordon Estes MD  08/15/22 1034

## 2022-08-16 NOTE — TELEPHONE ENCOUNTER
Transitional Care Management Phone Call    Summary of hospitalization:  [facility] discharge summary reviewed    DISCHARGE DIAGNOSIS:  Infection due to 2019 novel coronavirus    DATE OF DISCHARGE: 8/12/2022      Diagnostic Tests/Treatments reviewed.  Follow up needed: none    Post Discharge Medication Reconciliation: discharge medications reconciled and changed, per note/orders. Patient no longer taking Reglan as he went to the ED yesterday and was put on Gabapentin 100mg prn for hiccups.     Medications reviewed by: by myself    Problems taking medications regularly:  None    Problems adhering to non-medication therapy:  None    Other Healthcare Providers Involved in Patient s Care:         None    Update since discharge: stable.     Plan of care communicated with: patient and family    Just a friendly reminder that you appointment is:  Next 5 appointments (look out 90 days)    Aug 23, 2022  3:20 PM  Office Visit with Juan Lovelace MD  [facility] ([facility] ) [address]  [place] [address]  [phone]   Sep 01, 2022  8:20 AM  SHORT with Jovanni Sen MD  [facility] ([facility] ) [address]  [place] [address]  [phone]          We encourage you to keep this appointment.  Please remember to bring all of your pills in their bottles (including any vitamins or over the counter pills) with you to your appointment.   The patient indicates understanding of these issues and agrees with the plan of care.   Yes    Was the patient contacted within the 2 business days or other approved timeframe?  Yes    Was the Medication reconciliation and management done since the patient was discharged? Yes    Lc Beyer RN ....................  8/16/2022   9:22 AM

## 2022-08-17 NOTE — TELEPHONE ENCOUNTER
Date of birth and last name verified by his wife, Rae.    Staff doing radiation state that the hiccups are from the radiation.    Has had for 5 to 6 days again  At times are so strong that he  almost chokes.    What can they do?    Jay Cannon .......  8/17/2022  1:04 PM

## 2022-08-17 NOTE — TELEPHONE ENCOUNTER
The radiation oncology doctor had previously tried to prescribe something for his hiccups.  I do not know if it was ever filled or if the medication was tried.    I am uncertain what would work.    The radiation oncology team/pharmacist may have some ideas.  It would probably be worthwhile for patient to try work with that group of providers.    Electronically signed by:  Jovanni Sen MD  on August 17, 2022 1:24 PM

## 2022-08-17 NOTE — TELEPHONE ENCOUNTER
Please call the patient wife.  The patient still has hic-ups.  What should he do?      Lety Conte on 8/17/2022 at 8:18 AM

## 2022-08-17 NOTE — TELEPHONE ENCOUNTER
This is not oncology patient at [facility]. Patient is established with [facility] oncology.  Radha Ariza RN...........8/17/2022 4:08 PM

## 2022-08-18 NOTE — TELEPHONE ENCOUNTER
Date of birth and last name verified.  Patients wife states they talked with his oncologist yesterday and where given spoke suggestions and medication.    When I asked, she has no further concerns or questions at this time.    Jay Cannon .......  8/18/2022  4:48 PM

## 2022-08-18 NOTE — PROGRESS NOTES
Mr. Kapil Huynh is a patient well-known to our department who received radiation treatment over 6-1/2 weeks for carcinoma the lung.  The patient has had intractable hiccups for some time.  In mid May he was placed on baclofen half a tablet 3 times daily for hiccups and cough.  This was discontinued and the patient August 12 tried Reglan on August 12.  This was no benefit and again because of intractable hiccups he subsequently went to the emergency room on August 15.  At that time they gave him a prescription for 100 mg p.o. 3 times daily Neurontin.  That was of no benefit and he was subsequently tried on Compazine and after that Thorazine.  The Thorazine not surprisingly was associated with lightheadedness and the increased weakness.    At this point he has tried most of the useful medications.  Additional medicine that can be attempted is a scopolamine patch.  The patch is 1 mg and its every 72 hours.  I will prescribe for 5 scopolamine patches.  The associated side effects are drowsiness.  Reviewing the patient's medication he is on Creon CRE ON, Synthroid, Imodium, Glucophage 2000 mg for a diabetes.  He is on Nitrostat 0.4 mg, rivastigmine,inderal ,etc. I will speak to the pharmacist.   Another option is phenobarbital which is not an appropriate choice so I think we are left with a phrenic nerve block that ay be done with an interventional radiologist.    So I will be be here for a few more days only I will reach out to Dr. Ace Jackson if he has any thoughts for this patient.    CC Dr. Ace Jackson  , Semaj Yepez MD

## 2022-08-19 NOTE — PROGRESS NOTES
"[facility]  DEPARTMENT OF RADIATION ONCOLOGY  ON TREATMENT VISIT (OTV) NOTE    Name: Rajesh Huynh MRN: [id number]   : 1940 (81 year old)  Date of Service: May 11, 2022 Referring: Dr. Chambers and Dr. Olivera        Diagnosis and Cancer Staging  Malignant neoplasm of lower lobe of right lung (H)  Staging form: Lung, AJCC 8th Edition  - Clinical stage from 2022: Stage IIIA (cT4, cN0, cM0) - Signed by Faisal Marie MD on 2022    Malignant neoplasm of upper lobe of right lung (H)  Staging form: Lung, AJCC 8th Edition  - Clinical stage from 2022: Stage IA2 (cT1b, cN0, cM0) - Signed by Faisal Marie MD on 2022       ADDENDUM 2022 (8:35 AM): Reported overnight hiccups and 3 nights of a \"scratchy throat\" as noted in today's telephone call. We will observe the hiccups, and counseled the patient and his wife to contact the primary care service about his throat.  Radiation Therapy   Site: Right lower lobe, hilum, and lower mediastinum with concurrent chemotherapy (followed by SBRT to the right upper lobe).   Treatment to Date    Received 1 fraction(s) = 200 cGy (at 200 each)    Remaining 32 fraction(s)    Goal 33 fractions = 6600 cGy     Concurrent chemoradiation week 0 (fractions 1-5): Grade 0 toxicity due to radiation therapy (CTCAE 5.0).  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  Delivery method: Intensity modulated radiation therapy (IMRT) with static gantry angles.   Energy: 6 MV.  Dose:  25 fractions of 200 cGy each for 5000 cGy.  Dates: 2012-2012 (Elapsed days: 32).    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 Right upper lobe TTF-1 positive moderately to poorly differentiated squamous cell carcinoma measuring 1.3-cm (cT1b).. Nodes: N0 per PET-CT. Metastasis: M0 per PET-CT and MRI. Markers: Opposing TTF-1 staining as above). Based upon the different TTF-1 staining profile, note that elected to designate the patient as having 2 synchronous ipsilateral lung cancers rather than a single T4 cancer (metastasis to a separate ipsilateral lobe). Diagnostic biopsy 04/13/2022. 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) treated with surgery and radiation therapy as above (no chemotherapy), 2014 TURBT for non-invasive bladder cancer (low-grade papillary urothelial carcinoma without muscle invasion), 2021 local excision for left ear basal cell carcinoma (positive margin being observed), chronic occasional cough since the 2011 epiglottic surgery, bilateral shoulder replacements (good range of motion but caution for positioning for radiation therapy), Lewy body dementia (signs medical sent; does not drive), mild extremity tremors, chronic myeloproliferative disorder (carries the diagnosis stomach no transfusion), peptic ulcer disease, gastroesophageal reflux disease, 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    04/06/2022 PET-CT    (Please note that EMR interfaces between institutions can result in loss of embedded images.)    Recent History (CE)     General: Seen before treatment with LPN, at linac, and afterward with his wife. Medical oncology evaluated the patient yesterday and recommend proceeding with concurrent chemoradiation with weekly carboplatin and Abraxane starting today. Pending port placement on 06/01/2022. Feels well. Offers no new complaints. Finds setup and treatment to be easy.  Stable chronic CNS (e.g., Lewy body disease) and visual complaints.    Chemotherapy: Concurrent cytotoxic chemotherapy.  Regimen: Weekly carboplatin and Abraxane (C1D1 05/11/2022).  Most recent: 05/11/2022 (pending)  Next dose: 05/18/2022  Labs: Reviewed from 05/10/2022. Adequate to continue therapy.  Growth factors: None.  Scheduled supplemental IV fluids: None.    Pulmonary: Stable at baseline (dyspna, cough). Denies worsening hemoptysis or new breathlessness.  Index: Grade 0.  Intervention: Observe.  Impression: Anticipate possible changes within 1-2 weeks.    Fatigue: Stable at baseline (multiple naps daily).  Index: Grade 0.  Intervention: Observe.  Impression: Progressive changes possible within 1-2 weeks.    Pain: Denies pleuritic pain.  Index: Grade 0.  Intervention: Observe.  Impression: Anticipate changes within 1-2 weeks.    Mucositis, xerostomia, skin: Asymptomatic.  Index: Grade 0.  Intervention: Observe.  Impression: Rapid changes possible within 1-2 weeks.    Dysgeusia, anorexia, FEN, weight: Stable at baseline.  Index: Grade 0.  Intervention: Observe.  Impression: Rapid changes possible within 1-2 weeks.    Speech & swallowing: Stable at baseline.  Index: Grade 0.  Intervention: Observe.    Disease: Denies impression of progression of primary or node disease including symptoms such as worsening breathing, chest wall pain, hiccups, or hemoptysis.  Response: 0% per cone-beam CT.  Intervention: Continue therapy.  Re-simulation ordered placed if procedure indicated clinically.  Impression: Anticipate Possible re-aeration of lung that might require a repeat CT simulation and plan of radiation therapy. .    ID: Daily COVID-19 screening negative for barriers to proceeding with radiation therapy.    Follow-up: After radiation therapy, refer back to medical oncology for maintenance therapy. Will also schedule right upper lobe SBRT.    Prior Oncologic History   The patient's oncologic history across multiple institutions is abstracted as follows:    Diagnosis/Stagin Pancreatic benign disease (no pancreatic cancer)    2004 Whipple procedure ([facility]): Procedure recommended for pancreatic duct stone and pancreatitis, and possible pancreatic cancer. Operative report described an uncomplicated procedure. Pathology yielded marked chronic pancreatitis, pancreatic duct calculus (2-cm), benign pancreas pathology, and multiple benign nodes (0/3). No carcinoma.    Patient requires supplemental pancreatic enzymes (Creon) but has a broad normal diet. 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 Left glottis cancer did not clinically extend into the base of tongue or vocal cords. No clinically fixed nodes. Epiglottic specimen yielded a 1.8-cm invasive moderately differentiated squamous cell carcinoma (HPV negative) with lymphovascular invasion and negative surgical margins. Left neck specimen (levels II-V) yielded 3/32 positive nodes with up to 0.5-cm of disease across 3 levels. No extranodal extension. 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    Patient acknowledges slight intolerance of liquids at  times without paulo aspiration. Denies xerostomia or poor phonation as of 04/19/2022.  2014 Bladder cancer (non-muscle invasive)    11/10/2014 TURBT ([facility]): Presented with pelvic pain and microscopic hematuria without visible hematuria. Procedure yielded non-invasive low-grade papillary urothelial carcinoma without muscle invasion. Designated as [id number] disease.    10/14/2021 Yearly surveillance cystoscopy: Remained not suspicious for recurrent disease.    Patient requires tamsulosin for lower urinary tract symptoms. Denies visible hematuria as of 04/19/2022.  2021 Left ear basal cell carcinoma (no additional planned surgery )    01/11/2021 Diagnostic brain MRI with contrast. Obtained for cognitive and memory issues. Since 2017, progressive generalized volume loss with mild chronic microvascular changes without lesion suspicious for metastatic disease.    12/31/2021 Wide local excision of the the left ear: Lesion of the posterior ear managed initially with cryotherapy. Remained ulcerated. Excision of basal cell carcinoma with positive margin. Surgical service continue to follow and recommended against additional resection at that time.    Patient wishes to continue surveillance as of 04/19/2022.  2022 Right upper lobe squamous cell carcinoma (TTF-1 positive) and synchronous right lower lobe squamous cell carcinoma (TTF-1 negative)    01/26/2022 Medicine evaluation: Presented with several weeks of cough and sinus congestion. Subsequently persisted despite antibiotic therapies.    02/24/2022 Diagnostic chest x-ray: Obtained for cough. Since 2018, interval development of a right lung base mass and a right upper lung field mass.    03/03/2022 Diagnostic chest CT with contrast: Right lower lobe solid mass measuring 7-cm with no significant necrosis or loculation. Right upper lobe mass measuring 1-cm. No suspicious hilar or mediastinal nodes.    03/24/2022 Interventional pulmonary consultation: Recommended PET-CT for  staging and robot-assisted navigational bronchoscopy with endoscopic ultrasound and biopsies for diagnostic purposes.    2022 PET-CT (exported): Index mass is a right lower lobe intensely hypermetabolic mass with apparent postobstructive change and necrosis and worsening aeration of the lobe since the CT on 2022. Second mass is a right upper lobe moderately hypermetabolic lesion measuring 1.3-cm. Possible right middle lobe mass measuring less than 1 cm and faintly hypermetabolic. No suspicious regional crescencio or distant metastatic disease. Small right pleural effusion.    2022 Navigational bronchoscopy with endoscopic ultrasound and endobronchial biopsies: Operative report described an uncomplicated robotic-assisted procedure. Images demonstrate endobronchial involvement by the right lower lobe tumor. Biopsy of the right lower lobe specimen yielded TTF-1 negative moderately-differentiated squamous cell carcinoma. Biopsy of the right upper lobe yielded TTF-1 positive moderately to poorly differentiated squamous cell carcinoma. Preliminary consensus recommendation of concurrent chemoradiation followed by adjuvant durvalumab for the index right lower lobe mass and SBRT alone for the much smaller right upper lobe mass.    2022 Brain MRI with contrast: Not clinically suspicious for metastatic disease.    2022 Medical oncology consultation: Consensus recommendation of definitive concurrent chemoradiation for right lower lobe carcinoma and definitive SBRT for right upper lobe carcinoma.   [place]2022 Port placements: [facility].    2022 Neurology follow-up: Lewy body disease and symptomatic complaints of mild memory loss, visual change, imbalance, tremors, etc.    Chemotherapy History: Yes (as above).  Radiation History: Yes (as above).  Implanted Cardiac Devices: None.  Implanted Chest Wall Infusion Port: Pending placement.    Past Medical History:   Diagnosis Date      Acute myocardial infarction, unspecified (H) 07/05/2013     Basal cell carcinoma of left ear 12/31/2021     Bicipital tenosynovitis 07/05/2013     CAD (coronary artery disease)      Chronic myeloproliferative disease (H)     questionable     Diabetes mellitus, type 2 (H)      Dysphagia (partial resection of epiglottis) 07/05/2013     Essential (primary) hypertension 01/12/2004    unspecified     Glucose intolerance 11/14/2003     Hiccough 07/05/2013     Hyperlipidemia     No Comments Provided     Lewy body dementia (H)      Male erectile dysfunction     No Comments Provided     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)     4/22/2015     Malignant neoplasm of lower lobe of right lung (H) 04/14/2022     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,Radiation     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     Peptic ulcer disease 11/10/2003     Pneumonia     2001     Resolved: chronic pancreatitis (has undergone Whipple procedure)     No Comments Provided     Resolved: Pancreatitis 01/12/2004     Resolved: Personal history of nicotine dependence     2ppd - quit 2001     Resolved: Thoracic back pain 04/08/2010    sharp pain with thoracic movements; s/p 9 visits of PT     Resovled: Pancreatic duct stones 01/12/2004     ST elevation (STEMI) myocardial infarction (H)     7/5/2013,Integrity BMS to RCA done at [facility]     Subscapularis (muscle) sprain 07/05/2013     Tremor     No Comments Provided     Past Surgical History:   Procedure Laterality Date     APPENDECTOMY OPEN      1970     ARTHROSCOPY SHOULDER      11/2005,right shoulder     ARTHROSCOPY SHOULDER      1/2006,left shoulder     ARTHROSCOPY SHOULDER      2011,Left shoulder scope SAD, ac.  Open subscap, biceps     BRONCHOSCOPY  04/13/2022    bronchoscopy with endobronchial biopsies     CHOLECYSTECTOMY      1994     COLONOSCOPY  02/01/2005 02/2005     COLONOSCOPY  02/19/2015 2/19/2015,no repeat due at this time     CYSTOSCOPY      Multiple  times,Dr. Lyndon FRANK     FINE NEEDLE ASPIRATION  04/13/2022    transbronchial needle aspiration and biopsy     HEART CATH, ANGIOPLASTY      07/05/2013,Bare-metal stent to dominant RCA     OTHER SURGICAL HISTORY      2004,[id number],OTHER,for chronic pancreatitis     OTHER SURGICAL HISTORY      6/22/2007,[id number],SHOULDER REPLACEMENT,Right,with biceps tenodesis by Dr. Mukesh Ayoub     OTHER SURGICAL HISTORY      1995/2002,,ERCP,xseveral, one with papillotomy     OTHER SURGICAL HISTORY      1/30/2012-3/2/2012,[id number],RADIATION TREATMENT     OTHER SURGICAL HISTORY      12/7/2011,73229,NECK/THORAX PROCEDURE,L modified radical neck surgery     OTHER SURGICAL HISTORY      11/10/14,[id number],OTHER,Dr. Lyndon FRANK     OTHER SURGICAL HISTORY      2012,50219,REVISION EYELID,Dr. Farias     OTHER SURGICAL HISTORY      10/22/15,[id number],OTHER     TONSILLECTOMY, ADENOIDECTOMY, COMBINED      1947     VASECTOMY      1980     Outpatient Encounter Medications as of 5/11/2022   Medication Sig Dispense Refill     albuterol (PROAIR HFA/PROVENTIL HFA/VENTOLIN HFA) 108 (90 Base) MCG/ACT inhaler Inhale 2 puffs into the lungs every 4 hours as needed for shortness of breath / dyspnea or wheezing 18 g 1     amylase-lipase-protease (CREON) 692878-26642-396095 units CPEP Take 1-3 capsules by mouth 4 times daily (with meals and nightly) (Patient taking differently: Take 1-3 capsules by mouth 4 times daily (with meals and nightly) Taking 11073 units) 360 capsule 11     atorvastatin (LIPITOR) 40 MG tablet Take 1 tablet (40 mg) by mouth daily (with dinner) 90 tablet 3     blood glucose (NO BRAND SPECIFIED) lancets standard Use to test blood sugar 2 times daily  E11.9 200 each 3     blood glucose (NO BRAND SPECIFIED) test strip Use to test blood sugar 2 times daily  E11.9 200 strip 11     Blood Glucose Monitoring Suppl (FIFTY50 GLUCOSE METER 2.0) W/DEVICE KIT Dispense Accu-Chek Agnes  Meter. Dispense item covered by pt ins. E11.9 NIDDM type II - Test 1  time/day       calcium carbonate-vitamin D 600-200 MG-UNIT TABS Take 1 tablet by mouth 2 times daily (with meals)       cholecalciferol 50 MCG (2000 UT) CAPS Take 4,000 Units by mouth       DOCOSAHEXAENOIC ACID PO Take 1 capsule by mouth 2 times daily       levothyroxine (SYNTHROID/LEVOTHROID) 50 MCG tablet Take 1 tablet (50 mcg) by mouth daily -- Take in AM on empty stomach 90 tablet 1     loperamide (IMODIUM A-D) 2 MG tablet Take 1 tablet (2 mg) by mouth 4 times daily as needed for diarrhea 120 tablet 11     magnesium 200 MG TABS Take 200 mg by mouth       metFORMIN (GLUCOPHAGE) 1000 MG tablet Take 1 tablet (1,000 mg) by mouth 2 times daily (with meals) For diabetes prevention 180 tablet 3     Multiple Vitamin (MULTI-VITAMINS) TABS Take 1 tablet by mouth daily       nitroGLYcerin (NITROSTAT) 0.4 MG sublingual tablet DISSOLVE ONE TABLET UNDER THE TONGUE EVERY 5 MINUTES AS NEEDED FOR CHEST PAIN.  DO NOT EXCEED A TOTAL OF 3 DOSES IN 15 MINUTES (Patient not taking: No sig reported) 25 tablet 0     nystatin (NYSTOP) 908376 UNIT/GM external powder Apply topically 2 times daily as needed For skin fold rash 180 g 4     omeprazole (PRILOSEC) 20 MG DR capsule Take 1 capsule (20 mg) by mouth daily Take 30 to 60 minutes prior to a meal 90 capsule 3     potassium 99 MG TABS Take 1 tablet by mouth       primidone (MYSOLINE) 50 MG tablet Take 2 tablets (100 mg) by mouth At Bedtime 180 tablet 3     propranolol (INDERAL) 20 MG tablet Take 1 tablet (20 mg) by mouth 3 times daily (Patient taking differently: Take 20 mg by mouth 2 times daily 1 tab in AM and two tab in PM) 270 tablet 3     rivastigmine (EXELON) 3 MG capsule Take 3 mg by mouth daily       tamsulosin (FLOMAX) 0.4 MG capsule Take 1 capsule (0.4 mg) by mouth every evening 90 capsule 3     tiotropium (SPIRIVA) 18 MCG inhaled capsule Inhale 1 capsule (18 mcg) into the lungs daily - for chronic phlegm/Bronchitis 90 capsule 3     No facility-administered encounter  medications on file as of 2022.     Allergies/Reactions: Other drug allergy (see comments)    Orders via Epic EMR: No orders of the defined types were placed in this encounter.    Social History     Social History Narrative    Douglas, is  to Ruth.  They have two grown children.  He is currently retired.  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  He enjoys deer hunting(quit due to shoulders).       Socioeconomic History     Marital status:      Spouse name: Ruth     Number of children: 2     Years of education: high school graduate   Occupational History     Occupation: retired: from [employer]     Employer: [employer]     Comment: worked on assembly line then later as the Supervisor     Social History     Tobacco Use     Smoking status: Former Smoker     Packs/day: 3.00     Years: 43.00     Pack years: 129.00     Types: Cigarettes     Quit date: 2001     Years since quittin.3     Smokeless tobacco: Never Used   Vaping Use     Vaping Use: Never used   Substance Use Topics     Alcohol use: Not Currently     Drug use: No     Family History   Problem Relation Age of Onset     Breast Cancer Mother      Heart Disease Mother 80        Heart Disease,MI     Heart Disease Father 83        Heart Disease,CHF     Lung Cancer Father      Family History Negative Sister         Good Health     Heart Disease Sister         Heart Disease,pacemaker     Breast Cancer Daughter         Cancer-breast     Diabetes Son         Diabetes,Type 1     Alcoholism Son      Prostate Cancer No family hx of         Cancer-prostate   No other cancers in first or second degree relatives.    Baseline Review of Systems (prior to radiation therapy; supplemental to the History)   ROS (score of 0 indicates that symptom is at baseline for most sections below): See Flowsheet for additional comments as indicated.  Data Unavailable       Patient Health Questionnaire-9 (PHQ-9)      PHQ 4/23/2020 8/5/2020 2/24/2022   PHQ-9 Total Score 0 0 3   Q9: Thoughts of better off dead/self-harm past 2 weeks Not at all Not at all Not at all     Physical Exam   KPS: 70 (cares for self but unable to carry on normal activity or do active work) due to cognitive deficits but remains easily functionally independent and has a good quality of life.  ECOG Status: 2 (Ambulatory and capable of all selfcare but unable to carry out any work activities; may need help with IADLs up and about > 50% of waking hours)  Vitals: There were no vitals taken for this visit.  Wt Readings from Last 3 Encounters:   04/19/22 76.9 kg (169 lb 8 oz)   04/01/22 77.1 kg (170 lb)   02/24/22 78.1 kg (172 lb 3.2 oz)     Clinical Examination:  General: Upbeat, bright, dynamic, engaging, jovial. Initiates conversation and lines of thought. Appears clinically/medically stable. Appears very fit and in good general health. Overweight (BMI 25-29). Appears rested. Appears stated age. Appears well-developed, well-nourished, and in no acute distress. Does not appear acutely or chronically ill. Appears well groomed.  Head: No alopecia. Normocephalic.  Eyes: Glasses. Anicteric, vision intact.  ENT: Bilateral hearing aids. No coughing. No aspiration. No clearing of throat. Good volume. No hoarseness.  Neck: Well-healed left neck surgical scar. No crepitus. Soft, supple, symmetric, trachea midline.  Lymphatics: No parotid, periparotid, cervical, or supraclavicular adenopathy.  Chest/Breasts: No port. No implanted cardiac device.  Lungs: Easy respirations, no use of accessory muscles. Clear to auscultation  Cardiovascular: No jugulovenous distension. No carotid pulsations. RRR, S1, S2.  Abdomen: Nondistended, nontender. Soft. Bowel sounds positive.  Pelvis: Nondistended, nontender. Soft. Bowel sounds positive.  MSK: Muscular build. Shoulder range of motion is good. No arm edema or hand edema. Good muscle tone. No tenderness on palpation. Good posture.  No cords or calf tenderness.  Integument: No jaundice or pallor.   Neurologic: Right-handed. Mild intention tremors. Alert, oriented, fluent. Cognitive deficits are subtle (neurology is counseled the patient to not drive). Memory intact on observation. No errors in recall or distant memory. Briefly differential to wife for a couple of answers. Wife did not correct. Patient asked insightful questions and follow-up questions. EOMI. Motor intact. Sensory intact. No ataxia.  Psychologic: Well-spoken about his cancer and therapy. Good dynamic with his wife. Upbeat, relaxed, confident, engaging, and motivated. Pleasant, cooperative, insightful, and concrete. Good judgment, logical thinking, good thought process, concrete, abstracts. Did not require redirection for repeating of questions. Not tangential.    Physician Time on Day of Encounter, and [facility] Data Reviewed and Analyzed on Day of Encounter   Not applicable.    Physician Radiation Therapy Information Review   Radiation Oncology weekly review: Reviewed available labs and diagnostic studies. Interpreted as adequate to proceed with radiation therapy. Discussed management with Therapy, Treatment Planning, and Nursing. Review of weekly routine QA, linac imaging, and clinical set up are adequate to proceed with radiation therapy as prescribed.    Physician Radiation Therapy Assessment   Routine tolerance to radiation therapy.    Physician Radiation Therapy Plan   No new radiation therapy interventions. No boost/cone down. Reviewed the graphical 3D plan with the patient with attention to dose to the lung, heart, mediastinum, upper lobe lesion, spinal cord, liver, etc. Reviewed anticipated time course, nature, and potential interventions for managing toxicity during and after radiation therapy. Patient aware of onsite and telemedicine coverage of our clinic. He wished to proceed with treatment. All questions answered to the satisfaction of the patient and his  wife.      Faisal Marie MD, PhD  Department of Radiation Oncology  Tel: [phone]  Fax: [phone]    Care Team:  Miguel Chambers D.O. (interventional pulmonary medicine)  Mukesh Olivera M.D., Ph.D. (medical oncology)  DEEPA Yates M.D. (ophthalmology)  Ag Doan M.D. (surgery)  Beronica Sands N.P. (neurology)  Jovanni Sen M.D. (medicine)   Yes

## 2022-08-22 NOTE — PROGRESS NOTES
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 Huynh is a 82 year old male is here for follow-up of postradiation therapy to the right lower lobe of the lung.  Additionally he is here for follow-up for consideration of SBRT to the right upper lobe of the lung.  The right lower lobe of lung recently received 6600 cGy over 33 fractions (200 cGy each), over a total of 48 elapsed days completed on 6/28/2022.  During treatment he had an ongoing camacho with severe hiccups, he had tried many medications without success such as baclofen, Reglan, Neurontin, Compazine, and Thorazine.  He did have adverse reactions to the baclofen and Thorazine potentially due to his Lewy body dementia exacerbating the effects of the medications.  He has had several trips to the emergency department due to not only the medication use, but also due to the hiccups.  He had previously reported hiccups were so severe that they were occluding his airway at times, and having to \"stick his finger down his throat\" to help him begin breathing again.  Today he reports his hiccups were \"gone\" as of Friday morning. Wife reports she has a new medication to try that Dr. Angel prescribed that he has not yet started.  Wife states they will begin the medication if hiccups return.  They are unsure what the medication is at this time.  There biggest concern today is whether or not he will have the right upper lobe of his lung treated.  He does report an ongoing fatigue, chronic cough, and shortness of breath.  Otherwise denies fever, chills, or unintentional weight loss.    Review Of Systems:  /62 (BP Location: Left arm, Patient Position: Chair, Cuff Size: Adult Regular)   Pulse 75   Temp 98.2  F (36.8  C) (Temporal)   Resp 17   Wt 71.7 kg (158 lb 1.6 oz)   SpO2 96%   BMI 24.76 kg/m     Review of " Systems   Constitutional: Positive for activity change and fatigue. Negative for unexpected weight change.   HENT:        Loss of taste      Respiratory: Positive for cough and shortness of breath.    Cardiovascular: Negative for chest pain.   Psychiatric/Behavioral: Positive for confusion. Negative for sleep disturbance.     Past medical, social, surgical, and family histories reviewed.    Allergies:  Allergies as of 08/22/2022 - Reviewed 08/22/2022   Allergen Reaction Noted     Other drug allergy (see comments) Other (See Comments) 06/01/2021       Current Medications:  Current Outpatient Medications   Medication Sig Dispense Refill     acetaminophen (TYLENOL) 325 MG tablet Take 325-650 mg by mouth every 6 hours as needed for mild pain       amylase-lipase-protease (CREON 36) 448801-21383-591198 units CPEP Take 3 capsules by mouth 4 times daily (with meals and nightly) Taking 83457 units 1080 capsule 4     atorvastatin (LIPITOR) 40 MG tablet Take 1 tablet (40 mg) by mouth daily (with dinner) 90 tablet 4     Benzonatate (TESSALON PERLES PO) Take 100-200 mg by mouth every 8 hours as needed       blood glucose (NO BRAND SPECIFIED) lancets standard Use to test blood sugar 2 times daily  E11.9 200 each 3     blood glucose (NO BRAND SPECIFIED) test strip Use to test blood sugar 2 times daily  E11.9 200 strip 11     Blood Glucose Monitoring Suppl (FIFTY50 GLUCOSE METER 2.0) W/DEVICE KIT Dispense Accu-Chek Agnes  Meter. Dispense item covered by pt ins. E11.9 NIDDM type II - Test 1 time/day       CALCIUM PO Take 2 each by mouth At Bedtime       cetirizine (ZYRTEC) 10 MG tablet Take 10 mg by mouth daily       gabapentin (NEURONTIN) 100 MG capsule Take 1 capsule (100 mg) by mouth 3 times daily as needed (hiccups) 30 capsule 0     levothyroxine (SYNTHROID/LEVOTHROID) 50 MCG tablet Take 1 tablet (50 mcg) by mouth daily 90 tablet 4     loperamide (IMODIUM A-D) 2 MG tablet Take 1 tablet (2 mg) by mouth 4 times daily as needed  for diarrhea 120 tablet 11     MAGNESIUM PO Take 1 each by mouth At Bedtime       metFORMIN (GLUCOPHAGE) 1000 MG tablet Take 1 tablet (1,000 mg) by mouth 2 times daily (with meals) For diabetes prevention 180 tablet 1     metoclopramide (REGLAN) 5 MG tablet Take 1 tablet (5 mg) by mouth 3 times daily as needed 30 tablet 0     Multiple Vitamin (MULTI-VITAMINS) TABS Take 1 tablet by mouth daily       nitroGLYcerin (NITROSTAT) 0.4 MG sublingual tablet DISSOLVE ONE TABLET UNDER THE TONGUE EVERY 5 MINUTES AS NEEDED FOR CHEST PAIN.  DO NOT EXCEED A TOTAL OF 3 DOSES IN 15 MINUTES 25 tablet 0     nystatin (NYSTOP) 632216 UNIT/GM external powder Apply topically 2 times daily as needed For skin fold rash 180 g 4     Omega-3 Fatty Acids (FISH OIL PO) Take 1 each by mouth 2 times daily       omeprazole 20 MG tablet Take 20 mg by mouth daily       primidone (MYSOLINE) 50 MG tablet Take 2 tablets (100 mg) by mouth At Bedtime 180 tablet 4     propranolol (INDERAL) 20 MG tablet TAKE 1 TABLET BY MOUTH THREE TIMES DAILY 90 tablet 0     propranolol (INDERAL) 20 MG tablet Take 1 tablet by mouth in the morning and 2 tablets by mouth in the evening.       rivastigmine (EXELON) 3 MG capsule Take 3 mg by mouth 2 times daily       tamsulosin (FLOMAX) 0.4 MG capsule Take 0.4 mg by mouth daily       tiotropium (SPIRIVA) 18 MCG inhaled capsule Inhale 1 capsule (18 mcg) into the lungs daily - for chronic phlegm/Bronchitis 90 capsule 3     VITAMIN D PO Take 1 each by mouth daily          Physical Exam:  /62 (BP Location: Left arm, Patient Position: Chair, Cuff Size: Adult Regular)   Pulse 75   Temp 98.2  F (36.8  C) (Temporal)   Resp 17   Wt 71.7 kg (158 lb 1.6 oz)   SpO2 96%   BMI 24.76 kg/m      GENERAL APPEARANCE: healthy, alert and no distress     RESP: lungs clear to auscultation - lungs diminished throughout, rhonchi present     CARDIOVASCULAR: regular rates and rhythm, normal S1 S2     ABDOMEN:  soft, nontender,bowel sounds  normal     MUSCULOSKELETAL: extremities normal- no gross deformities noted, no evidence of inflammation in joints, No edema b/l LE.     SKIN: no suspicious lesions or rashes     PSYCHIATRIC: mentation appropriate,  is alert and able to follow direction and is able to appropriately answer some questions. However confused at times, some questions answered incorrectly    Laboratory/Imaging Studies  No visits with results within 2 Week(s) from this visit.   Latest known visit with results is:   Results Only on 06/28/2022   Component Date Value Ref Range Status     Course ID 06/28/2022 C2   Final     Course Start Date 06/28/2022 4/28/2022   Final     Course First Treatment Date 06/28/2022 5/11/2022   Final     Course Last Treatment Date 06/28/2022 6/28/2022   Final     Course Elapsed Days 06/28/2022 48   Final     Reference Point ID 06/28/2022 CalcPt Lung RLL   Final     Reference Point Dosage Given to Da* 06/28/2022   Gy Final                    Value:[phone]  [phone]       Reference Point ID 06/28/2022 Iso Lung RLL   Final     Reference Point Dosage Given to Da* 06/28/2022   Gy Final                    Value:[phone]  [id number]       Reference Point ID 06/28/2022 Lung Right LL TD   Final     Reference Point Dosage Given to Da* 06/28/2022   Gy Final                    Value:66  2       Plan ID 06/28/2022 Lung RLL   Final     Plan Name 06/28/2022 IMRT (VMAT 6MV)   Final     Plan Fractions Treated to Date 06/28/2022 33   Final     Plan Total Fractions Prescribed 06/28/2022 33   Final     Plan Prescribed Dose Per Fraction * 06/28/2022 2  Gy Final     Plan Total Prescribed Dose (cGy) 06/28/2022 6,600  cGy Final        ASSESSMENT/PLAN:    Overall Douglas has been feeling rather fatigued, is short of breath which may be partially related to radiation effect.  He continues to have a chronic cough.  Currently hiccups have halted as of last Friday, 8/19/2022.  Wife and patient understand that they may begin a  medication prescribed by medical oncology if hiccups return.  We did discuss other than pharmacological intervention, patient may want to visit with a specialty for a potential nerve block if hiccups return and are unable to be managed by  pharmacologic intervention.  I did discuss that this would be discussed with the medical oncologist prior to any change in plan.  Patient will return in 2 weeks for consideration of SBRT for the right upper lobe nodule.  Patient and wife deny any further questions or concerns at this time and are agreeable with plan of care.    Gloria Cao DNP, APRN, FNP-C   Department of Radiation Oncology    Pacheco Valadez MD    CC Dr Jackson

## 2022-08-23 NOTE — NURSING NOTE
Patient here for hospital follow up for hiccups. He was just over COVID he has a cold that he cant get rid of. Would like to have lungs checked. Medication Reconciliation: complete.    Dayna Marvin LPN  8/23/2022 3:21 PM

## 2022-08-23 NOTE — PROGRESS NOTES
Assessment & Plan     Infection due to 2019 novel coronavirus - 8/10/2022      Hiccups  Improving                   No follow-ups on file.    Juan Lovelace MD  [facility]    Subjective   Douglas is a 82 year old, presenting for the following health issues:  Hospital F/U (Hiccups )      Patient arrives here for follow-up of COVID.  He states that he is doing better.  Initially quite depressed.  Mainly due to his hiccups chemo and radiation.  But that is improving.    History of Present Illness       Reason for visit:  Hospital follow up    He eats 2-3 servings of fruits and vegetables daily.He consumes 1 sweetened beverage(s) daily.He exercises with enough effort to increase his heart rate 9 or less minutes per day.  He exercises with enough effort to increase his heart rate 4 days per week.   He is taking medications regularly.         Hospital Follow-up Visit:    Hospital/Nursing Home/IP Rehab Facility: [facility]  Date of Admission: 08/10  Date of Discharge: 08/12  Reason(s) for Admission: COVID     Was your hospitalization related to COVID-19? YES   How are you feeling today? Better  In the past 24 hours have you had shortness of breath when speaking, walking, or climbing stairs? My breathing issues have improved  Do you have a cough? Yes, I have a cough but it's not worse  When is the last time you had a fever greater than 100? NO  Are you having any other symptoms? DEPRESSION   Do you have any other stressors you would like to discuss with your provider? Health Concerns and OTHER: CANCER TREATMENTS        PHQ Assesment Total Score(s) 8/23/2022   PHQ-2 Score 0   Some recent data might be hidden       Was the patient in the ICU or did the patient experience delirium during hospitalization?  No    Problems taking medications regularly:  None  Medication changes since discharge: None  Problems adhering to non-medication therapy:  None    Summary of hospitalization:  [facility] discharge summary reviewed  Diagnostic Tests/Treatments reviewed.  Follow up needed: none  Other Healthcare Providers Involved in Patient s Care:         None  Update since discharge: improved.   Post Medication Reconciliation Status: Discharge medications reconciled, continue medications without change      Plan of care communicated with patient and family         Review of Systems         Objective    /60   Pulse 80   Temp 97.4  F (36.3  C)   Resp 20   Wt 72.5 kg (159 lb 12.8 oz)   SpO2 97%   BMI 25.03 kg/m    Body mass index is 25.03 kg/m .  Physical Exam   GENERAL: healthy, alert and no distress  NECK: no adenopathy, no asymmetry, masses, or scars and thyroid normal to palpation  RESP: lungs clear to auscultation - no rales, rhonchi or wheezes  CV: regular rate and rhythm, normal S1 S2, no S3 or S4, no murmur, click or rub, no peripheral edema and peripheral pulses strong  ABDOMEN: soft, nontender, no hepatosplenomegaly, no masses and bowel sounds normal  MS: no gross musculoskeletal defects noted, no edema                    .  ..

## 2022-08-26 PROBLEM — G31.83 LEWY BODY DEMENTIA WITHOUT BEHAVIORAL DISTURBANCE (H): Status: ACTIVE | Noted: 2021-09-16

## 2022-08-26 PROBLEM — E87.1 CHRONIC HYPONATREMIA: Status: ACTIVE | Noted: 2020-10-15

## 2022-08-26 PROBLEM — J18.9 PNEUMONIA: Status: ACTIVE | Noted: 2022-01-01

## 2022-08-26 PROBLEM — F02.80 LEWY BODY DEMENTIA WITHOUT BEHAVIORAL DISTURBANCE (H): Status: ACTIVE | Noted: 2021-09-16

## 2022-08-26 PROBLEM — C34.81 MALIGNANT NEOPLASM OF OVERLAPPING SITES OF RIGHT LUNG (H): Status: ACTIVE | Noted: 2022-01-01

## 2022-08-27 NOTE — PHARMACY-MEDICATION REGIMEN REVIEW
"Pharmacy Antimicrobial Stewardship/COVID Assessment     Current Antimicrobial Therapy:  Anti-infectives (From now, onward)    Start     Dose/Rate Route Frequency Ordered Stop    22 0030  piperacillin-tazobactam (ZOSYN) intermittent infusion 4.5 g        Note to Pharmacy: For [facility], [facility] and [facility]: For Zosyn-naive patients, use the \"Zosyn initial dose + extended infusion\" order panel.    4.5 g  200 mL/hr over 30 Minutes Intravenous EVERY 6 HOURS 22 0015 22 2359          Indication: HAP d/t recent COVID    Days of Therapy: 1     Pertinent Labs:  Recent Labs   Lab Test 22  0544 08/26/22  2114 08/15/22  0837   WBC 12.7* 14.0* 10.8     Recent Labs   Lab Test 22  0544 22  0037 22  0643 LACT  --  1.4 1.7  --  CRP  --   --   --  33.0* SED  --   --   --   --  PCAL 0.06*  --  40.20*  --   < > = values in this interval not displayed.        Temperature:  Temp (24hrs), Av.2  F (37.3  C), Min:98.1  F (36.7  C), Max:101.2  F (38.4  C)    Culture Results:   (22) Urine  (22) Sputum =     (22) Blood    (8/10/22) COVID = positive    Recommendations/Interventions:  1. Patient has lung cancer; adjust Lovenox to 0.5 mg/kg BID & check D-dimer        Kane Guerrero, [facility]  2022    "

## 2022-08-27 NOTE — ED PROVIDER NOTES
History     Chief Complaint   Patient presents with     Generalized Weakness     HPI  Rajesh Huynh is a 82 year old male with history of recent COVID admission who comes back in in the emergency department via EMS with increased weakness, shortness of breath decreased oral intake.  Patient can even stand or do his usual cares at home.    Reviewed nurses notes below, similar history is related to me.  Pt here by [facility]s 1 into [facility], pt was diagnosed with covid on 8/10 and admitted to the hospital at that time, since then pt has had increased weakness, decreased intake, general detioration in condition, VSS, no acute distress,     Allergies:  Allergies   Allergen Reactions     Other Drug Allergy (See Comments) Other (See Comments)     Trelegy Ellipta  Sore throat         Problem List:    Patient Active Problem List    Diagnosis Date Noted     Infection due to 2019 novel coronavirus - 8/10/2022 08/10/2022     Priority: Medium     Restless legs syndrome 05/31/2022     Priority: Medium     Malignant neoplasm of lower lobe of right lung (H) 04/14/2022     Priority: Medium     Malignant neoplasm of upper lobe of right lung (H) 04/14/2022     Priority: Medium     Right lower lobe lung mass 04/01/2022     Priority: Medium     Nodule of upper lobe of right lung 04/01/2022     Priority: Medium     Hypertensive heart disease with heart failure (H) 04/01/2022     Priority: Medium     Hypothyroidism due to acquired atrophy of thyroid 12/16/2021     Priority: Medium     Lewy body dementia without behavioral disturbance (H) 09/16/2021     Priority: Medium     Heartburn 02/23/2021     Priority: Medium     Simple chronic bronchitis (H) 02/23/2021     Priority: Medium     History of cardiac cath on 7/8/2013 10/15/2020     Priority: Medium     Benign prostatic hyperplasia with lower urinary tract symptoms, symptom details unspecified 10/15/2020     Priority: Medium     Hx of pancreatitis on 10/23/2014 10/15/2020     Priority: Medium      Hx of bladder cancer on 11/10/2014 10/15/2020     Priority: Medium     Aortic atherosclerosis (H) 10/15/2020     Priority: Medium     Hyponatremia 10/15/2020     Priority: Medium     Stenosis of left carotid artery 10/15/2020     Priority: Medium     Exocrine pancreatic insufficiency - Severe Deficiency 11/22/2019     Priority: Medium     Intermittent diarrhea 11/13/2019     Priority: Medium     Controlled type 2 diabetes mellitus without complication, without long-term current use of insulin (H) 05/03/2019     Priority: Medium     History of pancreatic surgery - Whipple 03/21/2019     Priority: Medium     History of tobacco use quitting in 1/1/2000 at 3 packs a day 05/08/2018     Priority: Medium     Benign essential tremor 01/24/2018     Priority: Medium     Psychosexual dysfunction with inhibited sexual excitement 01/24/2018     Priority: Medium     Benign essential hypertension 01/24/2018     Priority: Medium     Mixed hyperlipidemia 01/24/2018     Priority: Medium     History of throat cancer 12/13/2017     Priority: Medium     Partial tear of right rotator cuff 06/21/2017     Priority: Medium     History of urinary retention 12/23/2016     Priority: Medium     Overview:   Postoperative       Atherosclerosis of native coronary artery of native heart without angina pectoris 10/03/2016     Priority: Medium     Personal history of malignant neoplasm of bladder 11/19/2014     Priority: Medium     H/O STEMI 7/3/2013 involving the RCA 07/11/2013     Priority: Medium        Past Medical History:    Past Medical History:   Diagnosis Date     Acute myocardial infarction, unspecified (H) 07/05/2013     Basal cell carcinoma of left ear 12/31/2021     Bicipital tenosynovitis 07/05/2013     CAD (coronary artery disease)      Chronic myeloproliferative disease (H)      Diabetes mellitus, type 2 (H)      Dysphagia (partial resection of epiglottis) 07/05/2013     Essential (primary) hypertension 01/12/2004     Glucose  intolerance 11/14/2003     Hiccough 07/05/2013     Hyperlipidemia      Lewy body dementia (H)      Male erectile dysfunction      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)      Malignant neoplasm of lower lobe of right lung (H) 04/14/2022     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     Peptic ulcer disease 11/10/2003     Pneumonia      Resolved: chronic pancreatitis (has undergone Whipple procedure)      Resolved: Pancreatitis 01/12/2004     Resolved: Personal history of nicotine dependence      Resolved: Thoracic back pain 04/08/2010     Resovled: Pancreatic duct stones 01/12/2004     ST elevation (STEMI) myocardial infarction (H)      Subscapularis (muscle) sprain 07/05/2013     Tremor        Past Surgical History:    Past Surgical History:   Procedure Laterality Date     APPENDECTOMY OPEN      1970     ARTHROSCOPY SHOULDER      11/2005,right shoulder     ARTHROSCOPY SHOULDER      1/2006,left shoulder     ARTHROSCOPY SHOULDER      2011,Left shoulder scope SAD, ac.  Open subscap, biceps     BRONCHOSCOPY  04/13/2022    bronchoscopy with endobronchial biopsies     CHOLECYSTECTOMY      1994     COLONOSCOPY  02/01/2005 02/2005     COLONOSCOPY  02/19/2015 2/19/2015,no repeat due at this time     CYSTOSCOPY      Multiple times,Dr. Lyndon FRANK     FINE NEEDLE ASPIRATION  04/13/2022    transbronchial needle aspiration and biopsy     HEART CATH, ANGIOPLASTY      07/05/2013,Bare-metal stent to dominant RCA     OTHER SURGICAL HISTORY      2004,650243,OTHER,for chronic pancreatitis     OTHER SURGICAL HISTORY      6/22/2007,[id number],SHOULDER REPLACEMENT,Right,with biceps tenodesis by Dr. Mukesh Ayoub     OTHER SURGICAL HISTORY      1995/2002,,ERCP,xseveral, one with papillotomy     OTHER SURGICAL HISTORY      1/30/2012-3/2/2012,[id number],RADIATION TREATMENT     OTHER SURGICAL HISTORY      12/7/2011,[id number],NECK/THORAX PROCEDURE,L modified radical neck  surgery     OTHER SURGICAL HISTORY      11/10/14,[id number],OTHER,Dr. Lyndon FRANK     OTHER SURGICAL HISTORY      ,02145,REVISION EYELID,Dr. Farias     OTHER SURGICAL HISTORY      10/22/15,[id number],OTHER     TONSILLECTOMY, ADENOIDECTOMY, COMBINED      1947     VASECTOMY             Family History:    Family History   Problem Relation Age of Onset     Breast Cancer Mother      Heart Disease Mother 80        Heart Disease,MI     Heart Disease Father 83        Heart Disease,CHF     Lung Cancer Father      Family History Negative Sister         Good Health     Heart Disease Sister         Heart Disease,pacemaker     Breast Cancer Daughter         Cancer-breast     Diabetes Son         Diabetes,Type 1     Alcoholism Son      Prostate Cancer No family hx of         Cancer-prostate       Social History:  Marital Status:   [2]  Social History     Tobacco Use     Smoking status: Former Smoker     Packs/day: 3.00     Years: 43.00     Pack years: 129.00     Types: Cigarettes     Quit date: 2001     Years since quittin.6     Smokeless tobacco: Never Used   Vaping Use     Vaping Use: Never used   Substance Use Topics     Alcohol use: Not Currently     Drug use: No        Medications:    acetaminophen (TYLENOL) 325 MG tablet  amylase-lipase-protease (CREON 36) 154895-24457-539809 units CPEP  atorvastatin (LIPITOR) 40 MG tablet  Benzonatate (TESSALON PERLES PO)  blood glucose (NO BRAND SPECIFIED) lancets standard  blood glucose (NO BRAND SPECIFIED) test strip  Blood Glucose Monitoring Suppl (FIFTY50 GLUCOSE METER 2.0) W/DEVICE KIT  CALCIUM PO  cetirizine (ZYRTEC) 10 MG tablet  gabapentin (NEURONTIN) 100 MG capsule  levothyroxine (SYNTHROID/LEVOTHROID) 50 MCG tablet  loperamide (IMODIUM A-D) 2 MG tablet  MAGNESIUM PO  metFORMIN (GLUCOPHAGE) 1000 MG tablet  metoclopramide (REGLAN) 5 MG tablet  Multiple Vitamin (MULTI-VITAMINS) TABS  nitroGLYcerin (NITROSTAT) 0.4 MG sublingual tablet  nystatin (NYSTOP) 423371  "UNIT/GM external powder  Omega-3 Fatty Acids (FISH OIL PO)  omeprazole 20 MG tablet  primidone (MYSOLINE) 50 MG tablet  propranolol (INDERAL) 20 MG tablet  propranolol (INDERAL) 20 MG tablet  rivastigmine (EXELON) 3 MG capsule  tamsulosin (FLOMAX) 0.4 MG capsule  tiotropium (SPIRIVA) 18 MCG inhaled capsule  VITAMIN D PO          Review of Systems   Constitutional: Positive for chills and fever.   Respiratory: Positive for shortness of breath. Negative for chest tightness.        Physical Exam   BP: 137/77  Pulse: 81  Temp: (!) 101.2  F (38.4  C)  Resp: 16  Height: 170.2 cm (5' 7\")  Weight: 72.1 kg (159 lb)  SpO2: 94 %      Physical Exam  Vitals and nursing note reviewed.   Cardiovascular:      Rate and Rhythm: Normal rate.   Pulmonary:      Effort: No respiratory distress.   Abdominal:      General: Abdomen is flat.   Musculoskeletal:      Cervical back: Normal range of motion.   Neurological:      Mental Status: He is alert.         ED Course              ED Course as of 08/26/22 2220   Fri Aug 26, 2022   2202 On hold with [facility] to hopefully transfer.     Procedures        Results for orders placed or performed during the hospital encounter of 08/26/22 (from the past 24 hour(s))   CBC with platelets differential    Narrative    The following orders were created for panel order CBC with platelets differential.  Procedure                               Abnormality         Status                     ---------                               -----------         ------                     CBC with platelets and d...[[id number]]  Abnormal            Final result                 Please view results for these tests on the individual orders.   Comprehensive metabolic panel   Result Value Ref Range    Sodium 127 (L) 134 - 144 mmol/L    Potassium 4.1 3.5 - 5.1 mmol/L    Chloride 96 (L) 98 - 107 mmol/L    Carbon Dioxide (CO2) 24 21 - 31 mmol/L    Anion Gap 7 3 - 14 mmol/L    Urea Nitrogen 10 7 - 25 mg/dL    Creatinine 0.62 (L) " 0.70 - 1.30 mg/dL    Calcium 8.8 8.6 - 10.3 mg/dL    Glucose 136 (H) 70 - 105 mg/dL    Alkaline Phosphatase 61 34 - 104 U/L    AST 15 13 - 39 U/L    ALT 17 7 - 52 U/L    Protein Total 6.0 (L) 6.4 - 8.9 g/dL    Albumin 3.2 (L) 3.5 - 5.7 g/dL    Bilirubin Total 0.8 0.3 - 1.0 mg/dL    GFR Estimate >90 >60 mL/min/1.73m2   Lactic acid whole blood   Result Value Ref Range    Lactic Acid 1.7 0.7 - 2.0 mmol/L   CBC with platelets and differential   Result Value Ref Range    WBC Count 14.0 (H) 4.0 - 11.0 10e3/uL    RBC Count 3.13 (L) 4.40 - 5.90 10e6/uL    Hemoglobin 10.7 (L) 13.3 - 17.7 g/dL    Hematocrit 30.5 (L) 40.0 - 53.0 %    MCV 97 78 - 100 fL    MCH 34.2 (H) 26.5 - 33.0 pg    MCHC 35.1 31.5 - 36.5 g/dL    RDW 15.9 (H) 10.0 - 15.0 %    Platelet Count 215 150 - 450 10e3/uL    % Neutrophils 81 %    % Lymphocytes 3 %    % Monocytes 15 %    % Eosinophils 0 %    % Basophils 0 %    % Immature Granulocytes 1 %    NRBCs per 100 WBC 0 <1 /100    Absolute Neutrophils 11.5 (H) 1.6 - 8.3 10e3/uL    Absolute Lymphocytes 0.4 (L) 0.8 - 5.3 10e3/uL    Absolute Monocytes 2.0 (H) 0.0 - 1.3 10e3/uL    Absolute Eosinophils 0.0 0.0 - 0.7 10e3/uL    Absolute Basophils 0.0 0.0 - 0.2 10e3/uL    Absolute Immature Granulocytes 0.1 <=0.4 10e3/uL    Absolute NRBCs 0.0 10e3/uL   Extra Tube    Narrative    The following orders were created for panel order Extra Tube.  Procedure                               Abnormality         Status                     ---------                               -----------         ------                     Extra Blue Top Tube[[id number]]                              Final result               Extra Serum Separator Tu...[[id number]]                      Final result                 Please view results for these tests on the individual orders.   Extra Blue Top Tube   Result Value Ref Range    Hold Specimen JIC    Extra Serum Separator Tube (SST)   Result Value Ref Range    Hold Specimen JIC    XR Chest Port 1 View     Narrative    PROCEDURE INFORMATION:   Exam: XR Chest   Exam date and time: 8/26/2022 9:18 PM   Age: 82 years old   Clinical indication: Chest wall pain; Additional info: SOB fever     TECHNIQUE:   Imaging protocol: Radiologic exam of the chest.   Views: 1 view.     COMPARISON:   CR XR CHEST PORT 1 VIEW 8/10/2022 12:12 PM     FINDINGS:   Tubes, catheters and devices: Left MediPort catheter terminating mid SVC.     Lungs: Increased right perihilar density. Linear atelectatic changes lung   bases.   Pleural spaces: Unremarkable. No pleural effusion. No pneumothorax.   Heart/Mediastinum: Unremarkable. No cardiomegaly.   Bones/joints: Partially imaged bilateral shoulder prostheses.       Impression    IMPRESSION:   1. Increased prominence of the right hilar region. While this could be vascular   or related to adenopathy, underlying infiltrate or pneumonia not excluded.   Clinical correlation and if necessary further evaluation with CT could be   considered.   2. Left MediPort catheter in place.     THIS DOCUMENT HAS BEEN ELECTRONICALLY SIGNED BY KEMAR BURTON MD       Medications   0.9% sodium chloride BOLUS (1,000 mLs Intravenous New Bag 8/26/22 2050)     Followed by   sodium chloride 0.9% infusion (has no administration in time range)   cefTRIAXone in d5w (ROCEPHIN) intermittent infusion 1 g (has no administration in time range)       Assessments & Plan (with Medical Decision Making)     I have reviewed the nursing notes.    I have reviewed the findings, diagnosis, plan and need for follow up with the patient.    New Prescriptions    No medications on file     D/W Dr Ling, [place] hospitalist,  Pt graciously accepted for Transfer.  Patient and family are in agreement with plan as well.  Rocephin x1 in ED.  Patient hemodynamically stable for ground transport.  Final diagnoses:   Pneumonia due to infectious organism, unspecified laterality, unspecified part of lung       8/26/2022   [facility]      Andrew Miranda MD  08/26/22 1200

## 2022-08-27 NOTE — PLAN OF CARE
/70 (BP Location: Left arm)   Pulse 81   Temp 98.1  F (36.7  C) (Tympanic)   Resp 20   SpO2 93%     Pt alert, answered orientation questions alright but definitely confused, A1-2, free of falls or injury this shift. Door open nears nurse's station, bed alarm on, call light within reach. Pt has not . UA and sputum culture sent, sputum results back, see chart. LS with fine crackles and dim to R base and crackles to L base. Has to strain to urinate, hesitant. 431 bladder scan post void and provider paged about it. Urine has been orange. 2 AM glucose 148. Pt states his hearing aids are with his wife. Pt has tremors to upper extremities, hx of. Port accessed in [facility], now has NS running into it at 100 ml/hr, on zosyn. Put on 1 LPM this AM as sats were dipping into the high 80s as he slept. Productive cough. Nailbeds pale, hands cold, greater than 3 sec cap refill.     Face to face report given with opportunity to observe patient.    Report given to GISELL Miller RN   8/27/2022  8:06 AM

## 2022-08-27 NOTE — PLAN OF CARE
Goal Outcome Evaluation:Pt goes in/out of confusion. At times he verbalizes his thoughts and needs and other times he just mumbles sounds and unable to articulate words/needs.  Wife and son here to visit this afternoon and this seems to be his baseline.  VSS, BG monitored before meals and stable.  Lung have fine crackles in the bases and clear but dim in the uppers.  Has a productive intermittent cough, says it hurts in his chest from coughing.  Got an order for Tessalon and gave at approx 1730.  Continues on IV Zosyn Q6hrs.  Maintenance fluids decreased to 50 mls/hr and infusing into left chest port.  Port dressing is C,D,I.   Asst x1 with GB to chair/bed, little unsteady on feet.  Alarms in use and call light in reach.        Face to face report given with opportunity to observe patient.    Report given to Kate Ortiz RN   8/27/2022  10:05 PM

## 2022-08-27 NOTE — ED TRIAGE NOTES
Pt here by [facility]s 1 into [facility], pt was diagnosed with covid on 8/10 and admitted to the hospital at that time, since then pt has had increased weakness, decreased intake, general detioration in condition, VSS, no acute distress,     Triage Assessment     Row Name 08/26/22 2036       Triage Assessment (Adult)    Airway WDL WDL       Respiratory WDL    Respiratory WDL WDL       Skin Circulation/Temperature WDL    Skin Circulation/Temperature WDL WDL       Cardiac WDL    Cardiac WDL WDL       Peripheral/Neurovascular WDL    Peripheral Neurovascular WDL WDL       Cognitive/Neuro/Behavioral WDL    Cognitive/Neuro/Behavioral WDL WDL

## 2022-08-27 NOTE — H&P
[facility]    History and Physical  Hospitalist       Date of Admission:  8/27/2022  Date of Service (when I saw the patient): 08/27/22    Assessment & Plan       Pneumonia, bacterial:Secondary to recent COVID19 pneumonia with underlying immunocompromised state due to lung cancer. Procal is very high- over 40, however lactic acid is normal, no hypoxia, nontoxic appearing so sepsis ruled out. He does have fever of 101, mild leukocytosis, mildly dehydrated. CXR shows increased right perihilar density. However, on exam left lung has course crackles with rhonchi.  He was hospitalized for the covid so will treat as hospital acquired with Zosyn. Sputum culture ordered. Duonebs, flutter, mucinex.  Continuous sat.       Malignant neoplasm of lung: (H) 2 primary cancers in right lung. Right lower lobe-stage 3a just finished chemoradiotherapy end of June 2022 with reduction in size of mass Dr. Olivera [facility]-oncologist, got radiation therapy here at [facility]. Right upper lobe:  He has an appointment with radiation therapy on 9/6 to discuss starting SBRT.       Chronic hyponatremia: Normal range over the last year 127-133. At clinic 10 days ago he was 130. Lung disease )COPD) plus lung CA likely contributing.  He is receiving NS at 75cc/hr for mild dehydration, will not need more aggressive treatment as chronic and stable.      Chronic bronchitis (H): No wheezes so will not start steroids, bacterial pneumonia as above.       Lewy body dementia without behavioral disturbance (H): He is able to tell me his current symptoms and is oriented x3, however he is quite forgetful of recent events.       Benign essential hypertension: Blood pressures have been stable, he is not on any antihypertensives at home.       Controlled type 2 diabetes mellitus without complication, without long-term current use of insulin (H): A1c from 5/22 7.7. Consistent carb diet, glucose checks QID with sliding scale  insulin.         DVT Prophylaxis: Enoxaparin (Lovenox) SQ  Code Status: DNR / DNI-per POLST document and patient does confirm same    Disposition: Expected discharge in 2-3 days once stable.    Mell Ling CNP    Primary Care Physician   Jovanni Sen    Chief Complaint   Worsening weakness    History is obtained from the patient/ED physician, family unavailable on arrival to the hospital    History of Present Illness   Rajesh Huynh is a 82 year old male who presents with worsening weakness. Hep resented to the ED at [facility] with weakness. He was in his easy chair and tried to get up but could not and his wife was unable to get him out of the chair so she called the ambulance. He tells me he had a fall earlier in the day as well. He reports that he has not eaten all day because he has not had an appetite. On arrival he had a fever of 101.2, he denies chills of feeling feverish at home. He complains of wet cough but unable to get sputum up. He had severe hiccups after radiation for several weeks but states this stopped some time ago and has not returned. He does state he feels dyspneic as well though this has been long standing over the last several months. He denies chest pain, abdominal pain or nausea/vomiting. Chest Xray shows increased right perihilar density, cannot rule out pneumonia/atelectasis vs adenopathy. Since he had associated fever, weakness he was admitted for further care. He is transferred to [facility] from [facility] due to bed availability.     Past Medical History    I have reviewed this patient's medical history and updated it with pertinent information if needed.   Past Medical History:   Diagnosis Date     Acute myocardial infarction, unspecified (H) 07/05/2013     Basal cell carcinoma of left ear 12/31/2021     Bicipital tenosynovitis 07/05/2013     CAD (coronary artery disease)      Chronic myeloproliferative disease (H)     questionable     Diabetes mellitus, type 2 (H)       Dysphagia (partial resection of epiglottis) 07/05/2013     Essential (primary) hypertension 01/12/2004    unspecified     Glucose intolerance 11/14/2003     Hiccough 07/05/2013     Hyperlipidemia     No Comments Provided     Lewy body dementia (H)      Male erectile dysfunction     No Comments Provided     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)     4/22/2015     Malignant neoplasm of lower lobe of right lung (H) 04/14/2022     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,Radiation     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     Peptic ulcer disease 11/10/2003     Pneumonia     2001     Resolved: chronic pancreatitis (has undergone Whipple procedure)     No Comments Provided     Resolved: Pancreatitis 01/12/2004     Resolved: Personal history of nicotine dependence     2ppd - quit 2001     Resolved: Thoracic back pain 04/08/2010    sharp pain with thoracic movements; s/p 9 visits of PT     Resovled: Pancreatic duct stones 01/12/2004     ST elevation (STEMI) myocardial infarction (H)     7/5/2013,Integrity BMS to RCA done at [facility]     Subscapularis (muscle) sprain 07/05/2013     Tremor     No Comments Provided       Past Surgical History   I have reviewed this patient's surgical history and updated it with pertinent information if needed.  Past Surgical History:   Procedure Laterality Date     APPENDECTOMY OPEN      1970     ARTHROSCOPY SHOULDER      11/2005,right shoulder     ARTHROSCOPY SHOULDER      1/2006,left shoulder     ARTHROSCOPY SHOULDER      2011,Left shoulder scope SAD, ac.  Open subscap, biceps     BRONCHOSCOPY  04/13/2022    bronchoscopy with endobronchial biopsies     CHOLECYSTECTOMY      1994     COLONOSCOPY  02/01/2005 02/2005     COLONOSCOPY  02/19/2015 2/19/2015,no repeat due at this time     CYSTOSCOPY      Multiple times,Dr. Lyndon FRANK     FINE NEEDLE ASPIRATION  04/13/2022    transbronchial needle aspiration and biopsy     HEART CATH, ANGIOPLASTY       07/05/2013,Bare-metal stent to dominant RCA     OTHER SURGICAL HISTORY      2004,114257,OTHER,for chronic pancreatitis     OTHER SURGICAL HISTORY      6/22/2007,[id number],SHOULDER REPLACEMENT,Right,with biceps tenodesis by Dr. Mukesh Ayoub     OTHER SURGICAL HISTORY      1995/2002,,ERCP,xseveral, one with papillotomy     OTHER SURGICAL HISTORY      1/30/2012-3/2/2012,[id number],RADIATION TREATMENT     OTHER SURGICAL HISTORY      12/7/2011,[id number],NECK/THORAX PROCEDURE,L modified radical neck surgery     OTHER SURGICAL HISTORY      11/10/14,[id number],OTHER,Dr. Lyndon FRANK     OTHER SURGICAL HISTORY      2012,[id number],REVISION EYELID,Dr. Farias     OTHER SURGICAL HISTORY      10/22/15,[id number],OTHER     TONSILLECTOMY, ADENOIDECTOMY, COMBINED      1947     VASECTOMY      1980       Prior to Admission Medications   Prior to Admission Medications   Prescriptions Last Dose Informant Patient Reported? Taking?   Benzonatate (TESSALON PERLES PO) Unknown at Unknown time  Yes Yes   Sig: Take 100-200 mg by mouth every 8 hours as needed   Blood Glucose Monitoring Suppl (FIFTY50 GLUCOSE METER 2.0) W/DEVICE KIT   Yes No   Sig: Dispense Accu-Chek Agnes  Meter. Dispense item covered by pt ins. E11.9 NIDDM type II - Test 1 time/day   CALCIUM PO   Yes No   Sig: Take 2 each by mouth At Bedtime   MAGNESIUM PO   Yes No   Sig: Take 1 each by mouth At Bedtime   Multiple Vitamin (MULTI-VITAMINS) TABS   Yes No   Sig: Take 1 tablet by mouth daily   Omega-3 Fatty Acids (FISH OIL PO)   Yes No   Sig: Take 1 each by mouth 2 times daily   VITAMIN D PO   Yes No   Sig: Take 1 each by mouth daily   acetaminophen (TYLENOL) 325 MG tablet Past Week at Unknown time  Yes Yes   Sig: Take 325-650 mg by mouth every 6 hours as needed for mild pain   amylase-lipase-protease (CREON 36) 360366-34919-754176 units CPEP Past Week at Unknown time  No Yes   Sig: Take 3 capsules by mouth 4 times daily (with meals and nightly) Taking 04351 units   atorvastatin (LIPITOR) 40 MG  tablet Past Week at Unknown time  No Yes   Sig: Take 1 tablet (40 mg) by mouth daily (with dinner)   blood glucose (NO BRAND SPECIFIED) lancets standard   No No   Sig: Use to test blood sugar 2 times daily  E11.9   blood glucose (NO BRAND SPECIFIED) test strip   No No   Sig: Use to test blood sugar 2 times daily  E11.9   cetirizine (ZYRTEC) 10 MG tablet   Yes No   Sig: Take 10 mg by mouth daily   gabapentin (NEURONTIN) 100 MG capsule   No No   Sig: Take 1 capsule (100 mg) by mouth 3 times daily as needed (hiccups)   Patient not taking: Reported on 8/23/2022   levothyroxine (SYNTHROID/LEVOTHROID) 50 MCG tablet Past Week at Unknown time  No Yes   Sig: Take 1 tablet (50 mcg) by mouth daily   loperamide (IMODIUM A-D) 2 MG tablet   No No   Sig: Take 1 tablet (2 mg) by mouth 4 times daily as needed for diarrhea   metFORMIN (GLUCOPHAGE) 1000 MG tablet Past Week at Unknown time  No Yes   Sig: Take 1 tablet (1,000 mg) by mouth 2 times daily (with meals) For diabetes prevention   metoclopramide (REGLAN) 5 MG tablet   No No   Sig: Take 1 tablet (5 mg) by mouth 3 times daily as needed   Patient not taking: Reported on 8/23/2022   nitroGLYcerin (NITROSTAT) 0.4 MG sublingual tablet   No No   Sig: DISSOLVE ONE TABLET UNDER THE TONGUE EVERY 5 MINUTES AS NEEDED FOR CHEST PAIN.  DO NOT EXCEED A TOTAL OF 3 DOSES IN 15 MINUTES   nystatin (NYSTOP) 324354 UNIT/GM external powder   No No   Sig: Apply topically 2 times daily as needed For skin fold rash   omeprazole 20 MG tablet Past Week at Unknown time  Yes Yes   Sig: Take 20 mg by mouth daily   primidone (MYSOLINE) 50 MG tablet   No No   Sig: Take 2 tablets (100 mg) by mouth At Bedtime   propranolol (INDERAL) 20 MG tablet Past Week at Unknown time  No Yes   Sig: TAKE 1 TABLET BY MOUTH THREE TIMES DAILY   propranolol (INDERAL) 20 MG tablet   Yes No   Sig: Take 1 tablet by mouth in the morning and 2 tablets by mouth in the evening.   rivastigmine (EXELON) 3 MG capsule   Yes No   Sig: Take  3 mg by mouth 2 times daily   tamsulosin (FLOMAX) 0.4 MG capsule   Yes No   Sig: Take 0.4 mg by mouth daily   tiotropium (SPIRIVA) 18 MCG inhaled capsule   No No   Sig: Inhale 1 capsule (18 mcg) into the lungs daily - for chronic phlegm/Bronchitis      Facility-Administered Medications: None     Allergies   Allergies   Allergen Reactions     Other Drug Allergy (See Comments) Other (See Comments)     Trelegy Ellipta  Sore throat         Social History   I have reviewed this patient's social history and updated it with pertinent information if needed. Rajesh Huynh  reports that he quit smoking about 21 years ago. His smoking use included cigarettes. He has a 129.00 pack-year smoking history. He has never used smokeless tobacco. He reports previous alcohol use. He reports that he does not use drugs.    Family History   I have reviewed this patient's family history and updated it with pertinent information if needed.   Family History   Problem Relation Age of Onset     Breast Cancer Mother      Heart Disease Mother 80        Heart Disease,MI     Heart Disease Father 83        Heart Disease,CHF     Lung Cancer Father      Family History Negative Sister         Good Health     Heart Disease Sister         Heart Disease,pacemaker     Breast Cancer Daughter         Cancer-breast     Diabetes Son         Diabetes,Type 1     Alcoholism Son      Prostate Cancer No family hx of         Cancer-prostate       Review of Systems   Review of systems is limited by patient factors - lewey body dementia-inconsistent responses  CONSTITUTIONAL:  negative for  fevers, chills and sweats  RESPIRATORY:  positive for  cough with sputum and dyspnea  CARDIOVASCULAR:  negative for  chest pain, palpitations, edema, syncope  GASTROINTESTINAL:  negative for nausea, vomiting, change in bowel habits and abdominal pain  GENITOURINARY:  positive for frequency  negative for dysuria  MUSCULOSKELETAL:  positive for  muscle weakness  negative for   myalgias and arthralgias  NEUROLOGICAL:  negative for headaches, dizziness, syncope and near syncope    Physical Exam   Temp: 98.2  F (36.8  C) Temp src: Tympanic BP: 143/75 Pulse: 84   Resp: 20 SpO2: 96 % O2 Device: None (Room air)    Vital Signs with Ranges  Temp:  [98.2  F (36.8  C)] 98.2  F (36.8  C)  Pulse:  [84] 84  Resp:  [20] 20  BP: (143)/(75) 143/75  SpO2:  [96 %] 96 %  0 lbs 0 oz    Constitutional: Awake,alert, talkative. No acute distress, nontoxic.  HEENT: Mucous membranes mildy dry, pink. Normocephalic. No cervical lymphadenopathy.   Respiratory: Course crackles and rhonchi mid to base on the LEFT. Right lower lobe is diminished but right lung fields are all clear.   Cardiovascular: HRR, no murmurs, rubs,thrills. No peripheral edema.   GI: Soft,nontender, bowel sound are positive.   Skin: No unusual rashes, open areas or bruising. Portacath present and accessed in left upper chest.   Musculoskeletal: Moves all extremities equally. He is able to quickly it up at edge of bed when asked without any significant weakness noted.   Neurologic: AOx3. He does have some tangential responses during exam, difficulty remembering recent events but recalls specific details of his cancer treatment.   Psychiatric: Calm, cooperative. Quite fidgety but otherwise does not appear anxious.     Data   Data reviewed today:  I personally reviewed .  Recent Labs   Lab 08/26/22  2114   WBC 14.0*   HGB 10.7*   MCV 97      *   POTASSIUM 4.1   CHLORIDE 96*   CO2 24   BUN 10   CR 0.62*   ANIONGAP 7   ERICA 8.8   *   ALBUMIN 3.2*   PROTTOTAL 6.0*   BILITOTAL 0.8   ALKPHOS 61   ALT 17   AST 15       Recent Results (from the past 24 hour(s))   XR Chest Port 1 View    Narrative    PROCEDURE INFORMATION:   Exam: XR Chest   Exam date and time: 8/26/2022 9:18 PM   Age: 82 years old   Clinical indication: Chest wall pain; Additional info: SOB fever     TECHNIQUE:   Imaging protocol: Radiologic exam of the chest.   Views: 1  view.     COMPARISON:   CR XR CHEST PORT 1 VIEW 8/10/2022 12:12 PM     FINDINGS:   Tubes, catheters and devices: Left MediPort catheter terminating mid SVC.     Lungs: Increased right perihilar density. Linear atelectatic changes lung   bases.   Pleural spaces: Unremarkable. No pleural effusion. No pneumothorax.   Heart/Mediastinum: Unremarkable. No cardiomegaly.   Bones/joints: Partially imaged bilateral shoulder prostheses.       Impression    IMPRESSION:   1. Increased prominence of the right hilar region. While this could be vascular   or related to adenopathy, underlying infiltrate or pneumonia not excluded.   Clinical correlation and if necessary further evaluation with CT could be   considered.   2. Left MediPort catheter in place.     THIS DOCUMENT HAS BEEN ELECTRONICALLY SIGNED BY KEMAR BURTON MD        no

## 2022-08-27 NOTE — LETTER
[facility]  [address]  [place] [address] [phone]    FACSIMILE TRANSMITTAL SHEET       COMPANY: [employer]   FAX NUMBER: [phone]  PHONE NUMBER: [phone]    FROM: JERRI Christianson  PHONE: [phone]  DATE: 08/30/22      _____URGENT _____REVIEW ONLY _____PLEASE COMMENT____PLEASE REPLY                                          IF YOU DID NOT RECEIVE THE CORRECT NUMBER OF PAGES OR THE FAX DID NOT COME THROUGH CLEARLY, PLEASE CALL THE SENDER     CONFIDENTIALITY STATEMENT: Confidential information that may accompany this transmission contains protected health information under state and federal law and is legally privileged. This information is intended only for the use of the individual or entity named above and may be used only for carrying out treatment, payment or other healthcare operations. The recipient or person responsible for delivering this information is prohibited by law from disclosing this information without proper authorization to any other party, unless required to do so by law or regulation. If you are not the intended recipient, you are hereby notified that any review, dissemination, distribution, or copying of this message is strictly prohibited. If you have received this communication in error, please destroy the materials and contact us immediately by calling the number listed above. No response indicates that the information was received by the appropriate authorized party

## 2022-08-27 NOTE — PLAN OF CARE
[facility] Inpatient Admission Note:    Patient admitted to 3230/3230-1 at approximately 0001 via cart accompanied by EMS transport from [facility] ER. Report received from GISELL Huston in SBAR format at 2313 via telephone. Patient ambulated to bed via assist of 2. Patient is alert and oriented X 3 but definitely confused, denies pain; rates at 0 on 0-10 scale.  Patient oriented to room, unit, hourly rounding, and plan of care. Explained admission packet and patient handbook with patient bill of rights brochure. Will continue to monitor and document as needed.     Inpatient Nursing criteria listed below was met:    Health care directives status obtained and documented: Yes    Patient identifies a surrogate decision maker: Yes If yes, who: Wife, Ruth. Contact Information: see facesheet     If initial lactic acid greater than 2.0, repeat lactic acid drawn within one hour of arrival to unit: NA. If no, state reason: lactic 1.4    Clergy visit ordered if patient requests: N/A    Skin issues/needs documented: Yes    Isolation Patient: no Education given, correct sign in place and documentation row added to PCS:  N/A    Fall Prevention Yes: Care plan updated, education given and documented, sticker and magnet in place: Yes    Care Plan initiated: Yes    Education Documented (including assessment): Yes    Patient has discharge needs: unsure at this time

## 2022-08-27 NOTE — PLAN OF CARE
Most recent vitals: /70 (BP Location: Left arm)   Pulse 81   Temp 98.1  F (36.7  C) (Tympanic)   Resp 20   SpO2 93%       Pain Management:  Denies Pain.   LOC:  Alert, confused. Does not utilize call light. Needs encouragement.   Cardiac:  HRR  Respiratory:  LS diminished bilaterally. Coarse crackles to RLL and RML and LLL.   GI:  BS active x 4. Denies nausea, vomiting.  :  Hesitancy. Bladder scanned for 225. Voided 425 ml. Dark gordy in color.   IVF:  100 ml/hr.   Ambulation:Assist x 2.   Safety:  Call light in reach. Bed near nurses station.     Face to face report given with opportunity to observe patient.    Report given to GISELL Del Angel.     Belkis Alonso RN   8/27/2022  12:24 PM

## 2022-08-27 NOTE — PROGRESS NOTES
Hospitalist brief progress note    Patient seen at bedside.  This is an 82-year-old male with history of double lung cancer status post chemoradiation, Lewy body dementia, hypertension, non-insulin-dependent diabetes mellitus presenting with fever of 101.2, weakness.  Clinical evidence of community-acquired pneumonia including leukocytosis, increased infiltrate right hilar region, markedly elevated procalcitonin, acute hypoxic respiratory failure.  Patient being treated with Zosyn, clinically improved this morning, procalcitonin normalizing, supplemental oxygen weaned.  Blood cultures no growth, sputum gram stain showing polymicrobial rolando.  Cultures in progress.  Continue current treatment as patient is improving.    Myriam Briseno MD

## 2022-08-28 NOTE — PLAN OF CARE
Most recent vitals: /89 (BP Location: Left arm, Patient Position: Semi-Mar's)   Pulse 90   Temp 98.6  F (37  C) (Tympanic)   Resp 18   SpO2 95%     Patient alert but forgetful, waxes/weans with confusion. Up with Ax1-Ax2 from chair to bed, needs frequent reminders and redirection when moving from chair to bed. Wife, Ruth, updated this evening, did report patients confusion/forgetfullness has progressively gotten worse these pass few days, feels as if physical therapy would be beneficial for the patient. Patient urinal voiding. C/o cough and generalized discomfort, PRN tylenol and tesslon given with good relief. Fluids infusing via PAC per order. PAC dressing CDI, good blood return. Swallows pills whole. Will not call appropriately, alarms on for safety.     Face to face report given with opportunity to observe patient.    Report given to Ruth JAMESON.    Kate Martinez RN   8/28/2022  7:30 AM

## 2022-08-28 NOTE — PHARMACY-VANCOMYCIN DOSING SERVICE
"Pharmacy Vancomycin Initial Note  Date of Service 2022  Patient's  1940  82 year old, male    Indication: Community Acquired Pneumonia and Healthcare-Associated Pneumonia    Current estimated CrCl = Estimated Creatinine Clearance: 123.1 mL/min (A) (based on SCr of 0.48 mg/dL (L)).    Creatinine for last 3 days  2022:  9:14 PM Creatinine 0.62 mg/dL  2022:  5:44 AM Creatinine 0.59 mg/dL  2022:  5:40 AM Creatinine 0.48 mg/dL    Recent Vancomycin Level(s) for last 3 days  No results found for requested labs within last 72 hours.      Vancomycin IV Administrations (past 72 hours)      No vancomycin orders with administrations in past 72 hours.                Nephrotoxins and other renal medications (From now, onward)    Start     Dose/Rate Route Frequency Ordered Stop    22 1900  vancomycin 1250 mg in 0.9% NaCl 250 mL intermittent infusion 1,250 mg        \"Followed by\" Linked Group Details    1,250 mg  over 90 Minutes Intravenous EVERY 12 HOURS 22 0658      22 0700  vancomycin 1500 mg in 0.9% NaCl 250 ml intermittent infusion 1,500 mg        \"Followed by\" Linked Group Details    1,500 mg  over 90 Minutes Intravenous ONCE 22 0658      22 0030  piperacillin-tazobactam (ZOSYN) intermittent infusion 4.5 g        Note to Pharmacy: For [facility], [facility] and [facility]: For Zosyn-naive patients, use the \"Zosyn initial dose + extended infusion\" order panel.    4.5 g  200 mL/hr over 30 Minutes Intravenous EVERY 6 HOURS 22 0015 22 0029          Contrast Orders - past 72 hours (72h ago, onward)    None          [employer] Prediction of Planned Initial Vancomycin Regimen  Loading dose: 1500 mg at 06:56 2022.  Regimen: 1250 mg IV every 12 hours.  Start time: 06:57 on 2022  Exposure target: AUC24 (range)400-600 mg/L.hr   AUC24,ss: 542 mg/L.hr  Probability of AUC24 > 400: 79 %  Ctrough,ss: 16.0 mg/L  Probability of Ctrough,ss > 20: 33 %  Probability of " nephrotoxicity (Lodise DAFNE 2009): 11 %          Plan:  1. Start vancomycin  1500 mg once followed by 1250 IV q12h.   2. Vancomycin monitoring method: AUC  3. Vancomycin therapeutic monitoring goal: 400-600 mg*h/L  4. Pharmacy will check vancomycin levels as appropriate in 1-3 Days.    5. Serum creatinine levels will be ordered daily for the first week of therapy and at least twice weekly for subsequent weeks.      Juan Gentile, AmarilisD

## 2022-08-28 NOTE — PHARMACY-MEDICATION REGIMEN REVIEW
"Pharmacy Antimicrobial Stewardship Assessment     Current Antimicrobial Therapy:  Anti-infectives (From now, onward)    Start     Dose/Rate Route Frequency Ordered Stop    22 1900  vancomycin 1250 mg in 0.9% NaCl 250 mL intermittent infusion 1,250 mg        \"Followed by\" Linked Group Details    1,250 mg  over 90 Minutes Intravenous EVERY 12 HOURS 22 0658      22 0700  doxycycline (VIBRAMYCIN) 100 mg in sodium chloride 0.9 % 100 mL intermittent infusion         100 mg  over 1-2 Hours Intravenous EVERY 12 HOURS 22 0635      22 0700  vancomycin 1500 mg in 0.9% NaCl 250 ml intermittent infusion 1,500 mg        \"Followed by\" Linked Group Details    1,500 mg  over 90 Minutes Intravenous ONCE 22 0658      22 0030  piperacillin-tazobactam (ZOSYN) intermittent infusion 4.5 g        Note to Pharmacy: For [facility], [facility] and [facility]: For Zosyn-naive patients, use the \"Zosyn initial dose + extended infusion\" order panel.    4.5 g  200 mL/hr over 30 Minutes Intravenous EVERY 6 HOURS 22 0015 22 0029        Indication: HAP d/t recent COVID     Days of Therapy: Day 2 Zosyn, Day 1 Vancomycin & Doxycycline     Pertinent Labs:  Recent Labs   Lab Test 22  0540 22  0544 22  2114   WBC 14.2* 12.7* 14.0*     Recent Labs   Lab Test 22  0540 22  0544 22  0037 22  2114 LACT  --   --  1.4 1.7 .0*  --   --   --  SED  --   --   --   --  PCAL 0.08* 0.06*  --  40.20*  < > = values in this interval not displayed.          Temperature:  Temp (24hrs), Av.6  F (37  C), Min:98.6  F (37  C), Max:98.6  F (37  C)    Culture Results:   (22) Urine= mixed rolando  (22) Sputum =     (22) Blood     (8/10/22) COVID = positive     Recommendations/Interventions:  1. Patient has lung cancer and D-dimer is increasing; adjust Lovenox to 0.5 mg/kg BID & check D-dimer       2. Obtain strep pneumo & legionella per COVID protocol    Kane Guerrero, [facility]  August " 28, 2022

## 2022-08-28 NOTE — PROGRESS NOTES
[facility]    Hospitalist Progress Note    Date of Service (when I saw the patient): 08/28/2022    Assessment & Plan   Rajesh Huynh is a 82 year old male who was admitted on 8/27/2022.    Pneumonia, bacterial: He had recent COVID19 pneumonia with underlying immunocompromised state due to lung cancer.  Lungs have cleared up, Procal is down from admission, however trending up from yesterday, 0.06--->0.08.  Leukocytosis also trending up 12.7--->14.2.   -Patient is currently on room air, no respiratory distress  -Continuing Zosyn  -Added doxycycline and vancomycin   -Sputum culture ordered following visit as well as blood cultures  -Duonebs, flutter, mucinex, Tessalon  -If he worsens, will consider CT scan of the chest to look for postobstructive versus lung abscess       Malignant neoplasm of lung: (H) 2 primary cancers in right lung. Right lower lobe-stage 3a just finished chemoradiotherapy end of June 2022 with reduction in size of mass Dr. Olivera [facility]-oncologist, got radiation therapy here at [facility]. Right upper lobe:  He has an appointment with radiation therapy on 9/6 to discuss starting SBRT.        Chronic hyponatremia: Normal range over the last year 127-133. At clinic 10 days ago he was 130. Lung disease, COPD, plus lung CA likely contributing.  He is receiving NS at 75cc/hr for mild dehydration, will not need more aggressive treatment as chronic and stable.  -Currently Na 131       Chronic bronchitis (H): No wheezes so will not start steroids, bacterial pneumonia as above.        Lewy body dementia without behavioral disturbance (H): He is able to tell me his current symptoms and is oriented x3, however he is quite forgetful of recent events.        Benign essential hypertension: Blood pressures have  soft today, will hold scheduled propranolol.        Controlled type 2 diabetes mellitus without complication, without long-term current use of insulin (H): A1c  "from 5/22 7.7. Consistent carb diet, glucose checks QID with sliding scale insulin.    Clinically Significant Risk Factors Present on Admission               # DMII: A1C = N/A within past 3 months  # Overweight: Estimated body mass index is 26.8 kg/m  as calculated from the following:    Height as of 8/26/22: 1.702 m (5' 7\").    Weight as of this encounter: 77.6 kg (171 lb 1.6 oz).          DVT Prophylaxis: Enoxaparin (Lovenox) SQ    Code Status: No CPR- Do NOT Intubate    Disposition: Expected discharge in 2-3 days once cultures finalized, clinically improved.    Myriam Briseno MD, MD        Interval History   Patient seen in room with bedside nurse.  Patient sitting up in chair, feeding self.  No distress.  No shortness of breath.  Patient complaining of right-sided chest pain with coughing.  Blood pressure has been low with systolic in the 170s, however patient is asymptomatic.    -Data reviewed today: I reviewed all new labs and imaging results over the last 24 hours. I personally reviewed imaging reports.    Physical Exam   Temp: 98.8  F (37.1  C) Temp src: Tympanic BP: (!) 76/49 Pulse: 92   Resp: 22 SpO2: 92 % O2 Device: None (Room air)    Vitals:    08/28/22 0610   Weight: 77.6 kg (171 lb 1.6 oz)     Vital Signs with Ranges  Temp:  [98.6  F (37  C)-98.8  F (37.1  C)] 98.8  F (37.1  C)  Pulse:  [86-92] 92  Resp:  [18-22] 22  BP: ()/(49-89) 76/49  SpO2:  [92 %-95 %] 92 %    Intake/Output Summary (Last 24 hours) at 8/28/2022 0923  Last data filed at 8/28/2022 0914  Gross per 24 hour   Intake 1860 ml   Output 2350 ml   Net -490 ml       Port A Cath Single 08/26/22 Left Chest wall (Active)   Site Assessment WDL 08/28/22 0100   Dressing Status clean;dry;intact 08/28/22 0100   Dressing Intervention Chlorhexadine sponge;Transparent 08/28/22 0100   Dressing change due 09/02/22 08/27/22 0050   Line Status Infusing 08/28/22 0100   Access Date 08/26/22 08/27/22 0050   Access Attempts Other (Comment) 08/27/22 0050 "   Gauge 19 gauge;3/4 inch 08/26/22 2049   Needle Change Due 09/02/22 08/27/22 0050   Line Necessity Yes, meets criteria 08/28/22 0100   Extravasation? No 08/27/22 1703   Number of days: 2     Line/device assessment(s) completed for medical necessity    Constitutional - AA, NAD, nontoxic  HEENT - atraumatic, normocephalic  Neck - supple, no masses, no JVD  CVS - S1 S2 RRR, no murmurs, rubs, gallops  Respiratory - coarse crackles in left lung seem to have resolved  GI - soft, NT, ND, + bowel sounds, no organomegaly  Musculoskeletal - no LE edema, no lesions  Neuro - oriented x 3, no gross focal deficits    Medications     sodium chloride 50 mL/hr at 08/27/22 1601       amylase-lipase-protease  9 capsule Oral 4x Daily w/meals     atorvastatin  40 mg Oral Daily with supper     cetirizine  10 mg Oral Daily     doxycycline (VIBRAMYCIN) IV  100 mg Intravenous Q12H     enoxaparin ANTICOAGULANT  40 mg Subcutaneous Q24H     guaiFENesin  600 mg Oral BID     heparin  5-10 mL Intracatheter Q28 Days     heparin lock flush  5-10 mL Intracatheter Q24H     insulin aspart  1-7 Units Subcutaneous TID AC     insulin aspart  1-5 Units Subcutaneous At Bedtime     levothyroxine  50 mcg Oral QAM AC     metFORMIN  1,000 mg Oral BID w/meals     pantoprazole  40 mg Oral QAM AC     piperacillin-tazobactam  4.5 g Intravenous Q6H     primidone  100 mg Oral At Bedtime     propranolol  20 mg Oral Daily     propranolol  40 mg Oral QPM     rivastigmine  3 mg Oral BID     sodium chloride (PF)  10-20 mL Intracatheter Q28 Days     sodium chloride (PF)  3 mL Intracatheter Q8H     tamsulosin  0.4 mg Oral Daily     umeclidinium  1 puff Inhalation Daily     vancomycin  1,500 mg Intravenous Once    Followed by     vancomycin  1,250 mg Intravenous Q12H       Data   Recent Labs   Lab 08/28/22  0657 08/28/22  0540 08/27/22  2214 08/27/22  0927 08/27/22  0544 08/27/22  0158 08/26/22  2114   WBC  --  14.2*  --   --  12.7*  --  14.0*   HGB  --  10.1*  --   --   10.1*  --  10.7*   MCV  --  98  --   --  98  --  97   PLT  --  219  --   --  192  --  215   NA  --  131*  --   --  129*  --  127*   POTASSIUM  --  3.7  --   --  4.4  --  4.1   CHLORIDE  --  100  --   --  98  --  96*   CO2  --  26  --   --  25  --  24   BUN  --  6*  --   --  10  --  10   CR  --  0.48*  --   --  0.59*  --  0.62*   ANIONGAP  --  5  --   --  6  --  7   ERICA  --  8.3*  --   --  7.9*  --  8.8   * 138* 138*   < > 142*   < > 136*   ALBUMIN  --   --   --   --  2.2*  --  3.2*   PROTTOTAL  --   --   --   --  6.0*  --  6.0*   BILITOTAL  --   --   --   --  0.9  --  0.8   ALKPHOS  --   --   --   --  78  --  61   ALT  --   --   --   --  22  --  17   AST  --   --   --   --  28  --  15    < > = values in this interval not displayed.     Lactic Acid   Date Value Ref Range Status   08/27/2022 1.4 0.7 - 2.0 mmol/L Final   08/26/2022 1.7 0.7 - 2.0 mmol/L Final   08/10/2022 0.8 0.7 - 2.0 mmol/L Final       No results found for this or any previous visit (from the past 24 hour(s)).    Myriam Briseno MD

## 2022-08-28 NOTE — PLAN OF CARE
BP 94/58 (BP Location: Left arm)   Pulse 94   Temp 99.1  F (37.3  C) (Tympanic)   Resp 18   Wt 77.6 kg (171 lb 1.6 oz)   SpO2 94%   BMI 26.80 kg/m  . Lung sounds diminished with expiratory wheezes heard in right mid and lower lobes, sats 94% on room air, dyspneic with exertion. Alert and orientated at times as intermittently confused, does not call for assist, anticipate needs and alarms in place.

## 2022-08-28 NOTE — PLAN OF CARE
Goal Outcome Evaluation:  BP soft as charted via manual check.  All other VSS.  Lungs are dim and some fine crackles in bases.  Productive intermittent cough- gave Tessalon for relief of chest discomfort due to cough.     Lactic 2.5, MD notified and NS increased to 125 mls/hr.  Pt getting IV antbtx, Vanco, Doxy and Zosyn.  Port sluggish and changed out this afternoon, pt tolerated well.  Pt more steady on feet this afternoon, asst x1 with GB and walker.  Up to chair for few hours while family was here.  Alarms in use and call light in use.   Face to face report given with opportunity to observe patient.    Report given to Carlota Ortiz RN   8/28/2022  3:09 PM

## 2022-08-28 NOTE — PLAN OF CARE
Face to face report given with opportunity to observe patient.    Report given to Kate Swanson RN   8/28/2022  6:53 PM

## 2022-08-28 NOTE — PROVIDER NOTIFICATION
DATE:  8/28/2022   TIME OF RECEIPT FROM LAB:  1138  LAB TEST:  Lactic acid  LAB VALUE:  2.5  RESULTS GIVEN WITH READ-BACK TO (PROVIDER):  Myriam Briseno MD  TIME LAB VALUE REPORTED TO PROVIDER:   1149

## 2022-08-29 NOTE — PLAN OF CARE
Writer spoke with patient's wife Ruth, she just called for an update on patient. Writer said she would have a better idea of the plan for patient once care conference is through. Ruth gave writer her daughter Liz's number to give her an update once we have one as she will be coming here today because Ruth has appointments.

## 2022-08-29 NOTE — CARE PLAN
Prior to Admission Medication Reconciliation:     Medications added:   [x] None  [] As listed below:    Medications deleted:   [] None  [x] As listed below:    Duplicate propranolol- prescribed TID, pt takes 1 in the AM and 2 in the evening.         Medications marked for review/removal by attending:  [] None  [x] As listed below:    Gabapentin- reports ineffective and stopped independently. Needs MD removal.     Changes made to existing medications:   [] None  [x] Updated strengths and frequencies to most current.   [x] As listed below:    Metoclopramide from 5 mg TID PRN to 10 mg QID     Spouse got OTC vitamins and read off strengths- updated.     Pertinent notes/medications patient takes different than prescribed:     Benzonatate- takes PRN- spouse usually only gives an evening dose, but if pt is having coughing spells in the morning, she will give it then too.     Gabapentin- doesn't give- reports ineffective    Synthroid note- pt is out of refills and spouse isn't sure if they are supposed to continue?    spiriva- uses PRN    reglan- reports given for hiccups, but pt hasn't needed so they haven't started yet    Last times/dates taken verified with patient:  [x] Yes- completed myself  [] Prepared PTA medlist for review only. (will not be available to review personally)  [] Did not review with patient. Rx verification only. Review completed by nursing.    [] Nurse completed no changes made (double checked entries)  [] Unable to review with patient at this time:    Allergies listed at another location:  []Allergies match allergies listed in Epic  []Other allergies listed:    Allergy review:    [x]Did not review: reviewed by nursing  []Did not review: pt unable at this time  []Verified current existing allergies or NKDA: no changes made   []Patient confirmed current existing allergies and new allergies added:    Medication reconciliation sources:   [x]Patient  []Patient family member/emergency contact: **  [x][facility] Report Review  [x]Epic Chart Review  [x]Care Everywhere review  []Pharmacy med list/phone call: **  [x]Outside meds dispense report: see below  []Nursing home or Assisted Living MAR:  []Other: **    Pharmacy desired at discharge: [facility]    Is patient on coumadin?   [x]No  []Yes    Requests for consultation by provider or pharmacist:   [] Patient understands why all of their meds were prescribed and how to take them. No questions.   [x] Managing party has no questions.   [] Patient/ managing party has questions about the following:  [] Did not review with patient. Cannot assess.     Fill dates and reported compliancy:  [x] Fill dates coincide with reported compliancy for all/most maintenance meds.   [] Not applicable. Patient is not taking any maintenance medications at this time.   [] Fill dates do not coincide with compliancy with maintenance meds. See notes in PTA medlist and in comments.    [] Fill dates do not coincide with the following medications but pt reports compliancy:  [] Did not review with patient. Cannot assess.     Historian accuracy:  [] Excellent- alert and oriented, understands why meds were prescribed and how to take, able to answer specifics  [] Good- alert and oriented, understands why meds were prescribed and how to take, some confusion   [] Fair- alert and oriented, doesn't know medications without list, cannot answer specifics about medications, but has a decent process for which to take at home  [] Poor- does not know medications, may not have a process to take at home, may be cognitively unable to review at this time  [x]Medication management done by family member or facility, no concerns about historian accuracy.   [] Did not review with patient. Cannot assess.     Medication Management:  [] Manages meds independently  [x] Family member/ other party manages meds/assists: Ruth manages  [] Meds managed by staff at facility  [] Meds set up by home care, family/other party helps  administer  [] Meds set up by home care, self administers  [] Did not review with patient. Cannot assess.     Other medications aside from PTA:  [x] Denies taking any medications aside from those listed in PTA meds  [] Reports taking another medication(s) but cannot recall the name(s)  [] Refuses to say.  [] Did not review with patient. Cannot assess.     Comments: none    Ruth Encinas on 8/29/2022 at 10:21 AM       Notifying appropriate party of changes/additions/discrepancies:  [x]No pertinent changes made, notification not necessary.   [] Notified attending provider via text page/phone call  [] Notified attending provider in person  [] Notified pharmacy  [] Notified nurse  [] Medications have not been reconciled by a provider yet, notification not necessary  [] Pt is not admitted to floor yet, PTA meds completed before admission.     Medications Prior to Admission   Medication Sig Dispense Refill Last Dose     acetaminophen (TYLENOL) 325 MG tablet Take 325-650 mg by mouth every 6 hours as needed for mild pain   Past Week at Unknown time     amylase-lipase-protease (CREON 36) [id number] units CPEP Take 3 capsules by mouth 4 times daily (with meals and nightly) Taking 46040 units 1080 capsule 4 Past Week at Unknown time     atorvastatin (LIPITOR) 40 MG tablet Take 1 tablet (40 mg) by mouth daily (with dinner) 90 tablet 4 Past Week at Unknown time     benzonatate (TESSALON) 100 MG capsule Take 100-200 mg by mouth every 8 hours as needed   Past Week at Unknown time     Calcium Carb-Cholecalciferol (CALCIUM 600+D) 600-800 MG-UNIT TABS Take 2 tablets by mouth At Bedtime   Past Week at Unknown time     cetirizine (ZYRTEC) 10 MG tablet Take 10 mg by mouth At Bedtime   Past Week at Unknown time     gabapentin (NEURONTIN) 100 MG capsule Take 100 mg by mouth 3 times daily as needed (hiccups)        levothyroxine (SYNTHROID/LEVOTHROID) 50 MCG tablet Take 1 tablet (50 mcg) by mouth daily 90 tablet 4 8/24/2022 at AM      loperamide (IMODIUM A-D) 2 MG tablet Take 1 tablet (2 mg) by mouth 4 times daily as needed for diarrhea 120 tablet 11 Past Week at Unknown time     magnesium oxide (MAG-OX) 400 MG tablet Take 400 mg by mouth At Bedtime   Past Week at HS     metFORMIN (GLUCOPHAGE) 1000 MG tablet Take 1 tablet (1,000 mg) by mouth 2 times daily (with meals) For diabetes prevention (Patient taking differently: Take 1,000 mg by mouth 2 times daily (with meals) For diabetes prevention) 180 tablet 1 Past Week at Unknown time     Multiple Vitamin (MULTI-VITAMINS) TABS Take 1 tablet by mouth daily   Past Week at Unknown time     nitroGLYcerin (NITROSTAT) 0.4 MG sublingual tablet DISSOLVE ONE TABLET UNDER THE TONGUE EVERY 5 MINUTES AS NEEDED FOR CHEST PAIN.  DO NOT EXCEED A TOTAL OF 3 DOSES IN 15 MINUTES 25 tablet 0 Unknown at Unknown time     nystatin (NYSTOP) 957509 UNIT/GM external powder Apply topically 2 times daily as needed For skin fold rash 180 g 4 Past Month at Unknown time     Omega-3 Fatty Acids (FISH OIL-OMEGA-3 FATTY ACID) 1000 MG DR capsule Take 1 each by mouth 2 times daily   Past Week at Unknown time     omeprazole 20 MG tablet Take 20 mg by mouth daily   Past Week at AM     primidone (MYSOLINE) 50 MG tablet Take 2 tablets (100 mg) by mouth At Bedtime 180 tablet 4 Past Week at HS     propranolol (INDERAL) 20 MG tablet Take 1 tablet by mouth in the morning and 2 tablets by mouth in the evening.   Past Week at Unknown time     rivastigmine (EXELON) 3 MG capsule Take 3 mg by mouth 2 times daily . Should be taken with breakfast and dinner.   Past Week at Unknown time     tamsulosin (FLOMAX) 0.4 MG capsule Take 0.4 mg by mouth daily   Past Week at Unknown time     tiotropium (SPIRIVA) 18 MCG inhaled capsule Inhale 1 capsule (18 mcg) into the lungs daily - for chronic phlegm/Bronchitis (Patient taking differently: Inhale 18 mcg into the lungs daily as needed - for chronic phlegm/Bronchitis) 90 capsule 3 Past Month at Unknown  time     vitamin D3 (CHOLECALCIFEROL) 50 mcg (2000 units) tablet Take 1 tablet by mouth 2 times daily   Past Week at Unknown time     blood glucose (NO BRAND SPECIFIED) lancets standard Use to test blood sugar 2 times daily  E11.9 200 each 3      blood glucose (NO BRAND SPECIFIED) test strip Use to test blood sugar 2 times daily  E11.9 200 strip 11      Blood Glucose Monitoring Suppl (FIFTY50 GLUCOSE METER 2.0) W/DEVICE KIT Dispense Accu-Chek Agnes  Meter. Dispense item covered by pt ins. E11.9 NIDDM type II - Test 1 time/day        metoclopramide (REGLAN) 10 MG tablet Take 10 mg by mouth 4 times daily (before meals and nightly)              Medication Dispense History (from 8/29/2021 to 8/29/2022)  Expand All  Collapse All    Amoxicillin-Pot Clavulanate     Dispensed Days Supply Quantity Provider Pharmacy   AMOX/K CLAV  875-125MG TAB 01/30/2022 7 14 Units [facility]O [facility]ILL[facility] ...        Atorvastatin Calcium     Dispensed Days Supply Quantity Provider Pharmacy   ATORVASTATIN 40MG   TAB 06/29/2022 90 90 Units Monae Sen MD [facility] ...   ATORVASTATIN 40MG   TAB 03/30/2022 90 90 Units MONAE SEN [facility] ...   ATORVASTATIN 40MG   TAB 12/26/2021 90 90 Units MONAE SEN [facility] ...   ATORVASTATIN 40MG   TAB 09/24/2021 90 90 Units MONAE SEN [facility] ...        Azithromycin     Dispensed Days Supply Quantity Provider Pharmacy   AZITHROMYCIN 250MG   TAB 01/26/2022 5 6 Units CASEY WALDRON [facility] ...        Baclofen     Dispensed Days Supply Quantity Provider Pharmacy   BACLOFEN 20MG       TAB 05/18/2022 60 90 Units SIMEON JAUREGUI [facility] ...        Benzonatate     Dispensed Days Supply Quantity Provider Pharmacy   BENZONATATE 100MG   CAP 08/05/2022 20 120 Units Yomaira Dugan MD [facility] ...   BENZONATATE 100MG   CAP 06/15/2022 20 120 Units Yomaira Dugan MD [facility] ...         Doxycycline Hyclate     Dispensed Days Supply Quantity Provider Pharmacy   DOXYCYCLINE HYCLATE 100MG TAB 02/24/2022 10 20 Units CASEY WALDRON [facility] ...        Gabapentin     Dispensed Days Supply Quantity Provider Pharmacy   GABAPENTIN 100MG    CAP 08/15/2022 10 30 Units Jordon Estes MD [facility] ...   GABAPENTIN 100MG    CAP 05/27/2022 30 90 Units Gloria Cao APRN [facility] ...        Levothyroxine Sodium     Dispensed Days Supply Quantity Provider Pharmacy   LEVOTHYROXIN 50MCG  TAB 05/23/2022 90 90 Units Monae Sen MD [facility] ...   LEVOTHYROXIN 50MCG  TAB 05/19/2022 5 5 Units MONAE SEN [facility] ...   LEVOTHYROXIN 50MCG  TAB 03/14/2022 90 90 Units MONAE SEN [facility] ...   LEVOTHYROXIN 50MCG  TAB 12/17/2021 90 90 Units MONAE SEN [facility] ...        Metoclopramide HCl     Dispensed Days Supply Quantity Provider Pharmacy   METOCLOPRAMIDE 10MG    TAB 08/19/2022 15 60 Units Mukesh Olivera MD [facility] ...   METOCLOPRAM 5MG     TAB 08/12/2022 10 30 Units Emery Neves MD [facility] ...        Nitrofurantoin Monohyd Macro     Dispensed Days Supply Quantity Provider Pharmacy   NITROFURANTOIN MONO 100MG CAP 11/17/2021 5 10 Units DAVID COBB [facility] ...        Nitroglycerin     Dispensed Days Supply Quantity Provider Pharmacy   NITROGLYCER 0.4MG   SUB 06/17/2022 5 25 Units Monae Sen MD [facility] ...   NITROGLYCER 0.4MG   SUB 12/09/2021 5 25 Units MONAE SEN [facility] ...        Omeprazole     Dispensed Days Supply Quantity Provider Pharmacy   OMEPRAZOLE 20MG CAP 07/19/2022 90 180 Units Monae Sen MD [facility] ...   OMEPRAZOLE 20MG CAP 05/25/2022 30 60 Units Gloria Cao APRN CNP [facility] ...   OMEPRAZOLE 20MG CAP 03/23/2022 90 90 Units MONAE SEN [facility] ...   omeprazole 20  mg capsule,delayed release 12/19/2021 90 [facility]   OMEPRAZOLE 20MG CAP 09/22/2021 90 90 [facility] ...        Pancrelipase (Lip-Prot-Amyl)     Dispensed Days Supply Quantity Provider Pharmacy   CREON [id number]      CAP 08/17/2022 30 360 Units Monae Guzman MD [facility] ...   CREON [id number]      CAP 07/06/2022 28 340 Units Monae Guzman MD [facility] ...   Creon 36,000 unit-114,000 unit-180,000 unit capsule,delayed release 07/06/2022 30 360 capsule Monae Guzman MD [facility] ...   CREON [id number]      CAP 06/03/2022 30 360 Units Monae Guzman MD [facility] ...   CREON [id number]      CAP 04/20/2022 30 360 [facility] ...   Creon 36,000 unit-114,000 unit-180,000 unit capsule,delayed release 04/04/2022 30 360 capsule [facility] ...   CREON [id number] CAP 03/15/2022 28 360 Units [facility] ...   CREON [id number] CAP 02/09/2022 28 360 Units [facility] ...   CREON [id number] CAP 01/05/2022 28 360 Units [facility] ...   Creon 24,000-76,000-120,000 unit capsule,delayed release 11/29/2021 28 [facility]   CREON [id number] CAP 10/17/2021 28 360 Units [facility] ...   CREON [id number] CAP 09/26/2021 28 360 Units [facility] ...        Primidone     Dispensed Days Supply Quantity Provider Pharmacy   PRIMIDONE 50MG      TAB 07/22/2022 90 180 Units Monae Guzman MD [facility] ...   PRIMIDONE 50MG      TAB 04/22/2022 90 180 Units MONAE GUZMAN [facility] ...   PRIMIDONE 50MG      TAB 01/14/2022 90 180 Units MONAE GUZMAN [facility] ...   PRIMIDONE 50MG      TAB 10/19/2021 90 180 Units MONAE GUZMAN [facility] ...        Prochlorperazine Maleate     Dispensed  Days Supply Quantity Provider Pharmacy   PROCHLORPER 10MG    TAB 05/10/2022 8 30 Units YARELISLUIS BARTON [facility] ...        Propranolol HCl     Dispensed Days Supply Quantity Provider Pharmacy   PROPRANOLOL 20MG    TAB 08/12/2022 30 90 Units Jarad Lawrence MD [facility] ...   PROPRANOLOL 20MG    TAB 05/03/2022 90 270 [facility] ...   PROPRANOLOL 20MG    TAB 02/03/2022 90 270 [facility] ...   PROPRANOLOL 20MG    TAB 11/07/2021 90 270 [facility] ...        Rivastigmine Tartrate     Dispensed Days Supply Quantity Provider Pharmacy   RIVASTIGMINE 3MG  CAP 07/26/2022 30 60 Units Beronica Sands NP [facility] ...   RIVASTIGMINE 3MG  CAP 06/21/2022 30 60 Units Beronica Sands NP [facility] ...   RIVASTIGMINE 3MG  CAP 05/24/2022 30 60 Units Beronica Sands NP [facility] ...   RIVASTIGMINE 3MG  CAP [facility]/20/2022 30 [facility]   RIVASTIGMINE 3MG  CAP [facility]/23/2022 30 [facility] ...   RIVASTIGMINE 3MG  CAP 02/17/2022 30 60 [facility] ...   RIVASTIGMINE 3MG  CAP 01/20/2022 30 [facility] ...   RIVASTIGMINE 3MG  CAP 12/20/2021 30 60 [facility] ...   RIVASTIGMINE 3MG  CAP 11/22/2021 30 60 [facility] ...   RIVASTIGMINE 3MG  CAP 10/20/2021 30 60 [facility] ...   RIVASTIGMINE 3MG  CAP 09/15/2021 30 60 [facility] ...        Tamsulosin HCl     Dispensed Days Supply Quantity Provider Pharmacy   TAMSULOSIN 0.4MG    CAP 07/12/2022 90 90 Units Jovanni Sen MD [facility] ...   TAMSULOSIN 0.4MG    CAP 04/14/2022 90 90 Units JOVANNI SEN [facility] ...   TAMSULOSIN 0.4MG    CAP 01/10/2022 90 90 [facility] ...   TAMSULOSIN 0.4MG    CAP 10/14/2021 90 90 Units MONAE GUZMAN [facility] ...        Tiotropium Bromide Monohydrate     Dispensed Days Supply Quantity Provider Pharmacy   SPIRIVA HANDIHLR 18MCG CAP 06/25/2022 90 90 Units Monae Guzman MD [facility] ...        chlorproMAZINE HCl     Dispensed Days Supply Quantity Provider Pharmacy   chlorpromazine 10 mg tablet 06/03/2022 20 30 Units Faisal Marie MD [facility] ...   CHLORPROMAZINE 25MG TAB 05/26/2022 20 60 Units Faisal Marie MD [facility] ...        metFORMIN HCl     Dispensed Days Supply Quantity Provider Pharmacy   METFORMIN 1000MG TAB 07/06/2022 90 180 Units Monae Guzman MD [facility] ...   METFORMIN 1000MG TAB 03/30/2022 90 180 Units MONAE GUZMAN [facility] ...   METFORMIN 1000MG TAB 12/26/2021 90 180 Units MONAE GUZMAN [facility] ...   METFORMIN 1000MG TAB 09/30/2021 90 180 [facility] ...        Disclaimer    Certain dispenses may not be available or accurate in this report, including over-the-counter medications, low cost prescriptions, prescriptions paid for by the patient or non-participating sources, or errors in insurance claims information. The provider should independently verify medication history with the patient.    External Sources

## 2022-08-29 NOTE — PLAN OF CARE
Most recent vitals: /74 (BP Location: Right arm, Patient Position: Sitting)   Pulse 87   Temp 99.2  F (37.3  C) (Tympanic)   Resp 18   Wt 77.6 kg (171 lb 1.6 oz)   SpO2 95%   BMI 26.80 kg/m      Patient alert, forgetfullness and confusion waxes and weans. Up with Ax1-Ax2 to bedside commode and to urinal void. C/o mild generalized discomfort, PRN tylenol given with good relief. Remains on RA. Had 1 large loose BM this shift. PAC with good blood return and currently infusing fluids per order. Call light within reach. Patient does not call appropriately. Bed alarms on for safety.       Face to face report given with opportunity to observe patient.    Report given to Whitney Martinez RN   8/29/2022  7:39 AM

## 2022-08-29 NOTE — PROGRESS NOTES
[facility]    Hospitalist Progress Note    Date of Service (when I saw the patient): 08/29/2022    Assessment & Plan   Rajesh Huynh is a 82 year old male who was admitted on 8/27/2022.    Pneumonia, bacterial: Treating for health care associated pna.  Patient had recent COVID19 pneumonia with underlying immunocompromised state due to lung cancer. Had hospital stay in [place] from 8/10 - 8/12.  Lungs have cleared up, Procal is down from admission, however trending up 0.06--->0.08--->0.09.  CRP up 139 --> 147. Leukocytosis down 14.2 ---> 9.8.   -Patient is currently on room air, no respiratory distress  -Continuing Zosyn, doxycycline, vanco  -Sputum culture, blood cultures  -Duonebs, flutter, mucinex, Tessalon  -If he worsens, will consider CT scan of the chest to look for postobstructive versus lung abscess       Malignant neoplasm of lung: (H) 2 primary cancers in right lung. Right lower lobe-stage 3a just finished chemoradiotherapy end of June 2022 with reduction in size of mass Dr. [facility]-oncologist, got radiation therapy here at [facility]. Right upper lobe:  He has an appointment with radiation therapy on 9/6 to discuss starting SBRT.        Chronic hyponatremia: Normal range over the last year 127-133. At clinic 10 days ago he was 130. Lung disease, COPD, plus lung CA likely contributing.  He is receiving NS at 75cc/hr for mild dehydration, will not need more aggressive treatment as chronic and stable.  -Currently Na 133       Chronic bronchitis (H): No wheezes so will not start steroids, bacterial pneumonia as above.        Lewy body dementia without behavioral disturbance (H): He is able to tell me his current symptoms and is oriented x3, however he is quite forgetful of recent events.        Benign essential hypertension: Blood pressures have  soft today, will hold scheduled propranolol.        Controlled type 2 diabetes mellitus without complication,  "without long-term current use of insulin (H): A1c from 5/22 7.7. Consistent carb diet, glucose checks QID with sliding scale insulin.    Clinically Significant Risk Factors Present on Admission               # DMII: A1C = N/A within past 3 months  # Overweight: Estimated body mass index is 25.34 kg/m  as calculated from the following:    Height as of 8/26/22: 1.702 m (5' 7\").    Weight as of this encounter: 73.4 kg (161 lb 13.1 oz).        DVT Prophylaxis: Enoxaparin (Lovenox) SQ    Code Status: No CPR- Do NOT Intubate    Disposition: Expected discharge in 1-2 days once cultures finalized, clinically improved.    Myriam Briseno MD, MD        Interval History   Patient seen in room.  No distress.  No shortness of breath. Continues on RA.    -Data reviewed today: I reviewed all new labs and imaging results over the last 24 hours. I personally reviewed imaging reports.    Physical Exam   Temp: 97.4  F (36.3  C) Temp src: Tympanic BP: 147/79 Pulse: 95   Resp: 18 SpO2: 93 % O2 Device: None (Room air)    Vitals:    08/28/22 0610 08/29/22 0615   Weight: 77.6 kg (171 lb 1.6 oz) 73.4 kg (161 lb 13.1 oz)     Vital Signs with Ranges  Temp:  [97.4  F (36.3  C)-99.2  F (37.3  C)] 97.4  F (36.3  C)  Pulse:  [80-95] 95  Resp:  [18-20] 18  BP: ()/(48-79) 147/79  SpO2:  [93 %-95 %] 93 %      Intake/Output Summary (Last 24 hours) at 8/29/2022 1017  Last data filed at 8/29/2022 0916  Gross per 24 hour   Intake 1811 ml   Output 1100 ml   Net 711 ml         Port A Cath Single 08/26/22 Left Chest wall (Active)   Site Assessment WDL 08/29/22 0800   Dressing Status clean;dry;intact 08/29/22 0800   Dressing Intervention Chlorhexadine sponge 08/28/22 2000   Dressing change due 09/04/22 08/28/22 2000   Line Status Infusing 08/29/22 0800   Access Date 08/28/22 08/28/22 1300   Access Attempts 1 08/28/22 1300   Gauge 1 inch 08/28/22 1300   Needle Change Due 09/02/22 08/27/22 0050   De-Access Date 08/28/22 08/28/22 1200   Line Necessity " Yes, meets criteria 08/28/22 1300   Extravasation? No 08/28/22 1300   Number of days: 3     Line/device assessment(s) completed for medical necessity    Constitutional - AA, NAD, nontoxic  HEENT - atraumatic, normocephalic  Neck - supple, no masses, no JVD  CVS - S1 S2 RRR, no murmurs, rubs, gallops  Respiratory - coarse crackles in left lung seem to have resolved  GI - soft, NT, ND, + bowel sounds, no organomegaly  Musculoskeletal - no LE edema, no lesions  Neuro - oriented x 3, no gross focal deficits    Medications     sodium chloride 125 mL/hr at 08/29/22 0753       amylase-lipase-protease  9 capsule Oral 4x Daily w/meals     atorvastatin  40 mg Oral Daily with supper     cetirizine  10 mg Oral Daily     doxycycline (VIBRAMYCIN) IV  100 mg Intravenous Q12H     enoxaparin ANTICOAGULANT  40 mg Subcutaneous Q24H     guaiFENesin  600 mg Oral BID     heparin  5-10 mL Intracatheter Q28 Days     heparin lock flush  5-10 mL Intracatheter Q24H     insulin aspart  1-7 Units Subcutaneous TID AC     insulin aspart  1-5 Units Subcutaneous At Bedtime     levothyroxine  50 mcg Oral QAM AC     metFORMIN  1,000 mg Oral BID w/meals     pantoprazole  40 mg Oral QAM AC     piperacillin-tazobactam  4.5 g Intravenous Q6H     primidone  100 mg Oral At Bedtime     [Held by provider] propranolol  20 mg Oral Daily     [Held by provider] propranolol  40 mg Oral QPM     rivastigmine  3 mg Oral BID     sodium chloride (PF)  10-20 mL Intracatheter Q28 Days     sodium chloride (PF)  3 mL Intracatheter Q8H     tamsulosin  0.4 mg Oral Daily     umeclidinium  1 puff Inhalation Daily     vancomycin  1,250 mg Intravenous Q12H       Data   Recent Labs   Lab 08/29/22  0745 08/29/22  0509 08/28/22  2134 08/28/22  0657 08/28/22  0540 08/27/22  0927 08/27/22  0544 08/27/22  0158 08/26/22  2114   WBC  --  9.8  --   --  14.2*  --  12.7*  --  14.0*   HGB  --  10.1*  --   --  10.1*  --  10.1*  --  10.7*   MCV  --  99  --   --  98  --  98  --  97   PLT  --   198  --   --  219  --  192  --  215   NA  --  133  --   --  131*  --  129*  --  127*   POTASSIUM  --  3.2*  --   --  3.7  --  4.4  --  4.1   CHLORIDE  --  102  --   --  100  --  98  --  96*   CO2  --  26  --   --  26  --  25  --  24   BUN  --  5*  --   --  6*  --  10  --  10   CR  --  0.58*  --   --  0.48*  --  0.59*  --  0.62*   ANIONGAP  --  5  --   --  5  --  6  --  7   ERICA  --  8.1*  --   --  8.3*  --  7.9*  --  8.8   * 122* 124*   < > 138*   < > 142*   < > 136*   ALBUMIN  --   --   --   --   --   --  2.2*  --  3.2*   PROTTOTAL  --   --   --   --   --   --  6.0*  --  6.0*   BILITOTAL  --   --   --   --   --   --  0.9  --  0.8   ALKPHOS  --   --   --   --   --   --  78  --  61   ALT  --   --   --   --   --   --  22  --  17   AST  --   --   --   --   --   --  28  --  15    < > = values in this interval not displayed.     Lactic Acid   Date Value Ref Range Status   08/27/2022 1.4 0.7 - 2.0 mmol/L Final   08/26/2022 1.7 0.7 - 2.0 mmol/L Final   08/10/2022 0.8 0.7 - 2.0 mmol/L Final       No results found for this or any previous visit (from the past 24 hour(s)).    Myriam Briseno MD

## 2022-08-29 NOTE — PROGRESS NOTES
08/29/22 1200   Quick Adds   Type of Visit Initial Occupational Therapy Evaluation   Living Environment   People in Home child(sara), adult;spouse   Current Living Arrangements house   Home Accessibility stairs to enter home   Number of Stairs, Main Entrance 2   Transportation Anticipated family or friend will provide   Living Environment Comments Pt reports bathroom has walk in shower with grab bars as well as grab bars by the taller toilet.   Self-Care   Usual Activity Tolerance moderate   Current Activity Tolerance moderate   Equipment Currently Used at Home walker, rolling;grab bar, toilet;grab bar, tub/shower   Fall history within last six months   (pt denied)   Activity/Exercise/Self-Care Comment Pt reports he can manage ADLs independently and uses a FWW.   Instrumental Activities of Daily Living (IADL)   IADL Comments Pt tries to help with cooking and goes shopping but his wife and son complete those, too. Pt and wife set up his meds together. Pt has assist with remaining IADLs.   General Information   Onset of Illness/Injury or Date of Surgery 08/27/22   Referring Physician Dr. Briseno   Patient/Family Therapy Goal Statement (OT) did not report   Additional Occupational Profile Info/Pertinent History of Current Problem Pt admitted with pneumonia. He has CA and recent h/o COVID   Limitations/Impairments safety/cognitive   General Observations and Info Pt pleasantly confused. Unsure about the reliability of his answers but no family present during evaluation.   Cognitive Status Examination   Orientation Status person   Affect/Mental Status (Cognitive) confused   Follows Commands 75-90% accuracy;verbal cues/prompting required   Safety Deficit minimal deficit;insight into deficits/self-awareness   Memory Deficit unable/difficult to assess  (unsure of baseline so difficult assessing)   Cognitive Status Comments Pt has Lewy Body dementia. Unsure what his baseline cognition is   Visual Perception   Visual  Impairment/Limitations WFL   Pain Assessment   Patient Currently in Pain No   Range of Motion Comprehensive   General Range of Motion bilateral upper extremity ROM WFL   Strength Comprehensive (MMT)   Comment, General Manual Muscle Testing (MMT) Assessment BUEs grossly 3+ to 4/5   Muscle Tone Assessment   Muscle Tone Quick Adds No deficits were identified   Coordination   Coordination Comments Pt slightly shaky with feeding himself but states this occurs at home   Bed Mobility   Comment (Bed Mobility) NT pt already up OOB   Transfers   Transfers sit-stand transfer;toilet transfer   Sit-Stand Transfer   Sit-Stand Du[place] (Transfers) contact guard;verbal cues   Assistive Device (Sit-Stand Transfers) walker, front-wheeled   Toilet Transfer   Type (Toilet Transfer) sit-stand;stand-sit   Du[place] Level (Toilet Transfer) contact guard;verbal cues   Toilet Transfer Comments pt needed cues for sequencing task   Balance   Balance Comments impaired   Activities of Daily Living   BADL Assessment/Intervention lower body dressing;grooming   Lower Body Dressing Assessment/Training   Position (Lower Body Dressing) unsupported sitting   Du[place] Level (Lower Body Dressing) supervision   Grooming Assessment/Training   Position (Grooming) unsupported standing   Du[place] Level (Grooming) contact guard assist;verbal cues;minimum assist (75% patient effort)   Comment, (Grooming) pt had a LOB while performing grooming tasks, stating he could not lock the walker breaks (anticipate he was referring to 4WW but today was using a FWW)   Clinical Impression   Criteria for Skilled Therapeutic Interventions Met (OT) Yes, treatment indicated   OT Diagnosis Impaired ADLs   Influenced by the following impairments mild weakness, decreased balance, decreased cognition   Assessment of Occupational Performance 1-3 Performance Deficits   Identified Performance Deficits decreased safety and independence in ADLs   Planned Therapy  Interventions (OT) ADL retraining;cognition;strengthening;home program guidelines;progressive activity/exercise;risk factor education   Clinical Decision Making Complexity (OT) moderate complexity   Risk & Benefits of therapy have been explained evaluation/treatment results reviewed;care plan/treatment goals reviewed;patient   Clinical Impression Comments OT evaluation completed. Pt did fairly well with ADLs but did have 1 LOB. He has baseline cognitive deficits and does present with confusion. Unsure how far off pt is from his baseline? Recommend 24 hour supervision at home due to cognition. Pt also would benefit from home OT to ensure maximal safety and independence in ADLs in pt's home environment. Plan to treat pt during his stay.   OT Discharge Planning   OT Discharge Recommendation (DC Rec) home with home care occupational therapy;home with assist  (24 hour assist due to cognition)   OT Rationale for DC Rec OT evaluation completed. Pt did fairly well with ADLs but did have 1 LOB. He has baseline cognitive deficits and does present with confusion. Unsure how far off pt is from his baseline? Recommend 24 hour supervision at home due to cognition. Pt also would benefit from home OT to ensure maximal safety and independence in ADLs in pt's home environment. Plan to treat pt during his stay.   OT Brief overview of current status mostly SBA-CGA for ADLs/funcitonal mobility but pt did have a LOB while at the sink performing grooming tasks requiring increased assist   Total Evaluation Time (Minutes)   Total Evaluation Time (Minutes) 20   OT Goals   Therapy Frequency (OT) 5 times/wk   OT Predicted Duration/Target Date for Goal Attainment 09/05/22   OT Goals Hygiene/Grooming;Toilet Transfer/Toileting;Lower Body Bathing;Aerobic Activity   OT: Hygiene/Grooming modified independent   OT: Lower Body Bathing Modified independent   OT: Toilet Transfer/Toileting Modified independent   OT: Perform aerobic activity with stable  cardiovascular response 15 minutes

## 2022-08-29 NOTE — PHARMACY-MEDICATION REGIMEN REVIEW
"Pharmacy Antimicrobial Stewardship Assessment     Current Antimicrobial Therapy:  Anti-infectives (From now, onward)    Start     Dose/Rate Route Frequency Ordered Stop    22  vancomycin 1250 mg in 0.9% NaCl 250 mL intermittent infusion 1,250 mg        \"Followed by\" Linked Group Details    1,250 mg  over 90 Minutes Intravenous EVERY 12 HOURS 22 0658      22 0700  doxycycline (VIBRAMYCIN) 100 mg in sodium chloride 0.9 % 100 mL intermittent infusion         100 mg  over 1-2 Hours Intravenous EVERY 12 HOURS 22 0635      22 0030  piperacillin-tazobactam (ZOSYN) intermittent infusion 4.5 g        Note to Pharmacy: For [facility], [facility] and [facility]: For Zosyn-naive patients, use the \"Zosyn initial dose + extended infusion\" order panel.    4.5 g  200 mL/hr over 30 Minutes Intravenous EVERY 6 HOURS 22 0015 22 2359          Indication: HAP    Days of Therapy: Day 3 Zosyn, Day 2 Vancomycin & Doxycycline     Pertinent Labs:  Recent Labs   Lab Test 22  0509 22  0540 22  0544   WBC 9.8 14.2* 12.7*     Lab Test 22  0509 22  2139 22  1129 22  0540 22  2114 22  0643 22  0704 08/10/22  1222   LACT  --  1.5 2.5*  --    < >  --   --  0.8   .0*  --   --  139.0*  --  33.0* 61.0* 49.5*   SED  --   --   --   --   --   --   --   --    PCAL 0.09*  --   --  0.08*   < >  --   --   --         Temperature:  Temp (24hrs), Av.2  F (36.8  C), Min:97.1  F (36.2  C), Max:99.2  F (37.3  C)    Culture Results:       Recommendations/Interventions:  1. Vancomycin level ordered for 1700 tonight with an AUC monitoring goal of 400-600 mg*h/L.      Hieu Jacome, Pharmacy Intern  2022  "

## 2022-08-29 NOTE — TELEPHONE ENCOUNTER
Douglas Huynh Wife Call to Cancel appointment on September 6th due to Douglas is in the Hospital, reschedule appointment for September 15th 22. Cbb

## 2022-08-29 NOTE — PROGRESS NOTES
Assessment completed by visit with Douglas and daughterRuth.  Phone call to wifeRuth as well.    LOC: alert, oriented, pleasant    Dx: pneumonia   Chronic Disease Management: COPD, DMII, HTN, lung cancer    Lives with: wifeRuth and sonDouglas  Living at:  home  Transportation: YES Family provides     Primary PCP: Jovanni Sen  Insurance:  [facility] and [facility]  Medicare IMM letter reviewed with Douglas and Ruth chavarria.    Support System:  family  Homecare/PCA: not connected   /County Services:   Not connected   : YES [employer]     How was the VA notification completed: message sent to admitting     Health Care Directive: yes; on file  Guardian: no   POA: no     Pharmacy: [facility]  Meds management: Ruth manages     Adequate Resources for needs (housing, utilities, food/med): YES  Household chores: Wander manage majority   Work/community/social activity: YES as desired but has been limited lately d/t illness    ADLs: Ruth provides assistance with most tasks  Ambulation:walker utilized   Falls: yes   Nutrition: no concerns   Sleep: no concerns     Equipment used: walker, grab bars      Oxygen supplier: no      Does the supplier have valid oxygen orders: n/a    Mental health: no concerns  Substance abuse: denies   Exposure to violence/abuse: no concerns voiced/identified  Stressors: no concerns voiced/identified      Able to Return to Prior Living Arrangements: YES    Choice of Vendor: Pt/family was provided with the Medicare Compare list for Home Care.  Discussed associated Medicare star ratings to assist with choice for referrals/discharge planning Yes    Education was given to pt/family that star ratings are updated/maintained by Medicare and can be reviewed by visiting www.medicare.gov Yes    Patient/family choices for facility in priority are:   First Choice : Home [facility] [place]: [facility]    [phone]; therapy not available until after Labor day    Second Choice: Home [facility]  [place]: [facility]         [phone]; per conversation with Laura, services available this week.  Referral sent         Barriers: no barriers identified      ALIYAH: medium    Plan: return home with home care through [facility] ([facility]) via family

## 2022-08-29 NOTE — PLAN OF CARE
Daughter Liz is here and writer was able to give her a little update. She stated concerns of Douglas returning home because sometimes he is not able to stand up on his own and his wife is not able to assist him. She stated she does not want him returning home until his infection is cured and he is steady on his own. Writer shared families concerns with Rhina , and she plans to go and visit with family this morning/afternoon.

## 2022-08-29 NOTE — PROGRESS NOTES
Patient Name: Rajesh Huynh   MRN: [id number]   Admit Date: 8/27/2022    Current Infection Status: Recovered COVID   Current Isolation Status: No active isolations     Review of COVID-19 status and required isolation precautions    Refer to Special Precautions Duration and Period of Transmissibility Guideline, in Policy Tech.        Involved parties: MED CRAWFORD, Infection Prevention and Other Aundrea RN and HOMER RN.    Criteria used to make decision: Other >10 days post test, afebrile, .    The involved parties have reviewed this patient's chart per the Special Precautions Duration and Period of Transmissibility guideline and have taken the following action:     DECISION - OPTION 1: Patient meets all the criteria for Special Precautions isolation discontinuation and this writer will resolve the COVID-19 infection flag and isolation status, due to: Immunocompetent Symptomatic: Mild or Moderate: 10 days since symptoms began AND greater than or equal to 24hrs since last fever (without fever-reducing meds) AND COVID-19 symptoms have substantially improved. .       MED CRAWFORD, Infection Prevention

## 2022-08-29 NOTE — PHARMACY-VANCOMYCIN DOSING SERVICE
"Pharmacy Vancomycin Note  Date of Service 2022  Patient's  1940   82 year old, male    Indication: Healthcare-Associated Pneumonia  Day of Therapy: 2  Current vancomycin regimen:  1250 mg IV q12h  Current vancomycin monitoring method: AUC  Current vancomycin therapeutic monitoring goal: 400-600 mg*h/L    Insight[employer] Prediction of Current Vancomycin Regimen  Loading dose: N/A  Regimen: 1250 mg IV every 12 hours.  Start time: 18:40 on 2022  Exposure target: AUC24 (range)400-600 mg/L.hr   AUC24,ss: 561 mg/L.hr  Probability of AUC24 > 400: 96 %  Ctrough,ss: 16.8 mg/L  Probability of Ctrough,ss > 20: 28 %  Probability of nephrotoxicity (Lodise DAFNE ): 12 %      Current estimated CrCl = Estimated Creatinine Clearance: 103.7 mL/min (A) (based on SCr of 0.58 mg/dL (L)).    Creatinine for last 3 days  2022:  9:14 PM Creatinine 0.62 mg/dL  2022:  5:44 AM Creatinine 0.59 mg/dL  2022:  5:40 AM Creatinine 0.48 mg/dL  2022:  5:09 AM Creatinine 0.58 mg/dL    Recent Vancomycin Levels (past 3 days)  2022:  5:10 PM Vancomycin 14.4 mg/L    Vancomycin IV Administrations (past 72 hours)                   vancomycin 1250 mg in 0.9% NaCl 250 mL intermittent infusion 1,250 mg (mg) 1,250 mg New Bag 22 0814     1,250 mg New Bag 22    vancomycin 1500 mg in 0.9% NaCl 250 ml intermittent infusion 1,500 mg (mg) 1,500 mg New Bag 22 0823                Nephrotoxins and other renal medications (From now, onward)    Start     Dose/Rate Route Frequency Ordered Stop    22  vancomycin 1250 mg in 0.9% NaCl 250 mL intermittent infusion 1,250 mg        \"Followed by\" Linked Group Details    1,250 mg  over 90 Minutes Intravenous EVERY 12 HOURS 22 0658      22 0030  piperacillin-tazobactam (ZOSYN) intermittent infusion 4.5 g        Note to Pharmacy: For [facility], [facility] and [facility]: For Zosyn-naive patients, use the \"Zosyn initial dose + extended infusion\" order panel.    " 4.5 g  200 mL/hr over 30 Minutes Intravenous EVERY 6 HOURS 08/27/22 0015 09/02/22 2359             Contrast Orders - past 72 hours (72h ago, onward)    None          Interpretation of levels and current regimen:  Vancomycin level is reflective of -600    Has serum creatinine changed greater than 50% in last 72 hours: No    Urine output:  unable to determine    Renal Function: Stable        Plan:  1. Continue Current Dose  2. Vancomycin monitoring method: AUC  3. Vancomycin therapeutic monitoring goal: 400-600 mg*h/L  4. Pharmacy will check vancomycin levels as appropriate in 1-3 Days.  5. Serum creatinine levels will be ordered daily for the first week of therapy and at least twice weekly for subsequent weeks.    Ang Richard RPH

## 2022-08-29 NOTE — PROGRESS NOTES
08/29/22 0900   Quick Adds   Type of Visit Initial PT Evaluation   Living Environment   People in Home child(sara), adult;spouse   Current Living Arrangements house   Home Accessibility stairs to enter home   Number of Stairs, Main Entrance 2   Transportation Anticipated family or friend will provide   Self-Care   Usual Activity Tolerance moderate   Current Activity Tolerance moderate   Equipment Currently Used at Home walker, rolling   Fall history within last six months yes   Number of times patient has fallen within last six months 1   General Information   Onset of Illness/Injury or Date of Surgery 08/27/22   Referring Physician Dr. Briseno   Patient/Family Therapy Goals Statement (PT) Go home   Pertinent History of Current Problem (include personal factors and/or comorbidities that impact the POC) Pt hospitalized c pneumonia. Also has CA, h/o COVID   Weight-Bearing Status - LLE full weight-bearing   Weight-Bearing Status - RLE full weight-bearing   Cognition   Affect/Mental Status (Cognition) confused   Orientation Status (Cognition) oriented to;person;situation;place   Follows Commands (Cognition) 75-90% accuracy   Safety Deficit (Cognition) insight into deficits/self-awareness   Cognitive Status Comments h/o dementia. At first stated that he lived with his mother   Pain Assessment   Patient Currently in Pain No   Integumentary/Edema   Integumentary/Edema no deficits were identifed   Posture    Posture Not impaired   Range of Motion (ROM)   Range of Motion ROM is WFL   Strength (Manual Muscle Testing)   Strength Comments LE grossly 4/5   Transfers   Transfers sit-stand transfer   Sit-Stand Transfer   Sit-Stand Davidson (Transfers) verbal cues;supervision   Assistive Device (Sit-Stand Transfers) walker, front-wheeled   Gait/Stairs (Locomotion)   Davidson Level (Gait) supervision   Assistive Device (Gait) walker, front-wheeled   Distance in Feet (Required for LE Total Joints) 300'   Pattern (Gait)  step-through   Deviations/Abnormal Patterns (Gait) khris decreased   Sensory Examination   Sensory Perception patient reports no sensory changes   Coordination   Coordination no deficits were identified   Muscle Tone   Muscle Tone no deficits were identified   Clinical Impression   Criteria for Skilled Therapeutic Intervention Yes, treatment indicated   PT Diagnosis (PT) Impaired balance   Influenced by the following impairments mild LE weakness, impaired balance, cognitive impairment   Functional limitations due to impairments Increased need for assistive device   Clinical Presentation (PT Evaluation Complexity) Stable/Uncomplicated   Clinical Presentation Rationale Clinical judgement   Clinical Decision Making (Complexity) low complexity   Planned Therapy Interventions (PT) bed mobility training;balance training;gait training;neuromuscular re-education;strengthening;stair training;transfer training;progressive activity/exercise;home program guidelines   Risk & Benefits of therapy have been explained evaluation/treatment results reviewed;patient   Clinical Impression Comments PT evaluation completed for discharge recommendation. Pt tolerated mobility well today. He would benefit from home PT for safety due to recent fall and cognitive impairment. Plan to treat pt during his stay.   PT Discharge Planning   PT Discharge Recommendation (DC Rec) home;home with assist;home with home care physical therapy   PT Rationale for DC Rec PT evaluation completed for discharge recommendation. Pt tolerated mobility well today. He would benefit from home PT for safety due to recent fall and cognitive impairment. Plan to treat pt during his stay.   PT Brief overview of current status 300' spv c FWW   Plan of Care Review   Plan of Care Reviewed With patient   Total Evaluation Time   Total Evaluation Time (Minutes) 15   Physical Therapy Goals   PT Frequency 6x/week   PT Predicted Duration/Target Date for Goal Attainment 09/05/22    PT Goals Gait;Stairs   PT: Gait Modified independent;Greater than 200 feet   PT: Stairs Modified independent;2 stairs

## 2022-08-30 NOTE — DISCHARGE INSTRUCTIONS
[facility] will be calling you at home to schedule a Hospital Follow-up appointment if they do not contact you by tomorrow please call [phone] to schedule.     Brian ([facility] [employer]) phone number: [phone].  Call if you haven't received a call by Thursday, September 1st.

## 2022-08-30 NOTE — PLAN OF CARE
Patient alert, but easily becomes confused. SBA to A x 1 with walker and gait belt. Walked today in the [facility] with writer and daughter. VSS. On room air. Uses the commode, or urinal but may need some direction. RN managed potassium, writer put in re draw for early this am after evening replacement. Port accessed. NS infusing at 125ml/hr. Regular diet, but requires assistance. Expiratory wheezing to lung bases. Patient coughs frequently, productive. Tessalon pearls given at 1714. Creon given before meals 4 x daily. Writer questioned it being 9 capsules, but spoke with pharmacy and with our dosing it is correct. Patient tolerates oral medications 1 at a time with water. T2 DM metformin and sliding scale. Wife called earlier, and daughter spent most of the afternoon here. Daughter spoke with case management today and stated she was very satisfied with the plan for the patient. Daughter left tearful tonight stating that one minute he goes from okay to another he is all confused with his dementia. Patient requires re-direction at times. Receiving zosyn, doxy, and vanco. Voiding in the commode, along with stooling. Stools have been loose, and green. Patient has been up in chair majority of the shift, alarms activated and audible.    Face to face report given with opportunity to observe patient.    Report given to Elda Alexandra RN   8/29/2022  7:40 PM

## 2022-08-30 NOTE — PROGRESS NOTES
Most recent vitals: /87 (BP Location: Left arm, Patient Position: Supine, Cuff Size: Adult Regular)   Pulse 89   Temp 98.4  F (36.9  C) (Tympanic)   Resp 16   Wt 73.4 kg (161 lb 13.1 oz)   SpO2 95%   BMI 25.34 kg/m       Pain Management: Patient denies having any c/o pain. Only complaint is persistent productive cough.     LOC: A&O x1-2, orientation waxes & wanes. Calm & cooperative but anxious with movements. GCS: 14  Cardiac: regular rate & rhythm    Respiratory: shallow breathing, expiratory wheezes, persistent productive cough producing clear/yellow phlegm. PRN tessalon administered    GI: loose, green stools. Last BM 8/30. Lactobacillus BID initiated & administered  :  WDL, difficulty starting stream  Skin Issues: Pale, dry     IVF: L. Chest wall port-a-cath accessed and infusing NS @ 125 mL/hr. Dressing clean, dry, intact.   IV ABX: doxycycline q12H, Zosyn q6H, Vanco q12H     Nutrition: Regular 60g carbohydrate  ADL's: Assist x1   Ambulation: Assist x1-2 w/ 2ww and GB. Commode at bedside & utilizes urinal. Bilateral upper extremities tremulous     Safety: Chair alarm and bed alarm in place d/t confusion r/t Lewy Body dementia. Patient intermittently utilizes call light appropriately.      Comments: Potassium replacement RN managed ordered. 8/29 K+ 3.4 after receiving 40 mEq PO replacement. VSS throughout the overnight. A&O x1-2 while pleasantly confused. Frequent productive coughing overnight preventing patient from getting adequate sleep; PRN tessalon administered with some relief. x2 soft green bowel movements; probiotic ordered by hospitalist.    Face to face provided with opportunity to observe patient.   Verbal report given to GISELL Olson.

## 2022-08-30 NOTE — PROGRESS NOTES
08/30/22 1000   Signing Clinician's Name / Credentials   Signing clinician's name / credentials Beronica Mai OTR/L   Quick Adds   Rehab Discipline OT   Therapeutic Activity   Minutes of Treatment 10 minutes   Symptoms Noted During/After Treatment Fatigue   Treatment Detail Pt in the chair upon OT arrival, agreeable to tx. Sit<>stand from chair CGA. Pt ambulated to the sink a few feet away using FWW with CGA. He stood at the sink and brushed his mouth/dentures with CGA. Pt required mild verbal cues due to decreased cognition. He returned to the chair and c/o fatigue. Pt rested. Tried assessing O2 but unable to get a reading. Pt was educated in home therapy/care. Pt was left resting in the chair with the call light within reach and clip alert attached.   OT Discharge Planning   OT Discharge Recommendation (DC Rec) home with home care occupational therapy;home with assist  (24 hour assist due to cognition)   OT Rationale for DC Rec Pt slightly improved from yesterday with mobility. He needs cues due to decreased cognition. Recommend 24 hour supervision at home. Pt also would benefit from home OT to ensure maximal safety and independence in ADLs in pt's home environment.   OT Brief overview of current status Pt in the chair upon OT arrival, agreeable to tx. Sit<>stand from chair CGA. Pt ambulated to the sink a few feet away using FWW with CGA. He stood at the sink and brushed his mouth/dentures with CGA. Pt required mild verbal cues due to decreased cognition. He returned to the chair and c/o fatigue. Pt rested. Tried assessing O2 but unable to get a reading. Pt was educated in home therapy/care. Pt was left resting in the chair with the call light within reach and clip alert attached.   Additional Documentation   OT Plan Progress ADLs as able   Total Session Time   Total Session Time (minutes) 10 minutes

## 2022-08-30 NOTE — PROGRESS NOTES
Physical Therapy Discharge Summary    Reason for therapy discharge:    Discharged to home with home therapy.    Progress towards therapy goal(s). See goals on Care Plan in [facility] electronic health record for goal details.  Goals partially met.  Barriers to achieving goals:   discharge from facility.    Therapy recommendation(s):    Continued therapy is recommended.  Rationale/Recommendations:  Pt would benefit from home PT for home safety assessment, balance, strengthening.

## 2022-08-30 NOTE — PLAN OF CARE
Occupational Therapy Discharge Summary    Reason for therapy discharge:    Discharged to home with assist and home therapy    Progress towards therapy goal(s). See goals on Care Plan in [facility] electronic health record for goal details.  Goals partially met.  Barriers to achieving goals:   discharge from facility.    Therapy recommendation(s):    Continued therapy is recommended.  Rationale/Recommendations:  Recommend 24 hour supervision at home. Pt would benefit from home OT to ensure maximal safety and independence in ADLs in pt's home environment.

## 2022-08-30 NOTE — PLAN OF CARE
Patient discharged at 3:30 PM via wheel chair accompanied by spouse and son and staff. Prescriptions sent to patients preferred pharmacy. All belongings sent with patient.     Discharge instructions reviewed with Patient, Wife and Son. Listed belongings gathered and returned to patient.     Patient discharged to Home with wife Ruth and son Douglas.       MISC  Follow up appointment made:  Yes  Home medications returned to patient: Yes  Patient reports pain was well managed at discharge: Yes

## 2022-08-30 NOTE — PLAN OF CARE
Patient is alert, but will present with confusion off and on. Patient is orientated to person and place but disorientated to time and situation. Denies pain. Port was clot busted and was successful. Infusing NS at 125ml/hr, vibramycin vanco and zosyn to all be infused this am. Potassium RN managed. Patient continues to have a productive cough. Lung sounds diminished, coarse in upper lobes, expiratory wheezes in bases. Patient continues to have loose stools, on probiotic for. Heart sounds regular. Bowel sounds active. Regular diet, requires a bit of assistance so patient doesn't choke. SBA with belt and walker. Walked with PT this am. Patient able to make needs known. HCP plans to discharge patient later this afternoon.

## 2022-08-30 NOTE — PROVIDER NOTIFICATION
Alteplase was successful. There is now blood return, and port flushes. Writer notified HCP. NS infusing cont at 125ml/hr. Writer will give vibramycin, vanco, and zosyn as directed and then HCP will switch patient to oral antibiotics and plan to discharge later this afternoon.

## 2022-08-30 NOTE — PROGRESS NOTES
Name: Rajesh Huynh    MRN#: [id number]    Reason for Hospitalization: Pneumonia [J18.9]    ALIYAH: medium    Discharge Date: 8/30/2022    Patient / Family response to discharge plan: agreeable     Follow-Up Appt:   Future Appointments   Date Time Provider Department Center   9/1/2022  7:30 AM  [facility]   9/1/2022  8:20 AM Jovanni Sen MD [facility]   9/15/2022 11:15 AM Tian Perez MD [facility]   10/13/2022 11:00 AM Anthony Haney MD [facility]       Other Providers (Care Coordinator, County Services, PCA services etc): [facility]    Discharge Disposition: home    JERRI Christianson

## 2022-08-30 NOTE — PROVIDER NOTIFICATION
Writer notified HCP at this time of frequent, loose, green stools. Wondering if patient could possibly benefit from a probiotic, being he is on 3 antibiotics.

## 2022-08-30 NOTE — PROGRESS NOTES
08/30/22 0900   Signing Clinician's Name / Credentials   Signing clinician's name / credentials Ly Wolf PT   Quick Adds   Rehab Discipline PT   Quick Adds Interventions Therapeutic Activity   Gait Training   Distance in Feet (Required for LE Total Joints) 300   Therapeutic Activity   Minutes of Treatment 10 minutes   Symptoms Noted During/After Treatment Fatigue   Treatment Detail sit to stand from recliner req min asst 1 with immediate unsteadiness, no signif LOB. Pt ambul 300 ft using FWW req min asst 1, w/c push of another with slight unsteadiness when turning. No walker maneuvering assist req. Pt slightly SOB but reports feeling wnl. p02 sats difficult to read d/t cold fingers. SOB resolved after approx 2 mns of seated rest.   PT Discharge Planning   PT Discharge Recommendation (DC Rec) home;home with assist;home with home care physical therapy   PT Rationale for DC Rec improving mobility, hx of fall, cognitive impairment   PT Brief overview of current status sit to stand from recliner req min asst 1 with immediate unsteadiness, no signif LOB. Pt ambul 300 ft using FWW req min asst 1, w/c push of another with slight unsteadiness when turning. No walker maneuvering assist req. Pt slightly SOB but reports feeling wnl. p02 sats difficult to read d/t cold fingers. SOB resolved after approx 2 mns of seated rest.   Additional Documentation   PT Plan progress mobility as tolerated   Total Session Time   Total Session Time (minutes) 10 minutes

## 2022-08-30 NOTE — PLAN OF CARE
Wife just contacted writer for an update. Writer in a patient room at the time so told wife she would call her back shortly.

## 2022-08-30 NOTE — DISCHARGE SUMMARY
[facility]    Discharge Summary  Hospitalist    Date of Admission:  8/27/2022  Date of Discharge:  8/30/2022  Discharging Provider: Myriam Briseno MD, MD  Date of Service (when I saw the patient): 08/30/22    Discharge Diagnoses   Principal Problem:  Healthcare associated pneumonia  Active Problems:    Benign essential tremor    Benign essential hypertension    Controlled type 2 diabetes mellitus without complication, without long-term current use of insulin (H)    Chronic hyponatremia    Simple chronic bronchitis (H)    Lewy body dementia without behavioral disturbance (H)    Malignant neoplasm of lower lobe of right lung (H)    Malignant neoplasm of upper lobe of right lung (H)      History of Present Illness   Rajesh Huynh is an 82 year old male who presented with weakness.  Please see admission H+P for additional details.    Hospital Course   Rajesh Huynh was admitted on 8/27/2022.  82-year-old male with history of 2 primary lung cancers on right side, chemotherapy until June 2022, and possibly starting radiation therapy soon, COPD, Lewy body dementia, hypertension, non-insulin-dependent diabetes mellitus type 2, as well as chronic hyponatremia presents to the [place] emergency department with weakness.  He did have a fever as well as mild leukocytosis as well as an elevated procalcitonin.  Chest x-ray showed increased right perihilar density and he had coarse crackles with rhonchi in the left lung consistent with pneumonia.  He was hospitalized for COVID from 8/10-8/12, so suspicion for secondary infection was high.  He was treated with broad-spectrum antibiotics Zosyn, doxycycline, Vanco for possible healthcare associated pneumonia.  He never had respiratory failure, was never hypoxic, never required supplemental oxygen.  Clinically he improved, lungs cleared, inflammatory markers remain somewhat high although were trending down.  Blood and urine cultures no growth for 3 days.  Sputum  gram stain did many gram-positive as well as gram-negative organisms.  Finalize cultures have yet to speciate, however with improvement on broad-spectrum, we will also discharge him with broad-spectrum with Augmentin and Bactrim to cover gram-negative as well as gram-positive, respectively.  Patient is at moderate risk for readmission, he was admitted 8/10 - 8/12 already, however PT/OT assessments cleared patient to return home.  We will order home care PT/OT as well as nursing.  He will need to follow-up with his primary care physician within the week to assess for continued improvement.    Myriam Briseno MD      Significant Results and Procedures   See below    Pending Results   These results will be followed up by Jovanni Sen    Unresulted Labs Ordered in the Past 30 Days of this Admission     Date and Time Order Name Status Description    8/27/2022 12:15 AM Respiratory Aerobic Bacterial Culture with Gram Stain Preliminary     8/27/2022 12:15 AM Blood Culture Line, Other Preliminary     8/27/2022 12:15 AM Blood Culture Arm, Right Preliminary           Code Status   DNR / DNI       Primary Care Physician   Jovanni Sen    Physical Exam   Temp: 97.1  F (36.2  C) Temp src: Tympanic BP: 127/65 Pulse: 93   Resp: 16 SpO2: 93 % O2 Device: None (Room air)    Vitals:    08/28/22 0610 08/29/22 0615 08/30/22 0422   Weight: 77.6 kg (171 lb 1.6 oz) 73.4 kg (161 lb 13.1 oz) 75.7 kg (166 lb 14.2 oz)     Vital Signs with Ranges  Temp:  [96.8  F (36  C)-98.4  F (36.9  C)] 97.1  F (36.2  C)  Pulse:  [] 93  Resp:  [16-20] 16  BP: (127-176)/(62-94) 127/65  SpO2:  [89 %-96 %] 93 %  I/O last 3 completed shifts:  In: 5384 [P.O.:480; I.V.:4904]  Out: 1800 [Urine:1600; Stool:200]      Constitutional - AA, NAD, nontoxic  HEENT - atraumatic, normocephalic  Neck - supple, no masses, no JVD  CVS - S1 S2 RRR, no murmurs, rubs, gallops  Respiratory - coarse crackles in left lung seem to have resolved  GI - soft, NT, ND, + bowel  sounds, no organomegaly  Musculoskeletal - no LE edema, no lesions  Neuro - oriented x 3, no gross focal deficits      Discharge Disposition   Discharged to home with home care services  Condition at discharge: Stable    Consultations This Hospital Stay   PHARMACY TO DOSE NASIR  PHYSICAL THERAPY ADULT IP CONSULT  OCCUPATIONAL THERAPY ADULT IP CONSULT    Time Spent on this Encounter   Myriam VU MD, personally saw the patient today and spent greater than 30 minutes discharging this patient.    Discharge Orders      Home Care Referral      Reason for your hospital stay    HCAP     Follow-up and recommended labs and tests     Follow up with primary care provider, Jovanni Sen, within 7 days for hospital follow- up.  No follow up labs or test are needed.     Activity    Your activity upon discharge: activity as tolerated     Diet    Follow this diet upon discharge: Orders Placed This Encounter      Combination Diet Regular Diet Adult; Moderate Consistent Carb (60 g CHO per Meal) Diet     Discharge Medications   Current Discharge Medication List      START taking these medications    Details   amoxicillin-clavulanate (AUGMENTIN) 875-125 MG tablet Take 1 tablet by mouth 2 times daily for 5 days  Qty: 10 tablet, Refills: 0    Associated Diagnoses: Pneumonia of both lungs due to infectious organism, unspecified part of lung      sulfamethoxazole-trimethoprim (BACTRIM DS) 800-160 MG tablet Take 1 tablet by mouth 2 times daily for 5 days  Qty: 10 tablet, Refills: 0    Associated Diagnoses: Pneumonia of both lungs due to infectious organism, unspecified part of lung         CONTINUE these medications which have NOT CHANGED    Details   acetaminophen (TYLENOL) 325 MG tablet Take 325-650 mg by mouth every 6 hours as needed for mild pain      amylase-lipase-protease (CREON 36) 126261-70283-816419 units CPEP Take 3 capsules by mouth 4 times daily (with meals and nightly) Taking 17777 units  Qty: 1080 capsule, Refills: 4     Associated Diagnoses: Exocrine pancreatic insufficiency; History of pancreatic surgery      atorvastatin (LIPITOR) 40 MG tablet Take 1 tablet (40 mg) by mouth daily (with dinner)  Qty: 90 tablet, Refills: 4    Associated Diagnoses: Mixed hyperlipidemia      benzonatate (TESSALON) 100 MG capsule Take 100-200 mg by mouth every 8 hours as needed      Calcium Carb-Cholecalciferol (CALCIUM 600+D) 600-800 MG-UNIT TABS Take 2 tablets by mouth At Bedtime      cetirizine (ZYRTEC) 10 MG tablet Take 10 mg by mouth At Bedtime      gabapentin (NEURONTIN) 100 MG capsule Take 100 mg by mouth 3 times daily as needed (hiccups)      levothyroxine (SYNTHROID/LEVOTHROID) 50 MCG tablet Take 1 tablet (50 mcg) by mouth daily  Qty: 90 tablet, Refills: 4    Associated Diagnoses: Hypothyroidism due to acquired atrophy of thyroid      loperamide (IMODIUM A-D) 2 MG tablet Take 1 tablet (2 mg) by mouth 4 times daily as needed for diarrhea  Qty: 120 tablet, Refills: 11    Associated Diagnoses: Diarrhea, unspecified type      magnesium oxide (MAG-OX) 400 MG tablet Take 400 mg by mouth At Bedtime      metFORMIN (GLUCOPHAGE) 1000 MG tablet Take 1 tablet (1,000 mg) by mouth 2 times daily (with meals) For diabetes prevention  Qty: 180 tablet, Refills: 1    Associated Diagnoses: Controlled type 2 diabetes mellitus without complication, without long-term current use of insulin (H)      Multiple Vitamin (MULTI-VITAMINS) TABS Take 1 tablet by mouth daily      nitroGLYcerin (NITROSTAT) 0.4 MG sublingual tablet DISSOLVE ONE TABLET UNDER THE TONGUE EVERY 5 MINUTES AS NEEDED FOR CHEST PAIN.  DO NOT EXCEED A TOTAL OF 3 DOSES IN 15 MINUTES  Qty: 25 tablet, Refills: 0    Associated Diagnoses: History of ST elevation myocardial infarction (STEMI)      nystatin (NYSTOP) 779823 UNIT/GM external powder Apply topically 2 times daily as needed For skin fold rash  Qty: 180 g, Refills: 4    Associated Diagnoses: Candidiasis, intertriginous      Omega-3 Fatty Acids  (FISH OIL-OMEGA-3 FATTY ACID) 1000 MG DR capsule Take 1 each by mouth 2 times daily      omeprazole 20 MG tablet Take 20 mg by mouth daily      primidone (MYSOLINE) 50 MG tablet Take 2 tablets (100 mg) by mouth At Bedtime  Qty: 180 tablet, Refills: 4    Associated Diagnoses: Restless legs syndrome      propranolol (INDERAL) 20 MG tablet Take 1 tablet by mouth in the morning and 2 tablets by mouth in the evening.      rivastigmine (EXELON) 3 MG capsule Take 3 mg by mouth 2 times daily . Should be taken with breakfast and dinner.      tamsulosin (FLOMAX) 0.4 MG capsule Take 0.4 mg by mouth daily      tiotropium (SPIRIVA) 18 MCG inhaled capsule Inhale 1 capsule (18 mcg) into the lungs daily - for chronic phlegm/Bronchitis  Qty: 90 capsule, Refills: 3    Associated Diagnoses: Simple chronic bronchitis (H)      vitamin D3 (CHOLECALCIFEROL) 50 mcg (2000 units) tablet Take 1 tablet by mouth 2 times daily      blood glucose (NO BRAND SPECIFIED) lancets standard Use to test blood sugar 2 times daily  E11.9  Qty: 200 each, Refills: 3    Associated Diagnoses: Controlled type 2 diabetes mellitus without complication, without long-term current use of insulin (H)      blood glucose (NO BRAND SPECIFIED) test strip Use to test blood sugar 2 times daily  E11.9  Qty: 200 strip, Refills: 11    Associated Diagnoses: Controlled type 2 diabetes mellitus without complication, without long-term current use of insulin (H)      Blood Glucose Monitoring Suppl (FIFTY50 GLUCOSE METER 2.0) W/DEVICE KIT Dispense Accu-Chek Agnes  Meter. Dispense item covered by pt ins. E11.9 NIDDM type II - Test 1 time/day      metoclopramide (REGLAN) 10 MG tablet Take 10 mg by mouth 4 times daily (before meals and nightly)           Allergies   Allergies   Allergen Reactions     Other Drug Allergy (See Comments) Other (See Comments)     Trelegy Ellipta  Sore throat       Data   Most Recent 3 CBC's:  Recent Labs   Lab Test 08/30/22  0639 08/29/22  5357  08/28/22  0540   WBC 11.3* 9.8 14.2*   HGB 10.5* 10.1* 10.1*   MCV 98 99 98    198 219      Most Recent 3 BMP's:  Recent Labs   Lab Test 08/30/22  0754 08/30/22  0639 08/29/22  2351 08/29/22  2201 08/29/22  1712 08/29/22  1710 08/29/22  0745 08/29/22  0509 08/28/22  0657 08/28/22  0540   NA  --  130*  --   --   --   --   --  133  --  131*   POTASSIUM  --  3.3* 3.4  --   --  3.3*  --  3.2*  --  3.7   CHLORIDE  --  99  --   --   --   --   --  102  --  100   CO2  --  25  --   --   --   --   --  26  --  26   BUN  --  2*  --   --   --   --   --  5*  --  6*   CR  --  0.57*  --   --   --   --   --  0.58*  --  0.48*   ANIONGAP  --  6  --   --   --   --   --  5  --  5   [place]  --  8.4*  --   --   --   --   --  8.1*  --  8.3*   * 138*  --  124*   < >  --    < > 122*   < > 138*    < > = values in this interval not displayed.     Most Recent 2 LFT's:  Recent Labs   Lab Test 08/27/22  0544 08/26/22  2114   AST 28 15   ALT 22 17   ALKPHOS 78 61   BILITOTAL 0.9 0.8     Most Recent INR's and Anticoagulation Dosing History:  Anticoagulation Dose History     Recent Dosing and Labs Latest Ref Rng & Units 7/5/2013 5/29/2015    INR <1.3 1.0 1.1        Most Recent 3 Troponin's:No lab results found.  Most Recent Cholesterol Panel:  Recent Labs   Lab Test 05/31/22  0836   CHOL 100   LDL 40   HDL 47   TRIG 65     Most Recent 6 Bacteria Isolates From Any Culture (See EPIC Reports for Culture Details):No lab results found.  Most Recent TSH, T4 and A1c Labs:  Recent Labs   Lab Test 05/31/22  0836 05/21/22  0334 12/16/21  0843   TSH 3.05   < > 7.46*   T4  --   --  1.20   A1C 7.7*  --  7.2*    < > = values in this interval not displayed.     Results for orders placed or performed during the hospital encounter of 08/26/22   XR Chest Port 1 View    Narrative    PROCEDURE INFORMATION:   Exam: XR Chest   Exam date and time: 8/26/2022 9:18 PM   Age: 82 years old   Clinical indication: Chest wall pain; Additional info: SOB fever      TECHNIQUE:   Imaging protocol: Radiologic exam of the chest.   Views: 1 view.     COMPARISON:   CR XR CHEST PORT 1 VIEW 8/10/2022 12:12 PM     FINDINGS:   Tubes, catheters and devices: Left MediPort catheter terminating mid SVC.     Lungs: Increased right perihilar density. Linear atelectatic changes lung   bases.   Pleural spaces: Unremarkable. No pleural effusion. No pneumothorax.   Heart/Mediastinum: Unremarkable. No cardiomegaly.   Bones/joints: Partially imaged bilateral shoulder prostheses.       Impression    IMPRESSION:   1. Increased prominence of the right hilar region. While this could be vascular   or related to adenopathy, underlying infiltrate or pneumonia not excluded.   Clinical correlation and if necessary further evaluation with CT could be   considered.   2. Left MediPort catheter in place.     THIS DOCUMENT HAS BEEN ELECTRONICALLY SIGNED BY KEMAR BURTON MD

## 2022-09-02 NOTE — ED TRIAGE NOTES
ED Nursing Triage Note (General)   ________________________________    Rajesh Huynh is a 82 year old Male that presents to triage via private vehicle with complaints of diarrhea and ongoing hiccups.  Daughter states diarrhea started yesterday afternoon and has persisted since then.  No blood noted in stool.  Patient is currently on 2 antibiotics, Augmentin and bactrim, and was advised by his primary to be seen to rule out c-diff. Patient was recently discharged from the hospital for bacterial pneumonia.  Patient denies abdominal pain at this time.  Daughter also states patients legs are significantly more swollen on arrival which also began yesterday.  Significant symptoms had onset 24 hours ago.  Patient appears alert behavior.  GCS=15  Airway: intact  Breathing noted as Normal  Action taken: 3      =home

## 2022-09-02 NOTE — ED PROVIDER NOTES
History     Chief Complaint   Patient presents with     Diarrhea     HPI  Rajesh Huynh is a 82 year old male who was recently hospitalized and treated for pneumonia.  8/27/22 - 8/30/22.  He was started on vancomycin, doxycycline and Zosyn initially while in the hospital and discharged on Augmentin and Bactrim.  He started having frequent loose stools yesterday and these have persisted.  He was informed by his provider to come into the ER for further evaluation.  His daughter reports that his lower legs are significantly more swollen which seemed to start yesterday as well.  Furthermore he has had some chronic hiccups over the past month for which she has been prescribed Reglan.  This does not seem to be helping.  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    Allergies:  Allergies   Allergen Reactions     Other Drug Allergy (See Comments) Other (See Comments)     Trelegy Ellipta  Sore throat         Problem List:    Patient Active Problem List    Diagnosis Date Noted     Pneumonia 08/26/2022     Priority: Medium     Infection due to 2019 novel coronavirus - 8/10/2022 08/10/2022     Priority: Medium     Restless legs syndrome 05/31/2022     Priority: Medium     Malignant neoplasm of overlapping sites of right lung (H) 05/02/2022     Priority: Medium     Malignant neoplasm of lower lobe of right lung (H) 04/14/2022     Priority: Medium     Malignant neoplasm of upper lobe of right lung (H) 04/14/2022     Priority: Medium     Right lower lobe lung mass 04/01/2022     Priority: Medium     Nodule of upper lobe of  right lung 04/01/2022     Priority: Medium     Hypothyroidism due to acquired atrophy of thyroid 12/16/2021     Priority: Medium     Lewy body dementia without behavioral disturbance (H) 09/16/2021     Priority: Medium     Heartburn 02/23/2021     Priority: Medium     Simple chronic bronchitis (H) 02/23/2021     Priority: Medium     History of cardiac cath on 7/8/2013 10/15/2020     Priority: Medium     Benign prostatic hyperplasia with lower urinary tract symptoms, symptom details unspecified 10/15/2020     Priority: Medium     Hx of pancreatitis on 10/23/2014 10/15/2020     Priority: Medium     Hx of bladder cancer on 11/10/2014 10/15/2020     Priority: Medium     Aortic atherosclerosis (H) 10/15/2020     Priority: Medium     Chronic hyponatremia 10/15/2020     Priority: Medium     Stenosis of left carotid artery 10/15/2020     Priority: Medium     Exocrine pancreatic insufficiency - Severe Deficiency 11/22/2019     Priority: Medium     Intermittent diarrhea 11/13/2019     Priority: Medium     Controlled type 2 diabetes mellitus without complication, without long-term current use of insulin (H) 05/03/2019     Priority: Medium     History of pancreatic surgery - Whipple 03/21/2019     Priority: Medium     History of tobacco use quitting in 1/1/2000 at 3 packs a day 05/08/2018     Priority: Medium     Benign essential tremor 01/24/2018     Priority: Medium     Psychosexual dysfunction with inhibited sexual excitement 01/24/2018     Priority: Medium     Benign essential hypertension 01/24/2018     Priority: Medium     Mixed hyperlipidemia 01/24/2018     Priority: Medium     History of throat cancer 12/13/2017     Priority: Medium     Partial tear of right rotator cuff 06/21/2017     Priority: Medium     History of urinary retention 12/23/2016     Priority: Medium     Overview:   Postoperative       Atherosclerosis of native coronary artery of native heart without angina pectoris 10/03/2016     Priority: Medium      Personal history of malignant neoplasm of bladder 11/19/2014     Priority: Medium     H/O STEMI 7/3/2013 involving the RCA 07/11/2013     Priority: Medium        Past Medical History:    Past Medical History:   Diagnosis Date     Acute myocardial infarction, unspecified (H) 07/05/2013     Basal cell carcinoma of left ear 12/31/2021     Bicipital tenosynovitis 07/05/2013     CAD (coronary artery disease)      Chronic myeloproliferative disease (H)      Diabetes mellitus, type 2 (H)      Dysphagia (partial resection of epiglottis) 07/05/2013     Essential (primary) hypertension 01/12/2004     Glucose intolerance 11/14/2003     Hiccough 07/05/2013     Hyperlipidemia      Lewy body dementia (H)      Male erectile dysfunction      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)      Malignant neoplasm of lower lobe of right lung (H) 04/14/2022     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     Peptic ulcer disease 11/10/2003     Pneumonia      Resolved: chronic pancreatitis (has undergone Whipple procedure)      Resolved: Pancreatitis 01/12/2004     Resolved: Personal history of nicotine dependence      Resolved: Thoracic back pain 04/08/2010     Resovled: Pancreatic duct stones 01/12/2004     ST elevation (STEMI) myocardial infarction (H)      Subscapularis (muscle) sprain 07/05/2013     Tremor        Past Surgical History:    Past Surgical History:   Procedure Laterality Date     APPENDECTOMY OPEN      1970     ARTHROSCOPY SHOULDER      11/2005,right shoulder     ARTHROSCOPY SHOULDER      1/2006,left shoulder     ARTHROSCOPY SHOULDER      2011,Left shoulder scope SAD, ac.  Open subscap, biceps     BRONCHOSCOPY  04/13/2022    bronchoscopy with endobronchial biopsies     CHOLECYSTECTOMY      1994     COLONOSCOPY  02/01/2005 02/2005     COLONOSCOPY  02/19/2015 2/19/2015,no repeat due at this time     CYSTOSCOPY      Multiple times,Dr. Lyndon SANTIAGO  NEEDLE ASPIRATION  2022    transbronchial needle aspiration and biopsy     HEART CATH, ANGIOPLASTY      2013,Bare-metal stent to dominant RCA     OTHER SURGICAL HISTORY      ,[id number],OTHER,for chronic pancreatitis     OTHER SURGICAL HISTORY      2007,[id number],SHOULDER REPLACEMENT,Right,with biceps tenodesis by Dr. Mukesh Ayoub     OTHER SURGICAL HISTORY      ,,ERCP,xseveral, one with papillotomy     OTHER SURGICAL HISTORY      2012-3/2/2012,[id number],RADIATION TREATMENT     OTHER SURGICAL HISTORY      2011,[id number],NECK/THORAX PROCEDURE,L modified radical neck surgery     OTHER SURGICAL HISTORY      11/10/14,[id number],OTHER,Dr. Lyndon FRANK     OTHER SURGICAL HISTORY      ,[id number],REVISION EYELID,Dr. Farias     OTHER SURGICAL HISTORY      10/22/15,[id number],OTHER     TONSILLECTOMY, ADENOIDECTOMY, COMBINED      1947     VASECTOMY             Family History:    Family History   Problem Relation Age of Onset     Breast Cancer Mother      Heart Disease Mother 80        Heart Disease,MI     Heart Disease Father 83        Heart Disease,CHF     Lung Cancer Father      Family History Negative Sister         Good Health     Heart Disease Sister         Heart Disease,pacemaker     Breast Cancer Daughter         Cancer-breast     Diabetes Son         Diabetes,Type 1     Alcoholism Son      Prostate Cancer No family hx of         Cancer-prostate       Social History:  Marital Status:   [2]  Social History     Tobacco Use     Smoking status: Former Smoker     Packs/day: 3.00     Years: 43.00     Pack years: 129.00     Types: Cigarettes     Quit date: 2001     Years since quittin.6     Smokeless tobacco: Never Used   Vaping Use     Vaping Use: Never used   Substance Use Topics     Alcohol use: Not Currently     Drug use: No        Medications:    acetaminophen (TYLENOL) 325 MG tablet  amoxicillin-clavulanate (AUGMENTIN) 875-125 MG tablet  amylase-lipase-protease (CREON 36)  730276-29809-938059 units CPEP  atorvastatin (LIPITOR) 40 MG tablet  benzonatate (TESSALON) 100 MG capsule  blood glucose (NO BRAND SPECIFIED) lancets standard  blood glucose (NO BRAND SPECIFIED) test strip  Blood Glucose Monitoring Suppl (FIFTY50 GLUCOSE METER 2.0) W/DEVICE KIT  Calcium Carb-Cholecalciferol (CALCIUM 600+D) 600-800 MG-UNIT TABS  cetirizine (ZYRTEC) 10 MG tablet  gabapentin (NEURONTIN) 100 MG capsule  levothyroxine (SYNTHROID/LEVOTHROID) 50 MCG tablet  loperamide (IMODIUM A-D) 2 MG tablet  magnesium oxide (MAG-OX) 400 MG tablet  metFORMIN (GLUCOPHAGE) 1000 MG tablet  metoclopramide (REGLAN) 10 MG tablet  Multiple Vitamin (MULTI-VITAMINS) TABS  nitroGLYcerin (NITROSTAT) 0.4 MG sublingual tablet  nystatin (NYSTOP) 561015 UNIT/GM external powder  Omega-3 Fatty Acids (FISH OIL-OMEGA-3 FATTY ACID) 1000 MG DR capsule  omeprazole 20 MG tablet  primidone (MYSOLINE) 50 MG tablet  propranolol (INDERAL) 20 MG tablet  rivastigmine (EXELON) 3 MG capsule  sulfamethoxazole-trimethoprim (BACTRIM DS) 800-160 MG tablet  tamsulosin (FLOMAX) 0.4 MG capsule  tiotropium (SPIRIVA) 18 MCG inhaled capsule  vitamin D3 (CHOLECALCIFEROL) 50 mcg (2000 units) tablet          Review of Systems   Constitutional: Positive for activity change. Negative for appetite change and fever.   HENT: Negative for drooling, facial swelling and sore throat.    Eyes: Negative for visual disturbance.   Respiratory: Negative for cough and shortness of breath.    Cardiovascular: Negative for chest pain.   Gastrointestinal: Positive for abdominal pain and diarrhea. Negative for abdominal distention, anal bleeding, blood in stool, constipation, nausea and vomiting.   Musculoskeletal: Negative for back pain.   Neurological: Positive for tremors. Negative for dizziness, seizures, syncope, light-headedness and headaches.   All other systems reviewed and are negative.      Physical Exam   BP: 118/67  Pulse: 77  Temp: 97.8  F (36.6  C)  Resp: 18  Weight:  75.3 kg (166 lb)  SpO2: 94 %      Physical Exam  Vitals and nursing note reviewed.   Constitutional:       General: He is not in acute distress.     Appearance: Normal appearance. He is not ill-appearing or toxic-appearing.   HENT:      Head: Normocephalic.      Nose: Nose normal.   Eyes:      Extraocular Movements: Extraocular movements intact.   Cardiovascular:      Rate and Rhythm: Normal rate.   Pulmonary:      Effort: Pulmonary effort is normal. No respiratory distress.      Comments: SaO2 is 94% on room air.  He is not appear to be in any respiratory distress.  No tachypnea.  Abdominal:      General: Bowel sounds are normal. There is no distension.      Palpations: There is no mass.      Tenderness: There is no abdominal tenderness. There is no guarding or rebound.      Comments: Abdomen soft nontender no rebound or masses bowel sounds are normal   Musculoskeletal:         General: Normal range of motion.      Cervical back: Normal range of motion.   Neurological:      General: No focal deficit present.      Mental Status: He is alert and oriented to person, place, and time.         ED Course       No results found for this or any previous visit (from the past 24 hour(s)).    Medications   alum & mag hydroxide-simethicone (MAALOX) suspension 15 mL (has no administration in time range)     And   lidocaine (viscous) (XYLOCAINE) 2 % solution 15 mL (has no administration in time range)       Assessments & Plan (with Medical Decision Making)     I have reviewed the nursing notes.    I have reviewed the findings, diagnosis, plan and need for follow up with the patient.      Discharge Medication List as of 9/2/2022  4:11 PM      START taking these medications    Details   saccharomyces boulardii (FLORASTOR) 250 MG capsule Take 1 capsule (250 mg) by mouth 2 times daily for 10 days, Disp-20 capsule, R-0, Local Print             Final diagnoses:   Diarrhea, unspecified type   Dehydration   Hypomagnesemia   Chronic  hiccups   Bilateral leg edema     82 year old male who was recently hospitalized and treated for pneumonia.  8/27/22 - 8/30/22.  He was started on vancomycin, doxycycline and Zosyn initially while in the hospital and discharged on Augmentin and Bactrim.  He started having frequent loose stools yesterday and these have persisted.  He was informed by his provider to come into the ER for further evaluation.  His daughter reports that his lower legs are significantly more swollen which seemed to start yesterday as well.  Furthermore he has had some chronic hiccups over the past month for which she has been prescribed Reglan.  Differential diagnosis for loose stools includes but is not limited to ischemic colitis, viral infections, food poisoning, Giardia, drug reaction, ulcerative colitis, Crohn's disease, fecal impaction, laxative abuse, and emotional stress.   However most likely given his extensive antibiotic use most likely antibiotic induced diarrhea.  IV was established and he was given fluids.  GI cocktail was attempted to help with his hiccups and it did resolve them temporarily but now they have returned.  He will continue with his Reglan as directed.  Follow-up with his primary care provider for additional management.  CBC shows normal white blood cells but with a slight left shift.  His hemoglobin is 9.9 reviewing his recent lab work this is normal for him.   CMP shows a slightly decreased sodium at 133 but otherwise unremarkable.  His magnesium is decreased as well at 1.6 most likely this is due to GI losses.  Blood cultures are pending.  CRP is elevated at 122.3.  Stool cultures are pending but his C. difficile is negative as well as his H. pylori is negative.  This is reassuring.  Abdominal x-rays show No evidence of small bowel obstruction or free air.  We discussed his minimal lower leg edema.  He will continue to monitor this and follow-up with his primary care provider for additional management if  needed.  I am reluctant to start him on diuretics when he is already somewhat dehydrated by his frequent loose stools.  He is feeling much better with the above treatment.  I discussed discontinuing his antibiotics at this point.  I feel he has been on these for quite some time with no signs of recurrent pneumonia.  Furthermore I discussed increasing his GI rolando and following Rx for Florastor was written.  Discussed replacing his electrolytes through his diet.  He has Imodium at home and will continue to use this.  Continue to monitor his symptoms return if there is any concerns for further evaluation as needed.  He feels comfortable returning home.  I explained my diagnostic considerations and recommendations and the patient voiced an understanding and was in agreement with the treatment plan. All questions were answered to the best of my ability.  We discussed potential side effects of any prescribed or recommended therapies, as well as expectations for response to treatments.              9/2/2022   [facility]     Ang Norman PA-C  09/03/22 [id number]

## 2022-09-05 NOTE — PATIENT INSTRUCTIONS
"Call Neurology - as on your last after visit summary it states \"Also if hallucinations become worse, can call neurology and a medication will become available to help deal with those.\"    Melatonin as discussed by neurology - continue with this    Talk to Dr. Sen about Reglan (Metoclopramide) dosing/tapering and additional management.     "

## 2022-09-05 NOTE — PROGRESS NOTES
1. Hiccups  Discussed with patient and his wife as he has trialed baclofen, gabapentin and metoclopramide with incomplete relief as are often the mainstay treatments for chronic hiccups.  I reviewed the up-to-date algorithm and treatment of hiccups with patient and his wife.  We also discussed maneuvers to try to alleviate hiccups including but not limited to: Breath-holding for 5 to 10 seconds (or as tolerated), Valsalva maneuver holding for 5 to 10 seconds, sucking on a lemon or sour candy, sipping or gargling on very cold water (if tolerated).  I did recommend to try to avoid making soft decrease this to medications without contacting patient's care team, wife and patient expressed understanding of this.    2. Lewy body dementia without behavioral disturbance (H)  -Patient actively follows with neurology team as well as his PCP in regards to his chronic likely dementia.  I did encourage patient's wife to reach out to the neurology team as she reports that hallucinations are becoming increasingly prevalent.  I also encouraged her to reach out in regards to melatonin dosing, as per review of the chart 3 milligram dosing was recommended, recommend she review with neurology to assess if a dose/medication change is warranted.  Discussed at this time I made by his chronic care team (neurology, etc.), patient is wife expressed understanding of this.    Patient is in agreement and understanding of the above treatment plan. All questions and concerns were addressed and answered to patient's satisfaction. AVS reviewed with patient.     A total of 35 minutes was spent on care, 90% of this time directly with the face with patient and his wife, remainder of 10% of time was spent on pertinent chart review    Nicolette Cole is a 82 year old, presenting for the following health issues:  Medication Question (Metoclopramide/)    HPI   Patient and his wife presents to the clinic today in regards to a medication  "question/concern.  Patient has a ongoing history of chronic hiccups, wife noted that this started after chemo and radiation (patient has a history of malignant neoplasm of the bladder, lower lobe of right lung and supraglottic) he has ongoing dysphagia due to partial resection of his epiglottis.     For his chronic hiccups he has trialed baclofen, gabapentin and metoclopramide.  Wife notes incomplete relief with each of these medications, however, patient has tolerated metoclopramide the past until recently.  She states that normally the medication is dosed as a 10 mg tablet 4 times a day, however, she notes that she feels like \"he is coming off the walls while on the medication), therefore over the last 2 days she has been self decreasing his dose.  Yesterday he had 1 tablet 3 times a day, today he had 1 tablet twice a day -she notes that she feels that he is still agitated and \"off the loss).  She is unsure if she should stop the medication or if alternatives are available that could be more beneficial to patient.    In regards to increasing hallucinations, she states that they had a visit with neurology, a virtual visit via [facility] -during this visit it was discussed to restart 3 mg dose of melatonin Per review of the office note, wife states that she thought it was 5 mg, but is unsure and she will contact their office.  She states that they also had a referral to home health care and home health aide in order to help care for patient.  They did briefly discuss if patient's hallucinations worsen that they should be reevaluated, however, she is unsure if there will be any additional pharmacotherapy.    Of note, patient was recently admitted to the hospital for bilateral pneumonia, he was admitted from 8/27/2022 to 8/30/2022.  On 9/2/2022 patient presented to the ER for frequently loose stools and other acute concerns.  During this visit it was determined that patient was having adverse reaction to the " Augmentin and Bactrim which he was started on outpatient, antibiotics were discontinued and patient was placed on a probiotic.  They have not noticed drastic changes to stool since being started on a probiotic.  Patient had a negative C. difficile, H. pylori and enteric viral and bacterial stool testing during this visit.    Patient was also hospitalized from 8/10/2022 until 8/12/2022 due to COVID infection and dehydration, wife states that he seems to have recovered better from this hospital stay than from that on 8/27/2022.    Patient's comorbid conditions include but are not limited to-exocrine pancreatic insufficiency, mixed hyperlipidemia, controlled type 2 diabetes, hypothyroidism, STEMI, atherosclerosis    Review of Systems   Constitutional, HEENT, cardiovascular, pulmonary, gi and gu systems are negative, except as otherwise noted.      Objective    /66   Pulse 88   Temp 97.5  F (36.4  C) (Tympanic)   Resp 18   There is no height or weight on file to calculate BMI.  Physical Exam   GENERAL: healthy, alert and no distress  EYES: Eyes grossly normal to inspection, PERRL and conjunctivae and sclerae normal  NECK: no adenopathy, no asymmetry, masses, or scars and thyroid normal to palpation.  No hiccups noted on examination today  RESP: lungs clear to auscultation - no rales, rhonchi or wheezes  CV: regular rate and rhythm, normal S1 S2, no S3 or S4, no murmur, click or rub, no peripheral edema and peripheral pulses strong  ABDOMEN: soft, nontender, no hepatosplenomegaly, no masses and bowel sounds normal  MS: no gross musculoskeletal defects noted, no edema  SKIN: no suspicious lesions or rashes  PSYCH: mentation appears normal, affect normal/bright.  Patient is pleasant.    Chart review:   Reviewed chart at length including but not limited to:  - Patient's ER visit from 9/2/2022 as well as stool testing with patient and his wife.  -Telemedicine visit from 9/2/2022 with  neurology team  was reviewed with patient and his wife, I encouraged wife to contact [facility] as there is a note from a provider in her after visit summary that if hallucinations become worse they can call neurology to medication will become available to help deal with them.  We also reviewed not to argue about hallucinations but instead try to support Douglas's feelings and redirect.  -Hospitalization visits from 8/27/2020 to 3/30/2022 and 8/10/2022 through 8/12/2022 were also reviewed along with pertinent findings with patient and his wife.

## 2022-09-05 NOTE — NURSING NOTE
"Chief Complaint   Patient presents with     Medication Question     Metoclopramide       Patient is here for restlessness since decreasing his metoclopramide yesterday.    Initial /66   Pulse 88   Temp 97.5  F (36.4  C) (Tympanic)   Resp 18  Estimated body mass index is 26 kg/m  as calculated from the following:    Height as of 8/26/22: 1.702 m (5' 7\").    Weight as of 9/2/22: 75.3 kg (166 lb).  Medication Reconciliation: complete    Linnea Alvarado LPN  "

## 2022-09-06 PROBLEM — D50.9 IRON DEFICIENCY ANEMIA, UNSPECIFIED IRON DEFICIENCY ANEMIA TYPE: Status: ACTIVE | Noted: 2022-01-01

## 2022-09-06 PROBLEM — J69.0 RECURRENT ASPIRATION PNEUMONIA (H): Status: ACTIVE | Noted: 2022-01-01

## 2022-09-06 PROBLEM — E11.22 CONTROLLED TYPE 2 DIABETES MELLITUS WITH STAGE 3 CHRONIC KIDNEY DISEASE, WITHOUT LONG-TERM CURRENT USE OF INSULIN (H): Status: ACTIVE | Noted: 2022-01-01

## 2022-09-06 PROBLEM — N18.30 CONTROLLED TYPE 2 DIABETES MELLITUS WITH STAGE 3 CHRONIC KIDNEY DISEASE, WITHOUT LONG-TERM CURRENT USE OF INSULIN (H): Status: ACTIVE | Noted: 2022-01-01

## 2022-09-06 PROBLEM — R06.6 CHRONIC HICCUPS: Status: ACTIVE | Noted: 2022-01-01

## 2022-09-06 PROBLEM — R05.3 CHRONIC COUGH: Status: ACTIVE | Noted: 2022-01-01

## 2022-09-06 NOTE — PATIENT INSTRUCTIONS
Finish up the antibiotics - for his pneumonia.     Stop Reglan.     Labs are pending.     Diabetes is well controlled.     Aspects of Diabetes:   Recent Labs   Lab Test 09/06/22  1241 09/02/22  1351 08/30/22  1204 08/30/22  0639 08/28/22  0540 08/27/22  0544 08/26/22  2114 08/10/22  1222 05/31/22  0836 12/16/21  0844 12/16/21  0843 09/16/21  0832   A1C 6.5*  --   --   --   --   --   --   --  7.7*  --  7.2* 7.1*   LDL  --   --   --   --   --   --   --   --  40  --  47 41   HDL  --   --   --   --   --   --   --   --  47  --  46 45   TRIG  --   --   --   --   --   --   --   --  65  --  110 87   ALT  --  19  --   --   --  22 17   < > 15   < > 18 17   CR  --  1.18  --  0.57*   < > 0.59* 0.62*   < > 0.64*   < > 0.91 0.93   GFRESTIMATED  --  62  --  >90   < > >90 >90   < > >90   < > 79 77   POTASSIUM  --  3.6 3.2* 3.3*   < > 4.4 4.1   < > 4.9   < > 5.0 4.8   TSH 6.07*  --   --   --   --   --   --   --  3.05   < > 7.46* 5.29*   T4  --   --   --   --   --   --   --   --   --   --  1.20 1.04   WBC 13.6* 10.6  --  11.3*   < > 12.7* 14.0*   < > 7.9   < >  --  8.9   HGB 9.5* 9.9*  --  10.5*   < > 10.1* 10.7*   < > 11.9*   < >  --  13.4    336  --  251   < > 192 215   < > 322   < >  --  413   ALBUMIN  --  3.0*  --   --   --  2.2* 3.2*   < > 3.7   < > 4.4 4.2    < > = values in this interval not displayed.      Hemoglobin A1c  Goal range is under 8%. Best is 6.5 to 7   Blood Pressure 108/62 Goal to keep less than 140/90   Tobacco  reports that he quit smoking about 21 years ago. His smoking use included cigarettes. He has a 129.00 pack-year smoking history. He has never used smokeless tobacco. Goal to abstain from tobacco   Aspirin or Plavix Anti-platelet therapy Aspirin or Plavix reduces risk of heart disease and stroke  -- sometimes used with other blood thinners, depending on bleeding risk and risk factors.    ACE/ARB Specific blood pressure meds These medications can reduce risk of kidney disease   Cholesterol Statins  (Lipitor, Crestor, vs others) Statins reduce risk of heart disease and stroke   Eye Exam -- Do Yearly -- Annual diabetic eye exam   Healthy weight Wt Readings from Last 4 Encounters:   09/06/22 73.2 kg (161 lb 6.4 oz)   09/02/22 75.3 kg (166 lb)   08/30/22 75.7 kg (166 lb 14.2 oz)   08/26/22 72.1 kg (159 lb)      Body mass index is 25.28 kg/m .  Goal BMI under 30, best is under 25.      -- Trying to exercise daily (goal at least 20 min/day) with moderate aerobic activity   -- Eat healthy (resources from ADA at http://www.diabetes.org/)   -- Taking good care of my feet. Consider seeing the Podiatrist   -- Check blood sugars as directed, record in log book and bring to every appointment    Insurance companies are grading you and I on your blood sugar control -- Goal is to get your A1c down to 7.9% or lower and NO Smoking!  -- Medicare and most insurance companies, will only cover Hemoglobin A1c labs to be rechecked every 91+ days.      Return for Diabetes labs and clinic follow-up appointment every 3 to 4 months.    Schedule lab only appointment --- A few days AFTER: 12/5/22   Schedule clinic appointment with Dr. Sen -- Same day as labs, or 1-2 days later.

## 2022-09-06 NOTE — PROGRESS NOTES
Assessment & Plan   Rajesh Huynh is a 82 year old accompanied by his spouse, presenting for the following health issues:      ICD-10-CM    1. Recurrent aspiration pneumonia (H)  J69.0 amoxicillin-clavulanate (AUGMENTIN) 400-57 MG/5ML suspension     doxycycline hyclate (VIBRAMYCIN) 100 MG capsule   2. Hospital discharge follow-up  Z09    3. Leukocytosis, unspecified type  D72.829 Procalcitonin     CRP inflammation   4. Anemia, unspecified type  D64.9 Iron binding panel     Ferritin   5. Controlled type 2 diabetes mellitus with stage 3 chronic kidney disease, without long-term current use of insulin (H)  E11.22 metFORMIN (GLUCOPHAGE) 1000 MG tablet    N18.30    6. Lewy body dementia without behavioral disturbance (H)  G31.83     F02.80    7. Chronic cough  R05.3    8. Chronic hiccups  R06.6    9. Exocrine pancreatic insufficiency - Severe Deficiency  K86.81 amylase-lipase-protease (CREON 36) [id number] units CPEP   10. Hypothyroidism due to acquired atrophy of thyroid  E03.4 levothyroxine (SYNTHROID/LEVOTHROID) 50 MCG tablet   11. History of pancreatic surgery - [facility]  Z98.890 amylase-lipase-protease (CREON 36) [id number] units CPEP   12. Mixed hyperlipidemia  E78.2    13. Restless legs syndrome  G25.81    14. Aortic atherosclerosis (H)  I70.0 propranolol (INDERAL) 20 MG tablet   15. Hypertensive heart disease with heart failure (H)  I11.0 propranolol (INDERAL) 20 MG tablet   16. Iron deficiency anemia, unspecified iron deficiency anemia type  D50.9    17. Malignant neoplasm of overlapping sites of right lung (H)  C34.81      Patient comes in for recent hospitalization follow-up appointment.    He is not doing the best.  He is having some increased weakness and his breathing is somewhat labored.  He is frequently interrupted in his ability to speak due to hiccups which have been an ongoing issue for a number of months since being treated with chemotherapy and radiation therapy for his lung  cancer.    He was in the emergency room not too long ago and had evaluation for his diarrhea.  At that time he was told to stop his antibiotics and put on some probiotics.  He never finishes antibiotics unfortunately.  He has worsening lab work today and with his trouble swallowing, wife indicates that he probably has recurrent aspiration weekly if not daily.  He will occasionally actually stick his finger in his mouth to try to help if he is gagging or having trouble trying to swallow.  We talked about doing the Heimlich maneuver if this happens in the future.  Make sure that he eats slowly, chews well.    -- Procalcitonin came back quite elevated compared to his last lab work when he was in the hospital.  We will start liquid Augmentin and doxycycline both of which will be dosed twice daily.    Leukocytosis, procalcitonin is high.  Start antibiotics as noted above.    Anemia is worsening.  His iron levels were added on and he has low iron.  Unfortunately with all his antibiotics we will need to hold off on oral iron replacement for now.    Type 2 diabetes, continues with metformin.  Creatinine is relatively stable.  Needs refills.  Has some chronic diarrhea due to his pancreatic insufficiency.  Has not been taking his pancreatic enzyme replacement on a very regular basis recently, due to having poor appetite and not eating very much.    Iron deficiency anemia.  Hemoglobin levels are dropping.  IV iron therapy ordered.    Stage IIIa chronic kidney disease.  Kidney function has been slowly declining.  Encourage NSAID avoidance    Lewy body dementia, has been having some confusion, is uncertain if this is due to his Lewy body dementia versus infection.  Advised to monitor closely if he starts to have clinical deterioration to bring him into the emergency room.    He has chronic hiccups and cough.  See above.  Nothing is really helped.  He has tried a number of different medications including Thorazine,  metoclopramide    Severe exocrine pancreatic insufficiency.  Continues with Creon tablets.  Advised that he try to take some during the day even if he is not eating especially if the diarrhea issues continue.  Wife states that his oral intake has been declining.    Hypothyroidism.  Taking oral replacement.  Likely will need some dosing changes in the near future.  His labs are pending.    Mixed hyperlipidemia.  Stop Lipitor at this time.  He does have aortic atherosclerosis and hypertensive heart disease but this will not prolong his life at this point with all his other medical issues.    Hypertension, continues with propanolol.  Needs refills.    Hx of Chemo once weekly with Radiation therapy - finished up about End of July.     Encouraged regular exercise.     Return in about 3 months (around 12/6/2022) for - Labs every 91+ days, with DM Follow-up, Same Day or 1-2 days later with Dr. Sen.    Jovanni Sen MD  [facility] CLINIC AND [facility] F/U (Pneumonia) and Diabetes    Hospital Follow-up Visit:    Hospital/Nursing Home/IP Rehab Facility: [facility]  Date of Admission: 8/27  Date of Discharge: 8/30  Reason(s) for Admission: Bilateral pneumonia    Was your hospitalization related to COVID-19? No   Problems taking medications regularly:  YES - Difficulty swallowing  Medication changes since discharge: Yes - Stop Reglan.   Problems adhering to non-medication therapy:  Yes, difficulty swallowing at times.     Summary of hospitalization:  [facility] discharge summary reviewed  Diagnostic Tests/Treatments reviewed.  Follow up needed: Radiation oncology.   Other Healthcare Providers Involved in Patient s Care:         Homecare and Oncology  Update since discharge: worsened.     Post Medication Reconciliation Status: Discharge medications reconciled and changed, see notes/orders      Plan of care communicated with patient and family     History of  Present Illness       Diabetes:   He presents for follow up of diabetes.  He is not checking blood glucose. Blood glucose is never over 200 and never under 70. He is aware of hypoglycemia symptoms including shakiness and dizziness. He has no concerns regarding his diabetes at this time.  He is having weight gain, blurry vision and weight loss. The patient has had a diabetic eye exam in the last 12 months. Eye exam performed on 08/2022. Location of last eye exam [facility].        Reason for visit:  Results of blood work ,after hospital stay  Symptom onset:  1-2 weeks ago  Symptoms include:  Pneumonia  Symptom progression:  Improving  Had these symptoms before:  Yes    He eats 2-3 servings of fruits and vegetables daily.He consumes 0 sweetened beverage(s) daily.He exercises with enough effort to increase his heart rate 9 or less minutes per day.  He exercises with enough effort to increase his heart rate 3 or less days per week.   He is taking medications regularly.    Today's PHQ-9         PHQ-9 Total Score: 6    PHQ-9 Q9 Thoughts of better off dead/self-harm past 2 weeks :   Several days  Thoughts of suicide or self harm: (P) No  Self-harm Plan:     Self-harm Action:       Safety concerns for self or others: (P) No    How difficult have these problems made it for you to do your work, take care of things at home, or get along with other people: Very difficult  Today's LAVINIA-7 Score: 6     Review of Systems   Constitutional: Positive for fatigue. Negative for chills and fever.   HENT: Negative for congestion and hearing loss.    Eyes: Negative for visual disturbance.   Respiratory: Positive for cough (mostly clear phlegm ) and shortness of breath. Negative for wheezing.         + Right pulmonary nodule and mass  + Hiccups   Cardiovascular: Negative for chest pain and palpitations.   Gastrointestinal: Positive for abdominal pain (+intermittent) and diarrhea (+ much improved with Creon. still intermittent  diarrhea). Negative for nausea and vomiting.   Endocrine: Negative for cold intolerance and heat intolerance.        + Trying to eat less carbs, + Hyperglycemia   Genitourinary: Negative for dysuria and hematuria.   Musculoskeletal: Positive for gait problem. Negative for arthralgias and myalgias.   Skin: Positive for pallor. Negative for rash and wound.   Allergic/Immunologic: Positive for immunocompromised state (Getting steroids with chemotherapy/radiation treatments for Lung Cancer).   Neurological: Positive for dizziness, tremors (+ Restless legs) and light-headedness.        + some balance problems   Hematological: Does not bruise/bleed easily.   Psychiatric/Behavioral: Positive for confusion and sleep disturbance. Negative for agitation. The patient is nervous/anxious.         + Memory problems          Objective    /62 (BP Location: Right arm, Patient Position: Sitting, Cuff Size: Adult Regular)   Pulse 84   Temp 97.5  F (36.4  C) (Temporal)   Resp 22   Wt 73.2 kg (161 lb 6.4 oz)   BMI 25.28 kg/m    Body mass index is 25.28 kg/m .  Physical Exam  Constitutional:       General: He is not in acute distress.     Appearance: He is well-developed. He is ill-appearing. He is not diaphoretic.   HENT:      Head: Normocephalic and atraumatic.   Eyes:      General: No scleral icterus.     Conjunctiva/sclera: Conjunctivae normal.   Cardiovascular:      Rate and Rhythm: Normal rate and regular rhythm.      Pulses: Normal pulses.   Pulmonary:      Breath sounds: Wheezing and rales present.      Comments: Short, shallow breathing  Abdominal:      Palpations: Abdomen is soft.      Tenderness: There is no abdominal tenderness.   Musculoskeletal:         General: No deformity.      Cervical back: Neck supple.      Right lower leg: Edema present.      Left lower leg: Edema present.      Comments: Generalized weakness. Abnormal gait.    Lymphadenopathy:      Cervical: No cervical adenopathy.   Skin:     General:  Skin is warm and dry.      Coloration: Skin is pale. Skin is not jaundiced.      Findings: No lesion or rash.   Neurological:      Mental Status: He is alert and oriented to person, place, and time. Mental status is at baseline.      Comments: + Memory loss  + Frequent hiccups, causing difficulty talking.    Psychiatric:         Mood and Affect: Mood normal.         Behavior: Behavior normal.        Results for orders placed or performed in visit on 09/06/22   Comprehensive metabolic panel     Status: Abnormal   Result Value Ref Range    Sodium 136 134 - 144 mmol/L    Potassium 3.7 3.5 - 5.1 mmol/L    Chloride 99 98 - 107 mmol/L    Carbon Dioxide (CO2) 25 21 - 31 mmol/L    Anion Gap 12 3 - 14 mmol/L    Urea Nitrogen 17 7 - 25 mg/dL    Creatinine 1.22 0.70 - 1.30 mg/dL    Calcium 8.8 8.6 - 10.3 mg/dL    Glucose 122 (H) 70 - 105 mg/dL    Alkaline Phosphatase 58 34 - 104 U/L    AST 23 13 - 39 U/L    ALT 18 7 - 52 U/L    Protein Total 6.2 (L) 6.4 - 8.9 g/dL    Albumin 3.0 (L) 3.5 - 5.7 g/dL    Bilirubin Total 0.4 0.3 - 1.0 mg/dL    GFR Estimate 60 (L) >60 mL/min/1.73m2   Lipid Profile     Status: None   Result Value Ref Range    Cholesterol 81 <200 mg/dL    Triglycerides 88 <150 mg/dL    Direct Measure HDL 31 23 - 92 mg/dL    LDL Cholesterol Calculated 32 <=100 mg/dL    Non HDL Cholesterol 50 <130 mg/dL    Patient Fasting > 8hrs? Unknown     Narrative    Cholesterol  Desirable:  <200 mg/dL    Triglycerides  Normal:  Less than 150 mg/dL  Borderline High:  150-199 mg/dL  High:  200-499 mg/dL  Very High:  Greater than or equal to 500 mg/dL    Direct Measure HDL  Female:  Greater than or equal to 50 mg/dL   Male:  Greater than or equal to 40 mg/dL    LDL Cholesterol  Desirable:  <100mg/dL  Above Desirable:  100-129 mg/dL   Borderline High:  130-159 mg/dL   High:  160-189 mg/dL   Very High:  >= 190 mg/dL    Non HDL Cholesterol  Desirable:  130 mg/dL  Above Desirable:  130-159 mg/dL  Borderline High:  160-189 mg/dL  High:   190-219 mg/dL  Very High:  Greater than or equal to 220 mg/dL   CBC with platelets     Status: Abnormal   Result Value Ref Range    WBC Count 13.6 (H) 4.0 - 11.0 10e3/uL    RBC Count 2.82 (L) 4.40 - 5.90 10e6/uL    Hemoglobin 9.5 (L) 13.3 - 17.7 g/dL    Hematocrit 28.4 (L) 40.0 - 53.0 %     (H) 78 - 100 fL    MCH 33.7 (H) 26.5 - 33.0 pg    MCHC 33.5 31.5 - 36.5 g/dL    RDW 14.9 10.0 - 15.0 %    Platelet Count 419 150 - 450 10e3/uL   Hemoglobin A1c     Status: Abnormal   Result Value Ref Range    Hemoglobin A1C 6.5 (H) 4.0 - 6.2 %   TSH with free T4 reflex     Status: Abnormal   Result Value Ref Range    TSH 6.07 (H) 0.40 - 4.00 mU/L   CRP inflammation     Status: Abnormal   Result Value Ref Range    CRP Inflammation 58.0 (H) <10.0 mg/L   Ferritin     Status: Normal   Result Value Ref Range    Ferritin 195 24 - 336 ng/mL   Iron binding panel     Status: Abnormal   Result Value Ref Range    Transferrin 135 (L) 203 - 362 mg/dL    Iron 28 (L) 50 - 212 ug/dL    UIBC (Unsaturated) 161.00 mg/dL    Iron Binding Capacity 189.00 (L) 245.00 - 400.00 ug/dL    Iron Saturation 15 (L) 20 - 55 %   Procalcitonin     Status: Abnormal   Result Value Ref Range    Procalcitonin 27.29 (HH) <0.50 ng/mL ng/mL   T4 free     Status: Normal   Result Value Ref Range    Free T4 1.10 0.60 - 1.60 ng/dL   Procalcitonin returned very high.  Hemoglobin A1c is well controlled at 6.5%.  TSH is high.  CRP is high.  Ferritin is elevated.  Iron levels are low including trends ferritin, iron, iron binding capacity.  Free T4 is within goal range.  Hemoglobin is low.  White blood cell count is high.  Cholesterol levels are at goal.  Chemistry panel shows low albumin.  Kidney function has worsened slightly.  Random glucose is elevated.  Electrolyte levels are normal.            [unfilled]  @[facility] CenterCRYSTAL@

## 2022-09-06 NOTE — NURSING NOTE
"Chief Complaint   Patient presents with     Hospital F/U     Pneumonia     Diabetes         Initial /62 (BP Location: Right arm, Patient Position: Sitting, Cuff Size: Adult Regular)   Temp 97.5  F (36.4  C) (Temporal)   Resp 22   Wt 73.2 kg (161 lb 6.4 oz)   BMI 25.28 kg/m   Estimated body mass index is 25.28 kg/m  as calculated from the following:    Height as of 8/26/22: 1.702 m (5' 7\").    Weight as of this encounter: 73.2 kg (161 lb 6.4 oz).       FOOD SECURITY SCREENING QUESTIONS:    The next two questions are to help us understand your food security.  If you are feeling you need any assistance in this area, we have resources available to support you today.    Hunger Vital Signs:  Within the past 12 months we worried whether our food would run out before we got money to buy more. Never  Within the past 12 months the food we bought just didn't last and we didn't have money to get more. Never    Advance Care Directive on file? yes      Medication reconciliation complete.      Jay Cannon,on 9/6/2022 at 1:48 PM        "

## 2022-09-07 NOTE — TELEPHONE ENCOUNTER
Brian requesting verbal order for skilled nursing 1 time a week for 6 weeks.     Angelique Peñaloza on 9/7/2022 at 9:28 AM

## 2022-09-07 NOTE — TELEPHONE ENCOUNTER
I agree with the Home Care order requests.    Electronically signed by:  Jovanni Sen MD  on September 7, 2022 1:20 PM

## 2022-09-08 NOTE — TELEPHONE ENCOUNTER
[facility] sent Rx request for the following:      propranolol (INDERAL) 20 MG tablet      Prescription refused.  This is a duplicate request.  Lc Beyer RN.......9/8/2022 8:47 AM

## 2022-09-08 NOTE — TELEPHONE ENCOUNTER
Verbal Orders:  1 x a week 1 week   2 x a week 3 week    Exercise Balance and functional mobility training.     Thank you   Shanika Collier on 9/8/2022 at 8:14 AM

## 2022-09-09 NOTE — TELEPHONE ENCOUNTER
Date of birth and last name verified.    I did relayed Sr. Sen comments to i[employer].    She states understanding and has no further questions.    Jay Cannon .......  9/9/2022  4:08 PM

## 2022-09-09 NOTE — TELEPHONE ENCOUNTER
Call placed to :Valarie.  Dr. Sen comments relayed to her.    She states understanding and has no further questions.    Jay Cannon .......  9/9/2022  4:31 PM

## 2022-09-11 NOTE — ED TRIAGE NOTES
Pt presents to ED with increased SOB and weakness over this weekend after an admit to hospital 08/27/2022.  O2 90% on RA, bp 91/56.  Rosa Pulido RN.............................9/11/2022 1:50 PM       Triage Assessment     Row Name 09/11/22 1349       Triage Assessment (Adult)    Airway WDL WDL       Respiratory WDL    Respiratory WDL X       Skin Circulation/Temperature WDL    Skin Circulation/Temperature WDL WDL       Cardiac WDL    Cardiac WDL WDL       Peripheral/Neurovascular WDL    Peripheral Neurovascular WDL WDL       Cognitive/Neuro/Behavioral WDL    Cognitive/Neuro/Behavioral WDL WDL

## 2022-09-11 NOTE — DISCHARGE INSTRUCTIONS
Get plenty of fluids and rest.  As discussed, your magnesium was a little low today and potassium just slightly low.  They still see a little bit of pneumonia at the base of your right lung, however as discussed this may be just slightly worsening from what he has been struggling with for quite some time.  He is already on antibiotics for this problem.  I think he has quite a few chronic conditions at this time that are not easily fixable.  We did discuss admission to the hospital tonight for further management and to look for skilled nursing placement.  However, currently this was declined and decided to return home this evening.  I will contact his PCP to ensure close follow-up and referrals are also placed.  I do think a swallow study would be beneficial and they should be able to schedule that for him soon.  Please return if there are worsening or concerning symptoms.

## 2022-09-11 NOTE — ED NOTES
Pt's wife stated that pt has been having a hard time eating. Pt coughs after every time he eats.  Pt is waiting to be seen by speech pathology for a swallow study.  Wife has been trialing mixing meds with various consistencies, unable to break some pills.

## 2022-09-12 PROBLEM — R29.818 PROGRESSIVE NEUROLOGIC DECLINE: Status: ACTIVE | Noted: 2022-01-01

## 2022-09-12 NOTE — ED PROVIDER NOTES
History     Chief Complaint   Patient presents with     Shortness of Breath     HPI  Rajesh Huynh is a 82 year old male who presents to the ED for evaluation of generalized weakness.  Unfortunately he has been suffering from numerous ailments lately including lung cancer, Lewy body dementia, recurrent aspiration pneumonias.  He lives at home with his wife.  She reports that he has had decreased food and fluid intake and that is becoming very difficult for her to take care of him at home.  In general he has no major complaints for me today.    Allergies:  Allergies   Allergen Reactions     Other Drug Allergy (See Comments) Other (See Comments)     Trelegy Ellipta  Sore throat         Problem List:    Patient Active Problem List    Diagnosis Date Noted     Recurrent aspiration pneumonia (H) 09/06/2022     Priority: Medium     Chronic cough 09/06/2022     Priority: Medium     Chronic hiccups 09/06/2022     Priority: Medium     Iron deficiency anemia, unspecified iron deficiency anemia type 09/06/2022     Priority: Medium     Controlled type 2 diabetes mellitus with stage 3 chronic kidney disease, without long-term current use of insulin (H) 09/06/2022     Priority: Medium     Pneumonia 08/26/2022     Priority: Medium     Infection due to 2019 novel coronavirus - 8/10/2022 08/10/2022     Priority: Medium     Restless legs syndrome 05/31/2022     Priority: Medium     Malignant neoplasm of overlapping sites of right lung (H) 05/02/2022     Priority: Medium     Malignant neoplasm of lower lobe of right lung (H) 04/14/2022     Priority: Medium     Malignant neoplasm of upper lobe of right lung (H) 04/14/2022     Priority: Medium     Right lower lobe lung mass 04/01/2022     Priority: Medium     Nodule of upper lobe of right lung 04/01/2022     Priority: Medium     Hypothyroidism due to acquired atrophy of thyroid 12/16/2021     Priority: Medium     Lewy body dementia without behavioral disturbance (H) 09/16/2021      Priority: Medium     Heartburn 02/23/2021     Priority: Medium     Simple chronic bronchitis (H) 02/23/2021     Priority: Medium     History of cardiac cath on 7/8/2013 10/15/2020     Priority: Medium     Benign prostatic hyperplasia with lower urinary tract symptoms, symptom details unspecified 10/15/2020     Priority: Medium     Hx of pancreatitis on 10/23/2014 10/15/2020     Priority: Medium     Hx of bladder cancer on 11/10/2014 10/15/2020     Priority: Medium     Aortic atherosclerosis (H) 10/15/2020     Priority: Medium     Chronic hyponatremia 10/15/2020     Priority: Medium     Stenosis of left carotid artery 10/15/2020     Priority: Medium     Exocrine pancreatic insufficiency - Severe Deficiency 11/22/2019     Priority: Medium     Intermittent diarrhea 11/13/2019     Priority: Medium     Controlled type 2 diabetes mellitus without complication, without long-term current use of insulin (H) 05/03/2019     Priority: Medium     History of pancreatic surgery - Whipple 03/21/2019     Priority: Medium     History of tobacco use quitting in 1/1/2000 at 3 packs a day 05/08/2018     Priority: Medium     Benign essential tremor 01/24/2018     Priority: Medium     Psychosexual dysfunction with inhibited sexual excitement 01/24/2018     Priority: Medium     Benign essential hypertension 01/24/2018     Priority: Medium     Mixed hyperlipidemia 01/24/2018     Priority: Medium     History of throat cancer 12/13/2017     Priority: Medium     Partial tear of right rotator cuff 06/21/2017     Priority: Medium     History of urinary retention 12/23/2016     Priority: Medium     Overview:   Postoperative       Atherosclerosis of native coronary artery of native heart without angina pectoris 10/03/2016     Priority: Medium     Personal history of malignant neoplasm of bladder 11/19/2014     Priority: Medium     H/O STEMI 7/3/2013 involving the RCA 07/11/2013     Priority: Medium        Past Medical History:    Past Medical  History:   Diagnosis Date     Acute myocardial infarction, unspecified (H) 07/05/2013     Basal cell carcinoma of left ear 12/31/2021     Bicipital tenosynovitis 07/05/2013     CAD (coronary artery disease)      Chronic myeloproliferative disease (H)      Diabetes mellitus, type 2 (H)      Dysphagia (partial resection of epiglottis) 07/05/2013     Essential (primary) hypertension 01/12/2004     Glucose intolerance 11/14/2003     Hiccough 07/05/2013     Hyperlipidemia      Lewy body dementia (H)      Male erectile dysfunction      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)      Malignant neoplasm of lower lobe of right lung (H) 04/14/2022     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     Peptic ulcer disease 11/10/2003     Pneumonia      Resolved: chronic pancreatitis (has undergone Whipple procedure)      Resolved: Pancreatitis 01/12/2004     Resolved: Personal history of nicotine dependence      Resolved: Thoracic back pain 04/08/2010     Resovled: Pancreatic duct stones 01/12/2004     ST elevation (STEMI) myocardial infarction (H)      Subscapularis (muscle) sprain 07/05/2013     Tremor        Past Surgical History:    Past Surgical History:   Procedure Laterality Date     APPENDECTOMY OPEN      1970     ARTHROSCOPY SHOULDER      11/2005,right shoulder     ARTHROSCOPY SHOULDER      1/2006,left shoulder     ARTHROSCOPY SHOULDER      2011,Left shoulder scope SAD, ac.  Open subscap, biceps     BRONCHOSCOPY  04/13/2022    bronchoscopy with endobronchial biopsies     CHOLECYSTECTOMY      1994     COLONOSCOPY  02/01/2005 02/2005     COLONOSCOPY  02/19/2015 2/19/2015,no repeat due at this time     CYSTOSCOPY      Multiple times,Dr. Lyndon FRANK     FINE NEEDLE ASPIRATION  04/13/2022    transbronchial needle aspiration and biopsy     HEART CATH, ANGIOPLASTY      07/05/2013,Bare-metal stent to dominant RCA     OTHER SURGICAL HISTORY       2004,[id number],OTHER,for chronic pancreatitis     OTHER SURGICAL HISTORY      2007,[id number],SHOULDER REPLACEMENT,Right,with biceps tenodesis by Dr. Mukesh Ayoub     OTHER SURGICAL HISTORY      ,,ERCP,xseveral, one with papillotomy     OTHER SURGICAL HISTORY      2012-3/2/2012,[id number],RADIATION TREATMENT     OTHER SURGICAL HISTORY      2011,01094,NECK/THORAX PROCEDURE,L modified radical neck surgery     OTHER SURGICAL HISTORY      11/10/14,[id number],OTHER,Dr. Lyndon FRANK     OTHER SURGICAL HISTORY      ,06173,REVISION EYELID,Dr. Farias     OTHER SURGICAL HISTORY      10/22/15,[id number],OTHER     TONSILLECTOMY, ADENOIDECTOMY, COMBINED      1947     VASECTOMY             Family History:    Family History   Problem Relation Age of Onset     Breast Cancer Mother      Heart Disease Mother 80        Heart Disease,MI     Heart Disease Father 83        Heart Disease,CHF     Lung Cancer Father      Family History Negative Sister         Good Health     Heart Disease Sister         Heart Disease,pacemaker     Breast Cancer Daughter         Cancer-breast     Diabetes Son         Diabetes,Type 1     Alcoholism Son      Prostate Cancer No family hx of         Cancer-prostate       Social History:  Marital Status:   [2]  Social History     Tobacco Use     Smoking status: Former Smoker     Packs/day: 3.00     Years: 43.00     Pack years: 129.00     Types: Cigarettes     Quit date: 2001     Years since quittin.7     Smokeless tobacco: Never Used   Vaping Use     Vaping Use: Never used   Substance Use Topics     Alcohol use: Not Currently     Drug use: No        Medications:    acetaminophen (TYLENOL) 325 MG tablet  blood glucose (NO BRAND SPECIFIED) lancets standard  blood glucose (NO BRAND SPECIFIED) test strip  Blood Glucose Monitoring Suppl (FIFTY50 GLUCOSE METER 2.0) W/DEVICE KIT  cetirizine (ZYRTEC) 10 MG tablet  doxycycline hyclate (VIBRAMYCIN) 100 MG capsule  levothyroxine  "(SYNTHROID/LEVOTHROID) 50 MCG tablet  loperamide (IMODIUM A-D) 2 MG tablet  magnesium oxide (MAG-OX) 400 MG tablet  metFORMIN (GLUCOPHAGE) 1000 MG tablet  metoclopramide (REGLAN) 10 MG tablet  nitroGLYcerin (NITROSTAT) 0.4 MG sublingual tablet  nystatin (NYSTOP) 367474 UNIT/GM external powder  Omega-3 Fatty Acids (FISH OIL-OMEGA-3 FATTY ACID) 1000 MG DR capsule  omeprazole 20 MG tablet  primidone (MYSOLINE) 50 MG tablet  propranolol (INDERAL) 20 MG tablet  rivastigmine (EXELON) 3 MG capsule  tamsulosin (FLOMAX) 0.4 MG capsule  tiotropium (SPIRIVA) 18 MCG inhaled capsule  vitamin D3 (CHOLECALCIFEROL) 50 mcg (2000 units) tablet  amoxicillin-clavulanate (AUGMENTIN) 400-57 MG/5ML suspension  amylase-lipase-protease (CREON 36) 343347-16250-759167 units CPEP          Review of Systems   Constitutional: Negative for fever.   HENT: Negative for congestion.    Eyes: Negative for visual disturbance.   Respiratory: Negative for shortness of breath.    Cardiovascular: Negative for chest pain.   Gastrointestinal: Negative for abdominal pain, nausea and vomiting.   Genitourinary: Negative for dysuria.   Neurological: Positive for weakness.   Psychiatric/Behavioral: Negative for confusion.       Physical Exam   BP: 91/56  Pulse: 85  Temp: 98.1  F (36.7  C)  Resp: 20  Height: 170.2 cm (5' 7\")  Weight: 73 kg (161 lb)  SpO2: 90 %      Physical Exam  Constitutional:       General: He is not in acute distress.     Appearance: He is not diaphoretic.   HENT:      Head: Normocephalic and atraumatic.   Eyes:      General: No scleral icterus.     Conjunctiva/sclera: Conjunctivae normal.   Cardiovascular:      Rate and Rhythm: Normal rate and regular rhythm.   Pulmonary:      Effort: Pulmonary effort is normal.      Breath sounds: Normal breath sounds.   Abdominal:      Palpations: Abdomen is soft.      Tenderness: There is no abdominal tenderness.   Musculoskeletal:         General: No deformity.      Cervical back: Neck supple. "   Lymphadenopathy:      Cervical: No cervical adenopathy.   Skin:     General: Skin is warm and dry.      Coloration: Skin is not jaundiced.      Findings: No rash.   Neurological:      Mental Status: He is alert and oriented to person, place, and time. Mental status is at baseline.   Psychiatric:         Mood and Affect: Mood normal.         Behavior: Behavior normal.         ED Course                 Procedures              Critical Care time:  none               Results for orders placed or performed during the hospital encounter of 09/11/22 (from the past 24 hour(s))   CBC with platelets differential    Narrative    The following orders were created for panel order CBC with platelets differential.  Procedure                               Abnormality         Status                     ---------                               -----------         ------                     CBC with platelets and d...[[id number]]  Abnormal            Final result                 Please view results for these tests on the individual orders.   Basic metabolic panel   Result Value Ref Range    Sodium 136 134 - 144 mmol/L    Potassium 3.2 (L) 3.5 - 5.1 mmol/L    Chloride 98 98 - 107 mmol/L    Carbon Dioxide (CO2) 28 21 - 31 mmol/L    Anion Gap 10 3 - 14 mmol/L    Urea Nitrogen 11 7 - 25 mg/dL    Creatinine 0.99 0.70 - 1.30 mg/dL    Calcium 9.0 8.6 - 10.3 mg/dL    Glucose 132 (H) 70 - 105 mg/dL    GFR Estimate 77 >60 mL/min/1.73m2   Nt probnp inpatient (BNP)   Result Value Ref Range    N terminal Pro BNP Inpatient 373 (H) 0 - 100 pg/mL   CBC with platelets and differential   Result Value Ref Range    WBC Count 15.3 (H) 4.0 - 11.0 10e3/uL    RBC Count 3.16 (L) 4.40 - 5.90 10e6/uL    Hemoglobin 10.5 (L) 13.3 - 17.7 g/dL    Hematocrit 31.5 (L) 40.0 - 53.0 %     78 - 100 fL    MCH 33.2 (H) 26.5 - 33.0 pg    MCHC 33.3 31.5 - 36.5 g/dL    RDW 15.1 (H) 10.0 - 15.0 %    Platelet Count 361 150 - 450 10e3/uL    % Neutrophils 91 %    %  Lymphocytes 2 %    % Monocytes 7 %    % Eosinophils 0 %    % Basophils 0 %    % Immature Granulocytes 0 %    NRBCs per 100 WBC 0 <1 /100    Absolute Neutrophils 13.9 (H) 1.6 - 8.3 10e3/uL    Absolute Lymphocytes 0.3 (L) 0.8 - 5.3 10e3/uL    Absolute Monocytes 1.1 0.0 - 1.3 10e3/uL    Absolute Eosinophils 0.0 0.0 - 0.7 10e3/uL    Absolute Basophils 0.0 0.0 - 0.2 10e3/uL    Absolute Immature Granulocytes 0.1 <=0.4 10e3/uL    Absolute NRBCs 0.0 10e3/uL   Magnesium   Result Value Ref Range    Magnesium 1.6 (L) 1.9 - 2.7 mg/dL   XR Chest Port 1 View    Narrative    Exam:  XR CHEST PORT 1 VIEW    HISTORY: hx pneumonias, hx lung cancer, increased sob.    COMPARISON:  8/26/2022, 8/10/2022    FINDINGS:     There is a left chest port with the tip of the catheter in the low  SVC.    The cardiomediastinal contours are normal.      There is increasing consolidation in the right lung base. Similar mild  streaky opacity in the left lung base. Trace right pleural effusion.  No pneumothorax.      No acute osseous abnormality.       Impression    IMPRESSION:      Increasing consolidation in the right lung base, compatible with  pneumonia in the appropriate clinical setting.      DANIELLA BUI MD         SYSTEM ID:  [id number]   Extra Tube    Narrative    The following orders were created for panel order Extra Tube.  Procedure                               Abnormality         Status                     ---------                               -----------         ------                     Extra Serum Separator Tu...[[id number]]                      Final result                 Please view results for these tests on the individual orders.   Extra Serum Separator Tube (SST)   Result Value Ref Range    Hold Specimen JIC    UA with Microscopic reflex to Culture    Specimen: Urine, Clean Catch   Result Value Ref Range    Color Urine Light Yellow Colorless, Straw, Light Yellow, Yellow    Appearance Urine Clear Clear    Glucose Urine  Negative Negative mg/dL    Bilirubin Urine Negative Negative    Ketones Urine Negative Negative mg/dL    Specific Gravity Urine 1.011 1.000 - 1.030    Blood Urine Negative Negative    pH Urine 6.5 5.0 - 9.0    Protein Albumin Urine 20  (A) Negative mg/dL    Urobilinogen Urine Normal Normal, 2.0 mg/dL    Nitrite Urine Negative Negative    Leukocyte Esterase Urine Negative Negative    Mucus Urine Present (A) None Seen /LPF    RBC Urine 1 <=2 /HPF    WBC Urine 4 <=5 /HPF    Narrative    Urine Culture not indicated   Lactic acid whole blood STAT   Result Value Ref Range    Lactic Acid 1.5 0.7 - 2.0 mmol/L       Medications   0.9% sodium chloride BOLUS (0 mLs Intravenous Stopped 9/11/22 1549)   magnesium sulfate 2 g in water intermittent infusion (0 g Intravenous Stopped 9/11/22 1633)       Assessments & Plan (with Medical Decision Making)   Nontoxic but chronically ill-appearing patient.  Heart, lung, bowel sounds are normal.  Abdomen appears soft nontender palpation.  Vital signs are stable and he is afebrile.    He was found to have mild hypomagnesemia s hypokalemia, WBC 15.3.  Urinalysis appears noninfectious.    Chest x-ray read as:  -Increasing consolidation in the right lung base, compatible with  pneumonia in the appropriate clinical setting.      He is given a small amount of fluids and magnesium.    I had a long discussion with the patient and his wife.  Unfortunately, it seems as been suffering from a steady decline in his health for quite some time and is not felt that his presentation today is drastically worse than it has been for quite some time.  He is requiring no new supplemental oxygen.  He is already on antibiotics to cover pneumonias.  However, his wife is concerned that its little more difficult for her to manage him at home and the does seem to have decreased food and fluid intake.  It does sound like she would be willing to have him go to a SNF however it is hard to determine whether Douglas would  like to do that or not.  He clearly seems to be suffering from failure to thrive and I do offer them admission to the hospital.  The patient and his wife both decided that they would like to return home today.  I will place referrals for them to follow-up with PCP and social work.  With his recurring aspiration pneumonias it might be beneficial to obtain swallow study if he has not already.  2 encouraged him to return to the ED if there are any worsening or concerning symptoms.  They understand and agree with the plan the patient is discharged.    Maciel Mendoza PA-C    I have reviewed the nursing notes.    I have reviewed the findings, diagnosis, plan and need for follow up with the patient.       Discharge Medication List as of 9/11/2022  4:34 PM          Final diagnoses:   Adult failure to thrive   Pneumonia   Hypomagnesemia       9/11/2022   [facility] [place]     Maciel Mendoza PA  09/12/22 0019

## 2022-09-12 NOTE — TELEPHONE ENCOUNTER
Patient rosendo call to [facility] appointment. Daughter stated that she did not known when or if they would be able to make a future appointment.  Daughter state that they are looking into nursing home placement .

## 2022-09-12 NOTE — TELEPHONE ENCOUNTER
----- Message from JAMIE Michael sent at 9/12/2022 12:19 AM CDT -----  Dr. Sen,     Just wanted make you aware that I saw Douglas in the ED on 9/11.  Seems to continue to have a steady decline in his health.  Did not necessarily seem that anything was drastically worse in the ED however I can tell his wife is struggling to take care of him at home.  I did offer them admission to the hospital but they decided to go home.  It may be beneficial to have him follow-up closely to ensure that he is still remaining stable to have further discussions about nursing home placement as I think his wife and possibly Douglas are leaning more towards that.     Let me know if you have any questions,     Thanks,   Stewart

## 2022-09-13 NOTE — PROGRESS NOTES
Clinic Care Coordination Contact    Clinic Care Coordination Contact  OUTREACH    Referral Information: PCP      Chief Complaint   Patient presents with     Clinic Care Coordination - Initial        Universal Utilization: See below     Utilization    Hospital Admissions  2             ED Visits  6             No Show Count (past year)  1                Current as of: 9/12/2022  9:48 PM            Clinical Concerns:    Current Medical Concerns:  Lewy body dementia, lung cancer (finished chemo and radiation in July), recurrent aspiration pneumonia, Diabetes Type 2,  Diarrhea, severe pancreatic deficiency, failure to thrive (wt loss), iron deficiency anemia, Stage 3 kidney disease     Current Behavioral Concerns: none      Education Provided to patient: care coordination introduced, respite services and hospice introduced       Functional Status:  Patient is very weak.  Wife and family help care for him. During call he was sleeping, per wife he has had a hard time recovering from recurrent pneumonia. He has lost a lot of weight with difficulty swallowing.    Living Situation:  In private home with wife, Ruth.  She is care giver at this point and feeling overwhelmed. Looking for some respite care    Lifestyle & Psychosocial Needs:  Has family support. No concerns or behaviors mentioned by wife.     Social Determinants of Health     Tobacco Use: Medium Risk     Smoking Tobacco Use: Former Smoker     Smokeless Tobacco Use: Never Used   Alcohol Use: Not on file   Financial Resource Strain: Not on file   Food Insecurity: Not on file   Transportation Needs: Not on file   Physical Activity: Not on file   Stress: Not on file   Social Connections: Not on file   Intimate Partner Violence: Not on file   Depression: Not at risk     PHQ-2 Score: 0   Housing Stability: Not on file     Cardiology team at [facility], has put next round of treatment for a smaller lung nodule on hold due to weakness with pneumonia. At this point, per  wife, they are still planning to try next round of treatments when better. Did discuss that if it becomes too difficult or affects quality of life excessively, a referral for a hospice consult could be made.  Also briefly discussed what transitioning to an intermediate or SNF might look like when needed.      Resources and Interventions:  Current Resources:      [facility] referral has been made for  to discuss respite.     Care coordination, agreed to send letter for contact information.    Involved family       Future Appointments              In 6 days [facility] INFUSION NURSE;  INFUSION CHAIR 10 [facility]    In 1 week [facility] INFUSION NURSE;  INFUSION CHAIR 3 [facility]    In 1 month Anthony Haney MD [facility]    In 2 months [facility] LAB [facility]    In 2 months Jovanni Sen MD [facility]          Plan: Currently they would like to have supports in place to keep him home safely. Seb Ng  will be out on Friday to discuss respite and other services. Care coordinator will send letter with contact information for any additional referrals wanted or questions later.   Rosio Little RN on 9/13/2022 at 10:58 AM

## 2022-09-13 NOTE — LETTER
Rajesh Huynh  [address] [facility] [facility] [address]        Date: September 13, 2022    Dear Debbie,    I am the clinic care coordinator who works with Dr. Sen at [facility]. Thank you for your time on the phone with me today, Ruth.  I wanted to introduce what care coordination can offer.     The clinic care coordinator is a registered nurse who understands the health care system. The goal of clinic care coordination is to help you manage your health and navigate the health care system in the most efficient manner. For example, I can assist you in meeting your health care goals by providing information and referrals to resources, coordinating services, and strengthening communication with your providers.     Please feel free to contact me at [phone] with questions or concerns.  I would be happy to help in any way!     Sincerely,       Juanis REYNOLDS RN  [facility] Care Coordinator

## 2022-09-13 NOTE — PROGRESS NOTES
Clinic Care Coordination Contact  Care Team Conversations    Printed introductory letter to care coordination for mailing, as requested by wife, Ruth.   Rosio Little RN on 9/13/2022 at 4:28 PM

## 2022-09-14 PROBLEM — I50.33 ACUTE ON CHRONIC HEART FAILURE WITH PRESERVED EJECTION FRACTION (HFPEF) (H): Status: ACTIVE | Noted: 2022-01-01

## 2022-09-14 NOTE — LETTER
September 14, 2022      Rajesh Huynh  [address] [facility] [address]        To Whom It May Concern,     Rajesh Huynh 1940 [id number] - Has been diagnosed with advanced / end stage Lewy Body dementia, lung cancer, heart failure, recurrent aspiration pneumonia - all resulting in failure to thrive.     It is likely that this culmination of problems will result in his demise.     Life expectancy at the current level of declining health, is less than 6 months.       Sincerely,        Electronically signed by:  Jovanni Sen MD  on September 14, 2022 2:20 PM

## 2022-09-14 NOTE — PROGRESS NOTES
Douglas is a 82 year old who is being evaluated via a billable telephone visit.      What phone number would you like to be contacted at? [phone]  How would you like to obtain your AVS? MyChart    Assessment & Plan   Rajesh Huynh is a 82 year old accompanied by his spouse Ruth, presenting for the following health issues:      ICD-10-CM    1. Acute on chronic heart failure with preserved ejection fraction (HFpEF) (H)  I50.33 torsemide (DEMADEX) 10 MG tablet     Hospice Referral   2. Adult failure to thrive  R62.7 [facility] Referral     Hospice Referral   3. Recurrent aspiration pneumonia (H)  J69.0 Hospice Referral   4. Malignant neoplasm of overlapping sites of right lung (H)  C34.81 Hospice Referral   5. Lewy body dementia without behavioral disturbance (H)  G31.83 Hospice Referral    F02.80    6. Exocrine pancreatic insufficiency - Severe Deficiency  K86.81 Hospice Referral     Patient has combination of failure to thrive and poor oral caloric intake in addition to acute on chronic diastolic heart failure/preserved ejection fraction.  We discussed treatment options.  Start low-dose torsemide 10 mg daily.  If he is not increasing his urine output after 30 to 60 minutes after taking the 10 mg torsemide advised that dose should be increased up to 20 mg daily.    Given his progressive decline in health, recommend hospice consultation.  He is having failure to thrive.  Recurrent aspiration pneumonia.  Still taking oral antibiotics from about a week ago.    He has overlapping malignancy in the lungs.  Lewy body dementia.  Severe pancreatic insufficiency.  He is having hard time swallowing his Creon supplements.    Encouraged regular walking and exercise as tolerated.    Hospice referral placed.  Close follow-up advised for new or worsening symptoms.    Return for Return as needed for follow-up with Dr. Sen..    Jovanni Sen MD  [facility] AND [facility]     Subjective   multiple Issues (ER F/U;  multiple issues)    Patient has lost 15 lbs, has difficulty swallowing, wheezing; states that he takes his pills with cold water and after 2-3 pills, gets a sore throat and feels like water is seeping out the back of his throat.    HPI      Review of Systems       Objective    Vitals - Patient Reported  Systolic (Patient Reported): 125  Diastolic (Patient Reported): 69  Weight (Patient Reported): 64 kg (141 lb)  SpO2 (Patient Reported): 91 (room air)  Temperature (Patient Reported): 97.9  F (36.6  C)  Pulse (Patient Reported): 68  Pain Score: No Pain (0)        Physical Exam     Home care nurse at home today, indicated 3+ pitting edema with bilateral wheezing.     Very weak, slow talking / response. mild distress, fatigued  PSYCH: Alert; weak, slowed speech, low volume, able to articulate logical thoughts, able to abstract reason, no tangential thoughts, no hallucinations or delusions  His affect is flat  RESP: No cough, mild wheezing, able to talk in partial sentences  Remainder of exam unable to be completed due to telephone visits          Phone call duration: 25 minutes

## 2022-09-14 NOTE — PATIENT INSTRUCTIONS
Start torsemide 10 mg daily.  If needed okay to increase up to 20 mg daily.  Goal is to improve his breathing and help with some of his leg swelling.    Given his poor nutrition intake, his leg swelling issues will not resolve.    Continue to use compression stockings and some leg elevation periodically to help with gravity/dependent edema.    The whole point of starting torsemide is to try improving his breathing a little bit so he is not so short of breath and wheezing.    Continue to work on calorie intake and nutrition intake.    Hospice referral placed.  Please call to schedule consultation and arrange to complete paperwork.  Whenever it is appropriate to activate hospice, then you can simply call and have it initiated.

## 2022-09-16 NOTE — TELEPHONE ENCOUNTER
Dr Sen reviewed and completed the following home care form for Rajesh Huynh on 9/16/22   This covers the certification period effective 9/3/22 to 11/1/22.  Ale Ham LPN on 9/16/2022 at 9:42 AM

## 2022-09-19 NOTE — NURSING NOTE
Infusion Nursing Note:  Rajesh Huynh presents today for Feraheme 1 of 2.    Patient seen by provider today: No   present during visit today: Not Applicable.    Note: N/A.    Intravenous Access:  Implanted Port.    Treatment Conditions:  Not Applicable.    Post Infusion Assessment:  Patient tolerated infusion without incident.  Patient observed for 30 minutes post infusion per protocol.  Blood return noted pre and post infusion.  Site patent and intact, free from redness, edema or discomfort.  No evidence of extravasations.  Access discontinued per protocol.     Discharge Plan:   Discharge instructions reviewed with: Patient and Family.  Patient and/or family verbalized understanding of discharge instructions and all questions answered.  Copy of AVS reviewed with patient and/or family.  Patient will return 9/26/2022 for next appointment.  Patient discharged in stable condition accompanied by: wife and daughter.  Departure Mode: Ambulatory with walker.      Pauline Luz RN

## 2022-09-20 NOTE — TELEPHONE ENCOUNTER
DJS - Patient's therapist is calling to check on the status of referral for Modified Barium swallow study to be done at [facility] if possible.    Rachelle Cheatham on 9/20/2022 at 11:20 AM'

## 2022-09-20 NOTE — TELEPHONE ENCOUNTER
Please place order- he has been having trouble swallowing.   Lidia JAMESON ("[employer] reports- Requesting for something to help him sleep Prosom. They have not tried anything else beside Melatonin.  He has been very dizzy - do you have any recommendations for that. /54 today.   Norma J. Gosselin, LPN .......  9/20/2022  12:59 PM

## 2022-09-21 NOTE — ED TRIAGE NOTES
Pt here with family.  [facility].  Pt went there for weakness, not taking medications, not eating very much and not acting right according to his wife.   [facility] called and states that the pt's potassium and magnesium were low.      Triage Assessment     Row Name 09/21/22 5052       Triage Assessment (Adult)    Airway WDL WDL       Respiratory WDL    Respiratory WDL WDL       Peripheral/Neurovascular WDL    Peripheral Neurovascular WDL WDL

## 2022-09-21 NOTE — NURSING NOTE
"Chief Complaint   Patient presents with     Other     Patient states, \"I don't feel good.\" Unable to determine what the chief complaint is.          Initial /62 (BP Location: Left arm, Patient Position: Sitting, Cuff Size: Adult Regular)   Pulse 67   Temp 96.9  F (36.1  C) (Temporal)   Resp 18   SpO2 91%  Estimated body mass index is 25.22 kg/m  as calculated from the following:    Height as of 9/11/22: 1.702 m (5' 7\").    Weight as of 9/11/22: 73 kg (161 lb).         Norma J. Gosselin, LPN     DATE:  9/21/2022   TIME OF RECEIPT FROM LAB:  1641  LAB TEST:  Potassium  LAB VALUE:  2.4  RESULTS GIVEN WITH READ-BACK TO (PROVIDER):  THIAGO VILLATORO  TIME LAB VALUE REPORTED TO PROVIDER:   9/21/22 @ 1641    "

## 2022-09-21 NOTE — TELEPHONE ENCOUNTER
Something like Unisom or Prosom are over the counter.     Electronically signed by:  Jovanni Sen MD  on September 21, 2022 8:41 AM

## 2022-09-21 NOTE — PROGRESS NOTES
ASSESSMENT/PLAN:    I have reviewed the nursing notes.  I have reviewed the findings, diagnosis, plan and need for follow up with the patient.    1. Feeling unwell  2. Confusion  - CBC and Differential  - Magnesium  - UA reflex to Microscopic and Culture  - XR Chest 2 Views  - Basic Metabolic Panel  - Brain Natriuretic Peptide (BNP)  - Symptomatic; Yes; 9/21/2022 Influenza A/B & SARS-CoV2 (COVID-19) Virus PCR Multiplex Nose    3. Shortness of breath  - CBC and Differential  - Brain Natriuretic Peptide (BNP)  - Symptomatic; Yes; 9/21/2022 Influenza A/B & SARS-CoV2 (COVID-19) Virus PCR Multiplex Nose  Multiplex test was not yet obtained, will be obtained in the ED in the event that patient may require inpatient hospitalization.   CBC was trending downward as compared to when taken on 9-.  Today white count was at 12 as compared to 15.  Still does have slight neutrophil shift.  BNP is unremarkable.  Chest x-ray appears significantly improved as compared to when diagnosed with pneumonia about 2 weeks ago.  Lung sounds are clear but oxygenation remains about 91%.  His BMP shows hypokalemia and hypomagnesemia.  At this time as my biggest concern requiring him to be transferred to higher level care for replacement.  Urine sample also not obtained at this time as patient is unable to void.  Some urinary symptoms have me concerned that this could be a source of potential infection but at this time remains uncertain..    4. Hypokalemia  5. Hypomagnesemia  Transferred to the emergency department for IV potassium and magnesium replacement.  See below for additional information-  discussed and accepted by Dr. Martinez.    [facility]    DIAGNOSIS:   1. Feeling unwell    2. Shortness of breath    3. Hypokalemia    4. Hypomagnesemia    5. Confusion        Initial /62 (BP Location: Left arm, Patient Position: Sitting, Cuff Size: Adult Regular)   Pulse 67   Temp 96.9  F (36.1  C) (Temporal)   Resp 18   " SpO2 91%  Estimated body mass index is 25.22 kg/m  as calculated from the following:    Height as of 9/11/22: 1.702 m (5' 7\").    Weight as of 9/11/22: 73 kg (161 lb).    Patient will be transferred to higher level of care. Discussed patient scenario/case with Dr. Martinez in the ER who agrees with my concern of critical lab values requiring IV replacement and transfer to the ER setting for additional work up/ treatment.     Betzaida Mahoney NP        Follow up if symptoms persist or worsen or concerns    I explained my diagnostic considerations and recommendations to the patient, who voiced understanding and agreement with the treatment plan. All questions were answered. We discussed potential side effects of any prescribed or recommended therapies, as well as expectations for response to treatments.    Betzaida Mahoney NP  9/21/2022  3:29 PM    HPI:  Rajesh Huynh is a 82 year old male who [facility] for concerns of generally feeling unwell and more confused than his baseline. Here with wife and son today.     Home health nurse goes to their house and sees him regularly. [place] like his lungs were improving from recent pneumonia but just overall not feeling well and asking wife to \"please bring me to the hospital\" as he felt so unwell. He is using incentive spirometer. He has been on Augmentin and doxycycline for pneumonia that have both completed about 5 days ago.  He is not currently experiencing any cough or shortness of breath.    His wife tells me that he states he has to go to the bathroom and then he cannot urinate about the past 2 to 3 weeks now.  At 1 point today he started to squat in the hallway of their home and have a bowel movement.      Decreased appetite. Not wanting to take his pills. Some choking, awaiting swallow dilation.    Chronic hiccups.  Ongoing for quite some time.  Many different medications have been trialed for this.    No fevers. Occasionally dizzy. No chest pain " or shortness of breath. More weak than usual.     ROS otherwise negative.     Past Medical History:   Diagnosis Date     Acute myocardial infarction, unspecified (H) 07/05/2013     Basal cell carcinoma of left ear 12/31/2021     Bicipital tenosynovitis 07/05/2013     CAD (coronary artery disease)      Chronic myeloproliferative disease (H)     questionable     Diabetes mellitus, type 2 (H)      Dysphagia (partial resection of epiglottis) 07/05/2013     Essential (primary) hypertension 01/12/2004    unspecified     Glucose intolerance 11/14/2003     Hiccough 07/05/2013     Hyperlipidemia     No Comments Provided     Lewy body dementia (H)      Male erectile dysfunction     No Comments Provided     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)     4/22/2015     Malignant neoplasm of lower lobe of right lung (H) 04/14/2022     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,Radiation     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     Peptic ulcer disease 11/10/2003     Pneumonia     2001     Resolved: chronic pancreatitis (has undergone Whipple procedure)     No Comments Provided     Resolved: Pancreatitis 01/12/2004     Resolved: Personal history of nicotine dependence     2ppd - quit 2001     Resolved: Thoracic back pain 04/08/2010    sharp pain with thoracic movements; s/p 9 visits of PT     Resovled: Pancreatic duct stones 01/12/2004     ST elevation (STEMI) myocardial infarction (H)     7/5/2013,Integrity BMS to RCA done at [facility]     Subscapularis (muscle) sprain 07/05/2013     Tremor     No Comments Provided     Past Surgical History:   Procedure Laterality Date     APPENDECTOMY OPEN      1970     ARTHROSCOPY SHOULDER      11/2005,right shoulder     ARTHROSCOPY SHOULDER      1/2006,left shoulder     ARTHROSCOPY SHOULDER      2011,Left shoulder scope SAD, ac.  Open subscap, biceps     BRONCHOSCOPY  04/13/2022    bronchoscopy with endobronchial biopsies     CHOLECYSTECTOMY      1994      COLONOSCOPY  2005     COLONOSCOPY  2015,no repeat due at this time     CYSTOSCOPY      Multiple times,Dr. Lyndon FRANK     FINE NEEDLE ASPIRATION  2022    transbronchial needle aspiration and biopsy     HEART CATH, ANGIOPLASTY      2013,Bare-metal stent to dominant RCA     OTHER SURGICAL HISTORY      ,981121,OTHER,for chronic pancreatitis     OTHER SURGICAL HISTORY      2007,[id number],SHOULDER REPLACEMENT,Right,with biceps tenodesis by Dr. Mukesh Ayoub     OTHER SURGICAL HISTORY      ,,ERCP,xseveral, one with papillotomy     OTHER SURGICAL HISTORY      2012-3/2/2012,878801,RADIATION TREATMENT     OTHER SURGICAL HISTORY      2011,43878,NECK/THORAX PROCEDURE,L modified radical neck surgery     OTHER SURGICAL HISTORY      11/10/14,345614,OTHER,Dr. Lyndon FRANK     OTHER SURGICAL HISTORY      ,13042,REVISION EYELID,Dr. Farias     OTHER SURGICAL HISTORY      10/22/15,[id number],OTHER     TONSILLECTOMY, ADENOIDECTOMY, COMBINED      1947     VASECTOMY           Social History     Tobacco Use     Smoking status: Former Smoker     Packs/day: 3.00     Years: 43.00     Pack years: 129.00     Types: Cigarettes     Quit date: 2001     Years since quittin.7     Smokeless tobacco: Never Used   Substance Use Topics     Alcohol use: Not Currently     Current Outpatient Medications   Medication Sig Dispense Refill     acetaminophen (TYLENOL) 325 MG tablet Take 325-650 mg by mouth every 6 hours as needed for mild pain       amylase-lipase-protease (CREON 36) 476010-02085-300278 units CPEP Take 3 capsules by mouth 4 times daily (with meals and nightly) Taking 71286 units 1080 capsule 1     blood glucose (NO BRAND SPECIFIED) lancets standard Use to test blood sugar 2 times daily  E11.9 200 each 3     blood glucose (NO BRAND SPECIFIED) test strip Use to test blood sugar 2 times daily  E11.9 200 strip 11     Blood Glucose Monitoring Suppl (NBRVS74  GLUCOSE METER 2.0) W/DEVICE KIT Dispense Accu-Chek Agnes  Meter. Dispense item covered by pt ins. E11.9 NIDDM type II - Test 1 time/day       cetirizine (ZYRTEC) 10 MG tablet Take 10 mg by mouth At Bedtime       levothyroxine (SYNTHROID/LEVOTHROID) 50 MCG tablet Take 1 tablet (50 mcg) by mouth daily 90 tablet 1     loperamide (IMODIUM A-D) 2 MG tablet Take 1 tablet (2 mg) by mouth 4 times daily as needed for diarrhea 120 tablet 11     magnesium oxide (MAG-OX) 400 MG tablet Take 400 mg by mouth At Bedtime       metFORMIN (GLUCOPHAGE) 1000 MG tablet Take 1 tablet (1,000 mg) by mouth 2 times daily (with meals) For diabetes prevention 180 tablet 1     metoclopramide (REGLAN) 10 MG tablet Take 10 mg by mouth 4 times daily (before meals and nightly)       nitroGLYcerin (NITROSTAT) 0.4 MG sublingual tablet DISSOLVE ONE TABLET UNDER THE TONGUE EVERY 5 MINUTES AS NEEDED FOR CHEST PAIN.  DO NOT EXCEED A TOTAL OF 3 DOSES IN 15 MINUTES 25 tablet 0     nystatin (NYSTOP) 860165 UNIT/GM external powder Apply topically 2 times daily as needed For skin fold rash 180 g 4     Omega-3 Fatty Acids (FISH OIL-OMEGA-3 FATTY ACID) 1000 MG DR capsule Take 1 each by mouth 2 times daily       omeprazole 20 MG tablet Take 20 mg by mouth daily       primidone (MYSOLINE) 50 MG tablet Take 2 tablets (100 mg) by mouth At Bedtime 180 tablet 4     propranolol (INDERAL) 20 MG tablet Take 1 tablet by mouth in the morning and 2 tablets by mouth in the evening. 270 tablet 1     rivastigmine (EXELON) 3 MG capsule Take 3 mg by mouth 2 times daily . Should be taken with breakfast and dinner.       tamsulosin (FLOMAX) 0.4 MG capsule Take 0.4 mg by mouth daily       tiotropium (SPIRIVA) 18 MCG inhaled capsule Inhale 1 capsule (18 mcg) into the lungs daily - for chronic phlegm/Bronchitis (Patient taking differently: Inhale 18 mcg into the lungs daily as needed - for chronic phlegm/Bronchitis) 90 capsule 3     torsemide (DEMADEX) 10 MG tablet Take 1-2 tablets  (10-20 mg) by mouth daily 60 tablet 3     vitamin D3 (CHOLECALCIFEROL) 50 mcg (2000 units) tablet Take 1 tablet by mouth 2 times daily       Allergies   Allergen Reactions     Other Drug Allergy (See Comments) Other (See Comments)     Trelegy Ellipta  Sore throat       Past medical history, past surgical history, current medications and allergies reviewed and accurate to the best of my knowledge.      ROS:  Refer to HPI    /62 (BP Location: Left arm, Patient Position: Sitting, Cuff Size: Adult Regular)   Pulse 67   Temp 96.9  F (36.1  C) (Temporal)   Resp 18   SpO2 91%     EXAM:  General Appearance: fatigued, weak, but non-toxic appearing 82 year old male sitting in wheelchair. In no acute distress.  Respiratory: normal chest wall and respirations.  Normal effort.  Clear to auscultation bilaterally, no wheezing, crackles or rhonchi.  No increased work of breathing.  No cough appreciated.  Cardiac: RRR with no murmurs  Abdomen: soft, nontender, no rigidity, no rebound tenderness or guarding, normal bowel sounds present  :  No suprapubic tenderness to palpation.  Absent CVA tenderness to palpation.    Dermatological: no rashes noted of exposed skin  Psychological: normal affect, alert, oriented, and pleasant.     Results for orders placed or performed in visit on 09/21/22   XR Chest 2 Views     Status: None    Narrative    PROCEDURE:  XR CHEST 2 VIEWS    HISTORY: known RLL pneumonia diagnosed on 9/11/2022; Feeling unwell;  Pneumonia of right lower lobe due to infectious organism, .    COMPARISON:  3/3/2022, 9/11/2022    FINDINGS:  The cardiomediastinal contours are stable. The trachea is midline.  Nodule persists in the upper right lung. Right lower lobe opacity is  improved but partially persists medially. No pneumothorax is seen.    No suspicious osseous lesion or subdiaphragmatic free air.      Impression    IMPRESSION:      Improved aeration of the right lung base, possibly reflecting  improved  neoplasm related postobstructive pneumonia. Limited reassessment of  known pulmonary neoplasm.      IRISH STERLING MD         SYSTEM ID:  [id number]   Magnesium     Status: Abnormal   Result Value Ref Range    Magnesium 1.3 (L) 1.9 - 2.7 mg/dL   Basic Metabolic Panel     Status: Abnormal   Result Value Ref Range    Sodium 136 134 - 144 mmol/L    Potassium 2.4 (LL) 3.5 - 5.1 mmol/L    Chloride 95 (L) 98 - 107 mmol/L    Carbon Dioxide (CO2) 29 21 - 31 mmol/L    Anion Gap 12 3 - 14 mmol/L    Urea Nitrogen 13 7 - 25 mg/dL    Creatinine 1.00 0.70 - 1.30 mg/dL    Calcium 8.6 8.6 - 10.3 mg/dL    Glucose 151 (H) 70 - 105 mg/dL    GFR Estimate 76 >60 mL/min/1.73m2   Brain Natriuretic Peptide (BNP)     Status: Abnormal   Result Value Ref Range    N Terminal Pro BNP Outpatient 179 (H) 0 - 100 pg/mL   CBC with platelets and differential     Status: Abnormal   Result Value Ref Range    WBC Count 12.6 (H) 4.0 - 11.0 10e3/uL    RBC Count 3.24 (L) 4.40 - 5.90 10e6/uL    Hemoglobin 10.9 (L) 13.3 - 17.7 g/dL    Hematocrit 31.3 (L) 40.0 - 53.0 %    MCV 97 78 - 100 fL    MCH 33.6 (H) 26.5 - 33.0 pg    MCHC 34.8 31.5 - 36.5 g/dL    RDW 14.9 10.0 - 15.0 %    Platelet Count 282 150 - 450 10e3/uL    % Neutrophils 90 %    % Lymphocytes 3 %    % Monocytes 7 %    % Eosinophils 0 %    % Basophils 0 %    % Immature Granulocytes 0 %    NRBCs per 100 WBC 0 <1 /100    Absolute Neutrophils 11.2 (H) 1.6 - 8.3 10e3/uL    Absolute Lymphocytes 0.4 (L) 0.8 - 5.3 10e3/uL    Absolute Monocytes 0.9 0.0 - 1.3 10e3/uL    Absolute Eosinophils 0.0 0.0 - 0.7 10e3/uL    Absolute Basophils 0.0 0.0 - 0.2 10e3/uL    Absolute Immature Granulocytes 0.0 <=0.4 10e3/uL    Absolute NRBCs 0.0 10e3/uL   CBC and Differential     Status: Abnormal    Narrative    The following orders were created for panel order CBC and Differential.  Procedure                               Abnormality         Status                     ---------                                -----------         ------                     CBC with platelets and d...[[id number]]  Abnormal            Final result                 Please view results for these tests on the individual orders.

## 2022-09-22 NOTE — DISCHARGE INSTRUCTIONS
Get plenty of fluids and rest.  Your magnesium and potassium are trending in the right directions.  I did prescribe you potassium solution which she can take daily for the next week to try to normalize and stabilize your potassium level.  Continue to follow-up with PCP and social work to discuss further living situation options.  Return ED if there are worsening concerning symptoms.

## 2022-09-22 NOTE — ED PROVIDER NOTES
History     Chief Complaint   Patient presents with     Abnormal Labs     HPI  Rajesh Huynh is a 82 year old male who presents to the ED for evaluation of abnormal labs. Pt here with family.  [facility]  Pt went there for weakness, not taking medications, not eating very much and not acting right according to his wife.   [facility] called and states that the pt's potassium and magnesium were low. He is suffering from lung cancer, lewy body dementia, recurrent pneumonias. They are currently in the process of talking with social work about possible assisted living facilities for the patient.  He denies chest pain, shortness of breath, abdominal pain, dysuria.    Allergies:  Allergies   Allergen Reactions     Other Drug Allergy (See Comments) Other (See Comments)     Trelegy Ellipta  Sore throat         Problem List:    Patient Active Problem List    Diagnosis Date Noted     Acute on chronic heart failure with preserved ejection fraction (HFpEF) (H) 09/14/2022     Priority: Medium     Progressive neurologic decline 09/12/2022     Priority: Medium     Recurrent aspiration pneumonia (H) 09/06/2022     Priority: Medium     Chronic cough 09/06/2022     Priority: Medium     Chronic hiccups 09/06/2022     Priority: Medium     Iron deficiency anemia, unspecified iron deficiency anemia type 09/06/2022     Priority: Medium     Controlled type 2 diabetes mellitus with stage 3 chronic kidney disease, without long-term current use of insulin (H) 09/06/2022     Priority: Medium     Pneumonia 08/26/2022     Priority: Medium     Infection due to 2019 novel coronavirus - 8/10/2022 08/10/2022     Priority: Medium     Restless legs syndrome 05/31/2022     Priority: Medium     Malignant neoplasm of overlapping sites of right lung (H) 05/02/2022     Priority: Medium     Malignant neoplasm of lower lobe of right lung (H) 04/14/2022     Priority: Medium     Malignant neoplasm of upper lobe of right lung (H)  04/14/2022     Priority: Medium     Right lower lobe lung mass 04/01/2022     Priority: Medium     Nodule of upper lobe of right lung 04/01/2022     Priority: Medium     Hypothyroidism due to acquired atrophy of thyroid 12/16/2021     Priority: Medium     Lewy body dementia without behavioral disturbance (H) 09/16/2021     Priority: Medium     Heartburn 02/23/2021     Priority: Medium     Simple chronic bronchitis (H) 02/23/2021     Priority: Medium     History of cardiac cath on 7/8/2013 10/15/2020     Priority: Medium     Benign prostatic hyperplasia with lower urinary tract symptoms, symptom details unspecified 10/15/2020     Priority: Medium     Hx of pancreatitis on 10/23/2014 10/15/2020     Priority: Medium     Hx of bladder cancer on 11/10/2014 10/15/2020     Priority: Medium     Aortic atherosclerosis (H) 10/15/2020     Priority: Medium     Chronic hyponatremia 10/15/2020     Priority: Medium     Stenosis of left carotid artery 10/15/2020     Priority: Medium     Exocrine pancreatic insufficiency - Severe Deficiency 11/22/2019     Priority: Medium     Intermittent diarrhea 11/13/2019     Priority: Medium     Controlled type 2 diabetes mellitus without complication, without long-term current use of insulin (H) 05/03/2019     Priority: Medium     History of pancreatic surgery - Whipple 03/21/2019     Priority: Medium     History of tobacco use quitting in 1/1/2000 at 3 packs a day 05/08/2018     Priority: Medium     Benign essential tremor 01/24/2018     Priority: Medium     Psychosexual dysfunction with inhibited sexual excitement 01/24/2018     Priority: Medium     Benign essential hypertension 01/24/2018     Priority: Medium     Mixed hyperlipidemia 01/24/2018     Priority: Medium     History of throat cancer 12/13/2017     Priority: Medium     Partial tear of right rotator cuff 06/21/2017     Priority: Medium     History of urinary retention 12/23/2016     Priority: Medium     Overview:   Postoperative        Atherosclerosis of native coronary artery of native heart without angina pectoris 10/03/2016     Priority: Medium     Personal history of malignant neoplasm of bladder 11/19/2014     Priority: Medium     H/O STEMI 7/3/2013 involving the RCA 07/11/2013     Priority: Medium        Past Medical History:    Past Medical History:   Diagnosis Date     Acute myocardial infarction, unspecified (H) 07/05/2013     Basal cell carcinoma of left ear 12/31/2021     Bicipital tenosynovitis 07/05/2013     CAD (coronary artery disease)      Chronic myeloproliferative disease (H)      Diabetes mellitus, type 2 (H)      Dysphagia (partial resection of epiglottis) 07/05/2013     Essential (primary) hypertension 01/12/2004     Glucose intolerance 11/14/2003     Hiccough 07/05/2013     Hyperlipidemia      Lewy body dementia (H)      Male erectile dysfunction      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)      Malignant neoplasm of lower lobe of right lung (H) 04/14/2022     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     Peptic ulcer disease 11/10/2003     Pneumonia      Resolved: chronic pancreatitis (has undergone Whipple procedure)      Resolved: Pancreatitis 01/12/2004     Resolved: Personal history of nicotine dependence      Resolved: Thoracic back pain 04/08/2010     Resovled: Pancreatic duct stones 01/12/2004     ST elevation (STEMI) myocardial infarction (H)      Subscapularis (muscle) sprain 07/05/2013     Tremor        Past Surgical History:    Past Surgical History:   Procedure Laterality Date     APPENDECTOMY OPEN      1970     ARTHROSCOPY SHOULDER      11/2005,right shoulder     ARTHROSCOPY SHOULDER      1/2006,left shoulder     ARTHROSCOPY SHOULDER      2011,Left shoulder scope SAD, ac.  Open subscap, biceps     BRONCHOSCOPY  04/13/2022    bronchoscopy with endobronchial biopsies     CHOLECYSTECTOMY      1994     COLONOSCOPY  02/01/2005 02/2005      COLONOSCOPY  2015,no repeat due at this time     CYSTOSCOPY      Multiple times,Dr. Lyndon FRANK     FINE NEEDLE ASPIRATION  2022    transbronchial needle aspiration and biopsy     HEART CATH, ANGIOPLASTY      2013,Bare-metal stent to dominant RCA     OTHER SURGICAL HISTORY      ,915543,OTHER,for chronic pancreatitis     OTHER SURGICAL HISTORY      2007,[id number],SHOULDER REPLACEMENT,Right,with biceps tenodesis by Dr. Mukesh Ayoub     OTHER SURGICAL HISTORY      ,,ERCP,xseveral, one with papillotomy     OTHER SURGICAL HISTORY      2012-3/2/2012,188573,RADIATION TREATMENT     OTHER SURGICAL HISTORY      2011,25133,NECK/THORAX PROCEDURE,L modified radical neck surgery     OTHER SURGICAL HISTORY      11/10/14,199314,OTHER,Dr. Lyndon FRANK     OTHER SURGICAL HISTORY      ,41736,REVISION EYELID,Dr. Farias     OTHER SURGICAL HISTORY      10/22/15,735303,OTHER     TONSILLECTOMY, ADENOIDECTOMY, COMBINED      1947     VASECTOMY             Family History:    Family History   Problem Relation Age of Onset     Breast Cancer Mother      Heart Disease Mother 80        Heart Disease,MI     Heart Disease Father 83        Heart Disease,CHF     Lung Cancer Father      Family History Negative Sister         Good Health     Heart Disease Sister         Heart Disease,pacemaker     Breast Cancer Daughter         Cancer-breast     Diabetes Son         Diabetes,Type 1     Alcoholism Son      Prostate Cancer No family hx of         Cancer-prostate       Social History:  Marital Status:   [2]  Social History     Tobacco Use     Smoking status: Former Smoker     Packs/day: 3.00     Years: 43.00     Pack years: 129.00     Types: Cigarettes     Quit date: 2001     Years since quittin.7     Smokeless tobacco: Never Used   Vaping Use     Vaping Use: Never used   Substance Use Topics     Alcohol use: Not Currently     Drug use: No        Medications:   "  potassium chloride (KLOR-CON) 20 MEQ packet  acetaminophen (TYLENOL) 325 MG tablet  amylase-lipase-protease (CREON 36) 787246-55736-625808 units CPEP  blood glucose (NO BRAND SPECIFIED) lancets standard  blood glucose (NO BRAND SPECIFIED) test strip  Blood Glucose Monitoring Suppl (FIFTY50 GLUCOSE METER 2.0) W/DEVICE KIT  cetirizine (ZYRTEC) 10 MG tablet  levothyroxine (SYNTHROID/LEVOTHROID) 50 MCG tablet  loperamide (IMODIUM A-D) 2 MG tablet  magnesium oxide (MAG-OX) 400 MG tablet  metFORMIN (GLUCOPHAGE) 1000 MG tablet  metoclopramide (REGLAN) 10 MG tablet  nitroGLYcerin (NITROSTAT) 0.4 MG sublingual tablet  nystatin (NYSTOP) 838458 UNIT/GM external powder  Omega-3 Fatty Acids (FISH OIL-OMEGA-3 FATTY ACID) 1000 MG DR capsule  omeprazole 20 MG tablet  primidone (MYSOLINE) 50 MG tablet  propranolol (INDERAL) 20 MG tablet  rivastigmine (EXELON) 3 MG capsule  tamsulosin (FLOMAX) 0.4 MG capsule  tiotropium (SPIRIVA) 18 MCG inhaled capsule  torsemide (DEMADEX) 10 MG tablet  vitamin D3 (CHOLECALCIFEROL) 50 mcg (2000 units) tablet          Review of Systems   Constitutional: Negative for fever.   HENT: Negative for congestion.    Eyes: Negative for visual disturbance.   Respiratory: Negative for shortness of breath.    Cardiovascular: Negative for chest pain.   Gastrointestinal: Negative for abdominal pain, nausea and vomiting.   Genitourinary: Negative for dysuria.   Psychiatric/Behavioral: Negative for confusion.       Physical Exam   BP: 112/81  Pulse: 73  Temp: 97.8  F (36.6  C)  Resp: 18  Height: 170.2 cm (5' 7\")  Weight: 73 kg (161 lb)  SpO2: 91 %      Physical Exam  Constitutional:       General: He is not in acute distress.     Appearance: He is well-developed. He is not diaphoretic.   HENT:      Head: Normocephalic and atraumatic.   Eyes:      General: No scleral icterus.     Conjunctiva/sclera: Conjunctivae normal.   Cardiovascular:      Rate and Rhythm: Normal rate and regular rhythm.   Pulmonary:      Effort: " Pulmonary effort is normal.      Breath sounds: Normal breath sounds.   Abdominal:      Palpations: Abdomen is soft.      Tenderness: There is no abdominal tenderness.   Musculoskeletal:         General: No deformity.      Cervical back: Neck supple.   Lymphadenopathy:      Cervical: No cervical adenopathy.   Skin:     General: Skin is warm and dry.      Coloration: Skin is not jaundiced.      Findings: No rash.   Neurological:      Mental Status: He is alert and oriented to person, place, and time. Mental status is at baseline.   Psychiatric:         Mood and Affect: Mood normal.         Behavior: Behavior normal.         ED Course                 Procedures              Critical Care time:  none               Results for orders placed or performed during the hospital encounter of 09/21/22 (from the past 24 hour(s))   UA reflex to Microscopic   Result Value Ref Range    Color Urine Light Yellow Colorless, Straw, Light Yellow, Yellow    Appearance Urine Clear Clear    Glucose Urine Negative Negative mg/dL    Bilirubin Urine Negative Negative    Ketones Urine Negative Negative mg/dL    Specific Gravity Urine 1.010 1.000 - 1.030    Blood Urine Negative Negative    pH Urine 5.5 5.0 - 9.0    Protein Albumin Urine Negative Negative mg/dL    Urobilinogen Urine Normal Normal, 2.0 mg/dL    Nitrite Urine Negative Negative    Leukocyte Esterase Urine Negative Negative    Narrative    Microscopic not indicated   Magnesium   Result Value Ref Range    Magnesium 2.1 1.9 - 2.7 mg/dL   Potassium   Result Value Ref Range    Potassium 2.7 (L) 3.5 - 5.1 mmol/L   Extra Tube    Narrative    The following orders were created for panel order Extra Tube.  Procedure                               Abnormality         Status                     ---------                               -----------         ------                     Extra Blue Top Tube[[id number]]                              Final result               Extra Serum Separator  Tu...[[id number]]                      Final result               Extra Green Top (Lithium...[[id number]]                      Final result               Extra Purple Top Tube[[id number]]                            Final result                 Please view results for these tests on the individual orders.   Extra Blue Top Tube   Result Value Ref Range    Hold Specimen     Extra Serum Separator Tube (SST)   Result Value Ref Range    Hold Specimen     Extra Green Top (Lithium Heparin) Tube   Result Value Ref Range    Hold Specimen     Extra Purple Top Tube   Result Value Ref Range    Hold Specimen         Medications   heparin 100 UNIT/ML injection 500 Units (has no administration in time range)   magnesium sulfate 2 g in water intermittent infusion (0 g Intravenous Stopped 9/21/22 1905)   potassium chloride (KAYCIEL) solution 40 mEq (40 mEq Oral Given 9/21/22 1803)   0.9% sodium chloride BOLUS (0 mLs Intravenous Stopped 9/21/22 2111)       Assessments & Plan (with Medical Decision Making)   Pt nontoxic in NAD but chronically deconditioned. Heart, lung, bowel sounds normal, abd soft, nontender to palpation, nondistended. VSS, afebrile.     [facility] from [facility] showed low potassium and magnesium.  Remainder studies appeared well.  We did get a urinalysis in the ED which showed no signs of infection.  We did give him IV magnesium and oral solution potassium.  His magnesium normalized and his potassium is trending upwards.  I suspect if we were to wait longer and recheck his potassium that it would be closer to normalization.  I did offered to further observe the patient in the ED however both he and his wife would like to go home at this time.  He otherwise appears to be at his mental and physical baseline.  They do have follow-up appointment with social work tomorrow to discuss further options for his living situation.    I did prescribe a low dose of potassium oral solution that he can take over the next few days as  well.    Strict return precautions are given to the pt, they will return if symptoms are worsening or concerning. The pt understands and agrees with the plan and they are discharged.     Maciel Mendoza PA-C      I have reviewed the nursing notes.    I have reviewed the findings, diagnosis, plan and need for follow up with the patient.       New Prescriptions    POTASSIUM CHLORIDE (KLOR-CON) 20 MEQ PACKET    Take 20 mEq by mouth daily for 7 days       Final diagnoses:   Hypokalemia   Hypomagnesemia       9/21/2022   [facility]     Maciel Mendoza PA  09/21/22 2113

## 2022-09-22 NOTE — ED NOTES
Pt ambulatory to the bathroom to have a bowel movement. Slightly unsteady on feet but tolerated activity with stand by assist.

## 2022-09-23 NOTE — TELEPHONE ENCOUNTER
I have reviewed and completed the following home care or hospice form(s) for Rajesh Huynh  on September 23, 2022 .  This covers the certification period effective September 3, 2022.    Electronically signed by:  Jovanni Sen MD  on September 23, 2022 12:45 PM

## 2022-09-26 NOTE — TELEPHONE ENCOUNTER
Patient was scheduled on 10/13/22 for a cysto/ follow up. Patient is going down hill and does not want to proceed with the appointments.    Janis Johnson on 9/26/2022 at 7:59 AM

## 2022-09-27 PROBLEM — J15.9 COMMUNITY ACQUIRED BACTERIAL PNEUMONIA: Status: ACTIVE | Noted: 2022-01-01

## 2022-09-27 NOTE — LETTER
[facility]  [address]  [place] [id number] [phone]        October 2, 2022    Regarding:  Rajesh Huynh  [address] [place] [address]      To Whom It May Concern;     I took care of Mr. Huynh while hospitalized.  He has severe Lewy body dementia with progression of symptoms. He is only able to answer basic questions and is certainly not able to live on his own.  In regards to questions asked in the form, please see below.     He is not consistently able to feed himself and requires prompting from family or staff to eat safely due to dementia progression.  He is unable to prepare his own meals.  He requires assistance in getting dressed, grooming, and bathing. Assisted living or nursing home care is most appropriate for his current status.     Some medications are prescribed for treatment of dementia.  He needs instruction to take medications.  Claimant does not have the mental capacity to manage his finances, but has involved family that would be able to adequately manage his benefits.     On exam, he appears frail. Is hard of hearing and provides limited responses to questions when asked.  He does have full range of motion of upper and lower extremities, but has some diffuse weakness. He does not have any significant restrictions of his spine, trunk, or neck. The main impediment to performing ADLs appears to be dementia more so than a specific musculoskeletal deficit.      Due to dementia, he has lost bladder function and is incontinent of urine.  Functionally he is a fall risk and requires a walker. Associated with the Lewy body dementia has been dysphagia and repeated aspiration pneumonia. With episodes of illness he becomes severely weak, requiring 2 people to transfer. Mobility is impaired due to affiliated movement disorder with the Lewy body dementia. He spends most of the day laying in bed or seated in a chair. Is not safely able to leave home  without significant assistance.       Sincerely,        Pee Merchant MD

## 2022-09-27 NOTE — H&P
[facility]    History and Physical - Hospitalist Service       Date of Admission:  9/27/2022    Assessment & Plan      Rajesh Huynh is a 82 year old male admitted on 9/27/2022. He presented to the emergency department with his wife and daughter due to recurrent cough and perceived shortness of breath.    Patient has had recurrent pneumonias likely due to aspiration.  Suspect recurrent aspiration pneumonia.  He will be started on Zosyn and maintenance IV fluid.  Speech to evaluate and treat tomorrow.    He also has rapidly progressive Lewy body dementia.  In addition to problems swallowing family reports he has had significant loss of quality of life recently and seems to be hallucinating.  They think he would want to pursue hospice and would be safer in a skilled nursing facility.    Principal Problem:    Community acquired bacterial pneumonia    Assessment: Stable, recurrent.      Plan: Blood cultures drawn in ED.  Start Zosyn given potential for aspiration.  Recheck CBC in AM.  Oxygen as needed.    Active Problems:    Lewy body dementia without behavioral disturbance (H)    Assessment: Had a good discussion with family about progression of Lewy body dementia and given his lack of adequate swallow I suspect his life expectancy is very short.    Plan: We will treat aggressively with antibiotics.  If no significant improvement in functionality would suggest hospice which family is in agreement with.      Controlled type 2 diabetes mellitus with stage 3 chronic kidney disease, without long-term current use of insulin (H)    Assessment: Patient currently NPO.    Plan: Check blood sugars and can use SSI NovoLog if needed.      Acute on chronic heart failure with preserved ejection fraction (HFpEF) (H)    Assessment: Currently appears euvolemic.    Plan: Normal saline at 75 mL/h.    Hypokalemia  Assessment: Severe, in large part likely due to poor intake  Plan: Replacement per protocol      "  Diet:  N.p.o. given issues with recurrent aspiration  DVT Prophylaxis: Enoxaparin (Lovenox) SQ  Gomez Catheter: Not present  Central Lines: PRESENT     Cardiac Monitoring: None  Code Status: No CPR- Do NOT Intubate      Clinically Significant Risk Factors Present on Admission        # Hypokalemia: K = 2.5 mmol/L (Ref range: 3.5 - 5.1 mmol/L) on admission, will replace as needed    # Hypomagnesemia: Mg = 1.5 mg/dL (Ref range: 1.9 - 2.7 mg/dL) on admission, will replace as needed   # Hypoalbuminemia: Albumin = 3.2 g/dL (Ref range: 3.5 - 5.7 g/dL) on admission, will monitor as appropriate        # Overweight: Estimated body mass index is 25.22 kg/m  as calculated from the following:    Height as of this encounter: 1.702 m (5' 7\").    Weight as of this encounter: 73 kg (161 lb).        Disposition Plan      Expected Discharge Date: 09/29/2022                The patient's care was discussed with the Patient and Patient's Family.    Zen Pulido MD  Hospitalist Service  [facility]  Securely message with the [employer] Web Console (learn more here)  Text page via [facility] Paging/Directory         ______________________________________________________________________    Chief Complaint   Cough, shortness of breath, hallucinations    History is obtained from    History of Present Illness   Rajesh Huynh is a 82 year old male who presents patient with recurrent cough and shortness of breath as well as worsening dementia.  Patient was hospitalized and treated for pneumonia August 27 to August 30.  Discharged on Bactrim and Augmentin.  Symptoms returned after being home for about a week.  Family has noticed that he chronically aspirates liquids and solids.  Has not really been able to take much in lately.  Recently restarted on antibiotics but has not been able to actually swallow any.  No vomiting.  He does have some loose stools.  His wife is no longer able to take care of him at home as he requires " essentially 24/7 care.  Have noticed he seems to be having hallucinations and really does not follow any commands.    They have not noticed any fevers over the past couple days but he has been quite combative with them and agitated.    Review of Systems    Review of systems not obtained due to patient factors - mental status    Past Medical History    I have reviewed this patient's medical history and updated it with pertinent information if needed.   Past Medical History:   Diagnosis Date     Acute myocardial infarction, unspecified (H) 07/05/2013     Basal cell carcinoma of left ear 12/31/2021     Bicipital tenosynovitis 07/05/2013     CAD (coronary artery disease)      Chronic myeloproliferative disease (H)     questionable     Diabetes mellitus, type 2 (H)      Dysphagia (partial resection of epiglottis) 07/05/2013     Essential (primary) hypertension 01/12/2004    unspecified     Glucose intolerance 11/14/2003     Hiccough 07/05/2013     Hyperlipidemia     No Comments Provided     Lewy body dementia (H)      Male erectile dysfunction     No Comments Provided     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)     4/22/2015     Malignant neoplasm of lower lobe of right lung (H) 04/14/2022     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     Tremor     No Comments Provided       Past  Surgical History   I have reviewed this patient's surgical history and updated it with pertinent information if needed.  Past Surgical History:   Procedure Laterality Date     APPENDECTOMY OPEN      1970     ARTHROSCOPY SHOULDER      2005,right shoulder     ARTHROSCOPY SHOULDER      2006,left shoulder     ARTHROSCOPY SHOULDER      ,Left shoulder scope SAD, ac.  Open subscap, biceps     BRONCHOSCOPY  2022    bronchoscopy with endobronchial biopsies     CHOLECYSTECTOMY           COLONOSCOPY  2005     COLONOSCOPY  2015,no repeat due at this time     CYSTOSCOPY      Multiple times,Dr. Lyndon FRANK     FINE NEEDLE ASPIRATION  2022    transbronchial needle aspiration and biopsy     HEART CATH, ANGIOPLASTY      2013,Bare-metal stent to dominant RCA     OTHER SURGICAL HISTORY      ,240808,OTHER,for chronic pancreatitis     OTHER SURGICAL HISTORY      2007,[id number],SHOULDER REPLACEMENT,Right,with biceps tenodesis by Dr. Mukesh Ayoub     OTHER SURGICAL HISTORY      ,,ERCP,xseveral, one with papillotomy     OTHER SURGICAL HISTORY      2012-3/2/2012,[id number],RADIATION TREATMENT     OTHER SURGICAL HISTORY      2011,84731,NECK/THORAX PROCEDURE,L modified radical neck surgery     OTHER SURGICAL HISTORY      11/10/14,097660,OTHER,Dr. Lyndon FRANK     OTHER SURGICAL HISTORY      ,89523,REVISION EYELID,Dr. Farias     OTHER SURGICAL HISTORY      10/22/15,[id number],OTHER     TONSILLECTOMY, ADENOIDECTOMY, COMBINED      194     VASECTOMY             Social History   I have reviewed this patient's social history and updated it with pertinent information if needed.  Social History     Tobacco Use     Smoking status: Former Smoker     Packs/day: 3.00     Years: 43.00     Pack years: 129.00     Types: Cigarettes     Quit date: 2001     Years since quittin.7     Smokeless tobacco: Never Used   Vaping Use     Vaping Use: Never used    Substance Use Topics     Alcohol use: Not Currently     Drug use: No       Family History   I have reviewed this patient's family history and updated it with pertinent information if needed.  Family History   Problem Relation Age of Onset     Breast Cancer Mother      Heart Disease Mother 80        Heart Disease,MI     Heart Disease Father 83        Heart Disease,CHF     Lung Cancer Father      Family History Negative Sister         Good Health     Heart Disease Sister         Heart Disease,pacemaker     Breast Cancer Daughter         Cancer-breast     Diabetes Son         Diabetes,Type 1     Alcoholism Son      Prostate Cancer No family hx of         Cancer-prostate       Prior to Admission Medications   Prior to Admission Medications   Prescriptions Last Dose Informant Patient Reported? Taking?   Blood Glucose Monitoring Suppl (FIFTY50 GLUCOSE METER 2.0) W/DEVICE KIT NA at NA Spouse/Significant Other Yes No   Sig: Dispense Accu-Chek Agnes  Meter. Dispense item covered by pt ins. E11.9 NIDDM type II - Test 1 time/day   Omega-3 Fatty Acids (FISH OIL-OMEGA-3 FATTY ACID) 1000 MG DR capsule Past Week at AM Spouse/Significant Other Yes Yes   Sig: Take 1 each by mouth 2 times daily   acetaminophen (TYLENOL) 325 MG tablet Past Month at AM Spouse/Significant Other Yes Yes   Sig: Take 325-650 mg by mouth every 6 hours as needed for mild pain   amylase-lipase-protease (CREON 36) 659874-27507-085024 units CPEP Past Week at AM Spouse/Significant Other No Yes   Sig: Take 3 capsules by mouth 4 times daily (with meals and nightly) Taking 77876 units   blood glucose (NO BRAND SPECIFIED) lancets standard NA at NA Spouse/Significant Other No No   Sig: Use to test blood sugar 2 times daily  E11.9   blood glucose (NO BRAND SPECIFIED) test strip NA at NA Spouse/Significant Other No No   Sig: Use to test blood sugar 2 times daily  E11.9   cetirizine (ZYRTEC) 10 MG tablet Past Week at PM Spouse/Significant Other Yes Yes   Sig: Take 10  mg by mouth At Bedtime   levothyroxine (SYNTHROID/LEVOTHROID) 50 MCG tablet Past Week at AM Spouse/Significant Other No Yes   Sig: Take 1 tablet (50 mcg) by mouth daily   loperamide (IMODIUM A-D) 2 MG tablet Past Week at AM Spouse/Significant Other No Yes   Sig: Take 1 tablet (2 mg) by mouth 4 times daily as needed for diarrhea   magnesium oxide (MAG-OX) 400 MG tablet Past Week at PM Spouse/Significant Other Yes Yes   Sig: Take 400 mg by mouth At Bedtime   metFORMIN (GLUCOPHAGE) 1000 MG tablet Past Week at AM Spouse/Significant Other No Yes   Sig: Take 1 tablet (1,000 mg) by mouth 2 times daily (with meals) For diabetes prevention   nitroGLYcerin (NITROSTAT) 0.4 MG sublingual tablet More than a month at AM Spouse/Significant Other No Yes   Sig: DISSOLVE ONE TABLET UNDER THE TONGUE EVERY 5 MINUTES AS NEEDED FOR CHEST PAIN.  DO NOT EXCEED A TOTAL OF 3 DOSES IN 15 MINUTES   nystatin (NYSTOP) 618933 UNIT/GM external powder More than a month at AM Spouse/Significant Other No Yes   Sig: Apply topically 2 times daily as needed For skin fold rash   omeprazole 20 MG tablet Past Week at AM Spouse/Significant Other Yes Yes   Sig: Take 20 mg by mouth daily   potassium chloride (KLOR-CON) 20 MEQ packet Past Week at AM Spouse/Significant Other No Yes   Sig: Take 20 mEq by mouth daily for 7 days   primidone (MYSOLINE) 50 MG tablet Past Week at PM Spouse/Significant Other No Yes   Sig: Take 2 tablets (100 mg) by mouth At Bedtime   propranolol (INDERAL) 20 MG tablet Past Week at AM Spouse/Significant Other No Yes   Sig: Take 1 tablet by mouth in the morning and 2 tablets by mouth in the evening.   rivastigmine (EXELON) 3 MG capsule Past Week at AM Spouse/Significant Other Yes Yes   Sig: Take 3 mg by mouth 2 times daily . Should be taken with breakfast and dinner.   tamsulosin (FLOMAX) 0.4 MG capsule Past Week at AM Spouse/Significant Other Yes Yes   Sig: Take 0.4 mg by mouth daily   tiotropium (SPIRIVA) 18 MCG inhaled capsule Past  Month at AM Spouse/Significant Other No Yes   Sig: Inhale 1 capsule (18 mcg) into the lungs daily - for chronic phlegm/Bronchitis   Patient taking differently: Inhale 18 mcg into the lungs daily as needed - for chronic phlegm/Bronchitis   torsemide (DEMADEX) 10 MG tablet Past Week at AM Spouse/Significant Other No Yes   Sig: Take 1-2 tablets (10-20 mg) by mouth daily   vitamin D3 (CHOLECALCIFEROL) 50 mcg (2000 units) tablet Past Week at AM Spouse/Significant Other Yes Yes   Sig: Take 1 tablet by mouth 2 times daily      Facility-Administered Medications: None     Allergies   Allergies   Allergen Reactions     Other Drug Allergy (See Comments) Other (See Comments)     Trelegy Ellipta  Sore throat         Physical Exam   Vital Signs: Temp: 97.8  F (36.6  C) Temp src: Tympanic BP: 138/78 Pulse: 85   Resp: 22 SpO2: 96 % O2 Device: None (Room air)    Weight: 161 lbs 0 oz    Constitutional: Patient is sleeping and seems comfortable.  He does arouse to his daughter's voice and touch.  Eyes: Eyes remain closed.  ENT: Nasopharynx and oropharynx without abnormality.  Moist mucous membranes.  Hematologic / Lymphatic: Patient has an enlarged necrotic left anterior clavicular lymph node.  Respiratory: Bilateral rhonchi more notable at right lower base.  Currently no increased work of breathing.  Cardiovascular: Regular rate and rhythm.  No murmurs rubs or gallops.  No edema.  GI: Abdomen soft and nondistended.  Bowel sounds heard.  Genitounirinary: No apparent suprapubic tenderness  Skin: I do not appreciate any skin breakdown at this time  Musculoskeletal: Patient appears to build move all extremities but does not follow commands.  Neurologic: Neurologic exam limited by his inability to interact.  Neuropsychiatric: Patient is somnolent to lethargic and does not follow commands    Data   Data reviewed today: I reviewed all medications, new labs and imaging results over the last 24 hours. I personally reviewed the chest x-ray  image(s) showing Recurrent pneumonia.    Recent Labs   Lab 09/27/22  1137 09/21/22  2018 09/21/22  1541   WBC 11.0  --  12.6*   HGB 11.8*  --  10.9*   MCV 99  --  97     --  282     --  136   POTASSIUM 2.5* 2.7* 2.4*   CHLORIDE 101  --  95*   CO2 29  --  29   BUN 10  --  13   CR 0.90  --  1.00   ANIONGAP 13  --  12   ERICA 9.0  --  8.6   *  --  151*   ALBUMIN 3.2*  --   --    PROTTOTAL 6.2*  --   --    BILITOTAL 0.4  --   --    ALKPHOS 70  --   --    ALT 11  --   --    AST 14  --   --      Recent Results (from the past 24 hour(s))   XR Chest Port 1 View    Narrative    PROCEDURE:  XR CHEST PORT 1 VIEW    HISTORY: Shortness of breath    COMPARISON:  Chest radiographs 9/21/2022, 9/11/2022, 8/26/2022    FINDINGS: Single view chest radiograph  Left chest Port-A-Cath with tip projecting over the inferior SVC.  Bilateral total shoulder arthroplasties.  Cardiomediastinal silhouette is stable. Increased right infrahilar  streaky opacities. Right upper lung nodule and right lower lobe mass.  Pleural spaces are clear. No suspicious osseous lesion or  subdiaphragmatic free air.      Impression    IMPRESSION:   Increased right infrahilar streaky opacities, atelectasis versus  infection.      ANNE BARAHONA MD         SYSTEM ID:  [id number]

## 2022-09-27 NOTE — PHARMACY-ADMISSION MEDICATION HISTORY
Pharmacy -- Admission Medication Reconciliation    Prior to admission (PTA) medications were reviewed and the patient's PTA medication list was updated.    Sources Consulted: patient spouse Ruth interview, sure scripts, chart review    The reliability of this Medication Reconciliation is: Reliability: Reliable    The following significant changes were made:    Removed metoclopramide    In addition, the patient's allergies were reviewed with the patient and updated as follows:   Allergies: Other drug allergy (see comments)    The pharmacist has reviewed with the patient that all personal medications should be removed from the building or locked in the belongings safe.  Patient shall only take medications ordered by the physician and administered by the nursing staff.       Medication barriers identified: can't swallow medications, no medications for the past 2-3 days per spouse   Medication adherence concerns: see above   Understanding of emergency medications: has inhalers, has ntg     Rachelle Pickens RPH, 9/27/2022,  3:45 PM

## 2022-09-27 NOTE — PROVIDER NOTIFICATION
09/27/22 1643   Valuables   Patient Belongings remains with patient   Patient Belongings Remaining with Patient clothing   Did you bring any home meds/supplements to the hospital?  No   [facility] will make every effort per our policy to help keep your items safe while in the hospital.  If you choose to keep any items at the bedside, we cannot be held responsible for any items that are lost or broken.      List items sent to safe:     I have reviewed my belongings list on admission and verify that it is correct.     Patient signature_______________________________  Date/Time_____________________    2nd Staff person if patient unable to sign __________________________  Date/Time ______________________      I have received all my belongings noted above at discharge.    Patient signature________________________________  Date/Time  __________________________

## 2022-09-27 NOTE — PLAN OF CARE
"Patient is more alert at this time. VSS and recorded. LS have fine crackles throughout, patient has no SOB noted. Resting comfortably in bed.  Problem: Plan of Care - These are the overarching goals to be used throughout the patient stay.    Goal: Plan of Care Review/Shift Note  Description: The Plan of Care Review/Shift note should be completed every shift.  The Outcome Evaluation is a brief statement about your assessment that the patient is improving, declining, or no change.  This information will be displayed automatically on your shift note.  Outcome: Ongoing, Progressing  Goal: Patient-Specific Goal (Individualized)  Description: You can add care plan individualizations to a care plan. Examples of Individualization might be:  \"Parent requests to be called daily at 9am for status\", \"I have a hard time hearing out of my right ear\", or \"Do not touch me to wake me up as it startles me\".  Outcome: Ongoing, Progressing  Goal: Absence of Hospital-Acquired Illness or Injury  Outcome: Ongoing, Progressing  Intervention: Identify and Manage Fall Risk  Recent Flowsheet Documentation  Taken 9/27/2022 1700 by Nael Fu RN  Safety Promotion/Fall Prevention:   supervised activity   bed alarm on   clutter free environment maintained   fall prevention program maintained   increase visualization of patient   nonskid shoes/slippers when out of bed   room door open   room near nurse's station  Intervention: Prevent Skin Injury  Recent Flowsheet Documentation  Taken 9/27/2022 1700 by Nael Fu RN  Skin Protection: incontinence pads utilized  Intervention: Prevent and Manage VTE (Venous Thromboembolism) Risk  Recent Flowsheet Documentation  Taken 9/27/2022 1700 by Nael Fu RN  Range of Motion: ROM (range of motion) performed  VTE Prevention/Management: SCDs (sequential compression devices) on  Activity Management: activity adjusted per tolerance  Intervention: Prevent Infection  Recent Flowsheet " Documentation  Taken 9/27/2022 1700 by Nael Fu, RN  Infection Prevention:   environmental surveillance performed   rest/sleep promoted   single patient room provided   equipment surfaces disinfected  Goal: Optimal Comfort and Wellbeing  Outcome: Ongoing, Progressing  Goal: Readiness for Transition of Care  Outcome: Ongoing, Progressing  Intervention: Mutually Develop Transition Plan  Recent Flowsheet Documentation  Taken 9/27/2022 1633 by Nael Fu, RN  Equipment Currently Used at Home: walker, rolling   Goal Outcome Evaluation:

## 2022-09-27 NOTE — PROGRESS NOTES
"NS ADMISSION NOTE    Patient admitted to room 302 at approximately 1620 via cart from emergency room. Patient was accompanied by spouse and daughter.     Verbal SBAR report received from Wilda prior to patient arrival.     Patient trasferred to bed via air amparo. Patient does not respond to verbal stimuli, withdraws from touch. Pain is controlled without any medications.  . Admission vital signs: Blood pressure 138/78, pulse 85, temperature 97.8  F (36.6  C), temperature source Tympanic, resp. rate 22, height 1.702 m (5' 7\"), weight 73 kg (161 lb), SpO2 96 %. spouse and daughter were oriented to plan of care, call light, bed controls, tv, telephone, bathroom and visiting hours.     Nael Fu RN    "

## 2022-09-27 NOTE — ED PROVIDER NOTES
History     Chief Complaint   Patient presents with     Shortness of Breath     Respiratory Problems     HPI  Rajesh Huynh is a 82 year old male who presents the emergency department with cough and congestion shortness of breath, similar to previous pneumonia.  Hospitalized for pneumonia in August.  Very weak for the last 3 days, was in the ED recently and found to have low potassium and low magnesium which was treated.  There was no evidence of pneumonia at that time.  They are concerned that they are unable to care for him any longer at home as his weakness is rapidly progressing.    Reviewed nurses notes   Pt arrives via [facility] for difficulty breathing and chest congestion.  Pt was recently hospitalized with pneumonia and family feels he may still have it.  Family reports pt refusing meds the last 3 days.  Pt has a hx of Lewy Body dementia.     Allergies:  Allergies   Allergen Reactions     Other Drug Allergy (See Comments) Other (See Comments)     Trelegy Ellipta  Sore throat         Problem List:    Patient Active Problem List    Diagnosis Date Noted     Community acquired bacterial pneumonia 09/27/2022     Priority: Medium     Acute on chronic heart failure with preserved ejection fraction (HFpEF) (H) 09/14/2022     Priority: Medium     Progressive neurologic decline 09/12/2022     Priority: Medium     Recurrent aspiration pneumonia (H) 09/06/2022     Priority: Medium     Chronic cough 09/06/2022     Priority: Medium     Chronic hiccups 09/06/2022     Priority: Medium     Iron deficiency anemia, unspecified iron deficiency anemia type 09/06/2022     Priority: Medium     Controlled type 2 diabetes mellitus with stage 3 chronic kidney disease, without long-term current use of insulin (H) 09/06/2022     Priority: Medium     Pneumonia 08/26/2022     Priority: Medium     Infection due to 2019 novel coronavirus - 8/10/2022 08/10/2022     Priority: Medium     Restless legs syndrome 05/31/2022     Priority:  Medium     Malignant neoplasm of overlapping sites of right lung (H) 05/02/2022     Priority: Medium     Malignant neoplasm of lower lobe of right lung (H) 04/14/2022     Priority: Medium     Malignant neoplasm of upper lobe of right lung (H) 04/14/2022     Priority: Medium     Right lower lobe lung mass 04/01/2022     Priority: Medium     Nodule of upper lobe of right lung 04/01/2022     Priority: Medium     Hypothyroidism due to acquired atrophy of thyroid 12/16/2021     Priority: Medium     Lewy body dementia without behavioral disturbance (H) 09/16/2021     Priority: Medium     Heartburn 02/23/2021     Priority: Medium     Simple chronic bronchitis (H) 02/23/2021     Priority: Medium     History of cardiac cath on 7/8/2013 10/15/2020     Priority: Medium     Benign prostatic hyperplasia with lower urinary tract symptoms, symptom details unspecified 10/15/2020     Priority: Medium     Hx of pancreatitis on 10/23/2014 10/15/2020     Priority: Medium     Hx of bladder cancer on 11/10/2014 10/15/2020     Priority: Medium     Aortic atherosclerosis (H) 10/15/2020     Priority: Medium     Chronic hyponatremia 10/15/2020     Priority: Medium     Stenosis of left carotid artery 10/15/2020     Priority: Medium     Exocrine pancreatic insufficiency - Severe Deficiency 11/22/2019     Priority: Medium     Intermittent diarrhea 11/13/2019     Priority: Medium     Controlled type 2 diabetes mellitus without complication, without long-term current use of insulin (H) 05/03/2019     Priority: Medium     History of pancreatic surgery - Whipple 03/21/2019     Priority: Medium     History of tobacco use quitting in 1/1/2000 at 3 packs a day 05/08/2018     Priority: Medium     Benign essential tremor 01/24/2018     Priority: Medium     Psychosexual dysfunction with inhibited sexual excitement 01/24/2018     Priority: Medium     Benign essential hypertension 01/24/2018     Priority: Medium     Mixed hyperlipidemia 01/24/2018      Priority: Medium     History of throat cancer 12/13/2017     Priority: Medium     Partial tear of right rotator cuff 06/21/2017     Priority: Medium     History of urinary retention 12/23/2016     Priority: Medium     Overview:   Postoperative       Atherosclerosis of native coronary artery of native heart without angina pectoris 10/03/2016     Priority: Medium     Personal history of malignant neoplasm of bladder 11/19/2014     Priority: Medium     H/O STEMI 7/3/2013 involving the RCA 07/11/2013     Priority: Medium        Past Medical History:    Past Medical History:   Diagnosis Date     Acute myocardial infarction, unspecified (H) 07/05/2013     Basal cell carcinoma of left ear 12/31/2021     Bicipital tenosynovitis 07/05/2013     CAD (coronary artery disease)      Chronic myeloproliferative disease (H)      Diabetes mellitus, type 2 (H)      Dysphagia (partial resection of epiglottis) 07/05/2013     Essential (primary) hypertension 01/12/2004     Glucose intolerance 11/14/2003     Hiccough 07/05/2013     Hyperlipidemia      Lewy body dementia (H)      Male erectile dysfunction      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)      Malignant neoplasm of lower lobe of right lung (H) 04/14/2022     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     Peptic ulcer disease 11/10/2003     Pneumonia      Resolved: chronic pancreatitis (has undergone Whipple procedure)      Resolved: Pancreatitis 01/12/2004     Resolved: Personal history of nicotine dependence      Resolved: Thoracic back pain 04/08/2010     Resovled: Pancreatic duct stones 01/12/2004     ST elevation (STEMI) myocardial infarction (H)      Subscapularis (muscle) sprain 07/05/2013     Tremor        Past Surgical History:    Past Surgical History:   Procedure Laterality Date     APPENDECTOMY OPEN      1970     ARTHROSCOPY SHOULDER      11/2005,right shoulder     ARTHROSCOPY SHOULDER      1/2006,left  shoulder     ARTHROSCOPY SHOULDER      2011,Left shoulder scope SAD, ac.  Open subscap, biceps     BRONCHOSCOPY  04/13/2022    bronchoscopy with endobronchial biopsies     CHOLECYSTECTOMY      1994     COLONOSCOPY  02/01/2005 02/2005     COLONOSCOPY  02/19/2015 2/19/2015,no repeat due at this time     CYSTOSCOPY      Multiple times,Dr. Lyndon FRANK     FINE NEEDLE ASPIRATION  04/13/2022    transbronchial needle aspiration and biopsy     HEART CATH, ANGIOPLASTY      07/05/2013,Bare-metal stent to dominant RCA     OTHER SURGICAL HISTORY      2004,[id number],OTHER,for chronic pancreatitis     OTHER SURGICAL HISTORY      6/22/2007,[id number],SHOULDER REPLACEMENT,Right,with biceps tenodesis by Dr. Mukesh Ayoub     OTHER SURGICAL HISTORY      1995/2002,,ERCP,xseveral, one with papillotomy     OTHER SURGICAL HISTORY      1/30/2012-3/2/2012,[id number],RADIATION TREATMENT     OTHER SURGICAL HISTORY      12/7/2011,04711,NECK/THORAX PROCEDURE,L modified radical neck surgery     OTHER SURGICAL HISTORY      11/10/14,[id number],OTHER,Dr. Lyndon FRANK     OTHER SURGICAL HISTORY      2012,[id number],REVISION EYELID,Dr. Farias     OTHER SURGICAL HISTORY      10/22/15,[id number],OTHER     TONSILLECTOMY, ADENOIDECTOMY, COMBINED      1947     VASECTOMY      1980       Family History:    Family History   Problem Relation Age of Onset     Breast Cancer Mother      Heart Disease Mother 80        Heart Disease,MI     Heart Disease Father 83        Heart Disease,CHF     Lung Cancer Father      Family History Negative Sister         Good Health     Heart Disease Sister         Heart Disease,pacemaker     Breast Cancer Daughter         Cancer-breast     Diabetes Son         Diabetes,Type 1     Alcoholism Son      Prostate Cancer No family hx of         Cancer-prostate       Social History:  Marital Status:   [2]  Social History     Tobacco Use     Smoking status: Former Smoker     Packs/day: 3.00     Years: 43.00     Pack years: 129.00     Types:  "Cigarettes     Quit date: 2001     Years since quittin.7     Smokeless tobacco: Never Used   Vaping Use     Vaping Use: Never used   Substance Use Topics     Alcohol use: Not Currently     Drug use: No        Medications:    acetaminophen (TYLENOL) 325 MG tablet  amylase-lipase-protease (CREON 36) 909610-57300-431879 units CPEP  blood glucose (NO BRAND SPECIFIED) lancets standard  blood glucose (NO BRAND SPECIFIED) test strip  Blood Glucose Monitoring Suppl (FIFTY50 GLUCOSE METER 2.0) W/DEVICE KIT  cetirizine (ZYRTEC) 10 MG tablet  levothyroxine (SYNTHROID/LEVOTHROID) 50 MCG tablet  loperamide (IMODIUM A-D) 2 MG tablet  magnesium oxide (MAG-OX) 400 MG tablet  metFORMIN (GLUCOPHAGE) 1000 MG tablet  metoclopramide (REGLAN) 10 MG tablet  nitroGLYcerin (NITROSTAT) 0.4 MG sublingual tablet  nystatin (NYSTOP) 190664 UNIT/GM external powder  Omega-3 Fatty Acids (FISH OIL-OMEGA-3 FATTY ACID) 1000 MG DR capsule  omeprazole 20 MG tablet  potassium chloride (KLOR-CON) 20 MEQ packet  primidone (MYSOLINE) 50 MG tablet  propranolol (INDERAL) 20 MG tablet  rivastigmine (EXELON) 3 MG capsule  tamsulosin (FLOMAX) 0.4 MG capsule  tiotropium (SPIRIVA) 18 MCG inhaled capsule  torsemide (DEMADEX) 10 MG tablet  vitamin D3 (CHOLECALCIFEROL) 50 mcg (2000 units) tablet          Review of Systems   Unable to perform ROS: Dementia       Physical Exam   BP: 136/86  Pulse: 79  Temp: 97.9  F (36.6  C)  Resp: 22  Height: 170.2 cm (5' 7\")  Weight: 73 kg (161 lb)  SpO2: 97 %      Physical Exam  Vitals and nursing note reviewed.   HENT:      Nose: Congestion and rhinorrhea present.   Cardiovascular:      Rate and Rhythm: Normal rate.      Pulses: Normal pulses.   Pulmonary:      Effort: Pulmonary effort is normal.      Breath sounds: Rhonchi and rales present.   Musculoskeletal:      Right lower leg: No edema.      Left lower leg: No edema.   Neurological:      Mental Status: He is alert.         ED Course         Results for orders placed " or performed during the hospital encounter of 09/27/22 (from the past 24 hour(s))   UA with Microscopic reflex to Culture    Specimen: Urine, Clean Catch   Result Value Ref Range    Color Urine Yellow Colorless, Straw, Light Yellow, Yellow    Appearance Urine Clear Clear    Glucose Urine Negative Negative mg/dL    Bilirubin Urine Negative Negative    Ketones Urine 10  (A) Negative mg/dL    Specific Gravity Urine 1.015 1.000 - 1.030    Blood Urine Negative Negative    pH Urine 6.0 5.0 - 9.0    Protein Albumin Urine 30  (A) Negative mg/dL    Urobilinogen Urine Normal Normal, 2.0 mg/dL    Nitrite Urine Negative Negative    Leukocyte Esterase Urine Negative Negative    RBC Urine <1 <=2 /HPF    WBC Urine 2 <=5 /HPF    Narrative    Urine Culture not indicated   Asymptomatic Influenza A/B & SARS-CoV2 (COVID-19) Virus PCR Multiplex Nasopharyngeal    Specimen: Nasopharyngeal; Swab   Result Value Ref Range    Influenza A PCR Negative Negative    Influenza B PCR Negative Negative    RSV PCR Negative Negative    SARS CoV2 PCR Negative Negative    Narrative    Testing was performed using the Xpert Xpress CoV2/Flu/RSV Assay on the Cepheid GeneXpert Instrument. This test should be ordered for the detection of SARS-CoV-2 and influenza viruses in individuals who meet clinical and/or epidemiological criteria. Test performance is unknown in asymptomatic patients. This test is for in vitro diagnostic use under the FDA EUA for laboratories certified under CLIA to perform high or moderate complexity testing. This test has not been FDA cleared or approved. A negative result does not rule out the presence of PCR inhibitors in the specimen or target RNA in concentration below the limit of detection for the assay. If only one viral target is positive but coinfection with multiple targets is suspected, the sample should be re-tested with another FDA cleared, approved, or authorized test, if coinfection would change clinical management. This  test was validated by the [facility]. These laboratories are certified under the Clinical  Laboratory Improvement Amendments of 1988 (CLIA-88) as qualified to perform high complexity laboratory testing.   XR Chest Port 1 View    Narrative    PROCEDURE:  XR CHEST PORT 1 VIEW    HISTORY: Shortness of breath    COMPARISON:  Chest radiographs 9/21/2022, 9/11/2022, 8/26/2022    FINDINGS: Single view chest radiograph  Left chest Port-A-Cath with tip projecting over the inferior SVC.  Bilateral total shoulder arthroplasties.  Cardiomediastinal silhouette is stable. Increased right infrahilar  streaky opacities. Right upper lung nodule and right lower lobe mass.  Pleural spaces are clear. No suspicious osseous lesion or  subdiaphragmatic free air.      Impression    IMPRESSION:   Increased right infrahilar streaky opacities, atelectasis versus  infection.      ANNE BARAHONA MD         SYSTEM ID:  [id number]   CBC with platelets differential    Narrative    The following orders were created for panel order CBC with platelets differential.  Procedure                               Abnormality         Status                     ---------                               -----------         ------                     CBC with platelets and d...[[id number]]  Abnormal            Final result                 Please view results for these tests on the individual orders.   Comprehensive metabolic panel   Result Value Ref Range    Sodium 143 134 - 144 mmol/L    Potassium 2.5 (L) 3.5 - 5.1 mmol/L    Chloride 101 98 - 107 mmol/L    Carbon Dioxide (CO2) 29 21 - 31 mmol/L    Anion Gap 13 3 - 14 mmol/L    Urea Nitrogen 10 7 - 25 mg/dL    Creatinine 0.90 0.70 - 1.30 mg/dL    Calcium 9.0 8.6 - 10.3 mg/dL    Glucose 131 (H) 70 - 105 mg/dL    Alkaline Phosphatase 70 34 - 104 U/L    AST 14 13 - 39 U/L    ALT 11 7 - 52 U/L    Protein Total 6.2 (L) 6.4 - 8.9 g/dL    Albumin 3.2 (L) 3.5 - 5.7 g/dL    Bilirubin Total 0.4 0.3 - 1.0  mg/dL    GFR Estimate 86 >60 mL/min/1.73m2   Troponin I   Result Value Ref Range    Troponin I 14.4 0.0 - 34.0 pg/mL   Nt probnp inpatient   Result Value Ref Range    N terminal Pro BNP Inpatient 106 (H) 0 - 100 pg/mL   CBC with platelets and differential   Result Value Ref Range    WBC Count 11.0 4.0 - 11.0 10e3/uL    RBC Count 3.46 (L) 4.40 - 5.90 10e6/uL    Hemoglobin 11.8 (L) 13.3 - 17.7 g/dL    Hematocrit 34.3 (L) 40.0 - 53.0 %    MCV 99 78 - 100 fL    MCH 34.1 (H) 26.5 - 33.0 pg    MCHC 34.4 31.5 - 36.5 g/dL    RDW 16.1 (H) 10.0 - 15.0 %    Platelet Count 257 150 - 450 10e3/uL    % Neutrophils 89 %    % Lymphocytes 2 %    % Monocytes 9 %    % Eosinophils 0 %    % Basophils 0 %    % Immature Granulocytes 0 %    NRBCs per 100 WBC 0 <1 /100    Absolute Neutrophils 9.7 (H) 1.6 - 8.3 10e3/uL    Absolute Lymphocytes 0.2 (L) 0.8 - 5.3 10e3/uL    Absolute Monocytes 1.0 0.0 - 1.3 10e3/uL    Absolute Eosinophils 0.0 0.0 - 0.7 10e3/uL    Absolute Basophils 0.0 0.0 - 0.2 10e3/uL    Absolute Immature Granulocytes 0.0 <=0.4 10e3/uL    Absolute NRBCs 0.0 10e3/uL   Extra Tube    Narrative    The following orders were created for panel order Extra Tube.  Procedure                               Abnormality         Status                     ---------                               -----------         ------                     Extra Blue Top Tube[[id number]]                              Final result               Extra Serum Separator Tu...[[id number]]                      Final result                 Please view results for these tests on the individual orders.   Extra Blue Top Tube   Result Value Ref Range    Hold Specimen     Extra Serum Separator Tube (SST)   Result Value Ref Range    Hold Specimen     Magnesium   Result Value Ref Range    Magnesium 1.5 (L) 1.9 - 2.7 mg/dL       Medications   magnesium sulfate 1 gm in 100mL D5W intermittent infusion (has no administration in time range)   ipratropium - albuterol 0.5 mg/2.5  mg/3 mL (DUONEB) neb solution 3 mL (3 mLs Nebulization Given 9/27/22 1217)   potassium chloride 10 mEq in 100 mL sterile water intermittent infusion (premix) (0 mEq Intravenous Stopped 9/27/22 1408)   cefTRIAXone in d5w (ROCEPHIN) intermittent infusion 1 g (0 g Intravenous Stopped 9/27/22 1402)       Assessments & Plan (with Medical Decision Making)     I have reviewed the nursing notes.    I have reviewed the findings, diagnosis, plan and need for follow up with the patient.  Significant generalized weakness, patient certainly would be a fall risk at home.  Recurrent pneumonia suspected.  Antibiotics x1 in ED, discussed with Dr. Pulido who is in agreement with admission.  Anticipate disposition after hospitalization to skilled nursing facility.  Discussed with family they are in agreement.    New Prescriptions    No medications on file       Final diagnoses:   Community acquired bacterial pneumonia       9/27/2022   [facility]     Andrew Miranda MD  09/27/22 3196

## 2022-09-27 NOTE — ED TRIAGE NOTES
Pt arrives via [facility]S-[facility] for difficulty breathing and chest congestion.  Pt was recently hospitalized with pneumonia and family feels he may still have it.  Family reports pt refusing meds the last 3 days.  Pt has a hx of Lewy Body dementia.     Triage Assessment     Row Name 09/27/22 1047       Skin Circulation/Temperature WDL    Skin Circulation/Temperature WDL WDL       Cardiac WDL    Cardiac WDL WDL       Peripheral/Neurovascular WDL    Peripheral Neurovascular WDL WDL

## 2022-09-28 NOTE — PROGRESS NOTES
09/28/22 1000   General Information   Onset of Illness/Injury or Date of Surgery 09/27/22   Referring Physician Dr. Pulido   General Observations Pt was sitting in chair upon clinician arrival. Pt showing signs of hallucinations and seems to be disoriented, likely due to hx of Lewy Body dementia. Pt is currently NPO due to aspiration cautions. He participated in PO trials of think liquids, mildly and mod thick, soft and bite sized and regular texture foods. Pt demonstrating tremors in his UE's while grabbing cups from SLP.   Type of Evaluation   Type of Evaluation Swallow Evaluation   Dentition (Oral Motor)   Dentition (Oral Motor) adequate dentition   Facial Symmetry (Oral Motor)   Facial Symmetry (Oral Motor) WNL   Lip Function (Oral Motor)   Lip Range of Motion (Oral Motor) WNL   Lip Strength (Oral Motor) mild impairment   Lip Coordination (Oral Motor) minimal impairment   Vocal Quality/Secretion Management (Oral Motor)   Vocal Quality (Oral Motor)   (impaired loudness, speaks very softly.)   General Swallowing Observations   Current Diet/Method of Nutritional Intake (General Swallowing Observations, NIS) NPO   Swallowing Evaluation Clinical swallow evaluation   Clinical Swallow Evaluation   Feeding Assistance minimal assistance required   Clinical Swallow Evaluation Textures Trialed thin liquids;mildly thick liquids;moderately thick liquids/liquidized;soft & bite-sized;solid foods   Clinical Swallow Eval: Thin Liquid Texture Trial   Mode of Presentation, Thin Liquids cup;self-fed   Volume of Liquid or Food Presented 1oz   Pharyngeal Phase of Swallow coughing/choking   Diagnostic Statement Pt taking 1 sip of thin liquid and demonstrating with overt s/s of aspiration/penetration characterized by coughing.   Clinical Swallow Eval: Mildly Thick Liquids   Mode of Presentation cup;self-fed   Volume Presented 4oz   Pharyngeal Phase coughing/choking   Diagnostic Statement Pt taking single and consecutive sips,  demonstrating with overt s/ s of aspiration/penetration on 2/4 of trials characterized by coughing. He reports that this was easier to swallow than thins.   Clinical Swallow Eval: Moderately Thick Liquids   Mode of Presentation cup;self-fed   Volume Presented 8oz   Pharyngeal Phase coughing/choking   Diagnostic Statement Pt taking single and consecutive sips, pt demonstrating with delayed cough after 2/10 trials, likely due to pharyngeal residuals from generalized weakness of swallowing musculature. He reports that this texture goes down well.   Clinical Swallow Eval: Soft & Bite Sized   Mode of Presentation spoon;fed by clinician   Volume Presented 8 diced peaches   Oral Phase WFL   Pharyngeal Phase intact   Diagnostic Statement Pt stripping bolus from spoon and demonstrating with no overt s.s of aspiration/penetration.   Clinical Swallow Evaluation: Solid Food Texture Trial   Mode of Presentation self-fed   Volume Presented 1 ramya doone   Oral Phase residue in oral cavity   Oral Residue mid posterior tongue   Pharyngeal Phase intact   Diagnostic Statement Pt taking whole cookie as bite, demonstrating with residuals in oral cavity on mid lingual surface, with liquid wash pt was able to clear most of the residuals. When asked, pt reports that this texture is hard to chew.   Esophageal Phase of Swallow   Patient reports or presents with symptoms of esophageal dysphagia No   Swallowing Recommendations   Diet Consistency Recommendations moderately thick liquids/liquidized (level 3);soft & bite-sized (level 6)   Supervision Level for Intake distant supervision needed   Monitoring/Assistance Required (Eating/Swallowing) stop eating activities when fatigue is present;monitor for cough or change in vocal quality with intake   Comment, Swallowing Recommendations Recommend IDDSI 3 Moderatley thick and IDDSI 6 Soft and bite sized to reduce risk of aspiration and prevent fatigue during PO intake. Monitor for coughing and  change in vocal quality during PO intake.   General Therapy Interventions   Planned Therapy Interventions Dysphagia Treatment   Dysphagia treatment Modified diet education   Intervention Comments Pt requires modified diet due to overt s/s of aspiration.   Clinical Impression   Criteria for Skilled Therapeutic Interventions Met (SLP Eval) Yes, treatment indicated   SLP Diagnosis oropharyngeal dysphagia   Risks & Benefits of therapy have been explained evaluation/treatment results reviewed;care plan/treatment goals reviewed   Clinical Impression Comments Pt is demonstrating with oropharyngeal dysphagia, likely due to dx of Lewy Body dementia and overall weakness. Recommend IDDSI 3 and 6 to decrease risk of aspiration and prevent fatigue while eating.   SLP Discharge Planning   SLP Discharge Recommendation Long term care facility   SLP Rationale for DC Rec Pt requires a modified diet for liquids and solids, and would benefit from assisstance with setting up meals, and monitoring for aspiration events.   SLP Brief overview of current status  Pt is demonstrating with oropharyngeal dysphagia, likely due to dx of Lewy Body dementia and overall weakness. Recommend IDDSI 3 and 6 to decrease risk of aspiration and prevent fatigue while eating.   Therapy Certification   Start of Care Date 09/28/22   Certification date from 09/28/22   Certification date to 10/05/22   Medical Diagnosis Community acquired bacterial pneumonia    Total Evaluation Time   Total Evaluation Time (Minutes) 30   SLP Goals   Therapy Frequency (SLP Eval) daily   SLP Goals Swallow;SLP Goal 1   SLP: Safely tolerate diet without signs/symptoms of aspiration Moderately thick liquids;Soft & bite sized diet   SLP: Goal 1 Pt will tolerate diet with no signs/symptoms of aspiration for 90% of trials.   Psychosocial Support   Trust Relationship/Rapport care explained

## 2022-09-28 NOTE — PLAN OF CARE
Pt is alert to self, not time, place or situation. He has denied pain this shift. Has been anxious, and has been sitting up on th edge of the bed. He has been coughing/hacking up sputum this morning, it is mostly tan/white, he did have 3 small streaks of blood as well. He has crackles in his lung bases, otherwise clear and diminished. K+ has been managed per protocol, he had some irritation to his IV site towards the end of his last 2 infusions. He has set off his bed alarm throughout the shift.       Goal Outcome Evaluation:          Overall Patient Progress: no change

## 2022-09-28 NOTE — PROGRESS NOTES
:     Met with patient's wife and daughter in patient's room to discuss discharge planning.  Wife updated that they have looked into several memory care options and have selected [facility].  They toured [facility] and put a down payment down on a room in memory care.  They would also like hospice services and have already been talking to [facility].   They were provided with local options and would still like [facility].      Spoke with Tiarra at [facility].  They have already determined eligibility and patient is eligible.      Left a message for Chioma JAMESON inquiring when they will be able to screen patient for admission.  Also spoke with Sidney and she stated Chioma will be out of the office until tomorrow.     JERRI Martínez on 9/28/2022 at 3:06 PM

## 2022-09-28 NOTE — PLAN OF CARE
"VSS and recorded. Patient more alert today and alert to self, disorientated to situation and place. Patient does not use call light appropriately, occasionally makes needs known, but at time speech is garbled and difficult to understand. Patient agitated this am and MD ordered one-time dose of Seroquel, took a longer nap midday after dose.  LS are clear posteriorly and patient has a delayed loose cough. Seen by speech therapy bites of food and thickened liquids. Ambulating in room and also hallway with assist of 1-2. Family visited today and updated on patient status.  Problem: Plan of Care - These are the overarching goals to be used throughout the patient stay.    Goal: Plan of Care Review/Shift Note  Description: The Plan of Care Review/Shift note should be completed every shift.  The Outcome Evaluation is a brief statement about your assessment that the patient is improving, declining, or no change.  This information will be displayed automatically on your shift note.  Outcome: Ongoing, Not Progressing  Goal: Patient-Specific Goal (Individualized)  Description: You can add care plan individualizations to a care plan. Examples of Individualization might be:  \"Parent requests to be called daily at 9am for status\", \"I have a hard time hearing out of my right ear\", or \"Do not touch me to wake me up as it startles me\".  Outcome: Ongoing, Not Progressing  Goal: Absence of Hospital-Acquired Illness or Injury  Outcome: Ongoing, Not Progressing  Intervention: Identify and Manage Fall Risk  Recent Flowsheet Documentation  Taken 9/28/2022 9101 by Nael Fu RN  Safety Promotion/Fall Prevention:    activity supervised    bed alarm on    clutter free environment maintained    fall prevention program maintained    increased rounding and observation    increase visualization of patient    room door open    room near nurse's station    room organization consistent    safety round/check completed  Intervention: Prevent " and Manage VTE (Venous Thromboembolism) Risk  Recent Flowsheet Documentation  Taken 9/28/2022 0722 by Nael Fu, RN  VTE Prevention/Management: SCDs (sequential compression devices) off  Activity Management: activity adjusted per tolerance  Intervention: Prevent Infection  Recent Flowsheet Documentation  Taken 9/28/2022 0722 by Nael Fu, RN  Infection Prevention:    environmental surveillance performed    rest/sleep promoted    single patient room provided    equipment surfaces disinfected  Goal: Optimal Comfort and Wellbeing  Outcome: Ongoing, Not Progressing  Goal: Readiness for Transition of Care  Outcome: Ongoing, Not Progressing   Goal Outcome Evaluation:

## 2022-09-28 NOTE — TELEPHONE ENCOUNTER
Call placed to patient.  I talked with the patients son.  He told me  that his Father is in the hospital and that it was not possible for him to be calling us.    Jay Cannon .......  9/28/2022  3:01 PM

## 2022-09-28 NOTE — PROGRESS NOTES
"Clinical Nutrition / Initial Assessment     Reason for Assessment:  Screened at nutritional risk due to:  Eating poorly due to decreased appetite    Assessment:   Client History:  Pt with pneumonia, likely aspiration. Has Lewy body dementia as well, came in nearly unresponsive, currently much more awake. Speech recommending soft/bite sized foods with mildly thick fluids. Will add supplements TID to trays with report of poor appetite and significant wt loss in past month. (question accuracy of wts on 9/21, 9/11 as they are exactly the same. Suspected wt loss with decline in condition at home.)  Diet Order:  Soft and bite sized (level 6), moderately thick fluids (level 3)  Oral Intake:  Poor  Supplement Intake:  Will add thickened Ensure TID to trays  Weight:   Wt Readings from Last 10 Encounters:   09/28/22 60.1 kg (132 lb 9.6 oz)   09/21/22 73 kg (161 lb)   09/11/22 73 kg (161 lb)   09/06/22 73.2 kg (161 lb 6.4 oz)   09/02/22 75.3 kg (166 lb)   08/30/22 75.7 kg (166 lb 14.2 oz)   08/26/22 72.1 kg (159 lb)   08/23/22 72.5 kg (159 lb 12.8 oz)   08/22/22 71.7 kg (158 lb 1.6 oz)   08/15/22 72.4 kg (159 lb 9.6 oz)   Height: 5' 7\"  BMI: Body mass index is 20.77 kg/m .     Estimated nutritional needs based on:  Usual wt:  73 kg / 160 lbs   Estimated energy needs:  6466-2435 kcal/day (25-30 kcal/kg)  Estimated protein needs:  73-88 gm/day (1-1.2 g/kg)  Estimated fluid needs:  3131-2356 ml/day    Malnutrition Criteria:  (Need to have 2 indicators to qualify recommendation)  Energy Intake:  Chronic Severe: < 75% of estimated energy requirement for >/= 1 month  Interpretation of Weight Loss:  Acute Severe:   > 5% in 1 month  Physical Findings:  Acute Body Fat Loss:  mild to moderate and Acute Muscle Mass Loss:  mild to moderate  Reduced  Strength:  Not Measured but likely reduced with current illness and overall decline in condition    Recommended Nutrition Diagnosis:   Severe Malnutrition in the context of acute on " chronic illness or injury - based on AND/ASPEN Clinical Characterstics of Malnutrition May 2012      Nutrition Education: Nutrition education will be provided as appropriate.    Nutrition Diagnosis: Weight:  weight loss related to inadequate energy intakes as evidenced by wt loss of ~30# in past month, decline in condition and poor intakes.    Intervention:  Nutrition Prescription:     Nutrition Intervention(s):Recommended modified diet: texture per speech therapy recommendations  1. Meals and Snacks: level 6 (bite sized), level 3 fluids (moderately thick)  2. Medical Food Supplement: ensure TID on trays     Nutrition Goal(s):  1. Pt will consume 50% or more of meals and supplements   2. Pt will not have unplanned wt loss during hospitalization   3. Pt will allow staff to assist with ordering and feeding assistance as needed    Monitoring and Evaluation:   Food Intake, diet tolerance, weights     Discharge Recommendation:   Nutrition Discharge Planning  recommend supplements on discharge. Texture per speech therapy recommendations    RD will reassess in within 1-5 days or sooner.  Latrice Wooten RD on 9/28/2022 at 2:10 PM

## 2022-09-28 NOTE — TELEPHONE ENCOUNTER
DJS-patient is looking for a sooner than 10.11.22 for a hospital F/U he has one with [facility] on 10.11.22    Please call and advise  Thank Viet Abreu on 9/28/2022 at 12:56 PM

## 2022-09-28 NOTE — PROGRESS NOTES
[facility]    Medicine Progress Note - Hospitalist Service    Date of Admission:  9/27/2022    Assessment & Plan               Rajesh Huynh is a 82 year old male admitted on 9/27/2022. He presented to the emergency department with his wife and daughter due to recurrent cough and perceived shortness of breath.    Patient has had recurrent pneumonias likely due to aspiration.  Suspect recurrent aspiration pneumonia.  He will be started on Zosyn and maintenance IV fluid.  Speech to evaluate and treat tomorrow.    He also has rapidly progressive Lewy body dementia.  In addition to problems swallowing family reports he has had significant loss of quality of life recently and seems to be hallucinating.  They think he would want to pursue hospice and would be safer in a skilled nursing facility.    Patient has had dramatic improvement since yesterday.  He is up in the room and very active.  Somewhat agitated as he wants to eat and wants to leave the hospital.  We will give a dose of Seroquel.  We will look for placement.  Also await speech evaluation.    Principal Problem:    Community acquired bacterial pneumonia    Assessment: Stable, recurrent.      Plan: Continue Zosyn.  Significant improvement in condition overnight.    Active Problems:    Lewy body dementia without behavioral disturbance (H)    Assessment: Had a good discussion with family about progression of Lewy body dementia and given his lack of adequate swallow I suspect his life expectancy is very short.    Plan: I still think hospice would be appropriate given progression of Lewy body dementia and high likelihood for recurrent aspiration.  He has however significantly improved from yesterday.      Controlled type 2 diabetes mellitus with stage 3 chronic kidney disease, without long-term current use of insulin (H)    Assessment: Patient currently NPO.  After speech evaluation will adjust diet and check blood sugars.    Plan: Check  "blood sugars and can use SSI NovoLog if needed.      Acute on chronic heart failure with preserved ejection fraction (HFpEF) (H)    Assessment: Currently appears euvolemic.    Plan: Normal saline at 75 mL/h.    Hypokalemia  Assessment: Severe, in large part likely due to poor intake  Plan: Replacement per protocol         Diet: NPO for Medical/Clinical Reasons Except for: Meds    DVT Prophylaxis: Enoxaparin (Lovenox) SQ  Gomez Catheter: Not present  Central Lines: PRESENT     Cardiac Monitoring: None  Code Status: No CPR- Do NOT Intubate      Disposition Plan     Expected Discharge Date: 09/29/2022                The patient's care was discussed with the Patient and Patient's Family.    Zen Pulido MD  Hospitalist Service  [facility]  Securely message with the Vocera Web Console (learn more here)  Text page via [employer] Paging/Directory         Clinically Significant Risk Factors Present on Admission        # Hypokalemia: K = 2.8 mmol/L (Ref range: 3.5 - 5.1 mmol/L) on admission, will replace as needed    # Hypomagnesemia: Mg = 1.5 mg/dL (Ref range: 1.9 - 2.7 mg/dL) on admission, will replace as needed   # Hypoalbuminemia: Albumin = 3.2 g/dL (Ref range: 3.5 - 5.7 g/dL) on admission, will monitor as appropriate        # Overweight: Estimated body mass index is 25.22 kg/m  as calculated from the following:    Height as of this encounter: 1.702 m (5' 7\").    Weight as of this encounter: 73 kg (161 lb).        ______________________________________________________________________    Interval History   Patient is doing much better this morning.  Overnight he slept essentially the whole night.  Was not really moving.  Unresponsive.  This morning now he is up moving around the room, somewhat agitated.  Tells us he is hungry and wants to leave.  He is upset with being in the hospital and wants to go home.  Tells me he will get better faster at home and he has had pneumonia several times and always " improves.  Does not have any insight as to his dementia or aspiration.  Denies any pain.    Data reviewed today: I reviewed all medications, new labs and imaging results over the last 24 hours. I personally reviewed no images or EKG's today.    Physical Exam   Vital Signs: Temp: 97.8  F (36.6  C) Temp src: Tympanic BP: (!) 140/90 Pulse: 85   Resp: 18 SpO2: 96 % O2 Device: None (Room air)    Weight: 161 lbs 0 oz  Constitutional: awake, alert, cooperative, no apparent distress, and appears stated age  Respiratory: Today clear to auscultation bilaterally  Cardiovascular: Regular rate and rhythm  GI: Abdomen soft, nontender  Neuropsychiatric: Alert and active.  Oriented to place but not time or situation    Data   Recent Labs   Lab 09/28/22  0532 09/27/22  1137 09/21/22 2018 09/21/22  1541   WBC 9.0 11.0  --  12.6*   HGB 11.9* 11.8*  --  10.9*    99  --  97    257  --  282    143  --  136   POTASSIUM 2.8* 2.5* 2.7* 2.4*   CHLORIDE 103 101  --  95*   CO2 29 29  --  29   BUN 8 10  --  13   CR 0.94 0.90  --  1.00   ANIONGAP 11 13  --  12   ERICA 8.9 9.0  --  8.6   * 131*  --  151*   ALBUMIN  --  3.2*  --   --    PROTTOTAL  --  6.2*  --   --    BILITOTAL  --  0.4  --   --    ALKPHOS  --  70  --   --    ALT  --  11  --   --    AST  --  14  --   --      Recent Results (from the past 24 hour(s))   XR Chest Port 1 View    Narrative    PROCEDURE:  XR CHEST PORT 1 VIEW    HISTORY: Shortness of breath    COMPARISON:  Chest radiographs 9/21/2022, 9/11/2022, 8/26/2022    FINDINGS: Single view chest radiograph  Left chest Port-A-Cath with tip projecting over the inferior SVC.  Bilateral total shoulder arthroplasties.  Cardiomediastinal silhouette is stable. Increased right infrahilar  streaky opacities. Right upper lung nodule and right lower lobe mass.  Pleural spaces are clear. No suspicious osseous lesion or  subdiaphragmatic free air.      Impression    IMPRESSION:   Increased right infrahilar streaky  opacities, atelectasis versus  infection.      ANNE BARAHONA MD         SYSTEM ID:  [id number]

## 2022-09-29 NOTE — PROGRESS NOTES
:     Chioma RN was here from hospitals to assess patient.  They would be willing to accept patient at hospitals.  Chioma explained cost of care to family and did not know if they wanted to proceed.  They mentioned they just found out that pateint is 100% service connected and thought that [facility] if he was on hospice.  Explained to them that it would need to be a [facility] contracted facility like [facility].  Let them know I could check into this option.     Inquiry sent to Rosa at [facility] to follow up on available LTC beds.  They have no beds currently.  Patient was added to wait list.     JERRI Martínez on 9/29/2022 at 2:30 PM

## 2022-09-29 NOTE — PLAN OF CARE
"VSS and recorded. Patient restless this shift,offered ambulation, toilet, snacks, warm packs and lidocaine patch for low back pain. Sitter at bedside. Poor appetite today and mild cough with thick phlegm production.  Problem: Plan of Care - These are the overarching goals to be used throughout the patient stay.    Goal: Plan of Care Review/Shift Note  Description: The Plan of Care Review/Shift note should be completed every shift.  The Outcome Evaluation is a brief statement about your assessment that the patient is improving, declining, or no change.  This information will be displayed automatically on your shift note.  Outcome: Ongoing, Not Progressing  Goal: Patient-Specific Goal (Individualized)  Description: You can add care plan individualizations to a care plan. Examples of Individualization might be:  \"Parent requests to be called daily at 9am for status\", \"I have a hard time hearing out of my right ear\", or \"Do not touch me to wake me up as it startles me\".  Outcome: Ongoing, Not Progressing  Goal: Absence of Hospital-Acquired Illness or Injury  Outcome: Ongoing, Not Progressing  Intervention: Identify and Manage Fall Risk  Recent Flowsheet Documentation  Taken 9/29/2022 1508 by Nael Fu RN  Safety Promotion/Fall Prevention: safety round/check completed  Taken 9/29/2022 0726 by Nael Fu RN  Safety Promotion/Fall Prevention: safety round/check completed  Intervention: Prevent Skin Injury  Recent Flowsheet Documentation  Taken 9/29/2022 1508 by Nael Fu RN  Body Position: weight shifting  Taken 9/29/2022 0726 by Nael Fu RN  Body Position: weight shifting  Intervention: Prevent and Manage VTE (Venous Thromboembolism) Risk  Recent Flowsheet Documentation  Taken 9/29/2022 1508 by Nael Fu RN  Activity Management: ambulated to bathroom  Taken 9/29/2022 0726 by Nael Fu RN  Activity Management: ambulated to bathroom  Intervention: Prevent " Infection  Recent Flowsheet Documentation  Taken 9/29/2022 1508 by Nael Fu, RN  Infection Prevention:   single patient room provided   rest/sleep promoted  Taken 9/29/2022 0726 by Nael Fu RN  Infection Prevention:   single patient room provided   rest/sleep promoted  Goal: Optimal Comfort and Wellbeing  Outcome: Ongoing, Not Progressing  Intervention: Provide Person-Centered Care  Recent Flowsheet Documentation  Taken 9/29/2022 1508 by Nael Fu RN  Trust Relationship/Rapport:   care explained   reassurance provided   choices provided  Taken 9/29/2022 0726 by Nael Fu RN  Trust Relationship/Rapport:   care explained   reassurance provided   choices provided  Goal: Readiness for Transition of Care  Outcome: Ongoing, Not Progressing   Goal Outcome Evaluation:

## 2022-09-29 NOTE — PROGRESS NOTES
[facility]    Medicine Progress Note - Hospitalist Service    Date of Admission:  9/27/2022    Assessment & Plan                          Rajesh Huynh is a 82 year old male admitted on 9/27/2022. He presented to the emergency department with his wife and daughter due to recurrent cough and perceived shortness of breath.    Patient has had recurrent pneumonias likely due to aspiration.  Suspect recurrent aspiration pneumonia.  He will be started on Zosyn and maintenance IV fluid.  Speech to evaluate and treat tomorrow.    He also has rapidly progressive Lewy body dementia.  In addition to problems swallowing family reports he has had significant loss of quality of life recently and seems to be hallucinating.  They think he would want to pursue hospice and would be safer in a skilled nursing facility.    Patient has had dramatic improvement since admission.  He still has severe dementia but is much more alert and active.    Yesterday morning was given 25 mg of oral Seroquel which caused quite a bit of sedation.  Last night trialed 12.5 mg of Seroquel and later on Zyprexa, neither of which seem to be effective.    Diet was updated by speech.  He seems to be aspirating secretions but no witnessed aspiration of oral intake.    Principal Problem:    Community acquired bacterial pneumonia    Assessment: Stable, recurrent.      Plan: Continue Zosyn.  Significant improvement in condition since admission    Active Problems:    Lewy body dementia without behavioral disturbance (H)    Assessment: Had a good discussion with family about progression of Lewy body dementia.  I suspect his life expectancy is very short.    Plan: I still think hospice would be appropriate given progression of Lewy body dementia and high likelihood for recurrent aspiration.       Controlled type 2 diabetes mellitus with stage 3 chronic kidney disease, without long-term current use of insulin (H)    Assessment: Diet adjusted  but so far minimal oral intake.  Blood sugars 127-179.    Plan: Check blood sugars and can use SSI NovoLog if needed.      Acute on chronic heart failure with preserved ejection fraction (HFpEF) (H)    Assessment: Currently appears euvolemic.    Plan: Reduce IV fluids to 50 mL/h.  On D5 half-normal +20 KCl.  Titrate based on oral intake    Hypokalemia  Assessment: Improved.  Plan: Replacement per protocol           Diet: Soft & Bite Sized Diet (level 6) Liquidized/Moderately Thick (level 3)  Snacks/Supplements Adult: Ensure Enlive; With Meals    DVT Prophylaxis: Pneumatic Compression Devices  Gomez Catheter: Not present  Central Lines: PRESENT     Cardiac Monitoring: None  Code Status: No CPR- Do NOT Intubate      Disposition Plan     Expected Discharge Date: 09/29/2022      Destination: long-term care facility (memory care)          The patient's care was discussed with the Patient and Patient's Family.    Zen Pulido MD  Hospitalist Service  [facility]  Securely message with the Vocera Web Console (learn more here)  Text page via [employer] Paging/Directory         Clinically Significant Risk Factors Present on Admission                 # Severe Malnutrition: based on nutrition assessment     ______________________________________________________________________    Interval History   Patient is comfortable this morning.  He denies any pain.  He was noted to cough after appearing to swallow saliva.  Discussed with nursing staff.  Last night he was agitated again.  They gave 12.5 mg Seroquel.  They think he might have spit it out but not sure.  Later on got Zyprexa.  Neither seem to be very effective.  Had to be redirected and required a one-to-one sitter all night.  This morning is tired but still requiring some redirection intermittently.    Data reviewed today: I reviewed all medications, new labs and imaging results over the last 24 hours. I personally reviewed no images or EKG's  today.    Physical Exam   Vital Signs: Temp: 97.3  F (36.3  C) Temp src: Tympanic BP: (!) 151/92 Pulse: 88   Resp: 18 SpO2: 95 % O2 Device: None (Room air)    Weight: 132 lbs 9.6 oz  Constitutional: Sleeping but awakens easily.  Talkative.  Respiratory: Today with scattered rhonchi that clear completely with cough.  No wheezing.  No increased work of breathing  Cardiovascular: Regular rate and rhythm  Musculoskeletal: No synovitis.  Muscle strength age and body habitus appropriateas well as equal and even.   Neuropsychiatric: Patient is oriented to person and intermittently place but not time or situation    Data   Recent Labs   Lab 09/29/22  0715 09/29/22  0654 09/28/22  2346 09/28/22  2334 09/28/22  1723 09/28/22  1622 09/28/22  1135 09/28/22  0532 09/27/22  1137   WBC  --   --   --   --   --   --   --  9.0 11.0   HGB  --   --   --   --   --   --   --  11.9* 11.8*   MCV  --   --   --   --   --   --   --  100 99   PLT  --   --   --   --   --   --   --  284 257   NA  --  146*  --   --   --   --   --  143 143   POTASSIUM  --  3.3*  --  3.5  --  3.1*  --  2.8* 2.5*   CHLORIDE  --  108*  --   --   --   --   --  103 101   CO2  --  27  --   --   --   --   --  29 29   BUN  --  4*  --   --   --   --   --  8 10   CR  --  0.96  --   --   --   --   --  0.94 0.90   ANIONGAP  --  11  --   --   --   --   --  11 13   ERICA  --  9.4  --   --   --   --   --  8.9 9.0   * 179* 127*  --    < >  --    < > 159* 131*   ALBUMIN  --   --   --   --   --   --   --   --  3.2*   PROTTOTAL  --   --   --   --   --   --   --   --  6.2*   BILITOTAL  --   --   --   --   --   --   --   --  0.4   ALKPHOS  --   --   --   --   --   --   --   --  70   ALT  --   --   --   --   --   --   --   --  11   AST  --   --   --   --   --   --   --   --  14    < > = values in this interval not displayed.     No results found for this or any previous visit (from the past 24 hour(s)).

## 2022-09-29 NOTE — TELEPHONE ENCOUNTER
Please call patient / wife and clarify.     We can place referral orders as desired.     Electronically signed by:  Jovanni Sen MD  on September 29, 2022 12:52 PM

## 2022-09-29 NOTE — TELEPHONE ENCOUNTER
Call placed to home number. No answer.  Message along with our phone number.  I did call wife at the mobile number.  She verified his date of birth and last name.      Patient in is the hospital right now.  Upon discharge they are looking for a long term care facility that has a memory care unit.    She also states she was told most memory care units, you need to bring all your own furniture.  With that knowledge she said she would need a hospital bed and other things as well.    They would prefer a facility that is [facility] approved to help some of the costs.  She wonders if you know of a facility?

## 2022-09-29 NOTE — PLAN OF CARE
"Pt has been restless most of the night with very little sleep.  Utilized PRN seroquel 12.5 and PRN zyprexa IM with minimal effect.  Initiated sitter in room as pt is continuously trying to climb out of bed.  Uncooperative with instruction.  Pt is agitated and at times verbally abusive and threatening.  Agitation often increases when pt needs to use the bathroom.  Assistance provided throughout the night to get pt up to the toilet.  Good urine output.  Small BM.  BP (!) 143/99 (BP Location: Right arm, Patient Position: Semi-Mar's)   Pulse 89   Temp 97.4  F (36.3  C) (Tympanic)   Resp 24   Ht 1.702 m (5' 7\")   Wt 60.1 kg (132 lb 9.6 oz)   SpO2 94%   BMI 20.77 kg/m      Problem: Plan of Care - These are the overarching goals to be used throughout the patient stay.    Goal: Plan of Care Review/Shift Note  Description: The Plan of Care Review/Shift note should be completed every shift.  The Outcome Evaluation is a brief statement about your assessment that the patient is improving, declining, or no change.  This information will be displayed automatically on your shift note.  Outcome: Ongoing, Progressing  Flowsheets (Taken 9/29/2022 0637)  Plan of Care Reviewed With: patient  Overall Patient Progress: no change  Goal: Patient-Specific Goal (Individualized)  Description: You can add care plan individualizations to a care plan. Examples of Individualization might be:  \"Parent requests to be called daily at 9am for status\", \"I have a hard time hearing out of my right ear\", or \"Do not touch me to wake me up as it startles me\".  Outcome: Ongoing, Progressing  Goal: Absence of Hospital-Acquired Illness or Injury  Outcome: Ongoing, Progressing  Intervention: Identify and Manage Fall Risk  Recent Flowsheet Documentation  Taken 9/29/2022 0445 by Mary Jane Ramos, RN  Safety Promotion/Fall Prevention: safety round/check completed  Taken 9/28/2022 2220 by Mary Jane Ramos, RN  Safety Promotion/Fall Prevention: sitter at " bedside  Intervention: Prevent Skin Injury  Recent Flowsheet Documentation  Taken 9/29/2022 0445 by Mary Jane Ramos RN  Body Position: weight shifting  Taken 9/28/2022 2220 by Mary Jane Ramos RN  Body Position: weight shifting  Intervention: Prevent and Manage VTE (Venous Thromboembolism) Risk  Recent Flowsheet Documentation  Taken 9/29/2022 0445 by Mary Jane Ramos RN  Activity Management: ambulated to bathroom  Taken 9/28/2022 2220 by Mary Jane Ramos RN  Activity Management: ambulated to bathroom  Intervention: Prevent Infection  Recent Flowsheet Documentation  Taken 9/29/2022 0445 by Mary Jane Ramos RN  Infection Prevention:   single patient room provided   rest/sleep promoted  Taken 9/28/2022 2220 by Mary Jane Ramos RN  Infection Prevention:   single patient room provided   rest/sleep promoted  Goal: Optimal Comfort and Wellbeing  Outcome: Ongoing, Progressing  Intervention: Provide Person-Centered Care  Recent Flowsheet Documentation  Taken 9/29/2022 0445 by Mary Jane Ramos RN  Trust Relationship/Rapport:   care explained   reassurance provided  Taken 9/28/2022 2220 by Mary Jane Ramos RN  Trust Relationship/Rapport:   care explained   reassurance provided  Goal: Readiness for Transition of Care  Outcome: Ongoing, Progressing   Goal Outcome Evaluation:    Plan of Care Reviewed With: patient     Overall Patient Progress: no change

## 2022-09-29 NOTE — PROGRESS NOTES
:     Received a call from patient's wife and daughter.  They would like patient referred to [facility] as [facility] are the next closest [facility] contracted facility.  They spoke with admission today.  This is their next choice to [facility], who has no beds.   They are stating after meeting with [facility] they cannot afford their rates.  They would like  to contact Erwin at [facility] about the [facility].  Explained that I can call [facility] but have never seen the VA do that.      Left a message for Erwin at [facility].     Referral placed to [facility] via fax.  Left a message for Yaz guerrero on referral.     JERRI Martínez on 9/29/2022 at 4:18 PM

## 2022-09-30 PROBLEM — R13.10 DYSPHAGIA: Status: ACTIVE | Noted: 2022-01-01

## 2022-09-30 PROBLEM — J69.0 ASPIRATION PNEUMONIA (H): Status: ACTIVE | Noted: 2022-01-01

## 2022-09-30 NOTE — TELEPHONE ENCOUNTER
Date of birth and last name verified.    Ruth will and  the forms.    Also I she did explain that they are meeting with the Hospital Social Workers in a few minutes.    When I asked, she has no further questions at this time.    Forms left with [address] reception for her to .    Jay Cannon .......  9/30/2022  12:22 PM

## 2022-09-30 NOTE — TELEPHONE ENCOUNTER
The hospital  would be the best resource for these questions.     The hospital visit would probably also be the best time to get his VA forms completed.     Where is he hospitalized? [facility]?    -- if so, we can bring the forms over to the hospital.     Electronically signed by:  Jovanni Sen MD  on September 30, 2022 7:55 AM

## 2022-09-30 NOTE — PROGRESS NOTES
Discussed completing a  Videofluroscopic Study (VFSS) with Dr. Pulido, SLP attempted to get pt in today Friday 9/30 however there are no radiologists available during the time frame that SLP is able to complete study. Will continue to follow during stay at facility and reevaluate availability on Monday 8/3.     Gloria Liang, CCC-SLP

## 2022-09-30 NOTE — PROGRESS NOTES
[facility]    Medicine Progress Note - Hospitalist Service    Date of Admission:  9/27/2022    Assessment & Plan                          Rajesh Huynh is a 82 year old male admitted on 9/27/2022. He presented to the emergency department with his wife and daughter due to recurrent cough and perceived shortness of breath.Patient has had recurrent pneumonias likely due to aspiration.  Suspect recurrent aspiration pneumonia.      Aspiration pneumonia  Recurrent. CXR with right lower lobe opacity.  Speech evaluation  Improved with antibiotics. He still has severe dementia but is much more alert and active.  Continue on Zosyn and maintenance IV fluid.      Dysphagia  Speech evaluated. He seems to be aspirating secretions but no witnessed aspiration of oral intake. He seems to be aspirating secretions but no witnessed aspiration of oral intake. Placed on moderate thick liquids.        Lewy body dementia without behavioral disturbance (H)  Has rapidly progressive Lewy body dementia.  In addition to problems swallowing family reports he has had significant loss of quality of life recently and seems to be hallucinating.  They think he would want to pursue hospice and would be safer in a skilled nursing facility.  Dr Pulido had a discussion with family about progression of Lewy body dementia. Suspected his life expectancy is very short.  Elected for hospice.  Attempting placement under VA coverage, which has delayed discharge.    Has more restlessness and agitation. Given 25 mg of oral Seroquel which caused quite a bit of sedation.  Then trialed 12.5 mg of Seroquel and later on Zyprexa, neither of which seem to be effective.  Cholinesterase inhibitors are preferred treatment, started on rivastigmine patch.      Controlled type 2 diabetes mellitus with stage 3 chronic kidney disease, without long-term current use of insulin (H)    Assessment: Diet adjusted but so far minimal oral intake.  Blood sugars  127-179.    Plan: Check blood sugars and can use SSI NovoLog if needed.      Acute on chronic heart failure with preserved ejection fraction (HFpEF) (H)  Euvolemic on admission, careful IVF.    Hypokalemia, POA.  Improved on protocol           Diet: Soft & Bite Sized Diet (level 6) Liquidized/Moderately Thick (level 3)  Snacks/Supplements Adult: Ensure Enlive; With Meals    DVT Prophylaxis: Pneumatic Compression Devices  Gomez Catheter: Not present  Central Lines: PRESENT       Cardiac Monitoring: None  Code Status: No CPR- Do NOT Intubate      Disposition Plan    When assisted living placement is covered by [facility]     The patient's care was discussed with the Bedside Nurse.    Pee Merchant MD  Hospitalist Service  [facility]  Securely message with the Vocera Web Console (learn more here)  Text page via [employer] Paging/Directory         Clinically Significant Risk Factors Present on Admission                 # Severe Malnutrition: based on nutrition assessment     ______________________________________________________________________    Interval History   He is more alert, but agitated. Potentially has some pain, but since he cannot verbalize it is hard to say. Has Zyprexa and Seroquel to use as needed.    Data reviewed today: I reviewed all medications, new labs and imaging results over the last 24 hours. I personally reviewed no images or EKG's today.    Physical Exam   Vital Signs: Temp: 97.8  F (36.6  C) Temp src: Tympanic BP: 124/88 Pulse: 73   Resp: 20 SpO2: 90 % O2 Device: None (Room air)    Weight: 132 lbs 4.42 oz  Constitutional: Sleeping but awakens easily.  Talkative, but not understandable.  Respiratory: Today with scattered rhonchi that clear completely with cough.  No wheezing.  No increased work of breathing  Cardiovascular: Regular rate and rhythm  Musculoskeletal: No synovitis.  Muscle strength age and body habitus appropriateas well as equal and even.   Neuropsychiatric: Unable  to assess orientation    Data   Recent Labs   Lab 09/30/22  0727 09/30/22  0553 09/29/22  2330 09/29/22  2248 09/29/22  1621 09/29/22  1601 09/29/22  0715 09/29/22  0654 09/28/22  1135 09/28/22  0532 09/27/22  1137   WBC  --   --   --   --   --   --   --   --   --  9.0 11.0   HGB  --   --   --   --   --   --   --   --   --  11.9* 11.8*   MCV  --   --   --   --   --   --   --   --   --  100 99   PLT  --  289  --   --   --   --   --   --   --  284 257   NA  --   --   --   --   --   --   --  146*  --  143 143   POTASSIUM  --  3.4* 3.4*  --   --  3.4*  --  3.3*   < > 2.8* 2.5*   CHLORIDE  --   --   --   --   --   --   --  108*  --  103 101   CO2  --   --   --   --   --   --   --  27  --  29 29   BUN  --   --   --   --   --   --   --  4*  --  8 10   CR  --  0.98  --   --   --   --   --  0.96  --  0.94 0.90   ANIONGAP  --   --   --   --   --   --   --  11  --  11 13   ERICA  --   --   --   --   --   --   --  9.4  --  8.9 9.0   *  --   --  171* 163*  --    < > 179*   < > 159* 131*   ALBUMIN  --   --   --   --   --   --   --   --   --   --  3.2*   PROTTOTAL  --   --   --   --   --   --   --   --   --   --  6.2*   BILITOTAL  --   --   --   --   --   --   --   --   --   --  0.4   ALKPHOS  --   --   --   --   --   --   --   --   --   --  70   ALT  --   --   --   --   --   --   --   --   --   --  11   AST  --   --   --   --   --   --   --   --   --   --  14    < > = values in this interval not displayed.     No results found for this or any previous visit (from the past 24 hour(s)).

## 2022-09-30 NOTE — PLAN OF CARE
"Goal Outcome Evaluation:  Pt alert. Disorientated x4. VSS, had to use manual BP cuff to get accurate reading. Reddened area to coccyx, barrier cream applied. [id number]. PRN seroquel given for restlessness at beginning of shift with relief. Pt has since been resting peacefully. Has increased restlessness/agitation when pt has to use bathroom. Up to bedside commode with assist of 2.       Problem: Plan of Care - These are the overarching goals to be used throughout the patient stay.    Goal: Plan of Care Review/Shift Note  Description: The Plan of Care Review/Shift note should be completed every shift.  The Outcome Evaluation is a brief statement about your assessment that the patient is improving, declining, or no change.  This information will be displayed automatically on your shift note.  Outcome: Ongoing, Progressing  Goal: Patient-Specific Goal (Individualized)  Description: You can add care plan individualizations to a care plan. Examples of Individualization might be:  \"Parent requests to be called daily at 9am for status\", \"I have a hard time hearing out of my right ear\", or \"Do not touch me to wake me up as it startles me\".  Outcome: Ongoing, Progressing  Goal: Absence of Hospital-Acquired Illness or Injury  Outcome: Ongoing, Progressing  Intervention: Identify and Manage Fall Risk  Recent Flowsheet Documentation  Taken 9/30/2022 0409 by Alise Mckenzie, RN  Safety Promotion/Fall Prevention: safety round/check completed  Taken 9/30/2022 0000 by Alise Mckenzie, RN  Safety Promotion/Fall Prevention: safety round/check completed  Intervention: Prevent Skin Injury  Recent Flowsheet Documentation  Taken 9/30/2022 0345 by Alise Mckenzie, RN  Body Position:   neutral body alignment   foot of bed elevated   neutral head position  Intervention: Prevent and Manage VTE (Venous Thromboembolism) Risk  Recent Flowsheet Documentation  Taken 9/30/2022 0409 by Alise Mckenzie, RN  Range of Motion: ROM (range of motion) " performed  VTE Prevention/Management: SCDs (sequential compression devices) off  Taken 9/30/2022 0345 by Alise Mckenzie RN  Activity Management: up to bedside commode  Taken 9/30/2022 0000 by Alise Mckenzie RN  Range of Motion: ROM (range of motion) performed  Intervention: Prevent Infection  Recent Flowsheet Documentation  Taken 9/30/2022 0409 by Alise Mckenzie RN  Infection Prevention:   single patient room provided   rest/sleep promoted  Taken 9/30/2022 0000 by Alise Mckenzie RN  Infection Prevention:   single patient room provided   rest/sleep promoted  Goal: Optimal Comfort and Wellbeing  Outcome: Ongoing, Progressing  Goal: Readiness for Transition of Care  Outcome: Ongoing, Progressing     Problem: Fluid Imbalance (Pneumonia)  Goal: Fluid Balance  Outcome: Ongoing, Progressing     Problem: Infection (Pneumonia)  Goal: Resolution of Infection Signs and Symptoms  Outcome: Ongoing, Progressing     Problem: Respiratory Compromise (Pneumonia)  Goal: Effective Oxygenation and Ventilation  Outcome: Ongoing, Progressing  Intervention: Promote Airway Secretion Clearance  Recent Flowsheet Documentation  Taken 9/30/2022 0409 by Alise Mckenzie RN  Cough And Deep Breathing: done independently per patient  Taken 9/30/2022 0000 by Alise Mckenzie RN  Cough And Deep Breathing: done independently per patient     Problem: Suicide Risk  Goal: Absence of Self-Harm  Outcome: Ongoing, Progressing

## 2022-09-30 NOTE — PROGRESS NOTES
:     Received a voicemail from Yaz at [facility].  They declined patient due to needs.     JERRI Martínez on 9/30/2022 at 8:42 AM    Addendum:     Met with family in patient's room to update and further discuss discharge planning.  They now want to switch from [facility] to [facility].  They would like to check into [facility].     Spoke with [facility].  Neither have openings.      Telephone call with [facility].  They are able to accept referral.  Faxed referral.     JERRI Martínez on 9/30/2022 at 11:06 AM    Addendum:     Spoke with Chioma at [facility].  They have bed availability.  Faxed referral for review.  Left a message for admissions notifying of referral.   Awaiting response.    Family requested a referral be sent to [place] in [place].  Faxed referral and notified Aaliyah in admissions.  Awaiting response.    JERRI Martínez on 9/30/2022 at 4:04 PM

## 2022-10-01 PROBLEM — F02.818 LEWY BODY DEMENTIA WITH BEHAVIORAL DISTURBANCE (H): Status: ACTIVE | Noted: 2021-09-16

## 2022-10-01 NOTE — PLAN OF CARE
"VSS and stable. Patient is has been restless and confused today. Difficult to redirect at times as patient becomes agitated and does not follow instructions. Became upset when attempting to toilet and as a result was incontinent of urine today. Urine had an odor but appeared yellow. Urine was evaluated day of admission in ER unremarkable. Patient had some facial grimacing during periods of agitation and one-time dose of fentanyl IV was given along with tylenol both seemed to promote comfort. Poor appetite overall today and delayed cough after eating. LS diminished with fine crackles. SpO2 is 90-92% on room air, fingers have poor circulation so difficult to get an accurate result at times. Sitter at bedside throughout shift to promote safety, also using assist of two due to impulsivity.   Problem: Plan of Care - These are the overarching goals to be used throughout the patient stay.    Goal: Plan of Care Review/Shift Note  Description: The Plan of Care Review/Shift note should be completed every shift.  The Outcome Evaluation is a brief statement about your assessment that the patient is improving, declining, or no change.  This information will be displayed automatically on your shift note.  Outcome: Ongoing, Not Progressing  Goal: Patient-Specific Goal (Individualized)  Description: You can add care plan individualizations to a care plan. Examples of Individualization might be:  \"Parent requests to be called daily at 9am for status\", \"I have a hard time hearing out of my right ear\", or \"Do not touch me to wake me up as it startles me\".  Outcome: Ongoing, Not Progressing  Goal: Absence of Hospital-Acquired Illness or Injury  Outcome: Ongoing, Not Progressing  Intervention: Identify and Manage Fall Risk  Recent Flowsheet Documentation  Taken 9/30/2022 2443 by Nael Fu, RN  Safety Promotion/Fall Prevention: safety round/check completed  Taken 9/30/2022 9606 by Nael Fu, RN  Safety Promotion/Fall " Prevention: safety round/check completed  Intervention: Prevent Skin Injury  Recent Flowsheet Documentation  Taken 9/30/2022 1703 by Nael Fu RN  Body Position: (sitting in recliner) other (see comments)  Intervention: Prevent and Manage VTE (Venous Thromboembolism) Risk  Recent Flowsheet Documentation  Taken 9/30/2022 1703 by Nael Fu RN  Range of Motion: ROM (range of motion) performed  VTE Prevention/Management: SCDs (sequential compression devices) off  Taken 9/30/2022 0857 by Nael Fu RN  Range of Motion: ROM (range of motion) performed  VTE Prevention/Management: SCDs (sequential compression devices) off  Intervention: Prevent Infection  Recent Flowsheet Documentation  Taken 9/30/2022 1703 by Nael Fu RN  Infection Prevention:   single patient room provided   rest/sleep promoted  Taken 9/30/2022 0857 by Nael Fu RN  Infection Prevention:   single patient room provided   rest/sleep promoted  Goal: Optimal Comfort and Wellbeing  Outcome: Ongoing, Not Progressing  Goal: Readiness for Transition of Care  Outcome: Ongoing, Not Progressing   Goal Outcome Evaluation:

## 2022-10-01 NOTE — PLAN OF CARE
Goal Outcome Evaluation:    Plan of Care Reviewed With: patient     Overall Patient Progress: no change  Patient increased agitation. Is cooperating with staff, but not easily redirected. Spoke with md, pain medications helped yesterday. Will start with treating pain and monitor the patient.

## 2022-10-01 NOTE — PROGRESS NOTES
[facility]    Medicine Progress Note - Hospitalist Service    Date of Admission:  9/27/2022    Assessment & Plan             Rajesh Huynh is a 82 year old male admitted on 9/27/2022. He presented to the emergency department with his wife and daughter due to recurrent cough and perceived shortness of breath. Patient has had recurrent pneumonias likely due to aspiration.  Suspect recurrent aspiration pneumonia.      Aspiration pneumonia  Recurrent. CXR with right lower lobe opacity.  Speech evaluation  Improved with antibiotics. He still has severe dementia but is much more alert and active.  Received Zosyn for 4 days, then de-escalated to Unasyn.   Continues on maintenance IV fluid.      Dysphagia  Speech evaluated. He seems to be aspirating secretions but no witnessed aspiration of oral intake. Placed on moderate thick liquids.        Lewy body dementia without behavioral disturbance (H)  Has rapidly progressive Lewy body dementia.  In addition to problems swallowing family reports he has had significant loss of quality of life recently and seems to be hallucinating.  They think he would want to pursue hospice and would be safer in a skilled nursing facility.  Dr Pulido had a discussion with family about progression of Lewy body dementia. Suspected his life expectancy is very short.  Elected for hospice.  Attempting placement under [facility] coverage, which has delayed discharge.    Has more restlessness and agitation. Given 25 mg of oral Seroquel which caused quite a bit of sedation.  Then trialed 12.5 mg of Seroquel and later on Zyprexa, neither of which seem to be effective.  Cholinesterase inhibitors are preferred treatment, restarted on rivastigmine.  Stopped Zyprexa. Has quetiapine to use as needed.   Trying to treat potential pain with fentanyl as needed       Controlled type 2 diabetes mellitus with stage 3 chronic kidney disease, without long-term current use of insulin (H)    Assessment:  Diet adjusted but so far minimal oral intake.  Blood sugars 127-179.    Plan: Check blood sugars and can use SSI NovoLog if needed.      Acute on chronic heart failure with preserved ejection fraction (HFpEF) (H)  Euvolemic on admission, careful IVF.    Hypokalemia, POA.  Improved on protocol           Diet: Soft & Bite Sized Diet (level 6) Liquidized/Moderately Thick (level 3)  Snacks/Supplements Adult: Ensure Enlive; With Meals    DVT Prophylaxis: Pneumatic Compression Devices  Gomez Catheter: Not present  Central Lines: PRESENT       Cardiac Monitoring: None  Code Status: No CPR- Do NOT Intubate      Disposition Plan    When assisted living placement is covered by [facility]     The patient's care was discussed with the Bedside Nurse.    Pee Merchant MD  Hospitalist Service  [facility]  Securely message with the Vocera Web Console (learn more here)  Text page via [employer] Paging/Directory         Clinically Significant Risk Factors Present on Admission                  # Severe Malnutrition: based on nutrition assessment     ______________________________________________________________________    Interval History   Intermittent agitation. This afternoon is more alert, understandable, appears stronger.   Potentially has some pain, improved previously with fentanyl. Tried Zyprexa without benefit.   Was off rivastigmine on admission    Data reviewed today: I reviewed all medications, new labs and imaging results over the last 24 hours. I personally reviewed no images or EKG's today.    Physical Exam   Vital Signs: Temp: 97  F (36.1  C) Temp src: Tympanic BP: (!) 127/90 Pulse: 88   Resp: 16 SpO2: 95 % O2 Device: None (Room air)    Weight: 141 lbs 15.62 oz  General Appearance: Alert. No acute distress  Chest/Respiratory Exam: Clear to auscultation bilaterally  Cardiovascular Exam: Regular rate and rhythm. S1, S2, no murmur, gallop, or rubs.  Extremities: 2+ pedal pulses.  No lower extremity  edema.  Psychiatric: Pleasant affect, dementia        Data   Recent Labs   Lab 10/01/22  0610 09/30/22  2129 09/30/22  0727 09/30/22  0553 09/29/22  2330 09/29/22  2248 09/29/22  0715 09/29/22  0654 09/28/22  1135 09/28/22  0532 09/27/22  1137   WBC 10.7  --   --   --   --   --   --   --   --  9.0 11.0   HGB 11.7*  --   --   --   --   --   --   --   --  11.9* 11.8*     --   --   --   --   --   --   --   --  100 99     --   --  289  --   --   --   --   --  284 257   *  --   --   --   --   --   --  146*  --  143 143   POTASSIUM 3.9 4.0  --  3.4*   < >  --    < > 3.3*   < > 2.8* 2.5*   CHLORIDE 111*  --   --   --   --   --   --  108*  --  103 101   CO2 27  --   --   --   --   --   --  27  --  29 29   BUN 5*  --   --   --   --   --   --  4*  --  8 10   CR 0.96  --   --  0.98  --   --   --  0.96  --  0.94 0.90   ANIONGAP 9  --   --   --   --   --   --  11  --  11 13   ERICA 9.4  --   --   --   --   --   --  9.4  --  8.9 9.0   *  --  137*  --   --  171*   < > 179*   < > 159* 131*   ALBUMIN 3.2*  --   --   --   --   --   --   --   --   --  3.2*   PROTTOTAL 6.2*  --   --   --   --   --   --   --   --   --  6.2*   BILITOTAL 0.4  --   --   --   --   --   --   --   --   --  0.4   ALKPHOS 73  --   --   --   --   --   --   --   --   --  70   ALT 12  --   --   --   --   --   --   --   --   --  11   AST 12*  --   --   --   --   --   --   --   --   --  14    < > = values in this interval not displayed.     No results found for this or any previous visit (from the past 24 hour(s)).

## 2022-10-01 NOTE — PLAN OF CARE
Goal Outcome Evaluation:    Plan of Care Reviewed With: patient        Has 1:1 sitter at bedside to promote safety- is impulsive, disoriented/forgetful, mood labile, unsteady on feet- needs frequent interactions with staff to keep calm and safe in bed/chair.  Transferred with A2 pivot from recliner to bed.  Toradol administered for s/s pain/restlessness with minimal effect.  Zyprexa PRN administered for increased agitation with some relief.  Lungs clear/dim, loose/productive cough, VSS.  Jimena Huynh RN on 9/30/2022 at 11:19 PM         Continues with sitter for safety.  Has been restless, inconsistent following directions.  Linens changed- warm blankets, juice and pudding offered, heat pack.  toradol administered again for s/s pain per PAINAD.  Potassium recheck 4.   Jimena Huynh RN on 10/1/2022 at 2:31 AM       Slept only an hour through the night.  Up to commode to void, some incontinence.  Very restless in bed most the night.  Jimena Huynh RN on 10/1/2022 at 7:22 AM

## 2022-10-01 NOTE — PLAN OF CARE
"Patient increased restless. Calmed after Fentanyl. Then was restless and was ambulated in the room. Patient given Seroquel for agitation.  Currently up in his recliner. Sitter present in the room.  Problem: Plan of Care - These are the overarching goals to be used throughout the patient stay.    Goal: Plan of Care Review/Shift Note  Description: The Plan of Care Review/Shift note should be completed every shift.  The Outcome Evaluation is a brief statement about your assessment that the patient is improving, declining, or no change.  This information will be displayed automatically on your shift note.  Outcome: Ongoing, Progressing  Flowsheets (Taken 10/1/2022 0915)  Plan of Care Reviewed With: patient  Overall Patient Progress: no change  Goal: Patient-Specific Goal (Individualized)  Description: You can add care plan individualizations to a care plan. Examples of Individualization might be:  \"Parent requests to be called daily at 9am for status\", \"I have a hard time hearing out of my right ear\", or \"Do not touch me to wake me up as it startles me\".  Outcome: Ongoing, Progressing  Goal: Absence of Hospital-Acquired Illness or Injury  Outcome: Ongoing, Progressing  Intervention: Identify and Manage Fall Risk  Recent Flowsheet Documentation  Taken 10/1/2022 0823 by Kimberly Garcia RN  Safety Promotion/Fall Prevention: safety round/check completed  Intervention: Prevent and Manage VTE (Venous Thromboembolism) Risk  Recent Flowsheet Documentation  Taken 10/1/2022 0823 by Kimberly Garcia, RN  Range of Motion: ROM (range of motion) performed  VTE Prevention/Management: SCDs (sequential compression devices) off  Activity Management: activity adjusted per tolerance  Intervention: Prevent Infection  Recent Flowsheet Documentation  Taken 10/1/2022 0823 by Kimberly Garcia RN  Infection Prevention:   rest/sleep promoted   single patient room provided  Goal: Optimal Comfort and Wellbeing  Outcome: Ongoing, " Progressing  Goal: Readiness for Transition of Care  Outcome: Ongoing, Progressing   Goal Outcome Evaluation:    Plan of Care Reviewed With: patient     Overall Patient Progress: no change

## 2022-10-02 NOTE — PLAN OF CARE
Goal Outcome Evaluation:    Plan of Care Reviewed With: patient, spouse   Pt up in chair. Responding to questions appropriately. Joking and smiling. Wife here at 1000. 11:30. Pt getting a little irritable and confused. Coughing up thick tan sputum. 1300 Sleepy. Assisted back to bed. Repos comfortably. Wife at his side. Alarms on. Side rails up x 3. Wife remains here.

## 2022-10-02 NOTE — PROGRESS NOTES
[facility]    Medicine Progress Note - Hospitalist Service    Date of Admission:  9/27/2022    Assessment & Plan             Rajesh Huynh is a 82 year old male admitted on 9/27/2022. He presented to the emergency department with his wife and daughter due to recurrent cough and perceived shortness of breath. Patient has had recurrent pneumonias due to aspiration.     Aspiration pneumonia  Recurrent. CXR with right lower lobe opacity.  Speech evaluation below  Improved with antibiotics. He still has severe dementia but is much more alert and active.  Received Zosyn for 4 days, then de-escalated to Unasyn x 2 days   Continues on maintenance IV fluid.      Dysphagia  Speech evaluated. He seems to be aspirating secretions but no witnessed aspiration of oral intake. Placed on moderate thick liquids, soft and bite sized diet        Lewy body dementia without behavioral disturbance (H)  Has rapidly progressive Lewy body dementia.  In addition to problems swallowing family reports he has had significant loss of quality of life recently and seems to be hallucinating.  They think he would want to pursue hospice and would be safer in a skilled nursing facility.  Dr Pulido had a discussion with family about progression of Lewy body dementia. Suspected his life expectancy is very short.  Elected for hospice.  Attempting placement under [facility] coverage, which has delayed discharge. Waiting on placement per   Provided letter with his current status so family can seek additional VA coverage    Has more restlessness and agitation. Zyprexa was not effective  He did well with quetiapine at 25 mg twice daily  Had been off cholinesterase inhibitor for a few days since admission, restarted on rivastigmine.      Controlled type 2 diabetes mellitus with stage 3 chronic kidney disease, without long-term current use of insulin (H)    Assessment: Diet adjusted but so far minimal oral intake.  Blood sugars  127-179.    Plan: Check blood sugars and can use SSI NovoLog if needed.      Acute on chronic heart failure with preserved ejection fraction (HFpEF) (H)  Euvolemic on admission, careful IVF.    Hypokalemia, POA.  Improved on protocol           Diet: Soft & Bite Sized Diet (level 6) Liquidized/Moderately Thick (level 3)  Snacks/Supplements Adult: Ensure Enlive; With Meals    DVT Prophylaxis: Pneumatic Compression Devices  Gomez Catheter: Not present  Central Lines: PRESENT       Cardiac Monitoring: None  Code Status: No CPR- Do NOT Intubate      Disposition Plan    When assisted living placement is covered by [facility]     The patient's care was discussed with the Bedside Nurse.    Pee Merchant MD  Hospitalist Service  [facility]  Securely message with the Vocera Web Console (learn more here)  Text page via [employer] Paging/Directory         Clinically Significant Risk Factors Present on Admission                  # Severe Malnutrition: based on nutrition assessment     ______________________________________________________________________    Interval History   He is more verbal, looks better. This morning is doing well. Slept better on Seroquel last night. By this afternoon he is a little more confused.     Data reviewed today: I reviewed all medications, new labs and imaging results over the last 24 hours. I personally reviewed no images or EKG's today.    Physical Exam   Vital Signs: Temp: 97.2  F (36.2  C) Temp src: Tympanic BP: 99/58 Pulse: 96   Resp: 16 SpO2: 94 % O2 Device: None (Room air)    Weight: 141 lbs 15.62 oz  General Appearance: Alert. No acute distress  Chest/Respiratory Exam: Clear to auscultation bilaterally  Cardiovascular Exam: Regular rate and rhythm. S1, S2, no murmur, gallop, or rubs.  Extremities: 2+ pedal pulses.  No lower extremity edema.  Psychiatric: Pleasant affect, dementia        Data   Recent Labs   Lab 10/02/22  0705 10/01/22  0610 09/30/22 2129 09/30/22  [id number]  09/30/22  0553 09/29/22  2330 09/29/22  2248 09/29/22  0715 09/29/22  0654 09/28/22  1135 09/28/22  0532 09/27/22  1137   WBC  --  10.7  --   --   --   --   --   --   --   --  9.0 11.0   HGB  --  11.7*  --   --   --   --   --   --   --   --  11.9* 11.8*   MCV  --  100  --   --   --   --   --   --   --   --  100 99    261  --   --  289  --   --   --   --   --  284 257   NA  --  147*  --   --   --   --   --   --  146*  --  143 143   POTASSIUM  --  3.9 4.0  --  3.4*   < >  --    < > 3.3*   < > 2.8* 2.5*   CHLORIDE  --  111*  --   --   --   --   --   --  108*  --  103 101   CO2  --  27  --   --   --   --   --   --  27  --  29 29   BUN  --  5*  --   --   --   --   --   --  4*  --  8 10   CR 0.86 0.96  --   --  0.98  --   --   --  0.96  --  0.94 0.90   ANIONGAP  --  9  --   --   --   --   --   --  11  --  11 13   ERICA  --  9.4  --   --   --   --   --   --  9.4  --  8.9 9.0   GLC  --  171*  --  137*  --   --  171*   < > 179*   < > 159* 131*   ALBUMIN  --  3.2*  --   --   --   --   --   --   --   --   --  3.2*   PROTTOTAL  --  6.2*  --   --   --   --   --   --   --   --   --  6.2*   BILITOTAL  --  0.4  --   --   --   --   --   --   --   --   --  0.4   ALKPHOS  --  73  --   --   --   --   --   --   --   --   --  70   ALT  --  12  --   --   --   --   --   --   --   --   --  11   AST  --  12*  --   --   --   --   --   --   --   --   --  14    < > = values in this interval not displayed.     No results found for this or any previous visit (from the past 24 hour(s)).

## 2022-10-02 NOTE — PLAN OF CARE
Goal Outcome Evaluation:    Plan of Care Reviewed With: patient        Patient is alert, disoriented, speech garbled, A2 for mobility.  Lungs clear/dim, infrequent cough- congested/loose, VSS.  Pain managed with toradol and fentanyl.  Has been 1:1 sitter for impulsive, restlessness, safety and to maintain IV line/port.  Awake most the night with short naps off and on.  Had applesauce with HS meds and sips of honey thick fluids intermittently.  Incontinent of bladder and bowel.  Toradol pulled from omni to administer when able.  lavender patch on bedrail to promote calm.  Jimena Huynh RN on 10/2/2022 at 3:32 AM        Had another incontinent stool, loose.  Resistive with chele cares/linen change, pushing away from staff.   Jimena Huynh RN on 10/2/2022 at 6:25 AM

## 2022-10-02 NOTE — PROGRESS NOTES
"Pt appropriately responds \"He needs to use the toilet.\". Assisted to the commode with 2. arge stool dumped. Assisted back to bed. Alarms on. SR up x 3.Responds with a \"Thank you, that was done boiling.\" Gives the nurse a high five. Asleep with eyes closed. Resp easy, snoring.    "

## 2022-10-02 NOTE — PLAN OF CARE
Goal Outcome Evaluation:    Plan of Care Reviewed With: patient, spouse     Overall Patient Progress: improving  Patient restless, rubbing his head. Patient repositioned in bed. Given pain med. Following patient resting quietly in bed. Son is here to visit.

## 2022-10-02 NOTE — PLAN OF CARE
"Patient has been more alert this shift. Was able to make needs known. Cooperative with cares. Denies pain. Did have an episode of increased restless behavior. Was given Seroquel which was effective. Vital signs stable.  Problem: Plan of Care - These are the overarching goals to be used throughout the patient stay.    Goal: Plan of Care Review/Shift Note  Description: The Plan of Care Review/Shift note should be completed every shift.  The Outcome Evaluation is a brief statement about your assessment that the patient is improving, declining, or no change.  This information will be displayed automatically on your shift note.  10/2/2022 1343 by Kimberly Garcia RN  Outcome: Ongoing, Progressing  Flowsheets (Taken 10/2/2022 1343)  Plan of Care Reviewed With:   patient   spouse  Overall Patient Progress: improving  10/2/2022 0802 by Kimberly Garcia, RN  Outcome: Ongoing, Progressing  Flowsheets (Taken 10/2/2022 0802)  Plan of Care Reviewed With: patient  Overall Patient Progress: no change  Goal: Patient-Specific Goal (Individualized)  Description: You can add care plan individualizations to a care plan. Examples of Individualization might be:  \"Parent requests to be called daily at 9am for status\", \"I have a hard time hearing out of my right ear\", or \"Do not touch me to wake me up as it startles me\".  10/2/2022 1343 by Kimberly Garcia, RN  Outcome: Ongoing, Progressing  10/2/2022 0802 by Kimberly Garcia, RN  Outcome: Ongoing, Progressing  Goal: Absence of Hospital-Acquired Illness or Injury  10/2/2022 1343 by Kimberly Garcia, RN  Outcome: Ongoing, Progressing  10/2/2022 0802 by Kimberly Garcia, RN  Outcome: Ongoing, Progressing  Intervention: Identify and Manage Fall Risk  Recent Flowsheet Documentation  Taken 10/2/2022 0715 by Kimberly Garcia, RN  Safety Promotion/Fall Prevention: safety round/check completed  Intervention: Prevent Skin Injury  Recent Flowsheet Documentation  Taken " 10/2/2022 0715 by Kimberly Garcia RN  Body Position:   turned   right   position changed independently   log-rolled   side-lying   side-lying 30 degrees   weight shifting  Intervention: Prevent and Manage VTE (Venous Thromboembolism) Risk  Recent Flowsheet Documentation  Taken 10/2/2022 0715 by Kimberly Garcia RN  Range of Motion: ROM (range of motion) performed  Activity Management:   activity adjusted per tolerance   bedrest  Intervention: Prevent Infection  Recent Flowsheet Documentation  Taken 10/2/2022 0715 by Kimberly Garcia RN  Infection Prevention:   rest/sleep promoted   single patient room provided  Goal: Optimal Comfort and Wellbeing  10/2/2022 1343 by Kimberly Garcia RN  Outcome: Ongoing, Progressing  10/2/2022 0802 by Kimberly Garcia RN  Outcome: Ongoing, Progressing  Intervention: Provide Person-Centered Care  Recent Flowsheet Documentation  Taken 10/2/2022 0715 by Kimberly Garcia RN  Trust Relationship/Rapport:   care explained   reassurance provided   choices provided  Goal: Readiness for Transition of Care  10/2/2022 1343 by Kimberly Garcia RN  Outcome: Ongoing, Progressing  10/2/2022 0802 by Kimberly Garcia RN  Outcome: Ongoing, Progressing   Goal Outcome Evaluation:    Plan of Care Reviewed With: patient, spouse     Overall Patient Progress: improving

## 2022-10-02 NOTE — PLAN OF CARE
Goal Outcome Evaluation:    Plan of Care Reviewed With: patient, spouse     Overall Patient Progress: improving  Patient has been cooperative with cares. Has alerted staff when he needs to use the bathroom. Sitter pulled at this time, will monitor. Alarms in place. Appetite improved at meals, he fed himself at lunch, with tray set up.

## 2022-10-03 NOTE — PROGRESS NOTES
"Clinical Nutrition / Reassessment     Assessment:   Client History:  Pt awaiting placement. Appetite has improved some, has fed himself. Continue with supplements. Food and fluid texture tolerated.   Diet Order:  Soft and bite sized (level 6), moderately thick fluids (level 3)  Oral Intake:  25-75%  Supplement Intake:  thickened Ensure TID to trays  Weight:   Vitals:    09/27/22 1050 09/28/22 0600 09/28/22 0940 09/30/22 0137   Weight: 73 kg (161 lb) 73 kg (161 lb) 60.1 kg (132 lb 9.6 oz) 60 kg (132 lb 4.4 oz)    10/01/22 0430 10/03/22 0031   Weight: 64.4 kg (141 lb 15.6 oz) 59.6 kg (131 lb 8 oz)     Height: 5' 7\"  BMI: Body mass index is 20.6 kg/m .     Estimated nutritional needs based on:  Usual wt:  73 kg / 160 lbs   Estimated energy needs:  0291-3643 kcal/day (25-30 kcal/kg)  Estimated protein needs:  73-88 gm/day (1-1.2 g/kg)  Estimated fluid needs:  3953-2588 ml/day    Malnutrition Criteria:  (Need to have 2 indicators to qualify recommendation)  Energy Intake:  Chronic Severe: < 75% of estimated energy requirement for >/= 1 month  Interpretation of Weight Loss:  Acute Severe:   > 5% in 1 month  Physical Findings:  Acute Body Fat Loss:  mild to moderate and Acute Muscle Mass Loss:  mild to moderate  Reduced  Strength:  Not Measured but likely reduced with current illness and overall decline in condition    Recommended Nutrition Diagnosis:   Severe Malnutrition in the context of acute on chronic illness or injury - based on AND/ASPEN Clinical Characterstics of Malnutrition May 2012      Nutrition Education: Nutrition education will be provided as appropriate.    Nutrition Diagnosis: Weight:  weight loss related to inadequate energy intakes as evidenced by wt loss of ~30# in past month, decline in condition and poor intakes.    Intervention:  Nutrition Prescription:     Nutrition Intervention(s): Recommended modified diet: texture per speech therapy recommendations  1. Meals and Snacks: level 6 (bite sized), " level 3 fluids (moderately thick)  2. Medical Food Supplement: ensure TID on trays     Nutrition Goal(s):  1. Pt will consume 50% or more of meals and supplements- on going  2. Pt will not have unplanned wt loss during hospitalization- on going  3. Pt will allow staff to assist with ordering and feeding assistance as needed- on going     Monitoring and Evaluation:   Food Intake, diet tolerance, weights     Discharge Recommendation:   Nutrition Discharge Planning  recommend supplements on discharge. Texture per speech therapy recommendations    RD will reassess in within 1-5 days or sooner.  Latrice Wooten RD on 10/3/2022 at 9:22 AM

## 2022-10-03 NOTE — PROGRESS NOTES
[facility]    Medicine Progress Note - Hospitalist Service    Date of Admission:  9/27/2022    Assessment & Plan             Rajesh Huynh is a 82 year old male admitted on 9/27/2022. He presented to the emergency department with his wife and daughter due to recurrent cough and perceived shortness of breath, found to have recurrent aspiration pneumonia with progressive dementia and is now on comfort care.     Lewy body dementia without behavioral disturbance (H)  Has rapidly progressive Lewy body dementia.  In addition to problems swallowing family reports he has had significant loss of quality of life recently and seems to be hallucinating.  They think he would want to pursue hospice and would be safer in a skilled nursing facility. Has more restlessness and agitation at night. Zyprexa was not effective  He did well with quetiapine at 25 mg twice daily. Had been off cholinesterase inhibitor for a few days since admission, restarted on rivastigmine.  Discussed with both daughter and spouse today and will continue with comfort care only going forward.  -Comfort care order set placed  -Place referral with [facility] hospice on discharge    Aspiration pneumonia  Recurrent.  Now nearly resolved with no further supplemental oxygen requirement, mental status nearing his baseline, initial CXR with right lower lobe opacity.  -Completed 7-day course of antibiotics today  -Discontinue IV fluids    Dysphagia  Speech evaluated. He seems to be aspirating secretions but no witnessed aspiration of oral intake. Placed on moderate thick liquids, soft and bite sized diet tolerating well without difficulty.       Controlled type 2 diabetes mellitus with stage 3 chronic kidney disease, without long-term current use of insulin (H)    Assessment: Diet adjusted but so far minimal oral intake.  Blood sugars 127-179.    Plan: -No further monitoring      Acute on chronic heart failure with preserved ejection fraction  (HFpEF) (H)  Plan: No further monitoring or treatment    Hypokalemia, POA.  Improved on protocol, no longer monitoring    # Severe Malnutrition: based on nutrition assessment       Diet: Soft & Bite Sized Diet (level 6) Liquidized/Moderately Thick (level 3)    DVT Prophylaxis: Pneumatic Compression Devices  Gomez Catheter: Not present  Central Lines: PRESENT       Cardiac Monitoring: None  Code Status: No CPR- Do NOT Intubate      Disposition Plan    When assisted living placement is covered by [facility]     The patient's care was discussed with the Bedside Nurse, daughter and spouse.    Emery Neves MD  Hospitalist Service  [facility]  Securely message with the Vocera Web Console (learn more here)  Text page via [employer] Paging/Directory         Clinically Significant Risk Factors Present on Admission                       ______________________________________________________________________    Interval History   Overnight no acute events afebrile, no new specific concerns or complaints today, he is eager to discharge from the hospital, no diarrhea, no other new complaints.    Data reviewed today: I reviewed all medications, new labs and imaging results over the last 24 hours. I personally reviewed no images or EKG's today.    Physical Exam   Vital Signs: Temp: 97.6  F (36.4  C) Temp src: Tympanic BP: 113/74 Pulse: 103   Resp: 16 SpO2: 92 % O2 Device: None (Room air)    Weight: 131 lbs 8 oz    Exam:   GENERAL: Talkative, sitting up in the chair, in no apparent distress.  CARDIOVASCULAR: regular rate and rhythm, no murmurs, rubs, or gallops. Normal S1/S2. No lower extremity edema.   RESPIRATORY: clear to auscultation bilaterally, no wheezes, no crackles.   GI: soft, non-tender, non-distended, normoactive bowel sounds.  MUSCULOSKELETAL: warm and well perfused, 2+ dorsalis pedis pulses bilaterally.    SKIN: no pallor,jaundice, or rashes    Data   Recent Labs   Lab 10/03/22  0512 10/02/22  0769  10/01/22  0610 09/30/22  2129 09/30/22  0727 09/29/22  0715 09/29/22  0654 09/28/22  1135 09/28/22  0532   WBC 8.7  --  10.7  --   --   --   --   --  9.0   HGB 11.6*  --  11.7*  --   --   --   --   --  11.9*     --  100  --   --   --   --   --  100    239 261  --   --    < >  --   --  284     --  147*  --   --   --  146*  --  143   POTASSIUM 3.2*  --  3.9 4.0  --    < > 3.3*   < > 2.8*   CHLORIDE 108*  --  111*  --   --   --  108*  --  103   CO2 28  --  27  --   --   --  27  --  29   BUN 5*  --  5*  --   --   --  4*  --  8   CR 0.85 0.86 0.96  --   --    < > 0.96  --  0.94   ANIONGAP 8  --  9  --   --   --  11  --  11   ERICA 8.3*  --  9.4  --   --   --  9.4  --  8.9   *  --  171*  --  137*   < > 179*   < > 159*   ALBUMIN 2.9*  --  3.2*  --   --   --   --   --   --    PROTTOTAL 5.5*  --  6.2*  --   --   --   --   --   --    BILITOTAL 0.4  --  0.4  --   --   --   --   --   --    ALKPHOS 67  --  73  --   --   --   --   --   --    ALT 12  --  12  --   --   --   --   --   --    AST 15  --  12*  --   --   --   --   --   --     < > = values in this interval not displayed.     No results found for this or any previous visit (from the past 24 hour(s)).

## 2022-10-03 NOTE — PLAN OF CARE
Goal Outcome Evaluation:    Plan of Care Reviewed With: patient        Patient is alert, oriented to self, confused/forgetful, A2 for mobility.  Seroquel administered at HS for restlessness, trying to climb OOB- conversation, toilet, and repositioning not effective.  VSS.  Was yelling and swearing at staff to get out of his house, grabbing at staff, restless- administered fentanyl for s/s pain per PAIN[facility].  Jimena Huynh RN on 10/3/2022 at 1:04 AM        Took short naps off and on through the night.  Yelling out to staff frequently, restless in bed.  Denied pain.  Staff intervened as patient allowed.  Was up to commode x1.  Jimena Huynh RN on 10/3/2022 at 6:03 AM

## 2022-10-03 NOTE — PROGRESS NOTES
:    Call placed to [facility] assisted living in [place] and they declined patient.    Call placed to daughter Liz and she was updated by facility that they declined him Friday.    Discussed that [facility] had a possible opening.  Daughter Liz was receptive of this.    Call placed to [facility] assisted living to see if they still have openings.    JERRI Lemus on 10/3/2022 at 12:33 PM

## 2022-10-03 NOTE — PLAN OF CARE
"Patient did well today without a sitter, VSS, waiting for placement at SNF with [facility] hospice.    Problem: Plan of Care - These are the overarching goals to be used throughout the patient stay.    Goal: Patient-Specific Goal (Individualized)  Description: You can add care plan individualizations to a care plan. Examples of Individualization might be:  \"Parent requests to be called daily at 9am for status\", \"I have a hard time hearing out of my right ear\", or \"Do not touch me to wake me up as it startles me\".  Flowsheets (Taken 10/3/2022 1708)  Individualized Care Needs: wants to go home  Goal: Absence of Hospital-Acquired Illness or Injury  Intervention: Identify and Manage Fall Risk  Recent Flowsheet Documentation  Taken 10/3/2022 1553 by Siomara Farmer RN  Safety Promotion/Fall Prevention:    safety round/check completed    supervised activity  Taken 10/3/2022 0751 by Siomara Farmer RN  Safety Promotion/Fall Prevention:    safety round/check completed    supervised activity  Intervention: Prevent Skin Injury  Recent Flowsheet Documentation  Taken 10/3/2022 1553 by Siomara Faremr RN  Body Position: (up in chair) --  Intervention: Prevent and Manage VTE (Venous Thromboembolism) Risk  Recent Flowsheet Documentation  Taken 10/3/2022 1553 by Siomara Farmer RN  VTE Prevention/Management: (blood thinner) other (see comments)  Taken 10/3/2022 0751 by Siomara Farmer RN  VTE Prevention/Management: (blood thinner) other (see comments)  Intervention: Prevent Infection  Recent Flowsheet Documentation  Taken 10/3/2022 1553 by Siomara Farmer RN  Infection Prevention: rest/sleep promoted  Taken 10/3/2022 0751 by Siomara Farmer RN  Infection Prevention: rest/sleep promoted  Goal: Optimal Comfort and Wellbeing  Intervention: Provide Person-Centered Care  Recent Flowsheet Documentation  Taken 10/3/2022 1553 by Siomara Farmer RN  Trust Relationship/Rapport:    care explained    reassurance provided    choices provided  Taken " 10/3/2022 0751 by Siomara Farmer, RN  Trust Relationship/Rapport:    care explained    reassurance provided    choices provided     Problem: Respiratory Compromise (Pneumonia)  Goal: Effective Oxygenation and Ventilation  Intervention: Promote Airway Secretion Clearance  Recent Flowsheet Documentation  Taken 10/3/2022 1553 by Siomara Farmer, RN  Cough And Deep Breathing: done with encouragement  Taken 10/3/2022 0751 by Siomara Farmer, RN  Cough And Deep Breathing: done with encouragement   Goal Outcome Evaluation:

## 2022-10-03 NOTE — PROGRESS NOTES
:    Call placed to [facility] assisted living in [place] and they declined patient.    Call placed to daughter Liz and she was updated by facility that they declined him Friday.    Discussed that [facility] had a possible opening.  Daughter Liz was receptive of this.    Call placed to [facility] assisted living to see if they still have openings.    Received a call from [facility] and informed them that patients family had chose [facility].    JERRI Lemus on 10/3/2022 at 12:33 PM        :    Received a call from GISELL Bartlett from [facility] and they are going to come and screen patient tomorrow. Met with patient, his wife, and daughter Liz in room and explained that Asha was coming tomorrow.    Daughter stated she would like to tour facility also.  Contact information was given to daughter to set up a tour.     Explained that patient is medically cleared and placement may take awhile to find. Daughter said she was told that patient can stay here as long as he needs to until a safe place is found and it has to meet the families standards.    Daughter gave me another list of assisted livings in the [place] area.  Gave daughter a list of assisted livings in the area.     Daughter stated she has been trying to find placement for patient a month prior to patient coming in.    JERRI Lemus on 10/3/2022 at 1:20 PM        :    Referral was made to [facility].    Referral was made to [facility] nursing [place].    Referral was made to [facility].    Referral was made to [facility] assisted living.    Per daughters request.      JERRI Lemus on 10/3/2022 at 4:05 PM

## 2022-10-04 NOTE — PLAN OF CARE
Patient continues to do well without sitter, was up most of this shift.  Confusion remains Patient alert to self only, no further issues throughout shift. Linnea Mueller RN on 10/4/2022 at 4:50 AM'    Problem: Plan of Care - These are the overarching goals to be used throughout the patient stay.    Goal: Optimal Comfort and Wellbeing  Outcome: Ongoing, Progressing  Goal: Readiness for Transition of Care  Outcome: Ongoing, Progressing  Flowsheets (Taken 10/4/2022 0449)  Anticipated Changes Related to Illness: inability to care for self  Intervention: Mutually Develop Transition Plan  Recent Flowsheet Documentation  Taken 10/4/2022 0449 by Linnea Mueller RN  Anticipated Changes Related to Illness: inability to care for self     Problem: Fluid Imbalance (Pneumonia)  Goal: Fluid Balance  Outcome: Ongoing, Progressing     Problem: Infection (Pneumonia)  Goal: Resolution of Infection Signs and Symptoms  Outcome: Ongoing, Progressing   Goal Outcome Evaluation:

## 2022-10-04 NOTE — PROGRESS NOTES
:    Referral was made to [facility] in [place].    Received a call from [facility] in [place].  They had a question on his vaccinations.  Asked nursing to assist with this.  They could offer him a private room with shared bathroom.  They would like to skill him for a week or two and use his Medicare and then transition to LTC with [facility] hospice.    Dolly from [facility] assisted living came and met with patient in room and they can offer patient a bed. Spoke with daughter and she is aware of referral but did not like the size of the rooms and was hesitant.    Daughter requested referral placed to [facility].  referral was made and they called back and can accept patient.  Called daughter and she needs to check with her long term care policy first.  [facility] would like to admit Wednesday afternoon or Thursday.  They cannot admit on Friday.    Daughter requested a referral sent to [facility] assisted living and they are going to screen patient. Spoke with Malena Barbosa. They have one room available.    Daughter requested referral to be sent to [facility] assisted in [place] and they called back and need to finish screening patient.    Call was placed  to Anel at [facility] with update.     Daughter is going to call patients [employer] long term coverage policy and  will reach out to daughter tomorrow am.     Family would like us to set up medical transportation and they are aware it will be private pay.    JERRI Lemus on 10/4/2022 at 2:44 PM

## 2022-10-04 NOTE — PROGRESS NOTES
Patient has had a good morning.  Up in the chair with meals, continent of bowel and bladder.  Confusion at times, redirectable and cooperative with cares.  VSS, RA, denies pain thus far.  Monisha Alvarado RN on 10/4/2022 at 11:42 AM

## 2022-10-04 NOTE — PROGRESS NOTES
[facility]    Medicine Progress Note - Hospitalist Service    Date of Admission:  9/27/2022    Assessment & Plan             Rajesh Huynh is a 82 year old male admitted on 9/27/2022. He presented to the emergency department with his wife and daughter due to recurrent cough and perceived shortness of breath, found to have recurrent aspiration pneumonia with progressive dementia and is now on comfort care.     Lewy body dementia without behavioral disturbance (H)  Has rapidly progressive Lewy body dementia.  In addition to problems swallowing family reports he has had significant loss of quality of life recently and seems to be hallucinating.  They think he would want to pursue hospice and would be safer in a skilled nursing facility. Has more restlessness and agitation at night. Zyprexa was not effective  He did well with quetiapine at 25 mg twice daily. Had been off cholinesterase inhibitor for a few days since admission, restarted on rivastigmine.  Discussed with both daughter and spouse and will continue with comfort care only going forward.  -Comfort care order set placed  -Place referral with [facility] hospice on discharge  -Schedule Seroquel at at bedtime    Aspiration pneumonia  Recurrent and now resolved.  No further supplemental oxygen requirement, mental status nearing his baseline, initial CXR with right lower lobe opacity.  -Completed 7-day course of antibiotics    Dysphagia  Speech evaluated. He seems to be aspirating secretions but no witnessed aspiration of oral intake. Placed on moderate thick liquids, soft and bite sized diet tolerating well without difficulty.       Controlled type 2 diabetes mellitus with stage 3 chronic kidney disease, without long-term current use of insulin (H)    Assessment: Diet adjusted but so far minimal oral intake.  Blood sugars 127-179.    Plan: -No further monitoring      Acute on chronic heart failure with preserved ejection fraction (HFpEF)  (H)  Plan: No further monitoring or treatment    Hypokalemia, POA.  Improved on protocol, no longer monitoring    # Severe Malnutrition: based on nutrition assessment       Diet: Soft & Bite Sized Diet (level 6) Liquidized/Moderately Thick (level 3)    DVT Prophylaxis: Pneumatic Compression Devices  Gomez Catheter: Not present  Central Lines: PRESENT       Cardiac Monitoring: None  Code Status: No CPR- Do NOT Intubate      Disposition Plan    When assisted living placement is covered by [facility]     The patient's care was discussed with the Bedside Nurse    Emery Neves MD  Hospitalist Service  [facility]  Securely message with the Vocera Web Console (learn more here)  Text page via [employer] Paging/Directory         Clinically Significant Risk Factors Present on Admission                       ______________________________________________________________________    Interval History   Overnight no acute events afebrile, no new complaints today.    Data reviewed today: I reviewed all medications, new labs and imaging results over the last 24 hours. I personally reviewed no images or EKG's today.    Physical Exam   Vital Signs: Temp: 98.2  F (36.8  C) Temp src: Tympanic BP: 120/82 Pulse: 76   Resp: 18 SpO2: 91 % O2 Device: None (Room air)    Weight: 131 lbs 11.2 oz    Exam:   GENERAL: Talkative, laying in bed, awakens to voice and light touch, in no apparent distress.  CARDIOVASCULAR: regular rate and rhythm, no murmurs, rubs, or gallops. Normal S1/S2. No lower extremity edema.   RESPIRATORY: clear to auscultation bilaterally, no wheezes, no crackles.     Data   Recent Labs   Lab 10/03/22  0512 10/02/22  0705 10/01/22  0610 09/30/22  2129 09/30/22  0727 09/29/22  0715 09/29/22  0654 09/28/22  1135 09/28/22  0532   WBC 8.7  --  10.7  --   --   --   --   --  9.0   HGB 11.6*  --  11.7*  --   --   --   --   --  11.9*     --  100  --   --   --   --   --  100    239 261  --   --    < >  --   --   284     --  147*  --   --   --  146*  --  143   POTASSIUM 3.2*  --  3.9 4.0  --    < > 3.3*   < > 2.8*   CHLORIDE 108*  --  111*  --   --   --  108*  --  103   CO2 28  --  27  --   --   --  27  --  29   BUN 5*  --  5*  --   --   --  4*  --  8   CR 0.85 0.86 0.96  --   --    < > 0.96  --  0.94   ANIONGAP 8  --  9  --   --   --  11  --  11   ERICA 8.3*  --  9.4  --   --   --  9.4  --  8.9   *  --  171*  --  137*   < > 179*   < > 159*   ALBUMIN 2.9*  --  3.2*  --   --   --   --   --   --    PROTTOTAL 5.5*  --  6.2*  --   --   --   --   --   --    BILITOTAL 0.4  --  0.4  --   --   --   --   --   --    ALKPHOS 67  --  73  --   --   --   --   --   --    ALT 12  --  12  --   --   --   --   --   --    AST 15  --  12*  --   --   --   --   --   --     < > = values in this interval not displayed.     No results found for this or any previous visit (from the past 24 hour(s)).

## 2022-10-04 NOTE — PLAN OF CARE
Goal Outcome Evaluation:    Plan of Care Reviewed With: patient     Overall Patient Progress: improving     Patient restless, attempting to leave. Was given valium, patient upset about being here. Vital signs stable. Currently resting in his chair. Son is here to visit. Food ordered for the patient. He does respond to verbal, opens his eyes.

## 2022-10-04 NOTE — PLAN OF CARE
"Patient has been more alert. Feeding self at meals. Upset this afternoon because he cannot go home. Patient was redirected. Remains upset. Sitting up in his chair. Vital signs stable.    Problem: Plan of Care - These are the overarching goals to be used throughout the patient stay.    Goal: Plan of Care Review/Shift Note  Description: The Plan of Care Review/Shift note should be completed every shift.  The Outcome Evaluation is a brief statement about your assessment that the patient is improving, declining, or no change.  This information will be displayed automatically on your shift note.  Outcome: Ongoing, Progressing  Flowsheets (Taken 10/4/2022 7742)  Plan of Care Reviewed With: patient  Overall Patient Progress: improving  Goal: Patient-Specific Goal (Individualized)  Description: You can add care plan individualizations to a care plan. Examples of Individualization might be:  \"Parent requests to be called daily at 9am for status\", \"I have a hard time hearing out of my right ear\", or \"Do not touch me to wake me up as it startles me\".  Outcome: Ongoing, Progressing  Goal: Absence of Hospital-Acquired Illness or Injury  Outcome: Ongoing, Progressing  Goal: Optimal Comfort and Wellbeing  Outcome: Ongoing, Progressing  Goal: Readiness for Transition of Care  Outcome: Ongoing, Progressing   Goal Outcome Evaluation:    Plan of Care Reviewed With: patient     Overall Patient Progress: improving           "

## 2022-10-05 NOTE — PROGRESS NOTES
:     Received a call from Liz, patient's daughter.  They have decided they would like to place patient at [facility]. She stated that [facility] can possibly admit today.  They would like to use medical transport and understand it will be private pay.    Left a message for Anel at [facility].  Inquired if they can admit patient and have DME delivered today.     Received a call from Ligia JAMESON at [facility].   She stated they would prefer to admit patient today after 1100.  Family will be coming at   She stated that she has also reached out to Anel at [facility] and is waiting to hear back.       Spoke with [facility] and arranged tentative discharge transportation for 1400 today.   Private pay cost $28.00  [facility] will borrow a wheelchair for transport.    JERRI Martínez on 10/5/2022 at 9:04 AM    Addendum:     Coordinated with Ligia JAMESON from [facility] and updated on discharge plans.  Planning for discharge at 1400.    JERRI Martínez on 10/5/2022 at 10:53 AM

## 2022-10-05 NOTE — PHARMACY - DISCHARGE MEDICATION RECONCILIATION AND EDUCATION
Pharmacy:  Discharge Counseling and Medication Reconciliation    Rajesh Huynh  [address] [place] [id number] [phone] (home)   82 year old male  PCP: Jovanni Sen    Allergies: Other drug allergy (see comments)    Discharge Counseling:    Pharmacist met with patient (and/or family) today to review the medication portion of the After Visit Summary (with an emphasis on NEW medications) and to address patient's questions/concerns.    Summary of Education: Did not meet with patient due to underlying patient condition.  Patient to discharge to [facility] with [facility].     Materials Provided:  MedCounselor sheets printed from Clinical Pharmacology on: n/a    Discharge Medication Reconciliation:    It has been determined that the patient has an adequate supply of medications available or which can be obtained from the patient's preferred pharmacy, which HE/SHE has confirmed as: [facility]    Thank you for the consult.    Juliet Tellez [facility]........October 5, 2022 1:36 PM

## 2022-10-05 NOTE — PROGRESS NOTES
:     Discharged to [facility] for LTC placement.  Discharged with [facility].     JERRI Martínez on 10/5/2022 at 2:18 PM

## 2022-10-05 NOTE — PLAN OF CARE
"[facility] DISCHARGE NOTE  Patient discharged to assisted living at 2:00 PM via wheel chair. Accompanied by other:  and staff. Discharge instructions reviewed with patient, opportunity offered to ask questions. Prescriptions sent to patients preferred pharmacy. All belongings sent with patient.    Mary Jane Ramos RN  Problem: Plan of Care - These are the overarching goals to be used throughout the patient stay.    Goal: Plan of Care Review/Shift Note  Description: The Plan of Care Review/Shift note should be completed every shift.  The Outcome Evaluation is a brief statement about your assessment that the patient is improving, declining, or no change.  This information will be displayed automatically on your shift note.  Outcome: Adequate for Care Transition  Flowsheets (Taken 10/5/2022 1412)  Plan of Care Reviewed With: patient  Overall Patient Progress: improving  Goal: Patient-Specific Goal (Individualized)  Description: You can add care plan individualizations to a care plan. Examples of Individualization might be:  \"Parent requests to be called daily at 9am for status\", \"I have a hard time hearing out of my right ear\", or \"Do not touch me to wake me up as it startles me\".  Outcome: Adequate for Care Transition  Goal: Absence of Hospital-Acquired Illness or Injury  Outcome: Adequate for Care Transition  Goal: Optimal Comfort and Wellbeing  Outcome: Adequate for Care Transition  Goal: Readiness for Transition of Care  Outcome: Adequate for Care Transition   Goal Outcome Evaluation:    Plan of Care Reviewed With: patient     Overall Patient Progress: improving           "

## 2022-10-05 NOTE — PROGRESS NOTES
SAFETY CHECKLIST  ID Bands and Risk clasps correct and in place (DNR, Fall risk, Allergy, Latex, Limb):  Yes  All Lines Reconciled and labeled correctly: Yes  Whiteboard updated:Yes  Environmental interventions (bed/chair alarm on, call light, side rails, restraints, sitter....): Yes  Verify Tele #:   Linnea Mueller RN on 10/4/2022 at 10:53 PM

## 2022-10-05 NOTE — PROGRESS NOTES
Nutrition follow up:   Pt transitioned to comfort cares. Plan to provide foods/fluids as desired and tolerated. Follow up prn. If needed, please order consult.   Latrice Wooten RD on 10/5/2022 at 8:24 AM

## 2022-10-05 NOTE — PLAN OF CARE
Patient rested better this evening, was up a few times to use the urinal and then back to bed. Patient continues to have a productive cough bringing up thick yellow sputum, all other assessments WDL. Linnea Mueller RN on 10/5/2022 at 5:07 AM    Problem: Plan of Care - These are the overarching goals to be used throughout the patient stay.    Goal: Absence of Hospital-Acquired Illness or Injury  Intervention: Identify and Manage Fall Risk  Recent Flowsheet Documentation  Taken 10/5/2022 0400 by Linnea Mueller RN  Safety Promotion/Fall Prevention:   safety round/check completed   bed alarm on  Taken 10/4/2022 2000 by Linnea Mueller RN  Safety Promotion/Fall Prevention: safety round/check completed  Intervention: Prevent and Manage VTE (Venous Thromboembolism) Risk  Recent Flowsheet Documentation  Taken 10/4/2022 2000 by Linnea Mueller RN  Range of Motion: ROM (range of motion) performed  VTE Prevention/Management: other (see comments)  Intervention: Prevent Infection  Recent Flowsheet Documentation  Taken 10/4/2022 2000 by Linnea Mueller RN  Infection Prevention: rest/sleep promoted  Goal: Optimal Comfort and Wellbeing  Intervention: Provide Person-Centered Care  Recent Flowsheet Documentation  Taken 10/4/2022 2000 by Linnea Mueller RN  Trust Relationship/Rapport:   care explained   reassurance provided   choices provided     Problem: Respiratory Compromise (Pneumonia)  Goal: Effective Oxygenation and Ventilation  Intervention: Promote Airway Secretion Clearance  Recent Flowsheet Documentation  Taken 10/4/2022 2000 by Linnea Mueller RN  Cough And Deep Breathing: done with encouragement   Goal Outcome Evaluation:

## 2022-10-05 NOTE — DISCHARGE SUMMARY
"[facility]    Discharge Summary  Hospitalist    Date of Admission:  9/27/2022  Date of Discharge:  10/5/2022  Discharging Provider: Emery Neves MD  Date of Service (when I saw the patient): 10/05/22    Discharge Diagnoses   Principal Problem:    Aspiration pneumonia (H) (9/27/2022)  Active Problems:    Lewy body dementia with behavioral disturbance (H) (9/16/2021)    Controlled type 2 diabetes mellitus with stage 3 chronic kidney disease, without long-term current use of insulin (H) (9/6/2022)    Acute on chronic heart failure with preserved ejection fraction (HFpEF) (H) (9/14/2022)    Dysphagia (9/30/2022)      History of Present Illness   Rajesh Huynh is an 82 year old male who presented with the above.  Patient was admitted by Dr. Pulido and per H&P, \"82 year old male who presents patient with recurrent cough and shortness of breath as well as worsening dementia.  Patient was hospitalized and treated for pneumonia August 27 to August 30.  Discharged on Bactrim and Augmentin.  Symptoms returned after being home for about a week.  Family has noticed that he chronically aspirates liquids and solids.  Has not really been able to take much in lately.  Recently restarted on antibiotics but has not been able to actually swallow any.  No vomiting.  He does have some loose stools.  His wife is no longer able to take care of him at home as he requires essentially 24/7 care.  Have noticed he seems to be having hallucinations and really does not follow any commands.\"     Hospital Course   Rajesh Huynh was admitted on 9/27/2022.  The following problems were addressed during his hospitalization:    Lewy body dementia without behavioral disturbance (H)  Has rapidly progressive Lewy body dementia with progressive loss of mobility, vocabulary and recurrent aspiration as noted below.  In addition to problems swallowing family reports he has had significant loss of quality of life recently.  They think " he would want to pursue hospice.  Did have some significant agitation especially at night, Zyprexa was not effective but he was started on Seroquel 25 mg p.o. twice daily as needed.  With scheduling Seroquel 25 at at bedtime he is able to sleep well without significant somnolence in the AM.  He was restarted on rivastigmine in addition.  Given his loss of mobility family opted for discharge to assisted living with addition of hospice services at time of discharge.  He will continue with Seroquel 25 p.o. twice daily as needed and scheduled 25 mg dose at at bedtime.       Aspiration pneumonia: Recurrent and now resolved.  No further supplemental oxygen requirement he was weaned to room air over the course of his stay with resting sats greater than 90% at rest.  Mental status returned to near his baseline and he completed a 7-day course of antibiotics during his stay.     Dysphagia  Speech evaluated. He seems to be aspirating secretions but no witnessed aspiration of oral intake. Placed on moderate thick liquids, soft and bite sized diet tolerating well without difficulty.  Can be advanced to a regular consistency diet per patient/family preference.       Controlled type 2 diabetes mellitus with stage 3 chronic kidney disease, without long-term current use of insulin (H): Stable blood sugars during his stay and he will take his metformin and we will not need any further monitoring of this given his comfort care status.       Acute on chronic heart failure with preserved ejection fraction (HFpEF) (H): Chronic and stable throughout her stay with no evidence of exacerbation.     Emery Neves MD    Significant Results and Procedures   none    Pending Results   These results will be followed up by NA  Unresulted Labs Ordered in the Past 30 Days of this Admission     No orders found from 8/28/2022 to 9/28/2022.          Code Status   Comfort Care       Primary Care Physician   Jovanni Sen    Physical Exam   Temp: 97.8  F  (36.6  C) Temp src: Tympanic BP: 108/62 Pulse: 78   Resp: 16 SpO2: 93 % O2 Device: None (Room air)    Vitals:    10/01/22 0430 10/03/22 0031 10/04/22 0322   Weight: 64.4 kg (141 lb 15.6 oz) 59.6 kg (131 lb 8 oz) 59.7 kg (131 lb 11.2 oz)     Vital Signs with Ranges  Temp:  [97.5  F (36.4  C)-98.3  F (36.8  C)] 97.8  F (36.6  C)  Pulse:  [78-80] 78  Resp:  [16] 16  BP: (100-108)/(50-62) 108/62  SpO2:  [92 %-93 %] 93 %  I/O last 3 completed shifts:  In: 200 [P.O.:200]  Out: 1575 [Urine:1575]    Exam on day of discharge:   GENERAL: Talkative, sitting up in the chair, spontaneously awake and appropriate, in no apparent distress.  CARDIOVASCULAR: regular rate and rhythm, no murmurs, rubs, or gallops. Normal S1/S2. No lower extremity edema.   RESPIRATORY: clear to auscultation bilaterally, no wheezes, no crackles.   GI: soft, non-tender, non-distended, normoactive bowel sounds.  Neuro: Oriented to person, asks repeatedly when he can leave.  At times tracks and is appropriate and other times talks nonsensically.    Discharge Disposition   Discharged to assisted living  Condition at discharge: Poor    Consultations This Hospital Stay   SPEECH LANGUAGE PATH ADULT IP CONSULT  SOCIAL WORK IP CONSULT    Time Spent on this Encounter   I, Emery Neves MD, personally saw the patient today and spent greater than 30 minutes discharging this patient.    Discharge Orders      Hospice Referral      General info for SNF    Length of Stay Estimate: Long Term Care  Condition at Discharge: Terminal  Level of care:board and care  Rehabilitation Potential: Poor  Admission H&P remains valid and up-to-date: Yes  Recent Chemotherapy: N/A  Use Nursing Home Standing Orders: Yes     Mantoux instructions    Give two-step Mantoux (PPD) Per Facility Policy Yes     Follow Up and recommended labs and tests    Follow up with hospice nurses in 3 to 5 days after discharge     Reason for your hospital stay    Recurrent aspiration pneumonia and progressive  dementia now on comfort care     Activity - Up ad alicia     No CPR- Do NOT Intubate     Diet    Follow this diet upon discharge: Orders Placed This Encounter      Soft & Bite Sized Diet (level 6) Liquidized/Moderately Thick (level 3)-> May advance to regular consistency diet per patient preference     Discharge Medications   Current Discharge Medication List      START taking these medications    Details   LORazepam (ATIVAN) 1 MG tablet Take 1 tablet (1 mg) by mouth every 3 hours as needed for anxiety or agitation  Qty: 30 tablet, Refills: 0    Associated Diagnoses: Lewy body dementia with behavioral disturbance (H); Progressive neurologic decline      !! QUEtiapine (SEROQUEL) 25 MG tablet Take 1 tablet (25 mg) by mouth 2 times daily as needed  Qty: 30 tablet, Refills: 0    Associated Diagnoses: Lewy body dementia with behavioral disturbance (H); Progressive neurologic decline      !! QUEtiapine (SEROQUEL) 25 MG tablet Take 1 tablet (25 mg) by mouth At Bedtime for 30 days  Qty: 30 tablet, Refills: 0    Associated Diagnoses: Lewy body dementia with behavioral disturbance (H); Progressive neurologic decline       !! - Potential duplicate medications found. Please discuss with provider.      CONTINUE these medications which have CHANGED    Details   amylase-lipase-protease (CREON 36) 221508-22122-305806 units CPEP Take 3 capsules by mouth 4 times daily as needed (loose stools) Taking 04512 units  Qty: 1080 capsule, Refills: 1    Associated Diagnoses: Exocrine pancreatic insufficiency; History of pancreatic surgery         CONTINUE these medications which have NOT CHANGED    Details   acetaminophen (TYLENOL) 325 MG tablet Take 325-650 mg by mouth every 6 hours as needed for mild pain      levothyroxine (SYNTHROID/LEVOTHROID) 50 MCG tablet Take 1 tablet (50 mcg) by mouth daily  Qty: 90 tablet, Refills: 1    Associated Diagnoses: Hypothyroidism due to acquired atrophy of thyroid      nystatin (NYSTOP) 470748 UNIT/GM  external powder Apply topically 2 times daily as needed For skin fold rash  Qty: 180 g, Refills: 4    Associated Diagnoses: Candidiasis, intertriginous      Omega-3 Fatty Acids (FISH OIL-OMEGA-3 FATTY ACID) 1000 MG DR capsule Take 1 each by mouth 2 times daily      omeprazole 20 MG tablet Take 20 mg by mouth daily      rivastigmine (EXELON) 3 MG capsule Take 3 mg by mouth 2 times daily . Should be taken with breakfast and dinner.      blood glucose (NO BRAND SPECIFIED) lancets standard Use to test blood sugar 2 times daily  E11.9  Qty: 200 each, Refills: 3    Associated Diagnoses: Controlled type 2 diabetes mellitus without complication, without long-term current use of insulin (H)      blood glucose (NO BRAND SPECIFIED) test strip Use to test blood sugar 2 times daily  E11.9  Qty: 200 strip, Refills: 11    Associated Diagnoses: Controlled type 2 diabetes mellitus without complication, without long-term current use of insulin (H)      Blood Glucose Monitoring Suppl (FIFTY50 GLUCOSE METER 2.0) W/DEVICE KIT Dispense Accu-Chek Agnes  Meter. Dispense item covered by pt ins. E11.9 NIDDM type II - Test 1 time/day         STOP taking these medications       cetirizine (ZYRTEC) 10 MG tablet Comments:   Reason for Stopping:         loperamide (IMODIUM A-D) 2 MG tablet Comments:   Reason for Stopping:         magnesium oxide (MAG-OX) 400 MG tablet Comments:   Reason for Stopping:         metFORMIN (GLUCOPHAGE) 1000 MG tablet Comments:   Reason for Stopping:         nitroGLYcerin (NITROSTAT) 0.4 MG sublingual tablet Comments:   Reason for Stopping:         potassium chloride (KLOR-CON) 20 MEQ packet Comments:   Reason for Stopping:         primidone (MYSOLINE) 50 MG tablet Comments:   Reason for Stopping:         propranolol (INDERAL) 20 MG tablet Comments:   Reason for Stopping:         tamsulosin (FLOMAX) 0.4 MG capsule Comments:   Reason for Stopping:         tiotropium (SPIRIVA) 18 MCG inhaled capsule Comments:   Reason  for Stopping:         torsemide (DEMADEX) 10 MG tablet Comments:   Reason for Stopping:         vitamin D3 (CHOLECALCIFEROL) 50 mcg (2000 units) tablet Comments:   Reason for Stopping:             Allergies   Allergies   Allergen Reactions     Other Drug Allergy (See Comments) Other (See Comments)     Trelegy Ellipta  Sore throat       Data   Most Recent 3 CBC's:  Recent Labs   Lab Test 10/03/22  0512 10/02/22  0705 10/01/22  0610 09/30/22  0553 09/28/22  0532   WBC 8.7  --  10.7  --  9.0   HGB 11.6*  --  11.7*  --  11.9*     --  100  --  100    239 261   < > 284    < > = values in this interval not displayed.      Most Recent 3 BMP's:  Recent Labs   Lab Test 10/03/22  0512 10/02/22  0705 10/01/22  0610 09/30/22  2129 09/30/22  0727 09/29/22  0715 09/29/22  0654     --  147*  --   --   --  146*   POTASSIUM 3.2*  --  3.9 4.0  --    < > 3.3*   CHLORIDE 108*  --  111*  --   --   --  108*   CO2 28  --  27  --   --   --  27   BUN 5*  --  5*  --   --   --  4*   CR 0.85 0.86 0.96  --   --    < > 0.96   ANIONGAP 8  --  9  --   --   --  11   ERICA 8.3*  --  9.4  --   --   --  9.4   *  --  171*  --  137*   < > 179*    < > = values in this interval not displayed.     Most Recent 2 LFT's:  Recent Labs   Lab Test 10/03/22  0512 10/01/22  0610   AST 15 12*   ALT 12 12   ALKPHOS 67 73   BILITOTAL 0.4 0.4     Most Recent INR's and Anticoagulation Dosing History:  Anticoagulation Dose History     Recent Dosing and Labs Latest Ref Rng & Units 7/5/2013 5/29/2015    INR <1.3 1.0 1.1        Most Recent 3 Troponin's:No lab results found.  Most Recent Cholesterol Panel:  Recent Labs   Lab Test 09/06/22  1241   CHOL 81   LDL 32   HDL 31   TRIG 88     Most Recent 6 Bacteria Isolates From Any Culture (See EPIC Reports for Culture Details):No lab results found.  Most Recent TSH, T4 and A1c Labs:  Recent Labs   Lab Test 09/06/22  1241   TSH 6.07*   T4 1.10   A1C 6.5*     Results for orders placed or performed during  the hospital encounter of 09/27/22   XR Chest Port 1 View    Narrative    PROCEDURE:  XR CHEST PORT 1 VIEW    HISTORY: Shortness of breath    COMPARISON:  Chest radiographs 9/21/2022, 9/11/2022, 8/26/2022    FINDINGS: Single view chest radiograph  Left chest Port-A-Cath with tip projecting over the inferior SVC.  Bilateral total shoulder arthroplasties.  Cardiomediastinal silhouette is stable. Increased right infrahilar  streaky opacities. Right upper lung nodule and right lower lobe mass.  Pleural spaces are clear. No suspicious osseous lesion or  subdiaphragmatic free air.      Impression    IMPRESSION:   Increased right infrahilar streaky opacities, atelectasis versus  infection.      ANNE BARAHONA MD         SYSTEM ID:  [id number]

## 2022-10-06 NOTE — TELEPHONE ENCOUNTER
Patient admitted to Hospice. No TCM call required per policy. Encounter closed. Gloria Macdonald RN on 10/6/2022 at 2:33 PM

## 2022-10-11 NOTE — TELEPHONE ENCOUNTER
.Dr Jovanni Sen reviewed and completed the following home care or hospice form(s) for Rajesh on10/11/2022   This covers the certification period effective 10/05/2022 to 01/02/2023.  Jay Cannon on 10/11/2022 at 11:14 AM

## 2022-10-12 NOTE — TELEPHONE ENCOUNTER
SERINA Cortez at [facility] called and stated that patient is declining in condition.  He will be on daily visits with the RN.  If the provider has any questions you can call the main office at [phone].    Rachelle Cheatham on 10/12/2022 at 11:01 AM

## 2022-10-24 NOTE — TELEPHONE ENCOUNTER
I agree with the Home Care order requests.    Electronically signed by:  Jovanni Sen MD  on September 8, 2022 10:21 AM    Patient is a 50y old  Male who presents with a chief complaint of Debility (23 Oct 2022 13:56)    Patient seen and examined at bedside. No acute overnight events. Denies pain/discomfort.   Slept poorly overnight. Nervous about biopsy today    ALLERGIES:  No Known Allergies    MEDICATIONS  (STANDING):  amitriptyline 10 milliGRAM(s) Oral at bedtime  amLODIPine   Tablet 10 milliGRAM(s) Oral daily  AQUAPHOR (petrolatum Ointment) 1 Application(s) Topical two times a day  atorvastatin 40 milliGRAM(s) Oral at bedtime  budesonide  80 MICROgram(s)/formoterol 4.5 MICROgram(s) Inhaler 2 Puff(s) Inhalation two times a day  carvedilol 25 milliGRAM(s) Oral every 12 hours  dextrose 5%. 1000 milliLiter(s) (50 mL/Hr) IV Continuous <Continuous>  dextrose 5%. 1000 milliLiter(s) (100 mL/Hr) IV Continuous <Continuous>  dextrose 50% Injectable 25 Gram(s) IV Push once  dextrose 50% Injectable 12.5 Gram(s) IV Push once  dextrose 50% Injectable 25 Gram(s) IV Push once  fenofibrate Tablet 145 milliGRAM(s) Oral daily  glucagon  Injectable 1 milliGRAM(s) IntraMuscular once  insulin glargine Injectable (LANTUS) 20 Unit(s) SubCutaneous at bedtime  insulin lispro (ADMELOG) corrective regimen sliding scale   SubCutaneous three times a day before meals  insulin lispro (ADMELOG) corrective regimen sliding scale   SubCutaneous at bedtime  insulin lispro Injectable (ADMELOG) 2 Unit(s) SubCutaneous before breakfast  insulin lispro Injectable (ADMELOG) 2 Unit(s) SubCutaneous before lunch  insulin lispro Injectable (ADMELOG) 2 Unit(s) SubCutaneous before dinner  lisinopril 2.5 milliGRAM(s) Oral daily  multivitamin 1 Tablet(s) Oral daily  pantoprazole    Tablet 40 milliGRAM(s) Oral before breakfast    MEDICATIONS  (PRN):  acetaminophen     Tablet .. 650 milliGRAM(s) Oral every 6 hours PRN Temp greater or equal to 38C (100.4F), Mild Pain (1 - 3)  ALBUTerol    90 MICROgram(s) HFA Inhaler 2 Puff(s) Inhalation every 6 hours PRN Shortness of Breath and/or Wheezing  Biotene Dry Mouth Oral Rinse 15 milliLiter(s) Swish and Spit three times a day PRN Mouth Care  dextrose Oral Gel 15 Gram(s) Oral once PRN Blood Glucose LESS THAN 70 milliGRAM(s)/deciliter  simethicone 80 milliGRAM(s) Chew daily PRN Gas  sodium chloride 0.65% Nasal 1 Spray(s) Both Nostrils every 1 hour PRN Nasal Congestion    Vital Signs Last 24 Hrs  T(F): 97.2 (24 Oct 2022 08:19), Max: 97.8 (24 Oct 2022 05:33)  HR: 70 (24 Oct 2022 08:55) (70 - 82)  BP: 126/79 (24 Oct 2022 08:19) (126/79 - 145/70)  RR: 15 (24 Oct 2022 08:19) (15 - 18)  SpO2: 98% (24 Oct 2022 08:55) (98% - 99%)  I&O's Summary    BMI (kg/m2): 29.6 (10-20-22 @ 20:43)    PHYSICAL EXAM:  General: NAD, A/O x 3  ENT: MMM, no tonsilar exudate  Neck: Supple, No JVD  Lungs: Clear to auscultation bilaterally, no wheezes. Good air entry bilaterally   Cardio: RRR, S1/S2, No murmurs  Abdomen: Soft, Nontender, Nondistended; Bowel sounds present  Extremities: No calf tenderness, No pitting edema    LABS:                        11.0   5.39  )-----------( 202      ( 24 Oct 2022 06:25 )             31.9       10-24    139  |  105  |  58  ----------------------------<  182  4.5   |  28  |  3.17    Ca    8.9      24 Oct 2022 06:25    TPro  6.9  /  Alb  3.1  /  TBili  0.2  /  DBili  x   /  AST  26  /  ALT  24  /  AlkPhos  92  10-24     POCT Blood Glucose.: 164 mg/dL (24 Oct 2022 08:02)  POCT Blood Glucose.: 212 mg/dL (23 Oct 2022 22:08)  POCT Blood Glucose.: 127 mg/dL (23 Oct 2022 16:48)  POCT Blood Glucose.: 192 mg/dL (23 Oct 2022 11:16)    COVID-19 PCR: NotDetec (10-23-22 @ 06:20)  COVID-19 PCR: NotDetec (10-20-22 @ 14:41)  COVID-19 PCR: NotDetec (10-17-22 @ 07:40)  COVID-19 PCR: NotDetec (10-13-22 @ 15:08)  COVID-19 PCR: NotDetec (10-11-22 @ 06:03)    RADIOLOGY & ADDITIONAL TESTS:     Care Discussed with Consultants/Other Providers:

## 2023-10-09 NOTE — NURSING NOTE
Rajesh Huynh is a 79 year old male presenting today for: face and scalp lesions  Medication Reconciliation: complete    Agnes Peña LPN 5/14/2020 9:49 AM                 What Type Of Note Output Would You Prefer (Optional)?: Standard Output How Severe Is Your Skin Lesion?: mild Has Your Skin Lesion Been Treated?: not been treated Is This A New Presentation, Or A Follow-Up?: Skin Lesion Which Family Member (Optional)?: Grandmother

## 2024-02-01 NOTE — PLAN OF CARE
Writer contacted pharmacy at this time to see how long we had to wait after giving alteplase through patient's port, as RN on NOCS was unable to get blood return. Pharmacist instructed to try to aspirate blood 30 min after med was given and if functioning, irrigate with NS otherwise if unable to draw blood then wait 120 minutes to try to aspirate. Writer will attempt this right after care conference.    no fever/no chills/no sweating/no anorexia

## 2024-10-08 NOTE — TELEPHONE ENCOUNTER
Called to offer patient a different simulation appointment time because of a cancellation. Ruth informed me that they will keep their original time because Douglas has another appointment at the time offered.    Comment: She had a right posterior earlobe keloid excision with Dr. Adorno on 9/27. The area is healing well with great cosmetic result.  Detail Level: Simple Render Risk Assessment In Note?: no

## (undated) RX ORDER — REGADENOSON 0.08 MG/ML
INJECTION, SOLUTION INTRAVENOUS
Status: DISPENSED
Start: 2020-11-05

## (undated) RX ORDER — CEFTRIAXONE SODIUM 1 G/50ML
INJECTION, SOLUTION INTRAVENOUS
Status: DISPENSED
Start: 2022-01-01

## (undated) RX ORDER — IPRATROPIUM BROMIDE AND ALBUTEROL SULFATE 2.5; .5 MG/3ML; MG/3ML
SOLUTION RESPIRATORY (INHALATION)
Status: DISPENSED
Start: 2018-05-30

## (undated) RX ORDER — POTASSIUM CHLORIDE 20MEQ/15ML
LIQUID (ML) ORAL
Status: DISPENSED
Start: 2022-01-01

## (undated) RX ORDER — MAGNESIUM HYDROXIDE/ALUMINUM HYDROXICE/SIMETHICONE 120; 1200; 1200 MG/30ML; MG/30ML; MG/30ML
SUSPENSION ORAL
Status: DISPENSED
Start: 2022-01-01

## (undated) RX ORDER — LIDOCAINE HYDROCHLORIDE 20 MG/ML
SOLUTION OROPHARYNGEAL
Status: DISPENSED
Start: 2022-01-01

## (undated) RX ORDER — MAGNESIUM SULFATE HEPTAHYDRATE 40 MG/ML
INJECTION, SOLUTION INTRAVENOUS
Status: DISPENSED
Start: 2022-01-01

## (undated) RX ORDER — POTASSIUM CHLORIDE 7.45 MG/ML
INJECTION INTRAVENOUS
Status: DISPENSED
Start: 2022-01-01

## (undated) RX ORDER — CYANOCOBALAMIN 1000 UG/ML
INJECTION, SOLUTION INTRAMUSCULAR; SUBCUTANEOUS
Status: DISPENSED
Start: 2020-08-05

## (undated) RX ORDER — POTASSIUM CHLORIDE 1500 MG/1
TABLET, EXTENDED RELEASE ORAL
Status: DISPENSED
Start: 2022-01-01

## (undated) RX ORDER — HEPARIN SODIUM (PORCINE) LOCK FLUSH IV SOLN 100 UNIT/ML 100 UNIT/ML
SOLUTION INTRAVENOUS
Status: DISPENSED
Start: 2022-01-01

## (undated) RX ORDER — MAGNESIUM SULFATE 1 G/100ML
INJECTION INTRAVENOUS
Status: DISPENSED
Start: 2022-01-01

## (undated) RX ORDER — IPRATROPIUM BROMIDE AND ALBUTEROL SULFATE 2.5; .5 MG/3ML; MG/3ML
SOLUTION RESPIRATORY (INHALATION)
Status: DISPENSED
Start: 2022-01-01